# Patient Record
Sex: FEMALE | Race: WHITE | NOT HISPANIC OR LATINO | Employment: OTHER | URBAN - METROPOLITAN AREA
[De-identification: names, ages, dates, MRNs, and addresses within clinical notes are randomized per-mention and may not be internally consistent; named-entity substitution may affect disease eponyms.]

---

## 2018-10-08 ENCOUNTER — HOSPITAL ENCOUNTER (EMERGENCY)
Facility: CLINIC | Age: 63
Discharge: HOME | End: 2018-10-08
Attending: EMERGENCY MEDICINE | Admitting: EMERGENCY MEDICINE
Payer: MEDICARE

## 2018-10-08 VITALS
BODY MASS INDEX: 25.67 KG/M2 | DIASTOLIC BLOOD PRESSURE: 118 MMHG | SYSTOLIC BLOOD PRESSURE: 146 MMHG | RESPIRATION RATE: 16 BRPM | WEIGHT: 119 LBS | TEMPERATURE: 98.9 F | HEIGHT: 57 IN | OXYGEN SATURATION: 92 %

## 2018-10-08 DIAGNOSIS — G25.71 AKATHISIA: ICD-10-CM

## 2018-10-08 DIAGNOSIS — T88.7XXA MEDICATION SIDE EFFECTS: ICD-10-CM

## 2018-10-08 LAB
ALBUMIN SERPL-MCNC: 3.5 G/DL (ref 3.4–5)
ALBUMIN UR-MCNC: NEGATIVE MG/DL
ALP SERPL-CCNC: 85 U/L (ref 40–150)
ALT SERPL W P-5'-P-CCNC: 21 U/L (ref 0–50)
ANION GAP SERPL CALCULATED.3IONS-SCNC: 7 MMOL/L (ref 3–14)
APPEARANCE UR: CLEAR
AST SERPL W P-5'-P-CCNC: 20 U/L (ref 0–45)
BASOPHILS # BLD AUTO: 0 10E9/L (ref 0–0.2)
BASOPHILS NFR BLD AUTO: 0.3 %
BILIRUB SERPL-MCNC: 0.8 MG/DL (ref 0.2–1.3)
BILIRUB UR QL STRIP: NEGATIVE
BUN SERPL-MCNC: 14 MG/DL (ref 7–30)
CALCIUM SERPL-MCNC: 8.7 MG/DL (ref 8.5–10.1)
CHLORIDE SERPL-SCNC: 107 MMOL/L (ref 94–109)
CO2 SERPL-SCNC: 25 MMOL/L (ref 20–32)
COLOR UR AUTO: YELLOW
CREAT SERPL-MCNC: 0.58 MG/DL (ref 0.52–1.04)
DIFFERENTIAL METHOD BLD: NORMAL
EOSINOPHIL # BLD AUTO: 0 10E9/L (ref 0–0.7)
EOSINOPHIL NFR BLD AUTO: 0 %
ERYTHROCYTE [DISTWIDTH] IN BLOOD BY AUTOMATED COUNT: 12.8 % (ref 10–15)
GFR SERPL CREATININE-BSD FRML MDRD: >90 ML/MIN/1.7M2
GLUCOSE SERPL-MCNC: 107 MG/DL (ref 70–99)
GLUCOSE UR STRIP-MCNC: 150 MG/DL
HCT VFR BLD AUTO: 36.9 % (ref 35–47)
HGB BLD-MCNC: 12.8 G/DL (ref 11.7–15.7)
HGB UR QL STRIP: NEGATIVE
IMM GRANULOCYTES # BLD: 0 10E9/L (ref 0–0.4)
IMM GRANULOCYTES NFR BLD: 0.4 %
KETONES UR STRIP-MCNC: NEGATIVE MG/DL
LEUKOCYTE ESTERASE UR QL STRIP: NEGATIVE
LYMPHOCYTES # BLD AUTO: 0.9 10E9/L (ref 0.8–5.3)
LYMPHOCYTES NFR BLD AUTO: 8.4 %
MAGNESIUM SERPL-MCNC: 2.3 MG/DL (ref 1.6–2.3)
MCH RBC QN AUTO: 30.1 PG (ref 26.5–33)
MCHC RBC AUTO-ENTMCNC: 34.7 G/DL (ref 31.5–36.5)
MCV RBC AUTO: 87 FL (ref 78–100)
MONOCYTES # BLD AUTO: 1.2 10E9/L (ref 0–1.3)
MONOCYTES NFR BLD AUTO: 11.4 %
NEUTROPHILS # BLD AUTO: 8 10E9/L (ref 1.6–8.3)
NEUTROPHILS NFR BLD AUTO: 79.5 %
NITRATE UR QL: NEGATIVE
NRBC # BLD AUTO: 0 10*3/UL
NRBC BLD AUTO-RTO: 0 /100
PH UR STRIP: 6 PH (ref 5–7)
PLATELET # BLD AUTO: 247 10E9/L (ref 150–450)
POTASSIUM SERPL-SCNC: 3.5 MMOL/L (ref 3.4–5.3)
PROT SERPL-MCNC: 7.1 G/DL (ref 6.8–8.8)
RBC # BLD AUTO: 4.25 10E12/L (ref 3.8–5.2)
RBC #/AREA URNS AUTO: <1 /HPF (ref 0–2)
SODIUM SERPL-SCNC: 139 MMOL/L (ref 133–144)
SOURCE: ABNORMAL
SP GR UR STRIP: 1.02 (ref 1–1.03)
UROBILINOGEN UR STRIP-MCNC: NORMAL MG/DL (ref 0–2)
WBC # BLD AUTO: 10.1 10E9/L (ref 4–11)
WBC #/AREA URNS AUTO: 0 /HPF (ref 0–5)

## 2018-10-08 PROCEDURE — 81001 URINALYSIS AUTO W/SCOPE: CPT | Performed by: PHYSICIAN ASSISTANT

## 2018-10-08 PROCEDURE — 85025 COMPLETE CBC W/AUTO DIFF WBC: CPT | Performed by: PHYSICIAN ASSISTANT

## 2018-10-08 PROCEDURE — 25000128 H RX IP 250 OP 636: Performed by: EMERGENCY MEDICINE

## 2018-10-08 PROCEDURE — 83735 ASSAY OF MAGNESIUM: CPT | Performed by: PHYSICIAN ASSISTANT

## 2018-10-08 PROCEDURE — 25000132 ZZH RX MED GY IP 250 OP 250 PS 637: Mod: GY | Performed by: EMERGENCY MEDICINE

## 2018-10-08 PROCEDURE — 80053 COMPREHEN METABOLIC PANEL: CPT | Performed by: PHYSICIAN ASSISTANT

## 2018-10-08 PROCEDURE — 25000132 ZZH RX MED GY IP 250 OP 250 PS 637: Mod: GY | Performed by: PHYSICIAN ASSISTANT

## 2018-10-08 PROCEDURE — 25000128 H RX IP 250 OP 636: Performed by: PHYSICIAN ASSISTANT

## 2018-10-08 PROCEDURE — A9270 NON-COVERED ITEM OR SERVICE: HCPCS | Mod: GY | Performed by: PHYSICIAN ASSISTANT

## 2018-10-08 PROCEDURE — 99285 EMERGENCY DEPT VISIT HI MDM: CPT | Mod: 25

## 2018-10-08 PROCEDURE — 25000125 ZZHC RX 250: Performed by: EMERGENCY MEDICINE

## 2018-10-08 PROCEDURE — 96375 TX/PRO/DX INJ NEW DRUG ADDON: CPT

## 2018-10-08 PROCEDURE — 96374 THER/PROPH/DIAG INJ IV PUSH: CPT

## 2018-10-08 PROCEDURE — 96376 TX/PRO/DX INJ SAME DRUG ADON: CPT

## 2018-10-08 PROCEDURE — A9270 NON-COVERED ITEM OR SERVICE: HCPCS | Mod: GY | Performed by: EMERGENCY MEDICINE

## 2018-10-08 PROCEDURE — 96361 HYDRATE IV INFUSION ADD-ON: CPT

## 2018-10-08 RX ORDER — METOPROLOL TARTRATE 1 MG/ML
5 INJECTION, SOLUTION INTRAVENOUS ONCE
Status: COMPLETED | OUTPATIENT
Start: 2018-10-08 | End: 2018-10-08

## 2018-10-08 RX ORDER — LORAZEPAM 2 MG/ML
0.5 INJECTION INTRAMUSCULAR ONCE
Status: COMPLETED | OUTPATIENT
Start: 2018-10-08 | End: 2018-10-08

## 2018-10-08 RX ORDER — BENZTROPINE MESYLATE 1 MG/ML
0.5 INJECTION, SOLUTION INTRAMUSCULAR; INTRAVENOUS 2 TIMES DAILY PRN
Status: DISCONTINUED | OUTPATIENT
Start: 2018-10-08 | End: 2018-10-08

## 2018-10-08 RX ORDER — TRIHEXYPHENIDYL HYDROCHLORIDE 2 MG/1
2 TABLET ORAL ONCE
Status: COMPLETED | OUTPATIENT
Start: 2018-10-08 | End: 2018-10-08

## 2018-10-08 RX ORDER — PROPRANOLOL HYDROCHLORIDE 1 MG/ML
1 INJECTION, SOLUTION INTRAVENOUS ONCE
Status: DISCONTINUED | OUTPATIENT
Start: 2018-10-08 | End: 2018-10-08 | Stop reason: CLARIF

## 2018-10-08 RX ORDER — TRIHEXYPHENIDYL HYDROCHLORIDE 2 MG/1
2 TABLET ORAL 3 TIMES DAILY
Qty: 30 TABLET | Refills: 0 | Status: SHIPPED | OUTPATIENT
Start: 2018-10-08 | End: 2019-04-15

## 2018-10-08 RX ORDER — IBUPROFEN 600 MG/1
600 TABLET, FILM COATED ORAL ONCE
Status: COMPLETED | OUTPATIENT
Start: 2018-10-08 | End: 2018-10-08

## 2018-10-08 RX ORDER — BENZTROPINE MESYLATE 1 MG/ML
0.5 INJECTION, SOLUTION INTRAMUSCULAR; INTRAVENOUS ONCE
Status: COMPLETED | OUTPATIENT
Start: 2018-10-08 | End: 2018-10-08

## 2018-10-08 RX ORDER — LABETALOL HYDROCHLORIDE 5 MG/ML
20 INJECTION, SOLUTION INTRAVENOUS ONCE
Status: COMPLETED | OUTPATIENT
Start: 2018-10-08 | End: 2018-10-08

## 2018-10-08 RX ORDER — BENZTROPINE MESYLATE 1 MG/ML
1 INJECTION, SOLUTION INTRAMUSCULAR; INTRAVENOUS ONCE
Status: DISCONTINUED | OUTPATIENT
Start: 2018-10-08 | End: 2018-10-08

## 2018-10-08 RX ADMIN — LORAZEPAM 0.5 MG: 2 INJECTION INTRAMUSCULAR; INTRAVENOUS at 10:18

## 2018-10-08 RX ADMIN — METOPROLOL TARTRATE 5 MG: 5 INJECTION, SOLUTION INTRAVENOUS at 11:42

## 2018-10-08 RX ADMIN — TRIHEXYPHENIDYL HYDROCHLORIDE 2 MG: 2 TABLET ORAL at 13:05

## 2018-10-08 RX ADMIN — LORAZEPAM 0.5 MG: 2 INJECTION INTRAMUSCULAR; INTRAVENOUS at 12:48

## 2018-10-08 RX ADMIN — BENZTROPINE MESYLATE 0.5 MG: 1 INJECTION INTRAMUSCULAR; INTRAVENOUS at 13:19

## 2018-10-08 RX ADMIN — IBUPROFEN 600 MG: 600 TABLET, FILM COATED ORAL at 11:40

## 2018-10-08 RX ADMIN — TRIHEXYPHENIDYL HYDROCHLORIDE 2 MG: 2 TABLET ORAL at 11:03

## 2018-10-08 RX ADMIN — LABETALOL HYDROCHLORIDE 20 MG: 5 INJECTION, SOLUTION INTRAVENOUS at 12:50

## 2018-10-08 ASSESSMENT — ENCOUNTER SYMPTOMS
BACK PAIN: 0
WEAKNESS: 1
DIFFICULTY URINATING: 0
NERVOUS/ANXIOUS: 0
HEADACHES: 0
NECK PAIN: 0

## 2018-10-08 NOTE — ED AVS SNAPSHOT
Emergency Department    [address] [facility] [address]    Phone:  [phone]    Fax:  [phone]                                       Kortney Germain   MRN: [id number]    Department:   Emergency Department   Date of Visit:  10/8/2018           Patient Information     Date Of Birth          1955        Your diagnoses for this visit were:     Medication side effects     Akathisia        You were seen by Faheem Green MD.      Follow-up Information     Follow up with  Emergency Department.    Specialty:  EMERGENCY MEDICINE    Why:  As needed, If symptoms worsen    Contact information:    [address] [facility] [address] [phone]        Follow up with Niki Parham MD In 3 days.    Specialty:  Family Practice    Why:  For re-check    Contact information:    [facility]  [address] [place] [address] [phone]          Discharge Instructions       Follow-up with your primary care provider and your psychiatrist regarding the adjustment of your medications.  You will take the Artane 3 times daily, and can take a fourth dose as needed for symptoms of restlessness and movements of arms and legs.    24 Hour Appointment Hotline       To make an appointment at any [facility], call [phone] ([phone]). If you don't have a family doctor or clinic, we will help you find one. [facility] [place] clinics are conveniently located to serve the needs of you and your family.             Review of your medicines      START taking        Dose / Directions Last dose taken    trihexyphenidyl 2 MG tablet   Commonly known as:  ARTANE   Dose:  2 mg   Quantity:  30 tablet        Take 1 tablet (2 mg) by mouth 3 times daily You may take an additional 1 tablet (2 mg) as needed for symptoms of restlessness in arms and legs for a total of 4 doses per day.   Refills:  0                Prescriptions were sent or printed at these locations (1  Prescription)                   Other Prescriptions                Printed at Department/Unit printer (1 of 1)         trihexyphenidyl (ARTANE) 2 MG tablet                Procedures and tests performed during your visit     CBC with platelets differential    Comprehensive metabolic panel    Magnesium    Patient care order    UA with Microscopic      Orders Needing Specimen Collection     None      Pending Results     No orders found from 10/6/2018 to 10/9/2018.            Pending Culture Results     No orders found from 10/6/2018 to 10/9/2018.            Pending Results Instructions     If you had any lab results that were not finalized at the time of your Discharge, you can call the ED Lab Result RN at [phone]. You will be contacted by this team for any positive Lab results or changes in treatment. The nurses are available 7 days a week from 10A to 6:30P.  You can leave a message 24 hours per day and they will return your call.        Test Results From Your Hospital Stay        10/8/2018 10:09 AM      Component Results     Component Value Ref Range & Units Status    WBC 10.1 4.0 - 11.0 10e9/L Final    RBC Count 4.25 3.8 - 5.2 10e12/L Final    Hemoglobin 12.8 11.7 - 15.7 g/dL Final    Hematocrit 36.9 35.0 - 47.0 % Final    MCV 87 78 - 100 fl Final    MCH 30.1 26.5 - 33.0 pg Final    MCHC 34.7 31.5 - 36.5 g/dL Final    RDW 12.8 10.0 - 15.0 % Final    Platelet Count 247 150 - 450 10e9/L Final    Diff Method Automated Method  Final    % Neutrophils 79.5 % Final    % Lymphocytes 8.4 % Final    % Monocytes 11.4 % Final    % Eosinophils 0.0 % Final    % Basophils 0.3 % Final    % Immature Granulocytes 0.4 % Final    Nucleated RBCs 0 0 /100 Final    Absolute Neutrophil 8.0 1.6 - 8.3 10e9/L Final    Absolute Lymphocytes 0.9 0.8 - 5.3 10e9/L Final    Absolute Monocytes 1.2 0.0 - 1.3 10e9/L Final    Absolute Eosinophils 0.0 0.0 - 0.7 10e9/L Final    Absolute Basophils 0.0 0.0 - 0.2 10e9/L Final    Abs Immature  Granulocytes 0.0 0 - 0.4 10e9/L Final    Absolute Nucleated RBC 0.0  Final         10/8/2018 10:23 AM      Component Results     Component Value Ref Range & Units Status    Sodium 139 133 - 144 mmol/L Final    Potassium 3.5 3.4 - 5.3 mmol/L Final    Chloride 107 94 - 109 mmol/L Final    Carbon Dioxide 25 20 - 32 mmol/L Final    Anion Gap 7 3 - 14 mmol/L Final    Glucose 107 (H) 70 - 99 mg/dL Final    Urea Nitrogen 14 7 - 30 mg/dL Final    Creatinine 0.58 0.52 - 1.04 mg/dL Final    GFR Estimate >90 >60 mL/min/1.7m2 Final    Non  GFR Calc    GFR Estimate If Black >90 >60 mL/min/1.7m2 Final    African American GFR Calc    Calcium 8.7 8.5 - 10.1 mg/dL Final    Bilirubin Total 0.8 0.2 - 1.3 mg/dL Final    Albumin 3.5 3.4 - 5.0 g/dL Final    Protein Total 7.1 6.8 - 8.8 g/dL Final    Alkaline Phosphatase 85 40 - 150 U/L Final    ALT 21 0 - 50 U/L Final    AST 20 0 - 45 U/L Final         10/8/2018 10:21 AM      Component Results     Component Value Ref Range & Units Status    Magnesium 2.3 1.6 - 2.3 mg/dL Final         10/8/2018 10:21 AM      Component Results     Component Value Ref Range & Units Status    Color Urine Yellow  Final    Appearance Urine Clear  Final    Glucose Urine 150 (A) NEG^Negative mg/dL Final    Bilirubin Urine Negative NEG^Negative Final    Ketones Urine Negative NEG^Negative mg/dL Final    Specific Gravity Urine 1.017 1.003 - 1.035 Final    Blood Urine Negative NEG^Negative Final    pH Urine 6.0 5.0 - 7.0 pH Final    Protein Albumin Urine Negative NEG^Negative mg/dL Final    Urobilinogen mg/dL Normal 0.0 - 2.0 mg/dL Final    Nitrite Urine Negative NEG^Negative Final    Leukocyte Esterase Urine Negative NEG^Negative Final    Source Catheterized Urine  Final    WBC Urine 0 0 - 5 /HPF Final    RBC Urine <1 0 - 2 /HPF Final                Clinical Quality Measure: Blood Pressure Screening     Your blood pressure was checked while you were in the emergency department today. The last  "reading we obtained was  BP: (!) 146/118 . Please read the guidelines below about what these numbers mean and what you should do about them.  If your systolic blood pressure (the top number) is less than 120 and your diastolic blood pressure (the bottom number) is less than 80, then your blood pressure is normal. There is nothing more that you need to do about it.  If your systolic blood pressure (the top number) is 120-139 or your diastolic blood pressure (the bottom number) is 80-89, your blood pressure may be higher than it should be. You should have your blood pressure rechecked within a year by a primary care provider.  If your systolic blood pressure (the top number) is 140 or greater or your diastolic blood pressure (the bottom number) is 90 or greater, you may have high blood pressure. High blood pressure is treatable, but if left untreated over time it can put you at risk for heart attack, stroke, or kidney failure. You should have your blood pressure rechecked by a primary care provider within the next 4 weeks.  If your provider in the emergency department today gave you specific instructions to follow-up with your doctor or provider even sooner than that, you should follow that instruction and not wait for up to 4 weeks for your follow-up visit.        Thank you for choosing [facility]       Thank you for choosing [facility] for your care. Our goal is always to provide you with excellent care. Hearing back from our patients is one way we can continue to improve our services. Please take a few minutes to complete the written survey that you may receive in the mail after you visit with us. Thank you!        [employer] Information     [employer] lets you send messages to your doctor, view your test results, renew your prescriptions, schedule appointments and more. To sign up, go to [email or web address] . Click on \"Log in\" on the left side of the screen, which will take you to the Welcome page. Then click on \"Sign " "up Now\" on the right side of the page.     You will be asked to enter the access code listed below, as well as some personal information. Please follow the directions to create your username and password.     Your access code is: [email or web address]  Expires: 2019  9:15 AM     Your access code will  in 90 days. If you need help or a new code, please call your [facility] clinic or [phone].        Care EveryWhere ID     This is your Care EveryWhere ID. This could be used by other organizations to access your [facility] medical records  [id number]        Equal Access to Services     [facility] CampusSYLVIE : Kim Henning, stacey resendiz, pelon sawant, kana weaver . So [facility] [phone].    ATENCIÓN: Si habla español, tiene a amaral disposición servicios gratuitos de asistencia lingüística. Llame al [phone].    We comply with applicable federal civil rights laws and [place] laws. We do not discriminate on the basis of race, color, national origin, age, disability, sex, sexual orientation, or gender identity.            After Visit Summary       This is your record. Keep this with you and show to your community pharmacist(s) and doctor(s) at your next visit.                  "

## 2018-10-08 NOTE — ED NOTES
Bed: ED19  Expected date: 10/8/18  Expected time: 8:42 AM  Means of arrival: Ambulance  Comments:  [id number] weak ETA [id number]

## 2018-10-08 NOTE — DISCHARGE INSTRUCTIONS
Follow-up with your primary care provider and your psychiatrist regarding the adjustment of your medications.  You will take the Artane 3 times daily, and can take a fourth dose as needed for symptoms of restlessness and movements of arms and legs.

## 2018-10-08 NOTE — ED AVS SNAPSHOT
Emergency Department    [address] [place] [address]    Phone:  [phone]    Fax:  [phone]                                       Kortney Germain   MRN: [id number]    Department:   Emergency Department   Date of Visit:  10/8/2018           After Visit Summary Signature Page     I have received my discharge instructions, and my questions have been answered. I have discussed any challenges I see with this plan with the nurse or doctor.    ..........................................................................................................................................  Patient/Patient Representative Signature      ..........................................................................................................................................  Patient Representative Print Name and Relationship to Patient    ..................................................               ................................................  Date                                   Time    ..........................................................................................................................................  Reviewed by Signature/Title    ...................................................              ..............................................  Date                                               Time          22EPIC Rev 08/18

## 2018-10-09 ENCOUNTER — APPOINTMENT (OUTPATIENT)
Dept: GENERAL RADIOLOGY | Facility: CLINIC | Age: 63
End: 2018-10-09
Attending: EMERGENCY MEDICINE
Payer: MEDICARE

## 2018-10-09 ENCOUNTER — HOSPITAL ENCOUNTER (EMERGENCY)
Facility: CLINIC | Age: 63
Discharge: HOME | End: 2018-10-09
Attending: EMERGENCY MEDICINE | Admitting: EMERGENCY MEDICINE
Payer: MEDICARE

## 2018-10-09 VITALS
SYSTOLIC BLOOD PRESSURE: 185 MMHG | BODY MASS INDEX: 25.75 KG/M2 | HEIGHT: 57 IN | DIASTOLIC BLOOD PRESSURE: 99 MMHG | TEMPERATURE: 99.5 F | RESPIRATION RATE: 18 BRPM | OXYGEN SATURATION: 94 %

## 2018-10-09 DIAGNOSIS — S32.10XA CLOSED FRACTURE OF SACRUM, UNSPECIFIED PORTION OF SACRUM, INITIAL ENCOUNTER (H): ICD-10-CM

## 2018-10-09 PROCEDURE — 25000132 ZZH RX MED GY IP 250 OP 250 PS 637: Mod: GY | Performed by: EMERGENCY MEDICINE

## 2018-10-09 PROCEDURE — 72220 X-RAY EXAM SACRUM TAILBONE: CPT

## 2018-10-09 PROCEDURE — A9270 NON-COVERED ITEM OR SERVICE: HCPCS | Mod: GY | Performed by: EMERGENCY MEDICINE

## 2018-10-09 PROCEDURE — 99283 EMERGENCY DEPT VISIT LOW MDM: CPT

## 2018-10-09 RX ORDER — TRIHEXYPHENIDYL HYDROCHLORIDE 2 MG/1
2 TABLET ORAL ONCE
Status: COMPLETED | OUTPATIENT
Start: 2018-10-09 | End: 2018-10-09

## 2018-10-09 RX ORDER — LORAZEPAM 0.5 MG/1
1 TABLET ORAL ONCE
Status: COMPLETED | OUTPATIENT
Start: 2018-10-09 | End: 2018-10-09

## 2018-10-09 RX ORDER — ACETAMINOPHEN 500 MG
1000 TABLET ORAL ONCE
Status: COMPLETED | OUTPATIENT
Start: 2018-10-09 | End: 2018-10-09

## 2018-10-09 RX ORDER — OXYCODONE HYDROCHLORIDE 5 MG/1
5 TABLET ORAL EVERY 6 HOURS PRN
Qty: 12 TABLET | Refills: 0 | Status: SHIPPED | OUTPATIENT
Start: 2018-10-09 | End: 2020-06-03

## 2018-10-09 RX ADMIN — TRIHEXYPHENIDYL HYDROCHLORIDE 2 MG: 2 TABLET ORAL at 13:12

## 2018-10-09 RX ADMIN — ACETAMINOPHEN 1000 MG: 500 TABLET ORAL at 13:12

## 2018-10-09 RX ADMIN — LORAZEPAM 1 MG: 0.5 TABLET ORAL at 13:12

## 2018-10-09 ASSESSMENT — ENCOUNTER SYMPTOMS
HEADACHES: 0
ARTHRALGIAS: 1
BACK PAIN: 0

## 2018-10-09 NOTE — ED AVS SNAPSHOT
Emergency Department    [address] [facility] [address]    Phone:  [phone]    Fax:  [phone]                                       Kortney Germain   MRN: [id number]    Department:   Emergency Department   Date of Visit:  10/9/2018           Patient Information     Date Of Birth          1955        Your diagnoses for this visit were:     Closed fracture of sacrum, unspecified portion of sacrum, initial encounter (H)        You were seen by Ruel Black MD.      Follow-up Information     Follow up with Niki Parham MD.    Specialty:  Family Practice    Contact information:    [facility]  [address]   [facility] [id number] [phone]          Discharge Instructions         Pelvic Fracture  You have a break or fracture of the pelvic bone. Your fracture is stable because the bones are not out of place and there are no signs of serious internal bleeding. No surgery or other special treatment will be needed. As long as your pain is controlled by oral medicine, you can be treated at home. A broken pelvis will take about 6 to 8 weeks to heal. It can be painful to move for the first 3 to 4 weeks.   Home care    Bed rest and pain medicine is the only treatment required. Stay in bed for the first 2 to 3 days to reduce pain with movement. During this time, you will need help bathing, using the bathroom, and meals. A bedpan or bedside commode may be easier to use than getting up to use the bathroom. As soon as possible, begin sitting or walking to avoid problems with prolonged bed rest (muscle weakness, worsening back stiffness and pain, blood clots in the legs). A walker, crutches, or cane will make walking easier in the first 3 weeks.    Home healthcare may be available to provide in-home nursing services. Check with your healthcare provider, the hospital s social service department or a private nursing agency to see if your insurance will cover this  kind of care.    During the first 2 days after the injury there will probably be localized swelling and bruising on the skin over the pelvis. During this time apply an ice pack to the painful area for no more than 20 minutes every 1 to 2 hours to reduce swelling and pain. Wrap the ice pack in a thin towel. Never put ice or an ice pack directly on your skin.    You may use over-the-counter pain medicine to control pain, unless another medicine was prescribed. Take pain medicine as directed. Call your healthcare provider if your pain is not well-controlled. A dose change or stronger medicine may be needed. If you have chronic liver or kidney disease, talk with your healthcare provider before using pain medicine.  Follow-up care  Follow up with your healthcare provider, or as advised. This will help to make sure the bone is healing properly.  If X-rays were taken, you will be told of any new findings that may affect your care.  Call 911  Call 911 if you have:    Swelling, pain or redness below your knee    Chest pain or shortness of breath  When to seek medical advice  Call your healthcare provider right away if any of these occur:    Pain becomes worse or you are unable to walk with help for more than three days    Blood in your urine or bleeding from the urethra (the opening where urine comes out)    Difficulty passing urine or unable to pass stool due to pain    Fever of 100.4 F (38 C) or higher, or as directed by your healthcare provider  Date Last Reviewed: 12/3/2015    1510-5894 [employer]. [address] [place] [address]. All rights reserved. This information is not intended as a substitute for professional medical care. Always follow your healthcare professional's instructions.        Coccyx or Sacrum Contusion    You have a contusion (bruise) of the coccyx or sacrum. The sacrum is the triangular bone at the base of the spine that joins the pelvic bones. The coccyx (tailbone) is the  last bone of the sacrum that hangs down in a point like a small tail. Symptoms include swelling and some bleeding under the skin. This injury generaly takes a few weeks to heal. During that time, the bruise will typically change in color from reddish, to purple-blue, to greenish-yellow, then to yellow-brown. A crack (fracture) in the coccyx bone causes the same symptoms as a contusion in this area. Often, X-rays are not taken since the treatment is the same. If you have fracture of the tailbone as well as a contusion, healing generally takes about 4 weeks or longer.  Home care    Try to find a position of comfort. Try lying on your side with your knees bent up towards your chest and a pillow between your knees.    A bruised tailbone causes pain when sitting. You may try using a donut pillow. This is a foam pillow with a hole in the center to prevent pressure on the tailbone. You can buy this at a pharmacy or orthopedic supply store.    Ice the injured area to help reduce pain and swelling. Wrap a cold source (ice pack or ice cubes in a plastic bag) in a thin towel. Apply to the bruised area for 20 minutes every 1 to 2 hours the first day. Continue this 3 to 4 times a day until the pain and swelling goes away.    Unless another medicine was prescribed, you can take acetaminophen, ibuprofen, or naproxen to control pain. (If you have chronic liver or kidney disease or ever had a stomach ulcer or gastrointestinal bleeding, talk with your doctor before using these medicines.)  Follow-up care  Follow up with your healthcare provider, or as advised. Call if you are not improving within 2 weeks.  When to seek medical advice   Call your healthcare provider right away if you have any of the following:    Increased pain or swelling    Pain that becomes worse or spreads to one or both legs    Weakness or numbness in one or both legs    Loss of bowel or bladder control    Numbness in the groin area    Signs of infection,  including warmth, drainage, or increased redness    Frequent bruising for unknown reasons  Date Last Reviewed: 2/1/2017 2000-2017 [employer]. [address] [place] [address], [place] [address]. All rights reserved. This information is not intended as a substitute for professional medical care. Always follow your healthcare professional's instructions.    1.  Take Tylenol 500 mg tablets, take 2 tablets 1,000 mg three times a day for pain  2.  Take oxycodone for more severe pain not controlled with tylenol  3.  Weight bearing as tolerated  4.  Follow up with primary MD        24 Hour Appointment Hotline       To make an appointment at any [facility], call [phone] ([phone]). If you don't have a family doctor or clinic, we will help you find one. [facility] are conveniently located to serve the needs of you and your family.             Review of your medicines      START taking        Dose / Directions Last dose taken    oxyCODONE IR 5 MG tablet   Commonly known as:  ROXICODONE   Dose:  5 mg   Quantity:  12 tablet        Take 1 tablet (5 mg) by mouth every 6 hours as needed for pain   Refills:  0          Our records show that you are taking the medicines listed below. If these are incorrect, please call your family doctor or clinic.        Dose / Directions Last dose taken    ATIVAN PO        Refills:  0        BENADRYL PO        Refills:  0        PERPHENAZINE PO        Refills:  0        trihexyphenidyl 2 MG tablet   Commonly known as:  ARTANE   Dose:  2 mg   Quantity:  30 tablet        Take 1 tablet (2 mg) by mouth 3 times daily You may take an additional 1 tablet (2 mg) as needed for symptoms of restlessness in arms and legs for a total of 4 doses per day.   Refills:  0                Information about OPIOIDS     PRESCRIPTION OPIOIDS: WHAT YOU NEED TO KNOW   We gave you an opioid (narcotic) pain medicine. It is important to manage your pain, but opioids are not always the best  choice. You should first try all the other options your care team gave you. Take this medicine for as short a time (and as few doses) as possible.    Some activities can increase your pain, such as bandage changes or therapy sessions. It may help to take your pain medicine 30 to 60 minutes before these activities. Reduce your stress by getting enough sleep, working on hobbies you enjoy and practicing relaxation or meditation. Talk to your care team about ways to manage your pain beyond prescription opioids.    These medicines have risks:    DO NOT drive when on new or higher doses of pain medicine. These medicines can affect your alertness and reaction times, and you could be arrested for driving under the influence (DUI). If you need to use opioids long-term, talk to your care team about driving.    DO NOT operate heavy machinery    DO NOT do any other dangerous activities while taking these medicines.    DO NOT drink any alcohol while taking these medicines.     If the opioid prescribed includes acetaminophen, DO NOT take with any other medicines that contain acetaminophen. Read all labels carefully. Look for the word  acetaminophen  or  Tylenol.  Ask your pharmacist if you have questions or are unsure.    You can get addicted to pain medicines, especially if you have a history of addiction (chemical, alcohol or substance dependence). Talk to your care team about ways to reduce this risk.    All opioids tend to cause constipation. Drink plenty of water and eat foods that have a lot of fiber, such as fruits, vegetables, prune juice, apple juice and high-fiber cereal. Take a laxative (Miralax, milk of magnesia, Colace, Senna) if you don t move your bowels at least every other day. Other side effects include upset stomach, sleepiness, dizziness, throwing up, tolerance (needing more of the medicine to have the same effect), physical dependence and slowed breathing.    Store your pills in a secure place, locked if  possible. We will not replace any lost or stolen medicine. If you don t finish your medicine, please throw away (dispose) as directed by your pharmacist. The [place] [employer]ution [employer] has more information about safe disposal: https://www.pca.state.[place]/living-green/managing-unwanted-medications        Prescriptions were sent or printed at these locations (1 Prescription)                   Other Prescriptions                Printed at Department/Unit printer (1 of 1)         oxyCODONE IR (ROXICODONE) 5 MG tablet                Procedures and tests performed during your visit     XR Sacrum and Coccyx 2 Views      Orders Needing Specimen Collection     None      Pending Results     No orders found from 10/7/2018 to 10/10/2018.            Pending Culture Results     No orders found from 10/7/2018 to 10/10/2018.            Pending Results Instructions     If you had any lab results that were not finalized at the time of your Discharge, you can call the ED Lab Result RN at [phone]. You will be contacted by this team for any positive Lab results or changes in treatment. The nurses are available 7 days a week from 10A to 6:30P.  You can leave a message 24 hours per day and they will return your call.        Test Results From Your Hospital Stay        10/9/2018  2:13 PM      Narrative     XR SACRUM AND COCCYX 2 VW 10/9/2018 2:06 PM    HISTORY: Pain.    COMPARISON: None.        Impression     IMPRESSION: Lateral view demonstrates slight cortical irregularity of  the anterior sacrum concerning for a mildly displaced fracture.  Consider further evaluation with CT if warranted. Ill-defined  calcifications overlying the sacrum on frontal view likely reflect  calcified uterine fibroids.     TIMUR WOOTEN MD                Clinical Quality Measure: Blood Pressure Screening     Your blood pressure was checked while you were in the emergency department today. The last reading we obtained was  BP: (!) 185/99 . Please  "read the guidelines below about what these numbers mean and what you should do about them.  If your systolic blood pressure (the top number) is less than 120 and your diastolic blood pressure (the bottom number) is less than 80, then your blood pressure is normal. There is nothing more that you need to do about it.  If your systolic blood pressure (the top number) is 120-139 or your diastolic blood pressure (the bottom number) is 80-89, your blood pressure may be higher than it should be. You should have your blood pressure rechecked within a year by a primary care provider.  If your systolic blood pressure (the top number) is 140 or greater or your diastolic blood pressure (the bottom number) is 90 or greater, you may have high blood pressure. High blood pressure is treatable, but if left untreated over time it can put you at risk for heart attack, stroke, or kidney failure. You should have your blood pressure rechecked by a primary care provider within the next 4 weeks.  If your provider in the emergency department today gave you specific instructions to follow-up with your doctor or provider even sooner than that, you should follow that instruction and not wait for up to 4 weeks for your follow-up visit.        Thank you for choosing [facility]       Thank you for choosing [facility] for your care. Our goal is always to provide you with excellent care. Hearing back from our patients is one way we can continue to improve our services. Please take a few minutes to complete the written survey that you may receive in the mail after you visit with us. Thank you!        [employer] Information     [employer] lets you send messages to your doctor, view your test results, renew your prescriptions, schedule appointments and more. To sign up, go to [email or web address] . Click on \"Log in\" on the left side of the screen, which will take you to the Welcome page. Then click on \"Sign up Now\" on the right side of the page.     You " will be asked to enter the access code listed below, as well as some personal information. Please follow the directions to create your username and password.     Your access code is: [email or web address]  Expires: 2019  9:15 AM     Your access code will  in 90 days. If you need help or a new code, please call your [facility] clinic or [phone].        Care EveryWhere ID     This is your Care EveryWhere ID. This could be used by other organizations to access your [facility] medical records  [id number]        Equal Access to Services     [facility]: Kim Henning, stacey resendiz, pelon sawant, kana weaver . So [facility] [phone].    ATENCIÓN: Si habla español, tiene a amaral disposición servicios gratuitos de asistencia lingüística. Rosi al [phone].    We comply with applicable federal civil rights laws and [place] laws. We do not discriminate on the basis of race, color, national origin, age, disability, sex, sexual orientation, or gender identity.            After Visit Summary       This is your record. Keep this with you and show to your community pharmacist(s) and doctor(s) at your next visit.

## 2018-10-09 NOTE — ED NOTES
Bed: ED26  Expected date:   Expected time:   Means of arrival:   Comments:  william - 514 - 63 F fall buttocks pain eta 1238

## 2018-10-09 NOTE — ED PROVIDER NOTES
"  History     Chief Complaint:  Buttock pain    HPI   Kortney Germain is a 63 year old female who presents with concern for buttock pain. The patient reports that she lives in a group home, and that she fell standing up from her shower chair, due to a slippery shower mat. She landed on her back, but denies any head trauma or injury. She notes sacral pain as well as buttock pain secondary to the fall, which has persisted over the past week. The staff at this group home sent her to the emergency department with the claim that she is unable to walk independently with her walker. The patient denies this, although states that walking has indeed become more difficult. She has been managing her pain with Tylenol. She states She denies thoracic or lumbar back pain, or leg pain, or a history of osteoporosis, as well as all other concerns here in the ER today. Of note, she was seen here in the ED for the same pain yesterday.          Allergies:  Poultry meal  Potassium  Penicillins  Egg  Clozapine  Amlodipine    Medications:    Artane  Ativan  Benadryl    Past Medical History:    Dyskinesia  Schizo affective schizophrenia    Past Surgical History:    The patient does not have any pertinent past surgical history.    Family History:    No past pertinent family history.    Social History:  Marital Status:  Single [1]  Negative for tobacco use.  Negative for alcohol use.    Review of Systems   Musculoskeletal: Positive for arthralgias. Negative for back pain.   Neurological: Negative for headaches.   All other systems reviewed and are negative.      Physical Exam     Patient Vitals for the past 24 hrs:   BP Temp Temp src Heart Rate Resp SpO2 Height   10/09/18 1500 (!) 185/99 - - - - - -   10/09/18 1430 (!) 193/97 - - - - - -   10/09/18 1330 (!) 190/96 - - - - - -   10/09/18 1300 181/89 - - - - - -   10/09/18 1240 (!) 189/101 99.5  F (37.5  C) Oral 75 18 94 % 1.448 m (4' 9\")       Physical Exam    GENERAL: well developed, " pleasant  HEAD: atraumatic  EYES: pupils reactive, extraocular muscles intact, conjunctivae normal  ENT:  mucus membranes moist  NECK:  trachea midline, normal range of motion  RESPIRATORY: no tachypnea, breath sounds clear to auscultation   CVS: normal S1/S2, no murmurs, intact distal pulses  ABDOMEN: soft, nontender, nondistention  MUSCULOSKELETAL: no deformities  SKIN: warm and dry, no acute rashes or ulceration  NEURO: GCS 15, cranial nerves intact, alert and oriented x3  PSYCH:  Mood/affect normal      Emergency Department Course     Imaging:  Radiographic findings were communicated with the patient who voiced understanding of the findings.      XR sacrum and coccyx 2 views:  Lateral view demonstrates slight cortical irregularity of  the anterior sacrum concerning for a mildly displaced fracture.  Consider further evaluation with CT if warranted. Ill-defined  calcifications overlying the sacrum on frontal view likely reflect  calcified uterine fibroids.  as per radiology    Interventions:    1312 Artane 2 mg Oral   Ativan 1 mg Oral   Tylenol 1 g Oral      Emergency Department Course:    Nursing notes and vitals reviewed. (1667) I performed an exam of the patient as documented above.     Medicine administered as documented above. Blood drawn. This was sent to the lab for further testing, results above.    The patient was sent for a XR sacrum and coccyx while in the emergency department, findings above.     (4709) I rechecked the patient and discussed the results of her workup thus far.     Findings and plan explained to the Patient. Patient discharged home with instructions regarding supportive care, medications, and reasons to return. The importance of close follow-up was reviewed. The patient was prescribed Roxicodone.    I personally reviewed the imaging results with the Patient and answered all related questions prior to discharge.     Impression & Plan      Medical Decision Making:  Kortney Germain presents  with buttock pain after a fall. Xray of the coccyx and sacrum shows a subtle fracture. It is non displaced. Did not feel we needed dedicated images to prove this. Discussed supportive care with her. Waiting to see if she can ambulate with a walker and she is going back to a group home setting.  Patient tolerated walking and is safe for discharge.    Diagnosis:    ICD-10-CM    1. Closed fracture of sacrum, unspecified portion of sacrum, initial encounter (H) S32.10XA        Disposition:  discharged to home    Discharge Medications:  New Prescriptions    OXYCODONE IR (ROXICODONE) 5 MG TABLET    Take 1 tablet (5 mg) by mouth every 6 hours as needed for pain     Scribe Disclosure:  I, Dorian Pedroza, am serving as a scribe on 10/9/2018 at 12:43 PM to personally document services performed by Ruel Black MD based on my observations and the provider's statements to me.       Dorian Pedroza  10/9/2018    EMERGENCY DEPARTMENT       Ruel Black MD  10/12/18 0022

## 2018-10-09 NOTE — ED AVS SNAPSHOT
Emergency Department    [address] [place] [address]    Phone:  [phone]    Fax:  [phone]                                       Kortney Germain   MRN: [id number]    Department:   Emergency Department   Date of Visit:  10/9/2018           After Visit Summary Signature Page     I have received my discharge instructions, and my questions have been answered. I have discussed any challenges I see with this plan with the nurse or doctor.    ..........................................................................................................................................  Patient/Patient Representative Signature      ..........................................................................................................................................  Patient Representative Print Name and Relationship to Patient    ..................................................               ................................................  Date                                   Time    ..........................................................................................................................................  Reviewed by Signature/Title    ...................................................              ..............................................  Date                                               Time          22EPIC Rev 08/18

## 2018-10-09 NOTE — DISCHARGE INSTRUCTIONS
Pelvic Fracture  You have a break or fracture of the pelvic bone. Your fracture is stable because the bones are not out of place and there are no signs of serious internal bleeding. No surgery or other special treatment will be needed. As long as your pain is controlled by oral medicine, you can be treated at home. A broken pelvis will take about 6 to 8 weeks to heal. It can be painful to move for the first 3 to 4 weeks.   Home care    Bed rest and pain medicine is the only treatment required. Stay in bed for the first 2 to 3 days to reduce pain with movement. During this time, you will need help bathing, using the bathroom, and meals. A bedpan or bedside commode may be easier to use than getting up to use the bathroom. As soon as possible, begin sitting or walking to avoid problems with prolonged bed rest (muscle weakness, worsening back stiffness and pain, blood clots in the legs). A walker, crutches, or cane will make walking easier in the first 3 weeks.    Home healthcare may be available to provide in-home nursing services. Check with your healthcare provider, the hospital s social service department or a private nursing agency to see if your insurance will cover this kind of care.    During the first 2 days after the injury there will probably be localized swelling and bruising on the skin over the pelvis. During this time apply an ice pack to the painful area for no more than 20 minutes every 1 to 2 hours to reduce swelling and pain. Wrap the ice pack in a thin towel. Never put ice or an ice pack directly on your skin.    You may use over-the-counter pain medicine to control pain, unless another medicine was prescribed. Take pain medicine as directed. Call your healthcare provider if your pain is not well-controlled. A dose change or stronger medicine may be needed. If you have chronic liver or kidney disease, talk with your healthcare provider before using pain medicine.  Follow-up care  Follow up with  your healthcare provider, or as advised. This will help to make sure the bone is healing properly.  If X-rays were taken, you will be told of any new findings that may affect your care.  Call 911  Call 911 if you have:    Swelling, pain or redness below your knee    Chest pain or shortness of breath  When to seek medical advice  Call your healthcare provider right away if any of these occur:    Pain becomes worse or you are unable to walk with help for more than three days    Blood in your urine or bleeding from the urethra (the opening where urine comes out)    Difficulty passing urine or unable to pass stool due to pain    Fever of 100.4 F (38 C) or higher, or as directed by your healthcare provider  Date Last Reviewed: 12/3/2015    4718-5067 [employer]. [address] [place] [address]. All rights reserved. This information is not intended as a substitute for professional medical care. Always follow your healthcare professional's instructions.        Coccyx or Sacrum Contusion    You have a contusion (bruise) of the coccyx or sacrum. The sacrum is the triangular bone at the base of the spine that joins the pelvic bones. The coccyx (tailbone) is the last bone of the sacrum that hangs down in a point like a small tail. Symptoms include swelling and some bleeding under the skin. This injury generaly takes a few weeks to heal. During that time, the bruise will typically change in color from reddish, to purple-blue, to greenish-yellow, then to yellow-brown. A crack (fracture) in the coccyx bone causes the same symptoms as a contusion in this area. Often, X-rays are not taken since the treatment is the same. If you have fracture of the tailbone as well as a contusion, healing generally takes about 4 weeks or longer.  Home care    Try to find a position of comfort. Try lying on your side with your knees bent up towards your chest and a pillow between your knees.    A bruised tailbone causes pain  when sitting. You may try using a donut pillow. This is a foam pillow with a hole in the center to prevent pressure on the tailbone. You can buy this at a pharmacy or orthopedic supply store.    Ice the injured area to help reduce pain and swelling. Wrap a cold source (ice pack or ice cubes in a plastic bag) in a thin towel. Apply to the bruised area for 20 minutes every 1 to 2 hours the first day. Continue this 3 to 4 times a day until the pain and swelling goes away.    Unless another medicine was prescribed, you can take acetaminophen, ibuprofen, or naproxen to control pain. (If you have chronic liver or kidney disease or ever had a stomach ulcer or gastrointestinal bleeding, talk with your doctor before using these medicines.)  Follow-up care  Follow up with your healthcare provider, or as advised. Call if you are not improving within 2 weeks.  When to seek medical advice   Call your healthcare provider right away if you have any of the following:    Increased pain or swelling    Pain that becomes worse or spreads to one or both legs    Weakness or numbness in one or both legs    Loss of bowel or bladder control    Numbness in the groin area    Signs of infection, including warmth, drainage, or increased redness    Frequent bruising for unknown reasons  Date Last Reviewed: 2/1/2017 2000-2017 [employer]. [address] [place] [address], [place] [address]. All rights reserved. This information is not intended as a substitute for professional medical care. Always follow your healthcare professional's instructions.    1.  Take Tylenol 500 mg tablets, take 2 tablets 1,000 mg three times a day for pain  2.  Take oxycodone for more severe pain not controlled with tylenol  3.  Weight bearing as tolerated  4.  Follow up with primary MD

## 2018-10-30 ENCOUNTER — HOSPITAL ENCOUNTER (EMERGENCY)
Facility: CLINIC | Age: 63
Discharge: HOME | End: 2018-10-30
Attending: EMERGENCY MEDICINE | Admitting: EMERGENCY MEDICINE
Payer: MEDICARE

## 2018-10-30 ENCOUNTER — APPOINTMENT (OUTPATIENT)
Dept: CT IMAGING | Facility: CLINIC | Age: 63
End: 2018-10-30
Attending: EMERGENCY MEDICINE
Payer: MEDICARE

## 2018-10-30 VITALS
RESPIRATION RATE: 16 BRPM | HEIGHT: 57 IN | SYSTOLIC BLOOD PRESSURE: 136 MMHG | DIASTOLIC BLOOD PRESSURE: 83 MMHG | OXYGEN SATURATION: 95 % | HEART RATE: 75 BPM | WEIGHT: 97 LBS | TEMPERATURE: 97.6 F | BODY MASS INDEX: 20.93 KG/M2

## 2018-10-30 DIAGNOSIS — R42 DIZZINESS: ICD-10-CM
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
## 2018-10-30 PROCEDURE — 99284 EMERGENCY DEPT VISIT MOD MDM: CPT | Mod: 25

## 2018-10-30 PROCEDURE — 70450 CT HEAD/BRAIN W/O DYE: CPT

## 2018-10-30 ASSESSMENT — ENCOUNTER SYMPTOMS
MYALGIAS: 1
DIZZINESS: 1
NAUSEA: 0
VOMITING: 0
ABDOMINAL PAIN: 0
NECK PAIN: 0

## 2018-10-30 NOTE — ED AVS SNAPSHOT
Emergency Department    [address] [facility] [address]    Phone:  [phone]    Fax:  [phone]                                       Kortney Germain   MRN: [id number]    Department:   Emergency Department   Date of Visit:  10/30/2018           Patient Information     Date Of Birth          1955        Your diagnoses for this visit were:     Dizziness     Fall, initial encounter        You were seen by Umang Arceo MD.      Follow-up Information     Follow up with  Emergency Department.    Specialty:  EMERGENCY MEDICINE    Why:  As needed    Contact information:    [address] [facility] [address] [phone]        Follow up with Niki Parham MD.    Specialty:  Family Practice    Why:  As needed    Contact information:    [facility] [place]  [address] [facility] [address] [phone]          Discharge Instructions       Drink plenty of fluids and eat normally.    Please follow-up with your primary doctor by the end of the week if symptoms persist.    Please return to the emergency department as needed for new or worsening symptoms including speech changes, fainting, chest pain, shortness of breath, vomiting and unable to keep anything down, focal weakness to one side of her body, any other concerning symptoms.              Discharge Instructions  Concussion    You were seen today for signs of a concussion.  The symptoms will vary, depending on the nature of your injury and your health. You may have: headache, confusion, nausea (feel sick to your stomach), vomiting (throwing up) and problems with memory, concentrating, or sleep. You may feel dizzy, irritable, and tired. Children and teens may need help from their parents, teachers, and coaches to watch for symptoms as they recover.    Generally, every Emergency Department visit should have a follow-up clinic visit with either a primary or a specialty clinic/provider.  Please follow-up as instructed by your emergency provider today.     Return to the Emergency Department if:    Your headache gets worse or you start to have a really bad headache even with the recommended treatment plan.     You feel drowsier, have growing confusion, or slurred speech.     You keep repeating yourself.     You have strange behavior or are feeling more irritable.     You have a seizure.     You vomit (throw up) more than once.     You have trouble walking.     You have weakness or numbness.    Your neck pain gets worse.     You have a loss of consciousness.     You have blood for fluid coming from your ears or nose.     You have new symptoms or anything that worries you.     Home Care:    Get lots of rest and get enough sleep at night. Take daytime naps or rest if you feel tired.     Limit physical activity and  thinking  activities. These can make symptoms worse.   o Physical activities include gym, sports, weight training, running, exercise, and heavy lifting.   o Thinking activities include homework, class work, job-related work, and screen time (phone, computer, tablet, TV, and video games).     Stick to a healthy diet and drink lots of fluids. Avoid alcohol.    As symptoms improve, you may slowly return to your daily activities. If symptoms get worse or return, reduce your activity.     Know that it is normal to feel sad or frustrated when you do not feel right and are less active.     Going Back to Work:    Your care team will tell you when you are ready to return to work.      Limit the amount of work you do soon after your injury. This may speed healing. Take breaks if your symptoms get worse. You should also reduce your physical activity as well as activities that require a lot of thinking until you see your doctor. You may need shorter work days and a lighter workload.  Avoid heavy lifting, working with machinery, driving and working at heights until your symptoms are gone or you are cleared  by a provider.    Going Back to School:    If you are still having symptoms, you may need extra help at school.    Tell your teachers and school nurse about your injury and symptoms. Ask them to watch for problems with learning, memory, and concentrating. Symptoms may get worse when you do schoolwork, and you may become more irritable. You may need shorter school days, a reduced workload, and to postpone testing.  Do not drive or take gym class (physical activity) until cleared by a provider.    Returning to Sports:    Never return to play if you have any symptoms. A full recovery will reduce the chances of getting hurt again. Remember, it is better to miss one or two games than a whole season.    You should rest from all physical activity until you see your provider. Generally, if all symptoms have completely cleared, your provider can help guide you to slowly return to sports. If symptoms return or worsen, stop the activity and see your provider.    Important: If you are in an organized sport and under age 18, you will need written consent from a healthcare provider before you return to sports. Typically, this will be your primary care or sports medicine provider. Please make an appointment.    If you were given a prescription for medicine here today, be sure to read all of the information (including the package insert) that comes with your prescription.  This will include important information about the medicine, its side effects, and any warnings that you need to know about.  The pharmacist who fills the prescription can provide more information and answer questions you may have about the medicine.  If you have questions or concerns that the pharmacist cannot address, please call or return to the Emergency Department.     Remember that you can always come back to the Emergency Department if you are not able to see your regular provider in the amount of time listed above, if you get any new symptoms, or if there  is anything that worries you.    Discharge Instructions  Head Injury    You have been seen today for a head injury. Your evaluation included a history and physical examination. You may have had a CT (CAT) scan performed, though most head injuries do not require a scan. Based on this evaluation, your provider today does not feel that your head injury is serious.    Generally, every Emergency Department visit should have a follow-up clinic visit with either a primary or a specialty clinic/provider. Please follow-up as instructed by your emergency provider today.  Return to the Emergency Department if:    You are confused or you are not acting right.    Your headache gets worse or you start to have a really bad headache even with your recommended treatment plan.    You vomit (throw up) more than once.    You have a seizure.    You have trouble walking.    You have weakness or paralysis (cannot move) in an arm or a leg.    You have blood or fluid coming from your ears or nose.    You have new symptoms or anything that worries you.    Sleeping:  It is okay for you to sleep, but someone should wake you up if instructed by your provider, and someone should check on you at your usual time to wake up.     Activity:    Do not drive for at least 24 hours.    Do not drive if you have dizzy spells or trouble concentrating, or remembering things.    Do not return to any contact sports until cleared by your regular provider.     MORE INFORMATION:    Concussion:  A concussion is a minor head injury that may cause temporary problems with the way the brain works. Although concussions are important, they are generally not an emergency or a reason that a person needs to be hospitalized. Some concussion symptoms include confusion, amnesia (forgetful), nausea (sick to your stomach) and vomiting (throwing up), dizziness, fatigue, memory or concentration problems, irritability and sleep problems. For most people, concussions are mild and  temporary but some will have more severe and persistent symptoms that require on-going care and treatment.  CT Scans: Your evaluation today may have included a CT scan (CAT scan) to look for things like bleeding or a skull fracture (broken bone).  CT scans involve radiation and too many CT scans can cause serious health problems like cancer, especially in children.  Because of this, your provider may not have ordered a CT scan today if they think you are at low risk for a serious or life threatening problem.    If you were given a prescription for medicine here today, be sure to read all of the information (including the package insert) that comes with your prescription.  This will include important information about the medicine, its side effects, and any warnings that you need to know about.  The pharmacist who fills the prescription can provide more information and answer questions you may have about the medicine.  If you have questions or concerns that the pharmacist cannot address, please call or return to the Emergency Department.     Remember that you can always come back to the Emergency Department if you are not able to see your regular provider in the amount of time listed above, if you get any new symptoms, or if there is anything that worries you.      24 Hour Appointment Hotline       To make an appointment at any [facility], call [phone] ([phone]). If you don't have a family doctor or clinic, we will help you find one. [facility] are conveniently located to serve the needs of you and your family.             Review of your medicines      Our records show that you are taking the medicines listed below. If these are incorrect, please call your family doctor or clinic.        Dose / Directions Last dose taken    ATIVAN PO        Refills:  0        BENADRYL PO        Refills:  0        ESCITALOPRAM OXALATE PO        Refills:  0        oxyCODONE IR 5 MG tablet   Commonly known as:   ROXICODONE   Dose:  5 mg   Quantity:  12 tablet        Take 1 tablet (5 mg) by mouth every 6 hours as needed for pain   Refills:  0        PERPHENAZINE PO        Refills:  0        trihexyphenidyl 2 MG tablet   Commonly known as:  ARTANE   Dose:  2 mg   Quantity:  30 tablet        Take 1 tablet (2 mg) by mouth 3 times daily You may take an additional 1 tablet (2 mg) as needed for symptoms of restlessness in arms and legs for a total of 4 doses per day.   Refills:  0                Procedures and tests performed during your visit     CT Head w/o Contrast      Orders Needing Specimen Collection     None      Pending Results     Date and Time Order Name Status Description    10/30/2018 0936 CT Head w/o Contrast Preliminary             Pending Culture Results     No orders found from 10/28/2018 to 10/31/2018.            Pending Results Instructions     If you had any lab results that were not finalized at the time of your Discharge, you can call the ED Lab Result RN at [phone]. You will be contacted by this team for any positive Lab results or changes in treatment. The nurses are available 7 days a week from 10A to 6:30P.  You can leave a message 24 hours per day and they will return your call.        Test Results From Your Hospital Stay        10/30/2018 10:00 AM      Narrative     CT OF THE HEAD WITHOUT CONTRAST  10/30/2018 9:56 AM     COMPARISON: None.    HISTORY:  Intermittent dizziness status post fall. Evaluate  intracranial hemorrhage, skull fracture.     TECHNIQUE: 5 mm thick axial CT images of the head were acquired  without IV contrast material.    FINDINGS:  There is mild diffuse cerebral volume loss. There are  subtle patchy areas of decreased density in the cerebral white matter  bilaterally that are consistent with sequela of chronic small vessel  ischemic disease.     The ventricles and basal cisterns are within normal limits in  configuration given the degree of cerebral volume loss.  There is  no  midline shift. There are no extra-axial fluid collections.     No intracranial hemorrhage, mass or recent infarct.    The visualized paranasal sinuses are well aerated. There is no  mastoiditis. There are no fractures of the visualized bones.         Impression     IMPRESSION:  Diffuse cerebral volume loss and cerebral white matter  changes consistent with chronic small vessel ischemic disease. No  evidence for acute intracranial pathology.     Radiation dose for this scan was reduced using automated exposure  control, adjustment of the mA and/or kV according to patient size, or  iterative reconstruction technique.                Clinical Quality Measure: Blood Pressure Screening     Your blood pressure was checked while you were in the emergency department today. The last reading we obtained was  BP: 136/83 . Please read the guidelines below about what these numbers mean and what you should do about them.  If your systolic blood pressure (the top number) is less than 120 and your diastolic blood pressure (the bottom number) is less than 80, then your blood pressure is normal. There is nothing more that you need to do about it.  If your systolic blood pressure (the top number) is 120-139 or your diastolic blood pressure (the bottom number) is 80-89, your blood pressure may be higher than it should be. You should have your blood pressure rechecked within a year by a primary care provider.  If your systolic blood pressure (the top number) is 140 or greater or your diastolic blood pressure (the bottom number) is 90 or greater, you may have high blood pressure. High blood pressure is treatable, but if left untreated over time it can put you at risk for heart attack, stroke, or kidney failure. You should have your blood pressure rechecked by a primary care provider within the next 4 weeks.  If your provider in the emergency department today gave you specific instructions to follow-up with your doctor or provider even  "sooner than that, you should follow that instruction and not wait for up to 4 weeks for your follow-up visit.        Thank you for choosing [facility]       Thank you for choosing [facility] for your care. Our goal is always to provide you with excellent care. Hearing back from our patients is one way we can continue to improve our services. Please take a few minutes to complete the written survey that you may receive in the mail after you visit with us. Thank you!        [employer] Information     [employer] App lets you send messages to your doctor, view your test results, renew your prescriptions, schedule appointments and more. To sign up, go to [email or web address] . Click on \"Log in\" on the left side of the screen, which will take you to the Welcome page. Then click on \"Sign up Now\" on the right side of the page.     You will be asked to enter the access code listed below, as well as some personal information. Please follow the directions to create your username and password.     Your access code is: [email or web address]  Expires: 2019  9:15 AM     Your access code will  in 90 days. If you need help or a new code, please call your [facility] clinic or [phone].        Care EveryWhere ID     This is your Care EveryWhere ID. This could be used by other organizations to access your [facility] medical records  [id number]        Equal Access to Services     OLI AGGARWAL : Hadii shankar Henning, waaxda luqadaha, qaybta kaalmada savana, kana childress. So [facility] [phone].    ATENCIÓN: Si habla español, tiene a amaral disposición servicios gratuitos de asistencia lingüística. Rosi al [phone].    We comply with applicable federal civil rights laws and [place] laws. We do not discriminate on the basis of race, color, national origin, age, disability, sex, sexual orientation, or gender identity.            After Visit Summary       This is your record. Keep this with you and show to " your community pharmacist(s) and doctor(s) at your next visit.

## 2018-10-30 NOTE — DISCHARGE INSTRUCTIONS
Drink plenty of fluids and eat normally.    Please follow-up with your primary doctor by the end of the week if symptoms persist.    Please return to the emergency department as needed for new or worsening symptoms including speech changes, fainting, chest pain, shortness of breath, vomiting and unable to keep anything down, focal weakness to one side of her body, any other concerning symptoms.              Discharge Instructions  Concussion    You were seen today for signs of a concussion.  The symptoms will vary, depending on the nature of your injury and your health. You may have: headache, confusion, nausea (feel sick to your stomach), vomiting (throwing up) and problems with memory, concentrating, or sleep. You may feel dizzy, irritable, and tired. Children and teens may need help from their parents, teachers, and coaches to watch for symptoms as they recover.    Generally, every Emergency Department visit should have a follow-up clinic visit with either a primary or a specialty clinic/provider. Please follow-up as instructed by your emergency provider today.     Return to the Emergency Department if:    Your headache gets worse or you start to have a really bad headache even with the recommended treatment plan.     You feel drowsier, have growing confusion, or slurred speech.     You keep repeating yourself.     You have strange behavior or are feeling more irritable.     You have a seizure.     You vomit (throw up) more than once.     You have trouble walking.     You have weakness or numbness.    Your neck pain gets worse.     You have a loss of consciousness.     You have blood for fluid coming from your ears or nose.     You have new symptoms or anything that worries you.     Home Care:    Get lots of rest and get enough sleep at night. Take daytime naps or rest if you feel tired.     Limit physical activity and  thinking  activities. These can make symptoms worse.   o Physical activities include gym,  sports, weight training, running, exercise, and heavy lifting.   o Thinking activities include homework, class work, job-related work, and screen time (phone, computer, tablet, TV, and video games).     Stick to a healthy diet and drink lots of fluids. Avoid alcohol.    As symptoms improve, you may slowly return to your daily activities. If symptoms get worse or return, reduce your activity.     Know that it is normal to feel sad or frustrated when you do not feel right and are less active.     Going Back to Work:    Your care team will tell you when you are ready to return to work.      Limit the amount of work you do soon after your injury. This may speed healing. Take breaks if your symptoms get worse. You should also reduce your physical activity as well as activities that require a lot of thinking until you see your doctor. You may need shorter work days and a lighter workload.  Avoid heavy lifting, working with machinery, driving and working at heights until your symptoms are gone or you are cleared by a provider.    Going Back to School:    If you are still having symptoms, you may need extra help at school.    Tell your teachers and school nurse about your injury and symptoms. Ask them to watch for problems with learning, memory, and concentrating. Symptoms may get worse when you do schoolwork, and you may become more irritable. You may need shorter school days, a reduced workload, and to postpone testing.  Do not drive or take gym class (physical activity) until cleared by a provider.    Returning to Sports:    Never return to play if you have any symptoms. A full recovery will reduce the chances of getting hurt again. Remember, it is better to miss one or two games than a whole season.    You should rest from all physical activity until you see your provider. Generally, if all symptoms have completely cleared, your provider can help guide you to slowly return to sports. If symptoms return or worsen, stop  the activity and see your provider.    Important: If you are in an organized sport and under age 18, you will need written consent from a healthcare provider before you return to sports. Typically, this will be your primary care or sports medicine provider. Please make an appointment.    If you were given a prescription for medicine here today, be sure to read all of the information (including the package insert) that comes with your prescription.  This will include important information about the medicine, its side effects, and any warnings that you need to know about.  The pharmacist who fills the prescription can provide more information and answer questions you may have about the medicine.  If you have questions or concerns that the pharmacist cannot address, please call or return to the Emergency Department.     Remember that you can always come back to the Emergency Department if you are not able to see your regular provider in the amount of time listed above, if you get any new symptoms, or if there is anything that worries you.    Discharge Instructions  Head Injury    You have been seen today for a head injury. Your evaluation included a history and physical examination. You may have had a CT (CAT) scan performed, though most head injuries do not require a scan. Based on this evaluation, your provider today does not feel that your head injury is serious.    Generally, every Emergency Department visit should have a follow-up clinic visit with either a primary or a specialty clinic/provider. Please follow-up as instructed by your emergency provider today.  Return to the Emergency Department if:    You are confused or you are not acting right.    Your headache gets worse or you start to have a really bad headache even with your recommended treatment plan.    You vomit (throw up) more than once.    You have a seizure.    You have trouble walking.    You have weakness or paralysis (cannot move) in an arm or a  leg.    You have blood or fluid coming from your ears or nose.    You have new symptoms or anything that worries you.    Sleeping:  It is okay for you to sleep, but someone should wake you up if instructed by your provider, and someone should check on you at your usual time to wake up.     Activity:    Do not drive for at least 24 hours.    Do not drive if you have dizzy spells or trouble concentrating, or remembering things.    Do not return to any contact sports until cleared by your regular provider.     MORE INFORMATION:    Concussion:  A concussion is a minor head injury that may cause temporary problems with the way the brain works. Although concussions are important, they are generally not an emergency or a reason that a person needs to be hospitalized. Some concussion symptoms include confusion, amnesia (forgetful), nausea (sick to your stomach) and vomiting (throwing up), dizziness, fatigue, memory or concentration problems, irritability and sleep problems. For most people, concussions are mild and temporary but some will have more severe and persistent symptoms that require on-going care and treatment.  CT Scans: Your evaluation today may have included a CT scan (CAT scan) to look for things like bleeding or a skull fracture (broken bone).  CT scans involve radiation and too many CT scans can cause serious health problems like cancer, especially in children.  Because of this, your provider may not have ordered a CT scan today if they think you are at low risk for a serious or life threatening problem.    If you were given a prescription for medicine here today, be sure to read all of the information (including the package insert) that comes with your prescription.  This will include important information about the medicine, its side effects, and any warnings that you need to know about.  The pharmacist who fills the prescription can provide more information and answer questions you may have about the  medicine.  If you have questions or concerns that the pharmacist cannot address, please call or return to the Emergency Department.     Remember that you can always come back to the Emergency Department if you are not able to see your regular provider in the amount of time listed above, if you get any new symptoms, or if there is anything that worries you.

## 2018-10-30 NOTE — ED AVS SNAPSHOT
Emergency Department    [address] [place] [address]    Phone:  [phone]    Fax:  [phone]                                       Kortney Germain   MRN: [id number]    Department:   Emergency Department   Date of Visit:  10/30/2018           After Visit Summary Signature Page     I have received my discharge instructions, and my questions have been answered. I have discussed any challenges I see with this plan with the nurse or doctor.    ..........................................................................................................................................  Patient/Patient Representative Signature      ..........................................................................................................................................  Patient Representative Print Name and Relationship to Patient    ..................................................               ................................................  Date                                   Time    ..........................................................................................................................................  Reviewed by Signature/Title    ...................................................              ..............................................  Date                                               Time          22EPIC Rev 08/18

## 2018-10-30 NOTE — ED PROVIDER NOTES
"  History     Chief Complaint:  Dizziness    HPI   Kortney Germain is a 63 year old female with a history of a fractured sacrum and schizo affective schizophrenia who presents with two companions to the Emergency Department today for evaluation of dizziness. The patient reports she fell two days ago in the bathroom and was checked by paramedics after the fall. Since then she has had dizziness in the mornings. The dizziness feels like the room is spinning. She reports the dizziness goes away after she eats breakfast. She has never felt this dizziness before. No double vision. No nausea or vomiting. No chest pain. No abdominal pain. No neck pain. She reports leg pain but attributes it to her broken sacrum, for which she is supposed to see an orthopedist today.     Allergies:  Amlodipine  Clozapine  Penicillins  Potassium     Medications:    Benadryl  Ativan  Roxicodone  Perphenazine  Artane     Past Medical History:    Dyskinesia  Schizo affective schizophrenia  Fractured sacrum     Past Surgical History:    History reviewed. No pertinent surgical history.    Family History:    History reviewed. No pertinent family history.     Social History:  Smoking status: Never smoker  Alcohol use: No  Marital Status:  Single [1]     Review of Systems   Eyes: Negative for visual disturbance.   Cardiovascular: Negative for chest pain.   Gastrointestinal: Negative for abdominal pain, nausea and vomiting.   Musculoskeletal: Positive for myalgias. Negative for neck pain.   Neurological: Positive for dizziness.   All other systems reviewed and are negative.    Physical Exam     Patient Vitals for the past 24 hrs:   BP Temp Temp src Pulse Resp SpO2 Height Weight   10/30/18 0938 136/83 - - 75 16 95 % - -   10/30/18 0929 157/88 97.6  F (36.4  C) Oral 87 16 95 % 1.448 m (4' 9\") 44 kg (97 lb)   10/30/18 0928 157/88 - - - - - - -     Physical Exam    Constitutional: Well developed, nontox appearance  Head: Atraumatic.   Mouth/Throat: " Oropharynx is clear and moist.   Neck:  no stridor, no C-spine tenderness, full range of motion  Eyes: no scleral icterus, PERRL, EOMI  Cardiovascular: RRR, 2+ bilat radial pulses  Pulmonary/Chest: nml resp effort, Clear BS bilat  Abdominal: ND, +BS, soft, NT, no rebound or guarding   : no CVA tenderness bilat  Ext: Warm, well perfused, no edema, nontender w/ full ROM x4  Neurological: A&O,  CNII-XII intact, nml finger to nose, 5/5 strength throughout upper and lower ext, symmetric; sensation grossly intact  Skin: Skin is warm and dry.   Psychiatric: Behavior is normal. Thought content normal.   Nursing note and vitals reviewed.     Emergency Department Course     Imaging:  Radiographic findings were communicated with the patient and family who voiced understanding of the findings.  CT Head w/o Contrast  IMPRESSION:  Diffuse cerebral volume loss and cerebral white matter  changes consistent with chronic small vessel ischemic disease. No  evidence for acute intracranial pathology.  As read by radiology.    Emergency Department Course:  Past medical records, nursing notes, and vitals reviewed.  0930: I performed an exam of the patient and obtained history, as documented above.    The patient was sent for a head CT while in the emergency department, findings above.    1006: I rechecked the patient. Explained findings to the patient and her care staff.    Findings and plan explained to the Patient. Patient discharged home with instructions regarding supportive care, medications, and reasons to return. The importance of close follow-up was reviewed.     Impression & Plan      Medical Decision Makin year old female presenting w/ intermittent dizziness status post fall, currently asymptomatic     DDx includes concussion, BPPV, intracranial abnormality such as hemorrhage or skull fracture.  Doubt arterial injury such as carotid artery dissection or vertebral artery dissection given history and physical exam.   "Patient is somewhat of a poor historian but reports her \"dizziness\" improves after eating breakfast and only occurs in the morning.  Electrolyte abnormality seems unlikely given resolution of symptoms after eating.  There may be an element of transient hypoglycemia or dehydration given resolution with p.o. intake.  Labs deferred at this time given patient is asymptomatic.  Imaging ordered as noted above and significant for no acute intracranial abnml.  At this point I feel the pt is safe for discharge.  Concussion seems somewhat unlikely given reported symptoms in context of their alleviating and worsening factors.  Recommendations given regarding follow up with primary care doctor and return to the emergency department as needed for new or worsening symptoms.  Counseled on all results, disposition and diagnosis.  Patient in group home staff understanding and agreeable to plan. Patient discharged in stable condition.      Diagnosis:    ICD-10-CM   1. Dizziness R42   2. Fall, initial encounter W19.XXXA     Disposition:  discharged to home    Ana Grijalva  10/30/2018    EMERGENCY DEPARTMENT  Scribe Disclosure:  I, Ana Grijalva, am serving as a scribe at 9:30 AM on 10/30/2018 to document services personally performed by Umang Arceo MD based on my observations and the provider's statements to me.        Umang Arceo MD  10/31/18 0159    "

## 2019-04-15 ENCOUNTER — HOSPITAL ENCOUNTER (EMERGENCY)
Facility: CLINIC | Age: 64
Discharge: NURSING, REHAB OR LONG-TERM CARE | End: 2019-04-15
Attending: EMERGENCY MEDICINE | Admitting: EMERGENCY MEDICINE
Payer: MEDICARE

## 2019-04-15 VITALS
TEMPERATURE: 98.5 F | DIASTOLIC BLOOD PRESSURE: 93 MMHG | RESPIRATION RATE: 20 BRPM | OXYGEN SATURATION: 96 % | SYSTOLIC BLOOD PRESSURE: 166 MMHG

## 2019-04-15 DIAGNOSIS — G24.4 OROFACIAL DYSKINESIA: ICD-10-CM

## 2019-04-15 DIAGNOSIS — R25.1 TREMOR: ICD-10-CM

## 2019-04-15 LAB
ALBUMIN SERPL-MCNC: 3.8 G/DL (ref 3.4–5)
ALP SERPL-CCNC: 65 U/L (ref 40–150)
ALT SERPL W P-5'-P-CCNC: 33 U/L (ref 0–50)
ANION GAP SERPL CALCULATED.3IONS-SCNC: 6 MMOL/L (ref 3–14)
AST SERPL W P-5'-P-CCNC: 21 U/L (ref 0–45)
BASOPHILS # BLD AUTO: 0 10E9/L (ref 0–0.2)
BASOPHILS NFR BLD AUTO: 0.3 %
BILIRUB SERPL-MCNC: 0.5 MG/DL (ref 0.2–1.3)
BUN SERPL-MCNC: 27 MG/DL (ref 7–30)
CALCIUM SERPL-MCNC: 8.3 MG/DL (ref 8.5–10.1)
CHLORIDE SERPL-SCNC: 109 MMOL/L (ref 94–109)
CK SERPL-CCNC: 84 U/L (ref 30–225)
CO2 SERPL-SCNC: 27 MMOL/L (ref 20–32)
CREAT SERPL-MCNC: 0.72 MG/DL (ref 0.52–1.04)
DIFFERENTIAL METHOD BLD: NORMAL
EOSINOPHIL # BLD AUTO: 0.2 10E9/L (ref 0–0.7)
EOSINOPHIL NFR BLD AUTO: 3.2 %
ERYTHROCYTE [DISTWIDTH] IN BLOOD BY AUTOMATED COUNT: 13.7 % (ref 10–15)
GFR SERPL CREATININE-BSD FRML MDRD: 90 ML/MIN/{1.73_M2}
GLUCOSE SERPL-MCNC: 105 MG/DL (ref 70–99)
HCT VFR BLD AUTO: 40.2 % (ref 35–47)
HGB BLD-MCNC: 13.3 G/DL (ref 11.7–15.7)
IMM GRANULOCYTES # BLD: 0 10E9/L (ref 0–0.4)
IMM GRANULOCYTES NFR BLD: 0.2 %
LYMPHOCYTES # BLD AUTO: 1.8 10E9/L (ref 0.8–5.3)
LYMPHOCYTES NFR BLD AUTO: 30.1 %
MCH RBC QN AUTO: 31.7 PG (ref 26.5–33)
MCHC RBC AUTO-ENTMCNC: 33.1 G/DL (ref 31.5–36.5)
MCV RBC AUTO: 96 FL (ref 78–100)
MONOCYTES # BLD AUTO: 0.5 10E9/L (ref 0–1.3)
MONOCYTES NFR BLD AUTO: 8.5 %
NEUTROPHILS # BLD AUTO: 3.4 10E9/L (ref 1.6–8.3)
NEUTROPHILS NFR BLD AUTO: 57.7 %
NRBC # BLD AUTO: 0 10*3/UL
NRBC BLD AUTO-RTO: 0 /100
PLATELET # BLD AUTO: 235 10E9/L (ref 150–450)
POTASSIUM SERPL-SCNC: 3.7 MMOL/L (ref 3.4–5.3)
PROT SERPL-MCNC: 7.1 G/DL (ref 6.8–8.8)
RBC # BLD AUTO: 4.2 10E12/L (ref 3.8–5.2)
SODIUM SERPL-SCNC: 142 MMOL/L (ref 133–144)
WBC # BLD AUTO: 5.9 10E9/L (ref 4–11)

## 2019-04-15 PROCEDURE — 85025 COMPLETE CBC W/AUTO DIFF WBC: CPT | Performed by: EMERGENCY MEDICINE

## 2019-04-15 PROCEDURE — 82550 ASSAY OF CK (CPK): CPT | Performed by: EMERGENCY MEDICINE

## 2019-04-15 PROCEDURE — 80053 COMPREHEN METABOLIC PANEL: CPT | Performed by: EMERGENCY MEDICINE

## 2019-04-15 PROCEDURE — 96374 THER/PROPH/DIAG INJ IV PUSH: CPT

## 2019-04-15 PROCEDURE — 99284 EMERGENCY DEPT VISIT MOD MDM: CPT | Mod: 25

## 2019-04-15 PROCEDURE — 25000128 H RX IP 250 OP 636: Performed by: EMERGENCY MEDICINE

## 2019-04-15 PROCEDURE — 96361 HYDRATE IV INFUSION ADD-ON: CPT

## 2019-04-15 RX ORDER — DIPHENHYDRAMINE HYDROCHLORIDE 50 MG/ML
25 INJECTION INTRAMUSCULAR; INTRAVENOUS ONCE
Status: COMPLETED | OUTPATIENT
Start: 2019-04-15 | End: 2019-04-15

## 2019-04-15 RX ORDER — BENZTROPINE MESYLATE 1 MG/ML
1 INJECTION, SOLUTION INTRAMUSCULAR; INTRAVENOUS ONCE
Status: DISCONTINUED | OUTPATIENT
Start: 2019-04-15 | End: 2019-04-15 | Stop reason: HOSPADM

## 2019-04-15 RX ADMIN — SODIUM CHLORIDE 1000 ML: 9 INJECTION, SOLUTION INTRAVENOUS at 08:35

## 2019-04-15 RX ADMIN — DIPHENHYDRAMINE HYDROCHLORIDE 25 MG: 50 INJECTION INTRAMUSCULAR; INTRAVENOUS at 08:36

## 2019-04-15 ASSESSMENT — ENCOUNTER SYMPTOMS
VOMITING: 0
NAUSEA: 0
FEVER: 0
TREMORS: 1
DIARRHEA: 0

## 2019-04-15 NOTE — ED AVS SNAPSHOT
Emergency Department  [address] [place] [address]  Phone:  [phone]  Fax:  [phone]                                    Kortney Germain   MRN: [id number]    Department:   Emergency Department   Date of Visit:  4/15/2019           After Visit Summary Signature Page    I have received my discharge instructions, and my questions have been answered. I have discussed any challenges I see with this plan with the nurse or doctor.    ..........................................................................................................................................  Patient/Patient Representative Signature      ..........................................................................................................................................  Patient Representative Print Name and Relationship to Patient    ..................................................               ................................................  Date                                   Time    ..........................................................................................................................................  Reviewed by Signature/Title    ...................................................              ..............................................  Date                                               Time          22EPIC Rev 08/18

## 2019-04-15 NOTE — ED NOTES
Spoke with Carlos at pts group home regarding pts return to group home. Pt gave me permission to talk with carlos

## 2019-04-15 NOTE — ED PROVIDER NOTES
History     Chief Complaint:  Tremor    HPI   Kortney Germain is a 63 year old female with a history of hypertension and dyskinesia who presents to the ED via EMS from her group home for evaluation of increased tremors. EMS reports that the patient has a tremor at her baseline, related to her perphenazine 4 mg TID. There has been no recent dosing change of this, and she has had increased tremors over the past 4 days. The group home attempted to call her PA who prescribes this 3 days ago, though they were unable to get ahold of her. Given her ongoing symptoms, they called EMS to bring her to the ED for evaluation. Here in the ED, the patient denies any fevers, nausea, vomiting, diarrhea, or other symptoms or concerns. Of note, she does take Benadryl 50 mg TID, Artane 18 mg BID, and Lorazepam 0.25 mg BID to treat her tremor. She adds that she has no current homicidal or suicidal ideations.    Allergies:  Amlodipine  Clozapine  Penicillins  Potassium    Medications:    Benadryl  Escitalopram  Perphenazine  Artane    Past Medical History:    HTN  Anxiety  Dyskinesia  Schizo affective schizophrenia  Depression    Past Surgical History:    The patient does not have any pertinent past surgical history.    Family History:    No past pertinent family history.    Social History:  Marital Status:  Single [1]  Negative for tobacco use.  Negative for alcohol use.  Negative for drug use.     Review of Systems   Constitutional: Negative for fever.   Gastrointestinal: Negative for diarrhea, nausea and vomiting.   Neurological: Positive for tremors.   Psychiatric/Behavioral: Negative for suicidal ideas.   All other systems reviewed and are negative.      Physical Exam     Patient Vitals for the past 24 hrs:   BP Temp Temp src Heart Rate Resp SpO2   04/15/19 0829 (!) 166/93 98.5  F (36.9  C) Oral 73 20 96 %     Physical Exam   General: Alert, interactive in mild distress  Head:  Scalp is atraumatic. Tardive dyskinesia of the lower  lip.  Eyes:  The pupils are equal, round, and reactive to light    EOM's intact    No scleral icterus  ENT:      Nose:  The external nose is normal  Ears:  External ears are normal  Mouth/Throat: The oropharynx is normal    Mucus membranes are moist      Neck:  Normal range of motion.      There is no rigidity.    Trachea is in the midline         CV:  Regular rate and rhythm    No murmur   Resp:  Breath sounds are clear bilaterally    Non-labored, no retractions or accessory muscle use      GI:  Abdomen is soft, no distension, no tenderness.       MS:  Normal strength in all 4 extremities    No rigidity to the extremities. Mild carpalpedal spasm of the upper extremities.   Skin:  Warm and dry, No rash or lesions noted.  Neuro: Strength 5/5 x4.  Sensation intact  In all 4 extremities.        Psych:  Awake. Alert.      No suicidal or homicidal ideations.     Emergency Department Course     Laboratory:  CBC: WBC: 5.9, HGB: 13.3, PLT: 235  CMP: Glucose 105 (H), Calcium 8.3 (L), o/w WNL (Creatinine: 0.72)    CK total: 84    Interventions:  0835 NS 1L IV  0836 Benadryl 25 mg IV injection    Emergency Department Course:  Nursing notes and vitals reviewed. (4562) I performed an exam of the patient as documented above.     IV inserted. Medicine administered as documented above. Blood drawn. This was sent to the lab for further testing, results above.    (6532) I rechecked the patient and discussed the results of her workup thus far. The patient refuses a dose of cogentin here in the ED, as she has had side effects with this in the past.     (6613) I reevaluated the patient and provided an update in regards to her ED course.      (1239) I consulted with Kim Osuna PA-C, regarding the patient's history and presentation here in the emergency department.    Findings and plan explained to the Patient. Patient discharged home with instructions regarding supportive care, medications, and reasons to return. The importance of  close follow-up was reviewed. Patient was discharged with [place].    I personally reviewed the laboratory results with the Patient and answered all related questions prior to discharge. Patient will be transferred back to her group home    Impression & Plan      Medical Decision Making:  Kortney Germain was seen and evaluated. She presents with tardive dyskinesia. She has had no recent changes in any of her medications. There are no signs of neuroleptic malignant syndrome, serotonin syndrome, electrolyte abnormality, rhabdomyolysis, or more concerning illness. After IV benadryl and fluids here, the patient was feeling significantly improved.  Suggested cogentin, though the patient states that this has caused blurriness in her vision and refuses to take it. I have a call out for the patient's PA, Kim Cade, to discuss medication adjustments. At this point, I feel the patient can be safely discharged to home, closely follow up with her psychiatrist, and return if new symptoms develop.     Diagnosis:    ICD-10-CM    1. Orofacial dyskinesia G24.4 Comprehensive metabolic panel     CK total   2. Tremor R25.1      Disposition:  discharged to group home    Discharge Medications:     Medication List      Started    deutetrabenazine 12 MG tablet  Commonly known as:  AUSTEDO  24 mg, Oral, 2 TIMES DAILY        Discontinued    trihexyphenidyl 2 MG tablet  Commonly known as:  ARTANE          Scribe Disclosure:  I, Stephany Dewitt, am serving as a scribe on 4/15/2019 at 8:21 AM to personally document services performed by Lalit Martinez MD based on my observations and the provider's statements to me.     Stephany Dewitt  4/15/2019    EMERGENCY DEPARTMENT       Lalit Martinez MD  04/15/19 0803

## 2019-08-09 ENCOUNTER — HOSPITAL ENCOUNTER (EMERGENCY)
Facility: CLINIC | Age: 64
Discharge: HOME | End: 2019-08-09
Attending: EMERGENCY MEDICINE | Admitting: EMERGENCY MEDICINE
Payer: MEDICARE

## 2019-08-09 VITALS
TEMPERATURE: 97.5 F | DIASTOLIC BLOOD PRESSURE: 88 MMHG | HEART RATE: 59 BPM | RESPIRATION RATE: 18 BRPM | BODY MASS INDEX: 23.73 KG/M2 | SYSTOLIC BLOOD PRESSURE: 142 MMHG | HEIGHT: 57 IN | WEIGHT: 110 LBS | OXYGEN SATURATION: 98 %

## 2019-08-09 DIAGNOSIS — R25.1 TREMOR: ICD-10-CM

## 2019-08-09 DIAGNOSIS — G24.9 DYSKINESIA: ICD-10-CM

## 2019-08-09 LAB
ANION GAP SERPL CALCULATED.3IONS-SCNC: 5 MMOL/L (ref 3–14)
BASOPHILS # BLD AUTO: 0 10E9/L (ref 0–0.2)
BASOPHILS NFR BLD AUTO: 0.5 %
BUN SERPL-MCNC: 23 MG/DL (ref 7–30)
CALCIUM SERPL-MCNC: 8.8 MG/DL (ref 8.5–10.1)
CHLORIDE SERPL-SCNC: 113 MMOL/L (ref 94–109)
CO2 SERPL-SCNC: 28 MMOL/L (ref 20–32)
CREAT SERPL-MCNC: 0.61 MG/DL (ref 0.52–1.04)
DIFFERENTIAL METHOD BLD: NORMAL
EOSINOPHIL # BLD AUTO: 0.2 10E9/L (ref 0–0.7)
EOSINOPHIL NFR BLD AUTO: 3.2 %
ERYTHROCYTE [DISTWIDTH] IN BLOOD BY AUTOMATED COUNT: 13.1 % (ref 10–15)
GFR SERPL CREATININE-BSD FRML MDRD: >90 ML/MIN/{1.73_M2}
GLUCOSE SERPL-MCNC: 143 MG/DL (ref 70–99)
HCT VFR BLD AUTO: 40.4 % (ref 35–47)
HGB BLD-MCNC: 13.3 G/DL (ref 11.7–15.7)
IMM GRANULOCYTES # BLD: 0 10E9/L (ref 0–0.4)
IMM GRANULOCYTES NFR BLD: 0.3 %
LYMPHOCYTES # BLD AUTO: 1.7 10E9/L (ref 0.8–5.3)
LYMPHOCYTES NFR BLD AUTO: 27.7 %
MCH RBC QN AUTO: 32 PG (ref 26.5–33)
MCHC RBC AUTO-ENTMCNC: 32.9 G/DL (ref 31.5–36.5)
MCV RBC AUTO: 97 FL (ref 78–100)
MONOCYTES # BLD AUTO: 0.5 10E9/L (ref 0–1.3)
MONOCYTES NFR BLD AUTO: 7.5 %
NEUTROPHILS # BLD AUTO: 3.8 10E9/L (ref 1.6–8.3)
NEUTROPHILS NFR BLD AUTO: 60.8 %
NRBC # BLD AUTO: 0 10*3/UL
NRBC BLD AUTO-RTO: 0 /100
PLATELET # BLD AUTO: 223 10E9/L (ref 150–450)
POTASSIUM SERPL-SCNC: 3.9 MMOL/L (ref 3.4–5.3)
RBC # BLD AUTO: 4.16 10E12/L (ref 3.8–5.2)
SODIUM SERPL-SCNC: 146 MMOL/L (ref 133–144)
WBC # BLD AUTO: 6.3 10E9/L (ref 4–11)

## 2019-08-09 PROCEDURE — 96374 THER/PROPH/DIAG INJ IV PUSH: CPT

## 2019-08-09 PROCEDURE — 80048 BASIC METABOLIC PNL TOTAL CA: CPT | Performed by: EMERGENCY MEDICINE

## 2019-08-09 PROCEDURE — 99284 EMERGENCY DEPT VISIT MOD MDM: CPT | Mod: 25

## 2019-08-09 PROCEDURE — 25000128 H RX IP 250 OP 636: Performed by: EMERGENCY MEDICINE

## 2019-08-09 PROCEDURE — 85025 COMPLETE CBC W/AUTO DIFF WBC: CPT | Performed by: EMERGENCY MEDICINE

## 2019-08-09 RX ORDER — SODIUM CHLORIDE 9 MG/ML
1000 INJECTION, SOLUTION INTRAVENOUS CONTINUOUS
Status: DISCONTINUED | OUTPATIENT
Start: 2019-08-09 | End: 2019-08-09

## 2019-08-09 RX ORDER — DIPHENHYDRAMINE HYDROCHLORIDE 50 MG/ML
25 INJECTION INTRAMUSCULAR; INTRAVENOUS ONCE
Status: COMPLETED | OUTPATIENT
Start: 2019-08-09 | End: 2019-08-09

## 2019-08-09 RX ADMIN — DIPHENHYDRAMINE HYDROCHLORIDE 25 MG: 50 INJECTION, SOLUTION INTRAMUSCULAR; INTRAVENOUS at 15:36

## 2019-08-09 ASSESSMENT — ENCOUNTER SYMPTOMS
MYALGIAS: 0
HEADACHES: 0
FEVER: 0
NUMBNESS: 0
WEAKNESS: 0
NECK STIFFNESS: 0
TREMORS: 1
SHORTNESS OF BREATH: 0

## 2019-08-09 ASSESSMENT — MIFFLIN-ST. JEOR: SCORE: 927.84

## 2019-08-09 NOTE — ED TRIAGE NOTES
"Pt came to ER via EMS from group for evaluation of being \"shaky\" from taking her antipysch medication, perphenazine. Pt states she started taking this medication a couple months ago, with the shakiness starting a couple days ago.  "

## 2019-08-09 NOTE — ED NOTES
"Bed: ED04  Expected date: 8/9/19  Expected time: 2:08 PM  Means of arrival: Ambulance  Comments:  519 64f \"shakes\" ETA 1420  "

## 2019-08-09 NOTE — ED PROVIDER NOTES
History     Chief Complaint:  Shaking      HPI   Kortney Germain is a 64 year old female with a history of dyskinesia, schizophrenia, and depression who presents to the emergency department today for evaluation of shaking. Patient was taken off perphenazine on 7/27, and has had shaking ever since. She was then started on propranolol for the shaking, and it is not helping at all. She has been working with her PCP who referred her to a neurologist, but there is quite a long wait. She presents to the ED to be seen due to the inability to get in and see a neurologist. Her caregiver notes that her shaking has gotten slightly worse recently, and she was unable to go to her day program today. The last time she saw her doctor was a few weeks ago when she was prescribed the new medication at this appointment. She denies fever, new stiffness, or changes in her symptoms. She also denies chest pain, shortness of breath, weakness, numbness, headaches, tingling.     Allergies:  Amlodipine  Clozapine  Penicillins  Potassium    Medications:    Austedo  Benadryl  Escitalopram  Ativan  Oxycodone  Perphenazine    Past Medical History:    Depressive disorder  Dyskinesia  Hypertension  Schizo affective schizophrenia    Past Surgical History:    History reviewed. No pertinent surgical history.    Family History:    History reviewed. No pertinent family history.      Social History:  The patient was accompanied to the ED by her caregiver.  Smoking Status: no  Smokeless Tobacco: no  Alcohol Use: no   Marital Status:  Single [1]     Review of Systems   Constitutional: Negative for fever.   Respiratory: Negative for shortness of breath.    Cardiovascular: Negative for chest pain.   Musculoskeletal: Negative for myalgias and neck stiffness.   Neurological: Positive for tremors. Negative for weakness, numbness and headaches.   All other systems reviewed and are negative.      Physical Exam     Patient Vitals for the past 24 hrs:   BP Temp  "Temp src Pulse Heart Rate Resp SpO2 Height Weight   08/09/19 1615 (!) 142/88 -- -- 59 -- 14 96 % -- --   08/09/19 1600 (!) 153/89 -- -- -- -- 13 94 % -- --   08/09/19 1559 -- -- -- -- -- 16 94 % -- --   08/09/19 1558 -- -- -- -- 58 11 94 % -- --   08/09/19 1557 -- -- -- -- -- 13 -- -- --   08/09/19 1556 (!) 150/81 -- -- 58 -- -- -- -- --   08/09/19 1510 (!) 154/90 -- -- -- -- -- -- -- --   08/09/19 1440 -- -- -- -- -- -- 96 % -- --   08/09/19 1438 -- -- -- -- -- -- 95 % -- --   08/09/19 1429 (!) 123/93 99.2  F (37.3  C) Oral -- 64 16 95 % 1.448 m (4' 9\") 49.9 kg (110 lb)        Physical Exam  General: Resting on the bed.  Head: No obvious trauma to head.  Ears, Nose, Throat:  External ears normal.  Nose normal.  No pharyngeal erythema, swelling or exudate.  Midline uvula.  facial twitching noted.    Eyes:  Conjunctivae clear.  Pupils are equal, round, and reactive.   Neck: Normal range of motion.  Neck supple.   CV: Regular rate and rhythm.  No murmurs.      Respiratory: Effort normal and breath sounds normal.  No wheezing or crackles.   Gastrointestinal: Soft.  No distension. There is no tenderness.    Musculoskeletal: Normal range of motion.  Non tender extremities to palpations.    Neuro: Alert. Moving all extremities appropriately.  Normal speech.  Mild upper extremity rigidity but able to ROM.  2+ brachial and patellar reflexes.  No clonus.  Mild tremor, resolves with activity.    Skin: Skin is warm and dry.  No rash noted.   Psych: Normal mood and affect. Behavior is normal.      Emergency Department Course     Laboratory:  Laboratory findings were communicated with the patient who voiced understanding of the findings.   CBC: WBC 6.3, HGB 13.3,   BMP:  (H) Chloride 113 (H) Glucose 143 (H) (Creatinine 0.61)      Interventions:  1536 Benadryl 25 mg, IV     Emergency Department Course:  Past medical records, nursing notes, and vitals reviewed.    1516: I performed an exam of the patient and obtained " history, as documented above.     IV inserted and blood drawn.     1639 patient recheck    1653 Patient recheck    1653: I rechecked the patient. Findings and plan explained to the Patient and caregiver. Patient discharged home with instructions regarding supportive care, medications, and reasons to return. The importance of close follow-up was reviewed.          Impression & Plan      Medical Decision Makin-year-old female with history of schizophrenia, dyskinesias presents with shaking.  Vital signs have been largely unremarkable.  Broad differential was pursued including but not limited to medication reaction, dyskinesias, akathisia's, dystonic reaction, NMS, serotonin syndrome, infection, stroke, etc.  Overall patient has this bilateral tremor.  It seems to wax and wane.  When she is distracted the tremor seems to resolve or improve.  Her oral facial twitching does not seem to resolve is easily therefore suspect most likely this is a dyskinesia secondary to her psychiatric medications.  She is given IV Benadryl and had significant improvement in these.  She is encouraged to drink plenty of fluids as well.  Patient is afebrile, no hyperreflexia, no significant rigidity, no clonus.  Do not suspect NMS or serotonin syndrome at this time.  She has a nonfocal neurologic exam does not appear consistent with stroke.  She is has a low-grade fever but does not appear consistent with infection either.  She has no infectious signs or symptoms.  We attempted to get a hold of her [facility] physician assistant but were unable to.  She is feeling better.  She is able to ambulate with a walker which is baseline.  She otherwise was deemed appropriate for discharge to home.  She was discharged back to her group home.  She was encouraged to continue to follow-up with her primary doctor, her psychiatrist and the neurologist.  Of note this has been going on for several months.  She was seen in April in the ER as well for this.   I do not think this is an acute process.  Does not warrant hospitalization.  Given the patient feels better I feel comfortable patient discharging to home.  She is her own decision maker.     Diagnosis:    ICD-10-CM    1. Tremor R25.1    2. Dyskinesia G24.9        Disposition:  discharged to home    Kristin Bhupendra  8/9/2019    EMERGENCY DEPARTMENT  Scribe Disclosure:  I, Kristin Huizar, am serving as a scribe at 3:16 PM on 8/9/2019 to document services personally performed by Stephany Tripathi MD based on my observations and the provider's statements to me.       Stephany Tripathi MD  08/09/19 4350

## 2019-08-09 NOTE — ED NOTES
Went over discharge instructions with pt. Updated pt that [facility] will be transferring her back to her group home. Called group home and updated them on her return to home. HE eta 6:05.

## 2019-08-09 NOTE — ED AVS SNAPSHOT
Emergency Department  [address] [place] [address]  Phone:  [phone]  Fax:  [phone]                                    Kortney Germain   MRN: [id number]    Department:   Emergency Department   Date of Visit:  8/9/2019           After Visit Summary Signature Page    I have received my discharge instructions, and my questions have been answered. I have discussed any challenges I see with this plan with the nurse or doctor.    ..........................................................................................................................................  Patient/Patient Representative Signature      ..........................................................................................................................................  Patient Representative Print Name and Relationship to Patient    ..................................................               ................................................  Date                                   Time    ..........................................................................................................................................  Reviewed by Signature/Title    ...................................................              ..............................................  Date                                               Time          22EPIC Rev 08/18

## 2019-08-09 NOTE — DISCHARGE INSTRUCTIONS
Please continue to work with your primary doctor to manage this ongoing.  You need to continue to see her primary doctor, your psychiatrist and follow-up with neurology.  Any acute changes occur such as worsening numbness, tingling, unable to walk or do normal activities return to the emergency department sooner.

## 2020-06-03 ENCOUNTER — APPOINTMENT (OUTPATIENT)
Dept: GENERAL RADIOLOGY | Facility: CLINIC | Age: 65
End: 2020-06-03
Attending: EMERGENCY MEDICINE
Payer: MEDICARE

## 2020-06-03 ENCOUNTER — HOSPITAL ENCOUNTER (EMERGENCY)
Facility: CLINIC | Age: 65
Discharge: HOME | End: 2020-06-03
Attending: EMERGENCY MEDICINE | Admitting: EMERGENCY MEDICINE
Payer: MEDICARE

## 2020-06-03 VITALS
SYSTOLIC BLOOD PRESSURE: 127 MMHG | HEART RATE: 77 BPM | RESPIRATION RATE: 16 BRPM | DIASTOLIC BLOOD PRESSURE: 90 MMHG | TEMPERATURE: 97.7 F | BODY MASS INDEX: 22.94 KG/M2 | OXYGEN SATURATION: 99 % | WEIGHT: 106 LBS

## 2020-06-03 DIAGNOSIS — F32.0 CURRENT MILD EPISODE OF MAJOR DEPRESSIVE DISORDER, UNSPECIFIED WHETHER RECURRENT (H): ICD-10-CM

## 2020-06-03 DIAGNOSIS — S22.080A T12 COMPRESSION FRACTURE, INITIAL ENCOUNTER (H): ICD-10-CM

## 2020-06-03 PROCEDURE — 99283 EMERGENCY DEPT VISIT LOW MDM: CPT

## 2020-06-03 PROCEDURE — 25000132 ZZH RX MED GY IP 250 OP 250 PS 637: Mod: GY | Performed by: EMERGENCY MEDICINE

## 2020-06-03 PROCEDURE — 90791 PSYCH DIAGNOSTIC EVALUATION: CPT

## 2020-06-03 PROCEDURE — 72100 X-RAY EXAM L-S SPINE 2/3 VWS: CPT

## 2020-06-03 PROCEDURE — 99285 EMERGENCY DEPT VISIT HI MDM: CPT | Mod: 25

## 2020-06-03 RX ORDER — ALBUTEROL SULFATE 90 UG/1
1-2 AEROSOL, METERED RESPIRATORY (INHALATION) EVERY 4 HOURS PRN
COMMUNITY
End: 2023-01-01

## 2020-06-03 RX ORDER — METHOCARBAMOL 750 MG/1
750 TABLET, FILM COATED ORAL 4 TIMES DAILY PRN
Qty: 25 TABLET | Refills: 0 | Status: SHIPPED | OUTPATIENT
Start: 2020-06-03 | End: 2023-01-01

## 2020-06-03 RX ORDER — PERPHENAZINE 8 MG/1
8 TABLET ORAL AT BEDTIME
COMMUNITY
End: 2023-01-01

## 2020-06-03 RX ORDER — DIPHENHYDRAMINE HCL 50 MG
50 CAPSULE ORAL 3 TIMES DAILY
COMMUNITY

## 2020-06-03 RX ORDER — ACETAMINOPHEN 500 MG
1000 TABLET ORAL 3 TIMES DAILY
COMMUNITY

## 2020-06-03 RX ORDER — IBUPROFEN 600 MG/1
600 TABLET, FILM COATED ORAL ONCE
Status: COMPLETED | OUTPATIENT
Start: 2020-06-03 | End: 2020-06-03

## 2020-06-03 RX ORDER — NIFEDIPINE 30 MG
30 TABLET, EXTENDED RELEASE ORAL DAILY
COMMUNITY
End: 2023-01-01

## 2020-06-03 RX ORDER — OXYCODONE AND ACETAMINOPHEN 5; 325 MG/1; MG/1
1 TABLET ORAL ONCE
Status: COMPLETED | OUTPATIENT
Start: 2020-06-03 | End: 2020-06-03

## 2020-06-03 RX ORDER — MULTIPLE VITAMINS W/ MINERALS TAB 9MG-400MCG
1 TAB ORAL DAILY
COMMUNITY

## 2020-06-03 RX ADMIN — IBUPROFEN 600 MG: 600 TABLET ORAL at 09:58

## 2020-06-03 RX ADMIN — OXYCODONE HYDROCHLORIDE AND ACETAMINOPHEN 1 TABLET: 5; 325 TABLET ORAL at 09:58

## 2020-06-03 ASSESSMENT — ENCOUNTER SYMPTOMS
NUMBNESS: 0
FEVER: 0
HALLUCINATIONS: 0
NAUSEA: 0
VOMITING: 0
DYSPHORIC MOOD: 1
NECK PAIN: 0
BACK PAIN: 1
COUGH: 0

## 2020-06-03 NOTE — ED NOTES
Bed: ED29  Expected date:   Expected time:   Means of arrival:   Comments:  william - 518 - 63 F back pain no covid eta 0993

## 2020-06-03 NOTE — ED AVS SNAPSHOT
Emergency Department  [address] [place] [address]  Phone:  [phone]  Fax:  [phone]                                    Kortney Germain   MRN: [id number]    Department:   Emergency Department   Date of Visit:  6/3/2020           After Visit Summary Signature Page    I have received my discharge instructions, and my questions have been answered. I have discussed any challenges I see with this plan with the nurse or doctor.    ..........................................................................................................................................  Patient/Patient Representative Signature      ..........................................................................................................................................  Patient Representative Print Name and Relationship to Patient    ..................................................               ................................................  Date                                   Time    ..........................................................................................................................................  Reviewed by Signature/Title    ...................................................              ..............................................  Date                                               Time          22EPIC Rev 08/18

## 2020-06-03 NOTE — ED PROVIDER NOTES
History   Chief Complaint:  Depression    HPI   Kortney Germain is a 64 year old female, with a history of depression, schizo-affective schizophrenia, and dyskinesia, who presents to the ED for evaluation of depression. EMS reports the patient has been complaining of low back pain for the past three weeks per group home staff. They state she is having a tough time standing, but is ample to ambulate. EMS notes the staff have been trying to get an appointment with her primary, but has not been able to. Here in the emergency department, the patient states her back pain does not radiate into her legs and is localized into the middle of her back. She confirms the pain is worse when she ambulates. The patient does say, however, that her depression is worse than the back pain. She was recently taken off her Ativan about three months ago, which she states was when her depression had worsened. She does mention she has been crying excessively for the past three weeks, but has had no thoughts of self ham, suicidal ideation or homicidal ideation. The patient also denies any hallucinations as well. The patient does have a primary care doctor, but has not seen a psychiatrist recently. The patient denies any fever, nausea, vomiting, cough, recent falls, neck pain, or any other acute symptoms or injuries.     Allergies:  Amlodipine  Clozapine  Penicillins    Medications:    Austedo  Escitalopram  Perphenazine  Artane    Past Medical History:    Depressive disorder  Dyskinesia  Hypertension  Schizo affective schizophrenia  Anxiety  Adjustment disorder  ADD    Past Surgical History:    History reviewed. No pertinent surgical history.    Family History:    History reviewed. No pertinent family history.     Social History:  Smoking status: Never  Alcohol use: No  Drug use: No  Marital Status:  Single [1]    Review of Systems   Constitutional: Negative for fever.   Respiratory: Negative for cough.    Gastrointestinal: Negative for  nausea and vomiting.   Musculoskeletal: Positive for back pain. Negative for neck pain.   Neurological: Negative for numbness.   Psychiatric/Behavioral: Positive for dysphoric mood. Negative for hallucinations, self-injury and suicidal ideas.   All other systems reviewed and are negative.    Physical Exam     Patient Vitals for the past 24 hrs:   BP Temp Temp src Pulse Resp SpO2 Weight   06/03/20 0922 (!) 127/90 97.7  F (36.5  C) Oral 77 16 99 % 48.1 kg (106 lb)       Physical Exam  Nursing note and vitals reviewed.  Constitutional:  Appears well-developed and well-nourished.   HENT:   Head:    Atraumatic.   Mouth/Throat:   Oropharynx is clear and moist. No oropharyngeal exudate.   Eyes:    Pupils are equal, round, and reactive to light.   Neck:    Normal range of motion. Neck supple.      No tracheal deviation present. No thyromegaly present.   Cardiovascular:  Normal rate, regular rhythm, no murmur   Pulmonary/Chest: Breath sounds are clear and equal without wheezes or crackles.  Abdominal:   Soft. Bowel sounds are normal. Exhibits no distension and      no mass. There is no tenderness.   Back: Non-tender without swelling. No CVA tenderness.     There is no rebound and no guarding.   Musculoskeletal:  Exhibits no edema.   Lymphadenopathy:  No cervical adenopathy.   Neurological:   GCS 15.  CN 2-12 intact.  and proximal upper extremity strength strong and equal.  Bilateral lower extremity strength strong and equal, including strong dorsiflexion and plantarflexion strength.  Sensation intact and equal to the face, arms and legs.  No facial droop or weakness. Normal speech.  Follows commands and answers questions normally. Tremor(consistent with her history). Normal saddle area sensation. DTR's  2+ and equal bilaterally and achilles.    Skin:    Skin is warm and dry. No rash noted. No pallor.     Emergency Department Course   Imaging:  Radiology findings were communicated with the patient who voiced  understanding of the findings.    XR Lumbar spine, 2-3 views:  Mild dextroconvex curvature centered near the   thoracolumbar junction. Mild age-indeterminate superior endplate   compression fracture of T12. No other gross vertebral body height   loss. As before, cortical irregularity and abnormal morphology of the   sacrum on the lateral view, likely related to previous fracture, as   seen on prior pelvic radiograph of 10/9/2018, although a superimposed   acute component cannot be entirely excluded. Moderate degenerative   disc space narrowing at L5-S1 has progressed compared to previous   studies. No or minimal disc space narrowing elsewhere in the lumbar   spine. Multilevel facet arthropathy, appearing relatively   advanced/severe at L4-L5 and L5-S1. Presumed calcified uterine   fibroids projecting over the pelvis. Scattered vascular   calcifications.     Imaging independently reviewed and agree with radiologist interpretation.     Interventions:  0958: Ibuprofen 600 mg PO   0958: Percocet 5/325 1 tablet PO    Emergency Department Course:  Past medical records, nursing notes, and vitals reviewed.    [id number] I performed an exam of the patient as documented above.     The patient was sent for a lumbar x-ray while in the emergency department, results above.    1130 I rechecked the patient and discussed the results of her workup thus far. Patient is agreeable to seeing [facility].    1256 The patient spoke to Melecio of [facility].    1258 I rechecked the patient. Explained findings to patient.    Findings and plan explained to the Patient. Patient discharged home with instructions regarding supportive care, medications, and reasons to return. The importance of close follow-up was reviewed.    I personally reviewed the imaging results with the Patient and answered all related questions prior to discharge.     Impression & Plan   Medical Decision Making:  Kortney Germain is a 64 year old female who was found to have a T-12 vertebral  fracture of unknown age, which could be the cause of her pain. She does not have any neurologic deficits or complaints, so I felt she could be safely discharged to home. She was written for Robaxin, to take as needed, for muscle relaxation or pain and to continue with ibuprofen or Tylenol. She complains of feeling depressed, but she is not psychotic, suicidal, or homicidal and she was evaluated by the [facility] psychologist . She has a psychiatrist and was told to follow up with them. She does not want a therapist appointment. She was instructed to return if she developed any hallucinations or suicidal thoughts. She was also instructed to return if she developed any leg pain or weakness and was told to follow up with her primary care doctor this week. I did not feel there was any sign of epidural abscess, hematoma, or any concern for spinal chord compression and advanced imaging of her back was not indicated at this time.     Diagnosis:    ICD-10-CM    1. T12 compression fracture, initial encounter (H)  S22.080A    2. Current mild episode of major depressive disorder, unspecified whether recurrent (H)  F32.0        Disposition:  Discharged to home.    Discharge Medications:  New Prescriptions    METHOCARBAMOL (ROBAXIN) 750 MG TABLET    Take 1 tablet (750 mg) by mouth 4 times daily as needed for muscle spasms or other (back pain) No driving a car or drinking alcohol for 6 hours after taking this medication.       Scribe Disclosure:  I, Tye Lam, am serving as a scribe at 9:15 AM on 6/3/2020 to document services personally performed by Kiki Rivas MD based on my observations and the provider's statements to me.        Kiki Rivas MD  06/03/20 [id number]       Kiki Rivas MD  06/03/20 4359

## 2020-06-03 NOTE — PHARMACY-ADMISSION MEDICATION HISTORY
Pharmacy Medication History  Admission medication history interview status for the 6/3/2020  admission is complete. See EPIC admission navigator for prior to admission medications     Medication history sources: MAR ([facility] )  Medication history source reliability: Good  Adherence assessment: Good    Significant changes made to the medication list:        Additional medication history information:       Medication reconciliation completed by provider prior to medication history? No    Time spent in this activity: 15 min      Prior to Admission medications    Medication Sig Last Dose Taking? Auth Provider   acetaminophen (TYLENOL) 500 MG tablet Take 1,000 mg by mouth 3 times daily 0800 / 1400 / 2000 6/3/2020 at Unknown time Yes Unknown, Entered By History   albuterol (PROAIR HFA/PROVENTIL HFA/VENTOLIN HFA) 108 (90 Base) MCG/ACT inhaler Inhale 1-2 puffs into the lungs every 4 hours as needed for shortness of breath / dyspnea or wheezing Past Week at Unknown time Yes Unknown, Entered By History   cholecalciferol 50 MCG (2000 UT) tablet Take 1 tablet by mouth daily 6/3/2020 at Unknown time Yes Unknown, Entered By History   diphenhydrAMINE (BENADRYL) 50 MG capsule Take 50 mg by mouth 3 times daily 0800 / 1500 / 2000 6/3/2020 at Unknown time Yes Unknown, Entered By History   multivitamin w/minerals (THERA-VIT-M) tablet Take 1 tablet by mouth daily 6/3/2020 at Unknown time Yes Unknown, Entered By History   NIFEdipine ER (ADALAT CC) 30 MG 24 hr tablet Take 30 mg by mouth daily 6/3/2020 at Unknown time Yes Unknown, Entered By History   perphenazine 8 MG tablet Take 8 mg by mouth 2 times daily 6/3/2020 at Unknown time Yes Unknown, Entered By History        Stable

## 2020-06-21 ENCOUNTER — HOSPITAL ENCOUNTER (EMERGENCY)
Facility: CLINIC | Age: 65
Discharge: HOME | End: 2020-06-21
Attending: EMERGENCY MEDICINE | Admitting: EMERGENCY MEDICINE
Payer: MEDICARE

## 2020-06-21 VITALS
OXYGEN SATURATION: 97 % | RESPIRATION RATE: 25 BRPM | SYSTOLIC BLOOD PRESSURE: 182 MMHG | TEMPERATURE: 98.9 F | DIASTOLIC BLOOD PRESSURE: 123 MMHG

## 2020-06-21 DIAGNOSIS — G89.29 CHRONIC BACK PAIN, UNSPECIFIED BACK LOCATION, UNSPECIFIED BACK PAIN LATERALITY: ICD-10-CM

## 2020-06-21 DIAGNOSIS — M54.9 CHRONIC BACK PAIN, UNSPECIFIED BACK LOCATION, UNSPECIFIED BACK PAIN LATERALITY: ICD-10-CM

## 2020-06-21 DIAGNOSIS — F32.A DEPRESSION, UNSPECIFIED DEPRESSION TYPE: ICD-10-CM

## 2020-06-21 PROCEDURE — 25000132 ZZH RX MED GY IP 250 OP 250 PS 637: Mod: GY | Performed by: EMERGENCY MEDICINE

## 2020-06-21 PROCEDURE — 99285 EMERGENCY DEPT VISIT HI MDM: CPT | Mod: 25

## 2020-06-21 PROCEDURE — 90791 PSYCH DIAGNOSTIC EVALUATION: CPT

## 2020-06-21 RX ORDER — IBUPROFEN 800 MG/1
800 TABLET, FILM COATED ORAL EVERY 8 HOURS PRN
Qty: 60 TABLET | Refills: 0 | Status: SHIPPED | OUTPATIENT
Start: 2020-06-21 | End: 2023-01-01

## 2020-06-21 RX ADMIN — IBUPROFEN 800 MG: 600 TABLET ORAL at 18:37

## 2020-06-21 ASSESSMENT — ENCOUNTER SYMPTOMS
HALLUCINATIONS: 0
VOMITING: 0
TREMORS: 1
COUGH: 0
CHILLS: 0
SHORTNESS OF BREATH: 0
NAUSEA: 0
HEADACHES: 0
ABDOMINAL PAIN: 0
FEVER: 0
CONFUSION: 0
SLEEP DISTURBANCE: 0

## 2020-06-21 NOTE — ED TRIAGE NOTES
Pt has a medication change this week and feels like her depression is getting worse. She has been crying all day. Lives in a group home.

## 2020-06-21 NOTE — ED NOTES
Bed: ED17  Expected date: 6/21/20  Expected time: 3:55 PM  Means of arrival: Ambulance  Comments:  531 65f depression  ETA 1603

## 2020-06-21 NOTE — DISCHARGE INSTRUCTIONS
Continue your medications as directed.  Okay to take ibuprofen 800 mg up to 3 times a day as needed for your back pain with food.  Set an appointment up with your doctor in the clinic for recheck of your back.  Have staff call your psychiatrist tomorrow to talk about dosing of your antidepressant and set up a follow-up appointment.

## 2020-06-21 NOTE — ED PROVIDER NOTES
History     Chief Complaint:  Depression    HPI   Kortney Germain is a 65 year old female with a history of depression, anxiety, schizoaffective schizophrenia, and hypertension who arrived via EMS and presents with worsening depression over the last few days.  She said she is crying all the time.  She is taking her medications and she saw her psychiatrist in the last couple of weeks.  He does not have an appointment scheduled.  She denies suicidal ideation.  She does not do drugs.  She has a tremor disorder with dyskinesia but that is stable.  Because she is crying all the time, she talked with the group home staff and they recommended she come in to be seen.  She has been admitted for depression in the past but it has been a while.  She has been eating well.  She sleeps okay because of medicine she takes for chronic pain.  No other associated signs or symptoms.  There is been no eliciting factors that she can think of such as a friend's death or a bad relationship that could have triggered this.  She has had no COVID symptoms, no fever cough or shortness of breath.        Allergies:  Amlodipine  Clozapine  Egg  Penicillin  Potassium  Poultry     Medications:    Robaxin  Albuterol  Perphenazine  Adalat    Past Medical History:    Allergies  Depression  Dyskinesia  Hypertension  schizoaffective schizophrenia   Anxiety     Past Surgical History:    The patient does not have any pertinent past surgical history.     Family History:    History reviewed. No pertinent family history.     Social History:  Smoking status: never  Smokeless tobacco: never  Alcohol use: no  Drug use: no  Marital Status:  Single [1]     Review of Systems   Constitutional: Negative for chills and fever.   Respiratory: Negative for cough and shortness of breath.    Gastrointestinal: Negative for abdominal pain, nausea and vomiting.   Neurological: Positive for tremors. Negative for headaches.   Psychiatric/Behavioral: Negative for confusion,  hallucinations, self-injury, sleep disturbance and suicidal ideas.        Depressed   All other systems reviewed and are negative.      Physical Exam     Patient Vitals for the past 24 hrs:   BP Temp Temp src Heart Rate Resp SpO2   06/21/20 1618 (!) 167/94 98.9  F (37.2  C) Oral 78 16 96 %       Physical Exam  Nursing note and vitals reviewed.    Constitutional:  Patient has some irregular tremors or dyskinetic movements.  She seems depressed but makes good eye contact.  HENT:    Nose normal.  No discharge.      Oral mucosa is moist.  Eyes:    Conjunctivae are normal without injection.     Pupils are equal.  Cardiovascular:  Normal rate, regular rhythm with normal S1 and S2.      Normal heart sounds and peripheral pulses 2+ and equal.       No murmur or maria guadalupe.  Pulmonary:  Effort normal and breath sounds clear to auscultation bilaterally.     No respiratory distress.  No stridor.     No wheezes. No rales.     GI:    Soft. No distension and no mass. No tenderness.   Musculoskeletal:  Normal range of motion. No extremity deformity.     No edema and no tenderness.    Neurological:   Alert and oriented.  Patient has some random intermittent jerking movements.  She has a little bit of a contracture in her left hand and wrist.  No focal weakness noted.     GCS eye subscore is 4. GCS verbal subscore is 5.      GCS motor subscore is 6.   Skin:    Skin is warm and dry. No rash noted. No diaphoresis.      No erythema. No pallor.  No lesions.  Psychiatric:   Behavior is normal.  Flat affect.  Seems depressed but is not crying.  Good eye contact.  Thought processes are normal.      Emergency Department Course       Interventions:   Ibuprofen 800 mg PO given    Emergency Department Course:  Past medical records, nursing notes, and vitals reviewed.  1610: Patient arrived via EMS.    1641: I performed an exam of the patient and obtained history, as documented above.     Urinalysis obtained and sent for evaluation.    1729: I  spoke with DEC regarding this patient.    1737: I rechecked the patient. I reviewed the results with the Patient and answered all related questions prior to discharge.     Findings and plan explained to the Patient. Patient discharged home with instructions regarding supportive care, medications, and reasons to return. The importance of close follow-up was reviewed.   Impression & Plan   Medical Decision Making:  Patient comes in with 2 concerns.  One is her depression and the second 1 is her chronic back pain.  She has been crying a lot because of her back pain and there is been some depression issues as well.  An adjustment in her medication has been made recently for depression but she is not sure is helping much.  She is not suicidal I do not feel that she needs admission.  I had DEC see the patient and they felt that she also can go back to the group home.  They feel that her depression is probably increased because of her chronic pain that is not under good control.  She was given ibuprofen 800 mg orally here and I have written a prescription for 800 mg to be given every 8 hours as needed.  She really needs to get into see her physician regarding her back pain and get that managed better and also an appointment to see her psychiatrist to talk about her antidepressants and the dosing intervals.  I feel she is safe to go home at this point.    Diagnosis:    ICD-10-CM    1. Depression, unspecified depression type  F32.9    2. Chronic back pain, unspecified back location, unspecified back pain laterality  M54.9     G89.29      Disposition:  Discharged to home.    Continue your medications as directed.  Okay to take ibuprofen 800 mg up to 3 times a day as needed for your back pain with food.  Set an appointment up with your doctor in the clinic for recheck of your back.  Have staff call your psychiatrist tomorrow to talk about dosing of your antidepressant and set up a follow-up appointment.    Roque  Ariadne  6/21/2020    EMERGENCY DEPARTMENT    Scribe Disclosure:  I, Roque Ron, am serving as a scribe at 4:19 PM on 6/21/2020 to document services personally performed by Haylee Rod MD based on my observations and the provider's statements to me.          Haylee Rod MD  06/21/20 4136

## 2020-06-21 NOTE — ED AVS SNAPSHOT
Emergency Department  [address] [place] [address]  Phone:  [phone]  Fax:  [phone]                                    Kortney Germain   MRN: [id number]    Department:   Emergency Department   Date of Visit:  6/21/2020           After Visit Summary Signature Page    I have received my discharge instructions, and my questions have been answered. I have discussed any challenges I see with this plan with the nurse or doctor.    ..........................................................................................................................................  Patient/Patient Representative Signature      ..........................................................................................................................................  Patient Representative Print Name and Relationship to Patient    ..................................................               ................................................  Date                                   Time    ..........................................................................................................................................  Reviewed by Signature/Title    ...................................................              ..............................................  Date                                               Time          22EPIC Rev 08/18

## 2020-06-24 ENCOUNTER — HOSPITAL ENCOUNTER (EMERGENCY)
Facility: CLINIC | Age: 65
Discharge: HOME | End: 2020-06-24
Attending: EMERGENCY MEDICINE | Admitting: EMERGENCY MEDICINE
Payer: MEDICARE

## 2020-06-24 VITALS
OXYGEN SATURATION: 96 % | SYSTOLIC BLOOD PRESSURE: 151 MMHG | DIASTOLIC BLOOD PRESSURE: 112 MMHG | TEMPERATURE: 97.3 F | RESPIRATION RATE: 16 BRPM | WEIGHT: 106 LBS | HEART RATE: 74 BPM | HEIGHT: 60 IN | BODY MASS INDEX: 20.81 KG/M2

## 2020-06-24 DIAGNOSIS — M54.50 CHRONIC LOW BACK PAIN, UNSPECIFIED BACK PAIN LATERALITY, UNSPECIFIED WHETHER SCIATICA PRESENT: ICD-10-CM

## 2020-06-24 DIAGNOSIS — G89.29 CHRONIC LOW BACK PAIN, UNSPECIFIED BACK PAIN LATERALITY, UNSPECIFIED WHETHER SCIATICA PRESENT: ICD-10-CM

## 2020-06-24 PROCEDURE — 99283 EMERGENCY DEPT VISIT LOW MDM: CPT

## 2020-06-24 PROCEDURE — 25000132 ZZH RX MED GY IP 250 OP 250 PS 637: Mod: GY | Performed by: EMERGENCY MEDICINE

## 2020-06-24 RX ORDER — IBUPROFEN 400 MG/1
800 TABLET, FILM COATED ORAL ONCE
Status: COMPLETED | OUTPATIENT
Start: 2020-06-24 | End: 2020-06-24

## 2020-06-24 RX ADMIN — IBUPROFEN 800 MG: 400 TABLET, FILM COATED ORAL at 09:53

## 2020-06-24 ASSESSMENT — ENCOUNTER SYMPTOMS
FEVER: 0
BACK PAIN: 1
COUGH: 0
SHORTNESS OF BREATH: 0
CHILLS: 0

## 2020-06-24 ASSESSMENT — MIFFLIN-ST. JEOR: SCORE: 952.31

## 2020-06-24 NOTE — ED NOTES
Bed: ED05  Expected date: 6/24/20  Expected time: 9:10 AM  Means of arrival: Ambulance  Comments:  511 65f back pain ETA 0932

## 2020-06-24 NOTE — ED TRIAGE NOTES
Clinic appt for med refill, refused blood work so they wouldn't give her RX, came here for back pain , meds

## 2020-06-24 NOTE — ED NOTES
I was asked to assist this patient with a follow up appointment with her PCP.  I spoke with this patient and she said she goes to [facility] [place]. She said the  gets her to her appts and she would like the same provider with an appointment after lunch tomorrow.  I contacted [facility] and set up a phone visit at 1 pm with Dr. Blanchard who saw this patient yesterday morning.  I then called the  and spoke with the staff informing them that this patient has the appointment tomorrow on the phone and will need help. The  agreed to help her. I informed the  that this patient got Ibuprofen at 0950 and asked if the  picks this patient up form the ER.  The  said the ER sends the patient home.  I confirmed the address on the face sheet as being correct.  I updated the ER staff of the above.  I have completed the consult.

## 2020-06-24 NOTE — DISCHARGE INSTRUCTIONS
You have an ER follow up appointment with Dr. Blanchard on Thursday 6/25/2020 at 1 pm on the phone at the group home..  If you have questions re: this appointment please call the clinic at [phone].  Please be by the phone at 12:45 pm.    Use your medications as directed and follow your doctors orders.

## 2020-06-24 NOTE — ED AVS SNAPSHOT
Emergency Department  [address] [place] [id number]  Phone:  [phone]  Fax:  [phone]                                    Kortney Germain   MRN: [id number]    Department:   Emergency Department   Date of Visit:  6/24/2020           After Visit Summary Signature Page    I have received my discharge instructions, and my questions have been answered. I have discussed any challenges I see with this plan with the nurse or doctor.    ..........................................................................................................................................  Patient/Patient Representative Signature      ..........................................................................................................................................  Patient Representative Print Name and Relationship to Patient    ..................................................               ................................................  Date                                   Time    ..........................................................................................................................................  Reviewed by Signature/Title    ...................................................              ..............................................  Date                                               Time          22EPIC Rev 08/18

## 2020-06-24 NOTE — ED PROVIDER NOTES
History     Chief Complaint:  Back Pain      HPI   Kortney Germain is a 65 year old female who presents with back pain.  She has chronic back pain and was seen in the clinic yesterday for this.  She apparently was told she needed blood work before they would refill her pain medication and she did not want the blood work done so they did not refill her medication.  Today she called the ambulance from the group [facility] and they brought her in to be seen for this chronic back pain.  No new symptoms, no recent trauma.  No fevers or chills.  No COVID symptoms.  No urinary symptoms that are new.  No saddle anesthesia.    Allergies:  Amlodipine  Clozapine  Egg [Chicken-Derived Products (Egg)]  Penicillins  Potassium  Poultry Meal     Medications:    Acetaminophen   Albuterol   Benadryl  Methocarbamol  Perphenazine  Adalat  Fosamax    Past Medical History:    Depressive disorder  Dyskinesia  Hypertension   Schizo affective schizophrenia  Sacral fracture    Past Surgical History:    The patient does not have any pertinent past surgical history.     Family History:    No past pertinent family history.     Social History:  Smoking status: Never smoker  Alcohol use: No  Drug use: No  PCP: Physician No Ref-Primary  Presents to the ED alone   Up to date on immunization  Marital Status:  Single [1]    Review of Systems   Constitutional: Negative for chills and fever.   Respiratory: Negative for cough and shortness of breath.    Musculoskeletal: Positive for back pain.   All other systems reviewed and are negative.      Physical Exam     Patient Vitals for the past 24 hrs:   BP Temp Temp src Pulse Resp SpO2 Height Weight   06/24/20 0933 (!) 151/112 97.3  F (36.3  C) Temporal 74 16 96 % 1.524 m (5') 48.1 kg (106 lb)        Physical Exam  Nursing note and vitals reviewed.    Constitutional:  Appears comfortable.    Eyes:    Pupils are equal.  Musculoskeletal:  Patient has contractures in her hands.  No edema.  Diffuse low back  pain.  Neurological:   Alert and appropriate.       Coordination normal.   Skin:    Skin is warm and dry. No rash noted. No diaphoresis.   Psychiatric:   Behavior is normal. Appropriate mood and affect.     Judgment and thought content normal.      Emergency Department Course     Interventions:  0953 Ibuprofen, 800 mg, PO     Emergency Department Course:   Nursing notes and vitals reviewed.    0929 I performed an exam of the patient as documented above.     0954 I personally reviewed the results with the patient and answered all related questions prior to discharge.    Impression & Plan      Medical Decision Making:  Kortney Germain is a 65 year old female who presents to the emergency department today for evaluation of chronic back pain.  She was asking for something for pain.  She was supposed to have blood drawn yesterday before they would refill her pain medication and she refused.  Today she states she would be open to having her blood drawn.  I told her that that would not be appropriate for this visit which is for her back pain.  She does not have any new symptoms that would require advanced imaging, no fevers or chills and no trauma.  She was given 800 mg of ibuprofen and I consulted Ifrah our care coordinator who was able to establish an appointment with her doctor tomorrow.  I explained to her that if her doctor required blood work that she needs to have this done and they can manage her chronic pain through the clinic, not the emergency room.    You have an ER follow up appointment with Dr. Blanchard on Thursday 6/25/2020 at 1 pm on the phone at the group home..  If you have questions re: this appointment please call the clinic at [phone].  Please be by the phone at 12:45 pm.    Use your medications as directed and follow your doctors orders.    Diagnosis:    ICD-10-CM    1. Chronic low back pain, unspecified back pain laterality, unspecified whether sciatica present  M54.5     G89.29         Disposition:   The patient is discharged to home.     Discharge Medications:  New Prescriptions    No medications on file       Scribe Disclosure:  I, Vesta Currie, am serving as a scribe at 0929 on 6/24/2020 to document services personally performed by Haylee Rod MD based on my observations and the provider's statements to me.   EMERGENCY DEPARTMENT       Haylee Rod MD  06/24/20 1007

## 2020-09-28 NOTE — ED PROVIDER NOTES
History     Chief Complaint:  Extremity Weakness     HPI   Kortney Germain is a 63 year old female with history of schizophrenia and medication-induced dyskinesia on perphenazine who presents with leg weakness. The patient states that since last night, she has felt like she could not walk because her legs felt too weak. Of note, the patient has a baseline dyskinesia in her extremities from her psychiatric medications. She reports that her medications have been adjusted recently, though is uncertain exactly what was changed. The patient denies any headache, neck pain, back pain, difficulty urinating or defecating, weakness in her arms, or difficulty with speech/dizziness.she denies fever, chills, or pain.  Patient does report that she fell approximately 1 week ago, but has not fallen since.  She denies any history of similar episodes of leg weakness. She feels that she was at her baseline last night and reports she is felt otherwise well.         Allergies:  Amlodipine  Clozapine  Egg [Chicken-Derived Products (Egg)]  Penicillins  Potassium  Poultry Meal     Medications:    Procardia  Benadryl  Trilafon    Past Medical History:    Dyskinesia  Schizo affective shizophrenia    Past Surgical History:    The patient does not have any pertinent past surgical history.    Family History:    No past pertinent family history.    Social History:  Patient presents via EMS from her group home  Negative for tobacco use.  Negative for alcohol use.  Marital Status:  Single      Review of Systems   Genitourinary: Negative for difficulty urinating.   Musculoskeletal: Negative for back pain and neck pain.   Neurological: Positive for weakness. Negative for headaches.   Psychiatric/Behavioral: The patient is not nervous/anxious.    All other systems reviewed and are negative.    Physical Exam     Patient Vitals for the past 24 hrs:   BP Temp Temp src Heart Rate SpO2 Height Weight   10/08/18 1400 153/84 - - - - - -   10/08/18 1330  "166/81 - - - - - -   10/08/18 1300 158/90 - - - - - -   10/08/18 1255 (!) 166/94 - - - - - -   10/08/18 1130 173/89 - - - - - -   10/08/18 1100 162/80 - - - - - -   10/08/18 1030 169/90 - - - - - -   10/08/18 0901 (!) 199/104 98.9  F (37.2  C) Oral 75 92 % 1.448 m (4' 9\") 54 kg (119 lb)         Physical Exam  General: Frail-appearing female kicking and moving her legs restlessly.  Awake and alert.  Head:  Scalp is NC/AT  Eyes:  No scleral icterus, PERRL, EOMI   ENT:  The external nose and ears are normal.   Neck:  Normal range of motion without rigidity.  CV:  Regular rate and rhythm    No pathologic murmur, rubs, or gallops.  Resp:  Breath sounds are clear bilaterally.  No crackles, wheezes, rhonchi.    Non-labored, no retractions or accessory muscle use  GI:  Abdomen is soft, no distension, no tenderness. No peritoneal signs.  :  No suprapubic or flank tenderness  MS:  No lower extremity edema   Skin:  Warm and dry, No rash or lesions noted.  Neuro: Oriented x 3.    Akathisia, and dystonia is present bilaterally in the upper and lower extremities.    Normal movement of facial muscles without tardive dyskinesia.    Decreased strength bilaterally in upper and lower extremities.  Sensation grossly intact.    No hyperreflexia     Cranial nerves 2-12 intact.    GCS 15  Psych: Awake. Alert. Normal affect. Appropriate interactions.      Emergency Department Course     Laboratory:  UA with micro: Glucose: 150, o/w negative  CBC: WBC: 10.1, HGB: 12.8, PLT: 247  CMP: Glucose 107 (H), o/w WNL (Creatinine: 0.58)  Magnesium: 2.3      Interventions:  1018 - Ativan 0.5 mg IV  1103 - Artane 2 mg PO  1140 - Advil 600 mg PO  1142 - Lopressor 5 mg IV  1248 - Ativan 0.5 mg IV  1250 - Trandate 20 mg IV  1305 - Artane 2 mg IV  1319 - Cogentin 0.5 mg IV      Emergency Department Course:  Nursing notes and vitals reviewed.  0905: I performed an exam of the patient as documented above. IV inserted and blood drawn.  Labs ordered. "     1030: I rechecked the patient. Explained findings to patient.    1235: I rechecked the patient.  She continues to have akathisia, and reports pain in her legs diffusely on ambulation.  Patient unable     1346: Examined the patient and there is a marked reduction in the akathisia and dystonic reaction involving the feet and hand.  The patient feels symptomatically markedly improved although there is still some restlessness noted.  We will road test her again with and without a walker and 15-minutes.    1428: I rechecked the patient.  Findings and plan explained to the Patient. Patient discharged home with instructions regarding supportive care, medications, and reasons to return. The importance of close follow-up was reviewed.       Impression & Plan    Medical Decision Making:  Kortney Germain is a 63-year-old female who presents with akathisia and leg weakness.  Patient history and records reviewed.  Lab work was obtained as above and unremarkable including electrolytes and kidney function.  Urinalysis negative.      On examination, the patient has obvious akathisia accompanied by some dystonic movements.  She has a prior history of this secondary to her antipsychotics, and is currently prescribed Artane twice daily for symptoms.  She does not have evidence of tardive dyskinesia on examination.  There is no evidence of focal neurologic deficit to suggest CNS cause of symptoms.  She does not have any symptoms of bowel or bladder incontinence or groin anesthesia to suggest cauda equina syndrome.  No hyperreflexia, AMS, fever, or other evidence of Serotonin syndrome or NMS.  Patient is afebrile and no signs to suggest infection.  She was treated symptomatically here and required multiple medications to help control her symptoms including Cogentin.  Patient did report improvement in symptoms after several rechecks, and was able to ambulate well with the use of a walker.  She currently lives at a group home, and is  comfortable with discharge home at this time.  I did increase her Artane from twice daily to 2 mg 3 times daily with an option for an additional 2 mg dosage as needed.  I recommended she follow-up with her psychiatrist for recheck in the next several days to evaluate symptoms after adjustment of anticholinergic medication.  We will not decrease antipsychotic dosage at this time, but will defer this to outpatient psychiatry.      Additionally, the patient's blood pressure was elevated during her visit here, and it appears that she does have a prior diagnosis of hypertension though is not currently prescribed any medications.  She was given a dosage of labetalol and metoprolol for both blood pressure and for benefit related to extraparametal side effects with improvement in her pressures here.  I recommended that she follow-up with primary care provider for recheck of her blood pressure in the next 3-5 days.  Discussed indications to return to ED if new or worsening symptoms.  Diagnosis:    ICD-10-CM    1. Medication side effects T88.7XXA    2. Akathisia G25.71        Disposition:  discharged to home    Discharge Medications:  New Prescriptions    TRIHEXYPHENIDYL (ARTANE) 2 MG TABLET    Take 1 tablet (2 mg) by mouth 3 times daily You may take an additional 1 tablet (2 mg) as needed for symptoms of restlessness in arms and legs for a total of 4 doses per day.     Mukund Brewer PA-C  10/8/2018    EMERGENCY DEPARTMENT      Emergency Department Attending Supervision Note  10/8/2018  9:50 AM      I evaluated this patient in conjunction with an Advanced Practice Clinician:    APC: Daniella    Briefly, the patient presented with increasing akathisia and some mild dystonia in her legs but also this is noted in her right hand and wrist.  The patient takes Artane twice a day at 3 PM and just before bed.  She does not take this in the morning.  She has tried Cogentin in the past but this is caused blurred vision she has taken  Benadryl in the past and this led to urinary retention.  The patient believes that she needs a medication adjustment.    Examination:   General: Resting uncomfortably on the gurney  Head:  The scalp, face, and head appear normal  Eyes:  The pupils are normal    Conjunctivae and sclera appear normal    No dystonia to the tongue or face    No tar dive dyskinesia  ENT:    The nose is normal    Ears/pinnae are normal  Neck:  Normal range of motion  MS:  There is restlessness to the legs and to the right arm.  There are some periodic toe   curling/flexion and also some carpal spasms intermittently.  This is consistent with   akathisia and some dystonia    There is no focal weakness.  Skin:  No rash or lesions noted.  Neuro: Speech is normal and fluent    Akathisia and dystonia is are noted no tardive dyskinesia    No evidence of serotonin syndrome    No hyperreflexia    No clinical evidence of neuroleptic malignant syndrome  Psych:  Awake. Alert.        Medical decision making:  This patient presents with akathisia and some mild dystonic components to the toes and to the right wrist.  This is likely from her first generation antipsychotic agent.  The patient is intolerant of Cogentin and Benadryl but seems to do well with Artane.  We increased her Artane by 2 mg with some mild improvement.  We gave her 1 dose of beta-blocker which can be helpful.  A small dose of lorazepam was also provided, again which was helpful.  The most appropriate action at this time is to increase the Artane up to 2 mg 3 times a day and then she can take 1 extra 2 mg tablet if needed for akathisia and/or dystonic reaction.  Another possibility would be to have the patient talk with her psychiatrist and slightly decrease the dose of her antipsychotic, but that may not be a viable option.  There is no evidence of life-threatening etiology involving the spine or legs.  There is no evidence of neuroleptic malignant syndrome, serotonin syndrome, or  tardive dyskinesia at this time.  This patient required over 5 hours of revisits and reassessments to ensure that her extra pyramidal parkinsonian side effects or improved.  Ultimately, she is able to go home with the use of a walker for now.  The anticholinergic medication boost should be helpful over the coming days.    My impression/diagnosis is:    Akathisia and dystonia, consistent with extra pyramidal side effects from schizophrenia medication    MD Daniella Mitchell Clark Ellsworth, PA-C  10/08/18 1678       Faheem Green MD  10/08/18 3122     Orthopedic

## 2021-01-25 ENCOUNTER — APPOINTMENT (OUTPATIENT)
Dept: GENERAL RADIOLOGY | Facility: CLINIC | Age: 66
End: 2021-01-25
Attending: EMERGENCY MEDICINE
Payer: MEDICARE

## 2021-01-25 ENCOUNTER — HOSPITAL ENCOUNTER (EMERGENCY)
Facility: CLINIC | Age: 66
Discharge: HOME | End: 2021-01-25
Attending: EMERGENCY MEDICINE | Admitting: EMERGENCY MEDICINE
Payer: MEDICARE

## 2021-01-25 VITALS
HEART RATE: 76 BPM | TEMPERATURE: 98.1 F | HEIGHT: 60 IN | DIASTOLIC BLOOD PRESSURE: 82 MMHG | OXYGEN SATURATION: 99 % | BODY MASS INDEX: 20.7 KG/M2 | RESPIRATION RATE: 17 BRPM | SYSTOLIC BLOOD PRESSURE: 158 MMHG

## 2021-01-25 DIAGNOSIS — M25.552 HIP PAIN, LEFT: ICD-10-CM

## 2021-01-25 DIAGNOSIS — M16.12 OSTEOARTHRITIS OF LEFT HIP, UNSPECIFIED OSTEOARTHRITIS TYPE: ICD-10-CM

## 2021-01-25 PROCEDURE — 250N000013 HC RX MED GY IP 250 OP 250 PS 637: Mod: GY | Performed by: EMERGENCY MEDICINE

## 2021-01-25 PROCEDURE — 73502 X-RAY EXAM HIP UNI 2-3 VIEWS: CPT

## 2021-01-25 PROCEDURE — 99284 EMERGENCY DEPT VISIT MOD MDM: CPT

## 2021-01-25 RX ORDER — IBUPROFEN 400 MG/1
400 TABLET, FILM COATED ORAL ONCE
Status: COMPLETED | OUTPATIENT
Start: 2021-01-25 | End: 2021-01-25

## 2021-01-25 RX ADMIN — IBUPROFEN 400 MG: 400 TABLET, FILM COATED ORAL at 15:06

## 2021-01-25 ASSESSMENT — ENCOUNTER SYMPTOMS
SHORTNESS OF BREATH: 0
CHILLS: 0
FEVER: 0
WEAKNESS: 1
NUMBNESS: 0
ABDOMINAL PAIN: 0

## 2021-01-25 NOTE — ED PROVIDER NOTES
History   Chief Complaint:  Leg Pain    The history is provided by the patient, the EMS personnel and medical records.      Kortney Germain is a 65 year old female with history of hypertension, osteoporosis, closed sacral fracture and schizophrenia who presents via EMS with leg pain. EMS report that the patient began complaining of left leg pain at her group home last night around 9 pm. Pain is worse today she thinks is because of the weather combined with a prior injury to this leg. She took Benadryl and Tylenol approximately 1 hour ago which is not unusual for her. She has been able to bear weight but with pain. Vitals stable en route. Patient here states that she developed a sudden onset of left leg pain last night while getting up to go to the bathroom. Pain is from her left groin down her entire leg with no particular worse area of pain. Pain is worse with movement and laying back. She has a history of closed sacral fracture in 2018 and does have some chronic issues with low back pain, but nothing new or worse today, and this leg does not usually cause her any issues. No numbness or tingling, new leg swelling, fevers, chills, chest pain, shortness of breath, loss of taste or smell, abdominal pain or recent falls. No hx of blood clots. She does not have weakness, but is not using the leg as much because of pain.     Review of Systems   Constitutional: Negative for chills and fever.   HENT:        No loss of taste or smell   Respiratory: Negative for shortness of breath.    Cardiovascular: Negative for chest pain and leg swelling.   Gastrointestinal: Negative for abdominal pain.   Musculoskeletal:        Leg pain   Neurological: Positive for weakness. Negative for numbness.   All other systems reviewed and are negative.    Allergies:  Amlodipine  Clozapine  Egg   Penicillins  Potassium  Poultry meal    Medications:  Fosamax   Procardia   Trilafon   Ativan   Benadryl   Tylenol  Lexapro   Flexeril     Past Medical  History:    ADD  Anxiety   Depression  Hypertension   Osteoporosis   Sacral fracture, closed   Sebopsoriasis   Schizoaffective schizophrenia    Tardive dyskinesia    Social History:  Lives in a group home. Denies alcohol, tobacco and recreational drug use.     Physical Exam     Patient Vitals for the past 24 hrs:   BP Temp Temp src Pulse Resp Height   01/25/21 1507 -- -- -- 71 -- --   01/25/21 1502 110/79 98.1  F (36.7  C) Oral -- 17 1.524 m (5')     Physical Exam  Gen:  Alert, pleasant.    Eye:   Pupils are equal, round, and reactive.     Sclera non-injected.    ENT:   Moist mucus membranes.     Normal tongue.    Oropharynx without lesions.    Cardiac:     Normal rate and regular rhythm.    No murmurs, gallops, or rubs.   2+ femoral, popliteal and DP pulses bilaterally.     Pulmonary:     Clear to auscultation bilaterally.    No wheezes, rales, or rhonchi.    Abdomen:     Normal active bowel sounds.     Abdomen is soft and non-distended, without focal tenderness.    Musculoskeletal:     No midline thoracic or lumbar spine tenderness.   Spine is kyphotic. Pelvis is stable. Tenderness over the greater trochanter on the left, no crepitus, swelling or bruising.  Femur, knee and ankle are non-tender. Calves nontender.  Leg is warm and pink compared to the right.  5/5 DF, PF, HF, 4/5 KE on the left secondary to pain.  No pain with ROM in left hip.  Fine touch symmetric bilaterally.  No pain with log roll.  Able to bear weight with cane.    Extremities:    No edema.  Left leg shortened compared to the right.    Skin:   Warm and dry.  No rash or discoloration of left leg compared to right.    Neurologic:    Non-focal exam without asymmetric weakness or numbness.    Normal tone    Psychiatric:     Normal affect with appropriate interaction with examiner.    Emergency Department Course     Imaging:  XR Pelvis w Hip Left 1 View  No radiographic evidence of acute fracture or subluxation. Joint spaces are preserved with mild  marginal osteophyte formation. Calcified fibroid in the pelvis.  Reading per radiology     Emergency Department Course:  Reviewed:  I reviewed the patient's nursing notes, vitals, past medical records and Care Everywhere.     Assessments:  1434: I performed an exam of the patient as documented above.   1445: Patient declined labs, include d-dimer.  1704: Rechecked and updated.  She is able to ambulate without assistance, though requested a walker.  Feeling better following ibuprofen.    Interventions:  1506 Advil 400 mg Oral     Disposition:  The patient was discharged back to her group home.     Impression & Plan     Medical Decision Making:  Kortney Germain is a 65 year old female with history of schizophrenia, hypertension and osteoporosis who presents today with new onset of left leg pain. There is no history of trauma. The leg does not appear to be swollen or discolored. She has good pulses, and normal sensation. She does have a little bit of leg length discrepancy although this is likely related to old fracture she describes. She does have some point tenderness over the left greater trochanter, but has good range of motion without pain and is able to bear weight. Radiographs demonstrate bone-on-bone arthritis involving the left hip. I think this is likely the cause of the patient's pain. Otherwise, I do not suspect DVT, septic arthritis, occult fracture, acute arterial ischemia, or other dangerous condition. Here in the ED, the patient is able to ambulate with a cane, but requests use of a walker. I have recommended follow up with PCP as well as orthopedics. We did discuss initially performing labs to rule out DVT with d dimer. She declined this given that she has had recent blood work. She understands that I am unable to rule out other processes such as blood clot. Therefore I have recommended a return to the ED if she develops new or worsening symptoms. Pain with alleviated with ibuprofen, therefore I  recommended taking that as needed as well.     Diagnosis:    ICD-10-CM    1. Hip pain, left  M25.552    2. Osteoarthritis of left hip, unspecified osteoarthritis type  M16.12      Scribe Disclosure:  I, Daphne Fan, am serving as a scribe at 2:50 PM on 1/25/2021 to document services personally performed by Haylee Mcgowan MD based on my observations and the provider's statements to me.              Haylee Mcgowan MD  01/26/21 1415       Haylee Mcgowan MD  01/26/21 1414     no

## 2021-01-25 NOTE — ED NOTES
Bed: ED07  Expected date: 1/25/21  Expected time: 2:23 PM  Means of arrival: Ambulance  Comments:  514 65f leg pain ETA 1422

## 2021-01-25 NOTE — DISCHARGE INSTRUCTIONS
Your XRay today shows severe left-sided arthritis of the hip.  I think this is a likely source of your pain.  You did not wish us to check blood work which would include a screening test for a blood clot.  At this point, my suspicion for a blood clot is very low.  However, given her limited work-up, you should return to the ED if your pain gets worse, you get any leg swelling, or any new symptoms such as leg discoloration, fever or chills.  I recommend following up with an orthopedist and primary care physician to discuss further management of your arthritis.  You can try taking ibuprofen as directed, 200 to 400 mg every 6-8 hours for your pain.

## 2021-12-16 ENCOUNTER — APPOINTMENT (OUTPATIENT)
Dept: CT IMAGING | Facility: CLINIC | Age: 66
End: 2021-12-16
Attending: EMERGENCY MEDICINE
Payer: MEDICARE

## 2021-12-16 ENCOUNTER — HOSPITAL ENCOUNTER (EMERGENCY)
Facility: CLINIC | Age: 66
Discharge: HOME | End: 2021-12-16
Attending: EMERGENCY MEDICINE | Admitting: EMERGENCY MEDICINE
Payer: MEDICARE

## 2021-12-16 VITALS
HEART RATE: 69 BPM | SYSTOLIC BLOOD PRESSURE: 155 MMHG | OXYGEN SATURATION: 96 % | BODY MASS INDEX: 20.7 KG/M2 | TEMPERATURE: 98.3 F | WEIGHT: 106 LBS | RESPIRATION RATE: 16 BRPM | DIASTOLIC BLOOD PRESSURE: 86 MMHG

## 2021-12-16 DIAGNOSIS — H05.232 PERIORBITAL HEMATOMA OF LEFT EYE: ICD-10-CM

## 2021-12-16 DIAGNOSIS — S09.90XA CLOSED HEAD INJURY, INITIAL ENCOUNTER: ICD-10-CM

## 2021-12-16 LAB
ANION GAP SERPL CALCULATED.3IONS-SCNC: 4 MMOL/L (ref 3–14)
BASOPHILS # BLD AUTO: 0.1 10E3/UL (ref 0–0.2)
BASOPHILS NFR BLD AUTO: 2 %
BUN SERPL-MCNC: 24 MG/DL (ref 7–30)
CALCIUM SERPL-MCNC: 8.7 MG/DL (ref 8.5–10.1)
CHLORIDE BLD-SCNC: 110 MMOL/L (ref 94–109)
CO2 SERPL-SCNC: 26 MMOL/L (ref 20–32)
CREAT SERPL-MCNC: 0.57 MG/DL (ref 0.52–1.04)
EOSINOPHIL # BLD AUTO: 0.3 10E3/UL (ref 0–0.7)
EOSINOPHIL NFR BLD AUTO: 5 %
ERYTHROCYTE [DISTWIDTH] IN BLOOD BY AUTOMATED COUNT: 13 % (ref 10–15)
GFR SERPL CREATININE-BSD FRML MDRD: >90 ML/MIN/1.73M2
GLUCOSE BLD-MCNC: 88 MG/DL (ref 70–99)
HCT VFR BLD AUTO: 41.3 % (ref 35–47)
HGB BLD-MCNC: 12.8 G/DL (ref 11.7–15.7)
IMM GRANULOCYTES # BLD: 0 10E3/UL
IMM GRANULOCYTES NFR BLD: 0 %
LYMPHOCYTES # BLD AUTO: 2.1 10E3/UL (ref 0.8–5.3)
LYMPHOCYTES NFR BLD AUTO: 31 %
MCH RBC QN AUTO: 30.8 PG (ref 26.5–33)
MCHC RBC AUTO-ENTMCNC: 31 G/DL (ref 31.5–36.5)
MCV RBC AUTO: 100 FL (ref 78–100)
MONOCYTES # BLD AUTO: 0.7 10E3/UL (ref 0–1.3)
MONOCYTES NFR BLD AUTO: 11 %
NEUTROPHILS # BLD AUTO: 3.5 10E3/UL (ref 1.6–8.3)
NEUTROPHILS NFR BLD AUTO: 51 %
NRBC # BLD AUTO: 0 10E3/UL
NRBC BLD AUTO-RTO: 0 /100
PLATELET # BLD AUTO: 307 10E3/UL (ref 150–450)
POTASSIUM BLD-SCNC: 5.2 MMOL/L (ref 3.4–5.3)
RBC # BLD AUTO: 4.15 10E6/UL (ref 3.8–5.2)
SODIUM SERPL-SCNC: 140 MMOL/L (ref 133–144)
WBC # BLD AUTO: 6.8 10E3/UL (ref 4–11)

## 2021-12-16 PROCEDURE — 70480 CT ORBIT/EAR/FOSSA W/O DYE: CPT | Mod: MG

## 2021-12-16 PROCEDURE — 96375 TX/PRO/DX INJ NEW DRUG ADDON: CPT

## 2021-12-16 PROCEDURE — 96376 TX/PRO/DX INJ SAME DRUG ADON: CPT

## 2021-12-16 PROCEDURE — 85025 COMPLETE CBC W/AUTO DIFF WBC: CPT | Performed by: EMERGENCY MEDICINE

## 2021-12-16 PROCEDURE — 70450 CT HEAD/BRAIN W/O DYE: CPT | Mod: ME

## 2021-12-16 PROCEDURE — 80048 BASIC METABOLIC PNL TOTAL CA: CPT | Performed by: EMERGENCY MEDICINE

## 2021-12-16 PROCEDURE — 36415 COLL VENOUS BLD VENIPUNCTURE: CPT | Performed by: EMERGENCY MEDICINE

## 2021-12-16 PROCEDURE — 99285 EMERGENCY DEPT VISIT HI MDM: CPT | Mod: 25

## 2021-12-16 PROCEDURE — 96374 THER/PROPH/DIAG INJ IV PUSH: CPT

## 2021-12-16 PROCEDURE — 250N000011 HC RX IP 250 OP 636: Performed by: EMERGENCY MEDICINE

## 2021-12-16 RX ORDER — LORAZEPAM 2 MG/ML
0.5 INJECTION INTRAMUSCULAR ONCE
Status: COMPLETED | OUTPATIENT
Start: 2021-12-16 | End: 2021-12-16

## 2021-12-16 RX ORDER — DIPHENHYDRAMINE HYDROCHLORIDE 50 MG/ML
25 INJECTION INTRAMUSCULAR; INTRAVENOUS ONCE
Status: COMPLETED | OUTPATIENT
Start: 2021-12-16 | End: 2021-12-16

## 2021-12-16 RX ADMIN — LORAZEPAM 0.5 MG: 2 INJECTION INTRAMUSCULAR; INTRAVENOUS at 08:46

## 2021-12-16 RX ADMIN — LORAZEPAM 0.5 MG: 2 INJECTION INTRAMUSCULAR; INTRAVENOUS at 08:55

## 2021-12-16 RX ADMIN — DIPHENHYDRAMINE HYDROCHLORIDE 25 MG: 50 INJECTION INTRAMUSCULAR; INTRAVENOUS at 08:57

## 2021-12-16 ASSESSMENT — ENCOUNTER SYMPTOMS
NECK PAIN: 0
WOUND: 1

## 2021-12-16 NOTE — ED PROVIDER NOTES
History   Chief Complaint:  Head Injury     The history is provided by the patient and the EMS personnel.      Kortney Germain is a 66 year old female with history of hypertension, schizophrenia and dyskinesia who presents with a head injury following a fall. Per report, this morning the patient was at her group home when she slipped on wet floor in the bathroom and hit her head on the floor. She did not lose consciousness but did sustain a laceration above her left eye. Since then her left eye has become swollen. A staff was in the bathroom with her and witnessed the fall. Due to these symptoms EMS was called. With EMS her blood pressure was 160/100. Here, she denies any visual disturbances, neck pain or any other pain. Of note, she is not on any blood thinners.    Review of Systems   Eyes: Negative for visual disturbance.        Left eye swelling   Musculoskeletal: Negative for neck pain.        No other pain   Skin: Positive for wound.   All other systems reviewed and are negative.    Allergies:  Amlodipine  Clozapine  Egg   Penicillins  Potassium  Poultry Meal  Risperidone    Medications:  Albuterol  Cholecalciferol  Benadryl  Robaxin  Nifedipine  Perphenazine     Past Medical History:    Allergic state  Depressive disorder  Dyskinesia  Hypertension  Schizophrenia  Anxiety  Sacral fracture, closed  Vitamin D deficiency    Social History:  The patient was accompanied to the ED by EMS.    Physical Exam     Patient Vitals for the past 24 hrs:   BP Temp Temp src Pulse Resp SpO2 Weight   12/16/21 1000 -- -- -- 69 -- 96 % --   12/16/21 0845 (!) 155/86 -- -- -- -- -- --   12/16/21 0832 -- 98.3  F (36.8  C) Oral 94 16 96 % 48.1 kg (106 lb)       Physical Exam  General: Patient is alert and evidence of large periorbital hematoma on the left.  Patient has dyskinesia and spastic movement  HEENT: Head with evidence of trauma with left periorbital ecchymosis   Eyes: pupils equal and reactive. Conjunctiva clear.  No  diplopia   Nares: patent   Oropharynx: no lesions, uvula midline, no palatal draping, normal voice, no trismus  Neck: Supple without lymphadenopathy, no meningismus  Chest: Heart regular rate and rhythm.   Lungs: Equal clear to auscultation with no wheeze or rales  Abdomen: Soft, non tender, nondistended, normal bowel sounds  Back: No costovertebral angle tenderness, no midline C, T or L spine tenderness  Neuro: Alert and appropriate, moves all extremities equally, no weakness or numbness, dyskinesia movements and spastic movements noted  Extremities: No deformities, equal radial and DP pulses. No clubbing, cyanosis.  No edema  Skin: Warm and dry with no rash.       Emergency Department Course   Imaging:  CT Orbits wo Contrast  Left periorbital hematoma and soft tissue swelling. No  underlying orbital fracture. No fracture of the visualized facial  bones.  Reading per radiology    CT Head w/o Contrast  1. No evidence of acute intracranial hemorrhage, mass, or herniation.  2. Nonspecific white matter changes likely due to chronic  microvascular ischemic disease.   3. Left periorbital hematoma and soft tissue swelling. Please see  orbital CT for further details.  Reading per radiology    Laboratory:  CBC: WBC 6.8, HGB 12.8,   BMP: Chloride 110 (H) o/w WNL (Creatinine 0.57)    Emergency Department Course:  Reviewed:  I reviewed nursing notes, vitals, past medical history and care everywhere    Assessments:  0826 I obtained history and examined the patient as noted above.     1100 I rechecked and updated the patient regarding the imaging results, laboratory results and the plan for care.    Interventions:  0846 Atifan 0.5mg IV  0855 Ativan 0.5mg IV  0857 Benadryl 25mg IV    Disposition:  The patient was discharged to home.     Impression & Plan   Medical Decision Making:  Kortney Germain is a 66 year old female who presents for evaluation of a fall.  This was clearly a mechanical fall, not syncope.  There were  no prodromal symptoms so I doubt stroke, cardiac arrhythmia or other serious etiology.  Detailed exam shows periorbital hematoma on the left without diplopia or extraocular movement impairment.  Based on her medical history basic labs were ordered with an acceptable limits. CT head and CT orbits were negative for acute fracture or intracranial hemorrhage.  Results as above.  I had the tech ambulate the patient and she did well.  Medications were administered to facilitate imaging given her dyskinesia.  Patient continues to have a neurologic exam that is within normal limits, ambulate without difficulty.  Ice was applied to her left periorbital hematoma.  Based on this I would send home for outpatient management.  I would not do workup for syncope, stroke, ACS, PE at this point.  I doubt serious underlying fractures, intracerebral issues, spinal fractures.        Diagnosis:    ICD-10-CM    1. Periorbital hematoma of left eye  H05.232    2. Closed head injury, initial encounter  S09.90XA        Discharge Medications:  New Prescriptions    No medications on file       Scribe Disclosure:  I, Tang Rea, am serving as a scribe at 8:30 AM on 12/16/2021 to document services personally performed by Courtney Alonso MD based on my observations and the provider's statements to me.      Courtney Alonso MD  12/16/21 5149

## 2021-12-16 NOTE — DISCHARGE INSTRUCTIONS
Apply ice regularly to swollen left eye.      Discharge Instructions  Head Injury    You have been seen today for a head injury. Your evaluation included a history and physical examination. You may have had a CT (CAT) scan performed, though most head injuries do not require a scan. Based on this evaluation, your provider today does not feel that your head injury is serious.    Generally, every Emergency Department visit should have a follow-up clinic visit with either a primary or a specialty clinic/provider. Please follow-up as instructed by your emergency provider today.  Return to the Emergency Department if:  You are confused or you are not acting right.  Your headache gets worse or you start to have a really bad headache even with your recommended treatment plan.  You vomit (throw up) more than once.  You have a seizure.  You have trouble walking.  You have weakness or paralysis (cannot move) in an arm or a leg.  You have blood or fluid coming from your ears or nose.  You have new symptoms or anything that worries you.    Sleeping:  It is okay for you to sleep, but someone should wake you up if instructed by your provider, and someone should check on you at your usual time to wake up.     Activity:  Do not drive for at least 24 hours.  Do not drive if you have dizzy spells or trouble concentrating, or remembering things.  Do not return to any contact sports until cleared by your regular provider.     MORE INFORMATION:    Concussion:  A concussion is a minor head injury that may cause temporary problems with the way the brain works. Although concussions are important, they are generally not an emergency or a reason that a person needs to be hospitalized. Some concussion symptoms include confusion, amnesia (forgetful), nausea (sick to your stomach) and vomiting (throwing up), dizziness, fatigue, memory or concentration problems, irritability and sleep problems. For most people, concussions are mild and temporary  but some will have more severe and persistent symptoms that require on-going care and treatment.  CT Scans: Your evaluation today may have included a CT scan (CAT scan) to look for things like bleeding or a skull fracture (broken bone).  CT scans involve radiation and too many CT scans can cause serious health problems like cancer, especially in children.  Because of this, your provider may not have ordered a CT scan today if they think you are at low risk for a serious or life threatening problem.    If you were given a prescription for medicine here today, be sure to read all of the information (including the package insert) that comes with your prescription.  This will include important information about the medicine, its side effects, and any warnings that you need to know about.  The pharmacist who fills the prescription can provide more information and answer questions you may have about the medicine.  If you have questions or concerns that the pharmacist cannot address, please call or return to the Emergency Department.     Remember that you can always come back to the Emergency Department if you are not able to see your regular provider in the amount of time listed above, if you get any new symptoms, or if there is anything that worries you.

## 2021-12-16 NOTE — ED NOTES
Bed: ED04  Expected date:   Expected time:   Means of arrival:   Comments:  Joy - 513 - 66 F fall eye injury eta [id number]

## 2021-12-16 NOTE — ED TRIAGE NOTES
Pt arrives via EMS from a group home. Patient had a mechanical fall in the bathroom this morning and hit head and eye on counter. Pt has baseline tremors and hx of schizophrenia. A&Ox4.

## 2022-02-02 ENCOUNTER — HOSPITAL ENCOUNTER (EMERGENCY)
Facility: CLINIC | Age: 67
Discharge: HOME | End: 2022-02-02
Attending: EMERGENCY MEDICINE | Admitting: EMERGENCY MEDICINE
Payer: MEDICARE

## 2022-02-02 VITALS
BODY MASS INDEX: 22.58 KG/M2 | RESPIRATION RATE: 18 BRPM | HEIGHT: 60 IN | WEIGHT: 115 LBS | DIASTOLIC BLOOD PRESSURE: 102 MMHG | TEMPERATURE: 98.4 F | SYSTOLIC BLOOD PRESSURE: 143 MMHG | HEART RATE: 90 BPM | OXYGEN SATURATION: 96 %

## 2022-02-02 DIAGNOSIS — G89.29 OTHER CHRONIC PAIN: ICD-10-CM

## 2022-02-02 DIAGNOSIS — Z86.59 HISTORY OF PSYCHIATRIC DISORDER: ICD-10-CM

## 2022-02-02 DIAGNOSIS — M53.3 SACRAL PAIN: ICD-10-CM

## 2022-02-02 LAB
ANION GAP SERPL CALCULATED.3IONS-SCNC: 4 MMOL/L (ref 3–14)
ATRIAL RATE - MUSE: 84 BPM
BASOPHILS # BLD AUTO: 0.1 10E3/UL (ref 0–0.2)
BASOPHILS NFR BLD AUTO: 1 %
BUN SERPL-MCNC: 20 MG/DL (ref 7–30)
CALCIUM SERPL-MCNC: 9 MG/DL (ref 8.5–10.1)
CHLORIDE BLD-SCNC: 103 MMOL/L (ref 94–109)
CO2 SERPL-SCNC: 26 MMOL/L (ref 20–32)
CREAT SERPL-MCNC: 0.53 MG/DL (ref 0.52–1.04)
DIASTOLIC BLOOD PRESSURE - MUSE: NORMAL MMHG
EOSINOPHIL # BLD AUTO: 0.1 10E3/UL (ref 0–0.7)
EOSINOPHIL NFR BLD AUTO: 2 %
ERYTHROCYTE [DISTWIDTH] IN BLOOD BY AUTOMATED COUNT: 12.3 % (ref 10–15)
GFR SERPL CREATININE-BSD FRML MDRD: >90 ML/MIN/1.73M2
GLUCOSE BLD-MCNC: 102 MG/DL (ref 70–99)
HCT VFR BLD AUTO: 39.5 % (ref 35–47)
HGB BLD-MCNC: 12.7 G/DL (ref 11.7–15.7)
IMM GRANULOCYTES # BLD: 0 10E3/UL
IMM GRANULOCYTES NFR BLD: 0 %
INTERPRETATION ECG - MUSE: NORMAL
LYMPHOCYTES # BLD AUTO: 1.5 10E3/UL (ref 0.8–5.3)
LYMPHOCYTES NFR BLD AUTO: 28 %
MCH RBC QN AUTO: 30.7 PG (ref 26.5–33)
MCHC RBC AUTO-ENTMCNC: 32.2 G/DL (ref 31.5–36.5)
MCV RBC AUTO: 95 FL (ref 78–100)
MONOCYTES # BLD AUTO: 0.5 10E3/UL (ref 0–1.3)
MONOCYTES NFR BLD AUTO: 10 %
NEUTROPHILS # BLD AUTO: 3.1 10E3/UL (ref 1.6–8.3)
NEUTROPHILS NFR BLD AUTO: 59 %
NRBC # BLD AUTO: 0 10E3/UL
NRBC BLD AUTO-RTO: 0 /100
P AXIS - MUSE: 62 DEGREES
PLATELET # BLD AUTO: 317 10E3/UL (ref 150–450)
POTASSIUM BLD-SCNC: 4.7 MMOL/L (ref 3.4–5.3)
PR INTERVAL - MUSE: 186 MS
QRS DURATION - MUSE: 72 MS
QT - MUSE: 402 MS
QTC - MUSE: 475 MS
R AXIS - MUSE: 78 DEGREES
RBC # BLD AUTO: 4.14 10E6/UL (ref 3.8–5.2)
SODIUM SERPL-SCNC: 133 MMOL/L (ref 133–144)
SYSTOLIC BLOOD PRESSURE - MUSE: NORMAL MMHG
T AXIS - MUSE: 82 DEGREES
VENTRICULAR RATE- MUSE: 84 BPM
WBC # BLD AUTO: 5.2 10E3/UL (ref 4–11)

## 2022-02-02 PROCEDURE — 99284 EMERGENCY DEPT VISIT MOD MDM: CPT | Mod: 25

## 2022-02-02 PROCEDURE — 85025 COMPLETE CBC W/AUTO DIFF WBC: CPT | Performed by: EMERGENCY MEDICINE

## 2022-02-02 PROCEDURE — 93005 ELECTROCARDIOGRAM TRACING: CPT

## 2022-02-02 PROCEDURE — 250N000011 HC RX IP 250 OP 636: Performed by: EMERGENCY MEDICINE

## 2022-02-02 PROCEDURE — 36415 COLL VENOUS BLD VENIPUNCTURE: CPT | Performed by: EMERGENCY MEDICINE

## 2022-02-02 PROCEDURE — 80048 BASIC METABOLIC PNL TOTAL CA: CPT | Performed by: EMERGENCY MEDICINE

## 2022-02-02 PROCEDURE — 96374 THER/PROPH/DIAG INJ IV PUSH: CPT

## 2022-02-02 PROCEDURE — 250N000013 HC RX MED GY IP 250 OP 250 PS 637: Performed by: EMERGENCY MEDICINE

## 2022-02-02 RX ORDER — LIDOCAINE 4 G/G
1 PATCH TOPICAL EVERY 24 HOURS
Qty: 10 PATCH | Refills: 0 | Status: SHIPPED | OUTPATIENT
Start: 2022-02-02 | End: 2022-02-12

## 2022-02-02 RX ORDER — KETOROLAC TROMETHAMINE 15 MG/ML
15 INJECTION, SOLUTION INTRAMUSCULAR; INTRAVENOUS ONCE
Status: COMPLETED | OUTPATIENT
Start: 2022-02-02 | End: 2022-02-02

## 2022-02-02 RX ORDER — LIDOCAINE 4 G/G
1 PATCH TOPICAL ONCE
Status: DISCONTINUED | OUTPATIENT
Start: 2022-02-02 | End: 2022-02-02 | Stop reason: HOSPADM

## 2022-02-02 RX ORDER — CYCLOBENZAPRINE HCL 10 MG
10 TABLET ORAL ONCE
Status: COMPLETED | OUTPATIENT
Start: 2022-02-02 | End: 2022-02-02

## 2022-02-02 RX ADMIN — LIDOCAINE 1 PATCH: 560 PATCH PERCUTANEOUS; TOPICAL; TRANSDERMAL at 11:01

## 2022-02-02 RX ADMIN — KETOROLAC TROMETHAMINE 15 MG: 15 INJECTION, SOLUTION INTRAMUSCULAR; INTRAVENOUS at 09:29

## 2022-02-02 RX ADMIN — CYCLOBENZAPRINE 10 MG: 10 TABLET, FILM COATED ORAL at 11:35

## 2022-02-02 ASSESSMENT — ENCOUNTER SYMPTOMS
VOMITING: 0
BACK PAIN: 1
DIARRHEA: 0

## 2022-02-02 ASSESSMENT — MIFFLIN-ST. JEOR: SCORE: 988.14

## 2022-02-02 NOTE — ED NOTES
Bed: ED21  Expected date:   Expected time:   Means of arrival:   Comments:  Joy 513 66 F back pain eta [id number]

## 2022-02-02 NOTE — ED TRIAGE NOTES
Pt BIBA from group home. EMS report; Pt reports sacral pain since this morning, group home indicated no fall. Pt denies fall. When EMS arrived Pt was sitting on couch. Pt appeared agitated. . Pt had Tylenol at 0700.

## 2022-02-02 NOTE — ED NOTES
Report called to Britt Braswell ( manager) [phone] who will notify staff at site patient is returning shortly via [facility] wheelchair van.

## 2022-02-02 NOTE — ED NOTES
I was informed that this patients  wasn't answering the phone.  This patient lives at a [facility].  We called the [facility] number and got a contact Britt Braswell [phone].  I spoke with Britt and she said this patient is her own decision maker and she was complaining of low back pain.  This patient has chronic low back pain and takes ES tylenol q day and Iraj Camilo applied q.day.  She also got Ibuprophen this am.  Nothing was working and she was having bilateral leg spasms also.  She has flexeril ordered but hasn't taken that for some time.  This patient wanted to come to the ER to be checked out.    Britt stated that we will need to set up a ride for this patient to return to the  as there is no van at this facility.  If there is anything prescribed Britt asked that it be sent to [facility] as they will fill it and deliver the med to the .   [facility] fax [phone].  Britt asked that we call her when the patient is ready to return to the  and she will message the staff and let them know the patient is returning.  Of note Kathe is the  at this patients .  I wasn't given a contact number for her.  I updated the ER staff of the above and this patient was returned to her .  Bedside RN set up ride and called report to Britt.

## 2022-02-02 NOTE — ED PROVIDER NOTES
"  History   Chief Complaint:  \"my sacrum\"     The history is provided by the patient and the EMS personnel. History limited by: poor historian.     History supplemented by electronic chart review  History limited as patient is a poor historian    Kortney Germain is a 66 year old female with history of depressive disorder, dyskinesia, hypertension, and schizo affective schizophrenia who presents by EMS with sacral pain. The patient was transferred from a group home via EMS for complaints of sacral pain persisting \"for a while\" and feeling weak all over. EMS was able to measure her blood glucose level at 107. She denies any recent fall. She states she hasn't eaten much in three days. No vomiting, diarrhea, or urinary symptoms. The patient was given Tylenol at 7 am this morning (2/2/22) for her tailbone pain.    Of note the patient is considered a unreliable historian. Group home staff and EMS did not think the patient necessarily needed to come to emergency department but she felt strongly to the contrary.      In addition, chart review shows x-ray in 2018 showed suspected sacral fracture.     Review of Systems   Gastrointestinal: Negative for diarrhea and vomiting.   Genitourinary:        No urinary symptoms   Musculoskeletal: Positive for back pain (sacral region).   All other systems reviewed negative except as above in HPI      Allergies:  Amlodipine  Clozapine  Egg [Chicken-Derived Products (Egg)]  Penicillins  Potassium  Poultry Meal    Medications:  acetaminophen (TYLENOL) 500 MG tablet  albuterol (PROAIR HFA/PROVENTIL HFA/VENTOLIN HFA) 108 (90 Base) MCG/ACT inhaler  cholecalciferol 50 MCG (2000 UT) tablet  diphenhydrAMINE (BENADRYL) 50 MG capsule  ibuprofen (ADVIL/MOTRIN) 800 MG tablet  methocarbamol (ROBAXIN) 750 MG tablet  multivitamin w/minerals (THERA-VIT-M) tablet  NIFEdipine ER (ADALAT CC) 30 MG 24 hr tablet  perphenazine 8 MG tablet    Past Medical History:     Allergic state   Depressive " disorder   Dyskinesia   Hypertension   Schizo affective schizophrenia     Social History:  The patient currently lives in a group home.     Physical Exam     Patient Vitals for the past 24 hrs:   BP Temp Temp src Pulse Resp SpO2 Height Weight   02/02/22 1150 (!) 143/102 -- -- -- -- -- -- --   02/02/22 1145 -- -- -- 90 -- -- -- --   02/02/22 1010 -- -- -- -- -- 96 % -- --   02/02/22 0914 -- 98.4  F (36.9  C) Oral -- -- -- -- --   02/02/22 0910 (!) 158/131 -- -- 81 18 97 % 1.524 m (5') 52.2 kg (115 lb)     Physical Exam  General: Chronically ill but nontoxic-appearing woman recumbent in room 21  HENT: mucous membranes moist  CV: rate as above, regular rhythm  Resp: normal effort, speaks in full phrases, no stridor, no cough observed  GI: abdomen soft and nontender, no guarding  MSK:   Cervical spine: nontender with FROM  Thoracic spine: nontender, no CVAT  Lumbar spine: nontender  Pelvis stable.  Mild diffuse tenderness to sacral region without point tenderness or palpable acute deformity, no crepitus  FROM bilateral hips and knees without deformity.  Skin: appropriately warm and dry, no erythema/ecchymosis/vesicles to back, mild erythema consistent with pressure injury to sacral region, no fluctuance or induration  Neuro: alert, clear speech, oriented, hip flexion/extension normal, SILT bilateral legs and feet, patellar DTRs normal, ambulation not initially tested, frequently moving legs consistent with diagnosed dyskinesia  Psych: No evidence of joycelyn loose Nations    Emergency Department Course   ECG  ECG obtained at 0937, ECG read at 0941  Sinus rhythm with occasional premature ventricular complexes  Artifact in limb leads    Changes noted above, as compared to prior  Rate 84 bpm. RI interval 186 ms. QRS duration 72 ms. QT/QTc 402/475 ms. P-R-T axes 62 78 82.     Imaging:  Pelvis and Sacral xrays ordered but not completed as patient ultimately refused    Laboratory:  Labs Ordered and Resulted from Time of ED  Arrival to Time of ED Departure   BASIC METABOLIC PANEL - Abnormal       Result Value    Sodium 133      Potassium 4.7      Chloride 103      Carbon Dioxide (CO2) 26      Anion Gap 4      Urea Nitrogen 20      Creatinine 0.53      Calcium 9.0      Glucose 102 (*)     GFR Estimate >90     CBC WITH PLATELETS AND DIFFERENTIAL    WBC Count 5.2      RBC Count 4.14      Hemoglobin 12.7      Hematocrit 39.5      MCV 95      MCH 30.7      MCHC 32.2      RDW 12.3      Platelet Count 317      % Neutrophils 59      % Lymphocytes 28      % Monocytes 10      % Eosinophils 2      % Basophils 1      % Immature Granulocytes 0      NRBCs per 100 WBC 0      Absolute Neutrophils 3.1      Absolute Lymphocytes 1.5      Absolute Monocytes 0.5      Absolute Eosinophils 0.1      Absolute Basophils 0.1      Absolute Immature Granulocytes 0.0      Absolute NRBCs 0.0        Emergency Department Course:       Reviewed:  I reviewed nursing notes, vitals, past medical history, Care Everywhere and [facility]    Assessments:  0908 I obtained history and examined the patient as noted above.   1027 I rechecked the patient and explained findings.   1106 The patient was uncooperative for attempt at imagining with the portable x-ray.   1126 I rechecked the patient patient denies.     Consults:  I spoke with [employer] tech at 1024 re: patient uncooperative for initial attempt at xray.  1101 I discussed the situation with Ifrah Zhu ED care manager.    Interventions:  0929: ketorolac (TORADOL) injection 15 mg, IV  1101: Lidocaine (LIDOCARE) 4 % Patch 1 patch, transdermal    Disposition:  The patient was discharged to her group home.     Impression & Plan   Medical Decision Making:  She presents with concern for sacral and far lower back pain, the setting of chronic pain, but no recent trauma.  Her examination is benign.  She was sent over for x-rays, which she initially agreed to do, though upon getting to the radiology suite, she refused to cooperate with this,  and was sent back to her room.  She cited the fact that she did not think the x-ray tech knew where her sacrum was, as the reason for refusing these images.  We attempted then to perform portable x-rays after she agreed to retry, only to have her refuse these attempts as well.  I do not think that further attempts at imaging are indicated, nor that she requires advanced imaging, in the absence of focal neurologic changes, fever, or other more worrisome findings at this time.  The ED care manager was involved as well, and we spoke with staff at her group home.  She has additional analgesics available to her that she is not availing herself of.  I do not think that opioid medication is indicated.  I did electronically prescribe Lidoderm patches to a pharmacy that is able to deliver the medication to her group home to facilitate outpatient care.  No other changes to her medical regimen are recommended at this time.  She is welcome back for crisis and should follow-up soon to her primary clinician in the coming days.  I do believe her underlying psychiatric condition is contributing to her presentation though this is not so decompensated that she can be held against her will or requires formal psychiatric evaluation at this time.    Diagnosis:    ICD-10-CM    1. Sacral pain  M53.3    2. Other chronic pain  G89.29    3. History of psychiatric disorder  Z86.59        Discharge Medications:  Discharge Medication List as of 2/2/2022 12:06 PM      START taking these medications    Details   Lidocaine (LIDOCARE) 4 % Patch Place 1 patch onto the skin every 24 hours for 10 days To prevent lidocaine toxicity, patient should be patch free for 12 hrs daily.Disp-10 patch, [id number]             Scribe Disclosure:  Vesta NIETO, am serving as a scribe at 9:08 AM on 2/2/2022 to document services personally performed by Damian Lindsey MD based on my observations and the provider's statements to me.     Evelin NIETO  kimani Bryant serving as a scribe at 10:31 AM on 2/2/2022 to document services personally performed by Damian Lindsey MD based on my observations and the provider's statements to me.     This note was completed in part using Dragon voice recognition software. Although reviewed after completion, some word and grammatical errors may occur.       Damian Lindsey MD  02/02/22 0339

## 2023-01-01 ENCOUNTER — APPOINTMENT (OUTPATIENT)
Dept: GENERAL RADIOLOGY | Facility: CLINIC | Age: 68
DRG: 004 | End: 2023-01-01
Attending: PEDIATRICS
Payer: MEDICARE

## 2023-01-01 ENCOUNTER — APPOINTMENT (OUTPATIENT)
Dept: CT IMAGING | Facility: CLINIC | Age: 68
DRG: 004 | End: 2023-01-01
Attending: EMERGENCY MEDICINE
Payer: MEDICARE

## 2023-01-01 ENCOUNTER — DOCUMENTATION ONLY (OUTPATIENT)
Dept: CARE COORDINATION | Facility: CLINIC | Age: 68
End: 2023-01-01
Payer: MEDICARE

## 2023-01-01 ENCOUNTER — ANESTHESIA (OUTPATIENT)
Dept: SURGERY | Facility: CLINIC | Age: 68
DRG: 004 | End: 2023-01-01
Payer: MEDICARE

## 2023-01-01 ENCOUNTER — OFFICE VISIT (OUTPATIENT)
Dept: URGENT CARE | Facility: URGENT CARE | Age: 68
End: 2023-01-01
Payer: MEDICARE

## 2023-01-01 ENCOUNTER — HOSPITAL ENCOUNTER (INPATIENT)
Dept: NEUROLOGY | Facility: CLINIC | Age: 68
Discharge: HOME | DRG: 004 | End: 2023-10-22
Attending: NURSE PRACTITIONER
Payer: MEDICARE

## 2023-01-01 ENCOUNTER — HOSPITAL ENCOUNTER (INPATIENT)
Dept: NEUROLOGY | Facility: CLINIC | Age: 68
Discharge: HOME | DRG: 004 | End: 2023-10-08
Attending: NURSE PRACTITIONER
Payer: MEDICARE

## 2023-01-01 ENCOUNTER — APPOINTMENT (OUTPATIENT)
Dept: GENERAL RADIOLOGY | Facility: CLINIC | Age: 68
DRG: 004 | End: 2023-01-01
Attending: INTERNAL MEDICINE
Payer: MEDICARE

## 2023-01-01 ENCOUNTER — HOSPITAL ENCOUNTER (INPATIENT)
Dept: NEUROLOGY | Facility: CLINIC | Age: 68
Discharge: HOME | DRG: 004 | End: 2023-10-05
Attending: NURSE PRACTITIONER
Payer: MEDICARE

## 2023-01-01 ENCOUNTER — HOSPITAL ENCOUNTER (INPATIENT)
Dept: NEUROLOGY | Facility: CLINIC | Age: 68
Discharge: HOME | DRG: 004 | End: 2023-10-20
Attending: NURSE PRACTITIONER
Payer: MEDICARE

## 2023-01-01 ENCOUNTER — APPOINTMENT (OUTPATIENT)
Dept: CT IMAGING | Facility: CLINIC | Age: 68
DRG: 004 | End: 2023-01-01
Attending: INTERNAL MEDICINE
Payer: MEDICARE

## 2023-01-01 ENCOUNTER — HOSPITAL ENCOUNTER (INPATIENT)
Dept: NEUROLOGY | Facility: CLINIC | Age: 68
Discharge: HOME | DRG: 004 | End: 2023-11-05
Attending: PSYCHIATRY & NEUROLOGY
Payer: MEDICARE

## 2023-01-01 ENCOUNTER — HOSPITAL ENCOUNTER (INPATIENT)
Dept: NEUROLOGY | Facility: CLINIC | Age: 68
Discharge: HOME | DRG: 004 | End: 2023-10-23
Attending: NURSE PRACTITIONER
Payer: MEDICARE

## 2023-01-01 ENCOUNTER — APPOINTMENT (OUTPATIENT)
Dept: GENERAL RADIOLOGY | Facility: CLINIC | Age: 68
DRG: 640 | End: 2023-01-01
Attending: HOSPITALIST
Payer: MEDICARE

## 2023-01-01 ENCOUNTER — HOSPITAL ENCOUNTER (INPATIENT)
Facility: CLINIC | Age: 68
LOS: 10 days | End: 2023-11-27
Attending: HOSPITALIST | Admitting: INTERNAL MEDICINE
Payer: MEDICARE

## 2023-01-01 ENCOUNTER — APPOINTMENT (OUTPATIENT)
Dept: CT IMAGING | Facility: CLINIC | Age: 68
DRG: 640 | End: 2023-01-01
Attending: HOSPITALIST
Payer: MEDICARE

## 2023-01-01 ENCOUNTER — ANESTHESIA (OUTPATIENT)
Dept: MEDSURG UNIT | Facility: CLINIC | Age: 68
DRG: 004 | End: 2023-01-01
Payer: MEDICARE

## 2023-01-01 ENCOUNTER — NURSE TRIAGE (OUTPATIENT)
Dept: NURSING | Facility: CLINIC | Age: 68
End: 2023-01-01
Payer: MEDICARE

## 2023-01-01 ENCOUNTER — HOSPITAL ENCOUNTER (INPATIENT)
Dept: NEUROLOGY | Facility: CLINIC | Age: 68
Discharge: HOME | DRG: 004 | End: 2023-10-13
Attending: NURSE PRACTITIONER
Payer: MEDICARE

## 2023-01-01 ENCOUNTER — HOSPITAL ENCOUNTER (INPATIENT)
Dept: NEUROLOGY | Facility: CLINIC | Age: 68
Discharge: HOME | DRG: 004 | End: 2023-10-09
Attending: NURSE PRACTITIONER
Payer: MEDICARE

## 2023-01-01 ENCOUNTER — APPOINTMENT (OUTPATIENT)
Dept: SPEECH THERAPY | Facility: CLINIC | Age: 68
DRG: 640 | End: 2023-01-01
Payer: MEDICARE

## 2023-01-01 ENCOUNTER — APPOINTMENT (OUTPATIENT)
Dept: MRI IMAGING | Facility: CLINIC | Age: 68
DRG: 640 | End: 2023-01-01
Payer: MEDICARE

## 2023-01-01 ENCOUNTER — APPOINTMENT (OUTPATIENT)
Dept: GENERAL RADIOLOGY | Facility: CLINIC | Age: 68
DRG: 004 | End: 2023-01-01
Attending: STUDENT IN AN ORGANIZED HEALTH CARE EDUCATION/TRAINING PROGRAM
Payer: MEDICARE

## 2023-01-01 ENCOUNTER — APPOINTMENT (OUTPATIENT)
Dept: OCCUPATIONAL THERAPY | Facility: CLINIC | Age: 68
DRG: 640 | End: 2023-01-01
Payer: MEDICARE

## 2023-01-01 ENCOUNTER — HOSPITAL ENCOUNTER (INPATIENT)
Dept: NEUROLOGY | Facility: CLINIC | Age: 68
Discharge: HOME | DRG: 004 | End: 2023-10-03
Attending: NURSE PRACTITIONER
Payer: MEDICARE

## 2023-01-01 ENCOUNTER — HOSPITAL ENCOUNTER (INPATIENT)
Dept: NEUROLOGY | Facility: CLINIC | Age: 68
Discharge: HOME | DRG: 004 | End: 2023-10-17
Attending: NURSE PRACTITIONER
Payer: MEDICARE

## 2023-01-01 ENCOUNTER — HOSPITAL ENCOUNTER (INPATIENT)
Dept: NEUROLOGY | Facility: CLINIC | Age: 68
Discharge: HOME | DRG: 004 | End: 2023-10-07
Attending: NURSE PRACTITIONER
Payer: MEDICARE

## 2023-01-01 ENCOUNTER — HOSPITAL ENCOUNTER (INPATIENT)
Dept: NEUROLOGY | Facility: CLINIC | Age: 68
Discharge: HOME | DRG: 004 | End: 2023-10-26
Attending: PSYCHIATRY & NEUROLOGY
Payer: MEDICARE

## 2023-01-01 ENCOUNTER — APPOINTMENT (OUTPATIENT)
Dept: PHYSICAL THERAPY | Facility: CLINIC | Age: 68
DRG: 640 | End: 2023-01-01
Payer: MEDICARE

## 2023-01-01 ENCOUNTER — HOSPITAL ENCOUNTER (INPATIENT)
Dept: NEUROLOGY | Facility: CLINIC | Age: 68
Discharge: HOME | DRG: 004 | End: 2023-10-25
Attending: PSYCHIATRY & NEUROLOGY
Payer: MEDICARE

## 2023-01-01 ENCOUNTER — HOSPITAL ENCOUNTER (INPATIENT)
Dept: NEUROLOGY | Facility: CLINIC | Age: 68
Discharge: HOME | DRG: 004 | End: 2023-10-21
Attending: NURSE PRACTITIONER
Payer: MEDICARE

## 2023-01-01 ENCOUNTER — APPOINTMENT (OUTPATIENT)
Dept: CT IMAGING | Facility: CLINIC | Age: 68
DRG: 004 | End: 2023-01-01
Attending: PSYCHIATRY & NEUROLOGY
Payer: MEDICARE

## 2023-01-01 ENCOUNTER — APPOINTMENT (OUTPATIENT)
Dept: GENERAL RADIOLOGY | Facility: CLINIC | Age: 68
DRG: 004 | End: 2023-01-01
Attending: NURSE PRACTITIONER
Payer: MEDICARE

## 2023-01-01 ENCOUNTER — HOSPITAL ENCOUNTER (INPATIENT)
Facility: CLINIC | Age: 68
LOS: 49 days | Discharge: HOSPICE | DRG: 004 | End: 2023-11-16
Attending: EMERGENCY MEDICINE | Admitting: INTERNAL MEDICINE
Payer: MEDICARE

## 2023-01-01 ENCOUNTER — APPOINTMENT (OUTPATIENT)
Dept: GENERAL RADIOLOGY | Facility: CLINIC | Age: 68
DRG: 640 | End: 2023-01-01
Attending: INTERNAL MEDICINE
Payer: MEDICARE

## 2023-01-01 ENCOUNTER — HOSPITAL ENCOUNTER (INPATIENT)
Dept: NEUROLOGY | Facility: CLINIC | Age: 68
Discharge: HOME | DRG: 004 | End: 2023-10-16
Attending: NURSE PRACTITIONER
Payer: MEDICARE

## 2023-01-01 ENCOUNTER — HOSPITAL ENCOUNTER (EMERGENCY)
Facility: CLINIC | Age: 68
Discharge: HOME | DRG: 640 | End: 2023-03-05
Attending: EMERGENCY MEDICINE | Admitting: EMERGENCY MEDICINE
Payer: MEDICARE

## 2023-01-01 ENCOUNTER — ANESTHESIA EVENT (OUTPATIENT)
Dept: SURGERY | Facility: CLINIC | Age: 68
DRG: 004 | End: 2023-01-01
Payer: MEDICARE

## 2023-01-01 ENCOUNTER — HOSPITAL ENCOUNTER (INPATIENT)
Dept: NEUROLOGY | Facility: CLINIC | Age: 68
Discharge: HOME | DRG: 004 | End: 2023-11-04
Attending: PSYCHIATRY & NEUROLOGY
Payer: MEDICARE

## 2023-01-01 ENCOUNTER — APPOINTMENT (OUTPATIENT)
Dept: MRI IMAGING | Facility: CLINIC | Age: 68
DRG: 004 | End: 2023-01-01
Attending: NURSE PRACTITIONER
Payer: MEDICARE

## 2023-01-01 ENCOUNTER — APPOINTMENT (OUTPATIENT)
Dept: PHYSICAL THERAPY | Facility: CLINIC | Age: 68
DRG: 640 | End: 2023-01-01
Attending: INTERNAL MEDICINE
Payer: MEDICARE

## 2023-01-01 ENCOUNTER — MEDICAL CORRESPONDENCE (OUTPATIENT)
Dept: HEALTH INFORMATION MANAGEMENT | Facility: CLINIC | Age: 68
End: 2023-01-01

## 2023-01-01 ENCOUNTER — APPOINTMENT (OUTPATIENT)
Dept: OCCUPATIONAL THERAPY | Facility: CLINIC | Age: 68
DRG: 640 | End: 2023-01-01
Attending: INTERNAL MEDICINE
Payer: MEDICARE

## 2023-01-01 ENCOUNTER — HOSPITAL ENCOUNTER (INPATIENT)
Dept: NEUROLOGY | Facility: CLINIC | Age: 68
Discharge: HOME | DRG: 004 | End: 2023-09-30
Attending: NURSE PRACTITIONER
Payer: MEDICARE

## 2023-01-01 ENCOUNTER — APPOINTMENT (OUTPATIENT)
Dept: CARDIOLOGY | Facility: CLINIC | Age: 68
DRG: 640 | End: 2023-01-01
Attending: INTERNAL MEDICINE
Payer: MEDICARE

## 2023-01-01 ENCOUNTER — PREP FOR PROCEDURE (OUTPATIENT)
Dept: SURGERY | Facility: CLINIC | Age: 68
End: 2023-01-01
Payer: MEDICARE

## 2023-01-01 ENCOUNTER — HOSPITAL ENCOUNTER (INPATIENT)
Dept: NEUROLOGY | Facility: CLINIC | Age: 68
Discharge: HOME | DRG: 004 | End: 2023-10-14
Attending: NURSE PRACTITIONER
Payer: MEDICARE

## 2023-01-01 ENCOUNTER — HOSPITAL ENCOUNTER (INPATIENT)
Dept: NEUROLOGY | Facility: CLINIC | Age: 68
Discharge: HOME | DRG: 004 | End: 2023-11-07
Attending: INTERNAL MEDICINE
Payer: MEDICARE

## 2023-01-01 ENCOUNTER — HOSPITAL ENCOUNTER (INPATIENT)
Dept: NEUROLOGY | Facility: CLINIC | Age: 68
Discharge: HOME | DRG: 004 | End: 2023-10-24
Attending: NURSE PRACTITIONER
Payer: MEDICARE

## 2023-01-01 ENCOUNTER — HOSPITAL ENCOUNTER (INPATIENT)
Dept: NEUROLOGY | Facility: CLINIC | Age: 68
Discharge: HOME | DRG: 004 | End: 2023-11-08
Attending: INTERNAL MEDICINE
Payer: MEDICARE

## 2023-01-01 ENCOUNTER — HOSPITAL ENCOUNTER (INPATIENT)
Dept: NEUROLOGY | Facility: CLINIC | Age: 68
Discharge: HOME | DRG: 004 | End: 2023-10-18
Attending: NURSE PRACTITIONER
Payer: MEDICARE

## 2023-01-01 ENCOUNTER — HOSPITAL ENCOUNTER (INPATIENT)
Facility: CLINIC | Age: 68
LOS: 9 days | Discharge: HOME HEALTH | DRG: 640 | End: 2023-03-16
Attending: EMERGENCY MEDICINE | Admitting: HOSPITALIST
Payer: MEDICARE

## 2023-01-01 ENCOUNTER — HOSPITAL ENCOUNTER (INPATIENT)
Dept: NEUROLOGY | Facility: CLINIC | Age: 68
Discharge: HOME | DRG: 004 | End: 2023-10-04
Attending: NURSE PRACTITIONER
Payer: MEDICARE

## 2023-01-01 ENCOUNTER — HOSPITAL ENCOUNTER (INPATIENT)
Dept: NEUROLOGY | Facility: CLINIC | Age: 68
Discharge: HOME | DRG: 004 | End: 2023-10-01
Attending: NURSE PRACTITIONER
Payer: MEDICARE

## 2023-01-01 ENCOUNTER — HOSPITAL ENCOUNTER (INPATIENT)
Dept: NEUROLOGY | Facility: CLINIC | Age: 68
Discharge: HOME | DRG: 004 | End: 2023-10-27
Attending: PSYCHIATRY & NEUROLOGY
Payer: MEDICARE

## 2023-01-01 ENCOUNTER — ANESTHESIA EVENT (OUTPATIENT)
Dept: MEDSURG UNIT | Facility: CLINIC | Age: 68
DRG: 004 | End: 2023-01-01
Payer: MEDICARE

## 2023-01-01 ENCOUNTER — HOSPITAL ENCOUNTER (INPATIENT)
Dept: NEUROLOGY | Facility: CLINIC | Age: 68
Discharge: HOME | DRG: 004 | End: 2023-10-15
Attending: NURSE PRACTITIONER
Payer: MEDICARE

## 2023-01-01 ENCOUNTER — HOSPITAL ENCOUNTER (INPATIENT)
Dept: NEUROLOGY | Facility: CLINIC | Age: 68
Discharge: HOME | DRG: 004 | End: 2023-09-29
Attending: NURSE PRACTITIONER
Payer: MEDICARE

## 2023-01-01 ENCOUNTER — HOSPITAL ENCOUNTER (INPATIENT)
Dept: NEUROLOGY | Facility: CLINIC | Age: 68
Discharge: HOME | DRG: 004 | End: 2023-10-11
Attending: NURSE PRACTITIONER
Payer: MEDICARE

## 2023-01-01 ENCOUNTER — HOSPITAL ENCOUNTER (INPATIENT)
Dept: NEUROLOGY | Facility: CLINIC | Age: 68
Discharge: HOME | DRG: 004 | End: 2023-10-02
Attending: NURSE PRACTITIONER
Payer: MEDICARE

## 2023-01-01 VITALS
RESPIRATION RATE: 17 BRPM | TEMPERATURE: 98.7 F | DIASTOLIC BLOOD PRESSURE: 85 MMHG | SYSTOLIC BLOOD PRESSURE: 105 MMHG | HEART RATE: 71 BPM | OXYGEN SATURATION: 98 %

## 2023-01-01 VITALS
RESPIRATION RATE: 20 BRPM | TEMPERATURE: 97.8 F | HEART RATE: 60 BPM | SYSTOLIC BLOOD PRESSURE: 125 MMHG | WEIGHT: 102.29 LBS | OXYGEN SATURATION: 92 % | BODY MASS INDEX: 19.98 KG/M2 | DIASTOLIC BLOOD PRESSURE: 63 MMHG

## 2023-01-01 VITALS
SYSTOLIC BLOOD PRESSURE: 108 MMHG | DIASTOLIC BLOOD PRESSURE: 57 MMHG | HEART RATE: 60 BPM | OXYGEN SATURATION: 92 % | RESPIRATION RATE: 6 BRPM

## 2023-01-01 VITALS
OXYGEN SATURATION: 97 % | DIASTOLIC BLOOD PRESSURE: 74 MMHG | HEART RATE: 74 BPM | WEIGHT: 90 LBS | SYSTOLIC BLOOD PRESSURE: 176 MMHG | BODY MASS INDEX: 17.58 KG/M2 | TEMPERATURE: 97.6 F

## 2023-01-01 VITALS
RESPIRATION RATE: 16 BRPM | SYSTOLIC BLOOD PRESSURE: 101 MMHG | HEART RATE: 89 BPM | TEMPERATURE: 97.3 F | WEIGHT: 90.39 LBS | DIASTOLIC BLOOD PRESSURE: 62 MMHG | BODY MASS INDEX: 17.75 KG/M2 | OXYGEN SATURATION: 92 % | HEIGHT: 60 IN

## 2023-01-01 DIAGNOSIS — L89.152 PRESSURE INJURY OF SACRAL REGION, STAGE 2 (H): ICD-10-CM

## 2023-01-01 DIAGNOSIS — M62.82 NON-TRAUMATIC RHABDOMYOLYSIS: ICD-10-CM

## 2023-01-01 DIAGNOSIS — L30.4 INTERTRIGO: Primary | ICD-10-CM

## 2023-01-01 DIAGNOSIS — G40.901 STATUS EPILEPTICUS (H): Primary | ICD-10-CM

## 2023-01-01 DIAGNOSIS — R53.1 WEAKNESS: ICD-10-CM

## 2023-01-01 DIAGNOSIS — R07.0 THROAT PAIN: ICD-10-CM

## 2023-01-01 DIAGNOSIS — J06.9 VIRAL URI WITH COUGH: Primary | ICD-10-CM

## 2023-01-01 DIAGNOSIS — I10 ESSENTIAL HYPERTENSION: ICD-10-CM

## 2023-01-01 DIAGNOSIS — N30.00 ACUTE CYSTITIS WITHOUT HEMATURIA: ICD-10-CM

## 2023-01-01 DIAGNOSIS — D64.9 ANEMIA, UNSPECIFIED TYPE: ICD-10-CM

## 2023-01-01 DIAGNOSIS — L24.A2 IRRITANT CONTACT DERMATITIS DUE TO FECAL INCONTINENCE: ICD-10-CM

## 2023-01-01 DIAGNOSIS — G24.9 DYSKINESIA: ICD-10-CM

## 2023-01-01 DIAGNOSIS — R19.7 DIARRHEA, UNSPECIFIED TYPE: ICD-10-CM

## 2023-01-01 DIAGNOSIS — K92.2 LOWER GI BLEED: ICD-10-CM

## 2023-01-01 DIAGNOSIS — R33.9 URINARY RETENTION: ICD-10-CM

## 2023-01-01 DIAGNOSIS — K59.00 CONSTIPATION, UNSPECIFIED CONSTIPATION TYPE: ICD-10-CM

## 2023-01-01 DIAGNOSIS — R25.1 TREMOR: ICD-10-CM

## 2023-01-01 LAB
A-TOCOPHEROL VIT E SERPL-MCNC: 16.3 MG/L
ABO/RH(D): NORMAL
ABO/RH(D): NORMAL
ALBUMIN SERPL BCG-MCNC: 3.4 G/DL (ref 3.5–5.2)
ALBUMIN SERPL BCG-MCNC: 3.9 G/DL (ref 3.5–5.2)
ALBUMIN SERPL BCG-MCNC: 4.1 G/DL (ref 3.5–5.2)
ALBUMIN SERPL BCG-MCNC: 4.2 G/DL (ref 3.5–5.2)
ALBUMIN SERPL BCG-MCNC: 4.2 G/DL (ref 3.5–5.2)
ALBUMIN UR-MCNC: 30 MG/DL
ALBUMIN UR-MCNC: NEGATIVE MG/DL
ALLEN'S TEST: NO
ALLEN'S TEST: YES
ALLEN'S TEST: YES
ALP SERPL-CCNC: 63 U/L (ref 35–104)
ALP SERPL-CCNC: 66 U/L (ref 35–104)
ALP SERPL-CCNC: 77 U/L (ref 35–104)
ALP SERPL-CCNC: 80 U/L (ref 35–104)
ALP SERPL-CCNC: 83 U/L (ref 35–104)
ALT SERPL W P-5'-P-CCNC: 15 U/L (ref 0–50)
ALT SERPL W P-5'-P-CCNC: 15 U/L (ref 10–35)
ALT SERPL W P-5'-P-CCNC: 16 U/L (ref 0–50)
ALT SERPL W P-5'-P-CCNC: 20 U/L (ref 0–50)
ALT SERPL W P-5'-P-CCNC: 21 U/L (ref 10–35)
AMMONIA PLAS-SCNC: 14 UMOL/L (ref 11–51)
AMPAR2 IGG CSF QL CBA IFA: NEGATIVE
AMPHETAMINES UR QL SCN: ABNORMAL
AMPHIPHYSIN AB CSF QL IF: NEGATIVE
ANION GAP SERPL CALCULATED.3IONS-SCNC: 10 MMOL/L (ref 7–15)
ANION GAP SERPL CALCULATED.3IONS-SCNC: 11 MMOL/L (ref 7–15)
ANION GAP SERPL CALCULATED.3IONS-SCNC: 12 MMOL/L (ref 7–15)
ANION GAP SERPL CALCULATED.3IONS-SCNC: 18 MMOL/L (ref 7–15)
ANION GAP SERPL CALCULATED.3IONS-SCNC: 19 MMOL/L (ref 7–15)
ANION GAP SERPL CALCULATED.3IONS-SCNC: 6 MMOL/L (ref 7–15)
ANION GAP SERPL CALCULATED.3IONS-SCNC: 7 MMOL/L (ref 7–15)
ANION GAP SERPL CALCULATED.3IONS-SCNC: 8 MMOL/L (ref 7–15)
ANION GAP SERPL CALCULATED.3IONS-SCNC: 9 MMOL/L (ref 7–15)
ANNOTATION COMMENT IMP: NORMAL
ANTIBODY SCREEN: NEGATIVE
ANTIBODY SCREEN: NEGATIVE
APAP SERPL-MCNC: <5 UG/ML (ref 10–30)
APPEARANCE CSF: CLEAR
APPEARANCE CSF: CLEAR
APPEARANCE UR: ABNORMAL
APPEARANCE UR: ABNORMAL
APPEARANCE UR: CLEAR
APPEARANCE UR: CLEAR
APTT PPP: 25 SECONDS (ref 22–38)
AST SERPL W P-5'-P-CCNC: 14 U/L (ref 0–45)
AST SERPL W P-5'-P-CCNC: 19 U/L (ref 10–35)
AST SERPL W P-5'-P-CCNC: 22 U/L (ref 0–45)
AST SERPL W P-5'-P-CCNC: 27 U/L (ref 0–45)
AST SERPL W P-5'-P-CCNC: 39 U/L (ref 10–35)
ATRIAL RATE - MUSE: 64 BPM
ATRIAL RATE - MUSE: 68 BPM
ATRIAL RATE - MUSE: 68 BPM
ATRIAL RATE - MUSE: 75 BPM
ATRIAL RATE - MUSE: 79 BPM
BACTERIA #/AREA URNS HPF: ABNORMAL /HPF
BACTERIA BLD CULT: ABNORMAL
BACTERIA BLD CULT: ABNORMAL
BACTERIA BLD CULT: NO GROWTH
BACTERIA CSF CULT: ABNORMAL
BACTERIA CSF CULT: NO GROWTH
BACTERIA CSF CULT: NORMAL
BACTERIA CSF CULT: NORMAL
BACTERIA SPT CULT: ABNORMAL
BACTERIA SPT CULT: ABNORMAL
BACTERIA SPT CULT: NORMAL
BACTERIA SPT CULT: NORMAL
BACTERIA UR CULT: ABNORMAL
BACTERIA UR CULT: NO GROWTH
BACTERIA UR CULT: NO GROWTH
BACTERIA UR CULT: NORMAL
BACTERIA UR CULT: NORMAL
BARBITURATES UR QL SCN: ABNORMAL
BARTONELLA DNA SPEC QL NAA+PROBE: NOT DETECTED
BASE EXCESS BLDA CALC-SCNC: -2.9 MMOL/L (ref -9–1.8)
BASE EXCESS BLDA CALC-SCNC: -5.7 MMOL/L (ref -9–1.8)
BASE EXCESS BLDA CALC-SCNC: 1.3 MMOL/L (ref -9–1.8)
BASE EXCESS BLDV CALC-SCNC: -4.7 MMOL/L (ref -7.7–1.9)
BASE EXCESS BLDV CALC-SCNC: -5.2 MMOL/L (ref -7.7–1.9)
BASE EXCESS BLDV CALC-SCNC: 0.2 MMOL/L (ref -7.7–1.9)
BASE EXCESS BLDV CALC-SCNC: 1.4 MMOL/L (ref -7.7–1.9)
BASE EXCESS BLDV CALC-SCNC: 1.8 MMOL/L (ref -7.7–1.9)
BASE EXCESS BLDV CALC-SCNC: 4.5 MMOL/L (ref -7.7–1.9)
BASE EXCESS BLDV CALC-SCNC: 4.6 MMOL/L (ref -7.7–1.9)
BASE EXCESS BLDV CALC-SCNC: 9.5 MMOL/L (ref -7.7–1.9)
BASOPHILS # BLD AUTO: 0.1 10E3/UL (ref 0–0.2)
BASOPHILS NFR BLD AUTO: 1 %
BASOPHILS NFR BLD AUTO: 2 %
BENZODIAZ UR QL SCN: ABNORMAL
BETA+GAMMA TOCOPHEROL SERPL-MCNC: 1 MG/L
BILIRUB DIRECT SERPL-MCNC: <0.2 MG/DL (ref 0–0.3)
BILIRUB SERPL-MCNC: 0.2 MG/DL
BILIRUB SERPL-MCNC: 0.2 MG/DL
BILIRUB SERPL-MCNC: <0.2 MG/DL
BILIRUB UR QL STRIP: NEGATIVE
BLD PROD TYP BPU: NORMAL
BLD PROD TYP BPU: NORMAL
BLOOD COMPONENT TYPE: NORMAL
BLOOD COMPONENT TYPE: NORMAL
BUN SERPL-MCNC: 10.7 MG/DL (ref 8–23)
BUN SERPL-MCNC: 10.8 MG/DL (ref 8–23)
BUN SERPL-MCNC: 10.9 MG/DL (ref 8–23)
BUN SERPL-MCNC: 13.1 MG/DL (ref 8–23)
BUN SERPL-MCNC: 14 MG/DL (ref 8–23)
BUN SERPL-MCNC: 14.7 MG/DL (ref 8–23)
BUN SERPL-MCNC: 15.1 MG/DL (ref 8–23)
BUN SERPL-MCNC: 15.3 MG/DL (ref 8–23)
BUN SERPL-MCNC: 15.3 MG/DL (ref 8–23)
BUN SERPL-MCNC: 16 MG/DL (ref 8–23)
BUN SERPL-MCNC: 16.1 MG/DL (ref 8–23)
BUN SERPL-MCNC: 16.4 MG/DL (ref 8–23)
BUN SERPL-MCNC: 16.5 MG/DL (ref 8–23)
BUN SERPL-MCNC: 16.6 MG/DL (ref 8–23)
BUN SERPL-MCNC: 17.2 MG/DL (ref 8–23)
BUN SERPL-MCNC: 17.3 MG/DL (ref 8–23)
BUN SERPL-MCNC: 17.9 MG/DL (ref 8–23)
BUN SERPL-MCNC: 17.9 MG/DL (ref 8–23)
BUN SERPL-MCNC: 18.4 MG/DL (ref 8–23)
BUN SERPL-MCNC: 18.9 MG/DL (ref 8–23)
BUN SERPL-MCNC: 19 MG/DL (ref 8–23)
BUN SERPL-MCNC: 20.5 MG/DL (ref 8–23)
BUN SERPL-MCNC: 21.4 MG/DL (ref 8–23)
BUN SERPL-MCNC: 21.6 MG/DL (ref 8–23)
BUN SERPL-MCNC: 21.8 MG/DL (ref 8–23)
BUN SERPL-MCNC: 21.9 MG/DL (ref 8–23)
BUN SERPL-MCNC: 22.1 MG/DL (ref 8–23)
BUN SERPL-MCNC: 22.3 MG/DL (ref 8–23)
BUN SERPL-MCNC: 22.6 MG/DL (ref 8–23)
BUN SERPL-MCNC: 23.6 MG/DL (ref 8–23)
BUN SERPL-MCNC: 24 MG/DL (ref 8–23)
BUN SERPL-MCNC: 25.1 MG/DL (ref 8–23)
BUN SERPL-MCNC: 26 MG/DL (ref 8–23)
BUN SERPL-MCNC: 26.1 MG/DL (ref 8–23)
BUN SERPL-MCNC: 28.4 MG/DL (ref 8–23)
BUN SERPL-MCNC: 33 MG/DL (ref 8–23)
BUN SERPL-MCNC: 35.9 MG/DL (ref 8–23)
BUN SERPL-MCNC: 37.9 MG/DL (ref 8–23)
BUN SERPL-MCNC: 43.3 MG/DL (ref 8–23)
BUN SERPL-MCNC: 49.8 MG/DL (ref 8–23)
BUN SERPL-MCNC: 6.7 MG/DL (ref 8–23)
BUN SERPL-MCNC: 7.6 MG/DL (ref 8–23)
BZE UR QL SCN: ABNORMAL
C COLI+JEJUNI+LARI FUSA STL QL NAA+PROBE: NOT DETECTED
C DIFF TOX B STL QL: NEGATIVE
C DIFF TOX B STL QL: NEGATIVE
C GATTII+NEOFOR DNA CSF QL NAA+NON-PROBE: NEGATIVE
CA-I BLD-MCNC: 4.5 MG/DL (ref 4.4–5.2)
CA-I BLD-MCNC: 4.5 MG/DL (ref 4.4–5.2)
CA-I BLD-MCNC: 4.6 MG/DL (ref 4.4–5.2)
CA-I BLD-MCNC: 4.6 MG/DL (ref 4.4–5.2)
CA-I BLD-MCNC: 4.7 MG/DL (ref 4.4–5.2)
CALCIUM SERPL-MCNC: 6.6 MG/DL (ref 8.8–10.2)
CALCIUM SERPL-MCNC: 7 MG/DL (ref 8.8–10.2)
CALCIUM SERPL-MCNC: 7.3 MG/DL (ref 8.8–10.2)
CALCIUM SERPL-MCNC: 7.4 MG/DL (ref 8.8–10.2)
CALCIUM SERPL-MCNC: 7.5 MG/DL (ref 8.8–10.2)
CALCIUM SERPL-MCNC: 7.8 MG/DL (ref 8.8–10.2)
CALCIUM SERPL-MCNC: 7.9 MG/DL (ref 8.8–10.2)
CALCIUM SERPL-MCNC: 8 MG/DL (ref 8.8–10.2)
CALCIUM SERPL-MCNC: 8.1 MG/DL (ref 8.8–10.2)
CALCIUM SERPL-MCNC: 8.2 MG/DL (ref 8.8–10.2)
CALCIUM SERPL-MCNC: 8.3 MG/DL (ref 8.8–10.2)
CALCIUM SERPL-MCNC: 8.4 MG/DL (ref 8.8–10.2)
CALCIUM SERPL-MCNC: 8.5 MG/DL (ref 8.8–10.2)
CALCIUM SERPL-MCNC: 8.6 MG/DL (ref 8.8–10.2)
CALCIUM SERPL-MCNC: 8.7 MG/DL (ref 8.8–10.2)
CALCIUM SERPL-MCNC: 8.7 MG/DL (ref 8.8–10.2)
CALCIUM SERPL-MCNC: 8.8 MG/DL (ref 8.8–10.2)
CALCIUM SERPL-MCNC: 8.8 MG/DL (ref 8.8–10.2)
CALCIUM SERPL-MCNC: 8.9 MG/DL (ref 8.8–10.2)
CALCIUM SERPL-MCNC: 9 MG/DL (ref 8.8–10.2)
CALCIUM SERPL-MCNC: 9 MG/DL (ref 8.8–10.2)
CALCIUM SERPL-MCNC: 9.1 MG/DL (ref 8.8–10.2)
CALCIUM SERPL-MCNC: 9.3 MG/DL (ref 8.8–10.2)
CANNABINOIDS UR QL SCN: ABNORMAL
CASPR2 IGG CSF QL CBA IFA: NEGATIVE
CHLORIDE SERPL-SCNC: 100 MMOL/L (ref 98–107)
CHLORIDE SERPL-SCNC: 101 MMOL/L (ref 98–107)
CHLORIDE SERPL-SCNC: 102 MMOL/L (ref 98–107)
CHLORIDE SERPL-SCNC: 103 MMOL/L (ref 98–107)
CHLORIDE SERPL-SCNC: 104 MMOL/L (ref 98–107)
CHLORIDE SERPL-SCNC: 105 MMOL/L (ref 98–107)
CHLORIDE SERPL-SCNC: 106 MMOL/L (ref 98–107)
CHLORIDE SERPL-SCNC: 107 MMOL/L (ref 98–107)
CHLORIDE SERPL-SCNC: 108 MMOL/L (ref 98–107)
CHLORIDE SERPL-SCNC: 110 MMOL/L (ref 98–107)
CHLORIDE SERPL-SCNC: 110 MMOL/L (ref 98–107)
CHLORIDE SERPL-SCNC: 111 MMOL/L (ref 98–107)
CHLORIDE SERPL-SCNC: 112 MMOL/L (ref 98–107)
CHLORIDE SERPL-SCNC: 112 MMOL/L (ref 98–107)
CHLORIDE SERPL-SCNC: 114 MMOL/L (ref 98–107)
CHLORIDE SERPL-SCNC: 115 MMOL/L (ref 98–107)
CHLORIDE SERPL-SCNC: 116 MMOL/L (ref 98–107)
CHLORIDE SERPL-SCNC: 117 MMOL/L (ref 98–107)
CHLORIDE SERPL-SCNC: 118 MMOL/L (ref 98–107)
CHLORIDE SERPL-SCNC: 118 MMOL/L (ref 98–107)
CHLORIDE SERPL-SCNC: 119 MMOL/L (ref 98–107)
CHLORIDE SERPL-SCNC: 99 MMOL/L (ref 98–107)
CHOLEST SERPL-MCNC: 143 MG/DL
CK SERPL-CCNC: 105 U/L (ref 26–192)
CK SERPL-CCNC: 193 U/L (ref 26–192)
CK SERPL-CCNC: 682 U/L (ref 26–192)
CK SERPL-CCNC: 785 U/L (ref 26–192)
CLOBAZAM SERPL-MCNC: 133 NG/ML
CLOBAZAM SERPL-MCNC: 315 NG/ML
CMV DNA CSF QL NAA+NON-PROBE: NEGATIVE
CODING SYSTEM: NORMAL
CODING SYSTEM: NORMAL
COLOR CSF: COLORLESS
COLOR CSF: COLORLESS
COLOR UR AUTO: ABNORMAL
COLOR UR AUTO: YELLOW
COPPER SERPL-MCNC: 136.2 UG/DL
CREAT SERPL-MCNC: 0.33 MG/DL (ref 0.51–0.95)
CREAT SERPL-MCNC: 0.34 MG/DL (ref 0.51–0.95)
CREAT SERPL-MCNC: 0.36 MG/DL (ref 0.51–0.95)
CREAT SERPL-MCNC: 0.36 MG/DL (ref 0.51–0.95)
CREAT SERPL-MCNC: 0.38 MG/DL (ref 0.51–0.95)
CREAT SERPL-MCNC: 0.38 MG/DL (ref 0.51–0.95)
CREAT SERPL-MCNC: 0.39 MG/DL (ref 0.51–0.95)
CREAT SERPL-MCNC: 0.4 MG/DL (ref 0.51–0.95)
CREAT SERPL-MCNC: 0.41 MG/DL (ref 0.51–0.95)
CREAT SERPL-MCNC: 0.41 MG/DL (ref 0.51–0.95)
CREAT SERPL-MCNC: 0.42 MG/DL (ref 0.51–0.95)
CREAT SERPL-MCNC: 0.43 MG/DL (ref 0.51–0.95)
CREAT SERPL-MCNC: 0.44 MG/DL (ref 0.51–0.95)
CREAT SERPL-MCNC: 0.44 MG/DL (ref 0.51–0.95)
CREAT SERPL-MCNC: 0.46 MG/DL (ref 0.51–0.95)
CREAT SERPL-MCNC: 0.46 MG/DL (ref 0.51–0.95)
CREAT SERPL-MCNC: 0.47 MG/DL (ref 0.51–0.95)
CREAT SERPL-MCNC: 0.48 MG/DL (ref 0.51–0.95)
CREAT SERPL-MCNC: 0.5 MG/DL (ref 0.51–0.95)
CREAT SERPL-MCNC: 0.52 MG/DL (ref 0.51–0.95)
CREAT SERPL-MCNC: 0.52 MG/DL (ref 0.51–0.95)
CREAT SERPL-MCNC: 0.54 MG/DL (ref 0.51–0.95)
CREAT SERPL-MCNC: 0.56 MG/DL (ref 0.51–0.95)
CREAT SERPL-MCNC: 0.57 MG/DL (ref 0.51–0.95)
CREAT SERPL-MCNC: 0.58 MG/DL (ref 0.51–0.95)
CREAT SERPL-MCNC: 0.6 MG/DL (ref 0.51–0.95)
CREAT SERPL-MCNC: 0.63 MG/DL (ref 0.51–0.95)
CREAT SERPL-MCNC: 0.65 MG/DL (ref 0.51–0.95)
CREAT SERPL-MCNC: 0.68 MG/DL (ref 0.51–0.95)
CREAT SERPL-MCNC: 0.8 MG/DL (ref 0.51–0.95)
CREAT SERPL-MCNC: 0.81 MG/DL (ref 0.51–0.95)
CROSSMATCH: NORMAL
CROSSMATCH: NORMAL
CRP SERPL-MCNC: 9.23 MG/L
CV2 AB CSF QL IF: NEGATIVE
DEPRECATED HCO3 PLAS-SCNC: 14 MMOL/L (ref 22–29)
DEPRECATED HCO3 PLAS-SCNC: 15 MMOL/L (ref 22–29)
DEPRECATED HCO3 PLAS-SCNC: 19 MMOL/L (ref 22–29)
DEPRECATED HCO3 PLAS-SCNC: 19 MMOL/L (ref 22–29)
DEPRECATED HCO3 PLAS-SCNC: 21 MMOL/L (ref 22–29)
DEPRECATED HCO3 PLAS-SCNC: 22 MMOL/L (ref 22–29)
DEPRECATED HCO3 PLAS-SCNC: 23 MMOL/L (ref 22–29)
DEPRECATED HCO3 PLAS-SCNC: 24 MMOL/L (ref 22–29)
DEPRECATED HCO3 PLAS-SCNC: 25 MMOL/L (ref 22–29)
DEPRECATED HCO3 PLAS-SCNC: 26 MMOL/L (ref 22–29)
DEPRECATED HCO3 PLAS-SCNC: 27 MMOL/L (ref 22–29)
DEPRECATED HCO3 PLAS-SCNC: 28 MMOL/L (ref 22–29)
DEPRECATED HCO3 PLAS-SCNC: 29 MMOL/L (ref 22–29)
DEPRECATED HCO3 PLAS-SCNC: 30 MMOL/L (ref 22–29)
DEPRECATED HCO3 PLAS-SCNC: 31 MMOL/L (ref 22–29)
DEPRECATED HCO3 PLAS-SCNC: 32 MMOL/L (ref 22–29)
DEPRECATED S PYO AG THROAT QL EIA: NEGATIVE
DIASTOLIC BLOOD PRESSURE - MUSE: NORMAL MMHG
DPPX IGG CSF QL IF: NEGATIVE
E COLI K1 AG CSF QL: NEGATIVE
EBV DNA SPEC QL NAA+PROBE: NOT DETECTED
EC STX1 GENE STL QL NAA+PROBE: NOT DETECTED
EC STX2 GENE STL QL NAA+PROBE: NOT DETECTED
EGFRCR SERPLBLD CKD-EPI 2021: 79 ML/MIN/1.73M2
EGFRCR SERPLBLD CKD-EPI 2021: >90 ML/MIN/1.73M2
ENTEROCOCCUS FAECALIS: NOT DETECTED
ENTEROCOCCUS FAECIUM: NOT DETECTED
EOSINOPHIL # BLD AUTO: 0 10E3/UL (ref 0–0.7)
EOSINOPHIL # BLD AUTO: 0.1 10E3/UL (ref 0–0.7)
EOSINOPHIL # BLD AUTO: 0.2 10E3/UL (ref 0–0.7)
EOSINOPHIL # BLD AUTO: 0.2 10E3/UL (ref 0–0.7)
EOSINOPHIL # BLD AUTO: 0.3 10E3/UL (ref 0–0.7)
EOSINOPHIL # BLD AUTO: 0.4 10E3/UL (ref 0–0.7)
EOSINOPHIL # BLD AUTO: 0.4 10E3/UL (ref 0–0.7)
EOSINOPHIL # BLD AUTO: 0.5 10E3/UL (ref 0–0.7)
EOSINOPHIL # BLD AUTO: 0.8 10E3/UL (ref 0–0.7)
EOSINOPHIL NFR BLD AUTO: 0 %
EOSINOPHIL NFR BLD AUTO: 1 %
EOSINOPHIL NFR BLD AUTO: 2 %
EOSINOPHIL NFR BLD AUTO: 3 %
EOSINOPHIL NFR BLD AUTO: 3 %
EOSINOPHIL NFR BLD AUTO: 4 %
EOSINOPHIL NFR BLD AUTO: 5 %
EOSINOPHIL NFR BLD AUTO: 5 %
EOSINOPHIL NFR BLD AUTO: 6 %
ERYTHROCYTE [DISTWIDTH] IN BLOOD BY AUTOMATED COUNT: 15.1 % (ref 10–15)
ERYTHROCYTE [DISTWIDTH] IN BLOOD BY AUTOMATED COUNT: 15.2 % (ref 10–15)
ERYTHROCYTE [DISTWIDTH] IN BLOOD BY AUTOMATED COUNT: 15.2 % (ref 10–15)
ERYTHROCYTE [DISTWIDTH] IN BLOOD BY AUTOMATED COUNT: 15.4 % (ref 10–15)
ERYTHROCYTE [DISTWIDTH] IN BLOOD BY AUTOMATED COUNT: 15.5 % (ref 10–15)
ERYTHROCYTE [DISTWIDTH] IN BLOOD BY AUTOMATED COUNT: 15.5 % (ref 10–15)
ERYTHROCYTE [DISTWIDTH] IN BLOOD BY AUTOMATED COUNT: 15.6 % (ref 10–15)
ERYTHROCYTE [DISTWIDTH] IN BLOOD BY AUTOMATED COUNT: 15.8 % (ref 10–15)
ERYTHROCYTE [DISTWIDTH] IN BLOOD BY AUTOMATED COUNT: 16.1 % (ref 10–15)
ERYTHROCYTE [DISTWIDTH] IN BLOOD BY AUTOMATED COUNT: 16.2 % (ref 10–15)
ERYTHROCYTE [DISTWIDTH] IN BLOOD BY AUTOMATED COUNT: 16.5 % (ref 10–15)
ERYTHROCYTE [DISTWIDTH] IN BLOOD BY AUTOMATED COUNT: 17.9 % (ref 10–15)
ERYTHROCYTE [DISTWIDTH] IN BLOOD BY AUTOMATED COUNT: 18.2 % (ref 10–15)
ERYTHROCYTE [DISTWIDTH] IN BLOOD BY AUTOMATED COUNT: 18.3 % (ref 10–15)
ERYTHROCYTE [DISTWIDTH] IN BLOOD BY AUTOMATED COUNT: 18.8 % (ref 10–15)
ERYTHROCYTE [DISTWIDTH] IN BLOOD BY AUTOMATED COUNT: 18.9 % (ref 10–15)
ERYTHROCYTE [DISTWIDTH] IN BLOOD BY AUTOMATED COUNT: 19.7 % (ref 10–15)
ERYTHROCYTE [DISTWIDTH] IN BLOOD BY AUTOMATED COUNT: 19.9 % (ref 10–15)
ERYTHROCYTE [DISTWIDTH] IN BLOOD BY AUTOMATED COUNT: 20 % (ref 10–15)
ERYTHROCYTE [DISTWIDTH] IN BLOOD BY AUTOMATED COUNT: 20.1 % (ref 10–15)
ERYTHROCYTE [DISTWIDTH] IN BLOOD BY AUTOMATED COUNT: 20.1 % (ref 10–15)
ERYTHROCYTE [DISTWIDTH] IN BLOOD BY AUTOMATED COUNT: 20.2 % (ref 10–15)
ERYTHROCYTE [DISTWIDTH] IN BLOOD BY AUTOMATED COUNT: 20.3 % (ref 10–15)
ERYTHROCYTE [DISTWIDTH] IN BLOOD BY AUTOMATED COUNT: 20.4 % (ref 10–15)
ERYTHROCYTE [DISTWIDTH] IN BLOOD BY AUTOMATED COUNT: 20.5 % (ref 10–15)
ERYTHROCYTE [DISTWIDTH] IN BLOOD BY AUTOMATED COUNT: 20.6 % (ref 10–15)
ERYTHROCYTE [DISTWIDTH] IN BLOOD BY AUTOMATED COUNT: 20.7 % (ref 10–15)
ERYTHROCYTE [DISTWIDTH] IN BLOOD BY AUTOMATED COUNT: 20.8 % (ref 10–15)
ERYTHROCYTE [DISTWIDTH] IN BLOOD BY AUTOMATED COUNT: 20.9 % (ref 10–15)
ERYTHROCYTE [DISTWIDTH] IN BLOOD BY AUTOMATED COUNT: 20.9 % (ref 10–15)
ERYTHROCYTE [DISTWIDTH] IN BLOOD BY AUTOMATED COUNT: 21 % (ref 10–15)
ERYTHROCYTE [DISTWIDTH] IN BLOOD BY AUTOMATED COUNT: 21 % (ref 10–15)
ERYTHROCYTE [DISTWIDTH] IN BLOOD BY AUTOMATED COUNT: 21.9 % (ref 10–15)
ERYTHROCYTE [DISTWIDTH] IN BLOOD BY AUTOMATED COUNT: 22.3 % (ref 10–15)
ERYTHROCYTE [SEDIMENTATION RATE] IN BLOOD BY WESTERGREN METHOD: 11 MM/HR (ref 0–30)
ETHANOL SERPL-MCNC: <0.01 G/DL
EV RNA SPEC QL NAA+PROBE: NEGATIVE
FOLATE SERPL-MCNC: 20.6 NG/ML (ref 4.6–34.8)
GABABR IGG CSF QL CBA IFA: NEGATIVE
GAD65 IGG+IGM CSF IA-SCNC: 0 NMOL/L
GFAP ALPHA IGG CSF QL IF: NEGATIVE
GFR SERPL CREATININE-BSD FRML MDRD: 81 ML/MIN/1.73M2
GFR SERPL CREATININE-BSD FRML MDRD: >90 ML/MIN/1.73M2
GLIAL NUC TYPE 1 AB CSF QL IF: NEGATIVE
GLUCOSE BLDC GLUCOMTR-MCNC: 100 MG/DL (ref 70–99)
GLUCOSE BLDC GLUCOMTR-MCNC: 101 MG/DL (ref 70–99)
GLUCOSE BLDC GLUCOMTR-MCNC: 102 MG/DL (ref 70–99)
GLUCOSE BLDC GLUCOMTR-MCNC: 103 MG/DL (ref 70–99)
GLUCOSE BLDC GLUCOMTR-MCNC: 104 MG/DL (ref 70–99)
GLUCOSE BLDC GLUCOMTR-MCNC: 105 MG/DL (ref 70–99)
GLUCOSE BLDC GLUCOMTR-MCNC: 106 MG/DL (ref 70–99)
GLUCOSE BLDC GLUCOMTR-MCNC: 106 MG/DL (ref 70–99)
GLUCOSE BLDC GLUCOMTR-MCNC: 107 MG/DL (ref 70–99)
GLUCOSE BLDC GLUCOMTR-MCNC: 108 MG/DL (ref 70–99)
GLUCOSE BLDC GLUCOMTR-MCNC: 109 MG/DL (ref 70–99)
GLUCOSE BLDC GLUCOMTR-MCNC: 110 MG/DL (ref 70–99)
GLUCOSE BLDC GLUCOMTR-MCNC: 111 MG/DL (ref 70–99)
GLUCOSE BLDC GLUCOMTR-MCNC: 112 MG/DL (ref 70–99)
GLUCOSE BLDC GLUCOMTR-MCNC: 114 MG/DL (ref 70–99)
GLUCOSE BLDC GLUCOMTR-MCNC: 115 MG/DL (ref 70–99)
GLUCOSE BLDC GLUCOMTR-MCNC: 116 MG/DL (ref 70–99)
GLUCOSE BLDC GLUCOMTR-MCNC: 117 MG/DL (ref 70–99)
GLUCOSE BLDC GLUCOMTR-MCNC: 118 MG/DL (ref 70–99)
GLUCOSE BLDC GLUCOMTR-MCNC: 119 MG/DL (ref 70–99)
GLUCOSE BLDC GLUCOMTR-MCNC: 120 MG/DL (ref 70–99)
GLUCOSE BLDC GLUCOMTR-MCNC: 121 MG/DL (ref 70–99)
GLUCOSE BLDC GLUCOMTR-MCNC: 122 MG/DL (ref 70–99)
GLUCOSE BLDC GLUCOMTR-MCNC: 123 MG/DL (ref 70–99)
GLUCOSE BLDC GLUCOMTR-MCNC: 123 MG/DL (ref 70–99)
GLUCOSE BLDC GLUCOMTR-MCNC: 124 MG/DL (ref 70–99)
GLUCOSE BLDC GLUCOMTR-MCNC: 124 MG/DL (ref 70–99)
GLUCOSE BLDC GLUCOMTR-MCNC: 125 MG/DL (ref 70–99)
GLUCOSE BLDC GLUCOMTR-MCNC: 126 MG/DL (ref 70–99)
GLUCOSE BLDC GLUCOMTR-MCNC: 126 MG/DL (ref 70–99)
GLUCOSE BLDC GLUCOMTR-MCNC: 127 MG/DL (ref 70–99)
GLUCOSE BLDC GLUCOMTR-MCNC: 128 MG/DL (ref 70–99)
GLUCOSE BLDC GLUCOMTR-MCNC: 130 MG/DL (ref 70–99)
GLUCOSE BLDC GLUCOMTR-MCNC: 131 MG/DL (ref 70–99)
GLUCOSE BLDC GLUCOMTR-MCNC: 132 MG/DL (ref 70–99)
GLUCOSE BLDC GLUCOMTR-MCNC: 133 MG/DL (ref 70–99)
GLUCOSE BLDC GLUCOMTR-MCNC: 135 MG/DL (ref 70–99)
GLUCOSE BLDC GLUCOMTR-MCNC: 135 MG/DL (ref 70–99)
GLUCOSE BLDC GLUCOMTR-MCNC: 136 MG/DL (ref 70–99)
GLUCOSE BLDC GLUCOMTR-MCNC: 137 MG/DL (ref 70–99)
GLUCOSE BLDC GLUCOMTR-MCNC: 139 MG/DL (ref 70–99)
GLUCOSE BLDC GLUCOMTR-MCNC: 139 MG/DL (ref 70–99)
GLUCOSE BLDC GLUCOMTR-MCNC: 140 MG/DL (ref 70–99)
GLUCOSE BLDC GLUCOMTR-MCNC: 141 MG/DL (ref 70–99)
GLUCOSE BLDC GLUCOMTR-MCNC: 143 MG/DL (ref 70–99)
GLUCOSE BLDC GLUCOMTR-MCNC: 153 MG/DL (ref 70–99)
GLUCOSE BLDC GLUCOMTR-MCNC: 159 MG/DL (ref 70–99)
GLUCOSE BLDC GLUCOMTR-MCNC: 159 MG/DL (ref 70–99)
GLUCOSE BLDC GLUCOMTR-MCNC: 164 MG/DL (ref 70–99)
GLUCOSE BLDC GLUCOMTR-MCNC: 164 MG/DL (ref 70–99)
GLUCOSE BLDC GLUCOMTR-MCNC: 189 MG/DL (ref 70–99)
GLUCOSE BLDC GLUCOMTR-MCNC: 191 MG/DL (ref 70–99)
GLUCOSE BLDC GLUCOMTR-MCNC: 210 MG/DL (ref 70–99)
GLUCOSE BLDC GLUCOMTR-MCNC: 63 MG/DL (ref 70–99)
GLUCOSE BLDC GLUCOMTR-MCNC: 71 MG/DL (ref 70–99)
GLUCOSE BLDC GLUCOMTR-MCNC: 77 MG/DL (ref 70–99)
GLUCOSE BLDC GLUCOMTR-MCNC: 80 MG/DL (ref 70–99)
GLUCOSE BLDC GLUCOMTR-MCNC: 86 MG/DL (ref 70–99)
GLUCOSE BLDC GLUCOMTR-MCNC: 89 MG/DL (ref 70–99)
GLUCOSE BLDC GLUCOMTR-MCNC: 90 MG/DL (ref 70–99)
GLUCOSE BLDC GLUCOMTR-MCNC: 91 MG/DL (ref 70–99)
GLUCOSE BLDC GLUCOMTR-MCNC: 93 MG/DL (ref 70–99)
GLUCOSE BLDC GLUCOMTR-MCNC: 93 MG/DL (ref 70–99)
GLUCOSE BLDC GLUCOMTR-MCNC: 95 MG/DL (ref 70–99)
GLUCOSE BLDC GLUCOMTR-MCNC: 97 MG/DL (ref 70–99)
GLUCOSE BLDC GLUCOMTR-MCNC: 98 MG/DL (ref 70–99)
GLUCOSE BLDC GLUCOMTR-MCNC: 99 MG/DL (ref 70–99)
GLUCOSE CSF-MCNC: 88 MG/DL (ref 40–70)
GLUCOSE CSF-MCNC: 98 MG/DL (ref 40–70)
GLUCOSE SERPL-MCNC: 100 MG/DL (ref 70–99)
GLUCOSE SERPL-MCNC: 103 MG/DL (ref 70–99)
GLUCOSE SERPL-MCNC: 104 MG/DL (ref 70–99)
GLUCOSE SERPL-MCNC: 105 MG/DL (ref 70–99)
GLUCOSE SERPL-MCNC: 107 MG/DL (ref 70–99)
GLUCOSE SERPL-MCNC: 108 MG/DL (ref 70–99)
GLUCOSE SERPL-MCNC: 109 MG/DL (ref 70–99)
GLUCOSE SERPL-MCNC: 109 MG/DL (ref 70–99)
GLUCOSE SERPL-MCNC: 110 MG/DL (ref 70–99)
GLUCOSE SERPL-MCNC: 110 MG/DL (ref 70–99)
GLUCOSE SERPL-MCNC: 111 MG/DL (ref 70–99)
GLUCOSE SERPL-MCNC: 112 MG/DL (ref 70–99)
GLUCOSE SERPL-MCNC: 113 MG/DL (ref 70–99)
GLUCOSE SERPL-MCNC: 114 MG/DL (ref 70–99)
GLUCOSE SERPL-MCNC: 115 MG/DL (ref 70–99)
GLUCOSE SERPL-MCNC: 117 MG/DL (ref 70–99)
GLUCOSE SERPL-MCNC: 119 MG/DL (ref 70–99)
GLUCOSE SERPL-MCNC: 120 MG/DL (ref 70–99)
GLUCOSE SERPL-MCNC: 121 MG/DL (ref 70–99)
GLUCOSE SERPL-MCNC: 122 MG/DL (ref 70–99)
GLUCOSE SERPL-MCNC: 122 MG/DL (ref 70–99)
GLUCOSE SERPL-MCNC: 123 MG/DL (ref 70–99)
GLUCOSE SERPL-MCNC: 123 MG/DL (ref 70–99)
GLUCOSE SERPL-MCNC: 124 MG/DL (ref 70–99)
GLUCOSE SERPL-MCNC: 130 MG/DL (ref 70–99)
GLUCOSE SERPL-MCNC: 130 MG/DL (ref 70–99)
GLUCOSE SERPL-MCNC: 131 MG/DL (ref 70–99)
GLUCOSE SERPL-MCNC: 135 MG/DL (ref 70–99)
GLUCOSE SERPL-MCNC: 137 MG/DL (ref 70–99)
GLUCOSE SERPL-MCNC: 139 MG/DL (ref 70–99)
GLUCOSE SERPL-MCNC: 141 MG/DL (ref 70–99)
GLUCOSE SERPL-MCNC: 141 MG/DL (ref 70–99)
GLUCOSE SERPL-MCNC: 145 MG/DL (ref 70–99)
GLUCOSE SERPL-MCNC: 145 MG/DL (ref 70–99)
GLUCOSE SERPL-MCNC: 147 MG/DL (ref 70–99)
GLUCOSE SERPL-MCNC: 153 MG/DL (ref 70–99)
GLUCOSE SERPL-MCNC: 156 MG/DL (ref 70–99)
GLUCOSE SERPL-MCNC: 168 MG/DL (ref 70–99)
GLUCOSE SERPL-MCNC: 94 MG/DL (ref 70–99)
GLUCOSE SERPL-MCNC: 95 MG/DL (ref 70–99)
GLUCOSE SERPL-MCNC: 96 MG/DL (ref 70–99)
GLUCOSE SERPL-MCNC: 96 MG/DL (ref 70–99)
GLUCOSE SERPL-MCNC: 97 MG/DL (ref 70–99)
GLUCOSE UR STRIP-MCNC: NEGATIVE MG/DL
GP B STREP DNA CSF QL NAA+NON-PROBE: NEGATIVE
GRAM STAIN RESULT: ABNORMAL
GRAM STAIN RESULT: NORMAL
GROUP A STREP BY PCR: NOT DETECTED
HAEM INFLU DNA CSF QL NAA+NON-PROBE: NEGATIVE
HCO3 BLD-SCNC: 19 MMOL/L (ref 21–28)
HCO3 BLD-SCNC: 22 MMOL/L (ref 21–28)
HCO3 BLD-SCNC: 24 MMOL/L (ref 21–28)
HCO3 BLDV-SCNC: 15 MMOL/L (ref 21–28)
HCO3 BLDV-SCNC: 17 MMOL/L (ref 21–28)
HCO3 BLDV-SCNC: 19 MMOL/L (ref 21–28)
HCO3 BLDV-SCNC: 25 MMOL/L (ref 21–28)
HCO3 BLDV-SCNC: 26 MMOL/L (ref 21–28)
HCO3 BLDV-SCNC: 26 MMOL/L (ref 21–28)
HCO3 BLDV-SCNC: 29 MMOL/L (ref 21–28)
HCO3 BLDV-SCNC: 30 MMOL/L (ref 21–28)
HCO3 BLDV-SCNC: 35 MMOL/L (ref 21–28)
HCO3 SERPL-SCNC: 17 MMOL/L (ref 22–29)
HCT VFR BLD AUTO: 24.1 % (ref 35–47)
HCT VFR BLD AUTO: 24.3 % (ref 35–47)
HCT VFR BLD AUTO: 24.8 % (ref 35–47)
HCT VFR BLD AUTO: 24.8 % (ref 35–47)
HCT VFR BLD AUTO: 25.3 % (ref 35–47)
HCT VFR BLD AUTO: 25.5 % (ref 35–47)
HCT VFR BLD AUTO: 25.5 % (ref 35–47)
HCT VFR BLD AUTO: 25.6 % (ref 35–47)
HCT VFR BLD AUTO: 25.7 % (ref 35–47)
HCT VFR BLD AUTO: 25.7 % (ref 35–47)
HCT VFR BLD AUTO: 25.8 % (ref 35–47)
HCT VFR BLD AUTO: 26.4 % (ref 35–47)
HCT VFR BLD AUTO: 26.6 % (ref 35–47)
HCT VFR BLD AUTO: 26.6 % (ref 35–47)
HCT VFR BLD AUTO: 26.7 % (ref 35–47)
HCT VFR BLD AUTO: 27.1 % (ref 35–47)
HCT VFR BLD AUTO: 27.1 % (ref 35–47)
HCT VFR BLD AUTO: 27.2 % (ref 35–47)
HCT VFR BLD AUTO: 27.4 % (ref 35–47)
HCT VFR BLD AUTO: 27.5 % (ref 35–47)
HCT VFR BLD AUTO: 27.5 % (ref 35–47)
HCT VFR BLD AUTO: 27.6 % (ref 35–47)
HCT VFR BLD AUTO: 27.8 % (ref 35–47)
HCT VFR BLD AUTO: 28.4 % (ref 35–47)
HCT VFR BLD AUTO: 28.5 % (ref 35–47)
HCT VFR BLD AUTO: 28.9 % (ref 35–47)
HCT VFR BLD AUTO: 29.2 % (ref 35–47)
HCT VFR BLD AUTO: 29.5 % (ref 35–47)
HCT VFR BLD AUTO: 29.7 % (ref 35–47)
HCT VFR BLD AUTO: 30.1 % (ref 35–47)
HCT VFR BLD AUTO: 30.8 % (ref 35–47)
HCT VFR BLD AUTO: 31 % (ref 35–47)
HCT VFR BLD AUTO: 31.2 % (ref 35–47)
HCT VFR BLD AUTO: 31.2 % (ref 35–47)
HCT VFR BLD AUTO: 31.3 % (ref 35–47)
HCT VFR BLD AUTO: 31.4 % (ref 35–47)
HCT VFR BLD AUTO: 31.5 % (ref 35–47)
HCT VFR BLD AUTO: 31.6 % (ref 35–47)
HCT VFR BLD AUTO: 31.8 % (ref 35–47)
HCT VFR BLD AUTO: 31.9 % (ref 35–47)
HCT VFR BLD AUTO: 32.6 % (ref 35–47)
HCT VFR BLD AUTO: 32.7 % (ref 35–47)
HCT VFR BLD AUTO: 33.6 % (ref 35–47)
HDLC SERPL-MCNC: 50 MG/DL
HEMOCCULT STL QL: NEGATIVE
HGB BLD-MCNC: 10 G/DL (ref 11.7–15.7)
HGB BLD-MCNC: 6.3 G/DL (ref 11.7–15.7)
HGB BLD-MCNC: 6.9 G/DL (ref 11.7–15.7)
HGB BLD-MCNC: 7 G/DL (ref 11.7–15.7)
HGB BLD-MCNC: 7 G/DL (ref 11.7–15.7)
HGB BLD-MCNC: 7.1 G/DL (ref 11.7–15.7)
HGB BLD-MCNC: 7.2 G/DL (ref 11.7–15.7)
HGB BLD-MCNC: 7.2 G/DL (ref 11.7–15.7)
HGB BLD-MCNC: 7.4 G/DL (ref 11.7–15.7)
HGB BLD-MCNC: 7.4 G/DL (ref 11.7–15.7)
HGB BLD-MCNC: 7.5 G/DL (ref 11.7–15.7)
HGB BLD-MCNC: 7.6 G/DL (ref 11.7–15.7)
HGB BLD-MCNC: 7.7 G/DL (ref 11.7–15.7)
HGB BLD-MCNC: 7.8 G/DL (ref 11.7–15.7)
HGB BLD-MCNC: 7.9 G/DL (ref 11.7–15.7)
HGB BLD-MCNC: 8 G/DL (ref 11.7–15.7)
HGB BLD-MCNC: 8.1 G/DL (ref 11.7–15.7)
HGB BLD-MCNC: 8.2 G/DL (ref 11.7–15.7)
HGB BLD-MCNC: 8.3 G/DL (ref 11.7–15.7)
HGB BLD-MCNC: 8.3 G/DL (ref 11.7–15.7)
HGB BLD-MCNC: 8.4 G/DL (ref 11.7–15.7)
HGB BLD-MCNC: 8.5 G/DL (ref 11.7–15.7)
HGB BLD-MCNC: 8.7 G/DL (ref 11.7–15.7)
HGB BLD-MCNC: 8.8 G/DL (ref 11.7–15.7)
HGB BLD-MCNC: 8.9 G/DL (ref 11.7–15.7)
HGB BLD-MCNC: 9 G/DL (ref 11.7–15.7)
HGB BLD-MCNC: 9.1 G/DL (ref 11.7–15.7)
HGB BLD-MCNC: 9.2 G/DL (ref 11.7–15.7)
HGB BLD-MCNC: 9.3 G/DL (ref 11.7–15.7)
HGB BLD-MCNC: 9.4 G/DL (ref 11.7–15.7)
HGB BLD-MCNC: 9.4 G/DL (ref 11.7–15.7)
HGB BLD-MCNC: 9.6 G/DL (ref 11.7–15.7)
HGB BLD-MCNC: 9.8 G/DL (ref 11.7–15.7)
HGB BLD-MCNC: 9.9 G/DL (ref 11.7–15.7)
HGB BLD-MCNC: 9.9 G/DL (ref 11.7–15.7)
HGB UR QL STRIP: ABNORMAL
HGB UR QL STRIP: ABNORMAL
HGB UR QL STRIP: NEGATIVE
HGB UR QL STRIP: NEGATIVE
HHV6 DNA CSF QL NAA+NON-PROBE: NEGATIVE
HIV 1+2 AB+HIV1 P24 AG SERPL QL IA: NONREACTIVE
HOLD SPECIMEN: NORMAL
HSV1 DNA CSF QL NAA+NON-PROBE: NEGATIVE
HSV1 DNA CSF QL NAA+PROBE: NOT DETECTED
HSV1 DNA CSF QL NAA+PROBE: NOT DETECTED
HSV2 DNA CSF QL NAA+NON-PROBE: NEGATIVE
HSV2 DNA CSF QL NAA+PROBE: NOT DETECTED
HSV2 DNA CSF QL NAA+PROBE: NOT DETECTED
HU1 AB CSF QL IF: NEGATIVE
HU2 AB CSF QL IF: NEGATIVE
HU3 AB CSF QL IF: NEGATIVE
HYALINE CASTS: 3 /LPF
IGLON5 IGG CSF QL IF: NEGATIVE
IMM GRANULOCYTES # BLD: 0 10E3/UL
IMM GRANULOCYTES # BLD: 0.1 10E3/UL
IMM GRANULOCYTES NFR BLD: 0 %
IMM GRANULOCYTES NFR BLD: 0 %
IMM GRANULOCYTES NFR BLD: 1 %
IMMUNOLOGIST REVIEW: NORMAL
INR PPP: 1.04 (ref 0.85–1.15)
INR PPP: 1.15 (ref 0.85–1.15)
INTERPRETATION ECG - MUSE: NORMAL
ISSUE DATE AND TIME: NORMAL
ISSUE DATE AND TIME: NORMAL
KETONES UR STRIP-MCNC: NEGATIVE MG/DL
L MONOCYTOG DNA CSF QL NAA+NON-PROBE: NEGATIVE
LABCORP INTERFACED MISCELLANEOUS TEST RESULT: NORMAL
LACOSAMIDE SERPL-MCNC: 4.1 UG/ML
LACOSAMIDE SERPL-MCNC: 5.5 UG/ML
LACTATE BLD-SCNC: 1.1 MMOL/L
LACTATE SERPL-SCNC: 1.1 MMOL/L (ref 0.7–2)
LACTATE SERPL-SCNC: 1.3 MMOL/L (ref 0.7–2)
LACTATE SERPL-SCNC: 1.4 MMOL/L (ref 0.7–2)
LDLC SERPL CALC-MCNC: 75 MG/DL
LEUKOCYTE ESTERASE UR QL STRIP: ABNORMAL
LGI1 IGG CSF QL CBA IFA: NEGATIVE
LIPASE SERPL-CCNC: 22 U/L (ref 13–60)
LISTERIA SPECIES (DETECTED/NOT DETECTED): NOT DETECTED
LVEF ECHO: NORMAL
LYMPHOCYTES # BLD AUTO: 1.2 10E3/UL (ref 0.8–5.3)
LYMPHOCYTES # BLD AUTO: 1.7 10E3/UL (ref 0.8–5.3)
LYMPHOCYTES # BLD AUTO: 1.7 10E3/UL (ref 0.8–5.3)
LYMPHOCYTES # BLD AUTO: 1.8 10E3/UL (ref 0.8–5.3)
LYMPHOCYTES # BLD AUTO: 2 10E3/UL (ref 0.8–5.3)
LYMPHOCYTES # BLD AUTO: 2 10E3/UL (ref 0.8–5.3)
LYMPHOCYTES # BLD AUTO: 2.1 10E3/UL (ref 0.8–5.3)
LYMPHOCYTES # BLD AUTO: 2.3 10E3/UL (ref 0.8–5.3)
LYMPHOCYTES # BLD AUTO: 2.5 10E3/UL (ref 0.8–5.3)
LYMPHOCYTES # BLD AUTO: 2.8 10E3/UL (ref 0.8–5.3)
LYMPHOCYTES # BLD AUTO: 3.1 10E3/UL (ref 0.8–5.3)
LYMPHOCYTES NFR BLD AUTO: 11 %
LYMPHOCYTES NFR BLD AUTO: 12 %
LYMPHOCYTES NFR BLD AUTO: 16 %
LYMPHOCYTES NFR BLD AUTO: 17 %
LYMPHOCYTES NFR BLD AUTO: 17 %
LYMPHOCYTES NFR BLD AUTO: 23 %
LYMPHOCYTES NFR BLD AUTO: 23 %
LYMPHOCYTES NFR BLD AUTO: 26 %
LYMPHOCYTES NFR BLD AUTO: 28 %
LYMPHOCYTES NFR BLD AUTO: 28 %
LYMPHOCYTES NFR BLD AUTO: 35 %
Lab: NORMAL
M PNEUMO DNA SPEC QL NAA+PROBE: NOT DETECTED
MAGNESIUM SERPL-MCNC: 1.7 MG/DL (ref 1.7–2.3)
MAGNESIUM SERPL-MCNC: 1.9 MG/DL (ref 1.7–2.3)
MAGNESIUM SERPL-MCNC: 1.9 MG/DL (ref 1.7–2.3)
MAGNESIUM SERPL-MCNC: 2 MG/DL (ref 1.7–2.3)
MAGNESIUM SERPL-MCNC: 2.1 MG/DL (ref 1.7–2.3)
MAGNESIUM SERPL-MCNC: 2.2 MG/DL (ref 1.7–2.3)
MAGNESIUM SERPL-MCNC: 2.3 MG/DL (ref 1.7–2.3)
MAYO MISC RESULT: NORMAL
MCH RBC QN AUTO: 21.5 PG (ref 26.5–33)
MCH RBC QN AUTO: 21.7 PG (ref 26.5–33)
MCH RBC QN AUTO: 22 PG (ref 26.5–33)
MCH RBC QN AUTO: 23 PG (ref 26.5–33)
MCH RBC QN AUTO: 23.4 PG (ref 26.5–33)
MCH RBC QN AUTO: 23.7 PG (ref 26.5–33)
MCH RBC QN AUTO: 23.9 PG (ref 26.5–33)
MCH RBC QN AUTO: 24.1 PG (ref 26.5–33)
MCH RBC QN AUTO: 24.3 PG (ref 26.5–33)
MCH RBC QN AUTO: 24.3 PG (ref 26.5–33)
MCH RBC QN AUTO: 24.4 PG (ref 26.5–33)
MCH RBC QN AUTO: 24.5 PG (ref 26.5–33)
MCH RBC QN AUTO: 24.6 PG (ref 26.5–33)
MCH RBC QN AUTO: 24.7 PG (ref 26.5–33)
MCH RBC QN AUTO: 24.8 PG (ref 26.5–33)
MCH RBC QN AUTO: 24.8 PG (ref 26.5–33)
MCH RBC QN AUTO: 24.9 PG (ref 26.5–33)
MCH RBC QN AUTO: 24.9 PG (ref 26.5–33)
MCH RBC QN AUTO: 25 PG (ref 26.5–33)
MCH RBC QN AUTO: 25 PG (ref 26.5–33)
MCH RBC QN AUTO: 25.1 PG (ref 26.5–33)
MCH RBC QN AUTO: 25.2 PG (ref 26.5–33)
MCH RBC QN AUTO: 25.4 PG (ref 26.5–33)
MCH RBC QN AUTO: 26.5 PG (ref 26.5–33)
MCH RBC QN AUTO: 26.5 PG (ref 26.5–33)
MCH RBC QN AUTO: 26.7 PG (ref 26.5–33)
MCH RBC QN AUTO: 26.7 PG (ref 26.5–33)
MCH RBC QN AUTO: 26.8 PG (ref 26.5–33)
MCH RBC QN AUTO: 26.8 PG (ref 26.5–33)
MCH RBC QN AUTO: 27 PG (ref 26.5–33)
MCH RBC QN AUTO: 27 PG (ref 26.5–33)
MCH RBC QN AUTO: 27.2 PG (ref 26.5–33)
MCH RBC QN AUTO: 27.4 PG (ref 26.5–33)
MCHC RBC AUTO-ENTMCNC: 28.2 G/DL (ref 31.5–36.5)
MCHC RBC AUTO-ENTMCNC: 28.8 G/DL (ref 31.5–36.5)
MCHC RBC AUTO-ENTMCNC: 29 G/DL (ref 31.5–36.5)
MCHC RBC AUTO-ENTMCNC: 29.1 G/DL (ref 31.5–36.5)
MCHC RBC AUTO-ENTMCNC: 29.1 G/DL (ref 31.5–36.5)
MCHC RBC AUTO-ENTMCNC: 29.2 G/DL (ref 31.5–36.5)
MCHC RBC AUTO-ENTMCNC: 29.2 G/DL (ref 31.5–36.5)
MCHC RBC AUTO-ENTMCNC: 29.4 G/DL (ref 31.5–36.5)
MCHC RBC AUTO-ENTMCNC: 29.5 G/DL (ref 31.5–36.5)
MCHC RBC AUTO-ENTMCNC: 29.5 G/DL (ref 31.5–36.5)
MCHC RBC AUTO-ENTMCNC: 29.6 G/DL (ref 31.5–36.5)
MCHC RBC AUTO-ENTMCNC: 29.7 G/DL (ref 31.5–36.5)
MCHC RBC AUTO-ENTMCNC: 29.8 G/DL (ref 31.5–36.5)
MCHC RBC AUTO-ENTMCNC: 29.9 G/DL (ref 31.5–36.5)
MCHC RBC AUTO-ENTMCNC: 30 G/DL (ref 31.5–36.5)
MCHC RBC AUTO-ENTMCNC: 30 G/DL (ref 31.5–36.5)
MCHC RBC AUTO-ENTMCNC: 30.1 G/DL (ref 31.5–36.5)
MCHC RBC AUTO-ENTMCNC: 30.1 G/DL (ref 31.5–36.5)
MCHC RBC AUTO-ENTMCNC: 30.2 G/DL (ref 31.5–36.5)
MCHC RBC AUTO-ENTMCNC: 30.3 G/DL (ref 31.5–36.5)
MCHC RBC AUTO-ENTMCNC: 30.4 G/DL (ref 31.5–36.5)
MCHC RBC AUTO-ENTMCNC: 30.5 G/DL (ref 31.5–36.5)
MCHC RBC AUTO-ENTMCNC: 30.6 G/DL (ref 31.5–36.5)
MCHC RBC AUTO-ENTMCNC: 30.6 G/DL (ref 31.5–36.5)
MCHC RBC AUTO-ENTMCNC: 30.7 G/DL (ref 31.5–36.5)
MCHC RBC AUTO-ENTMCNC: 31.3 G/DL (ref 31.5–36.5)
MCHC RBC AUTO-ENTMCNC: 31.4 G/DL (ref 31.5–36.5)
MCV RBC AUTO: 75 FL (ref 78–100)
MCV RBC AUTO: 75 FL (ref 78–100)
MCV RBC AUTO: 76 FL (ref 78–100)
MCV RBC AUTO: 77 FL (ref 78–100)
MCV RBC AUTO: 77 FL (ref 78–100)
MCV RBC AUTO: 78 FL (ref 78–100)
MCV RBC AUTO: 79 FL (ref 78–100)
MCV RBC AUTO: 81 FL (ref 78–100)
MCV RBC AUTO: 82 FL (ref 78–100)
MCV RBC AUTO: 83 FL (ref 78–100)
MCV RBC AUTO: 84 FL (ref 78–100)
MCV RBC AUTO: 85 FL (ref 78–100)
MCV RBC AUTO: 86 FL (ref 78–100)
MCV RBC AUTO: 86 FL (ref 78–100)
MCV RBC AUTO: 87 FL (ref 78–100)
MCV RBC AUTO: 88 FL (ref 78–100)
MCV RBC AUTO: 88 FL (ref 78–100)
MCV RBC AUTO: 89 FL (ref 78–100)
MCV RBC AUTO: 90 FL (ref 78–100)
METHYLMALONATE SERPL-SCNC: 0.19 UMOL/L (ref 0–0.4)
MGLUR1 IGG CSF QL IF: NEGATIVE
MISCELLANEOUS TEST 1 (ARUP): NORMAL
MISCELLANEOUS TEST 1 (ARUP): NORMAL
MONOCYTES # BLD AUTO: 0.5 10E3/UL (ref 0–1.3)
MONOCYTES # BLD AUTO: 0.6 10E3/UL (ref 0–1.3)
MONOCYTES # BLD AUTO: 0.7 10E3/UL (ref 0–1.3)
MONOCYTES # BLD AUTO: 0.8 10E3/UL (ref 0–1.3)
MONOCYTES # BLD AUTO: 0.9 10E3/UL (ref 0–1.3)
MONOCYTES # BLD AUTO: 1 10E3/UL (ref 0–1.3)
MONOCYTES # BLD AUTO: 1.2 10E3/UL (ref 0–1.3)
MONOCYTES # BLD AUTO: 1.4 10E3/UL (ref 0–1.3)
MONOCYTES # BLD AUTO: 1.5 10E3/UL (ref 0–1.3)
MONOCYTES NFR BLD AUTO: 10 %
MONOCYTES NFR BLD AUTO: 10 %
MONOCYTES NFR BLD AUTO: 11 %
MONOCYTES NFR BLD AUTO: 11 %
MONOCYTES NFR BLD AUTO: 12 %
MONOCYTES NFR BLD AUTO: 5 %
MONOCYTES NFR BLD AUTO: 6 %
MONOCYTES NFR BLD AUTO: 7 %
MONOCYTES NFR BLD AUTO: 7 %
MONOCYTES NFR BLD AUTO: 9 %
MONOCYTES NFR BLD AUTO: 9 %
MRSA DNA SPEC QL NAA+PROBE: NEGATIVE
MUCOUS THREADS #/AREA URNS LPF: PRESENT /LPF
N MEN DNA CSF QL NAA+NON-PROBE: NEGATIVE
NEUROCHONDRIN AB CSF QL IF: NEGATIVE
NEUTROPHILS # BLD AUTO: 10.8 10E3/UL (ref 1.6–8.3)
NEUTROPHILS # BLD AUTO: 11.4 10E3/UL (ref 1.6–8.3)
NEUTROPHILS # BLD AUTO: 3.2 10E3/UL (ref 1.6–8.3)
NEUTROPHILS # BLD AUTO: 4.4 10E3/UL (ref 1.6–8.3)
NEUTROPHILS # BLD AUTO: 4.8 10E3/UL (ref 1.6–8.3)
NEUTROPHILS # BLD AUTO: 5 10E3/UL (ref 1.6–8.3)
NEUTROPHILS # BLD AUTO: 5.8 10E3/UL (ref 1.6–8.3)
NEUTROPHILS # BLD AUTO: 6.7 10E3/UL (ref 1.6–8.3)
NEUTROPHILS # BLD AUTO: 7.1 10E3/UL (ref 1.6–8.3)
NEUTROPHILS # BLD AUTO: 8.8 10E3/UL (ref 1.6–8.3)
NEUTROPHILS # BLD AUTO: 9.5 10E3/UL (ref 1.6–8.3)
NEUTROPHILS NFR BLD AUTO: 49 %
NEUTROPHILS NFR BLD AUTO: 54 %
NEUTROPHILS NFR BLD AUTO: 56 %
NEUTROPHILS NFR BLD AUTO: 57 %
NEUTROPHILS NFR BLD AUTO: 65 %
NEUTROPHILS NFR BLD AUTO: 65 %
NEUTROPHILS NFR BLD AUTO: 70 %
NEUTROPHILS NFR BLD AUTO: 71 %
NEUTROPHILS NFR BLD AUTO: 72 %
NEUTROPHILS NFR BLD AUTO: 76 %
NEUTROPHILS NFR BLD AUTO: 82 %
NIF IGG CSF QL IF: NEGATIVE
NITRATE UR QL: NEGATIVE
NITRATE UR QL: POSITIVE
NMDAR1 IGG CSF QL CBA IFA: NEGATIVE
NONHDLC SERPL-MCNC: 93 MG/DL
NORCLOBAZAM SERPL-MCNC: 1578 NG/ML
NORCLOBAZAM SERPL-MCNC: 883 NG/ML
NOROV GI+II ORF1-ORF2 JNC STL QL NAA+PR: NOT DETECTED
NRBC # BLD AUTO: 0 10E3/UL
NRBC BLD AUTO-RTO: 0 /100
O2/TOTAL GAS SETTING VFR VENT: 0 %
O2/TOTAL GAS SETTING VFR VENT: 0 %
O2/TOTAL GAS SETTING VFR VENT: 21 %
O2/TOTAL GAS SETTING VFR VENT: 30 %
O2/TOTAL GAS SETTING VFR VENT: 4 %
O2/TOTAL GAS SETTING VFR VENT: 40 %
O2/TOTAL GAS SETTING VFR VENT: 50 %
OPIATES UR QL SCN: ABNORMAL
OSMOLALITY SERPL: 313 MMOL/KG (ref 280–301)
OSMOLALITY UR: 678 MMOL/KG (ref 100–1200)
OXYHGB MFR BLDV: 74 % (ref 70–75)
OXYHGB MFR BLDV: 98 % (ref 70–75)
P AXIS - MUSE: 56 DEGREES
P AXIS - MUSE: 60 DEGREES
P AXIS - MUSE: 71 DEGREES
P AXIS - MUSE: 81 DEGREES
P AXIS - MUSE: 82 DEGREES
PARECHOVIRUS A RNA CSF QL NAA+NON-PROBE: NEGATIVE
PATH REPORT.COMMENTS IMP SPEC: NORMAL
PATH REPORT.FINAL DX SPEC: NORMAL
PATH REPORT.GROSS SPEC: NORMAL
PATH REPORT.MICROSCOPIC SPEC OTHER STN: NORMAL
PATH REPORT.RELEVANT HX SPEC: NORMAL
PCA-1 AB CSF QL IF: NEGATIVE
PCA-2 AB CSF QL IF: NEGATIVE
PCA-TR AB CSF QL IF: NEGATIVE
PCO2 BLD: 31 MM HG (ref 35–45)
PCO2 BLD: 31 MM HG (ref 35–45)
PCO2 BLD: 36 MM HG (ref 35–45)
PCO2 BLDV: 24 MM HG (ref 40–50)
PCO2 BLDV: 26 MM HG (ref 40–50)
PCO2 BLDV: 28 MM HG (ref 40–50)
PCO2 BLDV: 35 MM HG (ref 40–50)
PCO2 BLDV: 40 MM HG (ref 40–50)
PCO2 BLDV: 42 MM HG (ref 40–50)
PCO2 BLDV: 44 MM HG (ref 40–50)
PCO2 BLDV: 46 MM HG (ref 40–50)
PCO2 BLDV: 53 MM HG (ref 40–50)
PCP QUAL URINE (ROCHE): ABNORMAL
PERFORMING LABORATORY: NORMAL
PH BLD: 7.39 [PH] (ref 7.35–7.45)
PH BLD: 7.39 [PH] (ref 7.35–7.45)
PH BLD: 7.5 [PH] (ref 7.35–7.45)
PH BLDV: 7.38 [PH] (ref 7.32–7.43)
PH BLDV: 7.38 [PH] (ref 7.32–7.43)
PH BLDV: 7.41 [PH] (ref 7.32–7.43)
PH BLDV: 7.42 [PH] (ref 7.32–7.43)
PH BLDV: 7.43 [PH] (ref 7.32–7.43)
PH BLDV: 7.43 [PH] (ref 7.32–7.43)
PH BLDV: 7.44 [PH] (ref 7.32–7.43)
PH BLDV: 7.47 [PH] (ref 7.32–7.43)
PH BLDV: 7.47 [PH] (ref 7.32–7.43)
PH UR STRIP: 5 [PH] (ref 5–7)
PH UR STRIP: 5.5 [PH] (ref 5–7)
PH UR STRIP: 7 [PH] (ref 5–7)
PH UR STRIP: 7.5 [PH] (ref 5–7)
PHENYTOIN FREE SERPL-MCNC: 1.4 UG/ML
PHENYTOIN FREE SERPL-MCNC: 2 UG/ML
PHENYTOIN FREE SERPL-MCNC: 2.4 UG/ML
PHENYTOIN FREE SERPL-MCNC: 2.6 UG/ML
PHENYTOIN FREE SERPL-MCNC: 2.7 UG/ML
PHENYTOIN SERPL-MCNC: 11.5 UG/ML
PHENYTOIN SERPL-MCNC: 11.6 UG/ML
PHENYTOIN SERPL-MCNC: 13.8 UG/ML
PHENYTOIN SERPL-MCNC: 15.3 UG/ML
PHENYTOIN SERPL-MCNC: 17.2 UG/ML
PHENYTOIN SERPL-MCNC: 17.8 UG/ML
PHENYTOIN SERPL-MCNC: 8.9 UG/ML
PHOSPHATE SERPL-MCNC: 0.8 MG/DL (ref 2.5–4.5)
PHOSPHATE SERPL-MCNC: 1.5 MG/DL (ref 2.5–4.5)
PHOSPHATE SERPL-MCNC: 1.7 MG/DL (ref 2.5–4.5)
PHOSPHATE SERPL-MCNC: 1.8 MG/DL (ref 2.5–4.5)
PHOSPHATE SERPL-MCNC: 2.2 MG/DL (ref 2.5–4.5)
PHOSPHATE SERPL-MCNC: 2.6 MG/DL (ref 2.5–4.5)
PHOSPHATE SERPL-MCNC: 3 MG/DL (ref 2.5–4.5)
PHOSPHATE SERPL-MCNC: 3.1 MG/DL (ref 2.5–4.5)
PHOSPHATE SERPL-MCNC: 3.2 MG/DL (ref 2.5–4.5)
PHOSPHATE SERPL-MCNC: 3.2 MG/DL (ref 2.5–4.5)
PHOSPHATE SERPL-MCNC: 3.3 MG/DL (ref 2.5–4.5)
PHOSPHATE SERPL-MCNC: 3.3 MG/DL (ref 2.5–4.5)
PHOSPHATE SERPL-MCNC: 3.4 MG/DL (ref 2.5–4.5)
PHOSPHATE SERPL-MCNC: 3.5 MG/DL (ref 2.5–4.5)
PHOSPHATE SERPL-MCNC: 3.5 MG/DL (ref 2.5–4.5)
PHOSPHATE SERPL-MCNC: 3.6 MG/DL (ref 2.5–4.5)
PHOSPHATE SERPL-MCNC: 3.7 MG/DL (ref 2.5–4.5)
PHOSPHATE SERPL-MCNC: 3.8 MG/DL (ref 2.5–4.5)
PHOSPHATE SERPL-MCNC: 3.9 MG/DL (ref 2.5–4.5)
PHOSPHATE SERPL-MCNC: 4 MG/DL (ref 2.5–4.5)
PHOSPHATE SERPL-MCNC: 4 MG/DL (ref 2.5–4.5)
PHOSPHATE SERPL-MCNC: 4.1 MG/DL (ref 2.5–4.5)
PHOSPHATE SERPL-MCNC: 4.1 MG/DL (ref 2.5–4.5)
PHOSPHATE SERPL-MCNC: 4.2 MG/DL (ref 2.5–4.5)
PHOSPHATE SERPL-MCNC: 4.3 MG/DL (ref 2.5–4.5)
PHOSPHATE SERPL-MCNC: 4.5 MG/DL (ref 2.5–4.5)
PLATELET # BLD AUTO: 233 10E3/UL (ref 150–450)
PLATELET # BLD AUTO: 265 10E3/UL (ref 150–450)
PLATELET # BLD AUTO: 270 10E3/UL (ref 150–450)
PLATELET # BLD AUTO: 284 10E3/UL (ref 150–450)
PLATELET # BLD AUTO: 289 10E3/UL (ref 150–450)
PLATELET # BLD AUTO: 291 10E3/UL (ref 150–450)
PLATELET # BLD AUTO: 293 10E3/UL (ref 150–450)
PLATELET # BLD AUTO: 294 10E3/UL (ref 150–450)
PLATELET # BLD AUTO: 296 10E3/UL (ref 150–450)
PLATELET # BLD AUTO: 305 10E3/UL (ref 150–450)
PLATELET # BLD AUTO: 310 10E3/UL (ref 150–450)
PLATELET # BLD AUTO: 331 10E3/UL (ref 150–450)
PLATELET # BLD AUTO: 334 10E3/UL (ref 150–450)
PLATELET # BLD AUTO: 341 10E3/UL (ref 150–450)
PLATELET # BLD AUTO: 342 10E3/UL (ref 150–450)
PLATELET # BLD AUTO: 344 10E3/UL (ref 150–450)
PLATELET # BLD AUTO: 349 10E3/UL (ref 150–450)
PLATELET # BLD AUTO: 350 10E3/UL (ref 150–450)
PLATELET # BLD AUTO: 357 10E3/UL (ref 150–450)
PLATELET # BLD AUTO: 382 10E3/UL (ref 150–450)
PLATELET # BLD AUTO: 383 10E3/UL (ref 150–450)
PLATELET # BLD AUTO: 385 10E3/UL (ref 150–450)
PLATELET # BLD AUTO: 397 10E3/UL (ref 150–450)
PLATELET # BLD AUTO: 405 10E3/UL (ref 150–450)
PLATELET # BLD AUTO: 415 10E3/UL (ref 150–450)
PLATELET # BLD AUTO: 424 10E3/UL (ref 150–450)
PLATELET # BLD AUTO: 428 10E3/UL (ref 150–450)
PLATELET # BLD AUTO: 432 10E3/UL (ref 150–450)
PLATELET # BLD AUTO: 441 10E3/UL (ref 150–450)
PLATELET # BLD AUTO: 444 10E3/UL (ref 150–450)
PLATELET # BLD AUTO: 454 10E3/UL (ref 150–450)
PLATELET # BLD AUTO: 457 10E3/UL (ref 150–450)
PLATELET # BLD AUTO: 457 10E3/UL (ref 150–450)
PLATELET # BLD AUTO: 460 10E3/UL (ref 150–450)
PLATELET # BLD AUTO: 464 10E3/UL (ref 150–450)
PLATELET # BLD AUTO: 470 10E3/UL (ref 150–450)
PLATELET # BLD AUTO: 479 10E3/UL (ref 150–450)
PLATELET # BLD AUTO: 480 10E3/UL (ref 150–450)
PLATELET # BLD AUTO: 485 10E3/UL (ref 150–450)
PLATELET # BLD AUTO: 489 10E3/UL (ref 150–450)
PLATELET # BLD AUTO: 495 10E3/UL (ref 150–450)
PLATELET # BLD AUTO: 497 10E3/UL (ref 150–450)
PLATELET # BLD AUTO: 501 10E3/UL (ref 150–450)
PLATELET # BLD AUTO: 508 10E3/UL (ref 150–450)
PLATELET # BLD AUTO: 521 10E3/UL (ref 150–450)
PLATELET # BLD AUTO: 526 10E3/UL (ref 150–450)
PLATELET # BLD AUTO: 534 10E3/UL (ref 150–450)
PLATELET # BLD AUTO: 539 10E3/UL (ref 150–450)
PLATELET # BLD AUTO: 551 10E3/UL (ref 150–450)
PO2 BLD: 192 MM HG (ref 80–105)
PO2 BLD: 208 MM HG (ref 80–105)
PO2 BLD: 89 MM HG (ref 80–105)
PO2 BLDV: 112 MM HG (ref 25–47)
PO2 BLDV: 26 MM HG (ref 25–47)
PO2 BLDV: 32 MM HG (ref 25–47)
PO2 BLDV: 39 MM HG (ref 25–47)
PO2 BLDV: 40 MM HG (ref 25–47)
PO2 BLDV: 45 MM HG (ref 25–47)
PO2 BLDV: 45 MM HG (ref 25–47)
PO2 BLDV: 46 MM HG (ref 25–47)
PO2 BLDV: 56 MM HG (ref 25–47)
POTASSIUM SERPL-SCNC: 2.7 MMOL/L (ref 3.4–5.3)
POTASSIUM SERPL-SCNC: 2.9 MMOL/L (ref 3.4–5.3)
POTASSIUM SERPL-SCNC: 2.9 MMOL/L (ref 3.4–5.3)
POTASSIUM SERPL-SCNC: 3 MMOL/L (ref 3.4–5.3)
POTASSIUM SERPL-SCNC: 3 MMOL/L (ref 3.4–5.3)
POTASSIUM SERPL-SCNC: 3.1 MMOL/L (ref 3.4–5.3)
POTASSIUM SERPL-SCNC: 3.2 MMOL/L (ref 3.4–5.3)
POTASSIUM SERPL-SCNC: 3.3 MMOL/L (ref 3.4–5.3)
POTASSIUM SERPL-SCNC: 3.3 MMOL/L (ref 3.4–5.3)
POTASSIUM SERPL-SCNC: 3.4 MMOL/L (ref 3.4–5.3)
POTASSIUM SERPL-SCNC: 3.4 MMOL/L (ref 3.4–5.3)
POTASSIUM SERPL-SCNC: 3.5 MMOL/L (ref 3.4–5.3)
POTASSIUM SERPL-SCNC: 3.5 MMOL/L (ref 3.4–5.3)
POTASSIUM SERPL-SCNC: 3.6 MMOL/L (ref 3.4–5.3)
POTASSIUM SERPL-SCNC: 3.7 MMOL/L (ref 3.4–5.3)
POTASSIUM SERPL-SCNC: 3.8 MMOL/L (ref 3.4–5.3)
POTASSIUM SERPL-SCNC: 3.9 MMOL/L (ref 3.4–5.3)
POTASSIUM SERPL-SCNC: 4 MMOL/L (ref 3.4–5.3)
POTASSIUM SERPL-SCNC: 4.1 MMOL/L (ref 3.4–5.3)
POTASSIUM SERPL-SCNC: 4.2 MMOL/L (ref 3.4–5.3)
POTASSIUM SERPL-SCNC: 4.3 MMOL/L (ref 3.4–5.3)
POTASSIUM SERPL-SCNC: 4.7 MMOL/L (ref 3.4–5.3)
POTASSIUM SERPL-SCNC: 4.8 MMOL/L (ref 3.4–5.3)
PR INTERVAL - MUSE: 182 MS
PR INTERVAL - MUSE: 186 MS
PR INTERVAL - MUSE: 194 MS
PR INTERVAL - MUSE: 200 MS
PR INTERVAL - MUSE: 206 MS
PROCALCITONIN SERPL IA-MCNC: 0.06 NG/ML
PROT CSF-MCNC: 20.4 MG/DL (ref 15–45)
PROT CSF-MCNC: 22.8 MG/DL (ref 15–45)
PROT SERPL-MCNC: 5.6 G/DL (ref 6.4–8.3)
PROT SERPL-MCNC: 6.5 G/DL (ref 6.4–8.3)
PROT SERPL-MCNC: 6.7 G/DL (ref 6.4–8.3)
PROT SERPL-MCNC: 6.9 G/DL (ref 6.4–8.3)
PROT SERPL-MCNC: 6.9 G/DL (ref 6.4–8.3)
QRS DURATION - MUSE: 76 MS
QRS DURATION - MUSE: 78 MS
QRS DURATION - MUSE: 78 MS
QRS DURATION - MUSE: 80 MS
QRS DURATION - MUSE: 80 MS
QT - MUSE: 410 MS
QT - MUSE: 426 MS
QT - MUSE: 426 MS
QT - MUSE: 440 MS
QT - MUSE: 480 MS
QTC - MUSE: 452 MS
QTC - MUSE: 467 MS
QTC - MUSE: 470 MS
QTC - MUSE: 475 MS
QTC - MUSE: 495 MS
R AXIS - MUSE: -81 DEGREES
R AXIS - MUSE: -85 DEGREES
R AXIS - MUSE: 19 DEGREES
R AXIS - MUSE: 73 DEGREES
R AXIS - MUSE: 94 DEGREES
RBC # BLD AUTO: 2.99 10E6/UL (ref 3.8–5.2)
RBC # BLD AUTO: 3.05 10E6/UL (ref 3.8–5.2)
RBC # BLD AUTO: 3.08 10E6/UL (ref 3.8–5.2)
RBC # BLD AUTO: 3.11 10E6/UL (ref 3.8–5.2)
RBC # BLD AUTO: 3.11 10E6/UL (ref 3.8–5.2)
RBC # BLD AUTO: 3.12 10E6/UL (ref 3.8–5.2)
RBC # BLD AUTO: 3.13 10E6/UL (ref 3.8–5.2)
RBC # BLD AUTO: 3.15 10E6/UL (ref 3.8–5.2)
RBC # BLD AUTO: 3.18 10E6/UL (ref 3.8–5.2)
RBC # BLD AUTO: 3.21 10E6/UL (ref 3.8–5.2)
RBC # BLD AUTO: 3.23 10E6/UL (ref 3.8–5.2)
RBC # BLD AUTO: 3.23 10E6/UL (ref 3.8–5.2)
RBC # BLD AUTO: 3.25 10E6/UL (ref 3.8–5.2)
RBC # BLD AUTO: 3.28 10E6/UL (ref 3.8–5.2)
RBC # BLD AUTO: 3.31 10E6/UL (ref 3.8–5.2)
RBC # BLD AUTO: 3.31 10E6/UL (ref 3.8–5.2)
RBC # BLD AUTO: 3.32 10E6/UL (ref 3.8–5.2)
RBC # BLD AUTO: 3.33 10E6/UL (ref 3.8–5.2)
RBC # BLD AUTO: 3.34 10E6/UL (ref 3.8–5.2)
RBC # BLD AUTO: 3.35 10E6/UL (ref 3.8–5.2)
RBC # BLD AUTO: 3.36 10E6/UL (ref 3.8–5.2)
RBC # BLD AUTO: 3.37 10E6/UL (ref 3.8–5.2)
RBC # BLD AUTO: 3.38 10E6/UL (ref 3.8–5.2)
RBC # BLD AUTO: 3.43 10E6/UL (ref 3.8–5.2)
RBC # BLD AUTO: 3.45 10E6/UL (ref 3.8–5.2)
RBC # BLD AUTO: 3.45 10E6/UL (ref 3.8–5.2)
RBC # BLD AUTO: 3.46 10E6/UL (ref 3.8–5.2)
RBC # BLD AUTO: 3.47 10E6/UL (ref 3.8–5.2)
RBC # BLD AUTO: 3.54 10E6/UL (ref 3.8–5.2)
RBC # BLD AUTO: 3.57 10E6/UL (ref 3.8–5.2)
RBC # BLD AUTO: 3.59 10E6/UL (ref 3.8–5.2)
RBC # BLD AUTO: 3.6 10E6/UL (ref 3.8–5.2)
RBC # BLD AUTO: 3.61 10E6/UL (ref 3.8–5.2)
RBC # BLD AUTO: 3.65 10E6/UL (ref 3.8–5.2)
RBC # BLD AUTO: 3.66 10E6/UL (ref 3.8–5.2)
RBC # BLD AUTO: 3.68 10E6/UL (ref 3.8–5.2)
RBC # BLD AUTO: 3.68 10E6/UL (ref 3.8–5.2)
RBC # BLD AUTO: 3.71 10E6/UL (ref 3.8–5.2)
RBC # BLD AUTO: 3.73 10E6/UL (ref 3.8–5.2)
RBC # BLD AUTO: 3.76 10E6/UL (ref 3.8–5.2)
RBC # BLD AUTO: 3.76 10E6/UL (ref 3.8–5.2)
RBC # BLD AUTO: 3.77 10E6/UL (ref 3.8–5.2)
RBC # BLD AUTO: 3.77 10E6/UL (ref 3.8–5.2)
RBC # BLD AUTO: 3.87 10E6/UL (ref 3.8–5.2)
RBC # BLD AUTO: 3.92 10E6/UL (ref 3.8–5.2)
RBC # CSF MANUAL: 0 /UL (ref 0–2)
RBC URINE: 10 /HPF
RBC URINE: 2 /HPF
RBC URINE: 4 /HPF
RBC URINE: <1 /HPF
RETICS # AUTO: 0.06 10E6/UL (ref 0.03–0.1)
RETICS/RBC NFR AUTO: 1.8 % (ref 0.5–2)
RVA NSP5 STL QL NAA+PROBE: NOT DETECTED
S PNEUM DNA CSF QL NAA+NON-PROBE: NEGATIVE
SA TARGET DNA: NEGATIVE
SALICYLATES SERPL-MCNC: <0.3 MG/DL
SALMONELLA SP RPOD STL QL NAA+PROBE: NOT DETECTED
SAO2 % BLDV: 49 % (ref 94–100)
SARS-COV-2 RNA RESP QL NAA+PROBE: NEGATIVE
SCANNED LAB RESULT: NORMAL
SEPTIN-7 IGG CSF QL IF: NEGATIVE
SHIGELLA SP+EIEC IPAH STL QL NAA+PROBE: NOT DETECTED
SODIUM SERPL-SCNC: 136 MMOL/L (ref 135–145)
SODIUM SERPL-SCNC: 136 MMOL/L (ref 135–145)
SODIUM SERPL-SCNC: 137 MMOL/L (ref 135–145)
SODIUM SERPL-SCNC: 138 MMOL/L (ref 135–145)
SODIUM SERPL-SCNC: 139 MMOL/L (ref 135–145)
SODIUM SERPL-SCNC: 140 MMOL/L (ref 135–145)
SODIUM SERPL-SCNC: 141 MMOL/L (ref 135–145)
SODIUM SERPL-SCNC: 142 MMOL/L (ref 135–145)
SODIUM SERPL-SCNC: 142 MMOL/L (ref 136–145)
SODIUM SERPL-SCNC: 142 MMOL/L (ref 136–145)
SODIUM SERPL-SCNC: 143 MMOL/L (ref 135–145)
SODIUM SERPL-SCNC: 143 MMOL/L (ref 136–145)
SODIUM SERPL-SCNC: 144 MMOL/L (ref 135–145)
SODIUM SERPL-SCNC: 144 MMOL/L (ref 136–145)
SODIUM SERPL-SCNC: 144 MMOL/L (ref 136–145)
SODIUM SERPL-SCNC: 145 MMOL/L (ref 135–145)
SODIUM SERPL-SCNC: 145 MMOL/L (ref 135–145)
SODIUM SERPL-SCNC: 145 MMOL/L (ref 136–145)
SODIUM SERPL-SCNC: 146 MMOL/L (ref 136–145)
SODIUM SERPL-SCNC: 147 MMOL/L (ref 135–145)
SODIUM SERPL-SCNC: 147 MMOL/L (ref 136–145)
SODIUM SERPL-SCNC: 148 MMOL/L (ref 135–145)
SODIUM SERPL-SCNC: 149 MMOL/L (ref 136–145)
SODIUM SERPL-SCNC: 150 MMOL/L (ref 136–145)
SODIUM SERPL-SCNC: 151 MMOL/L (ref 136–145)
SODIUM SERPL-SCNC: 152 MMOL/L (ref 136–145)
SP GR UR STRIP: 1.01 (ref 1–1.03)
SP GR UR STRIP: 1.01 (ref 1–1.03)
SP GR UR STRIP: 1.02 (ref 1–1.03)
SP GR UR STRIP: 1.03 (ref 1–1.03)
SPECIMEN EXPIRATION DATE: NORMAL
SPECIMEN EXPIRATION DATE: NORMAL
SPECIMEN STATUS: NORMAL
STAPHYLOCOCCUS AUREUS: NOT DETECTED
STAPHYLOCOCCUS EPIDERMIDIS: NOT DETECTED
STAPHYLOCOCCUS LUGDUNENSIS: NOT DETECTED
STAPHYLOCOCCUS SPECIES: DETECTED
STREPTOCOCCUS AGALACTIAE: NOT DETECTED
STREPTOCOCCUS ANGINOSUS GROUP: NOT DETECTED
STREPTOCOCCUS PNEUMONIAE: NOT DETECTED
STREPTOCOCCUS PYOGENES: NOT DETECTED
STREPTOCOCCUS SPECIES: NOT DETECTED
SYSTOLIC BLOOD PRESSURE - MUSE: NORMAL MMHG
T AXIS - MUSE: 45 DEGREES
T AXIS - MUSE: 70 DEGREES
T AXIS - MUSE: 72 DEGREES
T AXIS - MUSE: 76 DEGREES
T AXIS - MUSE: 81 DEGREES
T WHIPPLEI DNA SPEC QL NAA+PROBE: NOT DETECTED
TEST NAME: NORMAL
TRIGL SERPL-MCNC: 136 MG/DL
TRIGL SERPL-MCNC: 1372 MG/DL
TRIGL SERPL-MCNC: 88 MG/DL
TROPONIN T SERPL HS-MCNC: 87 NG/L
TROPONIN T SERPL HS-MCNC: 90 NG/L
TSH SERPL DL<=0.005 MIU/L-ACNC: 1.13 UIU/ML (ref 0.3–4.2)
TUBE # CSF: 4
TUBE # CSF: 4
UNIT ABO/RH: NORMAL
UNIT ABO/RH: NORMAL
UNIT NUMBER: NORMAL
UNIT NUMBER: NORMAL
UNIT STATUS: NORMAL
UNIT STATUS: NORMAL
UNIT TYPE ISBT: 1700
UNIT TYPE ISBT: 5100
UROBILINOGEN UR STRIP-MCNC: NORMAL MG/DL
V CHOL+PARA RFBL+TRKH+TNAA STL QL NAA+PR: NOT DETECTED
VALPROATE FREE MFR SERPL: 41 %
VALPROATE FREE MFR SERPL: 49 %
VALPROATE FREE SERPL-MCNC: 10 UG/ML
VALPROATE FREE SERPL-MCNC: 12 UG/ML
VALPROATE SERPL-MCNC: 20 UG/ML
VALPROATE SERPL-MCNC: 30 UG/ML
VENTRICULAR RATE- MUSE: 64 BPM
VENTRICULAR RATE- MUSE: 68 BPM
VENTRICULAR RATE- MUSE: 68 BPM
VENTRICULAR RATE- MUSE: 75 BPM
VENTRICULAR RATE- MUSE: 79 BPM
VIT B1 PYROPHOSHATE BLD-SCNC: 223 NMOL/L
VIT B12 SERPL-MCNC: 1325 PG/ML (ref 232–1245)
VZV DNA CSF QL NAA+NON-PROBE: NEGATIVE
VZV DNA SPEC QL NAA+PROBE: NOT DETECTED
WBC # BLD AUTO: 10.1 10E3/UL (ref 4–11)
WBC # BLD AUTO: 10.2 10E3/UL (ref 4–11)
WBC # BLD AUTO: 10.6 10E3/UL (ref 4–11)
WBC # BLD AUTO: 10.8 10E3/UL (ref 4–11)
WBC # BLD AUTO: 11 10E3/UL (ref 4–11)
WBC # BLD AUTO: 11 10E3/UL (ref 4–11)
WBC # BLD AUTO: 11.1 10E3/UL (ref 4–11)
WBC # BLD AUTO: 11.3 10E3/UL (ref 4–11)
WBC # BLD AUTO: 11.4 10E3/UL (ref 4–11)
WBC # BLD AUTO: 11.4 10E3/UL (ref 4–11)
WBC # BLD AUTO: 11.5 10E3/UL (ref 4–11)
WBC # BLD AUTO: 11.8 10E3/UL (ref 4–11)
WBC # BLD AUTO: 12 10E3/UL (ref 4–11)
WBC # BLD AUTO: 12.1 10E3/UL (ref 4–11)
WBC # BLD AUTO: 12.2 10E3/UL (ref 4–11)
WBC # BLD AUTO: 12.6 10E3/UL (ref 4–11)
WBC # BLD AUTO: 14.2 10E3/UL (ref 4–11)
WBC # BLD AUTO: 14.8 10E3/UL (ref 4–11)
WBC # BLD AUTO: 16.1 10E3/UL (ref 4–11)
WBC # BLD AUTO: 5.6 10E3/UL (ref 4–11)
WBC # BLD AUTO: 6.4 10E3/UL (ref 4–11)
WBC # BLD AUTO: 6.5 10E3/UL (ref 4–11)
WBC # BLD AUTO: 6.5 10E3/UL (ref 4–11)
WBC # BLD AUTO: 6.8 10E3/UL (ref 4–11)
WBC # BLD AUTO: 6.9 10E3/UL (ref 4–11)
WBC # BLD AUTO: 7.4 10E3/UL (ref 4–11)
WBC # BLD AUTO: 7.5 10E3/UL (ref 4–11)
WBC # BLD AUTO: 7.6 10E3/UL (ref 4–11)
WBC # BLD AUTO: 7.6 10E3/UL (ref 4–11)
WBC # BLD AUTO: 7.8 10E3/UL (ref 4–11)
WBC # BLD AUTO: 8.1 10E3/UL (ref 4–11)
WBC # BLD AUTO: 8.3 10E3/UL (ref 4–11)
WBC # BLD AUTO: 8.4 10E3/UL (ref 4–11)
WBC # BLD AUTO: 8.5 10E3/UL (ref 4–11)
WBC # BLD AUTO: 8.5 10E3/UL (ref 4–11)
WBC # BLD AUTO: 8.6 10E3/UL (ref 4–11)
WBC # BLD AUTO: 8.7 10E3/UL (ref 4–11)
WBC # BLD AUTO: 8.8 10E3/UL (ref 4–11)
WBC # BLD AUTO: 9 10E3/UL (ref 4–11)
WBC # BLD AUTO: 9.1 10E3/UL (ref 4–11)
WBC # BLD AUTO: 9.2 10E3/UL (ref 4–11)
WBC # BLD AUTO: 9.2 10E3/UL (ref 4–11)
WBC # BLD AUTO: 9.4 10E3/UL (ref 4–11)
WBC # BLD AUTO: 9.6 10E3/UL (ref 4–11)
WBC # BLD AUTO: 9.6 10E3/UL (ref 4–11)
WBC # BLD AUTO: 9.7 10E3/UL (ref 4–11)
WBC # BLD AUTO: 9.8 10E3/UL (ref 4–11)
WBC # BLD AUTO: 9.9 10E3/UL (ref 4–11)
WBC # BLD AUTO: 9.9 10E3/UL (ref 4–11)
WBC # CSF MANUAL: 2 /UL (ref 0–5)
WBC # CSF MANUAL: 2 /UL (ref 0–5)
WBC CLUMPS #/AREA URNS HPF: PRESENT /HPF
WBC URINE: 18 /HPF
WBC URINE: >182 /HPF
WNV RNA SPEC QL NAA+PROBE: NOT DETECTED
Y ENTERO RECN STL QL NAA+PROBE: NOT DETECTED
ZINC SERPL-MCNC: 53 UG/DL

## 2023-01-01 PROCEDURE — 999N000065 XR ABDOMEN 1 VIEW

## 2023-01-01 PROCEDURE — 82803 BLOOD GASES ANY COMBINATION: CPT | Performed by: INTERNAL MEDICINE

## 2023-01-01 PROCEDURE — 110N000005 HC R&B HOSPICE, ACCENT

## 2023-01-01 PROCEDURE — 99291 CRITICAL CARE FIRST HOUR: CPT | Mod: 25 | Performed by: NURSE PRACTITIONER

## 2023-01-01 PROCEDURE — 250N000013 HC RX MED GY IP 250 OP 250 PS 637: Performed by: PHYSICIAN ASSISTANT

## 2023-01-01 PROCEDURE — 250N000013 HC RX MED GY IP 250 OP 250 PS 637: Performed by: INTERNAL MEDICINE

## 2023-01-01 PROCEDURE — 258N000003 HC RX IP 258 OP 636: Performed by: NURSE PRACTITIONER

## 2023-01-01 PROCEDURE — 250N000011 HC RX IP 250 OP 636: Performed by: PHYSICIAN ASSISTANT

## 2023-01-01 PROCEDURE — 95720 EEG PHY/QHP EA INCR W/VEEG: CPT | Performed by: PSYCHIATRY & NEUROLOGY

## 2023-01-01 PROCEDURE — C9254 INJECTION, LACOSAMIDE: HCPCS | Performed by: NURSE PRACTITIONER

## 2023-01-01 PROCEDURE — 999N000253 HC STATISTIC WEANING TRIALS

## 2023-01-01 PROCEDURE — 999N000009 HC STATISTIC AIRWAY CARE

## 2023-01-01 PROCEDURE — 99223 1ST HOSP IP/OBS HIGH 75: CPT | Mod: AI | Performed by: HOSPITALIST

## 2023-01-01 PROCEDURE — 250N000013 HC RX MED GY IP 250 OP 250 PS 637: Performed by: NURSE PRACTITIONER

## 2023-01-01 PROCEDURE — 94003 VENT MGMT INPAT SUBQ DAY: CPT

## 2023-01-01 PROCEDURE — 250N000011 HC RX IP 250 OP 636: Mod: JZ

## 2023-01-01 PROCEDURE — 99233 SBSQ HOSP IP/OBS HIGH 50: CPT | Performed by: NURSE PRACTITIONER

## 2023-01-01 PROCEDURE — 74018 RADEX ABDOMEN 1 VIEW: CPT

## 2023-01-01 PROCEDURE — 84425 ASSAY OF VITAMIN B-1: CPT | Performed by: PSYCHIATRY & NEUROLOGY

## 2023-01-01 PROCEDURE — 250N000011 HC RX IP 250 OP 636: Performed by: NURSE PRACTITIONER

## 2023-01-01 PROCEDURE — 80235 DRUG ASSAY LACOSAMIDE: CPT | Performed by: NURSE PRACTITIONER

## 2023-01-01 PROCEDURE — 82607 VITAMIN B-12: CPT | Performed by: PSYCHIATRY & NEUROLOGY

## 2023-01-01 PROCEDURE — 84100 ASSAY OF PHOSPHORUS: CPT | Performed by: INTERNAL MEDICINE

## 2023-01-01 PROCEDURE — 80048 BASIC METABOLIC PNL TOTAL CA: CPT | Performed by: INTERNAL MEDICINE

## 2023-01-01 PROCEDURE — 74177 CT ABD & PELVIS W/CONTRAST: CPT | Mod: MA

## 2023-01-01 PROCEDURE — 258N000003 HC RX IP 258 OP 636: Performed by: PHYSICIAN ASSISTANT

## 2023-01-01 PROCEDURE — 87493 C DIFF AMPLIFIED PROBE: CPT | Performed by: INTERNAL MEDICINE

## 2023-01-01 PROCEDURE — G0463 HOSPITAL OUTPT CLINIC VISIT: HCPCS

## 2023-01-01 PROCEDURE — 250N000009 HC RX 250: Performed by: NURSE ANESTHETIST, CERTIFIED REGISTERED

## 2023-01-01 PROCEDURE — 85025 COMPLETE CBC W/AUTO DIFF WBC: CPT

## 2023-01-01 PROCEDURE — 258N000003 HC RX IP 258 OP 636: Performed by: INTERNAL MEDICINE

## 2023-01-01 PROCEDURE — 0035U NEURO CSF PRION PRTN QUAL: CPT | Performed by: STUDENT IN AN ORGANIZED HEALTH CARE EDUCATION/TRAINING PROGRAM

## 2023-01-01 PROCEDURE — 85027 COMPLETE CBC AUTOMATED: CPT | Performed by: INTERNAL MEDICINE

## 2023-01-01 PROCEDURE — 85025 COMPLETE CBC W/AUTO DIFF WBC: CPT | Performed by: HOSPITALIST

## 2023-01-01 PROCEDURE — 86900 BLOOD TYPING SEROLOGIC ABO: CPT | Performed by: INTERNAL MEDICINE

## 2023-01-01 PROCEDURE — 99291 CRITICAL CARE FIRST HOUR: CPT | Performed by: NURSE PRACTITIONER

## 2023-01-01 PROCEDURE — 200N000001 HC R&B ICU

## 2023-01-01 PROCEDURE — 999N000157 HC STATISTIC RCP TIME EA 10 MIN

## 2023-01-01 PROCEDURE — 250N000011 HC RX IP 250 OP 636: Performed by: INTERNAL MEDICINE

## 2023-01-01 PROCEDURE — 82945 GLUCOSE OTHER FLUID: CPT | Performed by: INTERNAL MEDICINE

## 2023-01-01 PROCEDURE — 250N000013 HC RX MED GY IP 250 OP 250 PS 637: Performed by: STUDENT IN AN ORGANIZED HEALTH CARE EDUCATION/TRAINING PROGRAM

## 2023-01-01 PROCEDURE — 84478 ASSAY OF TRIGLYCERIDES: CPT | Performed by: STUDENT IN AN ORGANIZED HEALTH CARE EDUCATION/TRAINING PROGRAM

## 2023-01-01 PROCEDURE — 99221 1ST HOSP IP/OBS SF/LOW 40: CPT | Performed by: PSYCHIATRY & NEUROLOGY

## 2023-01-01 PROCEDURE — 87798 DETECT AGENT NOS DNA AMP: CPT | Performed by: STUDENT IN AN ORGANIZED HEALTH CARE EDUCATION/TRAINING PROGRAM

## 2023-01-01 PROCEDURE — 80185 ASSAY OF PHENYTOIN TOTAL: CPT | Performed by: PHYSICIAN ASSISTANT

## 2023-01-01 PROCEDURE — 258N000003 HC RX IP 258 OP 636: Performed by: STUDENT IN AN ORGANIZED HEALTH CARE EDUCATION/TRAINING PROGRAM

## 2023-01-01 PROCEDURE — 99291 CRITICAL CARE FIRST HOUR: CPT | Performed by: INTERNAL MEDICINE

## 2023-01-01 PROCEDURE — 250N000012 HC RX MED GY IP 250 OP 636 PS 637: Performed by: INTERNAL MEDICINE

## 2023-01-01 PROCEDURE — 120N000001 HC R&B MED SURG/OB

## 2023-01-01 PROCEDURE — 94640 AIRWAY INHALATION TREATMENT: CPT

## 2023-01-01 PROCEDURE — 80185 ASSAY OF PHENYTOIN TOTAL: CPT | Performed by: NURSE PRACTITIONER

## 2023-01-01 PROCEDURE — 83735 ASSAY OF MAGNESIUM: CPT | Performed by: INTERNAL MEDICINE

## 2023-01-01 PROCEDURE — 250N000013 HC RX MED GY IP 250 OP 250 PS 637: Performed by: PSYCHIATRY & NEUROLOGY

## 2023-01-01 PROCEDURE — 250N000013 HC RX MED GY IP 250 OP 250 PS 637: Performed by: HOSPITALIST

## 2023-01-01 PROCEDURE — 36415 COLL VENOUS BLD VENIPUNCTURE: CPT | Performed by: INTERNAL MEDICINE

## 2023-01-01 PROCEDURE — 250N000011 HC RX IP 250 OP 636: Mod: JZ | Performed by: STUDENT IN AN ORGANIZED HEALTH CARE EDUCATION/TRAINING PROGRAM

## 2023-01-01 PROCEDURE — 250N000011 HC RX IP 250 OP 636: Mod: JZ | Performed by: PHYSICIAN ASSISTANT

## 2023-01-01 PROCEDURE — 71045 X-RAY EXAM CHEST 1 VIEW: CPT

## 2023-01-01 PROCEDURE — 99232 SBSQ HOSP IP/OBS MODERATE 35: CPT | Performed by: INTERNAL MEDICINE

## 2023-01-01 PROCEDURE — 99233 SBSQ HOSP IP/OBS HIGH 50: CPT | Performed by: PSYCHIATRY & NEUROLOGY

## 2023-01-01 PROCEDURE — 99291 CRITICAL CARE FIRST HOUR: CPT | Performed by: PSYCHIATRY & NEUROLOGY

## 2023-01-01 PROCEDURE — 99232 SBSQ HOSP IP/OBS MODERATE 35: CPT | Performed by: HOSPITALIST

## 2023-01-01 PROCEDURE — G0378 HOSPITAL OBSERVATION PER HR: HCPCS

## 2023-01-01 PROCEDURE — 82945 GLUCOSE OTHER FLUID: CPT | Performed by: STUDENT IN AN ORGANIZED HEALTH CARE EDUCATION/TRAINING PROGRAM

## 2023-01-01 PROCEDURE — 80048 BASIC METABOLIC PNL TOTAL CA: CPT | Performed by: HOSPITALIST

## 2023-01-01 PROCEDURE — 99232 SBSQ HOSP IP/OBS MODERATE 35: CPT | Mod: GV | Performed by: INTERNAL MEDICINE

## 2023-01-01 PROCEDURE — 84999 UNLISTED CHEMISTRY PROCEDURE: CPT | Performed by: NURSE PRACTITIONER

## 2023-01-01 PROCEDURE — 250N000011 HC RX IP 250 OP 636: Mod: JZ | Performed by: INTERNAL MEDICINE

## 2023-01-01 PROCEDURE — 36415 COLL VENOUS BLD VENIPUNCTURE: CPT | Performed by: STUDENT IN AN ORGANIZED HEALTH CARE EDUCATION/TRAINING PROGRAM

## 2023-01-01 PROCEDURE — 80048 BASIC METABOLIC PNL TOTAL CA: CPT | Performed by: STUDENT IN AN ORGANIZED HEALTH CARE EDUCATION/TRAINING PROGRAM

## 2023-01-01 PROCEDURE — 80048 BASIC METABOLIC PNL TOTAL CA: CPT

## 2023-01-01 PROCEDURE — 85025 COMPLETE CBC W/AUTO DIFF WBC: CPT | Performed by: STUDENT IN AN ORGANIZED HEALTH CARE EDUCATION/TRAINING PROGRAM

## 2023-01-01 PROCEDURE — 258N000003 HC RX IP 258 OP 636: Performed by: PEDIATRICS

## 2023-01-01 PROCEDURE — 2894A VOIDCORRECT: CPT | Mod: 25 | Performed by: NURSE PRACTITIONER

## 2023-01-01 PROCEDURE — 36415 COLL VENOUS BLD VENIPUNCTURE: CPT | Performed by: EMERGENCY MEDICINE

## 2023-01-01 PROCEDURE — 36415 COLL VENOUS BLD VENIPUNCTURE: CPT | Performed by: HOSPITALIST

## 2023-01-01 PROCEDURE — 82550 ASSAY OF CK (CPK): CPT | Performed by: HOSPITALIST

## 2023-01-01 PROCEDURE — 250N000025 HC SEVOFLURANE, PER MIN: Performed by: THORACIC SURGERY (CARDIOTHORACIC VASCULAR SURGERY)

## 2023-01-01 PROCEDURE — 97535 SELF CARE MNGMENT TRAINING: CPT | Mod: GO

## 2023-01-01 PROCEDURE — 87040 BLOOD CULTURE FOR BACTERIA: CPT | Performed by: STUDENT IN AN ORGANIZED HEALTH CARE EDUCATION/TRAINING PROGRAM

## 2023-01-01 PROCEDURE — 999N000287 HC ICU ADULT ROUNDING, EACH 10 MINS

## 2023-01-01 PROCEDURE — 87075 CULTR BACTERIA EXCEPT BLOOD: CPT | Performed by: INTERNAL MEDICINE

## 2023-01-01 PROCEDURE — 82803 BLOOD GASES ANY COMBINATION: CPT | Performed by: NURSE PRACTITIONER

## 2023-01-01 PROCEDURE — 85027 COMPLETE CBC AUTOMATED: CPT | Performed by: PHYSICIAN ASSISTANT

## 2023-01-01 PROCEDURE — 999N000040 HC STATISTIC CONSULT NO CHARGE VASC ACCESS

## 2023-01-01 PROCEDURE — 250N000009 HC RX 250: Performed by: INTERNAL MEDICINE

## 2023-01-01 PROCEDURE — 999N000052 EEG VIDEO 12-26 HR UNMONITORED

## 2023-01-01 PROCEDURE — 250N000011 HC RX IP 250 OP 636: Performed by: STUDENT IN AN ORGANIZED HEALTH CARE EDUCATION/TRAINING PROGRAM

## 2023-01-01 PROCEDURE — 70553 MRI BRAIN STEM W/O & W/DYE: CPT | Mod: MG

## 2023-01-01 PROCEDURE — 84999 UNLISTED CHEMISTRY PROCEDURE: CPT | Performed by: STUDENT IN AN ORGANIZED HEALTH CARE EDUCATION/TRAINING PROGRAM

## 2023-01-01 PROCEDURE — 80165 DIPROPYLACETIC ACID FREE: CPT | Performed by: NURSE PRACTITIONER

## 2023-01-01 PROCEDURE — 83735 ASSAY OF MAGNESIUM: CPT

## 2023-01-01 PROCEDURE — 99231 SBSQ HOSP IP/OBS SF/LOW 25: CPT | Mod: GV | Performed by: INTERNAL MEDICINE

## 2023-01-01 PROCEDURE — 85049 AUTOMATED PLATELET COUNT: CPT | Performed by: PHYSICIAN ASSISTANT

## 2023-01-01 PROCEDURE — 36415 COLL VENOUS BLD VENIPUNCTURE: CPT

## 2023-01-01 PROCEDURE — 82525 ASSAY OF COPPER: CPT | Performed by: PSYCHIATRY & NEUROLOGY

## 2023-01-01 PROCEDURE — 93010 ELECTROCARDIOGRAM REPORT: CPT | Performed by: INTERNAL MEDICINE

## 2023-01-01 PROCEDURE — 258N000003 HC RX IP 258 OP 636: Performed by: HOSPITALIST

## 2023-01-01 PROCEDURE — 72131 CT LUMBAR SPINE W/O DYE: CPT | Mod: MA

## 2023-01-01 PROCEDURE — 80339 ANTIEPILEPTICS NOS 1-3: CPT | Performed by: PSYCHIATRY & NEUROLOGY

## 2023-01-01 PROCEDURE — 250N000013 HC RX MED GY IP 250 OP 250 PS 637: Performed by: ANESTHESIOLOGY

## 2023-01-01 PROCEDURE — 80339 ANTIEPILEPTICS NOS 1-3: CPT | Performed by: STUDENT IN AN ORGANIZED HEALTH CARE EDUCATION/TRAINING PROGRAM

## 2023-01-01 PROCEDURE — 95714 VEEG EA 12-26 HR UNMNTR: CPT

## 2023-01-01 PROCEDURE — 82746 ASSAY OF FOLIC ACID SERUM: CPT | Performed by: PSYCHIATRY & NEUROLOGY

## 2023-01-01 PROCEDURE — 258N000003 HC RX IP 258 OP 636

## 2023-01-01 PROCEDURE — 96374 THER/PROPH/DIAG INJ IV PUSH: CPT

## 2023-01-01 PROCEDURE — 83605 ASSAY OF LACTIC ACID: CPT | Performed by: INTERNAL MEDICINE

## 2023-01-01 PROCEDURE — 99291 CRITICAL CARE FIRST HOUR: CPT | Mod: FS | Performed by: NURSE PRACTITIONER

## 2023-01-01 PROCEDURE — 93306 TTE W/DOPPLER COMPLETE: CPT | Mod: 26 | Performed by: INTERNAL MEDICINE

## 2023-01-01 PROCEDURE — 83930 ASSAY OF BLOOD OSMOLALITY: CPT | Performed by: INTERNAL MEDICINE

## 2023-01-01 PROCEDURE — 82550 ASSAY OF CK (CPK): CPT | Performed by: INTERNAL MEDICINE

## 2023-01-01 PROCEDURE — 99233 SBSQ HOSP IP/OBS HIGH 50: CPT | Performed by: INTERNAL MEDICINE

## 2023-01-01 PROCEDURE — 250N000009 HC RX 250

## 2023-01-01 PROCEDURE — 250N000009 HC RX 250: Performed by: EMERGENCY MEDICINE

## 2023-01-01 PROCEDURE — 370N000017 HC ANESTHESIA TECHNICAL FEE, PER MIN: Performed by: SURGERY

## 2023-01-01 PROCEDURE — 93005 ELECTROCARDIOGRAM TRACING: CPT

## 2023-01-01 PROCEDURE — 99204 OFFICE O/P NEW MOD 45 MIN: CPT | Performed by: NURSE PRACTITIONER

## 2023-01-01 PROCEDURE — 86140 C-REACTIVE PROTEIN: CPT | Performed by: STUDENT IN AN ORGANIZED HEALTH CARE EDUCATION/TRAINING PROGRAM

## 2023-01-01 PROCEDURE — 272N000001 HC OR GENERAL SUPPLY STERILE: Performed by: SURGERY

## 2023-01-01 PROCEDURE — 94002 VENT MGMT INPAT INIT DAY: CPT

## 2023-01-01 PROCEDURE — 87070 CULTURE OTHR SPECIMN AEROBIC: CPT | Performed by: INTERNAL MEDICINE

## 2023-01-01 PROCEDURE — 97530 THERAPEUTIC ACTIVITIES: CPT | Mod: GP

## 2023-01-01 PROCEDURE — 96361 HYDRATE IV INFUSION ADD-ON: CPT

## 2023-01-01 PROCEDURE — 36415 COLL VENOUS BLD VENIPUNCTURE: CPT | Performed by: PHYSICIAN ASSISTANT

## 2023-01-01 PROCEDURE — 99231 SBSQ HOSP IP/OBS SF/LOW 25: CPT | Performed by: INTERNAL MEDICINE

## 2023-01-01 PROCEDURE — 99291 CRITICAL CARE FIRST HOUR: CPT | Performed by: STUDENT IN AN ORGANIZED HEALTH CARE EDUCATION/TRAINING PROGRAM

## 2023-01-01 PROCEDURE — 255N000002 HC RX 255 OP 636

## 2023-01-01 PROCEDURE — 250N000009 HC RX 250: Performed by: PHYSICIAN ASSISTANT

## 2023-01-01 PROCEDURE — 250N000011 HC RX IP 250 OP 636: Mod: JZ | Performed by: PEDIATRICS

## 2023-01-01 PROCEDURE — 87086 URINE CULTURE/COLONY COUNT: CPT | Performed by: INTERNAL MEDICINE

## 2023-01-01 PROCEDURE — 85041 AUTOMATED RBC COUNT: CPT | Performed by: INTERNAL MEDICINE

## 2023-01-01 PROCEDURE — 250N000009 HC RX 250: Performed by: PEDIATRICS

## 2023-01-01 PROCEDURE — 85045 AUTOMATED RETICULOCYTE COUNT: CPT | Performed by: INTERNAL MEDICINE

## 2023-01-01 PROCEDURE — 84132 ASSAY OF SERUM POTASSIUM: CPT | Performed by: INTERNAL MEDICINE

## 2023-01-01 PROCEDURE — 250N000011 HC RX IP 250 OP 636: Performed by: EMERGENCY MEDICINE

## 2023-01-01 PROCEDURE — 84484 ASSAY OF TROPONIN QUANT: CPT

## 2023-01-01 PROCEDURE — 999N000065 XR SKULL 1/3 VIEWS

## 2023-01-01 PROCEDURE — 250N000009 HC RX 250: Performed by: RADIOLOGY

## 2023-01-01 PROCEDURE — 94640 AIRWAY INHALATION TREATMENT: CPT | Mod: 76

## 2023-01-01 PROCEDURE — 82330 ASSAY OF CALCIUM: CPT | Performed by: STUDENT IN AN ORGANIZED HEALTH CARE EDUCATION/TRAINING PROGRAM

## 2023-01-01 PROCEDURE — 250N000011 HC RX IP 250 OP 636: Performed by: PSYCHIATRY & NEUROLOGY

## 2023-01-01 PROCEDURE — 99207 PR CDG-CUT & PASTE-POTENTIAL IMPACT ON LEVEL: CPT | Performed by: INTERNAL MEDICINE

## 2023-01-01 PROCEDURE — 250N000011 HC RX IP 250 OP 636: Performed by: HOSPITALIST

## 2023-01-01 PROCEDURE — 258N000001 HC RX 258: Performed by: INTERNAL MEDICINE

## 2023-01-01 PROCEDURE — 250N000011 HC RX IP 250 OP 636: Mod: JW | Performed by: STUDENT IN AN ORGANIZED HEALTH CARE EDUCATION/TRAINING PROGRAM

## 2023-01-01 PROCEDURE — 82565 ASSAY OF CREATININE: CPT | Performed by: INTERNAL MEDICINE

## 2023-01-01 PROCEDURE — 80186 ASSAY OF PHENYTOIN FREE: CPT | Performed by: NURSE PRACTITIONER

## 2023-01-01 PROCEDURE — 89050 BODY FLUID CELL COUNT: CPT | Performed by: INTERNAL MEDICINE

## 2023-01-01 PROCEDURE — 84478 ASSAY OF TRIGLYCERIDES: CPT | Performed by: INTERNAL MEDICINE

## 2023-01-01 PROCEDURE — 83520 IMMUNOASSAY QUANT NOS NONAB: CPT | Performed by: STUDENT IN AN ORGANIZED HEALTH CARE EDUCATION/TRAINING PROGRAM

## 2023-01-01 PROCEDURE — 84446 ASSAY OF VITAMIN E: CPT | Performed by: PSYCHIATRY & NEUROLOGY

## 2023-01-01 PROCEDURE — 85004 AUTOMATED DIFF WBC COUNT: CPT | Performed by: EMERGENCY MEDICINE

## 2023-01-01 PROCEDURE — 250N000011 HC RX IP 250 OP 636: Performed by: PEDIATRICS

## 2023-01-01 PROCEDURE — 85610 PROTHROMBIN TIME: CPT | Performed by: EMERGENCY MEDICINE

## 2023-01-01 PROCEDURE — 258N000001 HC RX 258: Performed by: NURSE ANESTHETIST, CERTIFIED REGISTERED

## 2023-01-01 PROCEDURE — 0DH63UZ INSERTION OF FEEDING DEVICE INTO STOMACH, PERCUTANEOUS APPROACH: ICD-10-PCS | Performed by: SURGERY

## 2023-01-01 PROCEDURE — 258N000003 HC RX IP 258 OP 636: Performed by: EMERGENCY MEDICINE

## 2023-01-01 PROCEDURE — 97116 GAIT TRAINING THERAPY: CPT | Mod: GP

## 2023-01-01 PROCEDURE — 85027 COMPLETE CBC AUTOMATED: CPT | Performed by: NURSE PRACTITIONER

## 2023-01-01 PROCEDURE — 82550 ASSAY OF CK (CPK): CPT | Performed by: EMERGENCY MEDICINE

## 2023-01-01 PROCEDURE — P9016 RBC LEUKOCYTES REDUCED: HCPCS | Performed by: STUDENT IN AN ORGANIZED HEALTH CARE EDUCATION/TRAINING PROGRAM

## 2023-01-01 PROCEDURE — 258N000003 HC RX IP 258 OP 636: Performed by: NURSE ANESTHETIST, CERTIFIED REGISTERED

## 2023-01-01 PROCEDURE — 80053 COMPREHEN METABOLIC PANEL: CPT | Performed by: STUDENT IN AN ORGANIZED HEALTH CARE EDUCATION/TRAINING PROGRAM

## 2023-01-01 PROCEDURE — 258N000002 HC RX IP 258 OP 250: Performed by: INTERNAL MEDICINE

## 2023-01-01 PROCEDURE — 70496 CT ANGIOGRAPHY HEAD: CPT | Mod: MG

## 2023-01-01 PROCEDURE — 250N000013 HC RX MED GY IP 250 OP 250 PS 637

## 2023-01-01 PROCEDURE — 99207 PR APP CREDIT; MD BILLING SHARED VISIT: CPT | Performed by: INTERNAL MEDICINE

## 2023-01-01 PROCEDURE — 87205 SMEAR GRAM STAIN: CPT | Performed by: INTERNAL MEDICINE

## 2023-01-01 PROCEDURE — 95711 VEEG 2-12 HR UNMONITORED: CPT

## 2023-01-01 PROCEDURE — 96375 TX/PRO/DX INJ NEW DRUG ADDON: CPT

## 2023-01-01 PROCEDURE — 82040 ASSAY OF SERUM ALBUMIN: CPT | Performed by: INTERNAL MEDICINE

## 2023-01-01 PROCEDURE — 99232 SBSQ HOSP IP/OBS MODERATE 35: CPT | Performed by: PSYCHIATRY & NEUROLOGY

## 2023-01-01 PROCEDURE — 31500 INSERT EMERGENCY AIRWAY: CPT | Performed by: NURSE ANESTHETIST, CERTIFIED REGISTERED

## 2023-01-01 PROCEDURE — 84630 ASSAY OF ZINC: CPT | Performed by: PSYCHIATRY & NEUROLOGY

## 2023-01-01 PROCEDURE — C9113 INJ PANTOPRAZOLE SODIUM, VIA: HCPCS | Mod: JZ | Performed by: INTERNAL MEDICINE

## 2023-01-01 PROCEDURE — 83735 ASSAY OF MAGNESIUM: CPT | Performed by: EMERGENCY MEDICINE

## 2023-01-01 PROCEDURE — 250N000011 HC RX IP 250 OP 636: Mod: JZ | Performed by: NURSE PRACTITIONER

## 2023-01-01 PROCEDURE — 87389 HIV-1 AG W/HIV-1&-2 AB AG IA: CPT | Performed by: STUDENT IN AN ORGANIZED HEALTH CARE EDUCATION/TRAINING PROGRAM

## 2023-01-01 PROCEDURE — 99223 1ST HOSP IP/OBS HIGH 75: CPT | Performed by: PSYCHIATRY & NEUROLOGY

## 2023-01-01 PROCEDURE — 82803 BLOOD GASES ANY COMBINATION: CPT

## 2023-01-01 PROCEDURE — 370N000003 HC ANESTHESIA WARD SERVICE: Performed by: NURSE ANESTHETIST, CERTIFIED REGISTERED

## 2023-01-01 PROCEDURE — 84157 ASSAY OF PROTEIN OTHER: CPT | Performed by: STUDENT IN AN ORGANIZED HEALTH CARE EDUCATION/TRAINING PROGRAM

## 2023-01-01 PROCEDURE — 87471 BARTONELLA DNA AMP PROBE: CPT | Performed by: HOSPITALIST

## 2023-01-01 PROCEDURE — 99207 PR APP CREDIT; MD BILLING SHARED VISIT: CPT | Mod: 25 | Performed by: PSYCHIATRY & NEUROLOGY

## 2023-01-01 PROCEDURE — 95718 EEG PHYS/QHP 2-12 HR W/VEEG: CPT | Performed by: PSYCHIATRY & NEUROLOGY

## 2023-01-01 PROCEDURE — 99291 CRITICAL CARE FIRST HOUR: CPT

## 2023-01-01 PROCEDURE — 84132 ASSAY OF SERUM POTASSIUM: CPT

## 2023-01-01 PROCEDURE — 120N000013 HC R&B IMCU

## 2023-01-01 PROCEDURE — 86255 FLUORESCENT ANTIBODY SCREEN: CPT | Performed by: STUDENT IN AN ORGANIZED HEALTH CARE EDUCATION/TRAINING PROGRAM

## 2023-01-01 PROCEDURE — 250N000013 HC RX MED GY IP 250 OP 250 PS 637: Performed by: EMERGENCY MEDICINE

## 2023-01-01 PROCEDURE — 99233 SBSQ HOSP IP/OBS HIGH 50: CPT | Performed by: HOSPITALIST

## 2023-01-01 PROCEDURE — 70450 CT HEAD/BRAIN W/O DYE: CPT | Mod: MG

## 2023-01-01 PROCEDURE — 99233 SBSQ HOSP IP/OBS HIGH 50: CPT | Mod: 25 | Performed by: PSYCHIATRY & NEUROLOGY

## 2023-01-01 PROCEDURE — A9585 GADOBUTROL INJECTION: HCPCS | Performed by: NURSE PRACTITIONER

## 2023-01-01 PROCEDURE — 84100 ASSAY OF PHOSPHORUS: CPT | Performed by: HOSPITALIST

## 2023-01-01 PROCEDURE — 99223 1ST HOSP IP/OBS HIGH 75: CPT | Mod: 25 | Performed by: INTERNAL MEDICINE

## 2023-01-01 PROCEDURE — 250N000009 HC RX 250: Performed by: STUDENT IN AN ORGANIZED HEALTH CARE EDUCATION/TRAINING PROGRAM

## 2023-01-01 PROCEDURE — 84295 ASSAY OF SERUM SODIUM: CPT | Performed by: EMERGENCY MEDICINE

## 2023-01-01 PROCEDURE — 999N000065 XR CHEST 1 VIEW

## 2023-01-01 PROCEDURE — 96374 THER/PROPH/DIAG INJ IV PUSH: CPT | Mod: 59

## 2023-01-01 PROCEDURE — 84100 ASSAY OF PHOSPHORUS: CPT

## 2023-01-01 PROCEDURE — 87040 BLOOD CULTURE FOR BACTERIA: CPT | Performed by: HOSPITALIST

## 2023-01-01 PROCEDURE — 255N000002 HC RX 255 OP 636: Performed by: NURSE PRACTITIONER

## 2023-01-01 PROCEDURE — 710N000009 HC RECOVERY PHASE 1, LEVEL 1, PER MIN: Performed by: SURGERY

## 2023-01-01 PROCEDURE — 999N000259 HC STATISTIC EXTUBATION

## 2023-01-01 PROCEDURE — 92610 EVALUATE SWALLOWING FUNCTION: CPT | Mod: GN | Performed by: SPEECH-LANGUAGE PATHOLOGIST

## 2023-01-01 PROCEDURE — 99291 CRITICAL CARE FIRST HOUR: CPT | Mod: 25 | Performed by: PSYCHIATRY & NEUROLOGY

## 2023-01-01 PROCEDURE — 99207 PR APP CREDIT; MD BILLING SHARED VISIT: CPT | Performed by: HOSPITALIST

## 2023-01-01 PROCEDURE — 85018 HEMOGLOBIN: CPT | Performed by: INTERNAL MEDICINE

## 2023-01-01 PROCEDURE — 272N000452 HC KIT SHRLOCK 5FR POWER PICC TRIPLE LUMEN

## 2023-01-01 PROCEDURE — 999N000065 XR ABDOMEN PORT 1 VIEW

## 2023-01-01 PROCEDURE — 85018 HEMOGLOBIN: CPT | Performed by: STUDENT IN AN ORGANIZED HEALTH CARE EDUCATION/TRAINING PROGRAM

## 2023-01-01 PROCEDURE — 80061 LIPID PANEL: CPT | Performed by: INTERNAL MEDICINE

## 2023-01-01 PROCEDURE — 85610 PROTHROMBIN TIME: CPT

## 2023-01-01 PROCEDURE — 999N000208 ECHOCARDIOGRAM COMPLETE

## 2023-01-01 PROCEDURE — 86923 COMPATIBILITY TEST ELECTRIC: CPT | Performed by: INTERNAL MEDICINE

## 2023-01-01 PROCEDURE — 99291 CRITICAL CARE FIRST HOUR: CPT | Mod: 25 | Performed by: PHYSICIAN ASSISTANT

## 2023-01-01 PROCEDURE — 99222 1ST HOSP IP/OBS MODERATE 55: CPT | Mod: 57 | Performed by: SURGERY

## 2023-01-01 PROCEDURE — 84100 ASSAY OF PHOSPHORUS: CPT | Performed by: PHYSICIAN ASSISTANT

## 2023-01-01 PROCEDURE — 250N000011 HC RX IP 250 OP 636: Mod: JW | Performed by: PEDIATRICS

## 2023-01-01 PROCEDURE — 80185 ASSAY OF PHENYTOIN TOTAL: CPT | Performed by: STUDENT IN AN ORGANIZED HEALTH CARE EDUCATION/TRAINING PROGRAM

## 2023-01-01 PROCEDURE — 250N000011 HC RX IP 250 OP 636: Performed by: NURSE ANESTHETIST, CERTIFIED REGISTERED

## 2023-01-01 PROCEDURE — 88188 FLOWCYTOMETRY/READ 9-15: CPT | Mod: GC | Performed by: STUDENT IN AN ORGANIZED HEALTH CARE EDUCATION/TRAINING PROGRAM

## 2023-01-01 PROCEDURE — 999N000190 HC STATISTIC VAT ROUNDS

## 2023-01-01 PROCEDURE — 88108 CYTOPATH CONCENTRATE TECH: CPT | Mod: TC | Performed by: STUDENT IN AN ORGANIZED HEALTH CARE EDUCATION/TRAINING PROGRAM

## 2023-01-01 PROCEDURE — 80165 DIPROPYLACETIC ACID FREE: CPT | Performed by: PHYSICIAN ASSISTANT

## 2023-01-01 PROCEDURE — 99231 SBSQ HOSP IP/OBS SF/LOW 25: CPT | Performed by: HOSPITALIST

## 2023-01-01 PROCEDURE — 88108 CYTOPATH CONCENTRATE TECH: CPT | Mod: 26 | Performed by: PATHOLOGY

## 2023-01-01 PROCEDURE — 999N000141 HC STATISTIC PRE-PROCEDURE NURSING ASSESSMENT: Performed by: SURGERY

## 2023-01-01 PROCEDURE — 258N000003 HC RX IP 258 OP 636: Performed by: PSYCHIATRY & NEUROLOGY

## 2023-01-01 PROCEDURE — 82272 OCCULT BLD FECES 1-3 TESTS: CPT | Performed by: EMERGENCY MEDICINE

## 2023-01-01 PROCEDURE — 99233 SBSQ HOSP IP/OBS HIGH 50: CPT | Mod: 25 | Performed by: INTERNAL MEDICINE

## 2023-01-01 PROCEDURE — 80186 ASSAY OF PHENYTOIN FREE: CPT | Performed by: PHYSICIAN ASSISTANT

## 2023-01-01 PROCEDURE — U0003 INFECTIOUS AGENT DETECTION BY NUCLEIC ACID (DNA OR RNA); SEVERE ACUTE RESPIRATORY SYNDROME CORONAVIRUS 2 (SARS-COV-2) (CORONAVIRUS DISEASE [COVID-19]), AMPLIFIED PROBE TECHNIQUE, MAKING USE OF HIGH THROUGHPUT TECHNOLOGIES AS DESCRIBED BY CMS-2020-01-R: HCPCS | Performed by: EMERGENCY MEDICINE

## 2023-01-01 PROCEDURE — 87086 URINE CULTURE/COLONY COUNT: CPT | Performed by: NURSE PRACTITIONER

## 2023-01-01 PROCEDURE — 85652 RBC SED RATE AUTOMATED: CPT | Performed by: STUDENT IN AN ORGANIZED HEALTH CARE EDUCATION/TRAINING PROGRAM

## 2023-01-01 PROCEDURE — 82565 ASSAY OF CREATININE: CPT | Performed by: PHARMACIST

## 2023-01-01 PROCEDURE — 85025 COMPLETE CBC W/AUTO DIFF WBC: CPT | Performed by: EMERGENCY MEDICINE

## 2023-01-01 PROCEDURE — 99418 PROLNG IP/OBS E/M EA 15 MIN: CPT | Performed by: NURSE PRACTITIONER

## 2023-01-01 PROCEDURE — 87506 IADNA-DNA/RNA PROBE TQ 6-11: CPT | Performed by: INTERNAL MEDICINE

## 2023-01-01 PROCEDURE — 87186 SC STD MICRODIL/AGAR DIL: CPT | Performed by: INTERNAL MEDICINE

## 2023-01-01 PROCEDURE — 82374 ASSAY BLOOD CARBON DIOXIDE: CPT | Performed by: EMERGENCY MEDICINE

## 2023-01-01 PROCEDURE — 999N000128 HC STATISTIC PERIPHERAL IV START W/O US GUIDANCE

## 2023-01-01 PROCEDURE — 99285 EMERGENCY DEPT VISIT HI MDM: CPT | Mod: 25

## 2023-01-01 PROCEDURE — 80053 COMPREHEN METABOLIC PANEL: CPT | Performed by: EMERGENCY MEDICINE

## 2023-01-01 PROCEDURE — 80186 ASSAY OF PHENYTOIN FREE: CPT | Performed by: STUDENT IN AN ORGANIZED HEALTH CARE EDUCATION/TRAINING PROGRAM

## 2023-01-01 PROCEDURE — 81001 URINALYSIS AUTO W/SCOPE: CPT | Performed by: EMERGENCY MEDICINE

## 2023-01-01 PROCEDURE — 250N000009 HC RX 250: Performed by: THORACIC SURGERY (CARDIOTHORACIC VASCULAR SURGERY)

## 2023-01-01 PROCEDURE — 250N000009 HC RX 250: Performed by: HOSPITALIST

## 2023-01-01 PROCEDURE — 82374 ASSAY BLOOD CARBON DIOXIDE: CPT | Performed by: INTERNAL MEDICINE

## 2023-01-01 PROCEDURE — 99207 PR NO BILLABLE SERVICE THIS VISIT: CPT | Performed by: INTERNAL MEDICINE

## 2023-01-01 PROCEDURE — 81001 URINALYSIS AUTO W/SCOPE: CPT | Performed by: NURSE PRACTITIONER

## 2023-01-01 PROCEDURE — G0463 HOSPITAL OUTPT CLINIC VISIT: HCPCS | Mod: 25

## 2023-01-01 PROCEDURE — 250N000011 HC RX IP 250 OP 636: Mod: JZ | Performed by: PSYCHIATRY & NEUROLOGY

## 2023-01-01 PROCEDURE — A9585 GADOBUTROL INJECTION: HCPCS

## 2023-01-01 PROCEDURE — 71260 CT THORAX DX C+: CPT | Mod: MG

## 2023-01-01 PROCEDURE — 86923 COMPATIBILITY TEST ELECTRIC: CPT | Performed by: STUDENT IN AN ORGANIZED HEALTH CARE EDUCATION/TRAINING PROGRAM

## 2023-01-01 PROCEDURE — 86317 IMMUNOASSAY INFECTIOUS AGENT: CPT | Performed by: STUDENT IN AN ORGANIZED HEALTH CARE EDUCATION/TRAINING PROGRAM

## 2023-01-01 PROCEDURE — 250N000009 HC RX 250: Performed by: SURGERY

## 2023-01-01 PROCEDURE — 70250 X-RAY EXAM OF SKULL: CPT

## 2023-01-01 PROCEDURE — 87529 HSV DNA AMP PROBE: CPT | Performed by: INTERNAL MEDICINE

## 2023-01-01 PROCEDURE — 84145 PROCALCITONIN (PCT): CPT | Performed by: NURSE PRACTITIONER

## 2023-01-01 PROCEDURE — 99223 1ST HOSP IP/OBS HIGH 75: CPT | Mod: AI | Performed by: STUDENT IN AN ORGANIZED HEALTH CARE EDUCATION/TRAINING PROGRAM

## 2023-01-01 PROCEDURE — 80235 DRUG ASSAY LACOSAMIDE: CPT | Performed by: PSYCHIATRY & NEUROLOGY

## 2023-01-01 PROCEDURE — 999N000185 HC STATISTIC TRANSPORT TIME EA 15 MIN

## 2023-01-01 PROCEDURE — 83935 ASSAY OF URINE OSMOLALITY: CPT | Performed by: INTERNAL MEDICINE

## 2023-01-01 PROCEDURE — 009U3ZX DRAINAGE OF SPINAL CANAL, PERCUTANEOUS APPROACH, DIAGNOSTIC: ICD-10-PCS | Performed by: RADIOLOGY

## 2023-01-01 PROCEDURE — 97166 OT EVAL MOD COMPLEX 45 MIN: CPT | Mod: GO

## 2023-01-01 PROCEDURE — 62328 DX LMBR SPI PNXR W/FLUOR/CT: CPT

## 2023-01-01 PROCEDURE — 84443 ASSAY THYROID STIM HORMONE: CPT | Performed by: INTERNAL MEDICINE

## 2023-01-01 PROCEDURE — 36600 WITHDRAWAL OF ARTERIAL BLOOD: CPT

## 2023-01-01 PROCEDURE — G1010 CDSM STANSON: HCPCS

## 2023-01-01 PROCEDURE — 87086 URINE CULTURE/COLONY COUNT: CPT | Performed by: HOSPITALIST

## 2023-01-01 PROCEDURE — 0042T CT HEAD PERFUSION W CONTRAST: CPT

## 2023-01-01 PROCEDURE — 009U3ZX DRAINAGE OF SPINAL CANAL, PERCUTANEOUS APPROACH, DIAGNOSTIC: ICD-10-PCS | Performed by: PHYSICIAN ASSISTANT

## 2023-01-01 PROCEDURE — 87798 DETECT AGENT NOS DNA AMP: CPT | Performed by: HOSPITALIST

## 2023-01-01 PROCEDURE — 99222 1ST HOSP IP/OBS MODERATE 55: CPT | Performed by: INTERNAL MEDICINE

## 2023-01-01 PROCEDURE — 80179 DRUG ASSAY SALICYLATE: CPT | Performed by: EMERGENCY MEDICINE

## 2023-01-01 PROCEDURE — 85060 BLOOD SMEAR INTERPRETATION: CPT | Performed by: PATHOLOGY

## 2023-01-01 PROCEDURE — 83605 ASSAY OF LACTIC ACID: CPT

## 2023-01-01 PROCEDURE — 84295 ASSAY OF SERUM SODIUM: CPT | Performed by: INTERNAL MEDICINE

## 2023-01-01 PROCEDURE — 82310 ASSAY OF CALCIUM: CPT | Performed by: HOSPITALIST

## 2023-01-01 PROCEDURE — 36415 COLL VENOUS BLD VENIPUNCTURE: CPT | Performed by: PSYCHIATRY & NEUROLOGY

## 2023-01-01 PROCEDURE — 87075 CULTR BACTERIA EXCEPT BLOOD: CPT | Performed by: STUDENT IN AN ORGANIZED HEALTH CARE EDUCATION/TRAINING PROGRAM

## 2023-01-01 PROCEDURE — 86255 FLUORESCENT ANTIBODY SCREEN: CPT | Performed by: NURSE PRACTITIONER

## 2023-01-01 PROCEDURE — 99291 CRITICAL CARE FIRST HOUR: CPT | Mod: GC | Performed by: PSYCHIATRY & NEUROLOGY

## 2023-01-01 PROCEDURE — 360N000075 HC SURGERY LEVEL 2, PER MIN: Performed by: THORACIC SURGERY (CARDIOTHORACIC VASCULAR SURGERY)

## 2023-01-01 PROCEDURE — 370N000017 HC ANESTHESIA TECHNICAL FEE, PER MIN: Performed by: THORACIC SURGERY (CARDIOTHORACIC VASCULAR SURGERY)

## 2023-01-01 PROCEDURE — 272N000001 HC OR GENERAL SUPPLY STERILE: Performed by: THORACIC SURGERY (CARDIOTHORACIC VASCULAR SURGERY)

## 2023-01-01 PROCEDURE — 87651 STREP A DNA AMP PROBE: CPT | Performed by: NURSE PRACTITIONER

## 2023-01-01 PROCEDURE — 97161 PT EVAL LOW COMPLEX 20 MIN: CPT | Mod: GP

## 2023-01-01 PROCEDURE — 0B110F4 BYPASS TRACHEA TO CUTANEOUS WITH TRACHEOSTOMY DEVICE, OPEN APPROACH: ICD-10-PCS | Performed by: THORACIC SURGERY (CARDIOTHORACIC VASCULAR SURGERY)

## 2023-01-01 PROCEDURE — 82140 ASSAY OF AMMONIA: CPT | Performed by: NURSE PRACTITIONER

## 2023-01-01 PROCEDURE — C9113 INJ PANTOPRAZOLE SODIUM, VIA: HCPCS | Performed by: INTERNAL MEDICINE

## 2023-01-01 PROCEDURE — 87186 SC STD MICRODIL/AGAR DIL: CPT | Performed by: EMERGENCY MEDICINE

## 2023-01-01 PROCEDURE — 99239 HOSP IP/OBS DSCHRG MGMT >30: CPT | Performed by: STUDENT IN AN ORGANIZED HEALTH CARE EDUCATION/TRAINING PROGRAM

## 2023-01-01 PROCEDURE — 99207 PR NO BILLABLE SERVICE THIS VISIT: CPT | Performed by: HOSPITALIST

## 2023-01-01 PROCEDURE — 70450 CT HEAD/BRAIN W/O DYE: CPT | Mod: MF

## 2023-01-01 PROCEDURE — 82077 ASSAY SPEC XCP UR&BREATH IA: CPT | Performed by: EMERGENCY MEDICINE

## 2023-01-01 PROCEDURE — 83921 ORGANIC ACID SINGLE QUANT: CPT | Performed by: PSYCHIATRY & NEUROLOGY

## 2023-01-01 PROCEDURE — 82805 BLOOD GASES W/O2 SATURATION: CPT | Performed by: PHYSICIAN ASSISTANT

## 2023-01-01 PROCEDURE — 81001 URINALYSIS AUTO W/SCOPE: CPT | Performed by: HOSPITALIST

## 2023-01-01 PROCEDURE — 99239 HOSP IP/OBS DSCHRG MGMT >30: CPT | Mod: GV | Performed by: INTERNAL MEDICINE

## 2023-01-01 PROCEDURE — 87581 M.PNEUMON DNA AMP PROBE: CPT | Performed by: STUDENT IN AN ORGANIZED HEALTH CARE EDUCATION/TRAINING PROGRAM

## 2023-01-01 PROCEDURE — 88185 FLOWCYTOMETRY/TC ADD-ON: CPT | Performed by: STUDENT IN AN ORGANIZED HEALTH CARE EDUCATION/TRAINING PROGRAM

## 2023-01-01 PROCEDURE — 83605 ASSAY OF LACTIC ACID: CPT | Performed by: NURSE PRACTITIONER

## 2023-01-01 PROCEDURE — 82947 ASSAY GLUCOSE BLOOD QUANT: CPT | Performed by: PHARMACIST

## 2023-01-01 PROCEDURE — 87086 URINE CULTURE/COLONY COUNT: CPT | Performed by: EMERGENCY MEDICINE

## 2023-01-01 PROCEDURE — 99233 SBSQ HOSP IP/OBS HIGH 50: CPT | Performed by: PHYSICIAN ASSISTANT

## 2023-01-01 PROCEDURE — 258N000003 HC RX IP 258 OP 636: Mod: JZ | Performed by: PHYSICIAN ASSISTANT

## 2023-01-01 PROCEDURE — 99222 1ST HOSP IP/OBS MODERATE 55: CPT | Performed by: PHYSICIAN ASSISTANT

## 2023-01-01 PROCEDURE — 87149 DNA/RNA DIRECT PROBE: CPT | Performed by: EMERGENCY MEDICINE

## 2023-01-01 PROCEDURE — 84132 ASSAY OF SERUM POTASSIUM: CPT | Performed by: HOSPITALIST

## 2023-01-01 PROCEDURE — 85049 AUTOMATED PLATELET COUNT: CPT | Performed by: HOSPITALIST

## 2023-01-01 PROCEDURE — 87641 MR-STAPH DNA AMP PROBE: CPT | Performed by: NURSE PRACTITIONER

## 2023-01-01 PROCEDURE — 360N000075 HC SURGERY LEVEL 2, PER MIN: Performed by: SURGERY

## 2023-01-01 PROCEDURE — C9803 HOPD COVID-19 SPEC COLLECT: HCPCS

## 2023-01-01 PROCEDURE — 86596 VOLTAGE-GTD CA CHNL ANTB EA: CPT | Performed by: STUDENT IN AN ORGANIZED HEALTH CARE EDUCATION/TRAINING PROGRAM

## 2023-01-01 PROCEDURE — 82805 BLOOD GASES W/O2 SATURATION: CPT | Performed by: STUDENT IN AN ORGANIZED HEALTH CARE EDUCATION/TRAINING PROGRAM

## 2023-01-01 PROCEDURE — 89050 BODY FLUID CELL COUNT: CPT | Performed by: STUDENT IN AN ORGANIZED HEALTH CARE EDUCATION/TRAINING PROGRAM

## 2023-01-01 PROCEDURE — 87205 SMEAR GRAM STAIN: CPT | Performed by: STUDENT IN AN ORGANIZED HEALTH CARE EDUCATION/TRAINING PROGRAM

## 2023-01-01 PROCEDURE — 999N000127 HC STATISTIC PERIPHERAL IV START W US GUIDANCE

## 2023-01-01 PROCEDURE — 70498 CT ANGIOGRAPHY NECK: CPT | Mod: MG

## 2023-01-01 PROCEDURE — 99207 EEG VIDEO 12-26 HR UNMONITORED: CPT | Performed by: PSYCHIATRY & NEUROLOGY

## 2023-01-01 PROCEDURE — 87205 SMEAR GRAM STAIN: CPT | Performed by: NURSE PRACTITIONER

## 2023-01-01 PROCEDURE — 999N000065 XR CHEST PORT 1 VIEW

## 2023-01-01 PROCEDURE — 80307 DRUG TEST PRSMV CHEM ANLYZR: CPT | Performed by: INTERNAL MEDICINE

## 2023-01-01 PROCEDURE — 250N000011 HC RX IP 250 OP 636

## 2023-01-01 PROCEDURE — 85730 THROMBOPLASTIN TIME PARTIAL: CPT | Performed by: EMERGENCY MEDICINE

## 2023-01-01 PROCEDURE — 87015 SPECIMEN INFECT AGNT CONCNTJ: CPT | Performed by: STUDENT IN AN ORGANIZED HEALTH CARE EDUCATION/TRAINING PROGRAM

## 2023-01-01 PROCEDURE — 99223 1ST HOSP IP/OBS HIGH 75: CPT | Mod: GC | Performed by: SURGERY

## 2023-01-01 PROCEDURE — 83690 ASSAY OF LIPASE: CPT | Performed by: EMERGENCY MEDICINE

## 2023-01-01 PROCEDURE — 82550 ASSAY OF CK (CPK): CPT | Performed by: PSYCHIATRY & NEUROLOGY

## 2023-01-01 PROCEDURE — 85027 COMPLETE CBC AUTOMATED: CPT | Performed by: STUDENT IN AN ORGANIZED HEALTH CARE EDUCATION/TRAINING PROGRAM

## 2023-01-01 PROCEDURE — 96376 TX/PRO/DX INJ SAME DRUG ADON: CPT

## 2023-01-01 PROCEDURE — 258N000003 HC RX IP 258 OP 636: Performed by: ORTHOPAEDIC SURGERY

## 2023-01-01 PROCEDURE — 80143 DRUG ASSAY ACETAMINOPHEN: CPT | Performed by: EMERGENCY MEDICINE

## 2023-01-01 PROCEDURE — 250N000011 HC RX IP 250 OP 636: Performed by: SURGERY

## 2023-01-01 PROCEDURE — 82310 ASSAY OF CALCIUM: CPT | Performed by: INTERNAL MEDICINE

## 2023-01-01 PROCEDURE — 43246 EGD PLACE GASTROSTOMY TUBE: CPT | Performed by: SURGERY

## 2023-01-01 PROCEDURE — 86900 BLOOD TYPING SEROLOGIC ABO: CPT | Performed by: EMERGENCY MEDICINE

## 2023-01-01 PROCEDURE — 84182 PROTEIN WESTERN BLOT TEST: CPT | Performed by: STUDENT IN AN ORGANIZED HEALTH CARE EDUCATION/TRAINING PROGRAM

## 2023-01-01 PROCEDURE — 86341 ISLET CELL ANTIBODY: CPT | Performed by: STUDENT IN AN ORGANIZED HEALTH CARE EDUCATION/TRAINING PROGRAM

## 2023-01-01 PROCEDURE — 99207 EEG VIDEO 2-12 HRS UNMONITORED: CPT | Performed by: PSYCHIATRY & NEUROLOGY

## 2023-01-01 PROCEDURE — 85014 HEMATOCRIT: CPT | Performed by: HOSPITALIST

## 2023-01-01 PROCEDURE — 87483 CNS DNA AMP PROBE TYPE 12-25: CPT | Performed by: STUDENT IN AN ORGANIZED HEALTH CARE EDUCATION/TRAINING PROGRAM

## 2023-01-01 PROCEDURE — 86341 ISLET CELL ANTIBODY: CPT | Performed by: NURSE PRACTITIONER

## 2023-01-01 PROCEDURE — 5A1955Z RESPIRATORY VENTILATION, GREATER THAN 96 CONSECUTIVE HOURS: ICD-10-PCS | Performed by: INTERNAL MEDICINE

## 2023-01-01 PROCEDURE — 87529 HSV DNA AMP PROBE: CPT | Performed by: STUDENT IN AN ORGANIZED HEALTH CARE EDUCATION/TRAINING PROGRAM

## 2023-01-01 PROCEDURE — P9016 RBC LEUKOCYTES REDUCED: HCPCS | Performed by: INTERNAL MEDICINE

## 2023-01-01 PROCEDURE — 84157 ASSAY OF PROTEIN OTHER: CPT | Performed by: INTERNAL MEDICINE

## 2023-01-01 PROCEDURE — 99238 HOSP IP/OBS DSCHRG MGMT 30/<: CPT | Mod: GV | Performed by: NURSE PRACTITIONER

## 2023-01-01 PROCEDURE — 36415 COLL VENOUS BLD VENIPUNCTURE: CPT | Performed by: NURSE PRACTITIONER

## 2023-01-01 RX ORDER — PERPHENAZINE 4 MG/1
4 TABLET ORAL EVERY MORNING
Status: DISCONTINUED | OUTPATIENT
Start: 2023-01-01 | End: 2023-01-01

## 2023-01-01 RX ORDER — SALIVA STIMULANT COMB. NO.3
2 SPRAY, NON-AEROSOL (ML) MUCOUS MEMBRANE 4 TIMES DAILY PRN
Status: DISCONTINUED | OUTPATIENT
Start: 2023-01-01 | End: 2023-01-01 | Stop reason: HOSPADM

## 2023-01-01 RX ORDER — DIPHENHYDRAMINE HYDROCHLORIDE 50 MG/ML
50 INJECTION INTRAMUSCULAR; INTRAVENOUS ONCE
Status: COMPLETED | OUTPATIENT
Start: 2023-01-01 | End: 2023-01-01

## 2023-01-01 RX ORDER — ACETAMINOPHEN 650 MG/1
650 SUPPOSITORY RECTAL EVERY 4 HOURS PRN
Status: DISCONTINUED | OUTPATIENT
Start: 2023-01-01 | End: 2023-01-01

## 2023-01-01 RX ORDER — NALOXONE HYDROCHLORIDE 0.4 MG/ML
0.2 INJECTION, SOLUTION INTRAMUSCULAR; INTRAVENOUS; SUBCUTANEOUS
Status: DISCONTINUED | OUTPATIENT
Start: 2023-01-01 | End: 2023-01-01 | Stop reason: HOSPADM

## 2023-01-01 RX ORDER — LIDOCAINE HYDROCHLORIDE 20 MG/ML
JELLY TOPICAL ONCE
Status: COMPLETED | OUTPATIENT
Start: 2023-01-01 | End: 2023-01-01

## 2023-01-01 RX ORDER — GADOBUTROL 604.72 MG/ML
5 INJECTION INTRAVENOUS ONCE
Status: COMPLETED | OUTPATIENT
Start: 2023-01-01 | End: 2023-01-01

## 2023-01-01 RX ORDER — NOREPINEPHRINE BITARTRATE 0.02 MG/ML
.01-.6 INJECTION, SOLUTION INTRAVENOUS CONTINUOUS
Status: DISCONTINUED | OUTPATIENT
Start: 2023-01-01 | End: 2023-01-01

## 2023-01-01 RX ORDER — MORPHINE SULFATE 20 MG/ML
5 SOLUTION ORAL EVERY 6 HOURS
Status: DISCONTINUED | OUTPATIENT
Start: 2023-01-01 | End: 2023-01-01

## 2023-01-01 RX ORDER — ATENOLOL 25 MG/1
25 TABLET ORAL DAILY
Status: DISCONTINUED | OUTPATIENT
Start: 2023-01-01 | End: 2023-01-01

## 2023-01-01 RX ORDER — MORPHINE SULFATE 20 MG/ML
5 SOLUTION ORAL EVERY 6 HOURS
Status: CANCELLED | OUTPATIENT
Start: 2023-01-01

## 2023-01-01 RX ORDER — ACETAMINOPHEN 650 MG/1
650 SUPPOSITORY RECTAL EVERY 6 HOURS PRN
Status: DISCONTINUED | OUTPATIENT
Start: 2023-01-01 | End: 2023-01-01 | Stop reason: HOSPADM

## 2023-01-01 RX ORDER — CEFEPIME HYDROCHLORIDE 1 G/1
1 INJECTION, POWDER, FOR SOLUTION INTRAMUSCULAR; INTRAVENOUS EVERY 8 HOURS
Status: DISCONTINUED | OUTPATIENT
Start: 2023-01-01 | End: 2023-01-01

## 2023-01-01 RX ORDER — NALOXONE HYDROCHLORIDE 0.4 MG/ML
0.4 INJECTION, SOLUTION INTRAMUSCULAR; INTRAVENOUS; SUBCUTANEOUS
Status: DISCONTINUED | OUTPATIENT
Start: 2023-01-01 | End: 2023-01-01 | Stop reason: HOSPADM

## 2023-01-01 RX ORDER — IOPAMIDOL 755 MG/ML
120 INJECTION, SOLUTION INTRAVASCULAR ONCE
Status: COMPLETED | OUTPATIENT
Start: 2023-01-01 | End: 2023-01-01

## 2023-01-01 RX ORDER — NALOXONE HYDROCHLORIDE 0.4 MG/ML
0.1 INJECTION, SOLUTION INTRAMUSCULAR; INTRAVENOUS; SUBCUTANEOUS
Status: CANCELLED | OUTPATIENT
Start: 2023-01-01

## 2023-01-01 RX ORDER — CEFTRIAXONE 2 G/1
2 INJECTION, POWDER, FOR SOLUTION INTRAMUSCULAR; INTRAVENOUS EVERY 24 HOURS
Status: DISCONTINUED | OUTPATIENT
Start: 2023-01-01 | End: 2023-01-01 | Stop reason: ALTCHOICE

## 2023-01-01 RX ORDER — LEVETIRACETAM 10 MG/ML
1000 INJECTION INTRAVASCULAR EVERY 12 HOURS
Status: DISCONTINUED | OUTPATIENT
Start: 2023-01-01 | End: 2023-01-01

## 2023-01-01 RX ORDER — NALOXONE HYDROCHLORIDE 0.4 MG/ML
0.4 INJECTION, SOLUTION INTRAMUSCULAR; INTRAVENOUS; SUBCUTANEOUS
Status: DISCONTINUED | OUTPATIENT
Start: 2023-01-01 | End: 2023-01-01

## 2023-01-01 RX ORDER — POTASSIUM CHLORIDE 20MEQ/15ML
40 LIQUID (ML) ORAL ONCE
Status: COMPLETED | OUTPATIENT
Start: 2023-01-01 | End: 2023-01-01

## 2023-01-01 RX ORDER — POTASSIUM CHLORIDE 1500 MG/1
40 TABLET, EXTENDED RELEASE ORAL ONCE
Status: COMPLETED | OUTPATIENT
Start: 2023-01-01 | End: 2023-01-01

## 2023-01-01 RX ORDER — ATROPINE SULFATE 10 MG/ML
2 SOLUTION/ DROPS OPHTHALMIC EVERY 4 HOURS PRN
Status: DISCONTINUED | OUTPATIENT
Start: 2023-01-01 | End: 2023-01-01 | Stop reason: HOSPADM

## 2023-01-01 RX ORDER — LORAZEPAM 2 MG/ML
0.5 INJECTION INTRAMUSCULAR EVERY 10 MIN PRN
Status: COMPLETED | OUTPATIENT
Start: 2023-01-01 | End: 2023-01-01

## 2023-01-01 RX ORDER — FUROSEMIDE 10 MG/ML
20 INJECTION INTRAMUSCULAR; INTRAVENOUS 2 TIMES DAILY
Status: DISCONTINUED | OUTPATIENT
Start: 2023-01-01 | End: 2023-01-01

## 2023-01-01 RX ORDER — HYDROMORPHONE HYDROCHLORIDE 1 MG/ML
.3-.5 INJECTION, SOLUTION INTRAMUSCULAR; INTRAVENOUS; SUBCUTANEOUS
Status: DISCONTINUED | OUTPATIENT
Start: 2023-01-01 | End: 2023-01-01 | Stop reason: HOSPADM

## 2023-01-01 RX ORDER — ZOLPIDEM TARTRATE 5 MG/1
10 TABLET ORAL ONCE
Qty: 2 TABLET | Refills: 0 | Status: COMPLETED | OUTPATIENT
Start: 2023-01-01 | End: 2023-01-01

## 2023-01-01 RX ORDER — LORAZEPAM 2 MG/ML
2 INJECTION INTRAMUSCULAR ONCE
Status: COMPLETED | OUTPATIENT
Start: 2023-01-01 | End: 2023-01-01

## 2023-01-01 RX ORDER — PERPHENAZINE 8 MG/1
8 TABLET ORAL AT BEDTIME
COMMUNITY

## 2023-01-01 RX ORDER — GUAIFENESIN 600 MG/1
15 TABLET, EXTENDED RELEASE ORAL DAILY
Status: DISCONTINUED | OUTPATIENT
Start: 2023-01-01 | End: 2023-01-01

## 2023-01-01 RX ORDER — ESCITALOPRAM OXALATE 20 MG/1
20 TABLET ORAL DAILY
Status: DISCONTINUED | OUTPATIENT
Start: 2023-01-01 | End: 2023-01-01

## 2023-01-01 RX ORDER — PROPOFOL 10 MG/ML
INJECTION, EMULSION INTRAVENOUS CONTINUOUS PRN
Status: DISCONTINUED | OUTPATIENT
Start: 2023-01-01 | End: 2023-01-01

## 2023-01-01 RX ORDER — ACETAMINOPHEN 325 MG/1
650 TABLET ORAL EVERY 4 HOURS PRN
Status: DISCONTINUED | OUTPATIENT
Start: 2023-01-01 | End: 2023-01-01

## 2023-01-01 RX ORDER — PERPHENAZINE 4 MG/1
4 TABLET ORAL EVERY MORNING
COMMUNITY
End: 2023-01-01

## 2023-01-01 RX ORDER — FENTANYL CITRATE 50 UG/ML
50 INJECTION, SOLUTION INTRAMUSCULAR; INTRAVENOUS ONCE
Status: COMPLETED | OUTPATIENT
Start: 2023-01-01 | End: 2023-01-01

## 2023-01-01 RX ORDER — PERPHENAZINE 4 MG/1
8 TABLET ORAL AT BEDTIME
Status: DISCONTINUED | OUTPATIENT
Start: 2023-01-01 | End: 2023-01-01

## 2023-01-01 RX ORDER — DEXTROSE MONOHYDRATE 100 MG/ML
INJECTION, SOLUTION INTRAVENOUS CONTINUOUS PRN
Status: DISCONTINUED | OUTPATIENT
Start: 2023-01-01 | End: 2023-01-01

## 2023-01-01 RX ORDER — HYDROMORPHONE HYDROCHLORIDE 1 MG/ML
0.5 INJECTION, SOLUTION INTRAMUSCULAR; INTRAVENOUS; SUBCUTANEOUS ONCE
Status: DISCONTINUED | OUTPATIENT
Start: 2023-01-01 | End: 2023-01-01

## 2023-01-01 RX ORDER — MIDAZOLAM HCL IN 0.9 % NACL/PF 1 MG/ML
1-12 PLASTIC BAG, INJECTION (ML) INTRAVENOUS CONTINUOUS
Status: DISCONTINUED | OUTPATIENT
Start: 2023-01-01 | End: 2023-01-01

## 2023-01-01 RX ORDER — DIPHENHYDRAMINE HCL 12.5MG/5ML
50 LIQUID (ML) ORAL 2 TIMES DAILY
Status: DISCONTINUED | OUTPATIENT
Start: 2023-01-01 | End: 2023-01-01

## 2023-01-01 RX ORDER — FUROSEMIDE 40 MG
40 TABLET ORAL
Status: DISCONTINUED | OUTPATIENT
Start: 2023-01-01 | End: 2023-01-01

## 2023-01-01 RX ORDER — FUROSEMIDE 10 MG/ML
20 INJECTION INTRAMUSCULAR; INTRAVENOUS ONCE
Status: COMPLETED | OUTPATIENT
Start: 2023-01-01 | End: 2023-01-01

## 2023-01-01 RX ORDER — CEFAZOLIN SODIUM/WATER 2 G/20 ML
2 SYRINGE (ML) INTRAVENOUS
Status: COMPLETED | OUTPATIENT
Start: 2023-01-01 | End: 2023-01-01

## 2023-01-01 RX ORDER — AMOXICILLIN 250 MG
2 CAPSULE ORAL 2 TIMES DAILY
Status: DISCONTINUED | OUTPATIENT
Start: 2023-01-01 | End: 2023-01-01 | Stop reason: HOSPADM

## 2023-01-01 RX ORDER — CLOBAZAM 2.5 MG/ML
5 SUSPENSION ORAL ONCE
Status: COMPLETED | OUTPATIENT
Start: 2023-01-01 | End: 2023-01-01

## 2023-01-01 RX ORDER — HYDROMORPHONE HYDROCHLORIDE 1 MG/ML
0.5 INJECTION, SOLUTION INTRAMUSCULAR; INTRAVENOUS; SUBCUTANEOUS ONCE
Status: COMPLETED | OUTPATIENT
Start: 2023-01-01 | End: 2023-01-01

## 2023-01-01 RX ORDER — ATROPINE SULFATE 10 MG/ML
2 SOLUTION/ DROPS OPHTHALMIC EVERY 4 HOURS PRN
Status: CANCELLED | OUTPATIENT
Start: 2023-01-01

## 2023-01-01 RX ORDER — CYCLOBENZAPRINE HCL 5 MG
5 TABLET ORAL 3 TIMES DAILY PRN
Status: DISCONTINUED | OUTPATIENT
Start: 2023-01-01 | End: 2023-01-01

## 2023-01-01 RX ORDER — ONDANSETRON 2 MG/ML
4 INJECTION INTRAMUSCULAR; INTRAVENOUS EVERY 30 MIN PRN
Status: DISCONTINUED | OUTPATIENT
Start: 2023-01-01 | End: 2023-01-01 | Stop reason: HOSPADM

## 2023-01-01 RX ORDER — HYDROMORPHONE HCL IN WATER/PF 6 MG/30 ML
0.4 PATIENT CONTROLLED ANALGESIA SYRINGE INTRAVENOUS EVERY 5 MIN PRN
Status: DISCONTINUED | OUTPATIENT
Start: 2023-01-01 | End: 2023-01-01 | Stop reason: HOSPADM

## 2023-01-01 RX ORDER — CARBOXYMETHYLCELLULOSE SODIUM 5 MG/ML
1-2 SOLUTION/ DROPS OPHTHALMIC
Status: DISCONTINUED | OUTPATIENT
Start: 2023-01-01 | End: 2023-01-01 | Stop reason: HOSPADM

## 2023-01-01 RX ORDER — DEXTROSE MONOHYDRATE 25 G/50ML
25-50 INJECTION, SOLUTION INTRAVENOUS
Status: DISCONTINUED | OUTPATIENT
Start: 2023-01-01 | End: 2023-01-01

## 2023-01-01 RX ORDER — CHLORHEXIDINE GLUCONATE ORAL RINSE 1.2 MG/ML
15 SOLUTION DENTAL EVERY 12 HOURS
Status: DISCONTINUED | OUTPATIENT
Start: 2023-01-01 | End: 2023-01-01

## 2023-01-01 RX ORDER — ONDANSETRON 4 MG/1
4 TABLET, ORALLY DISINTEGRATING ORAL EVERY 6 HOURS PRN
Status: DISCONTINUED | OUTPATIENT
Start: 2023-01-01 | End: 2023-01-01 | Stop reason: HOSPADM

## 2023-01-01 RX ORDER — POLYETHYLENE GLYCOL 3350 17 G/17G
17 POWDER, FOR SOLUTION ORAL 2 TIMES DAILY
Status: DISCONTINUED | OUTPATIENT
Start: 2023-01-01 | End: 2023-01-01

## 2023-01-01 RX ORDER — DIAZEPAM 10 MG/2ML
5 INJECTION, SOLUTION INTRAMUSCULAR; INTRAVENOUS EVERY 4 HOURS PRN
Status: DISCONTINUED | OUTPATIENT
Start: 2023-01-01 | End: 2023-01-01

## 2023-01-01 RX ORDER — CLOBAZAM 2.5 MG/ML
10 SUSPENSION ORAL 2 TIMES DAILY
Status: DISCONTINUED | OUTPATIENT
Start: 2023-01-01 | End: 2023-01-01

## 2023-01-01 RX ORDER — NIFEDIPINE 30 MG/1
30 TABLET, EXTENDED RELEASE ORAL DAILY
Status: DISCONTINUED | OUTPATIENT
Start: 2023-01-01 | End: 2023-01-01 | Stop reason: HOSPADM

## 2023-01-01 RX ORDER — ONDANSETRON 4 MG/1
4 TABLET, ORALLY DISINTEGRATING ORAL EVERY 30 MIN PRN
Status: DISCONTINUED | OUTPATIENT
Start: 2023-01-01 | End: 2023-01-01 | Stop reason: HOSPADM

## 2023-01-01 RX ORDER — LORAZEPAM 2 MG/ML
1 INJECTION INTRAMUSCULAR ONCE
Status: COMPLETED | OUTPATIENT
Start: 2023-01-01 | End: 2023-01-01

## 2023-01-01 RX ORDER — DEXTROSE MONOHYDRATE 50 MG/ML
INJECTION, SOLUTION INTRAVENOUS CONTINUOUS
Status: DISCONTINUED | OUTPATIENT
Start: 2023-01-01 | End: 2023-01-01

## 2023-01-01 RX ORDER — OLANZAPINE 5 MG/1
5 TABLET, ORALLY DISINTEGRATING ORAL ONCE
Status: COMPLETED | OUTPATIENT
Start: 2023-01-01 | End: 2023-01-01

## 2023-01-01 RX ORDER — SALIVA STIMULANT COMB. NO.3
1 SPRAY, NON-AEROSOL (ML) MUCOUS MEMBRANE
Status: DISCONTINUED | OUTPATIENT
Start: 2023-01-01 | End: 2023-01-01

## 2023-01-01 RX ORDER — NALOXONE HYDROCHLORIDE 0.4 MG/ML
0.2 INJECTION, SOLUTION INTRAMUSCULAR; INTRAVENOUS; SUBCUTANEOUS
Status: DISCONTINUED | OUTPATIENT
Start: 2023-01-01 | End: 2023-01-01

## 2023-01-01 RX ORDER — NITROGLYCERIN 0.4 MG/1
0.4 TABLET SUBLINGUAL EVERY 5 MIN PRN
Status: DISCONTINUED | OUTPATIENT
Start: 2023-01-01 | End: 2023-01-01

## 2023-01-01 RX ORDER — ESCITALOPRAM OXALATE 10 MG/1
20 TABLET ORAL DAILY
Status: DISCONTINUED | OUTPATIENT
Start: 2023-01-01 | End: 2023-01-01 | Stop reason: HOSPADM

## 2023-01-01 RX ORDER — LEVOFLOXACIN 500 MG/1
500 TABLET, FILM COATED ORAL DAILY
Status: DISCONTINUED | OUTPATIENT
Start: 2023-01-01 | End: 2023-01-01

## 2023-01-01 RX ORDER — BISACODYL 10 MG
10 SUPPOSITORY, RECTAL RECTAL DAILY PRN
Status: DISCONTINUED | OUTPATIENT
Start: 2023-01-01 | End: 2023-01-01 | Stop reason: HOSPADM

## 2023-01-01 RX ORDER — ALENDRONATE SODIUM 70 MG/1
70 TABLET ORAL
COMMUNITY

## 2023-01-01 RX ORDER — SODIUM CHLORIDE 9 MG/ML
INJECTION, SOLUTION INTRAVENOUS CONTINUOUS
Status: DISCONTINUED | OUTPATIENT
Start: 2023-01-01 | End: 2023-01-01

## 2023-01-01 RX ORDER — POTASSIUM CHLORIDE 7.45 MG/ML
10 INJECTION INTRAVENOUS ONCE
Qty: 100 ML | Refills: 0 | Status: COMPLETED | OUTPATIENT
Start: 2023-01-01 | End: 2023-01-01

## 2023-01-01 RX ORDER — POTASSIUM CHLORIDE 29.8 MG/ML
20 INJECTION INTRAVENOUS ONCE
Qty: 50 ML | Refills: 0 | Status: COMPLETED | OUTPATIENT
Start: 2023-01-01 | End: 2023-01-01

## 2023-01-01 RX ORDER — MULTIPLE VITAMINS W/ MINERALS TAB 9MG-400MCG
1 TAB ORAL DAILY
Status: DISCONTINUED | OUTPATIENT
Start: 2023-01-01 | End: 2023-01-01

## 2023-01-01 RX ORDER — ASPIRIN 325 MG
325 TABLET, DELAYED RELEASE (ENTERIC COATED) ORAL DAILY
Qty: 30 TABLET | Refills: 0 | Status: SHIPPED | OUTPATIENT
Start: 2023-01-01 | End: 2023-01-01

## 2023-01-01 RX ORDER — PROPOFOL 10 MG/ML
INJECTION, EMULSION INTRAVENOUS
Status: COMPLETED
Start: 2023-01-01 | End: 2023-01-01

## 2023-01-01 RX ORDER — LEVOFLOXACIN 500 MG/1
500 TABLET, FILM COATED ORAL DAILY
Status: COMPLETED | OUTPATIENT
Start: 2023-01-01 | End: 2023-01-01

## 2023-01-01 RX ORDER — NOREPINEPHRINE BITARTRATE 0.02 MG/ML
INJECTION, SOLUTION INTRAVENOUS
Status: DISCONTINUED
Start: 2023-01-01 | End: 2023-01-01 | Stop reason: HOSPADM

## 2023-01-01 RX ORDER — CLOBAZAM 10 MG/1
10 TABLET ORAL 2 TIMES DAILY
Status: DISCONTINUED | OUTPATIENT
Start: 2023-01-01 | End: 2023-01-01

## 2023-01-01 RX ORDER — HYDROMORPHONE HYDROCHLORIDE 1 MG/ML
0.3 INJECTION, SOLUTION INTRAMUSCULAR; INTRAVENOUS; SUBCUTANEOUS
Status: DISCONTINUED | OUTPATIENT
Start: 2023-01-01 | End: 2023-01-01 | Stop reason: HOSPADM

## 2023-01-01 RX ORDER — SODIUM CHLORIDE, SODIUM LACTATE, POTASSIUM CHLORIDE, CALCIUM CHLORIDE 600; 310; 30; 20 MG/100ML; MG/100ML; MG/100ML; MG/100ML
INJECTION, SOLUTION INTRAVENOUS CONTINUOUS
Status: DISCONTINUED | OUTPATIENT
Start: 2023-01-01 | End: 2023-01-01

## 2023-01-01 RX ORDER — SODIUM CHLORIDE 9 MG/ML
INJECTION, SOLUTION INTRAVENOUS CONTINUOUS
Status: DISCONTINUED | OUTPATIENT
Start: 2023-01-01 | End: 2023-01-01 | Stop reason: HOSPADM

## 2023-01-01 RX ORDER — ONDANSETRON 2 MG/ML
4 INJECTION INTRAMUSCULAR; INTRAVENOUS EVERY 6 HOURS PRN
Status: DISCONTINUED | OUTPATIENT
Start: 2023-01-01 | End: 2023-01-01

## 2023-01-01 RX ORDER — PERPHENAZINE 4 MG/1
4 TABLET ORAL EVERY MORNING
COMMUNITY

## 2023-01-01 RX ORDER — DEXTROSE MONOHYDRATE 25 G/50ML
INJECTION, SOLUTION INTRAVENOUS PRN
Status: DISCONTINUED | OUTPATIENT
Start: 2023-01-01 | End: 2023-01-01

## 2023-01-01 RX ORDER — ETOMIDATE 2 MG/ML
INJECTION INTRAVENOUS PRN
Status: DISCONTINUED | OUTPATIENT
Start: 2023-01-01 | End: 2023-01-01

## 2023-01-01 RX ORDER — CEFTRIAXONE 2 G/1
2 INJECTION, POWDER, FOR SOLUTION INTRAMUSCULAR; INTRAVENOUS EVERY 24 HOURS
Status: DISCONTINUED | OUTPATIENT
Start: 2023-01-01 | End: 2023-01-01

## 2023-01-01 RX ORDER — CLINDAMYCIN PHOSPHATE 900 MG/50ML
900 INJECTION, SOLUTION INTRAVENOUS SEE ADMIN INSTRUCTIONS
Status: CANCELLED | OUTPATIENT
Start: 2023-01-01

## 2023-01-01 RX ORDER — CARBOXYMETHYLCELLULOSE SODIUM 5 MG/ML
1-2 SOLUTION/ DROPS OPHTHALMIC
Status: CANCELLED | OUTPATIENT
Start: 2023-01-01

## 2023-01-01 RX ORDER — DIPHENHYDRAMINE HCL 25 MG
50 CAPSULE ORAL 2 TIMES DAILY
Status: DISCONTINUED | OUTPATIENT
Start: 2023-01-01 | End: 2023-01-01

## 2023-01-01 RX ORDER — PROPOFOL 10 MG/ML
5-75 INJECTION, EMULSION INTRAVENOUS CONTINUOUS
Status: DISCONTINUED | OUTPATIENT
Start: 2023-01-01 | End: 2023-01-01

## 2023-01-01 RX ORDER — ESCITALOPRAM OXALATE 20 MG/1
20 TABLET ORAL DAILY
COMMUNITY

## 2023-01-01 RX ORDER — MORPHINE SULFATE 20 MG/ML
5 SOLUTION ORAL EVERY 6 HOURS
Status: DISCONTINUED | OUTPATIENT
Start: 2023-01-01 | End: 2023-01-01 | Stop reason: HOSPADM

## 2023-01-01 RX ORDER — NICOTINE POLACRILEX 4 MG
15-30 LOZENGE BUCCAL
Status: DISCONTINUED | OUTPATIENT
Start: 2023-01-01 | End: 2023-01-01

## 2023-01-01 RX ORDER — LACOSAMIDE 10 MG/ML
250 SOLUTION ORAL 2 TIMES DAILY
Status: DISCONTINUED | OUTPATIENT
Start: 2023-01-01 | End: 2023-01-01 | Stop reason: HOSPADM

## 2023-01-01 RX ORDER — CLOBAZAM 2.5 MG/ML
20 SUSPENSION ORAL 2 TIMES DAILY
Status: DISCONTINUED | OUTPATIENT
Start: 2023-01-01 | End: 2023-01-01 | Stop reason: HOSPADM

## 2023-01-01 RX ORDER — ONDANSETRON 4 MG/1
4 TABLET, ORALLY DISINTEGRATING ORAL EVERY 6 HOURS PRN
Status: DISCONTINUED | OUTPATIENT
Start: 2023-01-01 | End: 2023-01-01

## 2023-01-01 RX ORDER — DIPHENHYDRAMINE HCL 25 MG
50 CAPSULE ORAL 3 TIMES DAILY
Status: DISCONTINUED | OUTPATIENT
Start: 2023-01-01 | End: 2023-01-01

## 2023-01-01 RX ORDER — LORAZEPAM 2 MG/ML
4 INJECTION INTRAMUSCULAR ONCE
Status: COMPLETED | OUTPATIENT
Start: 2023-01-01 | End: 2023-01-01

## 2023-01-01 RX ORDER — MORPHINE SULFATE 20 MG/ML
10 SOLUTION ORAL EVERY 6 HOURS
Status: DISCONTINUED | OUTPATIENT
Start: 2023-01-01 | End: 2023-01-01 | Stop reason: HOSPADM

## 2023-01-01 RX ORDER — WATER 10 ML/10ML
INJECTION INTRAMUSCULAR; INTRAVENOUS; SUBCUTANEOUS
Status: COMPLETED
Start: 2023-01-01 | End: 2023-01-01

## 2023-01-01 RX ORDER — ONDANSETRON 2 MG/ML
4 INJECTION INTRAMUSCULAR; INTRAVENOUS EVERY 6 HOURS PRN
Status: DISCONTINUED | OUTPATIENT
Start: 2023-01-01 | End: 2023-01-01 | Stop reason: HOSPADM

## 2023-01-01 RX ORDER — POTASSIUM CHLORIDE 1.5 G/1.58G
20 POWDER, FOR SOLUTION ORAL ONCE
Status: COMPLETED | OUTPATIENT
Start: 2023-01-01 | End: 2023-01-01

## 2023-01-01 RX ORDER — FUROSEMIDE 10 MG/ML
40 INJECTION INTRAMUSCULAR; INTRAVENOUS ONCE
Status: COMPLETED | OUTPATIENT
Start: 2023-01-01 | End: 2023-01-01

## 2023-01-01 RX ORDER — NALOXONE HYDROCHLORIDE 0.4 MG/ML
0.1 INJECTION, SOLUTION INTRAMUSCULAR; INTRAVENOUS; SUBCUTANEOUS
Status: DISCONTINUED | OUTPATIENT
Start: 2023-01-01 | End: 2023-01-01

## 2023-01-01 RX ORDER — CEFTRIAXONE 1 G/1
1 INJECTION, POWDER, FOR SOLUTION INTRAMUSCULAR; INTRAVENOUS EVERY 24 HOURS
Status: DISCONTINUED | OUTPATIENT
Start: 2023-01-01 | End: 2023-01-01

## 2023-01-01 RX ORDER — SODIUM CHLORIDE 9 MG/ML
INJECTION, SOLUTION INTRAVENOUS CONTINUOUS PRN
Status: DISCONTINUED | OUTPATIENT
Start: 2023-01-01 | End: 2023-01-01

## 2023-01-01 RX ORDER — CLOBAZAM 2.5 MG/ML
20 SUSPENSION ORAL 2 TIMES DAILY
Status: DISCONTINUED | OUTPATIENT
Start: 2023-01-01 | End: 2023-01-01

## 2023-01-01 RX ORDER — PERPHENAZINE 4 MG/1
4 TABLET ORAL EVERY MORNING
Status: DISCONTINUED | OUTPATIENT
Start: 2023-01-01 | End: 2023-01-01 | Stop reason: HOSPADM

## 2023-01-01 RX ORDER — IBUPROFEN 200 MG
400 TABLET ORAL EVERY 4 HOURS PRN
COMMUNITY
End: 2023-01-01

## 2023-01-01 RX ORDER — LORAZEPAM 0.5 MG/1
0.5 TABLET ORAL 2 TIMES DAILY
Status: DISCONTINUED | OUTPATIENT
Start: 2023-01-01 | End: 2023-01-01 | Stop reason: HOSPADM

## 2023-01-01 RX ORDER — LORAZEPAM 2 MG/ML
0.5 INJECTION INTRAMUSCULAR ONCE
Status: COMPLETED | OUTPATIENT
Start: 2023-01-01 | End: 2023-01-01

## 2023-01-01 RX ORDER — VANCOMYCIN HYDROCHLORIDE 1 G/200ML
1000 INJECTION, SOLUTION INTRAVENOUS EVERY 12 HOURS
Status: DISCONTINUED | OUTPATIENT
Start: 2023-01-01 | End: 2023-01-01

## 2023-01-01 RX ORDER — CEFAZOLIN SODIUM/WATER 2 G/20 ML
2 SYRINGE (ML) INTRAVENOUS SEE ADMIN INSTRUCTIONS
Status: DISCONTINUED | OUTPATIENT
Start: 2023-01-01 | End: 2023-01-01 | Stop reason: HOSPADM

## 2023-01-01 RX ORDER — SODIUM CHLORIDE 9 MG/ML
INJECTION, SOLUTION INTRAVENOUS CONTINUOUS
Status: DISCONTINUED | OUTPATIENT
Start: 2023-01-01 | End: 2023-01-01 | Stop reason: ALTCHOICE

## 2023-01-01 RX ORDER — LIDOCAINE 40 MG/G
CREAM TOPICAL
Status: ACTIVE | OUTPATIENT
Start: 2023-01-01 | End: 2023-01-01

## 2023-01-01 RX ORDER — FENTANYL CITRATE 0.05 MG/ML
25 INJECTION, SOLUTION INTRAMUSCULAR; INTRAVENOUS EVERY 5 MIN PRN
Status: DISCONTINUED | OUTPATIENT
Start: 2023-01-01 | End: 2023-01-01 | Stop reason: HOSPADM

## 2023-01-01 RX ORDER — HYDROMORPHONE HYDROCHLORIDE 1 MG/ML
.3-.5 INJECTION, SOLUTION INTRAMUSCULAR; INTRAVENOUS; SUBCUTANEOUS
Status: DISCONTINUED | OUTPATIENT
Start: 2023-01-01 | End: 2023-01-01

## 2023-01-01 RX ORDER — MIDAZOLAM HCL IN 0.9 % NACL/PF 1 MG/ML
1-15 PLASTIC BAG, INJECTION (ML) INTRAVENOUS CONTINUOUS
Status: DISCONTINUED | OUTPATIENT
Start: 2023-01-01 | End: 2023-01-01

## 2023-01-01 RX ORDER — FENTANYL CITRATE 0.05 MG/ML
50 INJECTION, SOLUTION INTRAMUSCULAR; INTRAVENOUS EVERY 5 MIN PRN
Status: DISCONTINUED | OUTPATIENT
Start: 2023-01-01 | End: 2023-01-01 | Stop reason: HOSPADM

## 2023-01-01 RX ORDER — LORAZEPAM 2 MG/ML
2 INJECTION INTRAMUSCULAR EVERY 4 HOURS
Status: CANCELLED | OUTPATIENT
Start: 2023-01-01

## 2023-01-01 RX ORDER — LIDOCAINE 40 MG/G
CREAM TOPICAL
Status: DISCONTINUED | OUTPATIENT
Start: 2023-01-01 | End: 2023-01-01

## 2023-01-01 RX ORDER — SODIUM CHLORIDE FOR INHALATION 3 %
3 VIAL, NEBULIZER (ML) INHALATION
Status: DISCONTINUED | OUTPATIENT
Start: 2023-01-01 | End: 2023-01-01 | Stop reason: HOSPADM

## 2023-01-01 RX ORDER — NALOXONE HYDROCHLORIDE 0.4 MG/ML
0.2 INJECTION, SOLUTION INTRAMUSCULAR; INTRAVENOUS; SUBCUTANEOUS
Status: CANCELLED | OUTPATIENT
Start: 2023-01-01

## 2023-01-01 RX ORDER — SALIVA STIMULANT COMB. NO.3
1 SPRAY, NON-AEROSOL (ML) MUCOUS MEMBRANE
Status: CANCELLED | OUTPATIENT
Start: 2023-01-01

## 2023-01-01 RX ORDER — LEVETIRACETAM 100 MG/ML
1500 SOLUTION ORAL 2 TIMES DAILY
Status: DISCONTINUED | OUTPATIENT
Start: 2023-01-01 | End: 2023-01-01 | Stop reason: HOSPADM

## 2023-01-01 RX ORDER — LORAZEPAM 2 MG/ML
1-2 INJECTION INTRAMUSCULAR EVERY 12 HOURS PRN
Status: DISCONTINUED | OUTPATIENT
Start: 2023-01-01 | End: 2023-01-01

## 2023-01-01 RX ORDER — LORAZEPAM 0.5 MG/1
0.25 TABLET ORAL AT BEDTIME
Status: DISCONTINUED | OUTPATIENT
Start: 2023-01-01 | End: 2023-01-01

## 2023-01-01 RX ORDER — IOPAMIDOL 755 MG/ML
45 INJECTION, SOLUTION INTRAVASCULAR ONCE
Status: COMPLETED | OUTPATIENT
Start: 2023-01-01 | End: 2023-01-01

## 2023-01-01 RX ORDER — CEFTRIAXONE 1 G/1
1 INJECTION, POWDER, FOR SOLUTION INTRAMUSCULAR; INTRAVENOUS EVERY 24 HOURS
Status: COMPLETED | OUTPATIENT
Start: 2023-01-01 | End: 2023-01-01

## 2023-01-01 RX ORDER — GLYCOPYRROLATE 0.2 MG/ML
0.2 INJECTION, SOLUTION INTRAMUSCULAR; INTRAVENOUS EVERY 4 HOURS PRN
Status: DISCONTINUED | OUTPATIENT
Start: 2023-01-01 | End: 2023-01-01 | Stop reason: HOSPADM

## 2023-01-01 RX ORDER — LACOSAMIDE 10 MG/ML
250 SOLUTION ORAL 2 TIMES DAILY
Status: CANCELLED | OUTPATIENT
Start: 2023-01-01

## 2023-01-01 RX ORDER — GADOBUTROL 604.72 MG/ML
4 INJECTION INTRAVENOUS ONCE
Status: COMPLETED | OUTPATIENT
Start: 2023-01-01 | End: 2023-01-01

## 2023-01-01 RX ORDER — MEROPENEM 1 G/1
1 INJECTION, POWDER, FOR SOLUTION INTRAVENOUS EVERY 8 HOURS
Status: DISCONTINUED | OUTPATIENT
Start: 2023-01-01 | End: 2023-01-01

## 2023-01-01 RX ORDER — CEFEPIME HYDROCHLORIDE 1 G/1
1 INJECTION, POWDER, FOR SOLUTION INTRAMUSCULAR; INTRAVENOUS EVERY 12 HOURS
Status: DISCONTINUED | OUTPATIENT
Start: 2023-01-01 | End: 2023-01-01

## 2023-01-01 RX ORDER — CEFTRIAXONE 2 G/1
2 INJECTION, POWDER, FOR SOLUTION INTRAMUSCULAR; INTRAVENOUS EVERY 12 HOURS
Status: DISCONTINUED | OUTPATIENT
Start: 2023-01-01 | End: 2023-01-01

## 2023-01-01 RX ORDER — CLINDAMYCIN PHOSPHATE 900 MG/50ML
900 INJECTION, SOLUTION INTRAVENOUS
Status: CANCELLED | OUTPATIENT
Start: 2023-01-01

## 2023-01-01 RX ORDER — LORAZEPAM 2 MG/ML
2 CONCENTRATE ORAL EVERY 4 HOURS
Status: DISCONTINUED | OUTPATIENT
Start: 2023-01-01 | End: 2023-01-01 | Stop reason: HOSPADM

## 2023-01-01 RX ORDER — HYDROMORPHONE HCL IN WATER/PF 6 MG/30 ML
0.2 PATIENT CONTROLLED ANALGESIA SYRINGE INTRAVENOUS EVERY 5 MIN PRN
Status: DISCONTINUED | OUTPATIENT
Start: 2023-01-01 | End: 2023-01-01 | Stop reason: HOSPADM

## 2023-01-01 RX ORDER — LACOSAMIDE 10 MG/ML
300 SOLUTION ORAL 2 TIMES DAILY
Status: DISCONTINUED | OUTPATIENT
Start: 2023-01-01 | End: 2023-01-01

## 2023-01-01 RX ORDER — LORAZEPAM 0.5 MG/1
0.5 TABLET ORAL 2 TIMES DAILY
Status: DISCONTINUED | OUTPATIENT
Start: 2023-01-01 | End: 2023-01-01

## 2023-01-01 RX ORDER — CYCLOBENZAPRINE HCL 5 MG
5 TABLET ORAL 3 TIMES DAILY PRN
COMMUNITY

## 2023-01-01 RX ORDER — GLYCOPYRROLATE 0.2 MG/ML
0.2 INJECTION, SOLUTION INTRAMUSCULAR; INTRAVENOUS EVERY 4 HOURS PRN
Status: CANCELLED | OUTPATIENT
Start: 2023-01-01

## 2023-01-01 RX ORDER — DIPHENHYDRAMINE HYDROCHLORIDE 50 MG/ML
25 INJECTION INTRAMUSCULAR; INTRAVENOUS EVERY 6 HOURS PRN
Status: DISCONTINUED | OUTPATIENT
Start: 2023-01-01 | End: 2023-01-01 | Stop reason: HOSPADM

## 2023-01-01 RX ORDER — HYDROMORPHONE HYDROCHLORIDE 1 MG/ML
.3-.5 INJECTION, SOLUTION INTRAMUSCULAR; INTRAVENOUS; SUBCUTANEOUS
Status: CANCELLED | OUTPATIENT
Start: 2023-01-01

## 2023-01-01 RX ORDER — IOPAMIDOL 755 MG/ML
48 INJECTION, SOLUTION INTRAVASCULAR ONCE
Status: COMPLETED | OUTPATIENT
Start: 2023-01-01 | End: 2023-01-01

## 2023-01-01 RX ORDER — LORAZEPAM 0.5 MG/1
0.25 TABLET ORAL AT BEDTIME
COMMUNITY

## 2023-01-01 RX ORDER — LORAZEPAM 2 MG/ML
2 INJECTION INTRAMUSCULAR EVERY 4 HOURS
Status: DISCONTINUED | OUTPATIENT
Start: 2023-01-01 | End: 2023-01-01 | Stop reason: HOSPADM

## 2023-01-01 RX ORDER — NALOXONE HYDROCHLORIDE 0.4 MG/ML
0.1 INJECTION, SOLUTION INTRAMUSCULAR; INTRAVENOUS; SUBCUTANEOUS
Status: DISCONTINUED | OUTPATIENT
Start: 2023-01-01 | End: 2023-01-01 | Stop reason: HOSPADM

## 2023-01-01 RX ORDER — PROPOFOL 10 MG/ML
INJECTION, EMULSION INTRAVENOUS PRN
Status: DISCONTINUED | OUTPATIENT
Start: 2023-01-01 | End: 2023-01-01

## 2023-01-01 RX ORDER — POTASSIUM CHLORIDE 7.45 MG/ML
10 INJECTION INTRAVENOUS
Status: COMPLETED | OUTPATIENT
Start: 2023-01-01 | End: 2023-01-01

## 2023-01-01 RX ORDER — AMOXICILLIN 250 MG
1 CAPSULE ORAL 2 TIMES DAILY
Status: DISCONTINUED | OUTPATIENT
Start: 2023-01-01 | End: 2023-01-01 | Stop reason: HOSPADM

## 2023-01-01 RX ORDER — LORAZEPAM 2 MG/ML
2 INJECTION INTRAMUSCULAR EVERY 30 MIN PRN
Status: DISCONTINUED | OUTPATIENT
Start: 2023-01-01 | End: 2023-01-01

## 2023-01-01 RX ORDER — ACETAMINOPHEN 325 MG/1
975 TABLET ORAL ONCE
Status: COMPLETED | OUTPATIENT
Start: 2023-01-01 | End: 2023-01-01

## 2023-01-01 RX ORDER — CEFEPIME HYDROCHLORIDE 2 G/1
2 INJECTION, POWDER, FOR SOLUTION INTRAVENOUS EVERY 8 HOURS
Status: DISCONTINUED | OUTPATIENT
Start: 2023-01-01 | End: 2023-01-01

## 2023-01-01 RX ORDER — POTASSIUM CHLORIDE 20MEQ/15ML
40 LIQUID (ML) ORAL ONCE
Status: DISCONTINUED | OUTPATIENT
Start: 2023-01-01 | End: 2023-01-01

## 2023-01-01 RX ORDER — PERPHENAZINE 4 MG/1
8 TABLET ORAL AT BEDTIME
Status: DISCONTINUED | OUTPATIENT
Start: 2023-01-01 | End: 2023-01-01 | Stop reason: HOSPADM

## 2023-01-01 RX ORDER — DEXTROSE MONOHYDRATE 50 MG/ML
INJECTION, SOLUTION INTRAVENOUS CONTINUOUS
Status: ACTIVE | OUTPATIENT
Start: 2023-01-01 | End: 2023-01-01

## 2023-01-01 RX ORDER — IPRATROPIUM BROMIDE AND ALBUTEROL SULFATE 2.5; .5 MG/3ML; MG/3ML
3 SOLUTION RESPIRATORY (INHALATION) 3 TIMES DAILY
Status: DISCONTINUED | OUTPATIENT
Start: 2023-01-01 | End: 2023-01-01

## 2023-01-01 RX ORDER — VECURONIUM BROMIDE 1 MG/ML
5 INJECTION, POWDER, LYOPHILIZED, FOR SOLUTION INTRAVENOUS ONCE
Status: COMPLETED | OUTPATIENT
Start: 2023-01-01 | End: 2023-01-01

## 2023-01-01 RX ORDER — SALIVA STIMULANT COMB. NO.3
2 SPRAY, NON-AEROSOL (ML) MUCOUS MEMBRANE 4 TIMES DAILY PRN
Status: DISCONTINUED | OUTPATIENT
Start: 2023-01-01 | End: 2023-01-01

## 2023-01-01 RX ORDER — SODIUM CHLORIDE, SODIUM LACTATE, POTASSIUM CHLORIDE, CALCIUM CHLORIDE 600; 310; 30; 20 MG/100ML; MG/100ML; MG/100ML; MG/100ML
INJECTION, SOLUTION INTRAVENOUS CONTINUOUS
Status: DISCONTINUED | OUTPATIENT
Start: 2023-01-01 | End: 2023-01-01 | Stop reason: HOSPADM

## 2023-01-01 RX ORDER — DIAZEPAM 10 MG/2ML
2.5 INJECTION, SOLUTION INTRAMUSCULAR; INTRAVENOUS EVERY 4 HOURS PRN
Status: DISCONTINUED | OUTPATIENT
Start: 2023-01-01 | End: 2023-01-01

## 2023-01-01 RX ORDER — CLOBAZAM 2.5 MG/ML
15 SUSPENSION ORAL 2 TIMES DAILY
Status: DISCONTINUED | OUTPATIENT
Start: 2023-01-01 | End: 2023-01-01

## 2023-01-01 RX ORDER — SODIUM CHLORIDE 450 MG/100ML
INJECTION, SOLUTION INTRAVENOUS CONTINUOUS
Status: DISCONTINUED | OUTPATIENT
Start: 2023-01-01 | End: 2023-01-01

## 2023-01-01 RX ORDER — ACETAMINOPHEN 325 MG/1
650 TABLET ORAL EVERY 6 HOURS PRN
Status: DISCONTINUED | OUTPATIENT
Start: 2023-01-01 | End: 2023-01-01 | Stop reason: HOSPADM

## 2023-01-01 RX ORDER — DIPHENHYDRAMINE HCL 25 MG
25 CAPSULE ORAL EVERY 6 HOURS PRN
Status: DISCONTINUED | OUTPATIENT
Start: 2023-01-01 | End: 2023-01-01 | Stop reason: HOSPADM

## 2023-01-01 RX ORDER — HYDROXYZINE HYDROCHLORIDE 25 MG/1
25 TABLET, FILM COATED ORAL 3 TIMES DAILY
Status: DISCONTINUED | OUTPATIENT
Start: 2023-01-01 | End: 2023-01-01 | Stop reason: HOSPADM

## 2023-01-01 RX ORDER — OXYCODONE HYDROCHLORIDE 5 MG/1
5 TABLET ORAL EVERY 4 HOURS PRN
Status: DISCONTINUED | OUTPATIENT
Start: 2023-01-01 | End: 2023-01-01 | Stop reason: HOSPADM

## 2023-01-01 RX ORDER — DIPHENHYDRAMINE HYDROCHLORIDE 50 MG/ML
25 INJECTION INTRAMUSCULAR; INTRAVENOUS ONCE
Status: COMPLETED | OUTPATIENT
Start: 2023-01-01 | End: 2023-01-01

## 2023-01-01 RX ORDER — PHENYTOIN SODIUM 50 MG/ML
100 INJECTION INTRAMUSCULAR; INTRAVENOUS EVERY 8 HOURS
Status: DISCONTINUED | OUTPATIENT
Start: 2023-01-01 | End: 2023-01-01

## 2023-01-01 RX ORDER — MIDAZOLAM HCL IN 0.9 % NACL/PF 1 MG/ML
4 PLASTIC BAG, INJECTION (ML) INTRAVENOUS CONTINUOUS
Status: DISCONTINUED | OUTPATIENT
Start: 2023-01-01 | End: 2023-01-01

## 2023-01-01 RX ORDER — LIDOCAINE HYDROCHLORIDE 10 MG/ML
5 INJECTION, SOLUTION EPIDURAL; INFILTRATION; INTRACAUDAL; PERINEURAL ONCE
Status: COMPLETED | OUTPATIENT
Start: 2023-01-01 | End: 2023-01-01

## 2023-01-01 RX ORDER — PROPOFOL 10 MG/ML
5-75 INJECTION, EMULSION INTRAVENOUS CONTINUOUS
Status: DISCONTINUED | OUTPATIENT
Start: 2023-01-01 | End: 2023-01-01 | Stop reason: ALTCHOICE

## 2023-01-01 RX ORDER — HYDRALAZINE HYDROCHLORIDE 20 MG/ML
5 INJECTION INTRAMUSCULAR; INTRAVENOUS EVERY 6 HOURS PRN
Status: DISCONTINUED | OUTPATIENT
Start: 2023-01-01 | End: 2023-01-01

## 2023-01-01 RX ORDER — FENTANYL CITRATE 50 UG/ML
INJECTION, SOLUTION INTRAMUSCULAR; INTRAVENOUS PRN
Status: DISCONTINUED | OUTPATIENT
Start: 2023-01-01 | End: 2023-01-01

## 2023-01-01 RX ORDER — MUSCLE RUB CREAM 100; 150 MG/G; MG/G
CREAM TOPICAL 4 TIMES DAILY
COMMUNITY

## 2023-01-01 RX ORDER — PERPHENAZINE 4 MG/1
4 TABLET ORAL 2 TIMES DAILY
Status: DISCONTINUED | OUTPATIENT
Start: 2023-01-01 | End: 2023-01-01

## 2023-01-01 RX ORDER — LORAZEPAM 0.5 MG/1
0.5 TABLET ORAL 2 TIMES DAILY
COMMUNITY

## 2023-01-01 RX ORDER — HYDROXYZINE HYDROCHLORIDE 25 MG/1
25-50 TABLET, FILM COATED ORAL 3 TIMES DAILY
Status: DISCONTINUED | OUTPATIENT
Start: 2023-01-01 | End: 2023-01-01

## 2023-01-01 RX ORDER — CLOBAZAM 2.5 MG/ML
10 SUSPENSION ORAL ONCE
Status: COMPLETED | OUTPATIENT
Start: 2023-01-01 | End: 2023-01-01

## 2023-01-01 RX ORDER — DIPHENHYDRAMINE HCL 50 MG
50 CAPSULE ORAL 3 TIMES DAILY
Status: DISCONTINUED | OUTPATIENT
Start: 2023-01-01 | End: 2023-01-01

## 2023-01-01 RX ORDER — DOCUSATE SODIUM 100 MG/1
100 CAPSULE, LIQUID FILLED ORAL 2 TIMES DAILY
Status: DISCONTINUED | OUTPATIENT
Start: 2023-01-01 | End: 2023-01-01

## 2023-01-01 RX ORDER — CLOBAZAM 2.5 MG/ML
20 SUSPENSION ORAL 2 TIMES DAILY
Status: CANCELLED | OUTPATIENT
Start: 2023-01-01

## 2023-01-01 RX ORDER — LORAZEPAM 2 MG/ML
2 INJECTION INTRAMUSCULAR EVERY 4 HOURS
Status: DISCONTINUED | OUTPATIENT
Start: 2023-01-01 | End: 2023-01-01

## 2023-01-01 RX ORDER — MAGNESIUM HYDROXIDE 1200 MG/15ML
LIQUID ORAL PRN
Status: DISCONTINUED | OUTPATIENT
Start: 2023-01-01 | End: 2023-01-01 | Stop reason: HOSPADM

## 2023-01-01 RX ORDER — LORAZEPAM 0.5 MG/1
0.25 TABLET ORAL AT BEDTIME
Status: DISCONTINUED | OUTPATIENT
Start: 2023-01-01 | End: 2023-01-01 | Stop reason: HOSPADM

## 2023-01-01 RX ORDER — DIAZEPAM 2 MG
2 TABLET ORAL EVERY 6 HOURS PRN
Status: DISCONTINUED | OUTPATIENT
Start: 2023-01-01 | End: 2023-01-01 | Stop reason: HOSPADM

## 2023-01-01 RX ORDER — ALBUTEROL SULFATE 0.83 MG/ML
2.5 SOLUTION RESPIRATORY (INHALATION) EVERY 4 HOURS PRN
Status: DISCONTINUED | OUTPATIENT
Start: 2023-01-01 | End: 2023-01-01

## 2023-01-01 RX ORDER — ALBUTEROL SULFATE 0.83 MG/ML
2.5 SOLUTION RESPIRATORY (INHALATION) EVERY 4 HOURS PRN
COMMUNITY

## 2023-01-01 RX ORDER — NOREPINEPHRINE BITARTRATE 0.02 MG/ML
INJECTION, SOLUTION INTRAVENOUS
Status: COMPLETED
Start: 2023-01-01 | End: 2023-01-01

## 2023-01-01 RX ORDER — HEPARIN SODIUM 5000 [USP'U]/.5ML
5000 INJECTION, SOLUTION INTRAVENOUS; SUBCUTANEOUS EVERY 8 HOURS
Status: DISCONTINUED | OUTPATIENT
Start: 2023-01-01 | End: 2023-01-01

## 2023-01-01 RX ORDER — ACETAMINOPHEN 500 MG
1000 TABLET ORAL 3 TIMES DAILY
Status: DISCONTINUED | OUTPATIENT
Start: 2023-01-01 | End: 2023-01-01

## 2023-01-01 RX ORDER — FUROSEMIDE 10 MG/ML
40 INJECTION INTRAMUSCULAR; INTRAVENOUS EVERY 12 HOURS
Status: DISCONTINUED | OUTPATIENT
Start: 2023-01-01 | End: 2023-01-01

## 2023-01-01 RX ORDER — OXYCODONE HYDROCHLORIDE 5 MG/1
10 TABLET ORAL EVERY 4 HOURS PRN
Status: DISCONTINUED | OUTPATIENT
Start: 2023-01-01 | End: 2023-01-01

## 2023-01-01 RX ORDER — POTASSIUM CHLORIDE 1500 MG/1
20 TABLET, EXTENDED RELEASE ORAL ONCE
Status: COMPLETED | OUTPATIENT
Start: 2023-01-01 | End: 2023-01-01

## 2023-01-01 RX ADMIN — FOSPHENYTOIN SODIUM 100 MG PE: 50 INJECTION INTRAMUSCULAR; INTRAVENOUS at 08:20

## 2023-01-01 RX ADMIN — LACOSAMIDE 200 MG: 10 INJECTION INTRAVENOUS at 22:27

## 2023-01-01 RX ADMIN — SODIUM CHLORIDE: 9 INJECTION, SOLUTION INTRAVENOUS at 07:33

## 2023-01-01 RX ADMIN — Medication 60 ML: at 15:06

## 2023-01-01 RX ADMIN — HYDROMORPHONE HYDROCHLORIDE 0.5 MG: 1 INJECTION, SOLUTION INTRAMUSCULAR; INTRAVENOUS; SUBCUTANEOUS at 06:24

## 2023-01-01 RX ADMIN — MICONAZOLE NITRATE: 2 POWDER TOPICAL at 09:56

## 2023-01-01 RX ADMIN — LACOSAMIDE 200 MG: 10 INJECTION INTRAVENOUS at 21:39

## 2023-01-01 RX ADMIN — ASPIRIN 325 MG: 325 TABLET, COATED ORAL at 09:04

## 2023-01-01 RX ADMIN — LEVETIRACETAM 1500 MG: 100 SOLUTION ORAL at 20:48

## 2023-01-01 RX ADMIN — MORPHINE SULFATE 5 MG: 100 SOLUTION ORAL at 01:00

## 2023-01-01 RX ADMIN — Medication 10 MG: at 02:12

## 2023-01-01 RX ADMIN — ACETAMINOPHEN 650 MG: 325 TABLET, FILM COATED ORAL at 06:14

## 2023-01-01 RX ADMIN — MICONAZOLE NITRATE: 2 POWDER TOPICAL at 20:03

## 2023-01-01 RX ADMIN — CEFEPIME HYDROCHLORIDE 1 G: 1 INJECTION, POWDER, FOR SOLUTION INTRAMUSCULAR; INTRAVENOUS at 04:15

## 2023-01-01 RX ADMIN — LACOSAMIDE ORAL SOLUTION 250 MG: 10 SOLUTION ORAL at 20:58

## 2023-01-01 RX ADMIN — LACOSAMIDE ORAL SOLUTION 250 MG: 10 SOLUTION ORAL at 09:51

## 2023-01-01 RX ADMIN — ACETAMINOPHEN 650 MG: 325 TABLET ORAL at 19:47

## 2023-01-01 RX ADMIN — VANCOMYCIN HYDROCHLORIDE 1000 MG: 1 INJECTION, SOLUTION INTRAVENOUS at 15:39

## 2023-01-01 RX ADMIN — LORAZEPAM 0.5 MG: 0.5 TABLET ORAL at 08:08

## 2023-01-01 RX ADMIN — Medication 60 ML: at 17:07

## 2023-01-01 RX ADMIN — LORAZEPAM 2 MG: 2 INJECTION INTRAMUSCULAR; INTRAVENOUS at 12:47

## 2023-01-01 RX ADMIN — FOSPHENYTOIN SODIUM 100 MG PE: 50 INJECTION INTRAMUSCULAR; INTRAVENOUS at 14:39

## 2023-01-01 RX ADMIN — HEPARIN SODIUM 5000 UNITS: 5000 INJECTION, SOLUTION INTRAVENOUS; SUBCUTANEOUS at 16:17

## 2023-01-01 RX ADMIN — PROPOFOL 50 MG: 10 INJECTION, EMULSION INTRAVENOUS at 13:49

## 2023-01-01 RX ADMIN — VALPROIC ACID 250 MG: 250 SOLUTION ORAL at 00:20

## 2023-01-01 RX ADMIN — LEVETIRACETAM 1500 MG: 100 INJECTION, SOLUTION INTRAVENOUS at 16:22

## 2023-01-01 RX ADMIN — MORPHINE SULFATE 10 MG: 100 SOLUTION ORAL at 05:55

## 2023-01-01 RX ADMIN — ACETAMINOPHEN 650 MG: 650 SUPPOSITORY RECTAL at 05:40

## 2023-01-01 RX ADMIN — CLOBAZAM 20 MG: 2.5 SUSPENSION ORAL at 09:16

## 2023-01-01 RX ADMIN — LEVETIRACETAM 1500 MG: 100 SOLUTION ORAL at 10:12

## 2023-01-01 RX ADMIN — Medication 10 MG: at 17:19

## 2023-01-01 RX ADMIN — HEPARIN SODIUM 5000 UNITS: 5000 INJECTION, SOLUTION INTRAVENOUS; SUBCUTANEOUS at 08:15

## 2023-01-01 RX ADMIN — Medication 10 MG: at 15:39

## 2023-01-01 RX ADMIN — POTASSIUM CHLORIDE 10 MEQ: 7.46 INJECTION, SOLUTION INTRAVENOUS at 18:41

## 2023-01-01 RX ADMIN — DIPHENHYDRAMINE HYDROCHLORIDE 50 MG: 25 SOLUTION ORAL at 21:11

## 2023-01-01 RX ADMIN — Medication 10 MG: at 02:26

## 2023-01-01 RX ADMIN — POLYETHYLENE GLYCOL 3350 17 G: 17 POWDER, FOR SOLUTION ORAL at 08:14

## 2023-01-01 RX ADMIN — HEPARIN SODIUM 5000 UNITS: 5000 INJECTION, SOLUTION INTRAVENOUS; SUBCUTANEOUS at 04:13

## 2023-01-01 RX ADMIN — VALPROIC ACID 250 MG: 250 SOLUTION ORAL at 09:56

## 2023-01-01 RX ADMIN — IPRATROPIUM BROMIDE AND ALBUTEROL SULFATE 3 ML: .5; 3 SOLUTION RESPIRATORY (INHALATION) at 12:09

## 2023-01-01 RX ADMIN — OXYCODONE HYDROCHLORIDE 5 MG: 5 TABLET ORAL at 17:33

## 2023-01-01 RX ADMIN — VALPROIC ACID 250 MG: 250 SOLUTION ORAL at 00:40

## 2023-01-01 RX ADMIN — VALPROIC ACID 250 MG: 250 SOLUTION ORAL at 10:10

## 2023-01-01 RX ADMIN — VALPROIC ACID 250 MG: 250 SOLUTION ORAL at 23:40

## 2023-01-01 RX ADMIN — MIDAZOLAM HYDROCHLORIDE 12 MG/HR: 1 INJECTION, SOLUTION INTRAVENOUS at 16:08

## 2023-01-01 RX ADMIN — SODIUM CHLORIDE 1000 MG: 9 INJECTION, SOLUTION INTRAVENOUS at 08:11

## 2023-01-01 RX ADMIN — FUROSEMIDE 40 MG: 10 INJECTION, SOLUTION INTRAMUSCULAR; INTRAVENOUS at 12:28

## 2023-01-01 RX ADMIN — VALPROIC ACID 250 MG: 250 SOLUTION ORAL at 00:06

## 2023-01-01 RX ADMIN — FENTANYL CITRATE 100 MCG: 50 INJECTION INTRAMUSCULAR; INTRAVENOUS at 13:49

## 2023-01-01 RX ADMIN — IOPAMIDOL 120 ML: 755 INJECTION, SOLUTION INTRAVENOUS at 16:32

## 2023-01-01 RX ADMIN — CLOBAZAM 20 MG: 2.5 SUSPENSION ORAL at 20:55

## 2023-01-01 RX ADMIN — LORAZEPAM 2 MG: 2 INJECTION INTRAMUSCULAR; INTRAVENOUS at 12:21

## 2023-01-01 RX ADMIN — Medication 60 ML: at 12:08

## 2023-01-01 RX ADMIN — HEPARIN SODIUM 5000 UNITS: 5000 INJECTION, SOLUTION INTRAVENOUS; SUBCUTANEOUS at 08:33

## 2023-01-01 RX ADMIN — POTASSIUM & SODIUM PHOSPHATES POWDER PACK 280-160-250 MG 2 PACKET: 280-160-250 PACK at 16:47

## 2023-01-01 RX ADMIN — FOSPHENYTOIN SODIUM 100 MG PE: 50 INJECTION INTRAMUSCULAR; INTRAVENOUS at 00:24

## 2023-01-01 RX ADMIN — HYDRALAZINE HYDROCHLORIDE 5 MG: 20 INJECTION INTRAMUSCULAR; INTRAVENOUS at 17:08

## 2023-01-01 RX ADMIN — LORAZEPAM 2 MG: 2 INJECTION INTRAMUSCULAR; INTRAVENOUS at 02:58

## 2023-01-01 RX ADMIN — CEFEPIME HYDROCHLORIDE 2 G: 2 INJECTION, POWDER, FOR SOLUTION INTRAVENOUS at 06:24

## 2023-01-01 RX ADMIN — HEPARIN SODIUM 5000 UNITS: 5000 INJECTION, SOLUTION INTRAVENOUS; SUBCUTANEOUS at 05:52

## 2023-01-01 RX ADMIN — MIDAZOLAM HYDROCHLORIDE 5 MG/HR: 1 INJECTION, SOLUTION INTRAVENOUS at 10:05

## 2023-01-01 RX ADMIN — FOSPHENYTOIN SODIUM 100 MG PE: 50 INJECTION INTRAMUSCULAR; INTRAVENOUS at 00:19

## 2023-01-01 RX ADMIN — LACOSAMIDE 150 MG: 10 INJECTION INTRAVENOUS at 22:23

## 2023-01-01 RX ADMIN — CHLORHEXIDINE GLUCONATE 15 ML: 1.2 SOLUTION ORAL at 07:48

## 2023-01-01 RX ADMIN — VALPROIC ACID 250 MG: 250 SOLUTION ORAL at 16:58

## 2023-01-01 RX ADMIN — CLOBAZAM 20 MG: 2.5 SUSPENSION ORAL at 09:29

## 2023-01-01 RX ADMIN — SODIUM CHLORIDE: 9 INJECTION, SOLUTION INTRAVENOUS at 08:51

## 2023-01-01 RX ADMIN — DEXTROSE MONOHYDRATE: 50 INJECTION, SOLUTION INTRAVENOUS at 14:36

## 2023-01-01 RX ADMIN — CLOBAZAM 10 MG: 2.5 SUSPENSION ORAL at 09:09

## 2023-01-01 RX ADMIN — VALPROIC ACID 250 MG: 250 SOLUTION ORAL at 16:06

## 2023-01-01 RX ADMIN — HEPARIN SODIUM 5000 UNITS: 5000 INJECTION, SOLUTION INTRAVENOUS; SUBCUTANEOUS at 15:48

## 2023-01-01 RX ADMIN — LACOSAMIDE 200 MG: 10 INJECTION INTRAVENOUS at 12:16

## 2023-01-01 RX ADMIN — LORAZEPAM 2 MG: 2 INJECTION INTRAMUSCULAR; INTRAVENOUS at 05:31

## 2023-01-01 RX ADMIN — CHLORHEXIDINE GLUCONATE 15 ML: 1.2 SOLUTION ORAL at 19:51

## 2023-01-01 RX ADMIN — Medication 15 ML: at 08:00

## 2023-01-01 RX ADMIN — SODIUM CHLORIDE 900 MG PE: 9 INJECTION, SOLUTION INTRAVENOUS at 18:35

## 2023-01-01 RX ADMIN — DIPHENHYDRAMINE HYDROCHLORIDE 50 MG: 25 SOLUTION ORAL at 20:45

## 2023-01-01 RX ADMIN — Medication 10 MG: at 08:09

## 2023-01-01 RX ADMIN — ESCITALOPRAM OXALATE 20 MG: 10 TABLET, FILM COATED ORAL at 08:08

## 2023-01-01 RX ADMIN — Medication 15 ML: at 08:16

## 2023-01-01 RX ADMIN — DEXTROSE MONOHYDRATE: 50 INJECTION, SOLUTION INTRAVENOUS at 22:14

## 2023-01-01 RX ADMIN — CHLORHEXIDINE GLUCONATE 15 ML: 1.2 SOLUTION ORAL at 08:29

## 2023-01-01 RX ADMIN — Medication 40 MG: at 08:11

## 2023-01-01 RX ADMIN — LORAZEPAM 4 MG: 2 INJECTION INTRAMUSCULAR; INTRAVENOUS at 08:29

## 2023-01-01 RX ADMIN — DIPHENHYDRAMINE HYDROCHLORIDE 50 MG: 25 SOLUTION ORAL at 08:29

## 2023-01-01 RX ADMIN — SENNOSIDES AND DOCUSATE SODIUM 1 TABLET: 50; 8.6 TABLET ORAL at 20:42

## 2023-01-01 RX ADMIN — VALPROIC ACID 250 MG: 250 SOLUTION ORAL at 23:56

## 2023-01-01 RX ADMIN — Medication 40 MG: at 06:34

## 2023-01-01 RX ADMIN — HYDROXYZINE HYDROCHLORIDE 25 MG: 25 TABLET, FILM COATED ORAL at 15:48

## 2023-01-01 RX ADMIN — DEXTROSE MONOHYDRATE: 50 INJECTION, SOLUTION INTRAVENOUS at 10:10

## 2023-01-01 RX ADMIN — ATENOLOL 25 MG: 25 TABLET ORAL at 09:06

## 2023-01-01 RX ADMIN — LORAZEPAM 2 MG: 2 CONCENTRATE ORAL at 23:40

## 2023-01-01 RX ADMIN — HEPARIN SODIUM 5000 UNITS: 5000 INJECTION, SOLUTION INTRAVENOUS; SUBCUTANEOUS at 14:02

## 2023-01-01 RX ADMIN — MORPHINE SULFATE 10 MG: 100 SOLUTION ORAL at 06:15

## 2023-01-01 RX ADMIN — MORPHINE SULFATE 10 MG: 100 SOLUTION ORAL at 23:42

## 2023-01-01 RX ADMIN — IOPAMIDOL 45 ML: 755 INJECTION, SOLUTION INTRAVENOUS at 16:38

## 2023-01-01 RX ADMIN — LACOSAMIDE 200 MG: 10 INJECTION INTRAVENOUS at 22:58

## 2023-01-01 RX ADMIN — CHLORHEXIDINE GLUCONATE 15 ML: 1.2 SOLUTION ORAL at 07:51

## 2023-01-01 RX ADMIN — HYDROXYZINE HYDROCHLORIDE 25 MG: 25 TABLET, FILM COATED ORAL at 07:49

## 2023-01-01 RX ADMIN — CHLORHEXIDINE GLUCONATE 15 ML: 1.2 SOLUTION ORAL at 08:14

## 2023-01-01 RX ADMIN — VALPROIC ACID 250 MG: 250 SOLUTION ORAL at 00:47

## 2023-01-01 RX ADMIN — LORAZEPAM 2 MG: 2 INJECTION INTRAMUSCULAR; INTRAVENOUS at 10:43

## 2023-01-01 RX ADMIN — MICONAZOLE NITRATE: 2 POWDER TOPICAL at 21:15

## 2023-01-01 RX ADMIN — SODIUM CHLORIDE, POTASSIUM CHLORIDE, SODIUM LACTATE AND CALCIUM CHLORIDE 1000 ML: 600; 310; 30; 20 INJECTION, SOLUTION INTRAVENOUS at 02:36

## 2023-01-01 RX ADMIN — DIPHENHYDRAMINE HYDROCHLORIDE 50 MG: 25 CAPSULE ORAL at 13:32

## 2023-01-01 RX ADMIN — MORPHINE SULFATE 5 MG: 100 SOLUTION ORAL at 00:50

## 2023-01-01 RX ADMIN — Medication 60 ML: at 15:37

## 2023-01-01 RX ADMIN — Medication 40 MG: at 08:00

## 2023-01-01 RX ADMIN — VANCOMYCIN HYDROCHLORIDE 1000 MG: 1 INJECTION, SOLUTION INTRAVENOUS at 01:49

## 2023-01-01 RX ADMIN — HEPARIN SODIUM 5000 UNITS: 5000 INJECTION, SOLUTION INTRAVENOUS; SUBCUTANEOUS at 08:24

## 2023-01-01 RX ADMIN — CEFEPIME HYDROCHLORIDE 1 G: 1 INJECTION, POWDER, FOR SOLUTION INTRAMUSCULAR; INTRAVENOUS at 20:19

## 2023-01-01 RX ADMIN — ACETAMINOPHEN 650 MG: 325 TABLET, FILM COATED ORAL at 15:37

## 2023-01-01 RX ADMIN — LORAZEPAM 0.25 MG: 0.5 TABLET ORAL at 23:08

## 2023-01-01 RX ADMIN — LORAZEPAM 2 MG: 2 INJECTION INTRAMUSCULAR; INTRAVENOUS at 00:21

## 2023-01-01 RX ADMIN — VALPROIC ACID 250 MG: 250 SOLUTION ORAL at 16:46

## 2023-01-01 RX ADMIN — NOREPINEPHRINE BITARTRATE 0.03 MCG/KG/MIN: 0.02 INJECTION, SOLUTION INTRAVENOUS at 02:28

## 2023-01-01 RX ADMIN — LACOSAMIDE ORAL SOLUTION 250 MG: 10 SOLUTION ORAL at 22:13

## 2023-01-01 RX ADMIN — DEXTROSE AND SODIUM CHLORIDE 1000 ML: 5; 200 INJECTION, SOLUTION INTRAVENOUS at 10:38

## 2023-01-01 RX ADMIN — DIPHENHYDRAMINE HYDROCHLORIDE 50 MG: 25 SOLUTION ORAL at 08:01

## 2023-01-01 RX ADMIN — NIFEDIPINE 30 MG: 30 TABLET, FILM COATED, EXTENDED RELEASE ORAL at 08:49

## 2023-01-01 RX ADMIN — VALPROIC ACID 250 MG: 250 SOLUTION ORAL at 00:04

## 2023-01-01 RX ADMIN — Medication 10 MG: at 14:09

## 2023-01-01 RX ADMIN — HEPARIN SODIUM 5000 UNITS: 5000 INJECTION, SOLUTION INTRAVENOUS; SUBCUTANEOUS at 00:48

## 2023-01-01 RX ADMIN — SODIUM CHLORIDE: 9 INJECTION, SOLUTION INTRAVENOUS at 17:38

## 2023-01-01 RX ADMIN — LORAZEPAM 2 MG: 2 CONCENTRATE ORAL at 12:22

## 2023-01-01 RX ADMIN — CLOBAZAM 20 MG: 2.5 SUSPENSION ORAL at 08:52

## 2023-01-01 RX ADMIN — ACETAMINOPHEN 650 MG: 325 TABLET, FILM COATED ORAL at 10:41

## 2023-01-01 RX ADMIN — LACOSAMIDE ORAL SOLUTION 300 MG: 10 SOLUTION ORAL at 20:58

## 2023-01-01 RX ADMIN — SODIUM PHOSPHATE, MONOBASIC, MONOHYDRATE AND SODIUM PHOSPHATE, DIBASIC, ANHYDROUS 15 MMOL: 142; 276 INJECTION, SOLUTION INTRAVENOUS at 06:08

## 2023-01-01 RX ADMIN — HYDROMORPHONE HYDROCHLORIDE 0.5 MG: 1 INJECTION, SOLUTION INTRAMUSCULAR; INTRAVENOUS; SUBCUTANEOUS at 20:12

## 2023-01-01 RX ADMIN — OLANZAPINE 5 MG: 5 TABLET, ORALLY DISINTEGRATING ORAL at 01:21

## 2023-01-01 RX ADMIN — LEVETIRACETAM 1500 MG: 100 INJECTION, SOLUTION INTRAVENOUS at 15:47

## 2023-01-01 RX ADMIN — HEPARIN SODIUM 5000 UNITS: 5000 INJECTION, SOLUTION INTRAVENOUS; SUBCUTANEOUS at 14:18

## 2023-01-01 RX ADMIN — LEVETIRACETAM 1500 MG: 100 INJECTION, SOLUTION INTRAVENOUS at 05:05

## 2023-01-01 RX ADMIN — CLOBAZAM 20 MG: 2.5 SUSPENSION ORAL at 09:10

## 2023-01-01 RX ADMIN — DEXTROSE MONOHYDRATE: 50 INJECTION, SOLUTION INTRAVENOUS at 11:21

## 2023-01-01 RX ADMIN — CLOBAZAM 20 MG: 2.5 SUSPENSION ORAL at 08:34

## 2023-01-01 RX ADMIN — ACETAMINOPHEN 650 MG: 325 TABLET ORAL at 11:15

## 2023-01-01 RX ADMIN — HEPARIN SODIUM 5000 UNITS: 5000 INJECTION, SOLUTION INTRAVENOUS; SUBCUTANEOUS at 13:21

## 2023-01-01 RX ADMIN — LORAZEPAM 2 MG: 2 INJECTION INTRAMUSCULAR; INTRAVENOUS at 10:15

## 2023-01-01 RX ADMIN — PROPOFOL 60 MCG/KG/MIN: 10 INJECTION, EMULSION INTRAVENOUS at 14:31

## 2023-01-01 RX ADMIN — PROPOFOL 40 MG: 10 INJECTION, EMULSION INTRAVENOUS at 07:33

## 2023-01-01 RX ADMIN — HEPARIN SODIUM 5000 UNITS: 5000 INJECTION, SOLUTION INTRAVENOUS; SUBCUTANEOUS at 02:29

## 2023-01-01 RX ADMIN — VALPROIC ACID 250 MG: 250 SOLUTION ORAL at 15:22

## 2023-01-01 RX ADMIN — HYDROXYZINE HYDROCHLORIDE 25 MG: 25 TABLET, FILM COATED ORAL at 16:16

## 2023-01-01 RX ADMIN — POLYETHYLENE GLYCOL 3350 17 G: 17 POWDER, FOR SOLUTION ORAL at 20:01

## 2023-01-01 RX ADMIN — Medication 40 MG: at 06:28

## 2023-01-01 RX ADMIN — IOPAMIDOL 48 ML: 755 INJECTION, SOLUTION INTRAVENOUS at 18:46

## 2023-01-01 RX ADMIN — SODIUM CHLORIDE, POTASSIUM CHLORIDE, SODIUM LACTATE AND CALCIUM CHLORIDE: 600; 310; 30; 20 INJECTION, SOLUTION INTRAVENOUS at 19:54

## 2023-01-01 RX ADMIN — Medication 60 ML: at 16:58

## 2023-01-01 RX ADMIN — LEVETIRACETAM 1500 MG: 100 INJECTION, SOLUTION INTRAVENOUS at 05:22

## 2023-01-01 RX ADMIN — FUROSEMIDE 40 MG: 40 TABLET ORAL at 16:59

## 2023-01-01 RX ADMIN — HYDROMORPHONE HYDROCHLORIDE 0.5 MG: 1 INJECTION, SOLUTION INTRAMUSCULAR; INTRAVENOUS; SUBCUTANEOUS at 03:16

## 2023-01-01 RX ADMIN — LORAZEPAM 2 MG: 2 INJECTION INTRAMUSCULAR; INTRAVENOUS at 09:10

## 2023-01-01 RX ADMIN — VALPROIC ACID 250 MG: 250 SOLUTION ORAL at 00:35

## 2023-01-01 RX ADMIN — MORPHINE SULFATE 10 MG: 100 SOLUTION ORAL at 23:57

## 2023-01-01 RX ADMIN — CLOBAZAM 20 MG: 2.5 SUSPENSION ORAL at 09:26

## 2023-01-01 RX ADMIN — HEPARIN SODIUM 5000 UNITS: 5000 INJECTION, SOLUTION INTRAVENOUS; SUBCUTANEOUS at 17:10

## 2023-01-01 RX ADMIN — FOSPHENYTOIN SODIUM 100 MG PE: 50 INJECTION INTRAMUSCULAR; INTRAVENOUS at 06:29

## 2023-01-01 RX ADMIN — CEFTRIAXONE SODIUM 2 G: 2 INJECTION, POWDER, FOR SOLUTION INTRAMUSCULAR; INTRAVENOUS at 22:14

## 2023-01-01 RX ADMIN — HEPARIN SODIUM 5000 UNITS: 5000 INJECTION, SOLUTION INTRAVENOUS; SUBCUTANEOUS at 21:18

## 2023-01-01 RX ADMIN — FUROSEMIDE 40 MG: 10 INJECTION, SOLUTION INTRAMUSCULAR; INTRAVENOUS at 00:00

## 2023-01-01 RX ADMIN — LORAZEPAM 2 MG: 2 CONCENTRATE ORAL at 18:47

## 2023-01-01 RX ADMIN — FOSPHENYTOIN SODIUM 100 MG PE: 50 INJECTION INTRAMUSCULAR; INTRAVENOUS at 14:07

## 2023-01-01 RX ADMIN — LACOSAMIDE ORAL SOLUTION 300 MG: 10 SOLUTION ORAL at 09:50

## 2023-01-01 RX ADMIN — Medication 15 ML: at 09:15

## 2023-01-01 RX ADMIN — LACOSAMIDE 150 MG: 10 INJECTION INTRAVENOUS at 12:27

## 2023-01-01 RX ADMIN — ACETAMINOPHEN 650 MG: 325 TABLET ORAL at 08:08

## 2023-01-01 RX ADMIN — LACOSAMIDE 200 MG: 10 INJECTION INTRAVENOUS at 22:16

## 2023-01-01 RX ADMIN — MORPHINE SULFATE 10 MG: 100 SOLUTION ORAL at 17:33

## 2023-01-01 RX ADMIN — LEVETIRACETAM 1500 MG: 100 SOLUTION ORAL at 08:41

## 2023-01-01 RX ADMIN — CLOBAZAM 20 MG: 2.5 SUSPENSION ORAL at 21:17

## 2023-01-01 RX ADMIN — CEFTRIAXONE SODIUM 1 G: 1 INJECTION, POWDER, FOR SOLUTION INTRAMUSCULAR; INTRAVENOUS at 05:06

## 2023-01-01 RX ADMIN — CLOBAZAM 20 MG: 2.5 SUSPENSION ORAL at 22:12

## 2023-01-01 RX ADMIN — LACOSAMIDE 100 MG: 10 INJECTION INTRAVENOUS at 22:31

## 2023-01-01 RX ADMIN — POTASSIUM & SODIUM PHOSPHATES POWDER PACK 280-160-250 MG 2 PACKET: 280-160-250 PACK at 13:24

## 2023-01-01 RX ADMIN — Medication 15 ML: at 09:08

## 2023-01-01 RX ADMIN — LEVETIRACETAM 1500 MG: 100 INJECTION, SOLUTION INTRAVENOUS at 15:58

## 2023-01-01 RX ADMIN — Medication 15 ML: at 08:39

## 2023-01-01 RX ADMIN — VALPROIC ACID 250 MG: 250 SOLUTION ORAL at 15:55

## 2023-01-01 RX ADMIN — FOSPHENYTOIN SODIUM 100 MG PE: 50 INJECTION INTRAMUSCULAR; INTRAVENOUS at 08:17

## 2023-01-01 RX ADMIN — Medication 10 MG: at 03:25

## 2023-01-01 RX ADMIN — SENNOSIDES AND DOCUSATE SODIUM 1 TABLET: 50; 8.6 TABLET ORAL at 09:04

## 2023-01-01 RX ADMIN — Medication 15 ML: at 08:31

## 2023-01-01 RX ADMIN — Medication 10 MG: at 20:17

## 2023-01-01 RX ADMIN — LORAZEPAM 2 MG: 2 CONCENTRATE ORAL at 05:44

## 2023-01-01 RX ADMIN — Medication 15 ML: at 08:58

## 2023-01-01 RX ADMIN — CLOBAZAM 20 MG: 2.5 SUSPENSION ORAL at 09:44

## 2023-01-01 RX ADMIN — CLOBAZAM 20 MG: 2.5 SUSPENSION ORAL at 20:45

## 2023-01-01 RX ADMIN — DIPHENHYDRAMINE HYDROCHLORIDE 50 MG: 25 SOLUTION ORAL at 20:22

## 2023-01-01 RX ADMIN — LACOSAMIDE 200 MG: 10 INJECTION INTRAVENOUS at 22:10

## 2023-01-01 RX ADMIN — MICONAZOLE NITRATE: 2 POWDER TOPICAL at 10:47

## 2023-01-01 RX ADMIN — ACETAMINOPHEN 650 MG: 325 TABLET, FILM COATED ORAL at 20:09

## 2023-01-01 RX ADMIN — GADOBUTROL 5 ML: 604.72 INJECTION INTRAVENOUS at 02:00

## 2023-01-01 RX ADMIN — LORAZEPAM 0.5 MG: 0.5 TABLET ORAL at 08:24

## 2023-01-01 RX ADMIN — HEPARIN SODIUM 5000 UNITS: 5000 INJECTION, SOLUTION INTRAVENOUS; SUBCUTANEOUS at 00:15

## 2023-01-01 RX ADMIN — Medication 60 ML: at 16:31

## 2023-01-01 RX ADMIN — HEPARIN SODIUM 5000 UNITS: 5000 INJECTION, SOLUTION INTRAVENOUS; SUBCUTANEOUS at 14:00

## 2023-01-01 RX ADMIN — VALPROIC ACID 250 MG: 250 SOLUTION ORAL at 16:35

## 2023-01-01 RX ADMIN — HYDROXYZINE HYDROCHLORIDE 25 MG: 25 TABLET, FILM COATED ORAL at 16:02

## 2023-01-01 RX ADMIN — POLYETHYLENE GLYCOL 3350 17 G: 17 POWDER, FOR SOLUTION ORAL at 21:52

## 2023-01-01 RX ADMIN — HEPARIN SODIUM 5000 UNITS: 5000 INJECTION, SOLUTION INTRAVENOUS; SUBCUTANEOUS at 08:55

## 2023-01-01 RX ADMIN — FOSPHENYTOIN SODIUM 100 MG PE: 50 INJECTION INTRAMUSCULAR; INTRAVENOUS at 23:14

## 2023-01-01 RX ADMIN — CLOBAZAM 20 MG: 2.5 SUSPENSION ORAL at 22:16

## 2023-01-01 RX ADMIN — LORAZEPAM 1 MG: 2 INJECTION INTRAMUSCULAR; INTRAVENOUS at 13:56

## 2023-01-01 RX ADMIN — DIPHENHYDRAMINE HYDROCHLORIDE 50 MG: 25 SOLUTION ORAL at 09:00

## 2023-01-01 RX ADMIN — HEPARIN SODIUM 5000 UNITS: 5000 INJECTION, SOLUTION INTRAVENOUS; SUBCUTANEOUS at 15:42

## 2023-01-01 RX ADMIN — HEPARIN SODIUM 5000 UNITS: 5000 INJECTION, SOLUTION INTRAVENOUS; SUBCUTANEOUS at 21:14

## 2023-01-01 RX ADMIN — MIDAZOLAM 2 MG: 1 INJECTION INTRAMUSCULAR; INTRAVENOUS at 11:54

## 2023-01-01 RX ADMIN — LEVETIRACETAM 1500 MG: 100 SOLUTION ORAL at 08:12

## 2023-01-01 RX ADMIN — CHLORHEXIDINE GLUCONATE 15 ML: 1.2 SOLUTION ORAL at 19:45

## 2023-01-01 RX ADMIN — HEPARIN SODIUM 5000 UNITS: 5000 INJECTION, SOLUTION INTRAVENOUS; SUBCUTANEOUS at 00:13

## 2023-01-01 RX ADMIN — FENTANYL CITRATE 50 MCG: 50 INJECTION INTRAMUSCULAR; INTRAVENOUS at 08:19

## 2023-01-01 RX ADMIN — LACOSAMIDE ORAL SOLUTION 250 MG: 10 SOLUTION ORAL at 09:44

## 2023-01-01 RX ADMIN — HYDROMORPHONE HYDROCHLORIDE 0.5 MG: 1 INJECTION, SOLUTION INTRAMUSCULAR; INTRAVENOUS; SUBCUTANEOUS at 22:05

## 2023-01-01 RX ADMIN — DEXTROSE MONOHYDRATE: 50 INJECTION, SOLUTION INTRAVENOUS at 23:33

## 2023-01-01 RX ADMIN — Medication 40 MG: at 08:07

## 2023-01-01 RX ADMIN — POTASSIUM CHLORIDE 40 MEQ: 20 SOLUTION ORAL at 15:42

## 2023-01-01 RX ADMIN — MICONAZOLE NITRATE: 2 POWDER TOPICAL at 08:25

## 2023-01-01 RX ADMIN — DIPHENHYDRAMINE HYDROCHLORIDE 50 MG: 25 SOLUTION ORAL at 08:22

## 2023-01-01 RX ADMIN — LORAZEPAM 2 MG: 2 INJECTION INTRAMUSCULAR; INTRAVENOUS at 06:15

## 2023-01-01 RX ADMIN — LACOSAMIDE 200 MG: 10 INJECTION INTRAVENOUS at 13:01

## 2023-01-01 RX ADMIN — LEVETIRACETAM 1500 MG: 100 INJECTION, SOLUTION INTRAVENOUS at 17:02

## 2023-01-01 RX ADMIN — LORAZEPAM 2 MG: 2 CONCENTRATE ORAL at 21:22

## 2023-01-01 RX ADMIN — SODIUM CHLORIDE: 9 INJECTION, SOLUTION INTRAVENOUS at 00:07

## 2023-01-01 RX ADMIN — LORAZEPAM 2 MG: 2 INJECTION INTRAMUSCULAR; INTRAVENOUS at 22:44

## 2023-01-01 RX ADMIN — CLOBAZAM 20 MG: 2.5 SUSPENSION ORAL at 22:13

## 2023-01-01 RX ADMIN — LACOSAMIDE ORAL SOLUTION 300 MG: 10 SOLUTION ORAL at 09:35

## 2023-01-01 RX ADMIN — FUROSEMIDE 40 MG: 40 TABLET ORAL at 08:14

## 2023-01-01 RX ADMIN — HEPARIN SODIUM 5000 UNITS: 5000 INJECTION, SOLUTION INTRAVENOUS; SUBCUTANEOUS at 08:58

## 2023-01-01 RX ADMIN — LACOSAMIDE 200 MG: 10 INJECTION INTRAVENOUS at 21:50

## 2023-01-01 RX ADMIN — LORAZEPAM 0.25 MG: 0.5 TABLET ORAL at 20:44

## 2023-01-01 RX ADMIN — Medication 60 ML: at 15:48

## 2023-01-01 RX ADMIN — LIDOCAINE HYDROCHLORIDE 3 ML: 10 INJECTION, SOLUTION EPIDURAL; INFILTRATION; INTRACAUDAL; PERINEURAL at 14:13

## 2023-01-01 RX ADMIN — PERPHENAZINE 8 MG: 4 TABLET, FILM COATED ORAL at 20:43

## 2023-01-01 RX ADMIN — FOSPHENYTOIN SODIUM 200 MG PE: 50 INJECTION INTRAMUSCULAR; INTRAVENOUS at 23:32

## 2023-01-01 RX ADMIN — CLOBAZAM 20 MG: 2.5 SUSPENSION ORAL at 09:52

## 2023-01-01 RX ADMIN — Medication 15 ML: at 08:08

## 2023-01-01 RX ADMIN — ACETAMINOPHEN 650 MG: 650 SUPPOSITORY RECTAL at 23:40

## 2023-01-01 RX ADMIN — HEPARIN SODIUM 5000 UNITS: 5000 INJECTION, SOLUTION INTRAVENOUS; SUBCUTANEOUS at 13:58

## 2023-01-01 RX ADMIN — ESCITALOPRAM OXALATE 20 MG: 10 TABLET, FILM COATED ORAL at 09:16

## 2023-01-01 RX ADMIN — LORAZEPAM 2 MG: 2 CONCENTRATE ORAL at 20:48

## 2023-01-01 RX ADMIN — MORPHINE SULFATE 10 MG: 100 SOLUTION ORAL at 00:47

## 2023-01-01 RX ADMIN — LORAZEPAM 1 MG: 2 INJECTION INTRAMUSCULAR; INTRAVENOUS at 18:48

## 2023-01-01 RX ADMIN — MICONAZOLE NITRATE: 2 POWDER TOPICAL at 10:11

## 2023-01-01 RX ADMIN — CLOBAZAM 20 MG: 2.5 SUSPENSION ORAL at 09:15

## 2023-01-01 RX ADMIN — CHLORHEXIDINE GLUCONATE 15 ML: 1.2 SOLUTION ORAL at 07:49

## 2023-01-01 RX ADMIN — VALPROIC ACID 250 MG: 250 SOLUTION ORAL at 16:31

## 2023-01-01 RX ADMIN — ATROPINE SULFATE 2 DROP: 10 SOLUTION/ DROPS OPHTHALMIC at 13:51

## 2023-01-01 RX ADMIN — POTASSIUM CHLORIDE 10 MEQ: 7.46 INJECTION, SOLUTION INTRAVENOUS at 14:49

## 2023-01-01 RX ADMIN — LEVETIRACETAM 1500 MG: 100 INJECTION, SOLUTION INTRAVENOUS at 16:44

## 2023-01-01 RX ADMIN — CLOBAZAM 10 MG: 2.5 SUSPENSION ORAL at 09:34

## 2023-01-01 RX ADMIN — FOSPHENYTOIN SODIUM 100 MG PE: 50 INJECTION INTRAMUSCULAR; INTRAVENOUS at 08:14

## 2023-01-01 RX ADMIN — MIDAZOLAM HYDROCHLORIDE 12 MG/HR: 1 INJECTION, SOLUTION INTRAVENOUS at 08:47

## 2023-01-01 RX ADMIN — VALPROIC ACID 250 MG: 250 SOLUTION ORAL at 16:13

## 2023-01-01 RX ADMIN — LEVETIRACETAM 1500 MG: 100 INJECTION, SOLUTION INTRAVENOUS at 04:17

## 2023-01-01 RX ADMIN — CHLORHEXIDINE GLUCONATE 15 ML: 1.2 SOLUTION ORAL at 20:17

## 2023-01-01 RX ADMIN — FUROSEMIDE 40 MG: 10 INJECTION, SOLUTION INTRAMUSCULAR; INTRAVENOUS at 13:02

## 2023-01-01 RX ADMIN — CHLORHEXIDINE GLUCONATE 15 ML: 1.2 SOLUTION ORAL at 07:54

## 2023-01-01 RX ADMIN — POLYETHYLENE GLYCOL 3350 17 G: 17 POWDER, FOR SOLUTION ORAL at 08:08

## 2023-01-01 RX ADMIN — FUROSEMIDE 40 MG: 10 INJECTION, SOLUTION INTRAMUSCULAR; INTRAVENOUS at 23:41

## 2023-01-01 RX ADMIN — MICONAZOLE NITRATE: 2 POWDER TOPICAL at 20:45

## 2023-01-01 RX ADMIN — LORAZEPAM 0.5 MG: 2 INJECTION INTRAMUSCULAR; INTRAVENOUS at 15:54

## 2023-01-01 RX ADMIN — HEPARIN SODIUM 5000 UNITS: 5000 INJECTION, SOLUTION INTRAVENOUS; SUBCUTANEOUS at 07:14

## 2023-01-01 RX ADMIN — LACOSAMIDE ORAL SOLUTION 250 MG: 10 SOLUTION ORAL at 21:49

## 2023-01-01 RX ADMIN — ATROPINE SULFATE 2 DROP: 10 SOLUTION/ DROPS OPHTHALMIC at 09:08

## 2023-01-01 RX ADMIN — PROPOFOL 30 MCG/KG/MIN: 10 INJECTION, EMULSION INTRAVENOUS at 21:23

## 2023-01-01 RX ADMIN — DEXTROSE MONOHYDRATE: 50 INJECTION, SOLUTION INTRAVENOUS at 14:03

## 2023-01-01 RX ADMIN — CHLORHEXIDINE GLUCONATE 15 ML: 1.2 SOLUTION ORAL at 07:41

## 2023-01-01 RX ADMIN — HYDROMORPHONE HYDROCHLORIDE 0.5 MG: 1 INJECTION, SOLUTION INTRAMUSCULAR; INTRAVENOUS; SUBCUTANEOUS at 05:09

## 2023-01-01 RX ADMIN — MICONAZOLE NITRATE: 2 POWDER TOPICAL at 19:39

## 2023-01-01 RX ADMIN — NIFEDIPINE 30 MG: 30 TABLET, FILM COATED, EXTENDED RELEASE ORAL at 08:08

## 2023-01-01 RX ADMIN — VALPROIC ACID 250 MG: 250 SOLUTION ORAL at 08:33

## 2023-01-01 RX ADMIN — Medication 15 ML: at 09:07

## 2023-01-01 RX ADMIN — GLYCOPYRROLATE 0.2 MG: 0.2 INJECTION, SOLUTION INTRAMUSCULAR; INTRAVENOUS at 22:04

## 2023-01-01 RX ADMIN — LORAZEPAM 2 MG: 2 INJECTION INTRAMUSCULAR; INTRAVENOUS at 04:58

## 2023-01-01 RX ADMIN — DIPHENHYDRAMINE HYDROCHLORIDE 25 MG: 50 INJECTION, SOLUTION INTRAMUSCULAR; INTRAVENOUS at 14:17

## 2023-01-01 RX ADMIN — DIAZEPAM 2 MG: 2 TABLET ORAL at 13:12

## 2023-01-01 RX ADMIN — POTASSIUM & SODIUM PHOSPHATES POWDER PACK 280-160-250 MG 2 PACKET: 280-160-250 PACK at 20:44

## 2023-01-01 RX ADMIN — ACYCLOVIR SODIUM 425 MG: 50 INJECTION, SOLUTION INTRAVENOUS at 14:39

## 2023-01-01 RX ADMIN — MIDAZOLAM HYDROCHLORIDE 4 MG/HR: 1 INJECTION, SOLUTION INTRAVENOUS at 16:49

## 2023-01-01 RX ADMIN — LACOSAMIDE ORAL SOLUTION 250 MG: 10 SOLUTION ORAL at 08:38

## 2023-01-01 RX ADMIN — LORAZEPAM 2 MG: 2 INJECTION INTRAMUSCULAR; INTRAVENOUS at 15:42

## 2023-01-01 RX ADMIN — Medication 60 ML: at 17:31

## 2023-01-01 RX ADMIN — MIDAZOLAM HYDROCHLORIDE 6 MG/HR: 1 INJECTION, SOLUTION INTRAVENOUS at 23:41

## 2023-01-01 RX ADMIN — DIAZEPAM 2 MG: 2 TABLET ORAL at 04:09

## 2023-01-01 RX ADMIN — HEPARIN SODIUM 5000 UNITS: 5000 INJECTION, SOLUTION INTRAVENOUS; SUBCUTANEOUS at 14:13

## 2023-01-01 RX ADMIN — LORAZEPAM 2 MG: 2 CONCENTRATE ORAL at 18:57

## 2023-01-01 RX ADMIN — HYDROXYZINE HYDROCHLORIDE 25 MG: 25 TABLET, FILM COATED ORAL at 20:44

## 2023-01-01 RX ADMIN — LORAZEPAM 0.25 MG: 0.5 TABLET ORAL at 21:51

## 2023-01-01 RX ADMIN — VALPROIC ACID 250 MG: 250 SOLUTION ORAL at 23:55

## 2023-01-01 RX ADMIN — LACOSAMIDE 200 MG: 10 INJECTION INTRAVENOUS at 12:23

## 2023-01-01 RX ADMIN — GLYCOPYRROLATE 0.2 MG: 0.2 INJECTION, SOLUTION INTRAMUSCULAR; INTRAVENOUS at 00:35

## 2023-01-01 RX ADMIN — CHLORHEXIDINE GLUCONATE 15 ML: 1.2 SOLUTION ORAL at 20:03

## 2023-01-01 RX ADMIN — VALPROIC ACID 250 MG: 250 SOLUTION ORAL at 00:16

## 2023-01-01 RX ADMIN — MIDAZOLAM HYDROCHLORIDE 12 MG/HR: 1 INJECTION, SOLUTION INTRAVENOUS at 22:15

## 2023-01-01 RX ADMIN — MIDAZOLAM HYDROCHLORIDE 4 MG/HR: 1 INJECTION, SOLUTION INTRAVENOUS at 18:19

## 2023-01-01 RX ADMIN — SENNOSIDES AND DOCUSATE SODIUM 1 TABLET: 50; 8.6 TABLET ORAL at 22:08

## 2023-01-01 RX ADMIN — LACOSAMIDE ORAL SOLUTION 300 MG: 10 SOLUTION ORAL at 09:15

## 2023-01-01 RX ADMIN — VALPROIC ACID 250 MG: 250 SOLUTION ORAL at 09:42

## 2023-01-01 RX ADMIN — CHLORHEXIDINE GLUCONATE 15 ML: 1.2 SOLUTION ORAL at 08:01

## 2023-01-01 RX ADMIN — CEFTRIAXONE SODIUM 1 G: 1 INJECTION, POWDER, FOR SOLUTION INTRAMUSCULAR; INTRAVENOUS at 15:22

## 2023-01-01 RX ADMIN — LACOSAMIDE ORAL SOLUTION 250 MG: 10 SOLUTION ORAL at 21:15

## 2023-01-01 RX ADMIN — ALTEPLASE 2 MG: 2.2 INJECTION, POWDER, LYOPHILIZED, FOR SOLUTION INTRAVENOUS at 21:14

## 2023-01-01 RX ADMIN — Medication 10 MG: at 14:00

## 2023-01-01 RX ADMIN — HYDROXYZINE HYDROCHLORIDE 25 MG: 25 TABLET, FILM COATED ORAL at 09:16

## 2023-01-01 RX ADMIN — LACOSAMIDE 200 MG: 10 INJECTION INTRAVENOUS at 10:57

## 2023-01-01 RX ADMIN — ATENOLOL 25 MG: 25 TABLET ORAL at 08:16

## 2023-01-01 RX ADMIN — LACOSAMIDE ORAL SOLUTION 250 MG: 10 SOLUTION ORAL at 20:48

## 2023-01-01 RX ADMIN — CLOBAZAM 20 MG: 2.5 SUSPENSION ORAL at 09:01

## 2023-01-01 RX ADMIN — CLOBAZAM 20 MG: 2.5 SUSPENSION ORAL at 20:54

## 2023-01-01 RX ADMIN — LEVETIRACETAM 1500 MG: 100 INJECTION, SOLUTION INTRAVENOUS at 03:54

## 2023-01-01 RX ADMIN — LACOSAMIDE ORAL SOLUTION 250 MG: 10 SOLUTION ORAL at 08:36

## 2023-01-01 RX ADMIN — CHLORHEXIDINE GLUCONATE 15 ML: 1.2 SOLUTION ORAL at 20:10

## 2023-01-01 RX ADMIN — SENNOSIDES AND DOCUSATE SODIUM 1 TABLET: 50; 8.6 TABLET ORAL at 20:07

## 2023-01-01 RX ADMIN — CLOBAZAM 10 MG: 2.5 SUSPENSION ORAL at 21:15

## 2023-01-01 RX ADMIN — LEVETIRACETAM 1500 MG: 100 SOLUTION ORAL at 08:29

## 2023-01-01 RX ADMIN — LORAZEPAM 2 MG: 2 INJECTION INTRAMUSCULAR; INTRAVENOUS at 20:36

## 2023-01-01 RX ADMIN — IPRATROPIUM BROMIDE AND ALBUTEROL SULFATE 3 ML: .5; 3 SOLUTION RESPIRATORY (INHALATION) at 07:08

## 2023-01-01 RX ADMIN — CHLORHEXIDINE GLUCONATE 15 ML: 1.2 SOLUTION ORAL at 08:15

## 2023-01-01 RX ADMIN — LORAZEPAM 2 MG: 2 INJECTION INTRAMUSCULAR; INTRAVENOUS at 09:15

## 2023-01-01 RX ADMIN — FOSPHENYTOIN SODIUM 100 MG PE: 50 INJECTION INTRAMUSCULAR; INTRAVENOUS at 07:06

## 2023-01-01 RX ADMIN — LORAZEPAM 2 MG: 2 INJECTION INTRAMUSCULAR; INTRAVENOUS at 13:14

## 2023-01-01 RX ADMIN — MICONAZOLE NITRATE: 2 POWDER TOPICAL at 07:50

## 2023-01-01 RX ADMIN — LEVOFLOXACIN 500 MG: 500 TABLET, FILM COATED ORAL at 15:51

## 2023-01-01 RX ADMIN — Medication 60 ML: at 15:57

## 2023-01-01 RX ADMIN — HEPARIN SODIUM 5000 UNITS: 5000 INJECTION, SOLUTION INTRAVENOUS; SUBCUTANEOUS at 22:16

## 2023-01-01 RX ADMIN — LACOSAMIDE 100 MG: 10 INJECTION INTRAVENOUS at 11:18

## 2023-01-01 RX ADMIN — Medication 60 ML: at 14:26

## 2023-01-01 RX ADMIN — FUROSEMIDE 40 MG: 10 INJECTION, SOLUTION INTRAMUSCULAR; INTRAVENOUS at 13:03

## 2023-01-01 RX ADMIN — HEPARIN SODIUM 5000 UNITS: 5000 INJECTION, SOLUTION INTRAVENOUS; SUBCUTANEOUS at 15:06

## 2023-01-01 RX ADMIN — PERPHENAZINE 8 MG: 4 TABLET, FILM COATED ORAL at 22:30

## 2023-01-01 RX ADMIN — IPRATROPIUM BROMIDE AND ALBUTEROL SULFATE 3 ML: .5; 3 SOLUTION RESPIRATORY (INHALATION) at 18:47

## 2023-01-01 RX ADMIN — MEROPENEM 1 G: 1 INJECTION, POWDER, FOR SOLUTION INTRAVENOUS at 14:05

## 2023-01-01 RX ADMIN — ATROPINE SULFATE 2 DROP: 10 SOLUTION/ DROPS OPHTHALMIC at 08:58

## 2023-01-01 RX ADMIN — VALPROIC ACID 250 MG: 250 SOLUTION ORAL at 15:42

## 2023-01-01 RX ADMIN — DIPHENHYDRAMINE HYDROCHLORIDE 50 MG: 25 SOLUTION ORAL at 08:17

## 2023-01-01 RX ADMIN — MORPHINE SULFATE 10 MG: 100 SOLUTION ORAL at 18:47

## 2023-01-01 RX ADMIN — LORAZEPAM 2 MG: 2 INJECTION INTRAMUSCULAR; INTRAVENOUS at 12:03

## 2023-01-01 RX ADMIN — FENTANYL CITRATE 50 MCG: 50 INJECTION, SOLUTION INTRAMUSCULAR; INTRAVENOUS at 13:13

## 2023-01-01 RX ADMIN — CHLORHEXIDINE GLUCONATE 15 ML: 1.2 SOLUTION ORAL at 08:30

## 2023-01-01 RX ADMIN — LORAZEPAM 2 MG: 2 CONCENTRATE ORAL at 23:57

## 2023-01-01 RX ADMIN — LEVETIRACETAM 1500 MG: 100 SOLUTION ORAL at 21:58

## 2023-01-01 RX ADMIN — Medication 40 MG: at 08:17

## 2023-01-01 RX ADMIN — MIDAZOLAM 2 MG: 1 INJECTION INTRAMUSCULAR; INTRAVENOUS at 12:00

## 2023-01-01 RX ADMIN — MORPHINE SULFATE 5 MG: 100 SOLUTION ORAL at 11:45

## 2023-01-01 RX ADMIN — Medication 40 MG: at 08:24

## 2023-01-01 RX ADMIN — LEVETIRACETAM 1500 MG: 100 INJECTION, SOLUTION INTRAVENOUS at 05:47

## 2023-01-01 RX ADMIN — VALPROIC ACID 250 MG: 250 SOLUTION ORAL at 08:29

## 2023-01-01 RX ADMIN — LORAZEPAM 2 MG: 2 INJECTION INTRAMUSCULAR; INTRAVENOUS at 20:05

## 2023-01-01 RX ADMIN — HYDROXYZINE HYDROCHLORIDE 25 MG: 25 TABLET, FILM COATED ORAL at 09:13

## 2023-01-01 RX ADMIN — VALPROIC ACID 250 MG: 250 SOLUTION ORAL at 00:07

## 2023-01-01 RX ADMIN — CLOBAZAM 20 MG: 2.5 SUSPENSION ORAL at 10:07

## 2023-01-01 RX ADMIN — DIPHENHYDRAMINE HYDROCHLORIDE 50 MG: 25 CAPSULE ORAL at 19:55

## 2023-01-01 RX ADMIN — HYDROXYZINE HYDROCHLORIDE 25 MG: 25 TABLET, FILM COATED ORAL at 21:51

## 2023-01-01 RX ADMIN — FOSPHENYTOIN SODIUM 100 MG PE: 50 INJECTION INTRAMUSCULAR; INTRAVENOUS at 13:44

## 2023-01-01 RX ADMIN — VALPROIC ACID 250 MG: 250 SOLUTION ORAL at 23:27

## 2023-01-01 RX ADMIN — MORPHINE SULFATE 5 MG: 100 SOLUTION ORAL at 17:47

## 2023-01-01 RX ADMIN — LORAZEPAM 2 MG: 2 INJECTION INTRAMUSCULAR; INTRAVENOUS at 16:53

## 2023-01-01 RX ADMIN — LORAZEPAM 2 MG: 2 INJECTION INTRAMUSCULAR; INTRAVENOUS at 04:52

## 2023-01-01 RX ADMIN — HEPARIN SODIUM 5000 UNITS: 5000 INJECTION, SOLUTION INTRAVENOUS; SUBCUTANEOUS at 02:13

## 2023-01-01 RX ADMIN — SENNOSIDES AND DOCUSATE SODIUM 2 TABLET: 50; 8.6 TABLET ORAL at 19:37

## 2023-01-01 RX ADMIN — HEPARIN SODIUM 5000 UNITS: 5000 INJECTION, SOLUTION INTRAVENOUS; SUBCUTANEOUS at 16:15

## 2023-01-01 RX ADMIN — CEFTRIAXONE SODIUM 1 G: 1 INJECTION, POWDER, FOR SOLUTION INTRAMUSCULAR; INTRAVENOUS at 05:11

## 2023-01-01 RX ADMIN — DIPHENHYDRAMINE HYDROCHLORIDE 25 MG: 25 CAPSULE ORAL at 16:16

## 2023-01-01 RX ADMIN — Medication 15 ML: at 08:17

## 2023-01-01 RX ADMIN — HEPARIN SODIUM 5000 UNITS: 5000 INJECTION, SOLUTION INTRAVENOUS; SUBCUTANEOUS at 06:06

## 2023-01-01 RX ADMIN — ALTEPLASE 2 MG: 2.2 INJECTION, POWDER, LYOPHILIZED, FOR SOLUTION INTRAVENOUS at 00:21

## 2023-01-01 RX ADMIN — LACOSAMIDE 150 MG: 10 INJECTION INTRAVENOUS at 12:38

## 2023-01-01 RX ADMIN — VALPROIC ACID 250 MG: 250 SOLUTION ORAL at 23:41

## 2023-01-01 RX ADMIN — Medication 15 ML: at 08:13

## 2023-01-01 RX ADMIN — CLOBAZAM 20 MG: 2.5 SUSPENSION ORAL at 08:39

## 2023-01-01 RX ADMIN — FOSPHENYTOIN SODIUM 100 MG PE: 50 INJECTION INTRAMUSCULAR; INTRAVENOUS at 16:11

## 2023-01-01 RX ADMIN — LORAZEPAM 0.25 MG: 0.5 TABLET ORAL at 22:07

## 2023-01-01 RX ADMIN — Medication 10 MG: at 02:15

## 2023-01-01 RX ADMIN — LORAZEPAM 2 MG: 2 INJECTION INTRAMUSCULAR; INTRAVENOUS at 00:29

## 2023-01-01 RX ADMIN — LACOSAMIDE ORAL SOLUTION 300 MG: 10 SOLUTION ORAL at 22:11

## 2023-01-01 RX ADMIN — NIFEDIPINE 30 MG: 30 TABLET, FILM COATED, EXTENDED RELEASE ORAL at 09:05

## 2023-01-01 RX ADMIN — LORAZEPAM 0.5 MG: 0.5 TABLET ORAL at 14:26

## 2023-01-01 RX ADMIN — CLOBAZAM 20 MG: 2.5 SUSPENSION ORAL at 09:50

## 2023-01-01 RX ADMIN — CEFTRIAXONE SODIUM 1 G: 1 INJECTION, POWDER, FOR SOLUTION INTRAMUSCULAR; INTRAVENOUS at 11:13

## 2023-01-01 RX ADMIN — HEPARIN SODIUM 5000 UNITS: 5000 INJECTION, SOLUTION INTRAVENOUS; SUBCUTANEOUS at 16:57

## 2023-01-01 RX ADMIN — LACOSAMIDE ORAL SOLUTION 250 MG: 10 SOLUTION ORAL at 09:30

## 2023-01-01 RX ADMIN — VALPROIC ACID 250 MG: 250 SOLUTION ORAL at 16:59

## 2023-01-01 RX ADMIN — PROPOFOL 50 MG: 10 INJECTION, EMULSION INTRAVENOUS at 13:16

## 2023-01-01 RX ADMIN — HYDROMORPHONE HYDROCHLORIDE 0.5 MG: 1 INJECTION, SOLUTION INTRAMUSCULAR; INTRAVENOUS; SUBCUTANEOUS at 10:24

## 2023-01-01 RX ADMIN — ESCITALOPRAM OXALATE 20 MG: 10 TABLET, FILM COATED ORAL at 09:14

## 2023-01-01 RX ADMIN — POLYETHYLENE GLYCOL 3350 17 G: 17 POWDER, FOR SOLUTION ORAL at 20:35

## 2023-01-01 RX ADMIN — LORAZEPAM 2 MG: 2 CONCENTRATE ORAL at 08:12

## 2023-01-01 RX ADMIN — LACOSAMIDE 200 MG: 10 INJECTION INTRAVENOUS at 21:28

## 2023-01-01 RX ADMIN — ESCITALOPRAM OXALATE 20 MG: 10 TABLET, FILM COATED ORAL at 08:24

## 2023-01-01 RX ADMIN — FOSPHENYTOIN SODIUM 100 MG PE: 50 INJECTION INTRAMUSCULAR; INTRAVENOUS at 07:14

## 2023-01-01 RX ADMIN — FOSPHENYTOIN SODIUM 100 MG PE: 50 INJECTION INTRAMUSCULAR; INTRAVENOUS at 00:06

## 2023-01-01 RX ADMIN — LACOSAMIDE ORAL SOLUTION 250 MG: 10 SOLUTION ORAL at 21:14

## 2023-01-01 RX ADMIN — CHLORHEXIDINE GLUCONATE 15 ML: 1.2 SOLUTION ORAL at 08:03

## 2023-01-01 RX ADMIN — FOSPHENYTOIN SODIUM 100 MG PE: 50 INJECTION INTRAMUSCULAR; INTRAVENOUS at 23:25

## 2023-01-01 RX ADMIN — CLOBAZAM 20 MG: 2.5 SUSPENSION ORAL at 20:33

## 2023-01-01 RX ADMIN — SODIUM CHLORIDE: 4.5 INJECTION, SOLUTION INTRAVENOUS at 04:23

## 2023-01-01 RX ADMIN — POTASSIUM & SODIUM PHOSPHATES POWDER PACK 280-160-250 MG 2 PACKET: 280-160-250 PACK at 19:55

## 2023-01-01 RX ADMIN — FUROSEMIDE 40 MG: 10 INJECTION, SOLUTION INTRAMUSCULAR; INTRAVENOUS at 00:05

## 2023-01-01 RX ADMIN — HEPARIN SODIUM 5000 UNITS: 5000 INJECTION, SOLUTION INTRAVENOUS; SUBCUTANEOUS at 13:46

## 2023-01-01 RX ADMIN — MIDAZOLAM HYDROCHLORIDE 18 MG/HR: 1 INJECTION, SOLUTION INTRAVENOUS at 02:07

## 2023-01-01 RX ADMIN — HEPARIN SODIUM 5000 UNITS: 5000 INJECTION, SOLUTION INTRAVENOUS; SUBCUTANEOUS at 04:29

## 2023-01-01 RX ADMIN — LEVETIRACETAM 1500 MG: 100 INJECTION, SOLUTION INTRAVENOUS at 18:31

## 2023-01-01 RX ADMIN — VALPROIC ACID 250 MG: 250 SOLUTION ORAL at 16:40

## 2023-01-01 RX ADMIN — MORPHINE SULFATE 10 MG: 100 SOLUTION ORAL at 06:19

## 2023-01-01 RX ADMIN — NOREPINEPHRINE BITARTRATE 0.03 MCG/KG/MIN: 0.02 INJECTION, SOLUTION INTRAVENOUS at 20:09

## 2023-01-01 RX ADMIN — HEPARIN SODIUM 5000 UNITS: 5000 INJECTION, SOLUTION INTRAVENOUS; SUBCUTANEOUS at 07:51

## 2023-01-01 RX ADMIN — LORAZEPAM 2 MG: 2 INJECTION INTRAMUSCULAR; INTRAVENOUS at 13:12

## 2023-01-01 RX ADMIN — MIDAZOLAM HYDROCHLORIDE 15 MG/HR: 1 INJECTION, SOLUTION INTRAVENOUS at 03:15

## 2023-01-01 RX ADMIN — VALPROIC ACID 250 MG: 250 SOLUTION ORAL at 07:48

## 2023-01-01 RX ADMIN — DIPHENHYDRAMINE HYDROCHLORIDE 25 MG: 25 CAPSULE ORAL at 06:19

## 2023-01-01 RX ADMIN — ROCURONIUM BROMIDE 30 MG: 50 INJECTION, SOLUTION INTRAVENOUS at 07:45

## 2023-01-01 RX ADMIN — MIDAZOLAM HYDROCHLORIDE 15 MG/HR: 1 INJECTION, SOLUTION INTRAVENOUS at 07:38

## 2023-01-01 RX ADMIN — MIDAZOLAM HYDROCHLORIDE 2 MG/HR: 1 INJECTION, SOLUTION INTRAVENOUS at 15:42

## 2023-01-01 RX ADMIN — LACOSAMIDE 200 MG: 10 INJECTION INTRAVENOUS at 12:21

## 2023-01-01 RX ADMIN — CHLORHEXIDINE GLUCONATE 15 ML: 1.2 SOLUTION ORAL at 08:07

## 2023-01-01 RX ADMIN — HEPARIN SODIUM 5000 UNITS: 5000 INJECTION, SOLUTION INTRAVENOUS; SUBCUTANEOUS at 14:23

## 2023-01-01 RX ADMIN — Medication 60 ML: at 17:54

## 2023-01-01 RX ADMIN — MIDAZOLAM HYDROCHLORIDE 15 MG/HR: 1 INJECTION, SOLUTION INTRAVENOUS at 00:25

## 2023-01-01 RX ADMIN — LORAZEPAM 0.25 MG: 0.5 TABLET ORAL at 21:16

## 2023-01-01 RX ADMIN — LORAZEPAM 2 MG: 2 INJECTION INTRAMUSCULAR; INTRAVENOUS at 16:28

## 2023-01-01 RX ADMIN — HYDROXYZINE HYDROCHLORIDE 25 MG: 25 TABLET, FILM COATED ORAL at 22:31

## 2023-01-01 RX ADMIN — POLYETHYLENE GLYCOL 3350 17 G: 17 POWDER, FOR SOLUTION ORAL at 08:24

## 2023-01-01 RX ADMIN — FOSPHENYTOIN SODIUM 100 MG PE: 50 INJECTION INTRAMUSCULAR; INTRAVENOUS at 06:03

## 2023-01-01 RX ADMIN — FUROSEMIDE 40 MG: 40 TABLET ORAL at 08:10

## 2023-01-01 RX ADMIN — LACOSAMIDE ORAL SOLUTION 250 MG: 10 SOLUTION ORAL at 09:08

## 2023-01-01 RX ADMIN — GLYCOPYRROLATE 0.2 MG: 0.2 INJECTION, SOLUTION INTRAMUSCULAR; INTRAVENOUS at 17:30

## 2023-01-01 RX ADMIN — LACOSAMIDE 200 MG: 10 INJECTION INTRAVENOUS at 22:13

## 2023-01-01 RX ADMIN — MIDAZOLAM HYDROCHLORIDE 12 MG/HR: 1 INJECTION, SOLUTION INTRAVENOUS at 23:57

## 2023-01-01 RX ADMIN — LACOSAMIDE ORAL SOLUTION 250 MG: 10 SOLUTION ORAL at 22:12

## 2023-01-01 RX ADMIN — LACOSAMIDE ORAL SOLUTION 250 MG: 10 SOLUTION ORAL at 09:34

## 2023-01-01 RX ADMIN — LORAZEPAM 2 MG: 2 INJECTION INTRAMUSCULAR; INTRAVENOUS at 00:27

## 2023-01-01 RX ADMIN — FOSPHENYTOIN SODIUM 100 MG PE: 50 INJECTION INTRAMUSCULAR; INTRAVENOUS at 00:04

## 2023-01-01 RX ADMIN — SODIUM CHLORIDE 1000 ML: 9 INJECTION, SOLUTION INTRAVENOUS at 13:26

## 2023-01-01 RX ADMIN — MIDAZOLAM HYDROCHLORIDE 18 MG/HR: 1 INJECTION, SOLUTION INTRAVENOUS at 13:31

## 2023-01-01 RX ADMIN — POLYETHYLENE GLYCOL 3350 17 G: 17 POWDER, FOR SOLUTION ORAL at 20:45

## 2023-01-01 RX ADMIN — LORAZEPAM 2 MG: 2 CONCENTRATE ORAL at 21:57

## 2023-01-01 RX ADMIN — LORAZEPAM 0.5 MG: 2 INJECTION INTRAMUSCULAR; INTRAVENOUS at 15:21

## 2023-01-01 RX ADMIN — Medication 15 ML: at 09:20

## 2023-01-01 RX ADMIN — HEPARIN SODIUM 5000 UNITS: 5000 INJECTION, SOLUTION INTRAVENOUS; SUBCUTANEOUS at 07:57

## 2023-01-01 RX ADMIN — LORAZEPAM 0.5 MG: 0.5 TABLET ORAL at 09:14

## 2023-01-01 RX ADMIN — CEFEPIME HYDROCHLORIDE 1 G: 1 INJECTION, POWDER, FOR SOLUTION INTRAMUSCULAR; INTRAVENOUS at 11:04

## 2023-01-01 RX ADMIN — FUROSEMIDE 40 MG: 10 INJECTION, SOLUTION INTRAMUSCULAR; INTRAVENOUS at 11:40

## 2023-01-01 RX ADMIN — CLOBAZAM 20 MG: 2.5 SUSPENSION ORAL at 20:27

## 2023-01-01 RX ADMIN — LORAZEPAM 2 MG: 2 INJECTION INTRAMUSCULAR; INTRAVENOUS at 08:47

## 2023-01-01 RX ADMIN — LACOSAMIDE ORAL SOLUTION 300 MG: 10 SOLUTION ORAL at 08:55

## 2023-01-01 RX ADMIN — IPRATROPIUM BROMIDE AND ALBUTEROL SULFATE 3 ML: .5; 3 SOLUTION RESPIRATORY (INHALATION) at 19:57

## 2023-01-01 RX ADMIN — SODIUM CHLORIDE 1000 MG: 9 INJECTION, SOLUTION INTRAVENOUS at 08:24

## 2023-01-01 RX ADMIN — ACETAMINOPHEN 650 MG: 325 TABLET ORAL at 13:04

## 2023-01-01 RX ADMIN — MIDAZOLAM HYDROCHLORIDE 18 MG/HR: 1 INJECTION, SOLUTION INTRAVENOUS at 22:12

## 2023-01-01 RX ADMIN — LORAZEPAM 0.25 MG: 0.5 TABLET ORAL at 22:05

## 2023-01-01 RX ADMIN — ESCITALOPRAM OXALATE 20 MG: 10 TABLET, FILM COATED ORAL at 08:49

## 2023-01-01 RX ADMIN — VALPROIC ACID 250 MG: 250 SOLUTION ORAL at 16:28

## 2023-01-01 RX ADMIN — LEVETIRACETAM 1500 MG: 100 INJECTION, SOLUTION INTRAVENOUS at 04:06

## 2023-01-01 RX ADMIN — PROPOFOL 25 MCG/KG/MIN: 10 INJECTION, EMULSION INTRAVENOUS at 01:58

## 2023-01-01 RX ADMIN — LORAZEPAM 2 MG: 2 INJECTION INTRAMUSCULAR; INTRAVENOUS at 00:04

## 2023-01-01 RX ADMIN — VALPROIC ACID 250 MG: 250 SOLUTION ORAL at 15:54

## 2023-01-01 RX ADMIN — CLOBAZAM 10 MG: 2.5 SUSPENSION ORAL at 12:07

## 2023-01-01 RX ADMIN — LACOSAMIDE 150 MG: 10 INJECTION INTRAVENOUS at 22:08

## 2023-01-01 RX ADMIN — LEVETIRACETAM 1500 MG: 100 INJECTION, SOLUTION INTRAVENOUS at 04:10

## 2023-01-01 RX ADMIN — ATENOLOL 25 MG: 25 TABLET ORAL at 08:24

## 2023-01-01 RX ADMIN — HYDROXYZINE HYDROCHLORIDE 25 MG: 25 TABLET, FILM COATED ORAL at 08:24

## 2023-01-01 RX ADMIN — NIFEDIPINE 30 MG: 30 TABLET, FILM COATED, EXTENDED RELEASE ORAL at 07:49

## 2023-01-01 RX ADMIN — ACETAMINOPHEN 650 MG: 325 TABLET ORAL at 20:18

## 2023-01-01 RX ADMIN — CLOBAZAM 20 MG: 2.5 SUSPENSION ORAL at 08:58

## 2023-01-01 RX ADMIN — HEPARIN SODIUM 5000 UNITS: 5000 INJECTION, SOLUTION INTRAVENOUS; SUBCUTANEOUS at 14:31

## 2023-01-01 RX ADMIN — MICONAZOLE NITRATE: 2 POWDER TOPICAL at 09:16

## 2023-01-01 RX ADMIN — CLOBAZAM 20 MG: 2.5 SUSPENSION ORAL at 09:22

## 2023-01-01 RX ADMIN — FUROSEMIDE 40 MG: 40 TABLET ORAL at 09:15

## 2023-01-01 RX ADMIN — LORAZEPAM 2 MG: 2 CONCENTRATE ORAL at 17:05

## 2023-01-01 RX ADMIN — HYDROMORPHONE HYDROCHLORIDE 0.5 MG: 1 INJECTION, SOLUTION INTRAMUSCULAR; INTRAVENOUS; SUBCUTANEOUS at 10:35

## 2023-01-01 RX ADMIN — MORPHINE SULFATE 10 MG: 100 SOLUTION ORAL at 05:47

## 2023-01-01 RX ADMIN — LEVETIRACETAM 1500 MG: 100 INJECTION, SOLUTION INTRAVENOUS at 04:14

## 2023-01-01 RX ADMIN — ESCITALOPRAM OXALATE 20 MG: 10 TABLET, FILM COATED ORAL at 09:05

## 2023-01-01 RX ADMIN — HEPARIN SODIUM 5000 UNITS: 5000 INJECTION, SOLUTION INTRAVENOUS; SUBCUTANEOUS at 00:07

## 2023-01-01 RX ADMIN — HYDROXYZINE HYDROCHLORIDE 25 MG: 25 TABLET, FILM COATED ORAL at 08:50

## 2023-01-01 RX ADMIN — HYDRALAZINE HYDROCHLORIDE 5 MG: 20 INJECTION INTRAMUSCULAR; INTRAVENOUS at 18:41

## 2023-01-01 RX ADMIN — HEPARIN SODIUM 5000 UNITS: 5000 INJECTION, SOLUTION INTRAVENOUS; SUBCUTANEOUS at 00:12

## 2023-01-01 RX ADMIN — CLOBAZAM 20 MG: 2.5 SUSPENSION ORAL at 09:20

## 2023-01-01 RX ADMIN — DIPHENHYDRAMINE HYDROCHLORIDE 50 MG: 25 SOLUTION ORAL at 08:14

## 2023-01-01 RX ADMIN — CHLORHEXIDINE GLUCONATE 15 ML: 1.2 SOLUTION ORAL at 09:00

## 2023-01-01 RX ADMIN — ATENOLOL 25 MG: 25 TABLET ORAL at 12:04

## 2023-01-01 RX ADMIN — Medication 60 ML: at 15:22

## 2023-01-01 RX ADMIN — CLOBAZAM 20 MG: 2.5 SUSPENSION ORAL at 20:58

## 2023-01-01 RX ADMIN — LEVETIRACETAM 1000 MG: 10 INJECTION INTRAVENOUS at 04:02

## 2023-01-01 RX ADMIN — IPRATROPIUM BROMIDE AND ALBUTEROL SULFATE 3 ML: .5; 3 SOLUTION RESPIRATORY (INHALATION) at 07:38

## 2023-01-01 RX ADMIN — GLYCOPYRROLATE 0.2 MG: 0.2 INJECTION, SOLUTION INTRAMUSCULAR; INTRAVENOUS at 05:47

## 2023-01-01 RX ADMIN — LACOSAMIDE ORAL SOLUTION 300 MG: 10 SOLUTION ORAL at 09:07

## 2023-01-01 RX ADMIN — LACOSAMIDE ORAL SOLUTION 300 MG: 10 SOLUTION ORAL at 09:23

## 2023-01-01 RX ADMIN — HEPARIN SODIUM 5000 UNITS: 5000 INJECTION, SOLUTION INTRAVENOUS; SUBCUTANEOUS at 16:46

## 2023-01-01 RX ADMIN — LACOSAMIDE 100 MG: 10 INJECTION INTRAVENOUS at 21:51

## 2023-01-01 RX ADMIN — LACOSAMIDE 200 MG: 10 INJECTION INTRAVENOUS at 12:12

## 2023-01-01 RX ADMIN — ASPIRIN 325 MG: 325 TABLET, COATED ORAL at 08:49

## 2023-01-01 RX ADMIN — MEROPENEM 1 G: 1 INJECTION, POWDER, FOR SOLUTION INTRAVENOUS at 06:34

## 2023-01-01 RX ADMIN — DIPHENHYDRAMINE HYDROCHLORIDE 50 MG: 25 CAPSULE ORAL at 14:54

## 2023-01-01 RX ADMIN — LACOSAMIDE ORAL SOLUTION 250 MG: 10 SOLUTION ORAL at 08:14

## 2023-01-01 RX ADMIN — VALPROIC ACID 250 MG: 250 SOLUTION ORAL at 09:15

## 2023-01-01 RX ADMIN — POTASSIUM CHLORIDE 40 MEQ: 20 SOLUTION ORAL at 01:49

## 2023-01-01 RX ADMIN — LEVOFLOXACIN 500 MG: 500 TABLET, FILM COATED ORAL at 17:33

## 2023-01-01 RX ADMIN — MIDAZOLAM HYDROCHLORIDE 10 MG/HR: 1 INJECTION, SOLUTION INTRAVENOUS at 00:30

## 2023-01-01 RX ADMIN — LORAZEPAM 0.5 MG: 0.5 TABLET ORAL at 13:57

## 2023-01-01 RX ADMIN — Medication 25 MCG/HR: at 00:13

## 2023-01-01 RX ADMIN — POLYETHYLENE GLYCOL 3350 17 G: 17 POWDER, FOR SOLUTION ORAL at 08:00

## 2023-01-01 RX ADMIN — HYDROMORPHONE HYDROCHLORIDE 0.5 MG: 1 INJECTION, SOLUTION INTRAMUSCULAR; INTRAVENOUS; SUBCUTANEOUS at 22:00

## 2023-01-01 RX ADMIN — LACOSAMIDE 200 MG: 10 INJECTION INTRAVENOUS at 11:16

## 2023-01-01 RX ADMIN — MORPHINE SULFATE 10 MG: 100 SOLUTION ORAL at 05:44

## 2023-01-01 RX ADMIN — DIPHENHYDRAMINE HYDROCHLORIDE 25 MG: 50 INJECTION, SOLUTION INTRAMUSCULAR; INTRAVENOUS at 18:39

## 2023-01-01 RX ADMIN — POTASSIUM & SODIUM PHOSPHATES POWDER PACK 280-160-250 MG 1 PACKET: 280-160-250 PACK at 15:52

## 2023-01-01 RX ADMIN — FUROSEMIDE 40 MG: 10 INJECTION, SOLUTION INTRAMUSCULAR; INTRAVENOUS at 12:12

## 2023-01-01 RX ADMIN — CEFEPIME HYDROCHLORIDE 1 G: 1 INJECTION, POWDER, FOR SOLUTION INTRAMUSCULAR; INTRAVENOUS at 06:46

## 2023-01-01 RX ADMIN — SODIUM CHLORIDE 54 ML: 9 INJECTION, SOLUTION INTRAVENOUS at 18:46

## 2023-01-01 RX ADMIN — LORAZEPAM 2 MG: 2 INJECTION INTRAMUSCULAR; INTRAVENOUS at 11:46

## 2023-01-01 RX ADMIN — FOSPHENYTOIN SODIUM 100 MG PE: 50 INJECTION INTRAMUSCULAR; INTRAVENOUS at 22:55

## 2023-01-01 RX ADMIN — MICONAZOLE NITRATE: 2 POWDER TOPICAL at 20:35

## 2023-01-01 RX ADMIN — SODIUM CHLORIDE, POTASSIUM CHLORIDE, SODIUM LACTATE AND CALCIUM CHLORIDE: 600; 310; 30; 20 INJECTION, SOLUTION INTRAVENOUS at 08:47

## 2023-01-01 RX ADMIN — FOSPHENYTOIN SODIUM 100 MG PE: 50 INJECTION INTRAMUSCULAR; INTRAVENOUS at 13:33

## 2023-01-01 RX ADMIN — MIDAZOLAM HYDROCHLORIDE 18 MG/HR: 1 INJECTION, SOLUTION INTRAVENOUS at 03:53

## 2023-01-01 RX ADMIN — VALPROIC ACID 250 MG: 250 SOLUTION ORAL at 08:21

## 2023-01-01 RX ADMIN — VALPROIC ACID 250 MG: 250 SOLUTION ORAL at 08:57

## 2023-01-01 RX ADMIN — Medication 60 ML: at 15:42

## 2023-01-01 RX ADMIN — MIDAZOLAM HYDROCHLORIDE 8 MG/HR: 1 INJECTION, SOLUTION INTRAVENOUS at 23:07

## 2023-01-01 RX ADMIN — LORAZEPAM 2 MG: 2 INJECTION INTRAMUSCULAR; INTRAVENOUS at 05:55

## 2023-01-01 RX ADMIN — LEVETIRACETAM 1500 MG: 100 INJECTION, SOLUTION INTRAVENOUS at 04:37

## 2023-01-01 RX ADMIN — LORAZEPAM 0.5 MG: 0.5 TABLET ORAL at 06:27

## 2023-01-01 RX ADMIN — DIPHENHYDRAMINE HYDROCHLORIDE 50 MG: 25 SOLUTION ORAL at 08:12

## 2023-01-01 RX ADMIN — Medication 60 ML: at 16:26

## 2023-01-01 RX ADMIN — Medication 10 MG: at 13:43

## 2023-01-01 RX ADMIN — DIPHENHYDRAMINE HYDROCHLORIDE 50 MG: 25 SOLUTION ORAL at 08:39

## 2023-01-01 RX ADMIN — FOSPHENYTOIN SODIUM 100 MG PE: 50 INJECTION INTRAMUSCULAR; INTRAVENOUS at 23:44

## 2023-01-01 RX ADMIN — LEVETIRACETAM 1500 MG: 100 SOLUTION ORAL at 20:52

## 2023-01-01 RX ADMIN — MORPHINE SULFATE 10 MG: 100 SOLUTION ORAL at 17:53

## 2023-01-01 RX ADMIN — HEPARIN SODIUM 5000 UNITS: 5000 INJECTION, SOLUTION INTRAVENOUS; SUBCUTANEOUS at 06:15

## 2023-01-01 RX ADMIN — Medication 10 MG: at 08:39

## 2023-01-01 RX ADMIN — MORPHINE SULFATE 5 MG: 100 SOLUTION ORAL at 19:47

## 2023-01-01 RX ADMIN — ACETAMINOPHEN 650 MG: 325 TABLET, FILM COATED ORAL at 14:39

## 2023-01-01 RX ADMIN — HYDRALAZINE HYDROCHLORIDE 5 MG: 20 INJECTION INTRAMUSCULAR; INTRAVENOUS at 13:30

## 2023-01-01 RX ADMIN — MORPHINE SULFATE 10 MG: 100 SOLUTION ORAL at 18:12

## 2023-01-01 RX ADMIN — CLOBAZAM 20 MG: 2.5 SUSPENSION ORAL at 21:52

## 2023-01-01 RX ADMIN — LORAZEPAM 2 MG: 2 CONCENTRATE ORAL at 02:24

## 2023-01-01 RX ADMIN — POLYETHYLENE GLYCOL 3350 17 G: 17 POWDER, FOR SOLUTION ORAL at 21:41

## 2023-01-01 RX ADMIN — Medication 10 MG: at 19:46

## 2023-01-01 RX ADMIN — CHLORHEXIDINE GLUCONATE 15 ML: 1.2 SOLUTION ORAL at 19:29

## 2023-01-01 RX ADMIN — Medication 40 MG: at 06:01

## 2023-01-01 RX ADMIN — LORAZEPAM 2 MG: 2 INJECTION INTRAMUSCULAR; INTRAVENOUS at 17:12

## 2023-01-01 RX ADMIN — VALPROIC ACID 250 MG: 250 SOLUTION ORAL at 08:15

## 2023-01-01 RX ADMIN — Medication 1 MG: at 20:43

## 2023-01-01 RX ADMIN — HEPARIN SODIUM 5000 UNITS: 5000 INJECTION, SOLUTION INTRAVENOUS; SUBCUTANEOUS at 00:01

## 2023-01-01 RX ADMIN — VALPROIC ACID 250 MG: 250 SOLUTION ORAL at 08:41

## 2023-01-01 RX ADMIN — NIFEDIPINE 30 MG: 30 TABLET, FILM COATED, EXTENDED RELEASE ORAL at 08:24

## 2023-01-01 RX ADMIN — CEFTRIAXONE SODIUM 1 G: 1 INJECTION, POWDER, FOR SOLUTION INTRAMUSCULAR; INTRAVENOUS at 10:57

## 2023-01-01 RX ADMIN — SODIUM CHLORIDE: 9 INJECTION, SOLUTION INTRAVENOUS at 18:20

## 2023-01-01 RX ADMIN — LORAZEPAM 2 MG: 2 INJECTION INTRAMUSCULAR; INTRAVENOUS at 16:01

## 2023-01-01 RX ADMIN — CEFTRIAXONE SODIUM 1 G: 1 INJECTION, POWDER, FOR SOLUTION INTRAMUSCULAR; INTRAVENOUS at 09:57

## 2023-01-01 RX ADMIN — LORAZEPAM 2 MG: 2 INJECTION INTRAMUSCULAR; INTRAVENOUS at 15:46

## 2023-01-01 RX ADMIN — Medication 40 MG: at 06:45

## 2023-01-01 RX ADMIN — CLOBAZAM 15 MG: 2.5 SUSPENSION ORAL at 09:28

## 2023-01-01 RX ADMIN — Medication 60 ML: at 16:59

## 2023-01-01 RX ADMIN — Medication 10 MG: at 13:46

## 2023-01-01 RX ADMIN — FOSPHENYTOIN SODIUM 100 MG PE: 50 INJECTION INTRAMUSCULAR; INTRAVENOUS at 00:02

## 2023-01-01 RX ADMIN — LACOSAMIDE ORAL SOLUTION 250 MG: 10 SOLUTION ORAL at 22:18

## 2023-01-01 RX ADMIN — MORPHINE SULFATE 5 MG: 100 SOLUTION ORAL at 06:37

## 2023-01-01 RX ADMIN — LORAZEPAM 0.5 MG: 0.5 TABLET ORAL at 14:58

## 2023-01-01 RX ADMIN — LEVETIRACETAM 1500 MG: 100 INJECTION, SOLUTION INTRAVENOUS at 05:31

## 2023-01-01 RX ADMIN — PROPOFOL 35 MCG/KG/MIN: 10 INJECTION, EMULSION INTRAVENOUS at 08:47

## 2023-01-01 RX ADMIN — SENNOSIDES AND DOCUSATE SODIUM 1 TABLET: 50; 8.6 TABLET ORAL at 08:50

## 2023-01-01 RX ADMIN — ACETAMINOPHEN 650 MG: 325 TABLET, FILM COATED ORAL at 12:48

## 2023-01-01 RX ADMIN — NIFEDIPINE 30 MG: 30 TABLET, FILM COATED, EXTENDED RELEASE ORAL at 09:15

## 2023-01-01 RX ADMIN — DIPHENHYDRAMINE HYDROCHLORIDE 50 MG: 25 CAPSULE ORAL at 06:28

## 2023-01-01 RX ADMIN — LACOSAMIDE 200 MG: 10 INJECTION INTRAVENOUS at 22:40

## 2023-01-01 RX ADMIN — LACOSAMIDE 200 MG: 10 INJECTION INTRAVENOUS at 22:17

## 2023-01-01 RX ADMIN — LORAZEPAM 2 MG: 2 CONCENTRATE ORAL at 16:01

## 2023-01-01 RX ADMIN — POLYETHYLENE GLYCOL 3350 17 G: 17 POWDER, FOR SOLUTION ORAL at 20:08

## 2023-01-01 RX ADMIN — VALPROIC ACID 250 MG: 250 SOLUTION ORAL at 16:26

## 2023-01-01 RX ADMIN — LACOSAMIDE 100 MG: 10 INJECTION INTRAVENOUS at 10:38

## 2023-01-01 RX ADMIN — LACOSAMIDE ORAL SOLUTION 250 MG: 10 SOLUTION ORAL at 09:01

## 2023-01-01 RX ADMIN — HEPARIN SODIUM 5000 UNITS: 5000 INJECTION, SOLUTION INTRAVENOUS; SUBCUTANEOUS at 17:58

## 2023-01-01 RX ADMIN — Medication 10 MG: at 02:13

## 2023-01-01 RX ADMIN — DIPHENHYDRAMINE HYDROCHLORIDE 25 MG: 50 INJECTION, SOLUTION INTRAMUSCULAR; INTRAVENOUS at 05:00

## 2023-01-01 RX ADMIN — VALPROIC ACID 250 MG: 250 SOLUTION ORAL at 07:57

## 2023-01-01 RX ADMIN — OXYCODONE HYDROCHLORIDE 5 MG: 5 TABLET ORAL at 11:20

## 2023-01-01 RX ADMIN — MORPHINE SULFATE 10 MG: 100 SOLUTION ORAL at 12:19

## 2023-01-01 RX ADMIN — Medication 15 ML: at 08:45

## 2023-01-01 RX ADMIN — POTASSIUM CHLORIDE 10 MEQ: 7.46 INJECTION, SOLUTION INTRAVENOUS at 09:02

## 2023-01-01 RX ADMIN — DIAZEPAM 2 MG: 2 TABLET ORAL at 13:41

## 2023-01-01 RX ADMIN — VALPROIC ACID 250 MG: 250 SOLUTION ORAL at 16:22

## 2023-01-01 RX ADMIN — HEPARIN SODIUM 5000 UNITS: 5000 INJECTION, SOLUTION INTRAVENOUS; SUBCUTANEOUS at 08:09

## 2023-01-01 RX ADMIN — LORAZEPAM 2 MG: 2 INJECTION INTRAMUSCULAR; INTRAVENOUS at 20:16

## 2023-01-01 RX ADMIN — LEVETIRACETAM 1500 MG: 100 INJECTION, SOLUTION INTRAVENOUS at 04:13

## 2023-01-01 RX ADMIN — HEPARIN SODIUM 5000 UNITS: 5000 INJECTION, SOLUTION INTRAVENOUS; SUBCUTANEOUS at 23:51

## 2023-01-01 RX ADMIN — SODIUM CHLORIDE 500 ML: 9 INJECTION, SOLUTION INTRAVENOUS at 20:08

## 2023-01-01 RX ADMIN — LORAZEPAM 4 MG: 2 INJECTION INTRAMUSCULAR; INTRAVENOUS at 18:44

## 2023-01-01 RX ADMIN — LACOSAMIDE 200 MG: 10 INJECTION INTRAVENOUS at 11:29

## 2023-01-01 RX ADMIN — Medication 15 ML: at 08:09

## 2023-01-01 RX ADMIN — VALPROIC ACID 250 MG: 250 SOLUTION ORAL at 08:35

## 2023-01-01 RX ADMIN — ASPIRIN 325 MG: 325 TABLET, COATED ORAL at 08:22

## 2023-01-01 RX ADMIN — MORPHINE SULFATE 10 MG: 100 SOLUTION ORAL at 18:29

## 2023-01-01 RX ADMIN — ROCURONIUM BROMIDE 20 MG: 50 INJECTION, SOLUTION INTRAVENOUS at 07:56

## 2023-01-01 RX ADMIN — LACOSAMIDE 200 MG: 10 INJECTION INTRAVENOUS at 18:19

## 2023-01-01 RX ADMIN — CEFEPIME HYDROCHLORIDE 2 G: 2 INJECTION, POWDER, FOR SOLUTION INTRAVENOUS at 21:51

## 2023-01-01 RX ADMIN — LORAZEPAM 2 MG: 2 INJECTION INTRAMUSCULAR; INTRAVENOUS at 08:57

## 2023-01-01 RX ADMIN — LORAZEPAM 2 MG: 2 CONCENTRATE ORAL at 20:49

## 2023-01-01 RX ADMIN — LORAZEPAM 1 MG: 2 INJECTION INTRAMUSCULAR; INTRAVENOUS at 13:31

## 2023-01-01 RX ADMIN — Medication 60 ML: at 17:14

## 2023-01-01 RX ADMIN — PROPOFOL 60 MCG/KG/MIN: 10 INJECTION, EMULSION INTRAVENOUS at 02:06

## 2023-01-01 RX ADMIN — Medication 60 ML: at 16:13

## 2023-01-01 RX ADMIN — NIFEDIPINE 30 MG: 30 TABLET, FILM COATED, EXTENDED RELEASE ORAL at 08:39

## 2023-01-01 RX ADMIN — LORAZEPAM 0.5 MG: 0.5 TABLET ORAL at 08:49

## 2023-01-01 RX ADMIN — CLOBAZAM 5 MG: 2.5 SUSPENSION ORAL at 16:41

## 2023-01-01 RX ADMIN — VALPROIC ACID 250 MG: 250 SOLUTION ORAL at 00:15

## 2023-01-01 RX ADMIN — VALPROIC ACID 250 MG: 250 SOLUTION ORAL at 00:51

## 2023-01-01 RX ADMIN — FOSPHENYTOIN SODIUM 200 MG PE: 50 INJECTION INTRAMUSCULAR; INTRAVENOUS at 21:26

## 2023-01-01 RX ADMIN — HEPARIN SODIUM 5000 UNITS: 5000 INJECTION, SOLUTION INTRAVENOUS; SUBCUTANEOUS at 05:58

## 2023-01-01 RX ADMIN — HEPARIN SODIUM 5000 UNITS: 5000 INJECTION, SOLUTION INTRAVENOUS; SUBCUTANEOUS at 00:06

## 2023-01-01 RX ADMIN — Medication 40 MG: at 07:53

## 2023-01-01 RX ADMIN — Medication 10 MG: at 13:51

## 2023-01-01 RX ADMIN — LEVETIRACETAM 1500 MG: 100 INJECTION, SOLUTION INTRAVENOUS at 04:52

## 2023-01-01 RX ADMIN — LORAZEPAM 0.5 MG: 2 INJECTION INTRAMUSCULAR; INTRAVENOUS at 16:09

## 2023-01-01 RX ADMIN — FOSPHENYTOIN SODIUM 100 MG PE: 50 INJECTION INTRAMUSCULAR; INTRAVENOUS at 22:49

## 2023-01-01 RX ADMIN — LORAZEPAM 0.5 MG: 2 INJECTION INTRAMUSCULAR; INTRAVENOUS at 13:30

## 2023-01-01 RX ADMIN — VALPROIC ACID 250 MG: 250 SOLUTION ORAL at 08:54

## 2023-01-01 RX ADMIN — VALPROIC ACID 250 MG: 250 SOLUTION ORAL at 07:59

## 2023-01-01 RX ADMIN — MORPHINE SULFATE 10 MG: 100 SOLUTION ORAL at 23:58

## 2023-01-01 RX ADMIN — VALPROIC ACID 250 MG: 250 SOLUTION ORAL at 07:51

## 2023-01-01 RX ADMIN — CLOBAZAM 20 MG: 2.5 SUSPENSION ORAL at 21:16

## 2023-01-01 RX ADMIN — VALPROIC ACID 250 MG: 250 SOLUTION ORAL at 16:53

## 2023-01-01 RX ADMIN — PANTOPRAZOLE SODIUM 40 MG: 40 INJECTION, POWDER, FOR SOLUTION INTRAVENOUS at 06:42

## 2023-01-01 RX ADMIN — LORAZEPAM 2 MG: 2 CONCENTRATE ORAL at 00:55

## 2023-01-01 RX ADMIN — Medication 10 MG: at 20:10

## 2023-01-01 RX ADMIN — Medication 10 MG: at 21:26

## 2023-01-01 RX ADMIN — LORAZEPAM 0.5 MG: 0.5 TABLET ORAL at 13:24

## 2023-01-01 RX ADMIN — CLOBAZAM 20 MG: 2.5 SUSPENSION ORAL at 09:33

## 2023-01-01 RX ADMIN — CHLORHEXIDINE GLUCONATE 15 ML: 1.2 SOLUTION ORAL at 19:44

## 2023-01-01 RX ADMIN — VALPROIC ACID 250 MG: 250 SOLUTION ORAL at 15:06

## 2023-01-01 RX ADMIN — MORPHINE SULFATE 10 MG: 100 SOLUTION ORAL at 12:22

## 2023-01-01 RX ADMIN — LORAZEPAM 2 MG: 2 CONCENTRATE ORAL at 21:30

## 2023-01-01 RX ADMIN — PERPHENAZINE 4 MG: 4 TABLET, FILM COATED ORAL at 08:39

## 2023-01-01 RX ADMIN — CLOBAZAM 20 MG: 2.5 SUSPENSION ORAL at 11:13

## 2023-01-01 RX ADMIN — VALPROIC ACID 250 MG: 250 SOLUTION ORAL at 16:57

## 2023-01-01 RX ADMIN — CHLORHEXIDINE GLUCONATE 15 ML: 1.2 SOLUTION ORAL at 08:39

## 2023-01-01 RX ADMIN — ALTEPLASE 2 MG: 2.2 INJECTION, POWDER, LYOPHILIZED, FOR SOLUTION INTRAVENOUS at 21:03

## 2023-01-01 RX ADMIN — ACYCLOVIR SODIUM 425 MG: 50 INJECTION, SOLUTION INTRAVENOUS at 23:34

## 2023-01-01 RX ADMIN — LORAZEPAM 1 MG: 2 INJECTION INTRAMUSCULAR; INTRAVENOUS at 11:58

## 2023-01-01 RX ADMIN — VALPROIC ACID 250 MG: 250 SOLUTION ORAL at 08:31

## 2023-01-01 RX ADMIN — FOSPHENYTOIN SODIUM 200 MG PE: 50 INJECTION INTRAMUSCULAR; INTRAVENOUS at 21:19

## 2023-01-01 RX ADMIN — POTASSIUM CHLORIDE 40 MEQ: 1500 TABLET, EXTENDED RELEASE ORAL at 18:07

## 2023-01-01 RX ADMIN — CHLORHEXIDINE GLUCONATE 15 ML: 1.2 SOLUTION ORAL at 20:26

## 2023-01-01 RX ADMIN — HEPARIN SODIUM 5000 UNITS: 5000 INJECTION, SOLUTION INTRAVENOUS; SUBCUTANEOUS at 03:36

## 2023-01-01 RX ADMIN — POTASSIUM CHLORIDE 10 MEQ: 7.46 INJECTION, SOLUTION INTRAVENOUS at 15:52

## 2023-01-01 RX ADMIN — HYDROXYZINE HYDROCHLORIDE 25 MG: 25 TABLET, FILM COATED ORAL at 23:09

## 2023-01-01 RX ADMIN — FOSPHENYTOIN SODIUM 100 MG PE: 50 INJECTION INTRAMUSCULAR; INTRAVENOUS at 16:57

## 2023-01-01 RX ADMIN — MICONAZOLE NITRATE: 2 POWDER TOPICAL at 08:39

## 2023-01-01 RX ADMIN — POTASSIUM CHLORIDE 10 MEQ: 7.46 INJECTION, SOLUTION INTRAVENOUS at 10:16

## 2023-01-01 RX ADMIN — Medication 10 MG: at 08:23

## 2023-01-01 RX ADMIN — MORPHINE SULFATE 10 MG: 100 SOLUTION ORAL at 13:27

## 2023-01-01 RX ADMIN — CHLORHEXIDINE GLUCONATE 15 ML: 1.2 SOLUTION ORAL at 08:17

## 2023-01-01 RX ADMIN — POLYETHYLENE GLYCOL 3350 17 G: 17 POWDER, FOR SOLUTION ORAL at 20:04

## 2023-01-01 RX ADMIN — WATER 10 ML: 1 INJECTION INTRAMUSCULAR; INTRAVENOUS; SUBCUTANEOUS at 00:21

## 2023-01-01 RX ADMIN — IPRATROPIUM BROMIDE AND ALBUTEROL SULFATE 3 ML: .5; 3 SOLUTION RESPIRATORY (INHALATION) at 13:37

## 2023-01-01 RX ADMIN — VALPROIC ACID 250 MG: 250 SOLUTION ORAL at 08:36

## 2023-01-01 RX ADMIN — SODIUM CHLORIDE 1000 MG: 9 INJECTION, SOLUTION INTRAVENOUS at 10:06

## 2023-01-01 RX ADMIN — CLOBAZAM 20 MG: 2.5 SUSPENSION ORAL at 21:26

## 2023-01-01 RX ADMIN — VALPROIC ACID 250 MG: 250 SOLUTION ORAL at 15:50

## 2023-01-01 RX ADMIN — CHLORHEXIDINE GLUCONATE 15 ML: 1.2 SOLUTION ORAL at 20:25

## 2023-01-01 RX ADMIN — CEFEPIME HYDROCHLORIDE 1 G: 1 INJECTION, POWDER, FOR SOLUTION INTRAMUSCULAR; INTRAVENOUS at 05:54

## 2023-01-01 RX ADMIN — Medication 60 ML: at 16:40

## 2023-01-01 RX ADMIN — VALPROIC ACID 250 MG: 250 SOLUTION ORAL at 18:11

## 2023-01-01 RX ADMIN — ACETAMINOPHEN 650 MG: 325 TABLET ORAL at 20:44

## 2023-01-01 RX ADMIN — MICONAZOLE NITRATE: 2 POWDER TOPICAL at 20:02

## 2023-01-01 RX ADMIN — FOSPHENYTOIN SODIUM 100 MG PE: 50 INJECTION INTRAMUSCULAR; INTRAVENOUS at 06:06

## 2023-01-01 RX ADMIN — HEPARIN SODIUM 5000 UNITS: 5000 INJECTION, SOLUTION INTRAVENOUS; SUBCUTANEOUS at 07:46

## 2023-01-01 RX ADMIN — CLOBAZAM 20 MG: 2.5 SUSPENSION ORAL at 21:07

## 2023-01-01 RX ADMIN — HYDROMORPHONE HYDROCHLORIDE 0.5 MG: 1 INJECTION, SOLUTION INTRAMUSCULAR; INTRAVENOUS; SUBCUTANEOUS at 23:06

## 2023-01-01 RX ADMIN — VALPROIC ACID 250 MG: 250 SOLUTION ORAL at 09:57

## 2023-01-01 RX ADMIN — HEPARIN SODIUM 5000 UNITS: 5000 INJECTION, SOLUTION INTRAVENOUS; SUBCUTANEOUS at 18:22

## 2023-01-01 RX ADMIN — CEFEPIME HYDROCHLORIDE 1 G: 1 INJECTION, POWDER, FOR SOLUTION INTRAMUSCULAR; INTRAVENOUS at 18:14

## 2023-01-01 RX ADMIN — ACETAMINOPHEN 650 MG: 325 TABLET, FILM COATED ORAL at 17:54

## 2023-01-01 RX ADMIN — HEPARIN SODIUM 5000 UNITS: 5000 INJECTION, SOLUTION INTRAVENOUS; SUBCUTANEOUS at 14:39

## 2023-01-01 RX ADMIN — FOSPHENYTOIN SODIUM 100 MG PE: 50 INJECTION INTRAMUSCULAR; INTRAVENOUS at 08:33

## 2023-01-01 RX ADMIN — CLOBAZAM 20 MG: 2.5 SUSPENSION ORAL at 20:48

## 2023-01-01 RX ADMIN — LACOSAMIDE ORAL SOLUTION 250 MG: 10 SOLUTION ORAL at 08:58

## 2023-01-01 RX ADMIN — OXYCODONE HYDROCHLORIDE 5 MG: 5 TABLET ORAL at 08:49

## 2023-01-01 RX ADMIN — LACOSAMIDE 200 MG: 10 INJECTION INTRAVENOUS at 10:49

## 2023-01-01 RX ADMIN — PROPOFOL 60 MCG/KG/MIN: 10 INJECTION, EMULSION INTRAVENOUS at 19:35

## 2023-01-01 RX ADMIN — HEPARIN SODIUM 5000 UNITS: 5000 INJECTION, SOLUTION INTRAVENOUS; SUBCUTANEOUS at 05:45

## 2023-01-01 RX ADMIN — LORAZEPAM 2 MG: 2 INJECTION INTRAMUSCULAR; INTRAVENOUS at 21:19

## 2023-01-01 RX ADMIN — ACETAMINOPHEN 650 MG: 325 TABLET, FILM COATED ORAL at 20:38

## 2023-01-01 RX ADMIN — Medication 60 ML: at 15:43

## 2023-01-01 RX ADMIN — CHLORHEXIDINE GLUCONATE 15 ML: 1.2 SOLUTION ORAL at 07:46

## 2023-01-01 RX ADMIN — SODIUM CHLORIDE: 9 INJECTION, SOLUTION INTRAVENOUS at 11:15

## 2023-01-01 RX ADMIN — LACOSAMIDE 300 MG: 10 INJECTION INTRAVENOUS at 12:11

## 2023-01-01 RX ADMIN — FOSPHENYTOIN SODIUM 100 MG PE: 50 INJECTION INTRAMUSCULAR; INTRAVENOUS at 14:18

## 2023-01-01 RX ADMIN — FOSPHENYTOIN SODIUM 100 MG PE: 50 INJECTION INTRAMUSCULAR; INTRAVENOUS at 17:17

## 2023-01-01 RX ADMIN — SUCCINYLCHOLINE CHLORIDE 60 MG: 20 INJECTION, SOLUTION INTRAMUSCULAR; INTRAVENOUS; PARENTERAL at 11:44

## 2023-01-01 RX ADMIN — LORAZEPAM 2 MG: 2 CONCENTRATE ORAL at 14:40

## 2023-01-01 RX ADMIN — MIDAZOLAM HYDROCHLORIDE 12 MG/HR: 1 INJECTION, SOLUTION INTRAVENOUS at 15:34

## 2023-01-01 RX ADMIN — VALPROIC ACID 250 MG: 250 SOLUTION ORAL at 01:36

## 2023-01-01 RX ADMIN — VALPROIC ACID 250 MG: 250 SOLUTION ORAL at 08:11

## 2023-01-01 RX ADMIN — SODIUM CHLORIDE: 9 INJECTION, SOLUTION INTRAVENOUS at 16:07

## 2023-01-01 RX ADMIN — CHLORHEXIDINE GLUCONATE 15 ML: 1.2 SOLUTION ORAL at 19:55

## 2023-01-01 RX ADMIN — SENNOSIDES AND DOCUSATE SODIUM 1 TABLET: 50; 8.6 TABLET ORAL at 08:24

## 2023-01-01 RX ADMIN — LEVETIRACETAM 1500 MG: 100 SOLUTION ORAL at 08:35

## 2023-01-01 RX ADMIN — CHLORHEXIDINE GLUCONATE 15 ML: 1.2 SOLUTION ORAL at 08:08

## 2023-01-01 RX ADMIN — HEPARIN SODIUM 5000 UNITS: 5000 INJECTION, SOLUTION INTRAVENOUS; SUBCUTANEOUS at 05:57

## 2023-01-01 RX ADMIN — LORAZEPAM 2 MG: 2 INJECTION INTRAMUSCULAR; INTRAVENOUS at 16:43

## 2023-01-01 RX ADMIN — CLOBAZAM 10 MG: 2.5 SUSPENSION ORAL at 22:08

## 2023-01-01 RX ADMIN — SODIUM CHLORIDE: 9 INJECTION, SOLUTION INTRAVENOUS at 02:19

## 2023-01-01 RX ADMIN — LACOSAMIDE 150 MG: 10 INJECTION INTRAVENOUS at 22:03

## 2023-01-01 RX ADMIN — LACOSAMIDE ORAL SOLUTION 250 MG: 10 SOLUTION ORAL at 09:10

## 2023-01-01 RX ADMIN — MORPHINE SULFATE 5 MG: 100 SOLUTION ORAL at 05:45

## 2023-01-01 RX ADMIN — DIAZEPAM 2.5 MG: 5 INJECTION INTRAMUSCULAR; INTRAVENOUS at 05:23

## 2023-01-01 RX ADMIN — MORPHINE SULFATE 10 MG: 100 SOLUTION ORAL at 23:40

## 2023-01-01 RX ADMIN — CLOBAZAM 20 MG: 2.5 SUSPENSION ORAL at 21:14

## 2023-01-01 RX ADMIN — HEPARIN SODIUM 5000 UNITS: 5000 INJECTION, SOLUTION INTRAVENOUS; SUBCUTANEOUS at 13:51

## 2023-01-01 RX ADMIN — POTASSIUM CHLORIDE 10 MEQ: 7.46 INJECTION, SOLUTION INTRAVENOUS at 16:12

## 2023-01-01 RX ADMIN — LACOSAMIDE 200 MG: 10 INJECTION INTRAVENOUS at 14:38

## 2023-01-01 RX ADMIN — LORAZEPAM 2 MG: 2 INJECTION INTRAMUSCULAR; INTRAVENOUS at 13:49

## 2023-01-01 RX ADMIN — HYDROXYZINE HYDROCHLORIDE 25 MG: 25 TABLET, FILM COATED ORAL at 21:56

## 2023-01-01 RX ADMIN — CLOBAZAM 20 MG: 2.5 SUSPENSION ORAL at 21:28

## 2023-01-01 RX ADMIN — SODIUM CHLORIDE 60 ML: 9 INJECTION, SOLUTION INTRAVENOUS at 16:49

## 2023-01-01 RX ADMIN — CLOBAZAM 20 MG: 2.5 SUSPENSION ORAL at 09:07

## 2023-01-01 RX ADMIN — LORAZEPAM 0.5 MG: 0.5 TABLET ORAL at 13:08

## 2023-01-01 RX ADMIN — LACOSAMIDE ORAL SOLUTION 250 MG: 10 SOLUTION ORAL at 20:54

## 2023-01-01 RX ADMIN — PROPOFOL 60 MCG/KG/MIN: 10 INJECTION, EMULSION INTRAVENOUS at 14:07

## 2023-01-01 RX ADMIN — LACOSAMIDE 100 MG: 10 INJECTION INTRAVENOUS at 09:44

## 2023-01-01 RX ADMIN — CLOBAZAM 20 MG: 2.5 SUSPENSION ORAL at 20:59

## 2023-01-01 RX ADMIN — CLOBAZAM 20 MG: 2.5 SUSPENSION ORAL at 21:30

## 2023-01-01 RX ADMIN — Medication 10 MG: at 02:33

## 2023-01-01 RX ADMIN — DIPHENHYDRAMINE HYDROCHLORIDE 50 MG: 25 SOLUTION ORAL at 21:16

## 2023-01-01 RX ADMIN — LORAZEPAM 2 MG: 2 INJECTION INTRAMUSCULAR; INTRAVENOUS at 16:16

## 2023-01-01 RX ADMIN — ASPIRIN 325 MG: 325 TABLET, COATED ORAL at 07:49

## 2023-01-01 RX ADMIN — Medication 10 MG: at 13:58

## 2023-01-01 RX ADMIN — CLOBAZAM 20 MG: 2.5 SUSPENSION ORAL at 12:01

## 2023-01-01 RX ADMIN — CLOBAZAM 20 MG: 2.5 SUSPENSION ORAL at 09:11

## 2023-01-01 RX ADMIN — Medication 40 MG: at 08:29

## 2023-01-01 RX ADMIN — HYDROMORPHONE HYDROCHLORIDE 0.5 MG: 1 INJECTION, SOLUTION INTRAMUSCULAR; INTRAVENOUS; SUBCUTANEOUS at 13:30

## 2023-01-01 RX ADMIN — CEFEPIME HYDROCHLORIDE 1 G: 1 INJECTION, POWDER, FOR SOLUTION INTRAMUSCULAR; INTRAVENOUS at 05:41

## 2023-01-01 RX ADMIN — FUROSEMIDE 40 MG: 10 INJECTION, SOLUTION INTRAMUSCULAR; INTRAVENOUS at 12:14

## 2023-01-01 RX ADMIN — Medication 2 G: at 12:56

## 2023-01-01 RX ADMIN — MICONAZOLE NITRATE: 2 POWDER TOPICAL at 20:07

## 2023-01-01 RX ADMIN — HEPARIN SODIUM 5000 UNITS: 5000 INJECTION, SOLUTION INTRAVENOUS; SUBCUTANEOUS at 22:26

## 2023-01-01 RX ADMIN — HEPARIN SODIUM 5000 UNITS: 5000 INJECTION, SOLUTION INTRAVENOUS; SUBCUTANEOUS at 06:17

## 2023-01-01 RX ADMIN — MIDAZOLAM HYDROCHLORIDE 18 MG/HR: 1 INJECTION, SOLUTION INTRAVENOUS at 08:09

## 2023-01-01 RX ADMIN — Medication 10 MG: at 14:35

## 2023-01-01 RX ADMIN — VALPROIC ACID 250 MG: 250 SOLUTION ORAL at 00:01

## 2023-01-01 RX ADMIN — LORAZEPAM 1 MG: 2 INJECTION INTRAMUSCULAR; INTRAVENOUS at 17:48

## 2023-01-01 RX ADMIN — Medication 15 ML: at 08:33

## 2023-01-01 RX ADMIN — CLOBAZAM 20 MG: 2.5 SUSPENSION ORAL at 08:47

## 2023-01-01 RX ADMIN — Medication 40 MG: at 07:50

## 2023-01-01 RX ADMIN — MORPHINE SULFATE 10 MG: 100 SOLUTION ORAL at 12:49

## 2023-01-01 RX ADMIN — DIAZEPAM 2 MG: 2 TABLET ORAL at 05:22

## 2023-01-01 RX ADMIN — LEVETIRACETAM 1500 MG: 100 INJECTION, SOLUTION INTRAVENOUS at 05:19

## 2023-01-01 RX ADMIN — SUGAMMADEX 100 MG: 100 INJECTION, SOLUTION INTRAVENOUS at 08:19

## 2023-01-01 RX ADMIN — HEPARIN SODIUM 5000 UNITS: 5000 INJECTION, SOLUTION INTRAVENOUS; SUBCUTANEOUS at 09:35

## 2023-01-01 RX ADMIN — LACOSAMIDE 150 MG: 10 INJECTION INTRAVENOUS at 11:56

## 2023-01-01 RX ADMIN — VALPROIC ACID 250 MG: 250 SOLUTION ORAL at 09:07

## 2023-01-01 RX ADMIN — MIDAZOLAM HYDROCHLORIDE 4 MG/HR: 1 INJECTION, SOLUTION INTRAVENOUS at 23:27

## 2023-01-01 RX ADMIN — HEPARIN SODIUM 5000 UNITS: 5000 INJECTION, SOLUTION INTRAVENOUS; SUBCUTANEOUS at 00:17

## 2023-01-01 RX ADMIN — PROPOFOL 20 MCG/KG/MIN: 10 INJECTION, EMULSION INTRAVENOUS at 13:37

## 2023-01-01 RX ADMIN — FOSPHENYTOIN SODIUM 100 MG PE: 50 INJECTION INTRAMUSCULAR; INTRAVENOUS at 00:09

## 2023-01-01 RX ADMIN — VALPROIC ACID 250 MG: 250 SOLUTION ORAL at 16:01

## 2023-01-01 RX ADMIN — HEPARIN SODIUM 5000 UNITS: 5000 INJECTION, SOLUTION INTRAVENOUS; SUBCUTANEOUS at 23:41

## 2023-01-01 RX ADMIN — CLOBAZAM 20 MG: 2.5 SUSPENSION ORAL at 21:48

## 2023-01-01 RX ADMIN — DIPHENHYDRAMINE HYDROCHLORIDE 50 MG: 25 SOLUTION ORAL at 20:25

## 2023-01-01 RX ADMIN — HYDROXYZINE HYDROCHLORIDE 25 MG: 25 TABLET, FILM COATED ORAL at 21:14

## 2023-01-01 RX ADMIN — LORAZEPAM 0.25 MG: 0.5 TABLET ORAL at 22:31

## 2023-01-01 RX ADMIN — LORAZEPAM 2 MG: 2 CONCENTRATE ORAL at 14:37

## 2023-01-01 RX ADMIN — GADOBUTROL 4 ML: 604.72 INJECTION INTRAVENOUS at 18:18

## 2023-01-01 RX ADMIN — LIDOCAINE HYDROCHLORIDE ANHYDROUS 1 ML: 10 INJECTION, SOLUTION INFILTRATION at 08:28

## 2023-01-01 RX ADMIN — VALPROIC ACID 250 MG: 250 SOLUTION ORAL at 17:14

## 2023-01-01 RX ADMIN — ATENOLOL 25 MG: 25 TABLET ORAL at 09:19

## 2023-01-01 RX ADMIN — CHLORHEXIDINE GLUCONATE 15 ML: 1.2 SOLUTION ORAL at 07:50

## 2023-01-01 RX ADMIN — LORAZEPAM 2 MG: 2 INJECTION INTRAMUSCULAR; INTRAVENOUS at 12:14

## 2023-01-01 RX ADMIN — LACOSAMIDE ORAL SOLUTION 250 MG: 10 SOLUTION ORAL at 09:11

## 2023-01-01 RX ADMIN — FOSPHENYTOIN SODIUM 200 MG PE: 50 INJECTION INTRAMUSCULAR; INTRAVENOUS at 23:23

## 2023-01-01 RX ADMIN — LORAZEPAM 2 MG: 2 INJECTION INTRAMUSCULAR; INTRAVENOUS at 18:50

## 2023-01-01 RX ADMIN — ACYCLOVIR SODIUM 425 MG: 50 INJECTION, SOLUTION INTRAVENOUS at 06:24

## 2023-01-01 RX ADMIN — GADOBUTROL 5 ML: 604.72 INJECTION INTRAVENOUS at 14:50

## 2023-01-01 RX ADMIN — LACOSAMIDE ORAL SOLUTION 250 MG: 10 SOLUTION ORAL at 21:16

## 2023-01-01 RX ADMIN — MORPHINE SULFATE 10 MG: 100 SOLUTION ORAL at 06:33

## 2023-01-01 RX ADMIN — Medication 10 MG: at 08:12

## 2023-01-01 RX ADMIN — LORAZEPAM 2 MG: 2 INJECTION INTRAMUSCULAR; INTRAVENOUS at 04:14

## 2023-01-01 RX ADMIN — ASPIRIN 325 MG: 325 TABLET, COATED ORAL at 09:15

## 2023-01-01 RX ADMIN — LACOSAMIDE 300 MG: 10 INJECTION INTRAVENOUS at 22:05

## 2023-01-01 RX ADMIN — MIDAZOLAM HYDROCHLORIDE 15 MG/HR: 1 INJECTION, SOLUTION INTRAVENOUS at 20:29

## 2023-01-01 RX ADMIN — LORAZEPAM 2 MG: 2 CONCENTRATE ORAL at 05:10

## 2023-01-01 RX ADMIN — HYDROMORPHONE HYDROCHLORIDE 0.5 MG: 1 INJECTION, SOLUTION INTRAMUSCULAR; INTRAVENOUS; SUBCUTANEOUS at 02:30

## 2023-01-01 RX ADMIN — VALPROIC ACID 250 MG: 250 SOLUTION ORAL at 09:10

## 2023-01-01 RX ADMIN — CLOBAZAM 20 MG: 2.5 SUSPENSION ORAL at 21:21

## 2023-01-01 RX ADMIN — ROCURONIUM BROMIDE 20 MG: 50 INJECTION, SOLUTION INTRAVENOUS at 07:40

## 2023-01-01 RX ADMIN — FOSPHENYTOIN SODIUM 100 MG PE: 50 INJECTION INTRAMUSCULAR; INTRAVENOUS at 15:55

## 2023-01-01 RX ADMIN — HEPARIN SODIUM 5000 UNITS: 5000 INJECTION, SOLUTION INTRAVENOUS; SUBCUTANEOUS at 06:12

## 2023-01-01 RX ADMIN — ACETAMINOPHEN 650 MG: 325 TABLET ORAL at 05:23

## 2023-01-01 RX ADMIN — VALPROIC ACID 250 MG: 250 SOLUTION ORAL at 01:01

## 2023-01-01 RX ADMIN — PHENYLEPHRINE HYDROCHLORIDE 100 MCG: 10 INJECTION INTRAVENOUS at 14:00

## 2023-01-01 RX ADMIN — CEFTRIAXONE SODIUM 1 G: 1 INJECTION, POWDER, FOR SOLUTION INTRAMUSCULAR; INTRAVENOUS at 10:24

## 2023-01-01 RX ADMIN — LORAZEPAM 2 MG: 2 CONCENTRATE ORAL at 10:23

## 2023-01-01 RX ADMIN — SENNOSIDES AND DOCUSATE SODIUM 1 TABLET: 50; 8.6 TABLET ORAL at 20:16

## 2023-01-01 RX ADMIN — SODIUM CHLORIDE 1000 MG: 9 INJECTION, SOLUTION INTRAVENOUS at 14:03

## 2023-01-01 RX ADMIN — Medication 15 ML: at 08:38

## 2023-01-01 RX ADMIN — ASPIRIN 325 MG: 325 TABLET, COATED ORAL at 09:13

## 2023-01-01 RX ADMIN — OXYCODONE HYDROCHLORIDE 5 MG: 5 TABLET ORAL at 14:58

## 2023-01-01 RX ADMIN — POLYETHYLENE GLYCOL 3350 17 G: 17 POWDER, FOR SOLUTION ORAL at 08:29

## 2023-01-01 RX ADMIN — POLYETHYLENE GLYCOL 3350 17 G: 17 POWDER, FOR SOLUTION ORAL at 20:05

## 2023-01-01 RX ADMIN — POTASSIUM & SODIUM PHOSPHATES POWDER PACK 280-160-250 MG 1 PACKET: 280-160-250 PACK at 08:10

## 2023-01-01 RX ADMIN — LORAZEPAM 2 MG: 2 INJECTION INTRAMUSCULAR; INTRAVENOUS at 14:30

## 2023-01-01 RX ADMIN — LORAZEPAM 2 MG: 2 INJECTION INTRAMUSCULAR; INTRAVENOUS at 00:36

## 2023-01-01 RX ADMIN — HEPARIN SODIUM 5000 UNITS: 5000 INJECTION, SOLUTION INTRAVENOUS; SUBCUTANEOUS at 02:15

## 2023-01-01 RX ADMIN — LORAZEPAM 2 MG: 2 INJECTION INTRAMUSCULAR; INTRAVENOUS at 18:30

## 2023-01-01 RX ADMIN — LACOSAMIDE ORAL SOLUTION 250 MG: 10 SOLUTION ORAL at 12:01

## 2023-01-01 RX ADMIN — MIDAZOLAM HYDROCHLORIDE 18 MG/HR: 1 INJECTION, SOLUTION INTRAVENOUS at 20:44

## 2023-01-01 RX ADMIN — DEXTROSE MONOHYDRATE: 50 INJECTION, SOLUTION INTRAVENOUS at 23:30

## 2023-01-01 RX ADMIN — CLOBAZAM 20 MG: 2.5 SUSPENSION ORAL at 10:11

## 2023-01-01 RX ADMIN — HYDROMORPHONE HYDROCHLORIDE 0.3 MG: 1 INJECTION, SOLUTION INTRAMUSCULAR; INTRAVENOUS; SUBCUTANEOUS at 14:37

## 2023-01-01 RX ADMIN — MORPHINE SULFATE 10 MG: 100 SOLUTION ORAL at 00:31

## 2023-01-01 RX ADMIN — PANTOPRAZOLE SODIUM 40 MG: 40 INJECTION, POWDER, FOR SOLUTION INTRAVENOUS at 06:17

## 2023-01-01 RX ADMIN — HYDROXYZINE HYDROCHLORIDE 25 MG: 25 TABLET, FILM COATED ORAL at 09:05

## 2023-01-01 RX ADMIN — HEPARIN SODIUM 5000 UNITS: 5000 INJECTION, SOLUTION INTRAVENOUS; SUBCUTANEOUS at 22:00

## 2023-01-01 RX ADMIN — HYDROXYZINE HYDROCHLORIDE 25 MG: 25 TABLET, FILM COATED ORAL at 22:08

## 2023-01-01 RX ADMIN — PROPOFOL 150 MCG/KG/MIN: 10 INJECTION, EMULSION INTRAVENOUS at 13:02

## 2023-01-01 RX ADMIN — CHLORHEXIDINE GLUCONATE 15 ML: 1.2 SOLUTION ORAL at 20:12

## 2023-01-01 RX ADMIN — HEPARIN SODIUM 5000 UNITS: 5000 INJECTION, SOLUTION INTRAVENOUS; SUBCUTANEOUS at 09:15

## 2023-01-01 RX ADMIN — VALPROIC ACID 250 MG: 250 SOLUTION ORAL at 17:05

## 2023-01-01 RX ADMIN — Medication 10 MG: at 19:51

## 2023-01-01 RX ADMIN — PERPHENAZINE 8 MG: 4 TABLET, FILM COATED ORAL at 22:05

## 2023-01-01 RX ADMIN — ACETAMINOPHEN 650 MG: 325 TABLET ORAL at 13:32

## 2023-01-01 RX ADMIN — ESCITALOPRAM OXALATE 20 MG: 10 TABLET, FILM COATED ORAL at 10:43

## 2023-01-01 RX ADMIN — CLOBAZAM 20 MG: 2.5 SUSPENSION ORAL at 21:23

## 2023-01-01 RX ADMIN — LORAZEPAM 0.5 MG: 0.5 TABLET ORAL at 14:02

## 2023-01-01 RX ADMIN — PHENYLEPHRINE HYDROCHLORIDE 150 MCG: 10 INJECTION INTRAVENOUS at 13:12

## 2023-01-01 RX ADMIN — LACOSAMIDE 300 MG: 10 INJECTION INTRAVENOUS at 12:33

## 2023-01-01 RX ADMIN — LORAZEPAM 2 MG: 2 CONCENTRATE ORAL at 17:24

## 2023-01-01 RX ADMIN — HEPARIN SODIUM 5000 UNITS: 5000 INJECTION, SOLUTION INTRAVENOUS; SUBCUTANEOUS at 21:19

## 2023-01-01 RX ADMIN — Medication 10 MG: at 19:58

## 2023-01-01 RX ADMIN — LEVETIRACETAM 1500 MG: 100 INJECTION, SOLUTION INTRAVENOUS at 04:30

## 2023-01-01 RX ADMIN — CHLORHEXIDINE GLUCONATE 15 ML: 1.2 SOLUTION ORAL at 20:21

## 2023-01-01 RX ADMIN — LORAZEPAM 0.5 MG: 0.5 TABLET ORAL at 09:05

## 2023-01-01 RX ADMIN — HYDROMORPHONE HYDROCHLORIDE 0.5 MG: 1 INJECTION, SOLUTION INTRAMUSCULAR; INTRAVENOUS; SUBCUTANEOUS at 12:00

## 2023-01-01 RX ADMIN — MICONAZOLE NITRATE: 2 POWDER TOPICAL at 08:18

## 2023-01-01 RX ADMIN — CHLORHEXIDINE GLUCONATE 15 ML: 1.2 SOLUTION ORAL at 08:00

## 2023-01-01 RX ADMIN — VALPROIC ACID 250 MG: 250 SOLUTION ORAL at 00:17

## 2023-01-01 RX ADMIN — HEPARIN SODIUM 5000 UNITS: 5000 INJECTION, SOLUTION INTRAVENOUS; SUBCUTANEOUS at 10:57

## 2023-01-01 RX ADMIN — ACETAMINOPHEN 650 MG: 650 SUPPOSITORY RECTAL at 01:27

## 2023-01-01 RX ADMIN — DIPHENHYDRAMINE HYDROCHLORIDE 50 MG: 25 SOLUTION ORAL at 21:25

## 2023-01-01 RX ADMIN — LACOSAMIDE 200 MG: 10 INJECTION INTRAVENOUS at 22:22

## 2023-01-01 RX ADMIN — LACOSAMIDE ORAL SOLUTION 250 MG: 10 SOLUTION ORAL at 20:56

## 2023-01-01 RX ADMIN — DIPHENHYDRAMINE HYDROCHLORIDE 50 MG: 50 INJECTION, SOLUTION INTRAMUSCULAR; INTRAVENOUS at 11:53

## 2023-01-01 RX ADMIN — MICONAZOLE NITRATE: 2 POWDER TOPICAL at 22:22

## 2023-01-01 RX ADMIN — LORAZEPAM 2 MG: 2 CONCENTRATE ORAL at 12:19

## 2023-01-01 RX ADMIN — LORAZEPAM 2 MG: 2 INJECTION INTRAMUSCULAR; INTRAVENOUS at 06:38

## 2023-01-01 RX ADMIN — POLYETHYLENE GLYCOL 3350 17 G: 17 POWDER, FOR SOLUTION ORAL at 20:16

## 2023-01-01 RX ADMIN — LORAZEPAM 2 MG: 2 CONCENTRATE ORAL at 17:53

## 2023-01-01 RX ADMIN — SODIUM CHLORIDE, POTASSIUM CHLORIDE, SODIUM LACTATE AND CALCIUM CHLORIDE 500 ML: 600; 310; 30; 20 INJECTION, SOLUTION INTRAVENOUS at 21:15

## 2023-01-01 RX ADMIN — LORAZEPAM 0.5 MG: 0.5 TABLET ORAL at 13:32

## 2023-01-01 RX ADMIN — DEXTROSE MONOHYDRATE 25 ML: 25 INJECTION, SOLUTION INTRAVENOUS at 07:25

## 2023-01-01 RX ADMIN — Medication 15 ML: at 08:14

## 2023-01-01 RX ADMIN — CLOBAZAM 20 MG: 2.5 SUSPENSION ORAL at 09:34

## 2023-01-01 RX ADMIN — VALPROIC ACID 250 MG: 250 SOLUTION ORAL at 08:12

## 2023-01-01 RX ADMIN — FOSPHENYTOIN SODIUM 100 MG PE: 50 INJECTION INTRAMUSCULAR; INTRAVENOUS at 16:47

## 2023-01-01 RX ADMIN — LORAZEPAM 2 MG: 2 CONCENTRATE ORAL at 13:27

## 2023-01-01 RX ADMIN — Medication 10 MG: at 03:36

## 2023-01-01 RX ADMIN — CLOBAZAM 10 MG: 2.5 SUSPENSION ORAL at 16:24

## 2023-01-01 RX ADMIN — MORPHINE SULFATE 10 MG: 100 SOLUTION ORAL at 18:57

## 2023-01-01 RX ADMIN — LACOSAMIDE ORAL SOLUTION 300 MG: 10 SOLUTION ORAL at 21:52

## 2023-01-01 RX ADMIN — DIPHENHYDRAMINE HYDROCHLORIDE 25 MG: 25 CAPSULE ORAL at 09:05

## 2023-01-01 RX ADMIN — VALPROIC ACID 250 MG: 250 SOLUTION ORAL at 07:50

## 2023-01-01 RX ADMIN — LORAZEPAM 1 MG: 2 INJECTION INTRAMUSCULAR; INTRAVENOUS at 17:20

## 2023-01-01 RX ADMIN — LACOSAMIDE ORAL SOLUTION 250 MG: 10 SOLUTION ORAL at 22:16

## 2023-01-01 RX ADMIN — LEVOFLOXACIN 500 MG: 500 TABLET, FILM COATED ORAL at 16:58

## 2023-01-01 RX ADMIN — PERPHENAZINE 4 MG: 4 TABLET, FILM COATED ORAL at 08:22

## 2023-01-01 RX ADMIN — CLOBAZAM 20 MG: 2.5 SUSPENSION ORAL at 20:37

## 2023-01-01 RX ADMIN — LORAZEPAM 2 MG: 2 INJECTION INTRAMUSCULAR; INTRAVENOUS at 05:19

## 2023-01-01 RX ADMIN — HEPARIN SODIUM 5000 UNITS: 5000 INJECTION, SOLUTION INTRAVENOUS; SUBCUTANEOUS at 08:14

## 2023-01-01 RX ADMIN — VALPROIC ACID 250 MG: 250 SOLUTION ORAL at 16:03

## 2023-01-01 RX ADMIN — PROPOFOL 10 MCG/KG/MIN: 10 INJECTION, EMULSION INTRAVENOUS at 11:00

## 2023-01-01 RX ADMIN — LEVETIRACETAM 1500 MG: 100 INJECTION, SOLUTION INTRAVENOUS at 17:03

## 2023-01-01 RX ADMIN — LEVETIRACETAM 1500 MG: 100 INJECTION, SOLUTION INTRAVENOUS at 17:44

## 2023-01-01 RX ADMIN — CLOBAZAM 20 MG: 2.5 SUSPENSION ORAL at 09:09

## 2023-01-01 RX ADMIN — LORAZEPAM 2 MG: 2 INJECTION INTRAMUSCULAR; INTRAVENOUS at 09:47

## 2023-01-01 RX ADMIN — DIAZEPAM 2 MG: 2 TABLET ORAL at 06:12

## 2023-01-01 RX ADMIN — CHLORHEXIDINE GLUCONATE 15 ML: 1.2 SOLUTION ORAL at 21:25

## 2023-01-01 RX ADMIN — LEVETIRACETAM 1500 MG: 100 INJECTION, SOLUTION INTRAVENOUS at 20:30

## 2023-01-01 RX ADMIN — MORPHINE SULFATE 10 MG: 100 SOLUTION ORAL at 18:19

## 2023-01-01 RX ADMIN — OXYCODONE HYDROCHLORIDE 5 MG: 5 TABLET ORAL at 18:39

## 2023-01-01 RX ADMIN — HEPARIN SODIUM 5000 UNITS: 5000 INJECTION, SOLUTION INTRAVENOUS; SUBCUTANEOUS at 17:37

## 2023-01-01 RX ADMIN — VALPROIC ACID 250 MG: 250 SOLUTION ORAL at 08:24

## 2023-01-01 RX ADMIN — DIAZEPAM 5 MG: 5 INJECTION INTRAMUSCULAR; INTRAVENOUS at 15:43

## 2023-01-01 RX ADMIN — CEFEPIME HYDROCHLORIDE 1 G: 1 INJECTION, POWDER, FOR SOLUTION INTRAMUSCULAR; INTRAVENOUS at 17:23

## 2023-01-01 RX ADMIN — LACOSAMIDE ORAL SOLUTION 250 MG: 10 SOLUTION ORAL at 21:32

## 2023-01-01 RX ADMIN — CLOBAZAM 20 MG: 2.5 SUSPENSION ORAL at 08:38

## 2023-01-01 RX ADMIN — CLOBAZAM 20 MG: 2.5 SUSPENSION ORAL at 21:15

## 2023-01-01 RX ADMIN — PROPOFOL 20 MCG/KG/MIN: 10 INJECTION, EMULSION INTRAVENOUS at 07:00

## 2023-01-01 RX ADMIN — POLYETHYLENE GLYCOL 3350 17 G: 17 POWDER, FOR SOLUTION ORAL at 08:16

## 2023-01-01 RX ADMIN — VALPROIC ACID 250 MG: 250 SOLUTION ORAL at 08:14

## 2023-01-01 RX ADMIN — Medication 10 MG: at 08:02

## 2023-01-01 RX ADMIN — VALPROIC ACID 250 MG: 250 SOLUTION ORAL at 02:23

## 2023-01-01 RX ADMIN — CLOBAZAM 20 MG: 2.5 SUSPENSION ORAL at 21:40

## 2023-01-01 RX ADMIN — LORAZEPAM 2 MG: 2 INJECTION INTRAMUSCULAR; INTRAVENOUS at 00:48

## 2023-01-01 RX ADMIN — HEPARIN SODIUM 5000 UNITS: 5000 INJECTION, SOLUTION INTRAVENOUS; SUBCUTANEOUS at 21:25

## 2023-01-01 RX ADMIN — SODIUM CHLORIDE 1000 ML: 9 INJECTION, SOLUTION INTRAVENOUS at 15:31

## 2023-01-01 RX ADMIN — Medication 15 ML: at 09:13

## 2023-01-01 RX ADMIN — VALPROIC ACID 250 MG: 250 SOLUTION ORAL at 16:17

## 2023-01-01 RX ADMIN — INSULIN ASPART 1 UNITS: 100 INJECTION, SOLUTION INTRAVENOUS; SUBCUTANEOUS at 11:41

## 2023-01-01 RX ADMIN — ASPIRIN 325 MG: 325 TABLET, COATED ORAL at 18:07

## 2023-01-01 RX ADMIN — VALPROIC ACID 250 MG: 250 SOLUTION ORAL at 00:38

## 2023-01-01 RX ADMIN — LORAZEPAM 2 MG: 2 INJECTION INTRAMUSCULAR; INTRAVENOUS at 04:05

## 2023-01-01 RX ADMIN — SENNOSIDES AND DOCUSATE SODIUM 1 TABLET: 50; 8.6 TABLET ORAL at 07:50

## 2023-01-01 RX ADMIN — LORAZEPAM 2 MG: 2 INJECTION INTRAMUSCULAR; INTRAVENOUS at 09:37

## 2023-01-01 RX ADMIN — VANCOMYCIN HYDROCHLORIDE 1000 MG: 1 INJECTION, SOLUTION INTRAVENOUS at 01:58

## 2023-01-01 RX ADMIN — POTASSIUM CHLORIDE 20 MEQ: 29.8 INJECTION, SOLUTION INTRAVENOUS at 11:30

## 2023-01-01 RX ADMIN — LACOSAMIDE 100 MG: 10 INJECTION INTRAVENOUS at 20:53

## 2023-01-01 RX ADMIN — POLYETHYLENE GLYCOL 3350 17 G: 17 POWDER, FOR SOLUTION ORAL at 09:23

## 2023-01-01 RX ADMIN — Medication 40 MG: at 08:14

## 2023-01-01 RX ADMIN — ACETAMINOPHEN 650 MG: 325 TABLET ORAL at 15:52

## 2023-01-01 RX ADMIN — LORAZEPAM 0.25 MG: 0.5 TABLET ORAL at 20:41

## 2023-01-01 RX ADMIN — HYDROXYZINE HYDROCHLORIDE 25 MG: 25 TABLET, FILM COATED ORAL at 08:21

## 2023-01-01 RX ADMIN — ESCITALOPRAM OXALATE 20 MG: 10 TABLET, FILM COATED ORAL at 08:22

## 2023-01-01 RX ADMIN — FOSPHENYTOIN SODIUM 100 MG PE: 50 INJECTION INTRAMUSCULAR; INTRAVENOUS at 00:27

## 2023-01-01 RX ADMIN — PHENYLEPHRINE HYDROCHLORIDE 100 MCG: 10 INJECTION INTRAVENOUS at 13:45

## 2023-01-01 RX ADMIN — LEVETIRACETAM 1500 MG: 100 SOLUTION ORAL at 08:58

## 2023-01-01 RX ADMIN — LACOSAMIDE 150 MG: 10 INJECTION INTRAVENOUS at 21:26

## 2023-01-01 RX ADMIN — CLOBAZAM 20 MG: 2.5 SUSPENSION ORAL at 09:35

## 2023-01-01 RX ADMIN — DEXTROSE MONOHYDRATE: 50 INJECTION, SOLUTION INTRAVENOUS at 00:49

## 2023-01-01 RX ADMIN — CHLORHEXIDINE GLUCONATE 15 ML: 1.2 SOLUTION ORAL at 20:13

## 2023-01-01 RX ADMIN — Medication 15 ML: at 08:05

## 2023-01-01 RX ADMIN — LORAZEPAM 2 MG: 2 CONCENTRATE ORAL at 08:58

## 2023-01-01 RX ADMIN — LORAZEPAM 2 MG: 2 CONCENTRATE ORAL at 04:04

## 2023-01-01 RX ADMIN — LORAZEPAM 2 MG: 2 INJECTION INTRAMUSCULAR; INTRAVENOUS at 21:49

## 2023-01-01 RX ADMIN — LEVETIRACETAM 1500 MG: 100 INJECTION, SOLUTION INTRAVENOUS at 16:17

## 2023-01-01 RX ADMIN — Medication 10 MG: at 08:03

## 2023-01-01 RX ADMIN — PANTOPRAZOLE SODIUM 40 MG: 40 INJECTION, POWDER, FOR SOLUTION INTRAVENOUS at 08:30

## 2023-01-01 RX ADMIN — LORAZEPAM 2 MG: 2 INJECTION INTRAMUSCULAR; INTRAVENOUS at 14:39

## 2023-01-01 RX ADMIN — CLOBAZAM 20 MG: 2.5 SUSPENSION ORAL at 21:56

## 2023-01-01 RX ADMIN — HEPARIN SODIUM 5000 UNITS: 5000 INJECTION, SOLUTION INTRAVENOUS; SUBCUTANEOUS at 21:07

## 2023-01-01 RX ADMIN — PROPOFOL 60 MCG/KG/MIN: 10 INJECTION, EMULSION INTRAVENOUS at 08:45

## 2023-01-01 RX ADMIN — VALPROIC ACID 250 MG: 250 SOLUTION ORAL at 23:57

## 2023-01-01 RX ADMIN — FUROSEMIDE 40 MG: 40 TABLET ORAL at 16:30

## 2023-01-01 RX ADMIN — DIPHENHYDRAMINE HYDROCHLORIDE 50 MG: 25 SOLUTION ORAL at 20:24

## 2023-01-01 RX ADMIN — MORPHINE SULFATE 5 MG: 100 SOLUTION ORAL at 12:14

## 2023-01-01 RX ADMIN — LEVETIRACETAM 1500 MG: 100 INJECTION, SOLUTION INTRAVENOUS at 15:42

## 2023-01-01 RX ADMIN — WATER 5 ML: 1 INJECTION INTRAMUSCULAR; INTRAVENOUS; SUBCUTANEOUS at 14:45

## 2023-01-01 RX ADMIN — SODIUM CHLORIDE: 9 INJECTION, SOLUTION INTRAVENOUS at 18:35

## 2023-01-01 RX ADMIN — Medication 10 MG: at 08:17

## 2023-01-01 RX ADMIN — DIPHENHYDRAMINE HYDROCHLORIDE 25 MG: 25 CAPSULE ORAL at 19:21

## 2023-01-01 RX ADMIN — ACETAMINOPHEN 650 MG: 325 TABLET, FILM COATED ORAL at 20:14

## 2023-01-01 RX ADMIN — FENTANYL CITRATE 50 MCG: 50 INJECTION INTRAMUSCULAR; INTRAVENOUS at 07:55

## 2023-01-01 RX ADMIN — Medication 15 ML: at 08:24

## 2023-01-01 RX ADMIN — VANCOMYCIN HYDROCHLORIDE 1000 MG: 1 INJECTION, SOLUTION INTRAVENOUS at 14:23

## 2023-01-01 RX ADMIN — HYDRALAZINE HYDROCHLORIDE 5 MG: 20 INJECTION INTRAMUSCULAR; INTRAVENOUS at 04:25

## 2023-01-01 RX ADMIN — Medication 40 MG: at 06:12

## 2023-01-01 RX ADMIN — CHLORHEXIDINE GLUCONATE 15 ML: 1.2 SOLUTION ORAL at 08:23

## 2023-01-01 RX ADMIN — ACETAMINOPHEN 650 MG: 325 TABLET, FILM COATED ORAL at 20:03

## 2023-01-01 RX ADMIN — Medication 15 ML: at 10:27

## 2023-01-01 RX ADMIN — HEPARIN SODIUM 5000 UNITS: 5000 INJECTION, SOLUTION INTRAVENOUS; SUBCUTANEOUS at 21:55

## 2023-01-01 RX ADMIN — LORAZEPAM 2 MG: 2 CONCENTRATE ORAL at 10:57

## 2023-01-01 RX ADMIN — LACOSAMIDE 200 MG: 10 INJECTION INTRAVENOUS at 22:35

## 2023-01-01 RX ADMIN — IPRATROPIUM BROMIDE AND ALBUTEROL SULFATE 3 ML: .5; 3 SOLUTION RESPIRATORY (INHALATION) at 12:30

## 2023-01-01 RX ADMIN — ETOMIDATE 8 MG: 2 INJECTION, SOLUTION INTRAVENOUS at 11:43

## 2023-01-01 RX ADMIN — HEPARIN SODIUM 5000 UNITS: 5000 INJECTION, SOLUTION INTRAVENOUS; SUBCUTANEOUS at 16:59

## 2023-01-01 RX ADMIN — LACOSAMIDE 200 MG: 10 INJECTION INTRAVENOUS at 21:52

## 2023-01-01 RX ADMIN — LACOSAMIDE ORAL SOLUTION 250 MG: 10 SOLUTION ORAL at 08:35

## 2023-01-01 RX ADMIN — CEFEPIME HYDROCHLORIDE 1 G: 1 INJECTION, POWDER, FOR SOLUTION INTRAMUSCULAR; INTRAVENOUS at 19:58

## 2023-01-01 RX ADMIN — Medication 10 MG: at 09:00

## 2023-01-01 RX ADMIN — IPRATROPIUM BROMIDE AND ALBUTEROL SULFATE 3 ML: .5; 3 SOLUTION RESPIRATORY (INHALATION) at 19:51

## 2023-01-01 RX ADMIN — LACOSAMIDE 200 MG: 10 INJECTION INTRAVENOUS at 22:26

## 2023-01-01 RX ADMIN — MORPHINE SULFATE 10 MG: 100 SOLUTION ORAL at 09:57

## 2023-01-01 RX ADMIN — MORPHINE SULFATE 10 MG: 100 SOLUTION ORAL at 00:35

## 2023-01-01 RX ADMIN — POTASSIUM CHLORIDE 10 MEQ: 7.46 INJECTION, SOLUTION INTRAVENOUS at 11:15

## 2023-01-01 RX ADMIN — ACETAMINOPHEN 650 MG: 325 TABLET ORAL at 16:15

## 2023-01-01 RX ADMIN — MIDAZOLAM HYDROCHLORIDE 6 MG/HR: 1 INJECTION, SOLUTION INTRAVENOUS at 20:20

## 2023-01-01 RX ADMIN — ZOLPIDEM TARTRATE 10 MG: 5 TABLET ORAL at 10:46

## 2023-01-01 RX ADMIN — HEPARIN SODIUM 5000 UNITS: 5000 INJECTION, SOLUTION INTRAVENOUS; SUBCUTANEOUS at 16:12

## 2023-01-01 RX ADMIN — VALPROIC ACID 250 MG: 250 SOLUTION ORAL at 16:16

## 2023-01-01 RX ADMIN — HEPARIN SODIUM 5000 UNITS: 5000 INJECTION, SOLUTION INTRAVENOUS; SUBCUTANEOUS at 06:22

## 2023-01-01 RX ADMIN — DEXTROSE MONOHYDRATE 25 G: 25 INJECTION, SOLUTION INTRAVENOUS at 08:00

## 2023-01-01 RX ADMIN — SODIUM CHLORIDE, POTASSIUM CHLORIDE, SODIUM LACTATE AND CALCIUM CHLORIDE 1000 ML: 600; 310; 30; 20 INJECTION, SOLUTION INTRAVENOUS at 18:48

## 2023-01-01 RX ADMIN — IPRATROPIUM BROMIDE AND ALBUTEROL SULFATE 3 ML: .5; 3 SOLUTION RESPIRATORY (INHALATION) at 12:05

## 2023-01-01 RX ADMIN — LACOSAMIDE ORAL SOLUTION 300 MG: 10 SOLUTION ORAL at 08:52

## 2023-01-01 RX ADMIN — LORAZEPAM 4 MG: 2 INJECTION INTRAMUSCULAR; INTRAVENOUS at 22:00

## 2023-01-01 RX ADMIN — MORPHINE SULFATE 10 MG: 100 SOLUTION ORAL at 13:20

## 2023-01-01 RX ADMIN — CLOBAZAM 20 MG: 2.5 SUSPENSION ORAL at 21:31

## 2023-01-01 RX ADMIN — LACOSAMIDE 200 MG: 10 INJECTION INTRAVENOUS at 12:08

## 2023-01-01 RX ADMIN — HEPARIN SODIUM 5000 UNITS: 5000 INJECTION, SOLUTION INTRAVENOUS; SUBCUTANEOUS at 16:40

## 2023-01-01 RX ADMIN — ALBUTEROL SULFATE 2.5 MG: 2.5 SOLUTION RESPIRATORY (INHALATION) at 22:59

## 2023-01-01 RX ADMIN — ACETAMINOPHEN 650 MG: 325 TABLET, FILM COATED ORAL at 21:07

## 2023-01-01 RX ADMIN — CLOBAZAM 20 MG: 2.5 SUSPENSION ORAL at 21:54

## 2023-01-01 RX ADMIN — CHLORHEXIDINE GLUCONATE 15 ML: 1.2 SOLUTION ORAL at 19:53

## 2023-01-01 RX ADMIN — ATROPINE SULFATE 2 DROP: 10 SOLUTION/ DROPS OPHTHALMIC at 09:10

## 2023-01-01 RX ADMIN — CHLORHEXIDINE GLUCONATE 15 ML: 1.2 SOLUTION ORAL at 07:53

## 2023-01-01 RX ADMIN — POTASSIUM & SODIUM PHOSPHATES POWDER PACK 280-160-250 MG 2 PACKET: 280-160-250 PACK at 23:44

## 2023-01-01 RX ADMIN — GLYCOPYRROLATE 0.2 MG: 0.2 INJECTION, SOLUTION INTRAMUSCULAR; INTRAVENOUS at 17:25

## 2023-01-01 RX ADMIN — LORAZEPAM 2 MG: 2 CONCENTRATE ORAL at 10:41

## 2023-01-01 RX ADMIN — Medication 60 ML: at 15:50

## 2023-01-01 RX ADMIN — LORAZEPAM 2 MG: 2 INJECTION INTRAMUSCULAR; INTRAVENOUS at 08:54

## 2023-01-01 RX ADMIN — LEVETIRACETAM 1500 MG: 100 SOLUTION ORAL at 21:25

## 2023-01-01 RX ADMIN — HEPARIN SODIUM 5000 UNITS: 5000 INJECTION, SOLUTION INTRAVENOUS; SUBCUTANEOUS at 00:20

## 2023-01-01 RX ADMIN — WATER 10 ML: 1 INJECTION INTRAMUSCULAR; INTRAVENOUS; SUBCUTANEOUS at 22:10

## 2023-01-01 RX ADMIN — HYDROMORPHONE HYDROCHLORIDE 0.5 MG: 1 INJECTION, SOLUTION INTRAMUSCULAR; INTRAVENOUS; SUBCUTANEOUS at 17:30

## 2023-01-01 RX ADMIN — LEVOFLOXACIN 500 MG: 500 TABLET, FILM COATED ORAL at 16:59

## 2023-01-01 RX ADMIN — CHLORHEXIDINE GLUCONATE 15 ML: 1.2 SOLUTION ORAL at 20:07

## 2023-01-01 RX ADMIN — GLYCOPYRROLATE 0.2 MG: 0.2 INJECTION, SOLUTION INTRAMUSCULAR; INTRAVENOUS at 05:17

## 2023-01-01 RX ADMIN — CLOBAZAM 20 MG: 2.5 SUSPENSION ORAL at 21:49

## 2023-01-01 RX ADMIN — VALPROIC ACID 250 MG: 250 SOLUTION ORAL at 17:53

## 2023-01-01 RX ADMIN — ACETAMINOPHEN 650 MG: 325 TABLET, FILM COATED ORAL at 10:07

## 2023-01-01 RX ADMIN — ATROPINE SULFATE 2 DROP: 10 SOLUTION/ DROPS OPHTHALMIC at 13:49

## 2023-01-01 RX ADMIN — Medication 15 ML: at 14:55

## 2023-01-01 RX ADMIN — ROCURONIUM BROMIDE 30 MG: 50 INJECTION, SOLUTION INTRAVENOUS at 07:33

## 2023-01-01 RX ADMIN — HEPARIN SODIUM 5000 UNITS: 5000 INJECTION, SOLUTION INTRAVENOUS; SUBCUTANEOUS at 09:32

## 2023-01-01 RX ADMIN — DIPHENHYDRAMINE HYDROCHLORIDE 50 MG: 25 SOLUTION ORAL at 20:10

## 2023-01-01 RX ADMIN — LACOSAMIDE ORAL SOLUTION 250 MG: 10 SOLUTION ORAL at 21:40

## 2023-01-01 RX ADMIN — FOSPHENYTOIN SODIUM 100 MG PE: 50 INJECTION INTRAMUSCULAR; INTRAVENOUS at 23:43

## 2023-01-01 RX ADMIN — MIDAZOLAM HYDROCHLORIDE 1 MG/HR: 1 INJECTION, SOLUTION INTRAVENOUS at 11:07

## 2023-01-01 RX ADMIN — HEPARIN SODIUM 5000 UNITS: 5000 INJECTION, SOLUTION INTRAVENOUS; SUBCUTANEOUS at 21:43

## 2023-01-01 RX ADMIN — DIPHENHYDRAMINE HYDROCHLORIDE 50 MG: 25 CAPSULE ORAL at 22:00

## 2023-01-01 RX ADMIN — CHLORHEXIDINE GLUCONATE 15 ML: 1.2 SOLUTION ORAL at 19:49

## 2023-01-01 RX ADMIN — CLOBAZAM 5 MG: 2.5 SUSPENSION ORAL at 17:27

## 2023-01-01 RX ADMIN — LACOSAMIDE ORAL SOLUTION 250 MG: 10 SOLUTION ORAL at 09:09

## 2023-01-01 RX ADMIN — LACOSAMIDE ORAL SOLUTION 250 MG: 10 SOLUTION ORAL at 21:31

## 2023-01-01 RX ADMIN — ACETAMINOPHEN 650 MG: 325 TABLET, FILM COATED ORAL at 08:10

## 2023-01-01 RX ADMIN — Medication 10 MG: at 02:35

## 2023-01-01 RX ADMIN — FOSPHENYTOIN SODIUM 100 MG PE: 50 INJECTION INTRAMUSCULAR; INTRAVENOUS at 09:00

## 2023-01-01 RX ADMIN — CHLORHEXIDINE GLUCONATE 15 ML: 1.2 SOLUTION ORAL at 19:46

## 2023-01-01 RX ADMIN — LACOSAMIDE 200 MG: 10 INJECTION INTRAVENOUS at 11:37

## 2023-01-01 RX ADMIN — Medication 15 ML: at 08:29

## 2023-01-01 RX ADMIN — Medication 15 ML: at 08:52

## 2023-01-01 RX ADMIN — GLYCOPYRROLATE 0.2 MG: 0.2 INJECTION, SOLUTION INTRAMUSCULAR; INTRAVENOUS at 20:48

## 2023-01-01 RX ADMIN — SENNOSIDES AND DOCUSATE SODIUM 1 TABLET: 50; 8.6 TABLET ORAL at 08:08

## 2023-01-01 RX ADMIN — ACETAMINOPHEN 650 MG: 325 TABLET, FILM COATED ORAL at 15:48

## 2023-01-01 RX ADMIN — DIPHENHYDRAMINE HYDROCHLORIDE 50 MG: 25 SOLUTION ORAL at 20:17

## 2023-01-01 RX ADMIN — Medication 10 MG: at 20:25

## 2023-01-01 RX ADMIN — LEVOFLOXACIN 500 MG: 500 TABLET, FILM COATED ORAL at 16:30

## 2023-01-01 RX ADMIN — VALPROIC ACID 250 MG: 250 SOLUTION ORAL at 08:05

## 2023-01-01 RX ADMIN — LACOSAMIDE ORAL SOLUTION 300 MG: 10 SOLUTION ORAL at 21:31

## 2023-01-01 RX ADMIN — LACOSAMIDE ORAL SOLUTION 300 MG: 10 SOLUTION ORAL at 21:05

## 2023-01-01 RX ADMIN — Medication 40 MG: at 07:46

## 2023-01-01 RX ADMIN — ACETAMINOPHEN 650 MG: 325 TABLET, FILM COATED ORAL at 05:55

## 2023-01-01 RX ADMIN — LEVETIRACETAM 1500 MG: 100 INJECTION, SOLUTION INTRAVENOUS at 15:20

## 2023-01-01 RX ADMIN — VALPROIC ACID 250 MG: 250 SOLUTION ORAL at 08:09

## 2023-01-01 RX ADMIN — GADOBUTROL 4 ML: 604.72 INJECTION INTRAVENOUS at 14:31

## 2023-01-01 RX ADMIN — LACOSAMIDE 200 MG: 10 INJECTION INTRAVENOUS at 09:41

## 2023-01-01 RX ADMIN — DOCUSATE SODIUM 226 ML: 50 LIQUID ORAL at 18:00

## 2023-01-01 RX ADMIN — MORPHINE SULFATE 10 MG: 100 SOLUTION ORAL at 19:02

## 2023-01-01 RX ADMIN — CLOBAZAM 10 MG: 2.5 SUSPENSION ORAL at 21:11

## 2023-01-01 RX ADMIN — MIDAZOLAM HYDROCHLORIDE 18 MG/HR: 1 INJECTION, SOLUTION INTRAVENOUS at 09:25

## 2023-01-01 RX ADMIN — LACOSAMIDE ORAL SOLUTION 250 MG: 10 SOLUTION ORAL at 20:59

## 2023-01-01 RX ADMIN — VALPROIC ACID 250 MG: 250 SOLUTION ORAL at 00:29

## 2023-01-01 RX ADMIN — HYDRALAZINE HYDROCHLORIDE 5 MG: 20 INJECTION INTRAMUSCULAR; INTRAVENOUS at 21:36

## 2023-01-01 RX ADMIN — FOSPHENYTOIN SODIUM 200 MG PE: 50 INJECTION INTRAMUSCULAR; INTRAVENOUS at 21:14

## 2023-01-01 RX ADMIN — POTASSIUM & SODIUM PHOSPHATES POWDER PACK 280-160-250 MG 1 PACKET: 280-160-250 PACK at 12:04

## 2023-01-01 RX ADMIN — MORPHINE SULFATE 10 MG: 100 SOLUTION ORAL at 00:06

## 2023-01-01 RX ADMIN — FUROSEMIDE 40 MG: 40 TABLET ORAL at 16:58

## 2023-01-01 RX ADMIN — LACOSAMIDE ORAL SOLUTION 300 MG: 10 SOLUTION ORAL at 09:22

## 2023-01-01 RX ADMIN — CLOBAZAM 20 MG: 2.5 SUSPENSION ORAL at 08:54

## 2023-01-01 RX ADMIN — CHLORHEXIDINE GLUCONATE 15 ML: 1.2 SOLUTION ORAL at 07:47

## 2023-01-01 RX ADMIN — LACOSAMIDE ORAL SOLUTION 300 MG: 10 SOLUTION ORAL at 09:24

## 2023-01-01 RX ADMIN — CHLORHEXIDINE GLUCONATE 15 ML: 1.2 SOLUTION ORAL at 19:59

## 2023-01-01 RX ADMIN — HEPARIN SODIUM 5000 UNITS: 5000 INJECTION, SOLUTION INTRAVENOUS; SUBCUTANEOUS at 05:26

## 2023-01-01 RX ADMIN — ESCITALOPRAM OXALATE 20 MG: 10 TABLET, FILM COATED ORAL at 07:49

## 2023-01-01 RX ADMIN — LACOSAMIDE 200 MG: 10 INJECTION INTRAVENOUS at 09:33

## 2023-01-01 RX ADMIN — CLOBAZAM 20 MG: 2.5 SUSPENSION ORAL at 08:36

## 2023-01-01 RX ADMIN — VALPROIC ACID 250 MG: 250 SOLUTION ORAL at 00:10

## 2023-01-01 RX ADMIN — DIPHENHYDRAMINE HYDROCHLORIDE 50 MG: 25 SOLUTION ORAL at 20:05

## 2023-01-01 RX ADMIN — LEVETIRACETAM 1000 MG: 10 INJECTION INTRAVENOUS at 15:49

## 2023-01-01 RX ADMIN — FUROSEMIDE 40 MG: 10 INJECTION, SOLUTION INTRAMUSCULAR; INTRAVENOUS at 00:07

## 2023-01-01 RX ADMIN — CLOBAZAM 20 MG: 2.5 SUSPENSION ORAL at 08:56

## 2023-01-01 RX ADMIN — LORAZEPAM 2 MG: 2 INJECTION INTRAMUSCULAR; INTRAVENOUS at 08:36

## 2023-01-01 RX ADMIN — CLOBAZAM 20 MG: 2.5 SUSPENSION ORAL at 08:14

## 2023-01-01 RX ADMIN — LORAZEPAM 0.25 MG: 0.5 TABLET ORAL at 21:14

## 2023-01-01 RX ADMIN — DIPHENHYDRAMINE HYDROCHLORIDE 50 MG: 25 SOLUTION ORAL at 08:00

## 2023-01-01 RX ADMIN — VALPROIC ACID 250 MG: 250 SOLUTION ORAL at 16:11

## 2023-01-01 RX ADMIN — HYDROMORPHONE HYDROCHLORIDE 0.5 MG: 1 INJECTION, SOLUTION INTRAMUSCULAR; INTRAVENOUS; SUBCUTANEOUS at 13:12

## 2023-01-01 RX ADMIN — CLOBAZAM 20 MG: 2.5 SUSPENSION ORAL at 08:45

## 2023-01-01 RX ADMIN — GLYCOPYRROLATE 0.2 MG: 0.2 INJECTION, SOLUTION INTRAMUSCULAR; INTRAVENOUS at 11:14

## 2023-01-01 RX ADMIN — MORPHINE SULFATE 10 MG: 100 SOLUTION ORAL at 12:48

## 2023-01-01 RX ADMIN — Medication 10 MG: at 20:03

## 2023-01-01 RX ADMIN — Medication 10 MG: at 20:31

## 2023-01-01 RX ADMIN — CHLORHEXIDINE GLUCONATE 15 ML: 1.2 SOLUTION ORAL at 20:16

## 2023-01-01 RX ADMIN — PROPOFOL 50 MG: 10 INJECTION, EMULSION INTRAVENOUS at 13:02

## 2023-01-01 RX ADMIN — LORAZEPAM 2 MG: 2 INJECTION INTRAMUSCULAR; INTRAVENOUS at 01:57

## 2023-01-01 RX ADMIN — CLOBAZAM 20 MG: 2.5 SUSPENSION ORAL at 09:30

## 2023-01-01 RX ADMIN — CLOBAZAM 20 MG: 2.5 SUSPENSION ORAL at 09:24

## 2023-01-01 RX ADMIN — CLOBAZAM 20 MG: 2.5 SUSPENSION ORAL at 09:08

## 2023-01-01 RX ADMIN — CHLORHEXIDINE GLUCONATE 15 ML: 1.2 SOLUTION ORAL at 08:12

## 2023-01-01 RX ADMIN — FOSPHENYTOIN SODIUM 100 MG PE: 50 INJECTION INTRAMUSCULAR; INTRAVENOUS at 06:45

## 2023-01-01 RX ADMIN — VALPROIC ACID 250 MG: 250 SOLUTION ORAL at 01:00

## 2023-01-01 RX ADMIN — FOSPHENYTOIN SODIUM 100 MG PE: 50 INJECTION INTRAMUSCULAR; INTRAVENOUS at 23:47

## 2023-01-01 RX ADMIN — Medication 1 MG: at 02:14

## 2023-01-01 RX ADMIN — LORAZEPAM 2 MG: 2 INJECTION INTRAMUSCULAR; INTRAVENOUS at 19:01

## 2023-01-01 RX ADMIN — NIFEDIPINE 30 MG: 30 TABLET, FILM COATED, EXTENDED RELEASE ORAL at 08:22

## 2023-01-01 RX ADMIN — LIDOCAINE HYDROCHLORIDE: 20 JELLY TOPICAL at 23:05

## 2023-01-01 RX ADMIN — LEVETIRACETAM 1500 MG: 100 INJECTION, SOLUTION INTRAVENOUS at 17:23

## 2023-01-01 RX ADMIN — MIDAZOLAM HYDROCHLORIDE 13 MG/HR: 1 INJECTION, SOLUTION INTRAVENOUS at 14:38

## 2023-01-01 RX ADMIN — Medication 15 ML: at 09:34

## 2023-01-01 RX ADMIN — CLOBAZAM 20 MG: 2.5 SUSPENSION ORAL at 09:19

## 2023-01-01 RX ADMIN — ACETAMINOPHEN 650 MG: 325 TABLET, FILM COATED ORAL at 19:52

## 2023-01-01 RX ADMIN — PHENYLEPHRINE HYDROCHLORIDE 100 MCG: 10 INJECTION INTRAVENOUS at 13:25

## 2023-01-01 RX ADMIN — HEPARIN SODIUM 5000 UNITS: 5000 INJECTION, SOLUTION INTRAVENOUS; SUBCUTANEOUS at 17:00

## 2023-01-01 RX ADMIN — LEVETIRACETAM 1500 MG: 100 SOLUTION ORAL at 21:18

## 2023-01-01 RX ADMIN — LACOSAMIDE ORAL SOLUTION 300 MG: 10 SOLUTION ORAL at 21:21

## 2023-01-01 RX ADMIN — CHLORHEXIDINE GLUCONATE 15 ML: 1.2 SOLUTION ORAL at 08:58

## 2023-01-01 RX ADMIN — CLOBAZAM 20 MG: 2.5 SUSPENSION ORAL at 20:44

## 2023-01-01 RX ADMIN — ESCITALOPRAM OXALATE 20 MG: 10 TABLET, FILM COATED ORAL at 08:39

## 2023-01-01 RX ADMIN — LACOSAMIDE 200 MG: 10 INJECTION INTRAVENOUS at 12:42

## 2023-01-01 RX ADMIN — LACOSAMIDE ORAL SOLUTION 250 MG: 10 SOLUTION ORAL at 20:33

## 2023-01-01 RX ADMIN — DIAZEPAM 2 MG: 2 TABLET ORAL at 10:47

## 2023-01-01 RX ADMIN — CLOBAZAM 20 MG: 2.5 SUSPENSION ORAL at 22:18

## 2023-01-01 RX ADMIN — IPRATROPIUM BROMIDE AND ALBUTEROL SULFATE 3 ML: .5; 3 SOLUTION RESPIRATORY (INHALATION) at 07:07

## 2023-01-01 RX ADMIN — POLYETHYLENE GLYCOL 3350 17 G: 17 POWDER, FOR SOLUTION ORAL at 20:24

## 2023-01-01 RX ADMIN — ACETAMINOPHEN 650 MG: 325 TABLET ORAL at 06:19

## 2023-01-01 RX ADMIN — PROPOFOL 60 MCG/KG/MIN: 10 INJECTION, EMULSION INTRAVENOUS at 01:49

## 2023-01-01 RX ADMIN — PHENYLEPHRINE HYDROCHLORIDE 100 MCG: 10 INJECTION INTRAVENOUS at 07:39

## 2023-01-01 RX ADMIN — SODIUM CHLORIDE 100 ML: 900 INJECTION INTRAVENOUS at 16:32

## 2023-01-01 RX ADMIN — HEPARIN SODIUM 5000 UNITS: 5000 INJECTION, SOLUTION INTRAVENOUS; SUBCUTANEOUS at 00:10

## 2023-01-01 RX ADMIN — MIDAZOLAM HYDROCHLORIDE 4 MG/HR: 1 INJECTION, SOLUTION INTRAVENOUS at 19:44

## 2023-01-01 RX ADMIN — ATROPINE SULFATE 2 DROP: 10 SOLUTION/ DROPS OPHTHALMIC at 21:02

## 2023-01-01 RX ADMIN — LACOSAMIDE ORAL SOLUTION 300 MG: 10 SOLUTION ORAL at 21:41

## 2023-01-01 RX ADMIN — CLOBAZAM 20 MG: 2.5 SUSPENSION ORAL at 21:05

## 2023-01-01 RX ADMIN — LORAZEPAM 0.5 MG: 0.5 TABLET ORAL at 13:13

## 2023-01-01 RX ADMIN — LORAZEPAM 2 MG: 2 CONCENTRATE ORAL at 13:19

## 2023-01-01 RX ADMIN — HEPARIN SODIUM 5000 UNITS: 5000 INJECTION, SOLUTION INTRAVENOUS; SUBCUTANEOUS at 21:22

## 2023-01-01 RX ADMIN — DIPHENHYDRAMINE HYDROCHLORIDE 25 MG: 25 CAPSULE ORAL at 08:08

## 2023-01-01 RX ADMIN — CHLORHEXIDINE GLUCONATE 15 ML: 1.2 SOLUTION ORAL at 19:47

## 2023-01-01 RX ADMIN — OXYCODONE HYDROCHLORIDE 5 MG: 5 TABLET ORAL at 04:09

## 2023-01-01 RX ADMIN — Medication 15 ML: at 08:23

## 2023-01-01 RX ADMIN — LORAZEPAM 2 MG: 2 INJECTION INTRAMUSCULAR; INTRAVENOUS at 07:52

## 2023-01-01 RX ADMIN — LACOSAMIDE 200 MG: 10 INJECTION INTRAVENOUS at 12:36

## 2023-01-01 RX ADMIN — CHLORHEXIDINE GLUCONATE 15 ML: 1.2 SOLUTION ORAL at 20:38

## 2023-01-01 RX ADMIN — LORAZEPAM 2 MG: 2 INJECTION INTRAMUSCULAR; INTRAVENOUS at 21:09

## 2023-01-01 RX ADMIN — LACOSAMIDE ORAL SOLUTION 300 MG: 10 SOLUTION ORAL at 21:30

## 2023-01-01 RX ADMIN — LORAZEPAM 2 MG: 2 INJECTION INTRAMUSCULAR; INTRAVENOUS at 04:06

## 2023-01-01 RX ADMIN — HEPARIN SODIUM 5000 UNITS: 5000 INJECTION, SOLUTION INTRAVENOUS; SUBCUTANEOUS at 06:52

## 2023-01-01 RX ADMIN — FUROSEMIDE 40 MG: 10 INJECTION, SOLUTION INTRAMUSCULAR; INTRAVENOUS at 00:48

## 2023-01-01 RX ADMIN — HEPARIN SODIUM 5000 UNITS: 5000 INJECTION, SOLUTION INTRAVENOUS; SUBCUTANEOUS at 06:01

## 2023-01-01 RX ADMIN — LORAZEPAM 0.25 MG: 0.5 TABLET ORAL at 21:55

## 2023-01-01 RX ADMIN — Medication 15 ML: at 07:46

## 2023-01-01 RX ADMIN — PROPOFOL 30 MCG/KG/MIN: 10 INJECTION, EMULSION INTRAVENOUS at 00:35

## 2023-01-01 RX ADMIN — SODIUM CHLORIDE 100 MG: 9 INJECTION, SOLUTION INTRAVENOUS at 12:43

## 2023-01-01 RX ADMIN — FUROSEMIDE 40 MG: 10 INJECTION, SOLUTION INTRAMUSCULAR; INTRAVENOUS at 23:51

## 2023-01-01 RX ADMIN — MIDAZOLAM HYDROCHLORIDE 7 MG/HR: 1 INJECTION, SOLUTION INTRAVENOUS at 12:33

## 2023-01-01 RX ADMIN — HYDROMORPHONE HYDROCHLORIDE 0.5 MG: 1 INJECTION, SOLUTION INTRAMUSCULAR; INTRAVENOUS; SUBCUTANEOUS at 18:38

## 2023-01-01 RX ADMIN — VALPROIC ACID 250 MG: 250 SOLUTION ORAL at 00:21

## 2023-01-01 RX ADMIN — CHLORHEXIDINE GLUCONATE 15 ML: 1.2 SOLUTION ORAL at 07:59

## 2023-01-01 RX ADMIN — LORAZEPAM 2 MG: 2 CONCENTRATE ORAL at 09:57

## 2023-01-01 RX ADMIN — PANTOPRAZOLE SODIUM 40 MG: 40 INJECTION, POWDER, FOR SOLUTION INTRAVENOUS at 06:03

## 2023-01-01 RX ADMIN — HYDRALAZINE HYDROCHLORIDE 5 MG: 20 INJECTION INTRAMUSCULAR; INTRAVENOUS at 19:59

## 2023-01-01 RX ADMIN — POTASSIUM CHLORIDE 20 MEQ: 1.5 POWDER, FOR SOLUTION ORAL at 05:17

## 2023-01-01 RX ADMIN — CHLORHEXIDINE GLUCONATE 15 ML: 1.2 SOLUTION ORAL at 08:24

## 2023-01-01 RX ADMIN — LACOSAMIDE 100 MG: 10 INJECTION INTRAVENOUS at 11:28

## 2023-01-01 RX ADMIN — FOSPHENYTOIN SODIUM 100 MG PE: 50 INJECTION INTRAMUSCULAR; INTRAVENOUS at 08:38

## 2023-01-01 RX ADMIN — PROPOFOL 30 MCG/KG/MIN: 10 INJECTION, EMULSION INTRAVENOUS at 19:00

## 2023-01-01 RX ADMIN — LORAZEPAM 0.5 MG: 0.5 TABLET ORAL at 14:30

## 2023-01-01 RX ADMIN — CLOBAZAM 10 MG: 2.5 SUSPENSION ORAL at 21:03

## 2023-01-01 RX ADMIN — DEXTROSE MONOHYDRATE: 50 INJECTION, SOLUTION INTRAVENOUS at 17:04

## 2023-01-01 RX ADMIN — Medication 15 ML: at 09:23

## 2023-01-01 RX ADMIN — LACOSAMIDE 200 MG: 10 INJECTION INTRAVENOUS at 22:03

## 2023-01-01 RX ADMIN — HEPARIN SODIUM 5000 UNITS: 5000 INJECTION, SOLUTION INTRAVENOUS; SUBCUTANEOUS at 21:30

## 2023-01-01 RX ADMIN — MICONAZOLE NITRATE: 2 POWDER TOPICAL at 20:42

## 2023-01-01 RX ADMIN — DEXTROSE MONOHYDRATE: 50 INJECTION, SOLUTION INTRAVENOUS at 14:17

## 2023-01-01 RX ADMIN — PANTOPRAZOLE SODIUM 40 MG: 40 INJECTION, POWDER, FOR SOLUTION INTRAVENOUS at 07:08

## 2023-01-01 RX ADMIN — LORAZEPAM 2 MG: 2 INJECTION INTRAMUSCULAR; INTRAVENOUS at 12:15

## 2023-01-01 RX ADMIN — LORAZEPAM 2 MG: 2 INJECTION INTRAMUSCULAR; INTRAVENOUS at 03:22

## 2023-01-01 RX ADMIN — VALPROIC ACID 250 MG: 250 SOLUTION ORAL at 00:48

## 2023-01-01 RX ADMIN — LORAZEPAM 0.5 MG: 0.5 TABLET ORAL at 08:21

## 2023-01-01 RX ADMIN — LORAZEPAM 2 MG: 2 INJECTION INTRAMUSCULAR; INTRAVENOUS at 22:32

## 2023-01-01 RX ADMIN — LACOSAMIDE ORAL SOLUTION 250 MG: 10 SOLUTION ORAL at 09:15

## 2023-01-01 RX ADMIN — LORAZEPAM 2 MG: 2 INJECTION INTRAMUSCULAR; INTRAVENOUS at 22:04

## 2023-01-01 RX ADMIN — FENTANYL CITRATE 50 MCG: 50 INJECTION, SOLUTION INTRAMUSCULAR; INTRAVENOUS at 12:33

## 2023-01-01 RX ADMIN — LIDOCAINE HYDROCHLORIDE 2 ML: 10 INJECTION, SOLUTION EPIDURAL; INFILTRATION; INTRACAUDAL; PERINEURAL at 13:01

## 2023-01-01 RX ADMIN — ACETAMINOPHEN 650 MG: 325 TABLET, FILM COATED ORAL at 12:05

## 2023-01-01 RX ADMIN — ATENOLOL 25 MG: 25 TABLET ORAL at 08:31

## 2023-01-01 RX ADMIN — LACOSAMIDE 150 MG: 10 INJECTION INTRAVENOUS at 12:12

## 2023-01-01 RX ADMIN — ALTEPLASE 2 MG: 2.2 INJECTION, POWDER, LYOPHILIZED, FOR SOLUTION INTRAVENOUS at 22:23

## 2023-01-01 RX ADMIN — MORPHINE SULFATE 10 MG: 100 SOLUTION ORAL at 05:25

## 2023-01-01 RX ADMIN — MIDAZOLAM HYDROCHLORIDE 6 MG/HR: 1 INJECTION, SOLUTION INTRAVENOUS at 12:35

## 2023-01-01 RX ADMIN — DIPHENHYDRAMINE HYDROCHLORIDE 50 MG: 25 SOLUTION ORAL at 21:12

## 2023-01-01 RX ADMIN — LEVETIRACETAM 1500 MG: 100 INJECTION, SOLUTION INTRAVENOUS at 16:30

## 2023-01-01 RX ADMIN — MIDAZOLAM HYDROCHLORIDE 15 MG/HR: 1 INJECTION, SOLUTION INTRAVENOUS at 10:13

## 2023-01-01 RX ADMIN — VALPROIC ACID 250 MG: 250 SOLUTION ORAL at 17:02

## 2023-01-01 RX ADMIN — CLOBAZAM 20 MG: 2.5 SUSPENSION ORAL at 09:02

## 2023-01-01 RX ADMIN — CLOBAZAM 20 MG: 2.5 SUSPENSION ORAL at 20:57

## 2023-01-01 RX ADMIN — HYDROXYZINE HYDROCHLORIDE 25 MG: 25 TABLET, FILM COATED ORAL at 17:21

## 2023-01-01 RX ADMIN — Medication 40 MG: at 06:52

## 2023-01-01 RX ADMIN — LEVETIRACETAM 1500 MG: 100 INJECTION, SOLUTION INTRAVENOUS at 16:16

## 2023-01-01 RX ADMIN — GLYCOPYRROLATE 0.2 MG: 0.2 INJECTION, SOLUTION INTRAMUSCULAR; INTRAVENOUS at 22:05

## 2023-01-01 RX ADMIN — ETOMIDATE 12 MG: 2 INJECTION, SOLUTION INTRAVENOUS at 11:42

## 2023-01-01 RX ADMIN — INSULIN ASPART 1 UNITS: 100 INJECTION, SOLUTION INTRAVENOUS; SUBCUTANEOUS at 08:38

## 2023-01-01 RX ADMIN — LORAZEPAM 2 MG: 2 INJECTION INTRAMUSCULAR; INTRAVENOUS at 22:12

## 2023-01-01 RX ADMIN — HEPARIN SODIUM 5000 UNITS: 5000 INJECTION, SOLUTION INTRAVENOUS; SUBCUTANEOUS at 21:48

## 2023-01-01 RX ADMIN — LACOSAMIDE ORAL SOLUTION 250 MG: 10 SOLUTION ORAL at 21:25

## 2023-01-01 RX ADMIN — LORAZEPAM 0.5 MG: 0.5 TABLET ORAL at 07:49

## 2023-01-01 RX ADMIN — LORAZEPAM 2 MG: 2 CONCENTRATE ORAL at 00:35

## 2023-01-01 RX ADMIN — VALPROIC ACID 250 MG: 250 SOLUTION ORAL at 17:12

## 2023-01-01 RX ADMIN — VALPROIC ACID 250 MG: 250 SOLUTION ORAL at 00:37

## 2023-01-01 RX ADMIN — CHLORHEXIDINE GLUCONATE 15 ML: 1.2 SOLUTION ORAL at 08:10

## 2023-01-01 RX ADMIN — HEPARIN SODIUM 5000 UNITS: 5000 INJECTION, SOLUTION INTRAVENOUS; SUBCUTANEOUS at 09:27

## 2023-01-01 RX ADMIN — LORAZEPAM 0.5 MG: 2 INJECTION INTRAMUSCULAR; INTRAVENOUS at 15:29

## 2023-01-01 RX ADMIN — VALPROIC ACID 250 MG: 250 SOLUTION ORAL at 23:58

## 2023-01-01 RX ADMIN — VALPROIC ACID 250 MG: 250 SOLUTION ORAL at 08:58

## 2023-01-01 RX ADMIN — LORAZEPAM 2 MG: 2 CONCENTRATE ORAL at 08:29

## 2023-01-01 RX ADMIN — Medication 15 ML: at 09:00

## 2023-01-01 RX ADMIN — VECURONIUM BROMIDE 5 MG: 1 INJECTION, POWDER, LYOPHILIZED, FOR SOLUTION INTRAVENOUS at 14:45

## 2023-01-01 RX ADMIN — NIFEDIPINE 30 MG: 30 TABLET, FILM COATED, EXTENDED RELEASE ORAL at 10:43

## 2023-01-01 RX ADMIN — LORAZEPAM 2 MG: 2 CONCENTRATE ORAL at 03:07

## 2023-01-01 RX ADMIN — VALPROIC ACID 250 MG: 250 SOLUTION ORAL at 08:16

## 2023-01-01 RX ADMIN — SODIUM CHLORIDE 1000 ML: 9 INJECTION, SOLUTION INTRAVENOUS at 16:09

## 2023-01-01 RX ADMIN — FOSPHENYTOIN SODIUM 200 MG PE: 50 INJECTION INTRAMUSCULAR; INTRAVENOUS at 21:18

## 2023-01-01 RX ADMIN — Medication 40 MG: at 07:47

## 2023-01-01 RX ADMIN — POLYETHYLENE GLYCOL 3350 17 G: 17 POWDER, FOR SOLUTION ORAL at 08:55

## 2023-01-01 RX ADMIN — VALPROIC ACID 250 MG: 250 SOLUTION ORAL at 15:37

## 2023-01-01 RX ADMIN — LACOSAMIDE ORAL SOLUTION 300 MG: 10 SOLUTION ORAL at 09:19

## 2023-01-01 RX ADMIN — VALPROIC ACID 250 MG: 250 SOLUTION ORAL at 17:31

## 2023-01-01 RX ADMIN — VALPROIC ACID 250 MG: 250 SOLUTION ORAL at 09:22

## 2023-01-01 RX ADMIN — LORAZEPAM 0.5 MG: 0.5 TABLET ORAL at 08:39

## 2023-01-01 RX ADMIN — CLOBAZAM 15 MG: 2.5 SUSPENSION ORAL at 21:26

## 2023-01-01 RX ADMIN — OXYCODONE HYDROCHLORIDE 5 MG: 5 TABLET ORAL at 07:24

## 2023-01-01 RX ADMIN — Medication 15 ML: at 08:11

## 2023-01-01 RX ADMIN — MIDAZOLAM HYDROCHLORIDE 12 MG/HR: 1 INJECTION, SOLUTION INTRAVENOUS at 07:00

## 2023-01-01 RX ADMIN — HYDROXYZINE HYDROCHLORIDE 25 MG: 25 TABLET, FILM COATED ORAL at 16:32

## 2023-01-01 RX ADMIN — SENNOSIDES AND DOCUSATE SODIUM 1 TABLET: 50; 8.6 TABLET ORAL at 21:14

## 2023-01-01 RX ADMIN — MICONAZOLE NITRATE: 2 POWDER TOPICAL at 13:31

## 2023-01-01 RX ADMIN — CLOBAZAM 20 MG: 2.5 SUSPENSION ORAL at 09:23

## 2023-01-01 RX ADMIN — ACETAMINOPHEN 650 MG: 325 TABLET ORAL at 08:36

## 2023-01-01 RX ADMIN — FUROSEMIDE 40 MG: 40 TABLET ORAL at 15:48

## 2023-01-01 RX ADMIN — CLOBAZAM 20 MG: 2.5 SUSPENSION ORAL at 08:51

## 2023-01-01 RX ADMIN — HEPARIN SODIUM 5000 UNITS: 5000 INJECTION, SOLUTION INTRAVENOUS; SUBCUTANEOUS at 19:44

## 2023-01-01 RX ADMIN — CLOBAZAM 20 MG: 2.5 SUSPENSION ORAL at 22:11

## 2023-01-01 RX ADMIN — HYDROXYZINE HYDROCHLORIDE 25 MG: 25 TABLET, FILM COATED ORAL at 15:51

## 2023-01-01 RX ADMIN — SODIUM CHLORIDE, POTASSIUM CHLORIDE, SODIUM LACTATE AND CALCIUM CHLORIDE 1000 ML: 600; 310; 30; 20 INJECTION, SOLUTION INTRAVENOUS at 10:20

## 2023-01-01 RX ADMIN — VALPROIC ACID 250 MG: 250 SOLUTION ORAL at 16:44

## 2023-01-01 RX ADMIN — LACOSAMIDE ORAL SOLUTION 250 MG: 10 SOLUTION ORAL at 10:11

## 2023-01-01 RX ADMIN — LACOSAMIDE 100 MG: 10 INJECTION INTRAVENOUS at 10:12

## 2023-01-01 RX ADMIN — HYDROXYZINE HYDROCHLORIDE 25 MG: 25 TABLET, FILM COATED ORAL at 08:08

## 2023-01-01 RX ADMIN — MIDAZOLAM HYDROCHLORIDE 2 MG/HR: 1 INJECTION, SOLUTION INTRAVENOUS at 11:49

## 2023-01-01 RX ADMIN — MIDAZOLAM HYDROCHLORIDE 18 MG/HR: 1 INJECTION, SOLUTION INTRAVENOUS at 14:56

## 2023-01-01 RX ADMIN — VALPROIC ACID 250 MG: 250 SOLUTION ORAL at 23:51

## 2023-01-01 RX ADMIN — FUROSEMIDE 40 MG: 10 INJECTION, SOLUTION INTRAMUSCULAR; INTRAVENOUS at 12:41

## 2023-01-01 RX ADMIN — Medication 40 MG: at 07:48

## 2023-01-01 RX ADMIN — VALPROIC ACID 250 MG: 250 SOLUTION ORAL at 08:52

## 2023-01-01 RX ADMIN — LORAZEPAM 2 MG: 2 INJECTION INTRAMUSCULAR; INTRAVENOUS at 01:36

## 2023-01-01 RX ADMIN — LACOSAMIDE ORAL SOLUTION 300 MG: 10 SOLUTION ORAL at 21:07

## 2023-01-01 RX ADMIN — CLOBAZAM 20 MG: 2.5 SUSPENSION ORAL at 20:47

## 2023-01-01 RX ADMIN — CLOBAZAM 20 MG: 2.5 SUSPENSION ORAL at 20:53

## 2023-01-01 RX ADMIN — LEVETIRACETAM 1784 MG: 100 INJECTION, SOLUTION INTRAVENOUS at 15:49

## 2023-01-01 RX ADMIN — LORAZEPAM 2 MG: 2 INJECTION INTRAMUSCULAR; INTRAVENOUS at 16:17

## 2023-01-01 RX ADMIN — ALTEPLASE 2 MG: 2.2 INJECTION, POWDER, LYOPHILIZED, FOR SOLUTION INTRAVENOUS at 11:27

## 2023-01-01 RX ADMIN — HEPARIN SODIUM 5000 UNITS: 5000 INJECTION, SOLUTION INTRAVENOUS; SUBCUTANEOUS at 12:12

## 2023-01-01 RX ADMIN — DIPHENHYDRAMINE HYDROCHLORIDE 25 MG: 50 INJECTION, SOLUTION INTRAMUSCULAR; INTRAVENOUS at 18:44

## 2023-01-01 RX ADMIN — POLYETHYLENE GLYCOL 3350 17 G: 17 POWDER, FOR SOLUTION ORAL at 23:06

## 2023-01-01 RX ADMIN — VALPROIC ACID 250 MG: 250 SOLUTION ORAL at 23:42

## 2023-01-01 RX ADMIN — Medication 10 MG: at 02:18

## 2023-01-01 RX ADMIN — Medication 15 ML: at 08:30

## 2023-01-01 RX ADMIN — OXYCODONE HYDROCHLORIDE 5 MG: 5 TABLET ORAL at 05:16

## 2023-01-01 RX ADMIN — ACETAMINOPHEN 975 MG: 325 TABLET ORAL at 17:26

## 2023-01-01 RX ADMIN — POTASSIUM CHLORIDE 10 MEQ: 7.46 INJECTION, SOLUTION INTRAVENOUS at 19:41

## 2023-01-01 RX ADMIN — SODIUM CHLORIDE, POTASSIUM CHLORIDE, SODIUM LACTATE AND CALCIUM CHLORIDE: 600; 310; 30; 20 INJECTION, SOLUTION INTRAVENOUS at 05:24

## 2023-01-01 RX ADMIN — PANTOPRAZOLE SODIUM 40 MG: 40 INJECTION, POWDER, FOR SOLUTION INTRAVENOUS at 09:02

## 2023-01-01 RX ADMIN — ASPIRIN 325 MG: 325 TABLET, COATED ORAL at 08:24

## 2023-01-01 RX ADMIN — ASPIRIN 325 MG: 325 TABLET, COATED ORAL at 08:39

## 2023-01-01 RX ADMIN — VALPROIC ACID 250 MG: 250 SOLUTION ORAL at 00:31

## 2023-01-01 RX ADMIN — HEPARIN SODIUM 5000 UNITS: 5000 INJECTION, SOLUTION INTRAVENOUS; SUBCUTANEOUS at 02:12

## 2023-01-01 RX ADMIN — FUROSEMIDE 40 MG: 40 TABLET ORAL at 08:55

## 2023-01-01 RX ADMIN — PHENYLEPHRINE HYDROCHLORIDE 50 MCG: 10 INJECTION INTRAVENOUS at 13:32

## 2023-01-01 RX ADMIN — MIDAZOLAM HYDROCHLORIDE 15 MG/HR: 1 INJECTION, SOLUTION INTRAVENOUS at 16:55

## 2023-01-01 RX ADMIN — LACOSAMIDE 300 MG: 10 INJECTION INTRAVENOUS at 22:33

## 2023-01-01 RX ADMIN — Medication 10 MG: at 08:01

## 2023-01-01 RX ADMIN — CLOBAZAM 20 MG: 2.5 SUSPENSION ORAL at 21:12

## 2023-01-01 RX ADMIN — OXYCODONE HYDROCHLORIDE 5 MG: 5 TABLET ORAL at 00:38

## 2023-01-01 RX ADMIN — CEFTRIAXONE SODIUM 2 G: 2 INJECTION, POWDER, FOR SOLUTION INTRAMUSCULAR; INTRAVENOUS at 15:33

## 2023-01-01 RX ADMIN — LORAZEPAM 2 MG: 2 INJECTION INTRAMUSCULAR; INTRAVENOUS at 20:28

## 2023-01-01 RX ADMIN — CLOBAZAM 20 MG: 2.5 SUSPENSION ORAL at 21:46

## 2023-01-01 RX ADMIN — LACOSAMIDE 200 MG: 10 INJECTION INTRAVENOUS at 21:41

## 2023-01-01 RX ADMIN — PROPOFOL 60 MCG/KG/MIN: 10 INJECTION, EMULSION INTRAVENOUS at 20:27

## 2023-01-01 RX ADMIN — ACETAMINOPHEN 650 MG: 325 TABLET ORAL at 09:14

## 2023-01-01 RX ADMIN — VALPROIC ACID 250 MG: 250 SOLUTION ORAL at 08:47

## 2023-01-01 RX ADMIN — LORAZEPAM 0.5 MG: 0.5 TABLET ORAL at 09:16

## 2023-01-01 RX ADMIN — FUROSEMIDE 20 MG: 10 INJECTION, SOLUTION INTRAMUSCULAR; INTRAVENOUS at 08:12

## 2023-01-01 RX ADMIN — IPRATROPIUM BROMIDE AND ALBUTEROL SULFATE 3 ML: .5; 3 SOLUTION RESPIRATORY (INHALATION) at 07:28

## 2023-01-01 RX ADMIN — CEFTRIAXONE SODIUM 1 G: 1 INJECTION, POWDER, FOR SOLUTION INTRAMUSCULAR; INTRAVENOUS at 10:29

## 2023-01-01 RX ADMIN — LEVETIRACETAM 1500 MG: 100 SOLUTION ORAL at 20:58

## 2023-01-01 RX ADMIN — DIPHENHYDRAMINE HYDROCHLORIDE 50 MG: 25 SOLUTION ORAL at 08:08

## 2023-01-01 RX ADMIN — POTASSIUM CHLORIDE 20 MEQ: 1500 TABLET, EXTENDED RELEASE ORAL at 10:10

## 2023-01-01 RX ADMIN — LEVETIRACETAM 1500 MG: 100 INJECTION, SOLUTION INTRAVENOUS at 04:58

## 2023-01-01 RX ADMIN — CLOBAZAM 20 MG: 2.5 SUSPENSION ORAL at 21:03

## 2023-01-01 RX ADMIN — WATER 10 ML: 1 INJECTION INTRAMUSCULAR; INTRAVENOUS; SUBCUTANEOUS at 21:44

## 2023-01-01 RX ADMIN — CHLORHEXIDINE GLUCONATE 15 ML: 1.2 SOLUTION ORAL at 20:04

## 2023-01-01 RX ADMIN — FOSPHENYTOIN SODIUM 100 MG PE: 50 INJECTION INTRAMUSCULAR; INTRAVENOUS at 08:35

## 2023-01-01 RX ADMIN — NIFEDIPINE 30 MG: 30 TABLET, FILM COATED, EXTENDED RELEASE ORAL at 09:14

## 2023-01-01 RX ADMIN — HEPARIN SODIUM 5000 UNITS: 5000 INJECTION, SOLUTION INTRAVENOUS; SUBCUTANEOUS at 13:53

## 2023-01-01 RX ADMIN — PROPOFOL 60 MCG/KG/MIN: 10 INJECTION, EMULSION INTRAVENOUS at 08:28

## 2023-01-01 RX ADMIN — HEPARIN SODIUM 5000 UNITS: 5000 INJECTION, SOLUTION INTRAVENOUS; SUBCUTANEOUS at 21:53

## 2023-01-01 RX ADMIN — FOSPHENYTOIN SODIUM 200 MG PE: 50 INJECTION INTRAMUSCULAR; INTRAVENOUS at 23:17

## 2023-01-01 RX ADMIN — LACOSAMIDE ORAL SOLUTION 300 MG: 10 SOLUTION ORAL at 21:02

## 2023-01-01 RX ADMIN — LORAZEPAM 2 MG: 2 CONCENTRATE ORAL at 03:22

## 2023-01-01 RX ADMIN — LORAZEPAM 2 MG: 2 INJECTION INTRAMUSCULAR; INTRAVENOUS at 04:37

## 2023-01-01 RX ADMIN — ACETAMINOPHEN 650 MG: 325 TABLET, FILM COATED ORAL at 14:44

## 2023-01-01 RX ADMIN — LORAZEPAM 2 MG: 2 INJECTION INTRAMUSCULAR; INTRAVENOUS at 16:22

## 2023-01-01 RX ADMIN — ASPIRIN 325 MG: 325 TABLET, COATED ORAL at 08:08

## 2023-01-01 RX ADMIN — LORAZEPAM 2 MG: 2 CONCENTRATE ORAL at 04:08

## 2023-01-01 RX ADMIN — LORAZEPAM 0.5 MG: 2 INJECTION INTRAMUSCULAR; INTRAVENOUS at 18:49

## 2023-01-01 RX ADMIN — LORAZEPAM 2 MG: 2 CONCENTRATE ORAL at 23:58

## 2023-01-01 RX ADMIN — FOSPHENYTOIN SODIUM 100 MG PE: 50 INJECTION INTRAMUSCULAR; INTRAVENOUS at 16:26

## 2023-01-01 RX ADMIN — Medication 60 ML: at 16:52

## 2023-01-01 RX ADMIN — LORAZEPAM 2 MG: 2 INJECTION INTRAMUSCULAR; INTRAVENOUS at 21:14

## 2023-01-01 RX ADMIN — GLYCOPYRROLATE 0.2 MG: 0.2 INJECTION, SOLUTION INTRAMUSCULAR; INTRAVENOUS at 17:55

## 2023-01-01 RX ADMIN — HYDROXYZINE HYDROCHLORIDE 25 MG: 25 TABLET, FILM COATED ORAL at 16:28

## 2023-01-01 RX ADMIN — POTASSIUM CHLORIDE 10 MEQ: 7.46 INJECTION, SOLUTION INTRAVENOUS at 07:57

## 2023-01-01 RX ADMIN — PROPOFOL 20 MCG/KG/MIN: 10 INJECTION, EMULSION INTRAVENOUS at 10:56

## 2023-01-01 RX ADMIN — Medication 10 MG: at 14:23

## 2023-01-01 RX ADMIN — CLOBAZAM 10 MG: 2.5 SUSPENSION ORAL at 08:14

## 2023-01-01 RX ADMIN — MORPHINE SULFATE 10 MG: 100 SOLUTION ORAL at 13:14

## 2023-01-01 RX ADMIN — MIDAZOLAM HYDROCHLORIDE 10 MG/HR: 1 INJECTION, SOLUTION INTRAVENOUS at 09:40

## 2023-01-01 RX ADMIN — ATENOLOL 25 MG: 25 TABLET ORAL at 09:15

## 2023-01-01 RX ADMIN — HEPARIN SODIUM 5000 UNITS: 5000 INJECTION, SOLUTION INTRAVENOUS; SUBCUTANEOUS at 08:10

## 2023-01-01 RX ADMIN — CLOBAZAM 10 MG: 2.5 SUSPENSION ORAL at 08:39

## 2023-01-01 RX ADMIN — HEPARIN SODIUM 5000 UNITS: 5000 INJECTION, SOLUTION INTRAVENOUS; SUBCUTANEOUS at 09:40

## 2023-01-01 RX ADMIN — FOSPHENYTOIN SODIUM 100 MG PE: 50 INJECTION INTRAMUSCULAR; INTRAVENOUS at 08:08

## 2023-01-01 RX ADMIN — HYDROMORPHONE HYDROCHLORIDE 0.5 MG: 1 INJECTION, SOLUTION INTRAMUSCULAR; INTRAVENOUS; SUBCUTANEOUS at 22:03

## 2023-01-01 RX ADMIN — MIDAZOLAM 2 MG: 1 INJECTION INTRAMUSCULAR; INTRAVENOUS at 07:33

## 2023-01-01 RX ADMIN — MORPHINE SULFATE 10 MG: 100 SOLUTION ORAL at 12:59

## 2023-01-01 RX ADMIN — FOSPHENYTOIN SODIUM 100 MG PE: 50 INJECTION INTRAMUSCULAR; INTRAVENOUS at 15:39

## 2023-01-01 RX ADMIN — HEPARIN SODIUM 5000 UNITS: 5000 INJECTION, SOLUTION INTRAVENOUS; SUBCUTANEOUS at 14:45

## 2023-01-01 RX ADMIN — LACOSAMIDE ORAL SOLUTION 250 MG: 10 SOLUTION ORAL at 11:13

## 2023-01-01 RX ADMIN — VALPROIC ACID 250 MG: 250 SOLUTION ORAL at 17:25

## 2023-01-01 RX ADMIN — Medication 40 MG: at 08:57

## 2023-01-01 RX ADMIN — HYDROXYZINE HYDROCHLORIDE 25 MG: 25 TABLET, FILM COATED ORAL at 20:42

## 2023-01-01 RX ADMIN — ACETAMINOPHEN 650 MG: 325 TABLET, FILM COATED ORAL at 16:04

## 2023-01-01 ASSESSMENT — ACTIVITIES OF DAILY LIVING (ADL)
ADLS_ACUITY_SCORE: 53
ADLS_ACUITY_SCORE: 53
ADLS_ACUITY_SCORE: 49
ADLS_ACUITY_SCORE: 49
ADLS_ACUITY_SCORE: 53
ADLS_ACUITY_SCORE: 53
ADLS_ACUITY_SCORE: 51
ADLS_ACUITY_SCORE: 49
ADLS_ACUITY_SCORE: 57
ADLS_ACUITY_SCORE: 51
CHANGE_IN_FUNCTIONAL_STATUS_SINCE_ONSET_OF_CURRENT_ILLNESS/INJURY: YES
ADLS_ACUITY_SCORE: 53
ADLS_ACUITY_SCORE: 45
ADLS_ACUITY_SCORE: 53
ADLS_ACUITY_SCORE: 51
ADLS_ACUITY_SCORE: 57
ADLS_ACUITY_SCORE: 51
ADLS_ACUITY_SCORE: 57
ADLS_ACUITY_SCORE: 51
ADLS_ACUITY_SCORE: 47
ADLS_ACUITY_SCORE: 55
ADLS_ACUITY_SCORE: 51
ADLS_ACUITY_SCORE: 53
TRANSFERRING: 0-->ASSISTANCE NEEDED (DEVELOPMENTALLY APPROPRIATE)
ADLS_ACUITY_SCORE: 55
ADLS_ACUITY_SCORE: 51
ADLS_ACUITY_SCORE: 53
ADLS_ACUITY_SCORE: 51
ADLS_ACUITY_SCORE: 49
TOILETING_ISSUES: YES
ADLS_ACUITY_SCORE: 57
ADLS_ACUITY_SCORE: 53
TOILETING: 1-->ASSISTANCE (EQUIPMENT/PERSON) NEEDED
ADLS_ACUITY_SCORE: 53
ADLS_ACUITY_SCORE: 49
ADLS_ACUITY_SCORE: 57
ADLS_ACUITY_SCORE: 47
ADLS_ACUITY_SCORE: 47
ADLS_ACUITY_SCORE: 53
ADLS_ACUITY_SCORE: 51
ADLS_ACUITY_SCORE: 49
ADLS_ACUITY_SCORE: 53
ADLS_ACUITY_SCORE: 59
ADLS_ACUITY_SCORE: 45
ADLS_ACUITY_SCORE: 45
ADLS_ACUITY_SCORE: 55
ADLS_ACUITY_SCORE: 49
DRESS: 0-->ASSISTANCE NEEDED (DEVELOPMENTALLY APPROPRIATE)
ADLS_ACUITY_SCORE: 47
ADLS_ACUITY_SCORE: 57
ADLS_ACUITY_SCORE: 53
ADLS_ACUITY_SCORE: 49
ADLS_ACUITY_SCORE: 51
ADLS_ACUITY_SCORE: 57
ADLS_ACUITY_SCORE: 51
ADLS_ACUITY_SCORE: 55
ADLS_ACUITY_SCORE: 57
ADLS_ACUITY_SCORE: 53
ADLS_ACUITY_SCORE: 49
DIFFICULTY_EATING/SWALLOWING: NO
ADLS_ACUITY_SCORE: 51
ADLS_ACUITY_SCORE: 55
ADLS_ACUITY_SCORE: 53
ADLS_ACUITY_SCORE: 51
ADLS_ACUITY_SCORE: 53
ADLS_ACUITY_SCORE: 53
ADLS_ACUITY_SCORE: 57
ADLS_ACUITY_SCORE: 55
ADLS_ACUITY_SCORE: 53
ADLS_ACUITY_SCORE: 49
ADLS_ACUITY_SCORE: 47
ADLS_ACUITY_SCORE: 57
ADLS_ACUITY_SCORE: 45
ADLS_ACUITY_SCORE: 53
ADLS_ACUITY_SCORE: 57
TOILETING_ASSISTANCE: TOILETING DIFFICULTY, REQUIRES EQUIPMENT
ADLS_ACUITY_SCORE: 53
ADLS_ACUITY_SCORE: 59
ADLS_ACUITY_SCORE: 51
ADLS_ACUITY_SCORE: 57
ADLS_ACUITY_SCORE: 55
ADLS_ACUITY_SCORE: 57
ADLS_ACUITY_SCORE: 53
ADLS_ACUITY_SCORE: 57
ADLS_ACUITY_SCORE: 53
ADLS_ACUITY_SCORE: 51
ADLS_ACUITY_SCORE: 49
ADLS_ACUITY_SCORE: 45
ADLS_ACUITY_SCORE: 47
ADLS_ACUITY_SCORE: 49
ADLS_ACUITY_SCORE: 53
ADLS_ACUITY_SCORE: 49
ADLS_ACUITY_SCORE: 49
ADLS_ACUITY_SCORE: 51
ADLS_ACUITY_SCORE: 49
ADLS_ACUITY_SCORE: 53
ADLS_ACUITY_SCORE: 35
ADLS_ACUITY_SCORE: 49
ADLS_ACUITY_SCORE: 51
ADLS_ACUITY_SCORE: 53
ADLS_ACUITY_SCORE: 53
ADLS_ACUITY_SCORE: 55
ADLS_ACUITY_SCORE: 53
DOING_ERRANDS_INDEPENDENTLY_DIFFICULTY: OTHER (SEE COMMENTS)
ADLS_ACUITY_SCORE: 39
ADLS_ACUITY_SCORE: 51
ADLS_ACUITY_SCORE: 51
ADLS_ACUITY_SCORE: 49
ADLS_ACUITY_SCORE: 51
ADLS_ACUITY_SCORE: 51
ADLS_ACUITY_SCORE: 59
ADLS_ACUITY_SCORE: 57
ADLS_ACUITY_SCORE: 53
ADLS_ACUITY_SCORE: 49
ADLS_ACUITY_SCORE: 53
ADLS_ACUITY_SCORE: 53
ADLS_ACUITY_SCORE: 55
ADLS_ACUITY_SCORE: 57
ADLS_ACUITY_SCORE: 35
ADLS_ACUITY_SCORE: 53
ADLS_ACUITY_SCORE: 47
ADLS_ACUITY_SCORE: 57
ADLS_ACUITY_SCORE: 55
ADLS_ACUITY_SCORE: 57
ADLS_ACUITY_SCORE: 45
ADLS_ACUITY_SCORE: 55
ADLS_ACUITY_SCORE: 53
ADLS_ACUITY_SCORE: 55
ADLS_ACUITY_SCORE: 57
ADLS_ACUITY_SCORE: 53
ADLS_ACUITY_SCORE: 60
ADLS_ACUITY_SCORE: 53
ADLS_ACUITY_SCORE: 49
ADLS_ACUITY_SCORE: 57
ADLS_ACUITY_SCORE: 53
ADLS_ACUITY_SCORE: 51
ADLS_ACUITY_SCORE: 55
ADLS_ACUITY_SCORE: 53
ADLS_ACUITY_SCORE: 55
ADLS_ACUITY_SCORE: 47
ADLS_ACUITY_SCORE: 49
ADLS_ACUITY_SCORE: 47
ADLS_ACUITY_SCORE: 45
ADLS_ACUITY_SCORE: 45
ADLS_ACUITY_SCORE: 57
ADLS_ACUITY_SCORE: 57
ADLS_ACUITY_SCORE: 51
ADLS_ACUITY_SCORE: 51
ADLS_ACUITY_SCORE: 37
ADLS_ACUITY_SCORE: 53
ADLS_ACUITY_SCORE: 57
ADLS_ACUITY_SCORE: 53
ADLS_ACUITY_SCORE: 47
ADLS_ACUITY_SCORE: 51
ADLS_ACUITY_SCORE: 45
ADLS_ACUITY_SCORE: 49
ADLS_ACUITY_SCORE: 53
ADLS_ACUITY_SCORE: 49
ADLS_ACUITY_SCORE: 53
ADLS_ACUITY_SCORE: 35
ADLS_ACUITY_SCORE: 53
ADLS_ACUITY_SCORE: 53
ADLS_ACUITY_SCORE: 49
ADLS_ACUITY_SCORE: 53
ADLS_ACUITY_SCORE: 51
ADLS_ACUITY_SCORE: 49
ADLS_ACUITY_SCORE: 60
ADLS_ACUITY_SCORE: 51
ADLS_ACUITY_SCORE: 51
ADLS_ACUITY_SCORE: 53
ADLS_ACUITY_SCORE: 51
ADLS_ACUITY_SCORE: 53
ADLS_ACUITY_SCORE: 55
ADLS_ACUITY_SCORE: 47
ADLS_ACUITY_SCORE: 57
ADLS_ACUITY_SCORE: 55
ADLS_ACUITY_SCORE: 53
ADLS_ACUITY_SCORE: 49
ADLS_ACUITY_SCORE: 53
ADLS_ACUITY_SCORE: 59
ADLS_ACUITY_SCORE: 51
ADLS_ACUITY_SCORE: 49
ADLS_ACUITY_SCORE: 51
ADLS_ACUITY_SCORE: 53
ADLS_ACUITY_SCORE: 53
ADLS_ACUITY_SCORE: 41
ADLS_ACUITY_SCORE: 51
ADLS_ACUITY_SCORE: 45
ADLS_ACUITY_SCORE: 49
ADLS_ACUITY_SCORE: 53
ADLS_ACUITY_SCORE: 55
ADLS_ACUITY_SCORE: 47
ADLS_ACUITY_SCORE: 55
ADLS_ACUITY_SCORE: 35
ADLS_ACUITY_SCORE: 55
ADLS_ACUITY_SCORE: 49
ADLS_ACUITY_SCORE: 55
ADLS_ACUITY_SCORE: 47
ADLS_ACUITY_SCORE: 53
ADLS_ACUITY_SCORE: 57
ADLS_ACUITY_SCORE: 51
ADLS_ACUITY_SCORE: 53
ADLS_ACUITY_SCORE: 57
ADLS_ACUITY_SCORE: 53
ADLS_ACUITY_SCORE: 59
ADLS_ACUITY_SCORE: 49
ADLS_ACUITY_SCORE: 55
ADLS_ACUITY_SCORE: 57
ADLS_ACUITY_SCORE: 55
ADLS_ACUITY_SCORE: 51
ADLS_ACUITY_SCORE: 57
ADLS_ACUITY_SCORE: 51
TRANSFERRING: 1-->ASSISTANCE (EQUIPMENT/PERSON) NEEDED
ADLS_ACUITY_SCORE: 49
ADLS_ACUITY_SCORE: 49
ADLS_ACUITY_SCORE: 55
ADLS_ACUITY_SCORE: 53
ADLS_ACUITY_SCORE: 49
TOILETING: 1-->ASSISTANCE (EQUIPMENT/PERSON) NEEDED (NOT DEVELOPMENTALLY APPROPRIATE)
ADLS_ACUITY_SCORE: 51
ADLS_ACUITY_SCORE: 53
ADLS_ACUITY_SCORE: 57
ADLS_ACUITY_SCORE: 55
ADLS_ACUITY_SCORE: 51
ADLS_ACUITY_SCORE: 53
ADLS_ACUITY_SCORE: 59
ADLS_ACUITY_SCORE: 53
ADLS_ACUITY_SCORE: 45
ADLS_ACUITY_SCORE: 53
ADLS_ACUITY_SCORE: 55
ADLS_ACUITY_SCORE: 53
ADLS_ACUITY_SCORE: 55
ADLS_ACUITY_SCORE: 49
ADLS_ACUITY_SCORE: 45
ADLS_ACUITY_SCORE: 51
ADLS_ACUITY_SCORE: 51
WEAR_GLASSES_OR_BLIND: OTHER (SEE COMMENTS)
ADLS_ACUITY_SCORE: 53
ADLS_ACUITY_SCORE: 53
ADLS_ACUITY_SCORE: 57
ADLS_ACUITY_SCORE: 55
ADLS_ACUITY_SCORE: 57
ADLS_ACUITY_SCORE: 55
ADLS_ACUITY_SCORE: 49
ADLS_ACUITY_SCORE: 45
ADLS_ACUITY_SCORE: 53
DIFFICULTY_EATING/SWALLOWING: OTHER (SEE COMMENTS)
ADLS_ACUITY_SCORE: 47
ADLS_ACUITY_SCORE: 51
ADLS_ACUITY_SCORE: 57
ADLS_ACUITY_SCORE: 51
ADLS_ACUITY_SCORE: 49
ADLS_ACUITY_SCORE: 57
ADLS_ACUITY_SCORE: 53
ADLS_ACUITY_SCORE: 55
ADLS_ACUITY_SCORE: 55
ADLS_ACUITY_SCORE: 53
ADLS_ACUITY_SCORE: 51
ADLS_ACUITY_SCORE: 53
ADLS_ACUITY_SCORE: 49
ADLS_ACUITY_SCORE: 57
ADLS_ACUITY_SCORE: 57
ADLS_ACUITY_SCORE: 49
ADLS_ACUITY_SCORE: 55
ADLS_ACUITY_SCORE: 53
ADLS_ACUITY_SCORE: 55
ADLS_ACUITY_SCORE: 57
ADLS_ACUITY_SCORE: 51
ADLS_ACUITY_SCORE: 57
ADLS_ACUITY_SCORE: 53
ADLS_ACUITY_SCORE: 57
ADLS_ACUITY_SCORE: 49
ADLS_ACUITY_SCORE: 55
ADLS_ACUITY_SCORE: 51
ADLS_ACUITY_SCORE: 57
ADLS_ACUITY_SCORE: 53
ADLS_ACUITY_SCORE: 57
ADLS_ACUITY_SCORE: 53
ADLS_ACUITY_SCORE: 35
ADLS_ACUITY_SCORE: 53
ADLS_ACUITY_SCORE: 53
ADLS_ACUITY_SCORE: 51
ADLS_ACUITY_SCORE: 53
ADLS_ACUITY_SCORE: 55
ADLS_ACUITY_SCORE: 57
ADLS_ACUITY_SCORE: 53
ADLS_ACUITY_SCORE: 53
ADLS_ACUITY_SCORE: 57
ADLS_ACUITY_SCORE: 53
ADLS_ACUITY_SCORE: 57
ADLS_ACUITY_SCORE: 51
ADLS_ACUITY_SCORE: 45
ADLS_ACUITY_SCORE: 53
ADLS_ACUITY_SCORE: 53
ADLS_ACUITY_SCORE: 47
ADLS_ACUITY_SCORE: 51
ADLS_ACUITY_SCORE: 55
ADLS_ACUITY_SCORE: 53
ADLS_ACUITY_SCORE: 53
ADLS_ACUITY_SCORE: 57
ADLS_ACUITY_SCORE: 45
ADLS_ACUITY_SCORE: 49
ADLS_ACUITY_SCORE: 55
ADLS_ACUITY_SCORE: 39
ADLS_ACUITY_SCORE: 51
ADLS_ACUITY_SCORE: 53
ADLS_ACUITY_SCORE: 51
ADLS_ACUITY_SCORE: 53
ADLS_ACUITY_SCORE: 53
DEPENDENT_IADLS:: COOKING;MEAL PREPARATION;MEDICATION MANAGEMENT;TRANSPORTATION
ADLS_ACUITY_SCORE: 53
ADLS_ACUITY_SCORE: 53
ADLS_ACUITY_SCORE: 57
TOILETING_ISSUES: OTHER (SEE COMMENTS)
ADLS_ACUITY_SCORE: 49
ADLS_ACUITY_SCORE: 45
ADLS_ACUITY_SCORE: 49
ADLS_ACUITY_SCORE: 53
ADLS_ACUITY_SCORE: 51
ADLS_ACUITY_SCORE: 47
ADLS_ACUITY_SCORE: 49
ADLS_ACUITY_SCORE: 57
ADLS_ACUITY_SCORE: 53
ADLS_ACUITY_SCORE: 51
ADLS_ACUITY_SCORE: 55
ADLS_ACUITY_SCORE: 53
ADLS_ACUITY_SCORE: 47
ADLS_ACUITY_SCORE: 57
ADLS_ACUITY_SCORE: 53
ADLS_ACUITY_SCORE: 49
ADLS_ACUITY_SCORE: 53
ADLS_ACUITY_SCORE: 49
ADLS_ACUITY_SCORE: 55
ADLS_ACUITY_SCORE: 53
ADLS_ACUITY_SCORE: 45
ADLS_ACUITY_SCORE: 51
ADLS_ACUITY_SCORE: 57
ADLS_ACUITY_SCORE: 53
ADLS_ACUITY_SCORE: 53
CONCENTRATING,_REMEMBERING_OR_MAKING_DECISIONS_DIFFICULTY: NO
ADLS_ACUITY_SCORE: 55
ADLS_ACUITY_SCORE: 55
ADLS_ACUITY_SCORE: 51
ADLS_ACUITY_SCORE: 57
ADLS_ACUITY_SCORE: 53
ADLS_ACUITY_SCORE: 57
ADLS_ACUITY_SCORE: 57
ADLS_ACUITY_SCORE: 55
ADLS_ACUITY_SCORE: 51
ADLS_ACUITY_SCORE: 53
ADLS_ACUITY_SCORE: 53
ADLS_ACUITY_SCORE: 57
ADLS_ACUITY_SCORE: 51
ADLS_ACUITY_SCORE: 47
ADLS_ACUITY_SCORE: 57
ADLS_ACUITY_SCORE: 51
ADLS_ACUITY_SCORE: 60
ADLS_ACUITY_SCORE: 59
ADLS_ACUITY_SCORE: 53
ADLS_ACUITY_SCORE: 53
ADLS_ACUITY_SCORE: 51
ADLS_ACUITY_SCORE: 35
ADLS_ACUITY_SCORE: 55
ADLS_ACUITY_SCORE: 35
ADLS_ACUITY_SCORE: 49
ADLS_ACUITY_SCORE: 53
ADLS_ACUITY_SCORE: 45
ADLS_ACUITY_SCORE: 53
ADLS_ACUITY_SCORE: 47
ADLS_ACUITY_SCORE: 51
ADLS_ACUITY_SCORE: 49
ADLS_ACUITY_SCORE: 57
ADLS_ACUITY_SCORE: 55
ADLS_ACUITY_SCORE: 47
ADLS_ACUITY_SCORE: 51
ADLS_ACUITY_SCORE: 53
ADLS_ACUITY_SCORE: 53
ADLS_ACUITY_SCORE: 51
WEAR_GLASSES_OR_BLIND: YES
ADLS_ACUITY_SCORE: 53
DRESSING/BATHING: BATHING DIFFICULTY, REQUIRES EQUIPMENT
ADLS_ACUITY_SCORE: 53
ADLS_ACUITY_SCORE: 53
ADLS_ACUITY_SCORE: 51
ADLS_ACUITY_SCORE: 57
ADLS_ACUITY_SCORE: 51
ADLS_ACUITY_SCORE: 51
EQUIPMENT_CURRENTLY_USED_AT_HOME: OTHER (SEE COMMENTS)
ADLS_ACUITY_SCORE: 51
ADLS_ACUITY_SCORE: 49
ADLS_ACUITY_SCORE: 51
ADLS_ACUITY_SCORE: 51
ADLS_ACUITY_SCORE: 57
ADLS_ACUITY_SCORE: 55
ADLS_ACUITY_SCORE: 51
ADLS_ACUITY_SCORE: 53
ADLS_ACUITY_SCORE: 35
ADLS_ACUITY_SCORE: 49
ADLS_ACUITY_SCORE: 53
ADLS_ACUITY_SCORE: 45
ADLS_ACUITY_SCORE: 51
ADLS_ACUITY_SCORE: 53
ADLS_ACUITY_SCORE: 51
ADLS_ACUITY_SCORE: 53
ADLS_ACUITY_SCORE: 49
ADLS_ACUITY_SCORE: 51
ADLS_ACUITY_SCORE: 57
ADLS_ACUITY_SCORE: 59
ADLS_ACUITY_SCORE: 51
ADLS_ACUITY_SCORE: 51
ADLS_ACUITY_SCORE: 57
ADLS_ACUITY_SCORE: 55
ADLS_ACUITY_SCORE: 49
ADLS_ACUITY_SCORE: 47
ADLS_ACUITY_SCORE: 51
ADLS_ACUITY_SCORE: 53
ADLS_ACUITY_SCORE: 57
ADLS_ACUITY_SCORE: 49
ADLS_ACUITY_SCORE: 51
ADLS_ACUITY_SCORE: 53
ADLS_ACUITY_SCORE: 49
ADLS_ACUITY_SCORE: 51
ADLS_ACUITY_SCORE: 51
ADLS_ACUITY_SCORE: 49
ADLS_ACUITY_SCORE: 53
ADLS_ACUITY_SCORE: 59
ADLS_ACUITY_SCORE: 55
ADLS_ACUITY_SCORE: 35
ADLS_ACUITY_SCORE: 53
ADLS_ACUITY_SCORE: 47
ADLS_ACUITY_SCORE: 47
ADLS_ACUITY_SCORE: 49
ADLS_ACUITY_SCORE: 49
ADLS_ACUITY_SCORE: 59
ADLS_ACUITY_SCORE: 53
ADLS_ACUITY_SCORE: 45
ADLS_ACUITY_SCORE: 57
ADLS_ACUITY_SCORE: 53
ADLS_ACUITY_SCORE: 57
ADLS_ACUITY_SCORE: 53
ADLS_ACUITY_SCORE: 53
ADLS_ACUITY_SCORE: 55
ADLS_ACUITY_SCORE: 51
ADLS_ACUITY_SCORE: 57
ADLS_ACUITY_SCORE: 49
DRESSING/BATHING_DIFFICULTY: OTHER (SEE COMMENTS)
ADLS_ACUITY_SCORE: 53
ADLS_ACUITY_SCORE: 49
ADLS_ACUITY_SCORE: 57
ADLS_ACUITY_SCORE: 53
ADLS_ACUITY_SCORE: 57
ADLS_ACUITY_SCORE: 49
ADLS_ACUITY_SCORE: 55
ADLS_ACUITY_SCORE: 53
ADLS_ACUITY_SCORE: 47
ADLS_ACUITY_SCORE: 51
ADLS_ACUITY_SCORE: 45
ADLS_ACUITY_SCORE: 59
ADLS_ACUITY_SCORE: 51
ADLS_ACUITY_SCORE: 49
ADLS_ACUITY_SCORE: 53
ADLS_ACUITY_SCORE: 57
ADLS_ACUITY_SCORE: 57
EQUIPMENT_CURRENTLY_USED_AT_HOME: CANE, QUAD
ADLS_ACUITY_SCORE: 53
ADLS_ACUITY_SCORE: 45
ADLS_ACUITY_SCORE: 53
ADLS_ACUITY_SCORE: 57
ADLS_ACUITY_SCORE: 51
ADLS_ACUITY_SCORE: 49
ADLS_ACUITY_SCORE: 55
ADLS_ACUITY_SCORE: 49
ADLS_ACUITY_SCORE: 51
ADLS_ACUITY_SCORE: 59
ADLS_ACUITY_SCORE: 55
ADLS_ACUITY_SCORE: 49
ADLS_ACUITY_SCORE: 51
ADLS_ACUITY_SCORE: 49
ADLS_ACUITY_SCORE: 49
ADLS_ACUITY_SCORE: 57
ADLS_ACUITY_SCORE: 53
ADLS_ACUITY_SCORE: 49
ADLS_ACUITY_SCORE: 49
ADLS_ACUITY_SCORE: 53
ADLS_ACUITY_SCORE: 57
ADLS_ACUITY_SCORE: 51
ADLS_ACUITY_SCORE: 57
ADLS_ACUITY_SCORE: 49
ADLS_ACUITY_SCORE: 47
ADLS_ACUITY_SCORE: 53
ADLS_ACUITY_SCORE: 51
ADLS_ACUITY_SCORE: 47
ADLS_ACUITY_SCORE: 57
ADLS_ACUITY_SCORE: 53
ADLS_ACUITY_SCORE: 51
ADLS_ACUITY_SCORE: 51
ADLS_ACUITY_SCORE: 57
ADLS_ACUITY_SCORE: 51
ADLS_ACUITY_SCORE: 57
ADLS_ACUITY_SCORE: 57
ADLS_ACUITY_SCORE: 53
ADLS_ACUITY_SCORE: 51
ADLS_ACUITY_SCORE: 43
ADLS_ACUITY_SCORE: 53
ADLS_ACUITY_SCORE: 51
ADLS_ACUITY_SCORE: 51
ADLS_ACUITY_SCORE: 57
ADLS_ACUITY_SCORE: 57
ADLS_ACUITY_SCORE: 55
ADLS_ACUITY_SCORE: 53
ADLS_ACUITY_SCORE: 51
ADLS_ACUITY_SCORE: 49
ADLS_ACUITY_SCORE: 51
ADLS_ACUITY_SCORE: 49
ADLS_ACUITY_SCORE: 45
ADLS_ACUITY_SCORE: 51
ADLS_ACUITY_SCORE: 55
ADLS_ACUITY_SCORE: 53
ADLS_ACUITY_SCORE: 57
VISION_MANAGEMENT: GLASSES
ADLS_ACUITY_SCORE: 53
ADLS_ACUITY_SCORE: 51
ADLS_ACUITY_SCORE: 51
ADLS_ACUITY_SCORE: 57
ADLS_ACUITY_SCORE: 53
ADLS_ACUITY_SCORE: 51
ADLS_ACUITY_SCORE: 53
DOING_ERRANDS_INDEPENDENTLY_DIFFICULTY: YES
ADLS_ACUITY_SCORE: 45
ADLS_ACUITY_SCORE: 53
ADLS_ACUITY_SCORE: 53
ADLS_ACUITY_SCORE: 51
ADLS_ACUITY_SCORE: 53
ADLS_ACUITY_SCORE: 49
ADLS_ACUITY_SCORE: 57
ADLS_ACUITY_SCORE: 53
ADLS_ACUITY_SCORE: 55
ADLS_ACUITY_SCORE: 53
ADLS_ACUITY_SCORE: 51
ADLS_ACUITY_SCORE: 59
ADLS_ACUITY_SCORE: 49
ADLS_ACUITY_SCORE: 53
ADLS_ACUITY_SCORE: 53
ADLS_ACUITY_SCORE: 47
ADLS_ACUITY_SCORE: 57
ADLS_ACUITY_SCORE: 55
ADLS_ACUITY_SCORE: 49
ADLS_ACUITY_SCORE: 35
ADLS_ACUITY_SCORE: 53
ADLS_ACUITY_SCORE: 51
ADLS_ACUITY_SCORE: 37
ADLS_ACUITY_SCORE: 49
ADLS_ACUITY_SCORE: 53
ADLS_ACUITY_SCORE: 51
ADLS_ACUITY_SCORE: 37
ADLS_ACUITY_SCORE: 51
ADLS_ACUITY_SCORE: 55
ADLS_ACUITY_SCORE: 57
FALL_HISTORY_WITHIN_LAST_SIX_MONTHS: NO
ADLS_ACUITY_SCORE: 55
ADLS_ACUITY_SCORE: 45
ADLS_ACUITY_SCORE: 53
ADLS_ACUITY_SCORE: 35
ADLS_ACUITY_SCORE: 45
ADLS_ACUITY_SCORE: 47
ADLS_ACUITY_SCORE: 53
ADLS_ACUITY_SCORE: 53
ADLS_ACUITY_SCORE: 57
ADLS_ACUITY_SCORE: 53
DRESS: 1-->ASSISTANCE (EQUIPMENT/PERSON) NEEDED
ADLS_ACUITY_SCORE: 49
ADLS_ACUITY_SCORE: 47
ADLS_ACUITY_SCORE: 53
ADLS_ACUITY_SCORE: 57
ADLS_ACUITY_SCORE: 53
ADLS_ACUITY_SCORE: 45
ADLS_ACUITY_SCORE: 57
ADLS_ACUITY_SCORE: 53
ADLS_ACUITY_SCORE: 47
DRESSING/BATHING_DIFFICULTY: YES
ADLS_ACUITY_SCORE: 53
ADLS_ACUITY_SCORE: 47
ADLS_ACUITY_SCORE: 53
ADLS_ACUITY_SCORE: 51
ADLS_ACUITY_SCORE: 47
ADLS_ACUITY_SCORE: 51
ADLS_ACUITY_SCORE: 53
ADLS_ACUITY_SCORE: 49
ADLS_ACUITY_SCORE: 53
ADLS_ACUITY_SCORE: 55
ADLS_ACUITY_SCORE: 45
ADLS_ACUITY_SCORE: 45
ADLS_ACUITY_SCORE: 51
ADLS_ACUITY_SCORE: 53
ADLS_ACUITY_SCORE: 49
ADLS_ACUITY_SCORE: 51
ADLS_ACUITY_SCORE: 53
ADLS_ACUITY_SCORE: 49
ADLS_ACUITY_SCORE: 53
ADLS_ACUITY_SCORE: 51
ADLS_ACUITY_SCORE: 53
ADLS_ACUITY_SCORE: 53
ADLS_ACUITY_SCORE: 59
ADLS_ACUITY_SCORE: 51
ADLS_ACUITY_SCORE: 53
ADLS_ACUITY_SCORE: 53
ADLS_ACUITY_SCORE: 55
ADLS_ACUITY_SCORE: 49
ADLS_ACUITY_SCORE: 53
ADLS_ACUITY_SCORE: 55
ADLS_ACUITY_SCORE: 51
ADLS_ACUITY_SCORE: 57
ADLS_ACUITY_SCORE: 57
ADLS_ACUITY_SCORE: 55
ADLS_ACUITY_SCORE: 55
ADLS_ACUITY_SCORE: 47
ADLS_ACUITY_SCORE: 51
ADLS_ACUITY_SCORE: 51
ADLS_ACUITY_SCORE: 53
ADLS_ACUITY_SCORE: 51
ADLS_ACUITY_SCORE: 55
ADLS_ACUITY_SCORE: 49
ADLS_ACUITY_SCORE: 51
ADLS_ACUITY_SCORE: 41
TOILETING_MANAGEMENT: ASSISTANCE
ADLS_ACUITY_SCORE: 53
ADLS_ACUITY_SCORE: 49
ADLS_ACUITY_SCORE: 49
ADLS_ACUITY_SCORE: 51
ADLS_ACUITY_SCORE: 51
ADLS_ACUITY_SCORE: 49
ADLS_ACUITY_SCORE: 57
ADLS_ACUITY_SCORE: 49
ADLS_ACUITY_SCORE: 53
ADLS_ACUITY_SCORE: 51
ADLS_ACUITY_SCORE: 51
ADLS_ACUITY_SCORE: 41
ADLS_ACUITY_SCORE: 53
ADLS_ACUITY_SCORE: 53
ADLS_ACUITY_SCORE: 57
ADLS_ACUITY_SCORE: 51
ADLS_ACUITY_SCORE: 57
ADLS_ACUITY_SCORE: 51
ADLS_ACUITY_SCORE: 57
ADLS_ACUITY_SCORE: 57
ADLS_ACUITY_SCORE: 53
ADLS_ACUITY_SCORE: 57
ADLS_ACUITY_SCORE: 51
ADLS_ACUITY_SCORE: 57
ADLS_ACUITY_SCORE: 55
ADLS_ACUITY_SCORE: 45
ADLS_ACUITY_SCORE: 53
ADLS_ACUITY_SCORE: 53
ADLS_ACUITY_SCORE: 49
ADLS_ACUITY_SCORE: 59
ADLS_ACUITY_SCORE: 53
ADLS_ACUITY_SCORE: 51
ADLS_ACUITY_SCORE: 53
ADLS_ACUITY_SCORE: 51
ADLS_ACUITY_SCORE: 55
ADLS_ACUITY_SCORE: 53
ADLS_ACUITY_SCORE: 51
WALKING_OR_CLIMBING_STAIRS_DIFFICULTY: OTHER (SEE COMMENTS)
ADLS_ACUITY_SCORE: 39
ADLS_ACUITY_SCORE: 51
ADLS_ACUITY_SCORE: 49
ADLS_ACUITY_SCORE: 47
ADLS_ACUITY_SCORE: 57
ADLS_ACUITY_SCORE: 51
ADLS_ACUITY_SCORE: 53
ADLS_ACUITY_SCORE: 49
ADLS_ACUITY_SCORE: 59
ADLS_ACUITY_SCORE: 51
ADLS_ACUITY_SCORE: 57
ADLS_ACUITY_SCORE: 53
ADLS_ACUITY_SCORE: 51
ADLS_ACUITY_SCORE: 57
ADLS_ACUITY_SCORE: 55
ADLS_ACUITY_SCORE: 51
ADLS_ACUITY_SCORE: 51
ADLS_ACUITY_SCORE: 53
ADLS_ACUITY_SCORE: 51
ADLS_ACUITY_SCORE: 53
ADLS_ACUITY_SCORE: 39
ADLS_ACUITY_SCORE: 53
ADLS_ACUITY_SCORE: 49
ADLS_ACUITY_SCORE: 45
ADLS_ACUITY_SCORE: 49
ADLS_ACUITY_SCORE: 47
ADLS_ACUITY_SCORE: 51
ADLS_ACUITY_SCORE: 57
ADLS_ACUITY_SCORE: 57
ADLS_ACUITY_SCORE: 51
ADLS_ACUITY_SCORE: 35
ADLS_ACUITY_SCORE: 53
ADLS_ACUITY_SCORE: 55
ADLS_ACUITY_SCORE: 49
ADLS_ACUITY_SCORE: 53
ADLS_ACUITY_SCORE: 49
WALKING_OR_CLIMBING_STAIRS_DIFFICULTY: YES
ADLS_ACUITY_SCORE: 53
ADLS_ACUITY_SCORE: 47
ADLS_ACUITY_SCORE: 49
ADLS_ACUITY_SCORE: 49
ADLS_ACUITY_SCORE: 53
ADLS_ACUITY_SCORE: 39
ADLS_ACUITY_SCORE: 53
ADLS_ACUITY_SCORE: 51
ADLS_ACUITY_SCORE: 47
ADLS_ACUITY_SCORE: 51
ADLS_ACUITY_SCORE: 55
FALL_HISTORY_WITHIN_LAST_SIX_MONTHS: NO
ADLS_ACUITY_SCORE: 55
ADLS_ACUITY_SCORE: 53
ADLS_ACUITY_SCORE: 51
ADLS_ACUITY_SCORE: 49
ADLS_ACUITY_SCORE: 51
ADLS_ACUITY_SCORE: 57
ADLS_ACUITY_SCORE: 45
ADLS_ACUITY_SCORE: 51
ADLS_ACUITY_SCORE: 53
ADLS_ACUITY_SCORE: 51
ADLS_ACUITY_SCORE: 45
ADLS_ACUITY_SCORE: 57
ADLS_ACUITY_SCORE: 55
ADLS_ACUITY_SCORE: 49
ADLS_ACUITY_SCORE: 53
ADLS_ACUITY_SCORE: 51
ADLS_ACUITY_SCORE: 49
ADLS_ACUITY_SCORE: 55
ADLS_ACUITY_SCORE: 47
ADLS_ACUITY_SCORE: 57
ADLS_ACUITY_SCORE: 53
ADLS_ACUITY_SCORE: 53
ADLS_ACUITY_SCORE: 51
ADLS_ACUITY_SCORE: 35
ADLS_ACUITY_SCORE: 55
ADLS_ACUITY_SCORE: 56
ADLS_ACUITY_SCORE: 55
ADLS_ACUITY_SCORE: 51
ADLS_ACUITY_SCORE: 51
ADLS_ACUITY_SCORE: 53
ADLS_ACUITY_SCORE: 57
ADLS_ACUITY_SCORE: 53
ADLS_ACUITY_SCORE: 47
ADLS_ACUITY_SCORE: 49
ADLS_ACUITY_SCORE: 51
ADLS_ACUITY_SCORE: 49
ADLS_ACUITY_SCORE: 49
ADLS_ACUITY_SCORE: 45
ADLS_ACUITY_SCORE: 57
ADLS_ACUITY_SCORE: 53
ADLS_ACUITY_SCORE: 35
ADLS_ACUITY_SCORE: 51
BATHING: 1-->ASSISTANCE NEEDED
ADLS_ACUITY_SCORE: 53
ADLS_ACUITY_SCORE: 51
ADLS_ACUITY_SCORE: 53
ADLS_ACUITY_SCORE: 51
ADLS_ACUITY_SCORE: 39
ADLS_ACUITY_SCORE: 53
ADLS_ACUITY_SCORE: 45
ADLS_ACUITY_SCORE: 53
ADLS_ACUITY_SCORE: 55
ADLS_ACUITY_SCORE: 57
ADLS_ACUITY_SCORE: 51
ADLS_ACUITY_SCORE: 53
ADLS_ACUITY_SCORE: 53
ADLS_ACUITY_SCORE: 55
ADLS_ACUITY_SCORE: 51
ADLS_ACUITY_SCORE: 57
ADLS_ACUITY_SCORE: 57
ADLS_ACUITY_SCORE: 55
ADLS_ACUITY_SCORE: 35
ADLS_ACUITY_SCORE: 47
ADLS_ACUITY_SCORE: 53
ADLS_ACUITY_SCORE: 49
ADLS_ACUITY_SCORE: 49
ADLS_ACUITY_SCORE: 45
ADLS_ACUITY_SCORE: 51
ADLS_ACUITY_SCORE: 49
ADLS_ACUITY_SCORE: 49
ADLS_ACUITY_SCORE: 53
ADLS_ACUITY_SCORE: 57
ADLS_ACUITY_SCORE: 53
ADLS_ACUITY_SCORE: 49
ADLS_ACUITY_SCORE: 55
ADLS_ACUITY_SCORE: 57
ADLS_ACUITY_SCORE: 57
ADLS_ACUITY_SCORE: 51
ADLS_ACUITY_SCORE: 55
ADLS_ACUITY_SCORE: 55
ADLS_ACUITY_SCORE: 49
ADLS_ACUITY_SCORE: 55
ADLS_ACUITY_SCORE: 49
ADLS_ACUITY_SCORE: 51
ADLS_ACUITY_SCORE: 55
ADLS_ACUITY_SCORE: 57
ADLS_ACUITY_SCORE: 51
CONCENTRATING,_REMEMBERING_OR_MAKING_DECISIONS_DIFFICULTY: OTHER (SEE COMMENTS)
ADLS_ACUITY_SCORE: 47
ADLS_ACUITY_SCORE: 49
ADLS_ACUITY_SCORE: 53
ADLS_ACUITY_SCORE: 57
ADLS_ACUITY_SCORE: 53
ADLS_ACUITY_SCORE: 47
ADLS_ACUITY_SCORE: 51
ADLS_ACUITY_SCORE: 53
ADLS_ACUITY_SCORE: 47
ADLS_ACUITY_SCORE: 51
ADLS_ACUITY_SCORE: 53
ADLS_ACUITY_SCORE: 57
ADLS_ACUITY_SCORE: 53
ADLS_ACUITY_SCORE: 45
ADLS_ACUITY_SCORE: 57
ADLS_ACUITY_SCORE: 51
ADLS_ACUITY_SCORE: 55
ADLS_ACUITY_SCORE: 53
ADLS_ACUITY_SCORE: 51
ADLS_ACUITY_SCORE: 47
CHANGE_IN_FUNCTIONAL_STATUS_SINCE_ONSET_OF_CURRENT_ILLNESS/INJURY: YES
ADLS_ACUITY_SCORE: 55
ADLS_ACUITY_SCORE: 45
ADLS_ACUITY_SCORE: 53
ADLS_ACUITY_SCORE: 55
ADLS_ACUITY_SCORE: 49
ADLS_ACUITY_SCORE: 51
ADLS_ACUITY_SCORE: 35
ADLS_ACUITY_SCORE: 57
ADLS_ACUITY_SCORE: 49
ADLS_ACUITY_SCORE: 49
ADLS_ACUITY_SCORE: 51
WALKING_OR_CLIMBING_STAIRS: AMBULATION DIFFICULTY, REQUIRES EQUIPMENT

## 2023-01-01 ASSESSMENT — ENCOUNTER SYMPTOMS
TREMORS: 1
APPETITE CHANGE: 0
VOMITING: 0
SEIZURES: 1
ABDOMINAL PAIN: 0
SEIZURES: 1
NAUSEA: 0

## 2023-03-05 NOTE — ED TRIAGE NOTES
From Group Home . History of tardive dyskinesia. At 0800 400mg Motrin to help shaking. At 1000 400mg Motrin. Upon arrival, EMS noticed shaking and stiffening .    Stool sample last week for diarrhea but don't know results.     EMS:    50 IM Benadryl which help shaking decrease.

## 2023-03-05 NOTE — ED PROVIDER NOTES
History     Chief Complaint:  Shaking    The history is provided by the patient and the EMS personnel. The history is limited by the condition of the patient.      Kortney Germain is a 67 year old female with a history of tardive dyskinesia and schizophrenia who presents with shaking. The patient arrives via EMS, coming from group home where she had been shaking. Staff normally administer 400 mg of Motrin to stop her shaking, which did not work as per usual, so they gave an additional 400 mg and called for EMS. En route, EMS gave 50 mg IM Benadryl, which stopped the shaking. Staff also note diarrhea for the past week, which the patient endorses. Patient denies any abdominal pain, nausea, vomiting, changes in eating/drinking, or sick contacts. Denies any pain anywhere. No recent stressors or staffing changes at home. No recent medication changes.     Independent Historian:   EMS - They report History of dystonia and given 50 of Benadryl with improvement of symptoms.    Review of External Notes: Paperwork and med list from [facility]    ROS:  Review of Systems   Unable to perform ROS: Other   Constitutional: Negative for appetite change.   Gastrointestinal: Negative for abdominal pain, nausea and vomiting.   Neurological: Positive for tremors.   All other systems reviewed and are negative.      Allergies:  Amlodipine  Clozapine  Egg   Penicillins  Potassium  Poultry Meal     Medications:    Albuterol  Benadryl  Robaxin  Adalat  Perphenazine     Past Medical History:    tardive dyskinesia  Depression  Hypertension   Vitamin D deficiency   ADD  UTI    Social History:   reports that she has never smoked. She has never used smokeless tobacco. She reports that she does not drink alcohol and does not use drugs.  PCP: [facility]   Presents to the ED alone via EMS, coming from group home where she lives.     Physical Exam     Patient Vitals for the past 24 hrs:   BP Temp Temp src Pulse Resp SpO2    03/05/23 1517 105/85 -- -- 71 17 98 %   03/05/23 1500 -- -- -- 72 17 --   03/05/23 1445 -- -- -- 73 18 --   03/05/23 1332 -- 98.7  F (37.1  C) Temporal -- -- --        Physical Exam  GENERAL: well developed, pleasant  HEAD: atraumatic  EYES: pupils reactive, extraocular muscles intact, conjunctivae normal  ENT:  mucus membranes moist  NECK:  trachea midline, normal range of motion  RESPIRATORY: no tachypnea, breath sounds clear to auscultation   CVS: normal S1/S2, no murmurs, intact distal pulses  ABDOMEN: soft, nontender, nondistention  MUSCULOSKELETAL: no deformities  SKIN: warm and dry, no acute rashes or ulceration  NEURO: GCS 15, cranial nerves intact, alert and oriented x3. Mild dyskinesias to the arms and legs.   PSYCH:  Mood/affect normal    Emergency Department Course     Laboratory:  Labs Ordered and Resulted from Time of ED Arrival to Time of ED Departure   COMPREHENSIVE METABOLIC PANEL - Abnormal       Result Value    Sodium 145      Potassium 4.0      Chloride 112 (*)     Carbon Dioxide (CO2) 21 (*)     Anion Gap 12      Urea Nitrogen 49.8 (*)     Creatinine 0.80      Calcium 9.3      Glucose 97      Alkaline Phosphatase 80      AST 19      ALT 15      Protein Total 6.5      Albumin 3.9      Bilirubin Total <0.2      GFR Estimate 81     CBC WITH PLATELETS AND DIFFERENTIAL - Abnormal    WBC Count 12.1 (*)     RBC Count 3.60 (*)     Hemoglobin 9.6 (*)     Hematocrit 31.3 (*)     MCV 87      MCH 26.7      MCHC 30.7 (*)     RDW 15.2 (*)     Platelet Count 383      % Neutrophils 72      % Lymphocytes 16      % Monocytes 10      % Eosinophils 0      % Basophils 1      % Immature Granulocytes 1      NRBCs per 100 WBC 0      Absolute Neutrophils 8.8 (*)     Absolute Lymphocytes 2.0      Absolute Monocytes 1.2      Absolute Eosinophils 0.0      Absolute Basophils 0.1      Absolute Immature Granulocytes 0.1      Absolute NRBCs 0.0     MAGNESIUM - Normal    Magnesium 2.0       Emergency Department Course &  Assessments:       Interventions:  Medications   0.9% sodium chloride BOLUS (0 mLs Intravenous Stopped 3/5/23 1402)     Followed by   sodium chloride 0.9% infusion (has no administration in time range)   LORazepam (ATIVAN) injection 0.5 mg (0.5 mg Intravenous $Given 3/5/23 1330)        Assessments:  1300 I obtained history and examined the patient as noted above.   1510 I rechecked the patient and explained findings. Prepared for discharge.        Social Determinants of Health affecting care:   None    Disposition:  The patient was discharged to home.     Impression & Plan      Medical Decision Making:    Patient presents with history of schizophrenia and dystonia likely secondary to medications which is chronic comes in with increased symptoms and recent diarrhea.  Certainly electrolyte disturbance is considered given the diarrhea.  Malabsorption of her medications is considered as well.  She was given IV fluids.  EMS report that the treatment plan right now is Motrin for the dystonia which I have some reservations as to this being true.  Potentially Benadryl is being used for the dystonia and there was miscommunication between the staff and EMS.  Reviewed her medication list did not see any sudden changes.  Certainly the diarrhea may have things worse.  She was given Ativan and feeling improved.  Ongoing dystonia can be managed by her psychiatry team.    Diagnosis:    ICD-10-CM    1. Diarrhea, unspecified type  R19.7       2. Dyskinesia  G24.9            Discharge Medications:  None.      Scribe Disclosure:  I, Lalit Rahman, am serving as a scribe at 1:06 PM on 3/5/2023 to document services personally performed by Ruel Black MD based on my observations and the provider's statements to me.   3/5/2023   Ruel Black MD Adams, Shaun L, MD  03/05/23 8079

## 2023-03-06 NOTE — ED PROVIDER NOTES
History     Chief Complaint:  Diarrhea and Shaking     HPI   Kortney Germain is a 67 year old female with a history of schizophrenia and tardive dyskinesia who presents via EMS from her group home for evaluation of diarrhea and shaking. Yesterday the patient was seen in the ED for increased shaking and diarrhea. Her workup in the ED was unremarkable and she was discharged back to her group home. Today EMS was again called to bring her into the ED for evaluation. Here in the ED the patient cannot provide additional history. Per EMS her blood sugar was 154 prior to arrival.     Independent Historian:   None - Patient Only    Review of External Notes:   [employer]  Outside Information Consultation 10/3/2019 [facility]     Kortney Germain - 67 y.o. Female; born [birth year]Encounter Summary, generated on Mar. 06, 2023March 06, 2023   Reason for Visit    Reason for Visit -   Reason Comments   CONSULT       Encounter Details    Encounter Details  Date Type Department Care Team Description   10/03/2019 Initial Consult [facility]    [address] [place] [address]    [phone]   Jesus Sage DO    [address] [facility] [address]    [place] [address]    [phone] (Work)    [phone] (Fax)   Schizophrenia, unspecified type (HRC) (Primary Dx);   Tardive syndrome     Social History  - documented as of this encounter  Social History  Tobacco Use Types Packs/Day Years Used Date   Smoking Tobacco: Never           Smokeless Tobacco: Never             Social History  Alcohol Use Standard Drinks/Week Comments   No 0 (1 standard drink = 0.6 oz pure alcohol)       Social History  Sex Assigned at Birth Date Recorded   Not on file       Last Filed Vital Signs  - documented in this encounter  Last Filed Vital Signs  Vital Sign Reading Time Taken Comments   Blood Pressure 137/76 10/03/2019 9:05 AM CDT     Pulse 66 10/03/2019 9:05 AM CDT      Temperature - -     Respiratory Rate 16 10/03/2019 9:05 AM CDT     Oxygen Saturation - -     Inhaled Oxygen Concentration - -     Weight 49.9 kg (110 lb) 10/03/2019 9:05 AM CDT     Height - -     Body Mass Index 23 2018 10:37 AM CST       Progress Notes  - documented in this encounter  AlonaJesusDO - 10/03/2019 12:00 PM CDT  Formatting of this note might be different from the original.      NAME: MAXIMUS PERAZA MR#: [id number]  CSN: [id number]  AUTHENTICATING CLINICIAN: Jesus TORRE DO Alona  CONFIRM #: [id number]  LOC: 223    CLINIC CONSULTATION    DATE OF CONSULTATION: 10/03/2019  : 1955    REQUESTING PHYSICIAN:  Niki Parham MD    I am seeing Mrs. Peraza for abnormal movements. Originally when this appointment was scheduled, she was having a great deal of difficulty with tremor and dyskinesia. She has a longstanding history of schizophrenia. Has been on neuroleptic agents for years, she believes decades. Had been on perphenazine for a number of years. Made several trips to the emergency room. Several recent medicine changes took place. She was switched from the perphenazine to risperidone. Also, had Austedo added to the mix in order to help her with her tardive movement disorder. This apparently was not tolerated. As she was transitioned to the Risperdal, her symptoms have attenuated substantially. Still has a little tremor predominantly involving her legs as well as some dyskinesias, but previously had difficulty walking, getting in and out of chairs, in and out of bed, etc. At present, she is back to independent activities.     Still uses Benadryl on occasion, she tells me.     She otherwise has a history of sacral fracture, anxiety, and hypertension.    CURRENT MEDICATIONS:  Fosamax, Lexapro, Inderal, and the Risperdal.    ALLERGIES:  Numerous allergies reviewed and updated in the computer.    PHYSICAL EXAMINATION:  VITAL SIGNS: Blood pressure 137/76, pulse 66.   NEURO: Pupils are  equally round and reactive to light. The extraocular movements are full. A little hypomimic in her facies. No chin tremor. No tremor of her hands. Face moves symmetrically. Tongue is midline. No dysarthria.   Deltoid, biceps, finger flexor strength probably normal. Finger taps are slowed, but she really did not have much in the way of rigidity. She has a little bit of dyskinesia involving her legs at present. Able to get out of the chair and initiate her gait. Steps are short. Is able to walk independently, but does better with a cane. Reflexes are all symmetric. Toes are downgoing. She has no clonus. Finger-nose-finger was accurate.    ASSESSMENT:  Tardive movement disorder. Seems to clearly be better, per history of the patient and the patient's group home care attendant. She seems to be tolerating the switch to risperidone and it does seem to be helping her mental health symptoms. At present, I do not think I would make any changes in her medicines. Mental health care trumps everything else. This particular medicine seems to be tolerated from side effect point of view better than the perphenazine was.     It is possible that the tardive movement disorder may continue to improve a bit, but I would not be surprised if she has baseline symptoms throughout time. It becomes a matter of having some side effects, which are I think acceptable. At present, that appears to be the case.     We will obtain the outside psychiatry records to review and see if there are any other suggestions that I have.    SAMARA:CHERELLE C:   D:10/03/19 09:51 T: 10/03/19 10:24 CONFIRM #: [id number]  Electronically signed by Jesus Sage DO at 10/04/2019 7:41 AM CDT   Plan of Treatment  - documented as of this encounter  Not on file    Visit Diagnoses  - documented in this encounter  Visit Diagnoses  Diagnosis   Schizophrenia, unspecified type (HRC) - Primary     Tardive syndrome    Orofacial dyskinesia       Discontinued Medications  - documented as  of this encounter  Discontinued Medications  Medication Sig Discontinue Reason Start Date End Date   multiple vitamins-minerals (CERTAGEN) oral liquid    Indications: Is taking 70 mg tab instead of liquid Take 70 mL by mouth daily. Indications: Is taking 70 mg tab instead of liquid *Resolved Condition   10/03/2019     Care Teams  - documented as of this encounter  Care Teams  Team Member Relationship Specialty Start Date End Date   Niki Parham MD    [address]    [place], [place] [address] [phone] (Work)    [phone] (Fax)   PCP - General   8/25/15       Images  Patient Demographics    Patient Demographics  Patient Address Communication Language Race / Ethnicity Marital Status   [address] (Home)  [place] [address]    Former (Apr. 25, 2006 - Aug. 17, 2015):  [address]  (Home)  [place] [address] [phone] (Home)  [phone] (Mobile)  [phone] (Home) English (Preferred) White / Non- Single     Patient Contacts    Patient Contacts  Contact Name Contact Address Communication Relationship to Patient   Home Group Unknown [phone] (Home) Other, Emergency Contact   Pt 10-23-18 Declined Unknown Unknown Emergency Contact     Document Information    Primary Care Provider Other Service Providers Document Coverage Dates   Niki Parham MD (Aug. 25, 2015August 25, 2015 - Present)  [phone] (Work)  [phone] (Fax)  [address]  [place] [address]  [facility]  [address] [place] [address]  Oct. 03, 2019October 03, 2019 - Oct. 04, 2019October 04, 2019      Organization   [facility]  [address] [place] [address]     Encounter Providers Encounter Date   Jesus Sage DO (Attending)  [phone] (Work)  [phone] (Fax)  [address] [address]  [place] [address]  Neurology Oct. 03           ROS:  Review of Systems   Unable to perform ROS: Psychiatric disorder        Allergies:  Amlodipine  Clozapine  Penicillins  Potassium    Medications:    Tylenol  Albuterol inhaler  Benadryl  Robaxin  Adalat   Perphenazine     Past Medical History:    Schizoaffective schizophrenia   Hypertension  Tardive dyskinesia   Depression      Social History:  The patient presents to the ED via EMS.  The patient lives in a group home.   PCP: [facility]     Physical Exam     Patient Vitals for the past 24 hrs:   BP Temp Temp src Pulse Resp SpO2 Height Weight   03/06/23 1836 (!) 175/93 -- -- 94 28 99 % -- --   03/06/23 1551 (!) 142/90 -- -- 86 (!) 32 98 % -- --   03/06/23 1448 (!) 159/118 98.7  F (37.1  C) Temporal 87 22 96 % -- --   03/06/23 1422 (!) 159/87 -- -- -- -- -- 1.524 m (5') 52.2 kg (115 lb)   03/06/23 1415 -- 98.9  F (37.2  C) Oral 97 29 95 % -- --        Physical Exam  Physical Exam   Nursing note and vitals reviewed.  General: Oriented to person, place, and time. Appears well-developed and well-nourished.   Head: No signs of trauma.   Mouth/Throat: Oropharynx is clear and moist.   Eyes: Conjunctivae are normal. Pupils are equal, round, and reactive to light.   Neck: Normal range of motion. No nuchal rigidity.   Cardiovascular: Normal rate and regular rhythm.    Respiratory: Effort normal and breath sounds normal. No respiratory distress.   Abdominal: Soft. There is no tenderness. There is no guarding.   Musculoskeletal: Normal range of motion. no edema.   Neurological: Tremulous. Both wrists and toes flexed. Intentional tremor seems to stop when she is talking. The patient is alert and oriented to person, place, and time.  PERRLA, EOMI, visual fields intact, strength in upper/lower extremities normal and symmetrical.   Sensation normal. Speech normal  GCS eye subscore is 4. GCS verbal subscore is 5. GCS motor subscore is 6.   Skin: Skin is warm and dry. No rash noted.   Psychiatric: normal mood and affect. behavior is normal.      Emergency Department Course      Laboratory:  Labs Ordered and Resulted from Time of ED Arrival to Time of ED Departure   COMPREHENSIVE METABOLIC PANEL - Abnormal       Result Value    Sodium 151 (*)     Potassium 3.9      Chloride 118 (*)     Carbon Dioxide (CO2) 21 (*)     Anion Gap 12      Urea Nitrogen 37.9 (*)     Creatinine 0.58      Calcium 9.3      Glucose 130 (*)     Alkaline Phosphatase 83      AST 39 (*)     ALT 21      Protein Total 6.9      Albumin 4.1      Bilirubin Total <0.2      GFR Estimate >90     CBC WITH PLATELETS AND DIFFERENTIAL - Abnormal    WBC Count 14.2 (*)     RBC Count 3.61 (*)     Hemoglobin 9.9 (*)     Hematocrit 31.5 (*)     MCV 87      MCH 27.4      MCHC 31.4 (*)     RDW 15.2 (*)     Platelet Count 424      % Neutrophils 76      % Lymphocytes 12      % Monocytes 10      % Eosinophils 0      % Basophils 1      % Immature Granulocytes 1      NRBCs per 100 WBC 0      Absolute Neutrophils 10.8 (*)     Absolute Lymphocytes 1.8      Absolute Monocytes 1.5 (*)     Absolute Eosinophils 0.0      Absolute Basophils 0.1      Absolute Immature Granulocytes 0.1      Absolute NRBCs 0.0     CK TOTAL - Abnormal     (*)    LIPASE - Normal    Lipase 22     ROUTINE UA WITH MICROSCOPIC REFLEX TO CULTURE      Emergency Department Course & Assessments:     Interventions:  1417 Benadryl 25 mg IV  1521 Ativan 0.5 mg IV    1529 Ativan 0.5 mg IV   1531 NS 1,000 mL IV   1554 Ativan 0.5 mg IV     Assessments:  1523: The patient was seen and evaluated.     Independent Interpretation (X-rays, CTs, rhythm strip):  None    Consultations/Discussion of Management or Tests:  1501: I spoke with Dr. Cantu of the hospitalist service regarding patient's presentation, findings, and plan of care.         Social Determinants of Health affecting care:   Education/Literacy, Homelessness/Housing Insecurity, Stress/Adjustment Disorders and Social Connections/Isolation    Disposition:  The patient was admitted to the hospital under the care of   Pepe.     Impression & Plan    Medical Decision Making:  Kortney Germain is a 67 year old female who presents for evaluation of weakness.  Associated symptoms today have included tremulousness.  She has a history of tremors.  She also has a history of tardive dyskinesia but that is only involving her facial muscles.    The workup here in the emergency room was to evaluate for infections, metabolic, electrolyte, neurologic or cardiovascular causes.  Of note, there are no signs of pneumonia or UTI causing the symptoms.    In addition, I do not believe symptoms are due to CVA, TIA, myasthesia gravis, myopathy or acute coronary syndromes and there are no indications at this point for a further workup with a benign history, exam and laboratory tests.    The patient has an uncontrolled tremor which has been present in the past.  She has a slight increase in her CK level.  Given her muscle tetany with these tremors the CK could be on the rise or leveling off.  She is requiring IV Ativan and Benadryl to help control these tremors.  She will be admitted to the hospital for further evaluation and treatment     Diagnosis:    ICD-10-CM    1. Tremor  R25.1       2. Non-traumatic rhabdomyolysis  M62.82       3. Weakness  R53.1             Scribe Disclosure:  I, Dorian Hannah, am serving as a scribe at 3:21 PM on 3/6/2023 to document services personally performed by Bao Li MD based on my observations and the provider's statements to me.   3/6/2023   Bao Li MD Joing, Todd Roger, MD  03/06/23 9976

## 2023-03-06 NOTE — ED NOTES
"While conducting Hourly Rounds on Pt this author observed pt out of bed and standing at the foot of the bed with back of knees touching the bed. Pt stated \" I want to urinate\". Pt  was helped back to bed with x 2 assist without resistance. She was incontintence of urine large amount. Tamar-care provided, bed linen change and pure wick placed to facilitate urination. No shakiness of extremities noted. VSS, denied pain when asked. Fransisca Sitter placed as pt did not demonstrate return demo on call light use after instructions provided on the use of the call light. Dr. Li updated.    "

## 2023-03-06 NOTE — H&P
[facility]    History and Physical - Hospitalist Service       Date of Admission:  3/6/2023    Assessment & Plan      Kortney Germain is a 67 year old female with history of schizophrenia, tardive dyskinesia, hypertension, depression, ADD, and UTI who resides in a group home who presented to the ED with diarrhea.  Patient is actually seen in the ED yesterday for increased shaking from her apparent baseline of tremors as well as ongoing diarrhea.  Work-up yesterday was fairly unremarkable and she discharged back to her group home.  EMS was called again today due to ongoing diarrhea with abdominal discomfort.  She is found to be fairly dehydrated and will be brought in under observation tonight.         Diarrhea  Dehydration  Patient was seen in ED yesterday 3/5 with similar symptoms and fairly unremarkable work up and discharge back to group home. Abdominal discomfort and diarrhea apparently continued and staff summoned EMS again.  Patient denies abdominal pain, bloody or black stool - she is not the best historian currently. No report of recent abx or hospitalization. No BM activity in ED reported thus far. Benign abdominal exam, afebrile, and nontoxic - will hold off from enteric panel or c diff testing.   - management as below  - prn anti-emetics etc  - may benefit from further details from group home when they can be reached - no answer tonight  - UA pending, follow    Hyperchloremic hypernatremia   today and Cl 118.  BUN  38 and creat 0.58.  Likely secondary to GI losses.  - IVF initiated in ED with NS 1L bolus, will continue maintenance IVF with LR at 100 ml/hr  - repeat BMP in AM  - encourage adequate dietary intake as tolerated    Nontraumatic rhabdomyolysis, mild  Worsening tremors/shaking  Reported history of tardive dyskinesia  Patient with frequent muscle flexing posturing at times.  No falls or substantial injuries noted.  History of TD was reportedly primarily in the  face years ago noted.  Patient is awake and alert during these times.  CK elevated at 682 which is greater than 3 times upper limit of normal.  Renal function appears intact although she is quite dehydrated likely from diarrhea as above.  Shaking/spasms could be in part related to dehydration and electrolyte abnormalities as above.  - follow CK and BMP  - Continue IV fluids  - benadryl and lorazepam attempted in ED due to shaking/spasm like movements, some improvement  - consider neurology consultation if persisting    History of schizophrenia  History of depression  Group home resident  Patient apparently is her own decision maker.   - PTA medications to continue when confirmed  - CT/SW consult for assistance with discharge planning    Hypertension  BP acceptable in ED initially.   - PTA regimen to resume when verified        Diet:   reg  DVT Prophylaxis: Pneumatic Compression Devices  Liu Catheter: Not present  Lines: None     Cardiac Monitoring: None  Code Status:  Full Code    Clinically Significant Risk Factors Present on Admission         # Hypernatremia: Highest Na = 151 mmol/L in last 2 days, will monitor as appropriate                       Disposition Plan      Expected Discharge Date: 03/07/2023                  Toni Cantu MD  Hospitalist Service  [facility]  Securely message with [employer] (more info)  Text page via [facility] Paging/Directory     ______________________________________________________________________    Chief Complaint   Diarrhea and shaking    History is obtained from the patient    History of Present Illness   Kortney Germain is a 67 year old female with history of schizophrenia, tardive dyskinesia, hypertension, depression, ADD, and UTI who resides in a group home who presented to the ED with diarrhea.  Patient is actually seen in the ED yesterday for increased shaking from her apparent baseline of tremors as well as ongoing diarrhea.  Work-up yesterday was  fairly unremarkable and she discharged back to her group home.  EMS was called again today due to ongoing diarrhea with abdominal discomfort.  She is not the most reliable historian but currently reports no abdominal pain or distention.  Abdomen is benign on exam.  She denies recent antibiotics, hospitalizations, or history of C. difficile.  Of note, she has not had any bowel movement in the ED today.  She does not report fevers, chills, coughing, shortness of breath, chest pain, bloody stool, or black stool.      She is found to be fairly dehydrated with hypernatremia, elevated BUN, and elevated CK technically qualifying for rhabdomyolysis (nontraumatic).  She has received IV fluids, Benadryl, and lorazepam with some improvement of her worsening tremors/baseline shakiness.  She will be brought in under observation with IV fluids continued.      Past Medical History    Past Medical History:   Diagnosis Date     Allergic state      Depressive disorder      Dyskinesia      Hypertension      Schizo affective schizophrenia (H)        Past Surgical History   No past surgical history on file.    Prior to Admission Medications   Prior to Admission Medications   Prescriptions Last Dose Informant Patient Reported? Taking?   NIFEdipine ER (ADALAT CC) 30 MG 24 hr tablet  Nursing Home Yes No   Sig: Take 30 mg by mouth daily   acetaminophen (TYLENOL) 500 MG tablet  Nursing Home Yes No   Sig: Take 1,000 mg by mouth 3 times daily 0800 / 1400 / 2000   albuterol (PROAIR HFA/PROVENTIL HFA/VENTOLIN HFA) 108 (90 Base) MCG/ACT inhaler  Nursing Home Yes No   Sig: Inhale 1-2 puffs into the lungs every 4 hours as needed for shortness of breath / dyspnea or wheezing   cholecalciferol 50 MCG (2000 UT) tablet  Nursing Home Yes No   Sig: Take 1 tablet by mouth daily   diphenhydrAMINE (BENADRYL) 50 MG capsule  Nursing Home Yes No   Sig: Take 50 mg by mouth 3 times daily 0800 / 1500 / 2000   ibuprofen (ADVIL/MOTRIN) 800 MG tablet   No No   Sig:  Take 1 tablet (800 mg) by mouth every 8 hours as needed for moderate pain   methocarbamol (ROBAXIN) 750 MG tablet   No No   Sig: Take 1 tablet (750 mg) by mouth 4 times daily as needed for muscle spasms or other (back pain) No driving a car or drinking alcohol for 6 hours after taking this medication.   multivitamin w/minerals (THERA-VIT-M) tablet  Nursing Home Yes No   Sig: Take 1 tablet by mouth daily   perphenazine 8 MG tablet  Nursing Home Yes No   Sig: Take 8 mg by mouth 2 times daily      Facility-Administered Medications: None        Review of Systems    The 10 point Review of Systems is negative other than noted in the HPI or here.     Social History   I have reviewed this patient's social history and updated it with pertinent information if needed.  Social History     Tobacco Use     Smoking status: Never     Smokeless tobacco: Never   Substance Use Topics     Alcohol use: No     Drug use: No        Physical Exam   Vital Signs: Temp: 98.7  F (37.1  C) Temp src: Temporal BP: (!) 142/90 Pulse: 86   Resp: (!) 32 SpO2: 98 % O2 Device: None (Room air)    Weight: 115 lbs 0 oz    Gen: NAD, pleasant, awake and alert, some tremor/shaking ongoing in extremities  HEENT: EOMI, MMM  Resp: no focal crackles,  no wheezes, no increased work of resp  CV: S1S2 heard, reg rhythm, reg rate  Abdo: soft, nontender, nondistended, bowel sounds present  Ext: calves nontender, well perfused  Neuro: aa, conversing, oriented, moving all ext, shaking as above, CN grossly intact, no facial asymmetry      Medical Decision Making       77 MINUTES SPENT BY ME on the date of service doing chart review, history, exam, documentation & further activities per the note.      Data     I have personally reviewed the following data over the past 24 hrs:    14.2 (H)  \   9.9 (L)   / 424     151 (H) 118 (H) 37.9 (H) /  130 (H)   3.9 21 (L) 0.58 \       ALT: 21 AST: 39 (H) AP: 83 TBILI: <0.2   ALB: 4.1 TOT PROTEIN: 6.9 LIPASE: 22       Imaging results  reviewed over the past 24 hrs:   No results found for this or any previous visit (from the past 24 hour(s)).

## 2023-03-06 NOTE — ED NOTES
Rapid Assessment Note    History:   Kortney Germain is a 67 year old female with a history of schizophrenia and tardive dyskinesia who presents via EMS from her group home for evaluation of diarrhea. Yesterday the patient was seen in the ED for increased shaking and diarrhea. Her workup in the ED was unremarkable and she was discharged back to her group home. Today EMS was again called to bring her into the ED for evaluation. Here in the ED the patient cannot provide additional history. Per EMS her blood sugar was 154 prior to arrival.     Exam:   General:  Alert, interactive  Cardiovascular:  Well perfused  Lungs:  No respiratory distress, no accessory muscle use  Neuro:  Moving all 4 extremities  Skin:  Warm, dry  Psych:  Normal affect    Plan of Care:   I evaluated the patient and developed an initial plan of care. I discussed this plan and explained that I, or one of my partners, would be returning to complete the evaluation.         I, Dorian Hannah, am serving as a scribe to document services personally performed by Dr. Li, based on my observations and the provider's statements to me.    3/6/2023  [employer]    Portions of this medical record were completed by a scribe. UPON MY REVIEW AND AUTHENTICATION BY ELECTRONIC SIGNATURE, this confirms (a) I performed the applicable clinical services, and (b) the record is accurate.       Bao Li MD  03/06/23 1240

## 2023-03-06 NOTE — ED TRIAGE NOTES
Pt seen in ED yesterday and diagnosed with dyknesia, comes from group home, pt is her own guardian. Abdominal pain and diarrhea,

## 2023-03-06 NOTE — ED NOTES
[facility]  ED Nurse Handoff Report    ED Chief complaint: Diarrhea      ED Diagnosis:   Final diagnoses:   Tremor   Non-traumatic rhabdomyolysis   Weakness       Code Status: Per admitting Provider     Allergies:   Allergies   Allergen Reactions    Amlodipine     Clozapine     Egg [Chicken-Derived Products (Egg)]     Penicillins     Potassium     Poultry Meal        Patient Story: Yesterday the patient was seen in the ED for increased shaking and diarrhea. She workup in the ED was unremarkable and she was discharged back to her group home. Today EMS was again called to bring her into the ED for evaluation for increased shaking.       Focused Assessment:   Pt A/ O to self and place. Increased shaking and rigidity of extremities noted that responded to x 3 doses of Ativan. Pt denied pain when asked. Posey sitter  put in placed  as a precautionary measure as pt got out of bed once and did not demonstrate  use of call light. Pure  wick in placed to facilitate urination.     Treatments and/or interventions provided:  Benadryl, Ativan 0.5 mg IVP x 3 respectively. Pt is asleep at   this time.     Results for orders placed or performed during the hospital encounter of 03/06/23   Comprehensive metabolic panel     Status: Abnormal   Result Value Ref Range    Sodium 151 (H) 136 - 145 mmol/L    Potassium 3.9 3.4 - 5.3 mmol/L    Chloride 118 (H) 98 - 107 mmol/L    Carbon Dioxide (CO2) 21 (L) 22 - 29 mmol/L    Anion Gap 12 7 - 15 mmol/L    Urea Nitrogen 37.9 (H) 8.0 - 23.0 mg/dL    Creatinine 0.58 0.51 - 0.95 mg/dL    Calcium 9.3 8.8 - 10.2 mg/dL    Glucose 130 (H) 70 - 99 mg/dL    Alkaline Phosphatase 83 35 - 104 U/L    AST 39 (H) 10 - 35 U/L    ALT 21 10 - 35 U/L    Protein Total 6.9 6.4 - 8.3 g/dL    Albumin 4.1 3.5 - 5.2 g/dL    Bilirubin Total <0.2 <=1.2 mg/dL    GFR Estimate >90 >60 mL/min/1.73m2   Lipase     Status: Normal   Result Value Ref Range    Lipase 22 13 - 60 U/L   Extra Tube     Status: None     Narrative    The following orders were created for panel order Extra Tube.  Procedure                               Abnormality         Status                     ---------                               -----------         ------                     Extra Blue Top Tube[[id number]]                              Final result               Extra Red Top Tube[[id number]]                               Final result               Extra Blood Bank Purple ...[[id number]]                      Final result                 Please view results for these tests on the individual orders.   CBC with platelets and differential     Status: Abnormal   Result Value Ref Range    WBC Count 14.2 (H) 4.0 - 11.0 10e3/uL    RBC Count 3.61 (L) 3.80 - 5.20 10e6/uL    Hemoglobin 9.9 (L) 11.7 - 15.7 g/dL    Hematocrit 31.5 (L) 35.0 - 47.0 %    MCV 87 78 - 100 fL    MCH 27.4 26.5 - 33.0 pg    MCHC 31.4 (L) 31.5 - 36.5 g/dL    RDW 15.2 (H) 10.0 - 15.0 %    Platelet Count 424 150 - 450 10e3/uL    % Neutrophils 76 %    % Lymphocytes 12 %    % Monocytes 10 %    % Eosinophils 0 %    % Basophils 1 %    % Immature Granulocytes 1 %    NRBCs per 100 WBC 0 <1 /100    Absolute Neutrophils 10.8 (H) 1.6 - 8.3 10e3/uL    Absolute Lymphocytes 1.8 0.8 - 5.3 10e3/uL    Absolute Monocytes 1.5 (H) 0.0 - 1.3 10e3/uL    Absolute Eosinophils 0.0 0.0 - 0.7 10e3/uL    Absolute Basophils 0.1 0.0 - 0.2 10e3/uL    Absolute Immature Granulocytes 0.1 <=0.4 10e3/uL    Absolute NRBCs 0.0 10e3/uL   Extra Blue Top Tube     Status: None   Result Value Ref Range    Hold Specimen JIC    Extra Red Top Tube     Status: None   Result Value Ref Range    Hold Specimen JIC    Extra Blood Bank Purple Top Tube     Status: None   Result Value Ref Range    Hold Specimen JIC    CK total     Status: Abnormal   Result Value Ref Range     (H) 26 - 192 U/L   CBC with platelets + differential     Status: Abnormal    Narrative    The following orders were created for panel order CBC with platelets  + differential.  Procedure                               Abnormality         Status                     ---------                               -----------         ------                     CBC with platelets and d...[[id number]]  Abnormal            Final result                 Please view results for these tests on the individual orders.      Patient's response to treatments and/or interventions:  Asleep and stable     To be done/followed up on inpatient unit:  Monitor     Does this patient have any cognitive concerns?:  No     Activity level - Baseline/Home:  Stand with assist x2  Activity Level - Current:   Stand with assist x2    Patient's Preferred language: English   Needed?: No    Isolation: None  Infection: Not Applicable  Patient tested for COVID 19 prior to admission: YES  Bariatric?: No    Vital Signs:   Vitals:    03/06/23 1415 03/06/23 1422 03/06/23 1448 03/06/23 1551   BP:  (!) 159/87 (!) 159/118 (!) 142/90   Pulse: 97  87 86   Resp: 29  22 (!) 32   Temp: 98.9  F (37.2  C)  98.7  F (37.1  C)    TempSrc: Oral  Temporal    SpO2: 95%  96% 98%   Weight:  52.2 kg (115 lb)     Height:  1.524 m (5')         Cardiac Rhythm:     Was the PSS-3 completed:   Yes  What interventions are required if any?               Family Comments: No family present in ED.  OBS brochure/video discussed/provided to patient/family: Yes              Name of person given brochure if not patient: N/A              Relationship to patient: N/A    For the majority of the shift this patient's behavior was Yellow.   Behavioral interventions performed were  Fransisca Figueroa .    ED NURSE PHONE NUMBER: [phone]

## 2023-03-07 PROBLEM — M62.82 NON-TRAUMATIC RHABDOMYOLYSIS: Status: ACTIVE | Noted: 2023-01-01

## 2023-03-07 PROBLEM — R25.1 TREMOR: Status: ACTIVE | Noted: 2023-01-01

## 2023-03-07 PROBLEM — R53.1 WEAKNESS: Status: ACTIVE | Noted: 2023-01-01

## 2023-03-07 NOTE — CONSULTS
Care Management Initial Consult    General Information  Assessment completed with: Care Team Member,   Alicia  Type of CM/SW Visit: Initial Assessment    Primary Care Provider verified and updated as needed: Yes   Readmission within the last 30 days: no previous admission in last 30 days      Reason for Consult: discharge planning  Advance Care Planning:            Communication Assessment  Patient's communication style: spoken language (English or Bilingual)    Hearing Difficulty or Deaf: no   Wear Glasses or Blind: yes    Cognitive  Cognitive/Neuro/Behavioral: .WDL except, orientation  Level of Consciousness: other (see comments) (forgetful at times)  Arousal Level: opens eyes spontaneously  Orientation: oriented x 4  Mood/Behavior: calm  Best Language: 0 - No aphasia  Speech: slow, clear, spontaneous    Living Environment:   People in home: other (see comments) ( resident)     Current living Arrangements: group home      Able to return to prior arrangements: yes       Family/Social Support:  Care provided by: self, other (see comments) ( staff)  Provides care for: no one  Marital Status: Single  Facility resident(s)/Staff          Description of Support System: Supportive, Involved         Current Resources:   Patient receiving home care services: No     Community Resources: County Worker, Home Chef Programs  Equipment currently used at home: walker, standard  Supplies currently used at home: None    Employment/Financial:  Employment Status: disabled        Financial Concerns: No concerns identified   Referral to Financial Worker: No       Lifestyle & Psychosocial Needs:  Social Determinants of Health     Tobacco Use: Not on file   Alcohol Use: Not on file   Financial Resource Strain: Not on file   Food Insecurity: Not on file   Transportation Needs: Not on file   Physical Activity: Not on file   Stress: Not on file   Social Connections: Not on file   Intimate Partner Violence: Not on file    Depression: Not on file   Housing Stability: Not on file       Functional Status:  Prior to admission patient needed assistance:              Mental Health Status:          Chemical Dependency Status:                Values/Beliefs:  Spiritual, Cultural Beliefs, Confucianist Practices, Values that affect care:                 Additional Information:  Per consult for discharge planning and chart review indicating patient resides at the [facility], Called  [phone] and spoke to Alicia  .  Per Alicia, pt is her own guardian.  Per Alicia, prior to admit pt was independent with all ADLs,uses a walker  and she attends a Day Program and her [facility]  is Gisele Aranda [phone].   Per [facility],  staff administer pt' medication and drives pt to her medical appointments.  Rimmama requested any new medications at discharge be filled at [facility].  Rimmama stated that  is unable to transport pt home at discharge and requested transportation be set up for pt at discharge.  Rimmama stated that  Manager is Emely [phone] and that she will need to be called regarding when pt is medically ready to discharge home from the hospital.  Inpatient Care Coordinator will continue to follow for discharge planning.    Marialuisa Cowan RN  Marialuisa Cowan RN, BSN, OCN   Inpatient Care Coordination [address]  Office: [phone]

## 2023-03-07 NOTE — ED NOTES
Discussed pt's increases restlessness with Charge and need for a 1:1 sitter. Sitter obtained and is currently at the bedside.

## 2023-03-07 NOTE — PROGRESS NOTES
RECEIVING UNIT ED HANDOFF REVIEW    ED Nurse Handoff Report was reviewed by: Katiuska Mendoza RN on March 7, 2023 at 2:16 AM

## 2023-03-07 NOTE — CONSULTS
"[facility] Nurse Inpatient Assessment     Consulted for: Pressure Injury buttocks       Areas Assessed:      Areas visualized during today's visit: Focused: and Sacrum/coccyx    Wound location: buttock     Last photo: 3/7  Wound due to: Moisture Associated Skin Damage (MASD) with pressure and friction components   Wound history/plan of care: found with diaper in use with incontinent urine   Wound base: 100 % dermis     Palpation of the wound bed: normal      Drainage: scant     Description of drainage: serosanguinous     Measurements (length x width x depth, in cm): diffuse with largest 1.5  x 0.8  x  0.1 cm      Tunneling: N/A     Undermining: N/A  Periwound skin: Irritant dermatitis      Color: normal and consistent with surrounding tissue      Temperature: normal   Odor: none  Pain: no grimacing or signs of discomfort, none  Pain interventions prior to dressing change: patient tolerated well  Treatment goal: Heal  and Decrease moisture  STATUS: initial assessment  Supplies ordered: supplies stored on unit       Treatment Plan:      Wound care  EVERY SHIFT        Comments: Location: buttock and perineal   Care: provided qshift by primary RN   1. Cleanse with each incontinence episode with wei cleanser and soft dry wipes   2. Apply Triad paste ( #[id number]) over wounds and perianal   3. Do not apply diapers   4. Apply covidien underpads for incontinence, ok to toilet patient or use purewick         Skin care precautions  EFFECTIVE NOW        Comments: Pressure Injury Prevention (PIP) Plan:   If patient is declining pressure injury prevention interventions: Explore reason why and address patient's concerns, Educate on pressure injury risk and prevention intervention(s), If patient is still declining, document \"informed refusal\" , and Ensure Care team is aware ( provider, charge nurse, etc)   Mattress: Follow bed algorithm, reassess daily and order specialty mattress, if indicated. "   HOB: Maintain at or below 30 degrees, unless contraindicated   Repositioning in bed: Every 1-2 hours , Left/right positioning; avoid supine, Raise foot of bed prior to raising head of bed, to reduce patient sliding down (shear), and Frequent microturns using TAPS wedges, as patient tolerates   Heels: Keep elevated off mattress and Pillows under calves   Protective Dressing: please see orders for Triad paste, separate care order   Chair positioning: Chair cushion (#[id number]) , Assist patient to reposition hourly, and Do NOT use a donut for sitting (this increases pressure to smaller area and creates a higher potential for injury)     If patient has a buttock pressure injury, or high risk for PI use chair cushion or SPS.   Moisture Management: Perineal cleansing /protection: Follow Incontinence Protocol, Avoid brief in bed, Clean and dry skin folds with bathing , and Moisturize dry skin   Under Devices: Inspect skin under all medical devices during skin inspection , Ensure tubes are stabilized without tension, and Ensure patient is not lying on medical devices or equipment when repositioned   Ask provider to discontinue device when no longer needed.          Orders: Written    RECOMMEND PRIMARY TEAM ORDER: None, at this time  Education provided: importance of repositioning, plan of care, Moisture management, Hygiene and Off-loading pressure  Discussed plan of care with: Patient and Nurse  WOC nurse follow-up plan: weekly  Notify WOC if wound(s) deteriorate.  Nursing to notify the Provider(s) and re-consult the WOC Nurse if new skin concern.    DATA:     Current support surface: Standard  Standard gel/foam mattress (IsoFlex, Atmos air, etc)  Containment of urine/stool: Incontinence Protocol  BMI: Body mass index is 17.3 kg/m .   Active diet order: Orders Placed This Encounter      Regular Diet Adult     Output: I/O last 3 completed shifts:  In: 1046 [P.O.:120; I.V.:926]  Out: -      Labs: Recent Labs   Lab  "03/06/23  1413   ALBUMIN 4.1   HGB 9.9*   WBC 14.2*     Pressure injury risk assessment:   Sensory Perception: 3-->slightly limited  Moisture: 3-->occasionally moist  Activity: 2-->chairfast  Mobility: 2-->very limited  Nutrition: 2-->probably inadequate  Friction and Shear: 1-->problem  Luis Score: 13    Anisa AVELAR   1st: Vocera Connect, call \"Anisa Fabian\" or call Group \"[facility] Nurse\"   (2nd option: leave a voicemail at Dept. Office Number: [phone]. Messages checked occasionally M-F)      "

## 2023-03-07 NOTE — PHARMACY-ADMISSION MEDICATION HISTORY
Pharmacy Medication History  Admission medication history interview status for the 3/6/2023  admission is complete. See EPIC admission navigator for prior to admission medications     Location of Interview: Phone  Medication history sources: Barber and staff member at [facility] ([phone]; spoke with Raphael)    Significant changes made to the medication list:  - Added alendronate, escitalopram, lorazepam, and Bengay  - Removed albuterol and methocarbamol  - Changed:    Ibuprofen 800 mg -> 400 mg (takes 2x 200 mg prn)    Perphenazine from 8 mg BID -> 4 mg am and 8 mg pm    In the past week, patient estimated taking medication this percent of the time: greater than 90%    Medication Affordability:  Not including over the counter (OTC) medications, was there a time in the past 12 months when you did not take your medications as prescribed because of cost?: Unable to Assess    Additional medication history information:   - Vitamin D filled for 400 units but [facility] staff indicates patient takes 2000 units daily.    Medication reconciliation completed by provider prior to medication history? No    Time spent in this activity: 35 minutes    Prior to Admission medications    Medication Sig Last Dose Taking? Auth Provider Long Term End Date   acetaminophen (TYLENOL) 500 MG tablet Take 1,000 mg by mouth 3 times daily 0800 / 1400 / 2000 3/6/2023 at 0800 Yes Unknown, Entered By History     alendronate (FOSAMAX) 70 MG tablet Take 70 mg by mouth every 7 days Thursdays at 0700 Past Week Yes Unknown, Entered By History Yes    cholecalciferol 50 MCG (2000 UT) tablet Take 1 tablet by mouth daily 0800 3/6/2023 at 0800 Yes Unknown, Entered By History     diphenhydrAMINE (BENADRYL) 50 MG capsule Take 50 mg by mouth 3 times daily 0800 / 1500 / 2000 3/6/2023 at 0800 Yes Unknown, Entered By History     escitalopram (LEXAPRO) 20 MG tablet Take 20 mg by mouth daily 0800 3/6/2023 at 0800 Yes Unknown, Entered By History No     ibuprofen (ADVIL/MOTRIN) 200 MG tablet Take 400 mg by mouth every 4 hours as needed for pain prn Yes Unknown, Entered By History     LORazepam (ATIVAN) 0.5 MG tablet Take 0.5 mg by mouth 2 times daily 0800/1400 3/6/2023 at 0800 Yes Unknown, Entered By History     LORazepam (ATIVAN) 0.5 MG tablet Take 0.25 mg by mouth At Bedtime 2000 3/5/2023 at hs Yes Unknown, Entered By History     multivitamin w/minerals (THERA-VIT-M) tablet Take 1 tablet by mouth daily 0800 3/6/2023 at 0800 Yes Unknown, Entered By History     muscle rub (ARTHRITIS HOT) 10-15 % CREA Apply topically 4 times daily Back pain - 0700/1100/1500/2000 3/6/2023 at 0700 Yes Unknown, Entered By History     NIFEdipine ER (ADALAT CC) 30 MG 24 hr tablet Take 30 mg by mouth daily 0800 3/6/2023 at 0800 Yes Unknown, Entered By History Yes    perphenazine 4 MG tablet Take 4 mg by mouth every morning 0800 3/6/2023 at 0800 Yes Unknown, Entered By History     perphenazine 8 MG tablet Take 8 mg by mouth At Bedtime 2000 3/5/2023 at 2000 Yes Unknown, Entered By History         The information provided in this note is only as accurate as the sources available at the time of update(s)

## 2023-03-07 NOTE — PROVIDER NOTIFICATION
MD Notification    Notified Person: MD    Notified Person Name: denise    Notification Date/Time: 3/7/23 0724    Notification Interaction: web based paging    Purpose of Notification: Can you please come up and evaluate the pt. we need some meds to help her relax. she is trying to crawl out of bed, very tense tremors that appear to have her in a lot of pain. we already gave her scheduled ativan and benadryl per MD. xyprexa worked     Orders Received:    Comments:

## 2023-03-07 NOTE — PLAN OF CARE
Arrived on unit at 0220. Alert to self only. VSS on RA. Denies pain, N/V. Regular diet. Up A2 w/GB. T/R q 2 hrs. Incontinent of B/B at times, uses bedside commode. L PIV infusing LR @ 100 mL/hr. Scattered bruising. Open sores on buttocks - mepilex applied. WOC consulted. Pulsate ordered. Tremors to BUE. Sitter at bedside. Discharge pending clinical improvement.

## 2023-03-07 NOTE — PROGRESS NOTES
[facility]    Hospitalist Progress Note    Date of Service (when I saw the patient): 03/07/2023    Assessment & Plan   Kortney Germain is a 67 year old female who was admitted on 3/6/2023.    Kortney Germain is a 67 year old female with history of schizophrenia, tardive dyskinesia, hypertension, depression, ADD, and UTI who resides in a group home who presented to the ED with diarrhea.  Patient is actually seen in the ED yesterday for increased shaking from her apparent baseline of tremors as well as ongoing diarrhea.  Work-up yesterday was fairly unremarkable and she discharged back to her group home.  EMS was called again today due to ongoing diarrhea with abdominal discomfort.  She is found to be fairly dehydrated and will be brought in under observation tonight.            Diarrhea  Dehydration  Patient was seen in ED yesterday 3/5 with similar symptoms and fairly unremarkable work up and discharge back to group home. Abdominal discomfort and diarrhea apparently continued and staff summoned EMS again.  Patient denies abdominal pain, bloody or black stool - she is not the best historian currently. No report of recent abx or hospitalization. No BM activity in ED reported thus far. Benign abdominal exam, afebrile, and nontoxic - will hold off from enteric panel or c diff testing.   - management as below  - prn anti-emetics etc  - may benefit from further details from [facility] when they can be reached - no answer tonight  - UA pending, follow    WBC count increased to 14 K from 12 K on admission  C. difficile testing and enteric panel sent results are currently pending     Hyperchloremic hypernatremia  Hyponatremia   on admission and Cl 118.  BUN  38 and creat 0.58.  Patient was started on LR at 100 mL/h on admission  We will switch LR to D5, 0.2% normal saline at 100 mL/h to help with hyper natremia  Sodium at 149 on 3/7/2023 with potassium of 2.9  Started on potassium  replacement protocol  Follow BMP daily     Nontraumatic rhabdomyolysis, mild  Worsening tremors/shaking  Reported history of tardive dyskinesia  Patient with frequent muscle flexing posturing at times.  No falls or substantial injuries noted.  History of TD was reportedly primarily in the face years ago noted.  Patient is awake and alert during these times.  CK elevated at 682 which is greater than 3 times upper limit of normal.  Renal function appears intact although she is quite dehydrated likely from diarrhea as above.  Shaking/spasms could be in part related to dehydration and electrolyte abnormalities as above.  -785  - Continue IV fluids with changing IV fluids as noted above  - benadryl and lorazepam attempted in ED due to shaking/spasm like movements, some improvement  Will order as needed Valium IV to help with tardive dyskinesia movements  Patient is on perphenazine antipsychotic to help with her seizure for anemia which might contribute to tardive dyskinesia and elevated CPK  Perphenazine held  Psych consult placed for medical management     History of schizophrenia  History of depression  Group home resident  Acute encephalopathy secondary to metabolic with hypernatremia and delirium with hospitalization  Patient apparently is her own decision maker.   PTA Perpenazine  discontinued due to increasing tardive dyskinesia tremors and elevated CPK  As needed Valium ordered  Patient was agitated overnight needing Ativan and Benadryl and Atarax given  Sleeping this morning.  Bedside sitter in place  Psych consultation requested.  Appreciate assistance  - CT/SW consult for assistance with discharge planning     Hypertension  BP acceptable in ED initially.   Blood pressure currently systolics in the 130s    Moderate to severe malnutrition with a BMI of 17.3  Nutrition services consult for supplementation ordering        Diet:   reg  DVT Prophylaxis: Pneumatic Compression Devices  Liu Catheter: Not  present  Lines: None     Cardiac Monitoring: None  Code Status:  Full Code           Clinically Significant Risk Factors Present on Admission         Clinically Significant Risk Factors Present on Admission        # Hypokalemia: Lowest K = 2.9 mmol/L in last 2 days, will replace as needed  # Hypernatremia: Highest Na = 151 mmol/L in last 2 days, will monitor as appropriate  # Hypocalcemia: Lowest Ca = 8.1 mg/dL in last 2 days, will monitor and replace as appropriate              # Cachexia: Estimated body mass index is 17.3 kg/m  as calculated from the following:    Height as of this encounter: 1.524 m (5').    Weight as of this encounter: 40.2 kg (88 lb 9.6 oz).                         Disposition: Expected discharge in 2-3days    Discussed with bedside RN and patient today    Chitra Lagos MD, MD  [phone] (P)      Interval History   Patient was agitated overnight trying to get out of bed.  Given as needed Atarax and Benadryl.  Resting comfortably in bed this morning during my visit.  Bedside sitter in place.  Denies any pain.    -Data reviewed today: I reviewed all new labs and imaging results over the last 24 hours. I personally reviewed no images or EKG's today.    Physical Exam   Temp: 100.3  F (37.9  C) Temp src: Axillary BP: 131/71 Pulse: 80   Resp: 16 SpO2: 95 % O2 Device: None (Room air)    Vitals:    03/06/23 1422 03/07/23 0445   Weight: 52.2 kg (115 lb) 40.2 kg (88 lb 9.6 oz)     Vital Signs with Ranges  Temp:  [98.7  F (37.1  C)-100.3  F (37.9  C)] 100.3  F (37.9  C)  Pulse:  [67-97] 80  Resp:  [11-32] 16  BP: (106-175)/() 131/71  SpO2:  [94 %-99 %] 95 %  I/O last 3 completed shifts:  In: 1046 [P.O.:120; I.V.:926]  Out: -     Constitutional: Awake, alert, cooperative, no apparent distress, resting comfortably in bed  Respiratory: Clear to auscultation bilaterally, no crackles or wheezing  Cardiovascular: Regular rate and rhythm, normal S1 and S2, and no murmur noted  GI: Normal bowel sounds,  soft, non-distended, non-tender  Skin/Integumen: No rashes, no cyanosis, mild tremors noted in upper extremities  Other:     Medications     dextrose 5% and 0.2% NaCl 1,000 mL (03/07/23 1038)       diphenhydrAMINE  50 mg Oral TID     escitalopram  20 mg Oral Daily     HYDROmorphone  0.5 mg Intravenous Once     LORazepam  0.25 mg Oral At Bedtime     LORazepam  0.5 mg Oral BID     miconazole   Topical BID     NIFEdipine ER  30 mg Oral Daily     senna-docusate  1 tablet Oral BID    Or     senna-docusate  2 tablet Oral BID       Data   Recent Labs   Lab 03/07/23  0748 03/06/23  1413 03/05/23  1323   WBC  --  14.2* 12.1*   HGB  --  9.9* 9.6*   MCV  --  87 87   PLT  --  424 383   * 151* 145   POTASSIUM 2.9* 3.9 4.0   CHLORIDE 115* 118* 112*   CO2 22 21* 21*   BUN 17.2 37.9* 49.8*   CR 0.47* 0.58 0.80   ANIONGAP 12 12 12   LULU 8.1* 9.3 9.3   * 130* 97   ALBUMIN  --  4.1 3.9   PROTTOTAL  --  6.9 6.5   BILITOTAL  --  <0.2 <0.2   ALKPHOS  --  83 80   ALT  --  21 15   AST  --  39* 19   LIPASE  --  22  --        No results found for this or any previous visit (from the past 24 hour(s)).

## 2023-03-07 NOTE — CODE/RAPID RESPONSE
[facility] [place]    Code Stroke  House Officer Note    ////////////////////////////////////////////////////////////////////////////////////////////////////////////////////////////////  Acute stroke evaluation:  Time of arrival: 16:05   Time called: 16:09   Glucose level 149   Time patient seen by neurology:  16:15   Time onset of stroke symptoms / witnessed onset:  15:53   Time last known well: 15:50    IV tPA admistered: No   IA / Thrombolectomy NA   Stroke Presentation Type Inhouse Stroke   Stroke Thrombolytic Ineligible Reason Improving symptoms   Stroke Thrombolytic Treatments Na   Neurologists Sravani Lee NP   Stroke Type of Vascular Event No evidence of acute ischemic stroke on initial imaging; will have Brain MRI   Pre TPa Wts entered? No   Post TPa VS Neuro Checks No      IV tPA:  Patient was not treated with rt-TP due to the following reason(s):  Rapidly improving symptoms     IA thrombolectomy:  N/A      [employer] Stroke Scale  Exam Interval: Baseline   Score    Level of consciousness: (0)   Alert, keenly responsive    LOC questions: (0)   Answers both questions correctly    LOC commands: (0)   Performs both tasks correctly    Best gaze: (0)   Normal    Visual: (0)   No visual loss    Facial palsy: (1)   Minor paralysis (flat nasolabial fold, smile asymmetry)    Motor arm (left): (1)   Drift    Motor arm (right): (0)   No drift    Motor leg (left): (1)   Drift    Motor leg (right): (0)   No drift    Limb ataxia: (1)   Present in one limb    Sensory: (1)   Mild to moderate sensory loss    Best language: (0)   Normal- no aphasia    Dysarthria: (0)   Normal    Extinction and inattention: (0)   No abnormality        Total Score:  5       Imaging:  No results found for this or any previous visit (from the past 24 hour(s)).    Physical Exam   Vital Signs with Ranges:  Temp:  [98.4  F (36.9  C)-100.3  F (37.9  C)] 98.4  F (36.9  C)  Pulse:  [67-94] 68  Resp:  [11-28]  16  BP: (106-175)/(58-93) 120/73  SpO2:  [93 %-99 %] 96 %    Assessment & Plan     Right facial droop, left upper and lower extremity weakness, left lower extremity ataxia  Patient was sitting on commode feeling weak leaning over towards the right side.  RN describes as decreased level of consciousness, however patient did not lose consciousness.  Upon arrival patient is nontoxic-appearing, not in acute distress.  She is alert, laying in bed with HOB 60 to 90 degrees. Initial vital signs include temp 98.4, HR 68, /73, RR 16 on RA. She is alert, oriented x3.  She is logical speech.  Neuro exam notable for right facial droop. Left upper extremity and left lower extremity weakness, and left lower extremity ataxia.    Stroke risk factors  HTN    Differentials:  - Vasovagal syncope vs. Orthostasis  - ACS- elevated troponin; will recheck at 1830  - Hypercapnic respiratory failure  - Seizures; less likely as lactic normal, patient did not appear to be in a post-ictal state  - Tardive Dyskinesia exacerbation  - Medication side effect; recent administration of valium; although patient developed these symptoms approximately 15-20 minutes after valium administered, likely not enough time for onset of medication    Working Diagnosis:     INTERVENTIONS:  -   - CT head w/o contrast  - CT Head perfusion  - CTA Head and neck  - 12-lead EKG- NSR, no acute ischemic changes  - BMP  - mg  - phos  - CBC  - trop 90, recheck at 1830  - lactic acid 1.1  - VB.42/40/32/26  - orthostatic BP  - telemetry  - bedside swallow  - 40 meq K  - Brain MRI w/ and w/o contrast    At end of evaluation patient is non-toxic appearing, hemodynamically stable.   Discussed with and defer further cares to Dr. Lagos.     Interval History     Kortney Germain is a 67 year old female who was admitted on 3/6/2023 for diarrhea.    Medical history significant for: Schizophrenia, tardive dyskinesia, hypertension, depression, ADD, and UTI    Code  Status: Full Code    Physical Exam   Constitutional: Alert, non-toxic appearing.   Neuro: Right facial droop. Clear, logical speech.  Pulmonary: Lungs clear, no increased work of breathing.  Cardiovascular: RRR. No murmur, rub, or gallop.  Skin/Integumen: No lesions on exposed skin.  Psych:  Calm, cooperative.  Extremities: generalized tremors in bilateral upper extremities. Left upper extremity and left lower extremity drift.    Data   Recent Labs   Lab 03/06/23  1413 03/05/23  1323   WBC 14.2* 12.1*   HGB 9.9* 9.6*   MCV 87 87    383         Time Spent on this Encounter   I spent 70 minutes (1605 - 1715) of critical care time on the unit/floor managing the care of Kortney Germain. Upon evaluation, this patient had a high probability of imminent or life-threatening deterioration due to neuro changes concerning for acute ischemic stroke, which required my direct attention, intervention, and personal management. 100% of my time was spent at the bedside counseling the patient and/or coordinating care regarding services listed in this note.      Jeremy Sims NP      .

## 2023-03-07 NOTE — PROGRESS NOTES
Shift Summary      Neuro: Alert to self. Restless/agitated much of the shift. Attempting to get out of bed. Hollering out. MD paged and PRN Zyprexa given per order. A sitter has present at the bedside to insure her safety.     Cardiac: HR 80's. 's-130's.     Respiratory: LS clear.    GI: Abdomen soft. Small loose stools 2. Incontinent of urine.     Musculoskeletal: Moving all extremities per self. Continued shaking/tremoring of extremities.      Pain: Grimacing and hollering out. PRN Tylenol given, see the MAR.     Skin: Open areas x3 to buttocks. Nonblanchable redness and excoriation to the perianal area. MD updated when paged regarding behaviors. Micatin powder ordered and a wound consult was placed.

## 2023-03-07 NOTE — PROVIDER NOTIFICATION
MD Notification    Notified Person: MD    Notified Person Name: Becca    Notification Date/Time: 03/07/23 5:45 AM    Notification Interaction: AmCom    Purpose of Notification: Pt having tremors again. Zyprexa seemed to help with tremors. Can we get another dose of zyprexa? Thanks.    Orders Received:    Comments:

## 2023-03-07 NOTE — ED NOTES
Text message below sent too the Hospitalist. Awaiting reply    Pt in ED room 11 , MRN:[id number] with increase restlessness and ,shaking in extremities not responding to redirections

## 2023-03-07 NOTE — CONSULTS
CLINICAL NUTRITION SERVICES  -  ASSESSMENT NOTE      Recommendations Ordered by Registered Dietitian (RD):   Vanilla Ensure BID w/ breakfast and dinner daily   Encouraged meals TID - offered to send standard meals TID but pt declines   MVI/M daily -- ADDENDUM: unable to order d/t potassium allergy warning    Malnutrition:   % Weight Loss:  > 20% in 1 year (severe malnutrition)  % Intake: Unable to determine  Subcutaneous Fat Loss:  Orbital region mild depletion and Upper arm region mild depletion (potentially greater, further examination needed)  Muscle Loss:  Temporal region mild depletion and Clavicle bone region mild depletion (potentially greater, further examination needed)  Fluid Retention:  None noted    Malnutrition Diagnosis: Moderate malnutrition  In Context of:  Chronic illness or disease          REASON FOR ASSESSMENT  Kortney Germain is a 67 year old female seen by Registered Dietitian for Provider Order - malnutrition       NUTRITION HISTORY  Chart reviewed and discussed with patient at bedside (sitter present) - ?accuracy of historian at this time   Resides in a group home - she states that she makes her own food and likes meat and vegetables   Pt denies recent changes to intake/appetite at home and feels she has been eating well   PMH includes schizophrenia, tardive dyskinesia, hypertension, depression, ADD, and UTI   Here with worsening diarrhea and dehydration   Egg and chicken allergy recorded in EMR - pt does not mention this during diet hx review     CURRENT NUTRITION ORDERS  Diet Order:     Regular    Current Intake/Tolerance:  100% consumption of lunch documented earlier today   R/o CDiff - in enteric precautions   She reports familiarly of protein supplements and is agreeable to Ensure BID     NUTRITION FOCUSED PHYSICAL ASSESSMENT FOR DIAGNOSING MALNUTRITION)  Limited d/t Pt having tremors and shaking during assessment          Observed:    Muscle wasting (refer to documentation in  "Malnutrition section) and Subcutaneous fat loss (refer to documentation in Malnutrition section) -- visually appears to have at least mild fat/muscle wasting but very limited regions observed     Obtained from Chart/Interdisciplinary Team:  WOCN --> buttock wound from Moisture Associated Skin Damage (MASD) with pressure and friction components     ANTHROPOMETRICS  Height: 5' 0\"  Weight: 88 lbs 9.6 oz  Body mass index is 17.3 kg/m .  Weight Status:  Underweight BMI <18.5  IBW: 45.4 kg   % IBW: 89%  Weight History: Potential for 27 lbs lost in <1 year (23% body wt) - pt denies wt loss   Wt Readings from Last 10 Encounters:   03/07/23 40.2 kg (88 lb 9.6 oz)   02/02/22 52.2 kg (115 lb)   12/16/21 48.1 kg (106 lb)   06/24/20 48.1 kg (106 lb)   06/03/20 48.1 kg (106 lb)   08/09/19 49.9 kg (110 lb)   10/30/18 44 kg (97 lb)   10/08/18 54 kg (119 lb)       LABS  Labs reviewed  Na 149  K 2.9 (L)  Recent Labs   Lab 03/07/23  0748 03/06/23  1413 03/05/23  1323   * 130* 97     Lab Results   Component Value Date    URINEKETONE Negative 10/08/2018       MEDICATIONS  Medications reviewed      ASSESSED NUTRITION NEEDS PER APPROVED PRACTICE GUIDELINES:    Dosing Weight 40.2 kg   Estimated Energy Needs: 0442-2502 kcals (35-40 Kcal/Kg)  Justification: repletion and underweight  Estimated Protein Needs: 48-60 grams protein (1.2-1.5 g pro/Kg)  Justification: Repletion and preservation of lean body mass  Estimated Fluid Needs: per MD      MALNUTRITION:  % Weight Loss:  > 20% in 1 year (severe malnutrition)  % Intake: Unable to determine  Subcutaneous Fat Loss:  Orbital region mild depletion and Upper arm region mild depletion (potentially greater, further examination needed)  Muscle Loss:  Temporal region mild depletion and Clavicle bone region mild depletion (potentially greater, further examination needed)  Fluid Retention:  None noted    Malnutrition Diagnosis: Moderate malnutrition  In Context of:  Chronic illness or " disease    NUTRITION DIAGNOSIS:  Unintended weight loss related to ?etiology, likely prolonged inadequate oral intake however patient denies as evidenced by wt loss up to 23% in 1 year or less      NUTRITION INTERVENTIONS  Recommendations / Nutrition Prescription  Vanilla Ensure BID w/ breakfast and dinner daily   Encouraged meals TID - offered to send standard meals TID but pt declines   MVI/M daily       Implementation  Nutrition education: Per Provider order if indicated. Encouraged improved protein/calorie intake to prevent wt loss   Medical Food Supplement and Multivitamin/Mineral: as above       Nutrition Goals  Patient will consume 75% nutritionally adequate meals, plus supplements, BID      MONITORING AND EVALUATION:  Progress towards goals will be monitored and evaluated per protocol and Practice Guidelines        Colleen Bobo RD, LD  Clinical Dietitian

## 2023-03-07 NOTE — PROVIDER NOTIFICATION
MD Notification    Notified Person: MD    Notified Person Name: Becca    Notification Date/Time: 03/07/23 6:12 AM    Notification Interaction: AmCom    Purpose of Notification: Pt becoming agitated and trying to jump out of bed. Can we get a PRN zyprexa? Thanks.    Orders Received: Ok to give morning ativan and benadryl early.    Comments:

## 2023-03-07 NOTE — DISCHARGE INSTRUCTIONS
No renal follow-up necessary       Wound care  EVERY SHIFT        Comments: Location: buttock and perineal   Care: provided qshift by primary RN   1. Cleanse with each incontinence episode with wei cleanser and soft dry wipes   2. Apply Triad paste ( #[id number]) over wounds and perianal   3. Do not apply diapers   4. Apply covidien underpads for incontinence, ok to toilet patient or use purewick

## 2023-03-07 NOTE — PROVIDER NOTIFICATION
MD Notification    Notified Person: MD    Notified Person Name: Yolis    Notification Date/Time: 3/7/23 8361    Notification Interaction: text page    Purpose of Notification:Pt has K allergy unspecified, but ok K replacement protocol, ok to try K replacement, pt is getting scheduled benadryl     Orders Received:    Comments:

## 2023-03-07 NOTE — CONSULTS
"      [facility]    Stroke Consult Note    Reason for Consult: Stroke Code     Chief Complaint: Diarrhea      HPI  Kortney Germain is a 67 year old female admitted to the hospital with diarrhea and is being treated for dehydration. She has a past medical history of schizophrenia, tardive dyskinesia, hypertension, depression, ADD, and UTI and resides in a group home. Today at 1545 she was on the toilet and nurse was present and she had a presyncopal episode and leaned over to the right. Since then she has some new right facial droop. On exam the patient reports she feels \"better now\" and otherwise has no specific complaints.    Imaging Findings  CT head shows no acute finding. CTA shows focal high grade stenosis R V3.      Intravenous Thrombolysis  Not given due to:   - minor/isolated/quickly resolving symptoms    Endovascular Treatment  Not initiated due to absence of proximal vessel occlusion    Impression   Mild R facial droop, not clear if baseline or related to new infarct. Although her NIHSS is 6 her neurologic exam is overall not especially focal  R vertebral stenosis, probably chronic    Recommendations  - Continue neuro checks q4 hours  - Check brain MRI this afternoon/evening ideally by 7 am  - Would treat with antiplatet agents only if stroke seen on MRI    Patient Follow-up     - final recommendation pending work-up    Thank you for this consult. We will continue to follow.      Sravani Lee PA-C  Vascular Neurology    To page me or covering stroke neurology team member, click here: AMCOM  Choose \"On Call\" tab at top, then select \"NEUROLOGY/ALL SITES\" from middle drop-down box, press Enter, then look for \"stroke\" or \"telestroke\" for your site.    ______________________________________________________    Clinically Significant Risk Factors Present on Admission        # Hypokalemia: Lowest K = 2.9 mmol/L in last 2 days, will replace as needed  # Hypernatremia: Highest Na = 151 " mmol/L in last 2 days, will monitor as appropriate  # Hypocalcemia: Lowest Ca = 8.1 mg/dL in last 2 days, will monitor and replace as appropriate              # Cachexia: Estimated body mass index is 17.3 kg/m  as calculated from the following:    Height as of this encounter: 1.524 m (5').    Weight as of this encounter: 40.2 kg (88 lb 9.6 oz).    # Moderate Malnutrition: based on nutrition assessment        Past Medical History   Past Medical History:   Diagnosis Date     Allergic state      Depressive disorder      Dyskinesia      Hypertension      Schizo affective schizophrenia (H)      Past Surgical History   No past surgical history on file.  Medications   Home Meds  Prior to Admission medications    Medication Sig Start Date End Date Taking? Authorizing Provider   acetaminophen (TYLENOL) 500 MG tablet Take 1,000 mg by mouth 3 times daily 0800 / 1400 / 2000   Yes Unknown, Entered By History   alendronate (FOSAMAX) 70 MG tablet Take 70 mg by mouth every 7 days Thursdays at 0700   Yes Unknown, Entered By History   cholecalciferol 50 MCG (2000 UT) tablet Take 1 tablet by mouth daily 0800   Yes Unknown, Entered By History   diphenhydrAMINE (BENADRYL) 50 MG capsule Take 50 mg by mouth 3 times daily 0800 / 1500 / 2000   Yes Unknown, Entered By History   escitalopram (LEXAPRO) 20 MG tablet Take 20 mg by mouth daily 0800   Yes Unknown, Entered By History   ibuprofen (ADVIL/MOTRIN) 200 MG tablet Take 400 mg by mouth every 4 hours as needed for pain   Yes Unknown, Entered By History   LORazepam (ATIVAN) 0.5 MG tablet Take 0.5 mg by mouth 2 times daily 0800/1400   Yes Unknown, Entered By History   LORazepam (ATIVAN) 0.5 MG tablet Take 0.25 mg by mouth At Bedtime 2000   Yes Unknown, Entered By History   multivitamin w/minerals (THERA-VIT-M) tablet Take 1 tablet by mouth daily 0800   Yes Unknown, Entered By History   muscle rub (ARTHRITIS HOT) 10-15 % CREA Apply topically 4 times daily Back pain - 0700/1100/1500/2000   Yes  Unknown, Entered By History   NIFEdipine ER (ADALAT CC) 30 MG 24 hr tablet Take 30 mg by mouth daily 0800   Yes Unknown, Entered By History   perphenazine 4 MG tablet Take 4 mg by mouth every morning 0800   Yes Unknown, Entered By History   perphenazine 8 MG tablet Take 8 mg by mouth At Bedtime 2000   Yes Unknown, Entered By History       Scheduled Meds    diphenhydrAMINE  50 mg Oral TID     escitalopram  20 mg Oral Daily     HYDROmorphone  0.5 mg Intravenous Once     LORazepam  0.25 mg Oral At Bedtime     LORazepam  0.5 mg Oral BID     miconazole   Topical BID     NIFEdipine ER  30 mg Oral Daily     potassium chloride  40 mEq Oral Once     senna-docusate  1 tablet Oral BID    Or     senna-docusate  2 tablet Oral BID       Infusion Meds    dextrose 5% and 0.2% NaCl 1,000 mL (03/07/23 [facility])       PRN Meds  acetaminophen **OR** acetaminophen, bisacodyl, diazepam, HYDROmorphone, melatonin, naloxone **OR** naloxone **OR** naloxone **OR** naloxone, ondansetron **OR** ondansetron, oxyCODONE    Allergies   Allergies   Allergen Reactions     Amlodipine      Clozapine      Egg [Chicken-Derived Products (Egg)]      Penicillins      Potassium      Poultry Meal      Family History   No family history on file.  Social History   Social History     Tobacco Use     Smoking status: Never     Smokeless tobacco: Never   Substance Use Topics     Alcohol use: No     Drug use: No       Review of Systems   Review of systems not obtained due to patient factors - critical condition       PHYSICAL EXAMINATION  Temp:  [98.4  F (36.9  C)-100.3  F (37.9  C)] 98.4  F (36.9  C)  Pulse:  [66-94] 66  Resp:  [11-28] 22  BP: (106-175)/(58-93) 137/93  SpO2:  [93 %-99 %] 97 %     Neurologic  Mental Status:  oriented to self, age, place, situation, follows most 1-step commands and is able to repeat  Cranial Nerves:  PERRL, EOMI with normal smooth pursuit, facial sensation intact and symmetric, hearing not formally tested but intact to conversation,  palate elevation symmetric and uvula midline, shoulder shrug strong bilaterally, tongue protrusion midline, mild R NLF flattening  Motor:  arms with frequent shaking which is positional, and contractured appearance of hands  Reflexes:  deferred  Sensory:  light touch sensation intact and symmetric throughout upper and lower extremities, no extinction on double simultaneous stimulation   Coordination:  reports pain and that she cannot do these movements--- no gross ataxia observed but limited by frequent shaking  Station/Gait:  deferred    Dysphagia Screen  Per Nursing    Stroke Scales    NIHSS  1a. Level of Consciousness 0-->Alert, keenly responsive   1b. LOC Questions 0-->Answers both questions correctly   1c. LOC Commands 0-->Performs both tasks correctly   2.   Best Gaze 0-->Normal   3.   Visual 0-->No visual loss   4.   Facial Palsy 1-->Minor paralysis (flattened nasolabial fold, asymmetry on smiling)   5a. Motor Arm, Left 1-->Drift, limb holds 90 (or 45) degrees, but drifts down before full 10 seconds, does not hit bed or other support   5b. Motor Arm, Right 1-->Drift, limb holds 90 (or 45) degrees, but drifts down before full 10 secs, does not hit bed or other support   6a. Motor Leg, Left 1-->Drift, leg falls by the end of the 5-sec period but does not hit bed   6b. Motor Leg, right 1-->Drift, leg falls by the end of the 5-sec period but does not hit bed   7.   Limb Ataxia 0-->Absent   8.   Sensory 0-->Normal, no sensory loss   9.   Best Language 0-->No aphasia, normal   10. Dysarthria 1-->Mild-to-moderate dysarthria, patient slurs at least some words and, at worst, can be understood with some difficulty   11. Extinction and Inattention  0-->No abnormality   Total 6 (03/07/23 1626)       Imaging  I personally reviewed all imaging; relevant findings per HPI.     Lab Results Data   CBC  Recent Labs   Lab 03/07/23  1629 03/06/23  1413 03/05/23  1323   WBC 11.8* 14.2* 12.1*   RBC 3.45* 3.61* 3.60*   HGB 9.3* 9.9*  9.6*   HCT 31.0* 31.5* 31.3*    424 383     Basic Metabolic Panel    Recent Labs   Lab 03/07/23  1629 03/07/23  1605 03/07/23  0748 03/06/23  1413   *  --  149* 151*   POTASSIUM 2.9*  --  2.9* 3.9   CHLORIDE 115*  --  115* 118*   CO2 24  --  22 21*   BUN 17.9  --  17.2 37.9*   CR 0.65  --  0.47* 0.58   * 141* 107* 130*   LULU 8.4*  --  8.1* 9.3     Liver Panel  Recent Labs   Lab 03/06/23  1413 03/05/23  1323   PROTTOTAL 6.9 6.5   ALBUMIN 4.1 3.9   BILITOTAL <0.2 <0.2   ALKPHOS 83 80   AST 39* 19   ALT 21 15     INR    Recent Labs   Lab Test 03/07/23  1629   INR 1.15      Lipid Profile  No lab results found.  A1C  No lab results found.  Troponin    Recent Labs   Lab 03/07/23  1629   CTROPT 90*          Stroke Code Data Data   Stroke Code Data  (for stroke code without tele)  Stroke code activated 03/07/23   1609   First stroke provider response 03/07/23   1612   Last known normal 03/07/23   1544   Time of discovery   (or onset of symptoms) 03/07/23   1545   Head CT read by Stroke Neuro Dr/Provider 03/07/23   1635   Was stroke code de-escalated? Yes 03/07/23 1715            I personally examined and evaluated the patient today. At the time of my evaluation and management the patient was in critical condition today due to stroke. I personally managed stroke code. Key decisions made today included management of stroke code. I spent a total of 60 minutes providing critical care services, evaluating the patient, directing care and reviewing laboratory values and radiologic reports.

## 2023-03-07 NOTE — UTILIZATION REVIEW
Admission Status; Secondary Review Determination       Under the authority of the Utilization Management Committee, the utilization review process indicated a secondary review on the above patient. The review outcome is based on review of the medical records, discussions with staff, and applying clinical experience noted on the date of the review.     (x) Inpatient Status Appropriate - This patient's medical care is consistent with medical management for inpatient care and reasonable inpatient medical practice.     RATIONALE FOR DETERMINATION    67-year-old female with a history of schizophrenia, tardive dyskinesia, hypertension, depression, ADD, and UTI was admitted to the ED for diarrhea and dehydration. The patient had visited the ED the previous day for increased shaking and ongoing diarrhea but was discharged after a unremarkable workup. However, the symptoms persisted, and the patient was readmitted due to ongoing diarrhea and abdominal discomfort. The patient was found to be dehydrated, and tests showed hyperchloremic hypernatremia and nontraumatic rhabdomyolysis. The patient also exhibited shaking/spasms, possibly due to dehydration and electrolyte abnormalities, and was given IV fluids, anti-emetics, and lorazepam.    Patient requires inpatient admission versus short stay observation or outpatient treatment for the following reasons: Continues to have significant electrolyte abnormality with a potassium of 2.9, continues to be hypernatremic requiring ongoing IV fluid hospital care.  This is a conditional review for a Medicare patient, at the time of this review patient is anticipated to require at least 1 more night of hospital care; however if plan changes and discharges before 2 midnights please send for post discharge review.        This document was produced using voice recognition software       The information on this document is developed by the utilization review team in order for the business  office to ensure compliance. This only denotes the appropriateness of proper admission status and does not reflect the quality of care rendered.   The definitions of Inpatient Status and Observation Status used in making the determination above are those provided in the CMS Coverage Manual, Chapter 1 and Chapter 6, section 70.4.   Sincerely,   DARRIAN NIELSON MD   System Medical Director   Utilization Management   [facility].

## 2023-03-07 NOTE — PLAN OF CARE
Goal Outcome Evaluation:      Plan of Care Reviewed With: patient    Overall Patient Progress: improvingOverall Patient Progress: improving    Nursing Note    Patient Information  Name: Kortney Germain  Age: 67 year old    Assessment  Orientation/Neuro: Alert and Oriented x4  Cardiac/Tele: No tele - apical pulse regular  Resp: CARRANZA  GI/: Incont of bladder, unable to obtain urine sample, stool sample sent, pt having softer stools  Mobility: walks with assist/A2 gait belt  Pain: denies   Diet: Orders Placed This Encounter      Regular Diet Adult    Vital Signs  B/P: 143/73, T: 99.4, P: 82, R: 16, O2: 95 on RA    Plan  Electrolyte replacements, see provider notification about K allergy, continue IV ABX, discharge back to group home 1-3 days    Jazlyn Seth RN              Marty Tadeo is a 70 year old male here for  Chief Complaint   Patient presents with   • Video Visit     MGUS     Denies latex allergy or sensitivity.    Medication verified and med list updated.  PCP and Pharmacy verified.    Social History     Tobacco Use   Smoking Status Former Smoker   • Packs/day: 1.00   • Years: 15.00   • Pack years: 15.00   • Types: Cigarettes   • Start date: 1978   • Quit date: 1993   • Years since quittin.5   Smokeless Tobacco Never Used     Advance Directives Filed: Yes    ECOG:   ECOG [22 1035]   ECOG Performance Status 2       Vitals:    There were no vitals taken for this visit.    These vital signs are:  Within defined parameters (Per Reference \"Defined Limits Hospital Outpatient Department (HOD)\")    Height: No.  Ht Readings from Last 1 Encounters:   22 5' 6\" (1.676 m)     Weight:No.  Wt Readings from Last 3 Encounters:   22 102.2 kg (225 lb 6.4 oz)   22 101.6 kg (223 lb 15.8 oz)   22 101.6 kg (224 lb)       BMI: There is no height or weight on file to calculate BMI.    REVIEW OF SYSTEMS  GENERAL:  Patient denies headache, fevers, chills, night sweats, excessive fatigue, change in appetite, weight loss, dizziness  ALLERGIC/IMMUNOLOGIC: Verified allergies: Yes  EYES:  Patient denies significant visual difficulties, double vision, blurred vision  ENT/MOUTH: Patient denies problems with hearing, sore throat, mouth sores, but complains of: sinus drainage  ENDOCRINE:  Patient denies diabetes, thyroid disease, hormone replacement, hot flashes  HEMATOLOGIC/LYMPHATIC: Patient denies bleeding, tender lymph nodes, swollen lymph nodes, but complains of: easy bruising  BREASTS: Patient denies abnormal masses of breast, nipple discharge, pain  RESPIRATORY:  Patient denies lung pain with breathing, coughing up blood, but complains of: cough and shortness of breath  CARDIOVASCULAR:  Patient denies anginal chest pain, palpitations, shortness of breath  when lying flat, but complains of: peripheral edema feet ankles and legs  GASTROINTESTINAL: Patient denies abdominal pain , nausea, vomiting, diarrhea, GI bleeding, constipation, change in bowel habits, heartburn, sensation of feeling full, difficulty swallowing  : Patient denies blood in the urine, burning with urination, frequency, urgency, hesitancy, incontinence  MUSCULOSKELETAL:  Patient denies joint pain, bone pain, joint swelling, redness, decreased range of motion  SKIN:  Patient denies chronic rashes, inflammation, ulcerations, skin changes, but complains of: itching all over dry skin  NEUROLOGIC:  Patient denies areas of focal weakness, abnormal gait, sensory problems, but complains of: loss of balance, numbness fingers and tingling fingers  PSYCHIATRIC: Patient denies insomnia, depression, anxiety    This patient reported abnormal symptoms that needed immediate verbal communication: No

## 2023-03-07 NOTE — PROVIDER NOTIFICATION
Brief update:    Patient remains agitated, calling out, answers yes and no questions, though not always appropriately.  Minimal improvement after Ativan per nursing    ODT Zyprexa 5 mg x 1, assess for worsening tardive dyskinesia or potential improvement in symptoms.    Added miconazole powder  Wound consult for pressure injuries    Anderson Kee MD  1:13 AM

## 2023-03-07 NOTE — ED NOTES
Dr. Cantu ( [facility] Hospitalist) returned call. Status given. Ativan 1 mg IVP administered as ordered by Dr. Cantu. Sitter 1:1  remains at the beside.

## 2023-03-08 NOTE — CONSULTS
"[facility] Nurse Inpatient Assessment      Consulted for: wounds on buttocks          Areas Assessed:       Areas visualized during today's visit: Focused: and Sacrum/coccyx     Wound location: buttock     Last photo: 3/8  Wound due to: Moisture Associated Skin Damage (MASD) with pressure and friction components   Wound history/plan of care: found without diaper, found with triad in use per plan of care    Wound base: 100 % dermis     Palpation of the wound bed: normal      Drainage: scant     Description of drainage: serosanguinous     Measurements (length x width x depth, in cm): diffuse with largest 1.5  x 0.8  x  0.1 cm      Tunneling: N/A     Undermining: N/A  Periwound skin: Irritant dermatitis      Color: normal and consistent with surrounding tissue      Temperature: normal   Odor: none  Pain: no grimacing or signs of discomfort, none  Pain interventions prior to dressing change: patient tolerated well  Treatment goal: Heal  and Decrease moisture  STATUS: improved compared to assessment 3/7   Supplies ordered: supplies stored on unit        Treatment Plan:        Wound care  EVERY SHIFT        Comments: Location: buttock and perineal   Care: provided qshift by primary RN   1. Cleanse with each incontinence episode with wei cleanser and soft dry wipes   2. Apply Triad paste ( #[id number]) over wounds and perianal   3. Do not apply diapers   4. Apply covidien underpads for incontinence, ok to toilet patient or use purewick           Skin care precautions  EFFECTIVE NOW        Comments: Pressure Injury Prevention (PIP) Plan:   If patient is declining pressure injury prevention interventions: Explore reason why and address patient's concerns, Educate on pressure injury risk and prevention intervention(s), If patient is still declining, document \"informed refusal\" , and Ensure Care team is aware ( provider, charge nurse, etc)   Mattress: Follow bed algorithm, reassess daily and " order specialty mattress, if indicated.   HOB: Maintain at or below 30 degrees, unless contraindicated   Repositioning in bed: Every 1-2 hours , Left/right positioning; avoid supine, Raise foot of bed prior to raising head of bed, to reduce patient sliding down (shear), and Frequent microturns using TAPS wedges, as patient tolerates   Heels: Keep elevated off mattress and Pillows under calves   Protective Dressing: please see orders for Triad paste, separate care order   Chair positioning: Chair cushion (#[id number]) , Assist patient to reposition hourly, and Do NOT use a donut for sitting (this increases pressure to smaller area and creates a higher potential for injury)     If patient has a buttock pressure injury, or high risk for PI use chair cushion or SPS.   Moisture Management: Perineal cleansing /protection: Follow Incontinence Protocol, Avoid brief in bed, Clean and dry skin folds with bathing , and Moisturize dry skin   Under Devices: Inspect skin under all medical devices during skin inspection , Ensure tubes are stabilized without tension, and Ensure patient is not lying on medical devices or equipment when repositioned   Ask provider to discontinue device when no longer needed.            Orders: reviewed      RECOMMEND PRIMARY TEAM ORDER: None, at this time  Education provided: importance of repositioning, plan of care, Moisture management, Hygiene and Off-loading pressure  Discussed plan of care with: Patient and Nurse  WOC nurse follow-up plan: weekly  Notify WOC if wound(s) deteriorate.  Nursing to notify the Provider(s) and re-consult the WOC Nurse if new skin concern.     DATA:      Current support surface: Standard  Standard gel/foam mattress (IsoFlex, Atmos air, etc)  Containment of urine/stool: Incontinence Protocol  BMI: Body mass index is 17.3 kg/m .   Active diet order: Orders Placed This Encounter      Regular Diet Adult     Output: I/O last 3 completed shifts:  In: 1046 [P.O.:120;  "I.V.:926]  Out: -           Labs: Recent Labs   Lab 03/06/23  1413   ALBUMIN 4.1   HGB 9.9*   WBC 14.2*      Pressure injury risk assessment:   Sensory Perception: 3-->slightly limited  Moisture: 3-->occasionally moist  Activity: 2-->chairfast  Mobility: 2-->very limited  Nutrition: 2-->probably inadequate  Friction and Shear: 1-->problem  Luis Score: 13     Anisa AVELAR   1st: Vocera Connect, call \"Anisa Fabian\" or call Group \"[facility] Nurse\"   (2nd option: leave a voicemail at Dept. Office Number: [phone]. Messages checked occasionally M-F)      "

## 2023-03-08 NOTE — PLAN OF CARE
Shift Note:  [id number]  RRT called at 1547- see previous progress note. Code stroke called. Pt went down for head CT- no acute findings. EKG completed. Plan for MRI. Pt A&Ox3-disoriented to situation. Lethargic and slow to respond. Neuros (q4)- R sided facial droop, tongue deviates to the R. L sided pronator drift. Tremors to BUE. PRN IV valium given for tremors x1. VSS on RA. Orthostatic BPs negative. Pt placed on tele. Denies pain. Regular diet- good appetite, assistance with feeding. Takes pills whole one at a time. Up A2 w/GB. Cont B/B- uses bedside commode or bedpan. X2 PIV SL-fluids stopped after RRT and was advised to saline lock pt and not continue fluids. K 2.9- replaced x2, recheck 2220. Still need UA and urine drug screen. Scattered bruising. Abrasions on sacrum/buttocks- pasted applied. Sitter at bedside. WOC following. Lyly consulted-not seen yet. Discharge pending. Contact precautions maintained.

## 2023-03-08 NOTE — PROGRESS NOTES
MRI checklist faxed to MRI.   Received call from MRI asking if pt is A&Ox4. Writer informed MRI that pt is A&Ox2. MRI informed writer that a more reliable source is needed to fill out check list. Writer called [facility]'s phone number listed in pt chart with no answer.

## 2023-03-08 NOTE — CONSULTS
"      Initial Psychiatric Consult   Consult date: March 8, 2023         Reason for Consult, requesting source:    pt with worsening TD on perphenazine    Requesting source:     Labs and imaging reviewed. Patient seen and evaluated by Milagros Parker MD          HPI:     This is a 67-year-old female admitted on 3/6/2023 after she presented with diarrhea and increased tremoring.  The patient had been seen in the ED the day prior for the same complaints, and was sent home.  She was found to be dehydrated when she came into the ED the second time, so she was admitted for observation.  The patient does reside in a group home and has a reported history of schizophrenia.  Patient was noted to have electrolyte derangement, as well as nontraumatic rhabdomyolysis with her CK at [phone].  IV Valium was ordered due to concerns for tardive dyskinesia, and the patient's perphenazine was held.  We are asked to see the patient to evaluate her medications.    Patient was seen today in her room.  Discussed with CNA who is there as a one-to-one sitter.  Patient has notable tardive dyskinesia.  She is aware of the tardive dyskinesia, and states that the thing that helps it is \"perphenazine\".  She was unaware of any other medications that have ever been more helpful, but is a somewhat limited historian.  She would like to have her perphenazine back.  I was unclear whether or not she is currently having psychiatric symptoms, though she seemed to indicate that she is not depressed, or hallucinating.  She may be having some anxiety.  Communication was somewhat limited.        Past Psychiatric History:   Patient indicates that she first developed mental health issues after a traumatic head injury at age 29.  She states that she was living in [facility], and fell on the ice, and since then has had problems.  She was not able to tell me what her diagnosis is.  There are multiple psychiatric admissions in her chart, but I cannot access " them because we do not have permission to review records from [facility] or [facility].        Substance Use and History:   I do not see any mention of substance abuse issues in the patient's chart, and she lives in a controlled setting.        Past Medical History:   PAST MEDICAL HISTORY:   Past Medical History:   Diagnosis Date     Allergic state      Depressive disorder      Dyskinesia      Hypertension      Schizo affective schizophrenia (H)        PAST SURGICAL HISTORY: No past surgical history on file.          Family History:   FAMILY HISTORY: No family history on file.    Family Psychiatric History:         Social History:   SOCIAL HISTORY:   Social History     Tobacco Use     Smoking status: Never     Smokeless tobacco: Never   Substance Use Topics     Alcohol use: No                Physical ROS:   Patient cannot comply with comprehensive review of systems due to communication limitations and cognitive impairment.           Medications:       aspirin  325 mg Oral Daily     escitalopram  20 mg Oral Daily     LORazepam  0.25 mg Oral At Bedtime     LORazepam  0.5 mg Oral BID     miconazole   Topical BID     NIFEdipine ER  30 mg Oral Daily     perphenazine  4 mg Oral BID     senna-docusate  1 tablet Oral BID    Or     senna-docusate  2 tablet Oral BID              Allergies:     Allergies   Allergen Reactions     Amlodipine      Clozapine      Egg [Chicken-Derived Products (Egg)]      Penicillins      Potassium      Poultry Meal           Labs:     Recent Results (from the past 48 hour(s))   Comprehensive metabolic panel    Collection Time: 03/06/23  2:13 PM   Result Value Ref Range    Sodium 151 (H) 136 - 145 mmol/L    Potassium 3.9 3.4 - 5.3 mmol/L    Chloride 118 (H) 98 - 107 mmol/L    Carbon Dioxide (CO2) 21 (L) 22 - 29 mmol/L    Anion Gap 12 7 - 15 mmol/L    Urea Nitrogen 37.9 (H) 8.0 - 23.0 mg/dL    Creatinine 0.58 0.51 - 0.95 mg/dL    Calcium 9.3 8.8 - 10.2 mg/dL    Glucose 130  (H) 70 - 99 mg/dL    Alkaline Phosphatase 83 35 - 104 U/L    AST 39 (H) 10 - 35 U/L    ALT 21 10 - 35 U/L    Protein Total 6.9 6.4 - 8.3 g/dL    Albumin 4.1 3.5 - 5.2 g/dL    Bilirubin Total <0.2 <=1.2 mg/dL    GFR Estimate >90 >60 mL/min/1.73m2   Lipase    Collection Time: 03/06/23  2:13 PM   Result Value Ref Range    Lipase 22 13 - 60 U/L   CBC with platelets and differential    Collection Time: 03/06/23  2:13 PM   Result Value Ref Range    WBC Count 14.2 (H) 4.0 - 11.0 10e3/uL    RBC Count 3.61 (L) 3.80 - 5.20 10e6/uL    Hemoglobin 9.9 (L) 11.7 - 15.7 g/dL    Hematocrit 31.5 (L) 35.0 - 47.0 %    MCV 87 78 - 100 fL    MCH 27.4 26.5 - 33.0 pg    MCHC 31.4 (L) 31.5 - 36.5 g/dL    RDW 15.2 (H) 10.0 - 15.0 %    Platelet Count 424 150 - 450 10e3/uL    % Neutrophils 76 %    % Lymphocytes 12 %    % Monocytes 10 %    % Eosinophils 0 %    % Basophils 1 %    % Immature Granulocytes 1 %    NRBCs per 100 WBC 0 <1 /100    Absolute Neutrophils 10.8 (H) 1.6 - 8.3 10e3/uL    Absolute Lymphocytes 1.8 0.8 - 5.3 10e3/uL    Absolute Monocytes 1.5 (H) 0.0 - 1.3 10e3/uL    Absolute Eosinophils 0.0 0.0 - 0.7 10e3/uL    Absolute Basophils 0.1 0.0 - 0.2 10e3/uL    Absolute Immature Granulocytes 0.1 <=0.4 10e3/uL    Absolute NRBCs 0.0 10e3/uL   Extra Red Top Tube    Collection Time: 03/06/23  2:13 PM   Result Value Ref Range    Hold Specimen JI    Extra Blood Bank Purple Top Tube    Collection Time: 03/06/23  2:13 PM   Result Value Ref Range    Hold Specimen JI    CK total    Collection Time: 03/06/23  2:13 PM   Result Value Ref Range     (H) 26 - 192 U/L   Extra Blue Top Tube    Collection Time: 03/06/23  2:15 PM   Result Value Ref Range    Hold Specimen JIC    Asymptomatic COVID-19 Virus (Coronavirus) by PCR Nasopharyngeal    Collection Time: 03/07/23 12:44 AM    Specimen: Nasopharyngeal; Swab   Result Value Ref Range    SARS CoV2 PCR Negative Negative   Basic metabolic panel    Collection Time: 03/07/23  7:48 AM   Result Value  Ref Range    Sodium 149 (H) 136 - 145 mmol/L    Potassium 2.9 (L) 3.4 - 5.3 mmol/L    Chloride 115 (H) 98 - 107 mmol/L    Carbon Dioxide (CO2) 22 22 - 29 mmol/L    Anion Gap 12 7 - 15 mmol/L    Urea Nitrogen 17.2 8.0 - 23.0 mg/dL    Creatinine 0.47 (L) 0.51 - 0.95 mg/dL    Calcium 8.1 (L) 8.8 - 10.2 mg/dL    Glucose 107 (H) 70 - 99 mg/dL    GFR Estimate >90 >60 mL/min/1.73m2   CK total    Collection Time: 03/07/23  7:48 AM   Result Value Ref Range     (H) 26 - 192 U/L   C. difficile Toxin B PCR with reflex to C. difficile Antigen and Toxins A/B EIA    Collection Time: 03/07/23 11:59 AM    Specimen: Per Rectum; Stool   Result Value Ref Range    C Difficile Toxin B by PCR Negative Negative   Glucose by meter    Collection Time: 03/07/23  4:05 PM   Result Value Ref Range    GLUCOSE BY METER POCT 141 (H) 70 - 99 mg/dL   Basic metabolic panel    Collection Time: 03/07/23  4:29 PM   Result Value Ref Range    Sodium 150 (H) 136 - 145 mmol/L    Potassium 2.9 (L) 3.4 - 5.3 mmol/L    Chloride 115 (H) 98 - 107 mmol/L    Carbon Dioxide (CO2) 24 22 - 29 mmol/L    Anion Gap 11 7 - 15 mmol/L    Urea Nitrogen 17.9 8.0 - 23.0 mg/dL    Creatinine 0.65 0.51 - 0.95 mg/dL    Calcium 8.4 (L) 8.8 - 10.2 mg/dL    Glucose 135 (H) 70 - 99 mg/dL    GFR Estimate >90 >60 mL/min/1.73m2   Troponin T, High Sensitivity    Collection Time: 03/07/23  4:29 PM   Result Value Ref Range    Troponin T, High Sensitivity 90 (H) <=14 ng/L   INR    Collection Time: 03/07/23  4:29 PM   Result Value Ref Range    INR 1.15 0.85 - 1.15   Blood gas venous    Collection Time: 03/07/23  4:29 PM   Result Value Ref Range    pH Venous 7.42 7.32 - 7.43    pCO2 Venous 40 40 - 50 mm Hg    pO2 Venous 32 25 - 47 mm Hg    Bicarbonate Venous 26 21 - 28 mmol/L    Base Excess/Deficit (+/-) 1.4 -7.7 - 1.9 mmol/L    FIO2 0    Lactic acid whole blood    Collection Time: 03/07/23  4:29 PM   Result Value Ref Range    Lactic Acid 1.1 0.7 - 2.0 mmol/L   CBC with platelets and  differential    Collection Time: 03/07/23  4:29 PM   Result Value Ref Range    WBC Count 11.8 (H) 4.0 - 11.0 10e3/uL    RBC Count 3.45 (L) 3.80 - 5.20 10e6/uL    Hemoglobin 9.3 (L) 11.7 - 15.7 g/dL    Hematocrit 31.0 (L) 35.0 - 47.0 %    MCV 90 78 - 100 fL    MCH 27.0 26.5 - 33.0 pg    MCHC 30.0 (L) 31.5 - 36.5 g/dL    RDW 15.4 (H) 10.0 - 15.0 %    Platelet Count 334 150 - 450 10e3/uL    % Neutrophils 57 %    % Lymphocytes 26 %    % Monocytes 12 %    % Eosinophils 4 %    % Basophils 1 %    % Immature Granulocytes 0 %    NRBCs per 100 WBC 0 <1 /100    Absolute Neutrophils 6.7 1.6 - 8.3 10e3/uL    Absolute Lymphocytes 3.1 0.8 - 5.3 10e3/uL    Absolute Monocytes 1.4 (H) 0.0 - 1.3 10e3/uL    Absolute Eosinophils 0.4 0.0 - 0.7 10e3/uL    Absolute Basophils 0.1 0.0 - 0.2 10e3/uL    Absolute Immature Granulocytes 0.0 <=0.4 10e3/uL    Absolute NRBCs 0.0 10e3/uL   EKG 12-lead, tracing only    Collection Time: 03/07/23  4:52 PM   Result Value Ref Range    Systolic Blood Pressure  mmHg    Diastolic Blood Pressure  mmHg    Ventricular Rate 64 BPM    Atrial Rate 64 BPM    IL Interval 200 ms    QRS Duration 78 ms     ms    QTc 495 ms    P Axis 71 degrees    R AXIS 94 degrees    T Axis 70 degrees    Interpretation ECG       Sinus rhythm  Rightward axis  Nonspecific ST abnormality  Prolonged QT  Abnormal ECG  When compared with ECG of 02-FEB-2022 09:37,  Premature ventricular complexes are no longer Present     Troponin T, High Sensitivity    Collection Time: 03/07/23  6:05 PM   Result Value Ref Range    Troponin T, High Sensitivity 87 (H) <=14 ng/L   Magnesium    Collection Time: 03/07/23  6:05 PM   Result Value Ref Range    Magnesium 2.0 1.7 - 2.3 mg/dL   Phosphorus    Collection Time: 03/07/23  6:05 PM   Result Value Ref Range    Phosphorus 1.8 (L) 2.5 - 4.5 mg/dL   Potassium    Collection Time: 03/07/23 11:10 PM   Result Value Ref Range    Potassium 3.7 3.4 - 5.3 mmol/L   CBC with platelets    Collection Time: 03/08/23   8:30 AM   Result Value Ref Range    WBC Count 8.5 4.0 - 11.0 10e3/uL    RBC Count 3.15 (L) 3.80 - 5.20 10e6/uL    Hemoglobin 8.5 (L) 11.7 - 15.7 g/dL    Hematocrit 27.2 (L) 35.0 - 47.0 %    MCV 86 78 - 100 fL    MCH 27.0 26.5 - 33.0 pg    MCHC 31.3 (L) 31.5 - 36.5 g/dL    RDW 15.6 (H) 10.0 - 15.0 %    Platelet Count 293 150 - 450 10e3/uL          Physical and Psychiatric Examination:     /68   Pulse 63   Temp 97.6  F (36.4  C)   Resp 16   Ht 1.524 m (5')   Wt 40.2 kg (88 lb 9.6 oz)   SpO2 96%   BMI 17.30 kg/m    Weight is 88 lbs 9.6 oz  Body mass index is 17.3 kg/m .    Physical Exam:  I have reviewed the physical exam as documented by by the medical team and agree with findings and assessment and have no additional findings to add at this time.    Mental Status Exam:    Appearance: awake, alert, appeared older than stated age, poorly groomed, awake, cooperative and mild distress  Attitude:  cooperative  Eye Contact:  good  Mood:  anxious  Affect:  intensity is blunted  Speech:  dysarthria and increased speech latency  Language: Fluent in english   Psychomotor Behavior:  evidence of tardive dyskinesia  Thought Process:  goal oriented  Associations:  Unable to assess due to communication limitations  Thought Content:  no evidence of suicidal ideation or homicidal ideation and Patient appeared to indicate that she is not hallucinating, but I am not confident that I understood what she was saying due to communication limitations.  Insight:  fair  Judgement:  fair  Oriented to:  Unable to assess  Attention Span and Concentration:  fair  Recent and Remote Memory:  fair  Fund of Knowledge: Appropriate   Gait and Station:                DSM-5 Diagnosis:   Schizoaffective disorder    History of closed head injury    Tardive dyskinesia          Assessment:   This is a 67-year-old female who reportedly has a history of schizoaffective disorder.  It looks that she has many past psychiatric hospitalizations, though  I am not able to access those records.  She told me that her problems began at age 29 when she fell and hit her head on the ice.  Unclear if that is entirely accurate, though the patient was pretty clear about it.  She has notable tardive dyskinesia which is uncomfortable to watch, and uncomfortable for her as well.  She states that the perphenazine helps her when she is on it, so I have restarted the medication.  Looks like she also was previously on Benadryl 50 mg p.o. 3 times daily.  It does look like she has been on other anticholinergics, and medications for extraparametal side effects.  It is difficult to tell how long she has been on the Benadryl.          Summary of Recommendations:   1.  I have restarted the patient's perphenazine with the same regimen she takes as an outpatient.    2.  Patient's other psychiatric medications are already restarted.    3.  Patient is okay to discharge from a psychiatric standpoint when medically clear.  We will sign off.    Milagros Parker MD  Consult/Liaison Psychiatry and Addiction Medicine  [facility]

## 2023-03-08 NOTE — PROGRESS NOTES
House [facility] brief RRT follow up:    Was asked by colleague, TAMI Louise, CNP, to follow up on trop; please refer to Levi's initial RRT note for further details.     Latest Reference Range & Units 03/07/23 16:29 03/07/23 18:05   Troponin T, High Sensitivity <=14 ng/L 90 (H) 87 (H)   (H): Data is abnormally high    Troponin remains flat.  Pt's VS remain stable.  Pt continues on telemetry monitoring.  Nursing reports no current chest pain.  Reviewed EKG from earlier this evening, no obvious signs of ischemic changes noted.  Will continue with previously outlined plan of care.    TAMI Serrano, CNP  Hospitalist-[facility]  Hospitalist Service  Securely message with [employer] (more info)  Text page via [facility] Paging/Directory     No charge.

## 2023-03-08 NOTE — CONSULTS
RENAL CONSULTATION NOTE      REFERRING MD:  Yolis    REASON FOR CONSULTATION:  Hypernatremia        A/P:     1.  Baseline normal renal function   -creatinine 0.6 mg/dl range  2.  [place] UA 2018  3.  Hypernatremia    Was receiving isotonic fluid on admission in the form of LR secondary to diarrhea   and apparent dehydration.  Sodium has increased from 145 mmol/L to 152 mmol/L today.  She did receive furosemide today.    Based on weight 52.2 kg calculated water deficit of 2.7 L  Replacement of half the deficit over 24 hours yields the D5 rate of approximately 75 mL/min.    Patient is able to take p.o. and drinking water per NA.      Water deficit secondary to inadequate water provision.  Would continue with D5 at 100 cc/h.  Avoid diuretic if able.  Twice daily sodium check adequate.        HPI:     Comfortable in bed.  Recently received lorazepam  Lethargic but arouseable  Will follow commands    No headache or chest pain per patient  No edema per patient    She does require a sitter.  She has water by the bedside.  She is on her fourth cup today by report        ROS:  A complete 10 point review of systems was performed and is negative except as noted above.    PMH:    Past Medical History:   Diagnosis Date     Allergic state      Depressive disorder      Dyskinesia      Hypertension      Schizo affective schizophrenia (H)        PSH:  No past surgical history on file.    MEDICATIONS:    No current outpatient medications on file.       ALLERGIES:    Allergies as of 03/06/2023 - Reviewed 03/06/2023   Allergen Reaction Noted     Amlodipine  10/08/2018     Clozapine  10/08/2018     Egg [chicken-derived products (egg)]  10/08/2018     Penicillins  10/08/2018     Potassium  10/08/2018     Poultry meal  10/08/2018       FH:  No family history on file.    SH:    Social History     Socioeconomic History     Marital status: Single     Spouse name: Not on file     Number of children: Not on file     Years of education: Not on  file     Highest education level: Not on file   Occupational History     Not on file   Tobacco Use     Smoking status: Never     Smokeless tobacco: Never   Substance and Sexual Activity     Alcohol use: No     Drug use: No     Sexual activity: Never   Other Topics Concern     Not on file   Social History Narrative     Not on file     Social Determinants of Health     Financial Resource Strain: Not on file   Food Insecurity: Not on file   Transportation Needs: Not on file   Physical Activity: Not on file   Stress: Not on file   Social Connections: Not on file   Intimate Partner Violence: Not on file   Housing Stability: Not on file       PHYSICAL EXAM:      Vitals were reviewed  Patient Vitals for the past 8 hrs:   BP Temp Temp src Pulse Resp SpO2   03/08/23 1220 -- 99.3  F (37.4  C) Axillary -- -- --   03/08/23 1112 -- 100.2  F (37.9  C) Axillary -- -- --   03/08/23 0837 131/68 97.6  F (36.4  C) -- 63 16 96 %     I/O last 3 completed shifts:  In: 940 [P.O.:940]  Out: 200 [Urine:200]      Vitals:    03/06/23 1422 03/07/23 0445   Weight: 52.2 kg (115 lb) 40.2 kg (88 lb 9.6 oz)         GENERAL: awake, alert, follows  HEENT: NC/AT, PERRLA, EOMI, non icteric, pharynx moist without lesion  RESP:  clear anteriorly  CV: RRR, normal S1 S2  ABDOMEN: soft  MS: no clubbing, cyanosis   SKIN: clear without significant rashes or lesions  EXT: warm, no edema      LABS:        Recent Labs   Lab 03/08/23  0830   *   POTASSIUM 3.3*   CHLORIDE 119*   CO2 26   ANIONGAP 7   *   BUN 18.9   CR 0.54   GFRESTIMATED >90   LULU 8.3*     Recent Labs   Lab 03/08/23  0830 03/07/23  1629 03/07/23  0748 03/06/23  1413 03/05/23  1323   CR 0.54 0.65 0.47* 0.58 0.80     Recent Labs   Lab Test 03/08/23  0830 03/07/23  1629 03/07/23  0748 03/06/23  1413 03/05/23  1323 02/02/22  0925 12/16/21  0844 08/09/19  1436 04/15/19  0825 10/08/18  0930   * 150* 149* 151* 145 133 140 146* 142 139     Recent Labs   Lab 03/06/23  1413 03/05/23  1323    ALBUMIN 4.1 3.9       DIAGNOSTICS:  Reviewed      KENDRA Mccauley    [facility] consultants  [phone]

## 2023-03-08 NOTE — PROGRESS NOTES
RRT called at 1547. RN went into room while pt was on the bedside commode. While pt was on the beside commode, RN and NA noticed the pt started to lean to the right side, R-sided facial droop, and drooling from the right side. Pt became very lethargic and slurred speech. RRT was called.

## 2023-03-08 NOTE — PROGRESS NOTES
[facility]    Hospitalist Progress Note    Date of Service (when I saw the patient): 03/08/2023    Assessment & Plan   Kortney Germain is a 67 year old female who was admitted on 3/6/2023.    Kortney Geramin is a 67 year old female with history of schizophrenia, tardive dyskinesia, hypertension, depression, ADD, and UTI who resides in a group home who presented to the ED with diarrhea.  Patient is actually seen in the ED yesterday for increased shaking from her apparent baseline of tremors as well as ongoing diarrhea.  Work-up yesterday was fairly unremarkable and she discharged back to her group home.  EMS was called again today due to ongoing diarrhea with abdominal discomfort.  She is found to be fairly dehydrated and will be brought in under observation tonight.            Diarrhea  Dehydration  Patient was seen in ED yesterday 3/5 with similar symptoms and fairly unremarkable work up and discharge back to group home. Abdominal discomfort and diarrhea apparently continued and staff summoned EMS again.  Patient denies abdominal pain, bloody or black stool - she is not the best historian currently. No report of recent abx or hospitalization. No BM activity in ED reported thus far. Benign abdominal exam, afebrile, and nontoxic - will hold off from enteric panel or c diff testing.   - management as below  - prn anti-emetics etc  - may benefit from further details from group home when they can be reached - no answer tonight  - UA pending, follow    WBC count 12.1-14.2-11.8-8.5   C. difficile testing done returned negative     Right facial droop, left upper and lower extremity weakness, left lower extremity ataxia  Patient was sitting on commode feeling weak leaning over towards the right side.  RN describes as decreased level of consciousness, however patient did not lose consciousness.  RRT called on 3/8 for code stroke   CT and CTA head and neck done returned negative eccept Segmental  severe stenoses of the right the V3 segment.   2.  Focal high-grade stenosis versus short segment occlusion of the distal right V3 segment with reconstitution of normal caliber right vertebral artery immediately prior to piercing the dura.   3.  Multiple mild and moderate stenoses of the right vertebral artery throughout the V2 segment  NEurology consulted and are following   NEurochecks Q 4hours   MRI brain ordered   Started on regular aspirin        Hyperchloremic hypernatremia  Hyponatremia  HYpophosphatemia    on admission and Cl 118.  BUN  38 and creat 0.58.  Patient was started on LR at 100 mL/h on admission  We will switch  To D5w at 100ml per hour Sodium at 152  on 3/7/2023 with potassium of 2.9  Sodium rechecks q 4hours ordered   Started on potassium replacement protocol  On K Phos replacement protocol   Follow BMP daily     Nontraumatic rhabdomyolysis, mild  Worsening tremors/shaking  Reported history of tardive dyskinesia  Patient with frequent muscle flexing posturing at times.  No falls or substantial injuries noted.  History of TD was reportedly primarily in the face years ago noted.  Patient is awake and alert during these times.  CK elevated at 682 which is greater than 3 times upper limit of normal.  Renal function appears intact although she is quite dehydrated likely from diarrhea as above.  Shaking/spasms could be in part related to dehydration and electrolyte abnormalities as above.  -785  - Continue IV fluids with changing IV fluids as noted above  - benadryl and lorazepam attempted in ED due to shaking/spasm like movements, some improvement  Will order as needed Valium 2mg PO [id number] PRN  to help with tardive dyskinesia movements  Patient is on perphenazine antipsychotic to help with her seizure for anemia which might contribute to tardive dyskinesia and elevated CPK  Perphenazine held and restarted at 4mg PO BID   Psych consult placed for medical management    General NEurology  consult placed for evaluation and management of tremors      History of schizophrenia  History of depression  Group home resident  Acute encephalopathy secondary to metabolic with hypernatremia and delirium with hospitalization  Patient apparently is her own decision maker.   PTA Perpenazine  discontinued due to increasing tardive dyskinesia tremors and elevated CPK  As needed Valium ordered  Patient was agitated overnight needing Ativan and Benadryl and Atarax given  Sleeping this morning.  Bedside sitter in place  Psych consultation requested.  Appreciate assistance  - CT/SW consult for assistance with discharge planning     Hypertension  BP acceptable in ED initially.   Blood pressure currently systolics in the 130s    Moderate to severe malnutrition with a BMI of 17.3  Nutrition services consult for supplementation ordering        Diet:   reg  DVT Prophylaxis: Pneumatic Compression Devices  Liu Catheter: Not present  Lines: None     Cardiac Monitoring: None  Code Status:  Full Code           Clinically Significant Risk Factors Present on Admission         Clinically Significant Risk Factors        # Hypokalemia: Lowest K = 2.9 mmol/L in last 2 days, will replace as needed  # Hypernatremia: Highest Na = 152 mmol/L in last 2 days, will monitor as appropriate  # Hypocalcemia: Lowest Ca = 8.1 mg/dL in last 2 days, will monitor and replace as appropriate                # Cachexia: Estimated body mass index is 17.3 kg/m  as calculated from the following:    Height as of this encounter: 1.524 m (5').    Weight as of this encounter: 40.2 kg (88 lb 9.6 oz)., PRESENT ON ADMISSION  # Moderate Malnutrition: based on nutrition assessment, PRESENT ON ADMISSION                     Disposition: Expected discharge in 2-3days    Discussed with bedside RN and patient today    Chitra Lagos MD, MD  [phone] (P)      Interval History   Patient needed RRT last evening for stroke like symptoms .  C/o tremors being uncomfortable  in both upper extremities and lower jaw noted this morning. MRI brain to be done.     -Data reviewed today: I reviewed all new labs and imaging results over the last 24 hours. I personally reviewed no images or EKG's today.    Physical Exam   Temp: 97.6  F (36.4  C) Temp src: Axillary BP: 131/68 Pulse: 63   Resp: 16 SpO2: 96 % O2 Device: None (Room air)    Vitals:    03/06/23 1422 03/07/23 0445   Weight: 52.2 kg (115 lb) 40.2 kg (88 lb 9.6 oz)     Vital Signs with Ranges  Temp:  [97.6  F (36.4  C)-99.4  F (37.4  C)] 97.6  F (36.4  C)  Pulse:  [63-82] 63  Resp:  [16-22] 16  BP: (117-160)/(62-93) 131/68  SpO2:  [93 %-97 %] 96 %  I/O last 3 completed shifts:  In: 940 [P.O.:940]  Out: 200 [Urine:200]    Constitutional: Awake, alert, cooperative, no apparent distress, resting comfortably in bed  Respiratory: Clear to auscultation bilaterally, no crackles or wheezing  Cardiovascular: Regular rate and rhythm, normal S1 and S2, and no murmur noted  GI: Normal bowel sounds, soft, non-distended, non-tender  Skin/Integumen: No rashes, no cyanosis, mild tremors noted in upper extremities  Other:     Medications     D5W 100 mL/hr at 03/08/23 1010       aspirin  325 mg Oral Daily     escitalopram  20 mg Oral Daily     LORazepam  0.25 mg Oral At Bedtime     LORazepam  0.5 mg Oral BID     miconazole   Topical BID     NIFEdipine ER  30 mg Oral Daily     perphenazine  4 mg Oral BID     senna-docusate  1 tablet Oral BID    Or     senna-docusate  2 tablet Oral BID       Data   Recent Labs   Lab 03/08/23  0830 03/07/23  2310 03/07/23  1629 03/07/23  1605 03/07/23  0748 03/06/23  1413 03/05/23  1323   WBC 8.5  --  11.8*  --   --  14.2* 12.1*   HGB 8.5*  --  9.3*  --   --  9.9* 9.6*   MCV 86  --  90  --   --  87 87     --  334  --   --  424 383   INR  --   --  1.15  --   --   --   --    *  --  150*  --  149* 151* 145   POTASSIUM 3.3* 3.7 2.9*  --  2.9* 3.9 4.0   CHLORIDE 119*  --  115*  --  115* 118* 112*   CO2 26  --  24  --   22 21* 21*   BUN 18.9  --  17.9  --  17.2 37.9* 49.8*   CR 0.54  --  0.65  --  0.47* 0.58 0.80   ANIONGAP 7  --  11  --  12 12 12   LULU 8.3*  --  8.4*  --  8.1* 9.3 9.3   *  --  135* 141* 107* 130* 97   ALBUMIN  --   --   --   --   --  4.1 3.9   PROTTOTAL  --   --   --   --   --  6.9 6.5   BILITOTAL  --   --   --   --   --  <0.2 <0.2   ALKPHOS  --   --   --   --   --  83 80   ALT  --   --   --   --   --  21 15   AST  --   --   --   --   --  39* 19   LIPASE  --   --   --   --   --  22  --        Recent Results (from the past 24 hour(s))   CT Head w/o Contrast    Narrative    EXAM: CT HEAD W/O CONTRAST, CTA HEAD NECK W CONTRAST, CT HEAD PERFUSION W CONTRAST  LOCATION: [facility]  DATE/TIME: 3/7/2023 4:35 PM    INDICATION: stroke  COMPARISON: 12/16/2021  TECHNIQUE: Head and neck CT angiogram with IV contrast. Noncontrast head CT followed by axial helical CT images of the head and neck vessels obtained during the arterial phase of intravenous contrast administration. Axial 2D reconstructed images and   multiplanar 3D MIP reconstructed images of the head and neck vessels were performed by the technologist. Additional CT cerebral perfusion was performed utilizing a second contrast bolus. Perfusion data were post processed with generation of standard   perfusion maps and estimation of ischemic/infarcted volumes utilizing standard threshold values. Dose reduction techniques were used. All stenosis measurements made according to NASCET criteria unless otherwise specified.  CONTRAST: 75 mL Isovue 370 (accession [id number]), 75 mL Isovue 370 (accession [id number]), 50mL Isovue 370     (accession [id number])    FINDINGS:   NONCONTRAST HEAD CT:   INTRACRANIAL CONTENTS: No intracranial hemorrhage, extraaxial collection, or mass effect.  No CT evidence of acute infarct. Moderate presumed chronic small vessel ischemic changes. Normal ventricles and sulci.     VISUALIZED ORBITS/SINUSES/MASTOIDS: No  intraorbital abnormality. No paranasal sinus mucosal disease. No middle ear or mastoid effusion.    BONES/SOFT TISSUES: No acute abnormality.    HEAD CTA:  ANTERIOR CIRCULATION: No stenosis/occlusion, aneurysm, or high flow vascular malformation. Fetal origin of the right posterior cerebral artery from the anterior circulation. Partially fetal origin of the left posterior cerebral artery from the anterior   circulation.    POSTERIOR CIRCULATION: Moderate stenosis of the right vertebral artery as it pierces the dura. Mild irregularity of the proximal basilar artery and mild stenoses noted. Moderate stenosis of the proximal basilar artery. No stenosis/occlusion, aneurysm, or   high flow vascular malformation. Dominant right vertebral artery supplies the basilar artery with a small left vertebral artery supplying the left posterior inferior cerebellar artery (PICA).     DURAL VENOUS SINUSES: Expected enhancement of the major dural venous sinuses.    NECK CTA:  RIGHT CAROTID: No measurable stenosis or dissection.    LEFT CAROTID: No measurable stenosis or dissection.    VERTEBRAL ARTERIES: There are multiple mild and moderate stenoses of the right vertebral artery throughout the V2 segment. Severe, segmental stenosis of the right V3 segment . Focal high-grade stenosis versus short segment occlusion of the distal right   V3 segment. Just prior to piercing the dura of the distal right vertebral artery in the neck reconstitutes and becomes normal in caliber again. Of note the right posterior inferior cerebellar artery arises prior to piercing the dura. Dominant right and   smaller left vertebral arteries.    AORTIC ARCH: Classic aortic arch anatomy with no significant stenosis at the origin of the great vessels.    NONVASCULAR STRUCTURES: Unremarkable.    CT PERFUSION:  PERFUSION MAPS: Symmetrical cerebral perfusion. No focal deficits in cerebral blood flow or volume to suggest ischemia/oligemia. There is a small region of  parenchyma on the region of the right occipital lobe with a Tmax greater than 4 seconds though no   corresponding reduced cerebral blood flow. Finding is nonspecific it may be incidental. Mild hypoperfusion in this region is possible.    RAPID ANALYSIS:  CBF<30%: 0 mL  Tmax>6sec: 0 mL  Mismatch volume: 0 mL  Mismatch ratio: None      Impression    IMPRESSION:   HEAD CT:  1.  No CT evidence for acute intracranial process.  2.  Brain atrophy and presumed chronic microvascular ischemic changes as above.    HEAD CTA:   1.  No large vessel occlusion.  2.  Moderate stenosis of the right vertebral artery as it pierces the dura.  3.  Moderate stenosis of the mid right basilar artery.    NECK CTA:  1.  Segmental severe stenoses of the right the V3 segment.  2.  Focal high-grade stenosis versus short segment occlusion of the distal right V3 segment with reconstitution of normal caliber right vertebral artery immediately prior to piercing the dura.  3.  Multiple mild and moderate stenoses of the right vertebral artery throughout the V2 segment.    CT PERFUSION:  1.  Normal cerebral perfusion. No focal deficits to suggest ischemia.  2.  Small region of normally prolonged Tmax in the right occipital lobe without corresponding reduced cerebral blood flow is nonspecific. Mild hypoperfusion is possible. An MRI could further evaluate if clinically indicated.    Findings were discussed with nurse practitioner Jeremy Sims at 4:48 PM on 03/07/2023.   CTA Head Neck w Contrast    Narrative    EXAM: CT HEAD W/O CONTRAST, CTA HEAD NECK W CONTRAST, CT HEAD PERFUSION W CONTRAST  LOCATION: [facility]  DATE/TIME: 3/7/2023 4:35 PM    INDICATION: stroke  COMPARISON: 12/16/2021  TECHNIQUE: Head and neck CT angiogram with IV contrast. Noncontrast head CT followed by axial helical CT images of the head and neck vessels obtained during the arterial phase of intravenous contrast administration. Axial 2D reconstructed images  and   multiplanar 3D MIP reconstructed images of the head and neck vessels were performed by the technologist. Additional CT cerebral perfusion was performed utilizing a second contrast bolus. Perfusion data were post processed with generation of standard   perfusion maps and estimation of ischemic/infarcted volumes utilizing standard threshold values. Dose reduction techniques were used. All stenosis measurements made according to NASCET criteria unless otherwise specified.  CONTRAST: 75 mL Isovue 370 (accession [id number]), 75 mL Isovue 370 (accession [id number]), 50mL Isovue 370     (accession [id number])    FINDINGS:   NONCONTRAST HEAD CT:   INTRACRANIAL CONTENTS: No intracranial hemorrhage, extraaxial collection, or mass effect.  No CT evidence of acute infarct. Moderate presumed chronic small vessel ischemic changes. Normal ventricles and sulci.     VISUALIZED ORBITS/SINUSES/MASTOIDS: No intraorbital abnormality. No paranasal sinus mucosal disease. No middle ear or mastoid effusion.    BONES/SOFT TISSUES: No acute abnormality.    HEAD CTA:  ANTERIOR CIRCULATION: No stenosis/occlusion, aneurysm, or high flow vascular malformation. Fetal origin of the right posterior cerebral artery from the anterior circulation. Partially fetal origin of the left posterior cerebral artery from the anterior   circulation.    POSTERIOR CIRCULATION: Moderate stenosis of the right vertebral artery as it pierces the dura. Mild irregularity of the proximal basilar artery and mild stenoses noted. Moderate stenosis of the proximal basilar artery. No stenosis/occlusion, aneurysm, or   high flow vascular malformation. Dominant right vertebral artery supplies the basilar artery with a small left vertebral artery supplying the left posterior inferior cerebellar artery (PICA).     DURAL VENOUS SINUSES: Expected enhancement of the major dural venous sinuses.    NECK CTA:  RIGHT CAROTID: No measurable stenosis or dissection.    LEFT CAROTID: No  measurable stenosis or dissection.    VERTEBRAL ARTERIES: There are multiple mild and moderate stenoses of the right vertebral artery throughout the V2 segment. Severe, segmental stenosis of the right V3 segment . Focal high-grade stenosis versus short segment occlusion of the distal right   V3 segment. Just prior to piercing the dura of the distal right vertebral artery in the neck reconstitutes and becomes normal in caliber again. Of note the right posterior inferior cerebellar artery arises prior to piercing the dura. Dominant right and   smaller left vertebral arteries.    AORTIC ARCH: Classic aortic arch anatomy with no significant stenosis at the origin of the great vessels.    NONVASCULAR STRUCTURES: Unremarkable.    CT PERFUSION:  PERFUSION MAPS: Symmetrical cerebral perfusion. No focal deficits in cerebral blood flow or volume to suggest ischemia/oligemia. There is a small region of parenchyma on the region of the right occipital lobe with a Tmax greater than 4 seconds though no   corresponding reduced cerebral blood flow. Finding is nonspecific it may be incidental. Mild hypoperfusion in this region is possible.    RAPID ANALYSIS:  CBF<30%: 0 mL  Tmax>6sec: 0 mL  Mismatch volume: 0 mL  Mismatch ratio: None      Impression    IMPRESSION:   HEAD CT:  1.  No CT evidence for acute intracranial process.  2.  Brain atrophy and presumed chronic microvascular ischemic changes as above.    HEAD CTA:   1.  No large vessel occlusion.  2.  Moderate stenosis of the right vertebral artery as it pierces the dura.  3.  Moderate stenosis of the mid right basilar artery.    NECK CTA:  1.  Segmental severe stenoses of the right the V3 segment.  2.  Focal high-grade stenosis versus short segment occlusion of the distal right V3 segment with reconstitution of normal caliber right vertebral artery immediately prior to piercing the dura.  3.  Multiple mild and moderate stenoses of the right vertebral artery throughout the V2  segment.    CT PERFUSION:  1.  Normal cerebral perfusion. No focal deficits to suggest ischemia.  2.  Small region of normally prolonged Tmax in the right occipital lobe without corresponding reduced cerebral blood flow is nonspecific. Mild hypoperfusion is possible. An MRI could further evaluate if clinically indicated.    Findings were discussed with nurse practitioner Jeremy Sims at 4:48 PM on 03/07/2023.   CT Head Perfusion w Contrast    Narrative    EXAM: CT HEAD W/O CONTRAST, CTA HEAD NECK W CONTRAST, CT HEAD PERFUSION W CONTRAST  LOCATION: [facility]  DATE/TIME: 3/7/2023 4:35 PM    INDICATION: stroke  COMPARISON: 12/16/2021  TECHNIQUE: Head and neck CT angiogram with IV contrast. Noncontrast head CT followed by axial helical CT images of the head and neck vessels obtained during the arterial phase of intravenous contrast administration. Axial 2D reconstructed images and   multiplanar 3D MIP reconstructed images of the head and neck vessels were performed by the technologist. Additional CT cerebral perfusion was performed utilizing a second contrast bolus. Perfusion data were post processed with generation of standard   perfusion maps and estimation of ischemic/infarcted volumes utilizing standard threshold values. Dose reduction techniques were used. All stenosis measurements made according to NASCET criteria unless otherwise specified.  CONTRAST: 75 mL Isovue 370 (accession [id number]), 75 mL Isovue 370 (accession [id number]), 50mL Isovue 370     (accession [id number])    FINDINGS:   NONCONTRAST HEAD CT:   INTRACRANIAL CONTENTS: No intracranial hemorrhage, extraaxial collection, or mass effect.  No CT evidence of acute infarct. Moderate presumed chronic small vessel ischemic changes. Normal ventricles and sulci.     VISUALIZED ORBITS/SINUSES/MASTOIDS: No intraorbital abnormality. No paranasal sinus mucosal disease. No middle ear or mastoid effusion.    BONES/SOFT TISSUES: No acute  abnormality.    HEAD CTA:  ANTERIOR CIRCULATION: No stenosis/occlusion, aneurysm, or high flow vascular malformation. Fetal origin of the right posterior cerebral artery from the anterior circulation. Partially fetal origin of the left posterior cerebral artery from the anterior   circulation.    POSTERIOR CIRCULATION: Moderate stenosis of the right vertebral artery as it pierces the dura. Mild irregularity of the proximal basilar artery and mild stenoses noted. Moderate stenosis of the proximal basilar artery. No stenosis/occlusion, aneurysm, or   high flow vascular malformation. Dominant right vertebral artery supplies the basilar artery with a small left vertebral artery supplying the left posterior inferior cerebellar artery (PICA).     DURAL VENOUS SINUSES: Expected enhancement of the major dural venous sinuses.    NECK CTA:  RIGHT CAROTID: No measurable stenosis or dissection.    LEFT CAROTID: No measurable stenosis or dissection.    VERTEBRAL ARTERIES: There are multiple mild and moderate stenoses of the right vertebral artery throughout the V2 segment. Severe, segmental stenosis of the right V3 segment . Focal high-grade stenosis versus short segment occlusion of the distal right   V3 segment. Just prior to piercing the dura of the distal right vertebral artery in the neck reconstitutes and becomes normal in caliber again. Of note the right posterior inferior cerebellar artery arises prior to piercing the dura. Dominant right and   smaller left vertebral arteries.    AORTIC ARCH: Classic aortic arch anatomy with no significant stenosis at the origin of the great vessels.    NONVASCULAR STRUCTURES: Unremarkable.    CT PERFUSION:  PERFUSION MAPS: Symmetrical cerebral perfusion. No focal deficits in cerebral blood flow or volume to suggest ischemia/oligemia. There is a small region of parenchyma on the region of the right occipital lobe with a Tmax greater than 4 seconds though no   corresponding reduced  cerebral blood flow. Finding is nonspecific it may be incidental. Mild hypoperfusion in this region is possible.    RAPID ANALYSIS:  CBF<30%: 0 mL  Tmax>6sec: 0 mL  Mismatch volume: 0 mL  Mismatch ratio: None      Impression    IMPRESSION:   HEAD CT:  1.  No CT evidence for acute intracranial process.  2.  Brain atrophy and presumed chronic microvascular ischemic changes as above.    HEAD CTA:   1.  No large vessel occlusion.  2.  Moderate stenosis of the right vertebral artery as it pierces the dura.  3.  Moderate stenosis of the mid right basilar artery.    NECK CTA:  1.  Segmental severe stenoses of the right the V3 segment.  2.  Focal high-grade stenosis versus short segment occlusion of the distal right V3 segment with reconstitution of normal caliber right vertebral artery immediately prior to piercing the dura.  3.  Multiple mild and moderate stenoses of the right vertebral artery throughout the V2 segment.    CT PERFUSION:  1.  Normal cerebral perfusion. No focal deficits to suggest ischemia.  2.  Small region of normally prolonged Tmax in the right occipital lobe without corresponding reduced cerebral blood flow is nonspecific. Mild hypoperfusion is possible. An MRI could further evaluate if clinically indicated.    Findings were discussed with nurse practitioner Jeremy Sims at 4:48 PM on 03/07/2023.

## 2023-03-08 NOTE — PLAN OF CARE
Goal Outcome Evaluation:    Pt A&Ox2 this shift. Disoriented to place and situation. Pt pulled out one of her IVs,. PRN Valium given x1 for tremors. Pt took off Tele; writer replaced Tele patches more than 3 times, pt education provided. Up with assist of 2 with gait belt and walker to bedside commode. No new changes or concerns overnight. Continue to monitor.

## 2023-03-08 NOTE — CONSULTS
[facility]    Neurology Consultation     Date of Admission:  3/6/2023    Assessment & Plan   Kortney Germain is a 67 year old female who was admitted on 3/6/2023. I was asked to see the patient for pt with worsened involuntary movements.  Extrapyramidal symptoms with tremors and increasing muscle tone very likely due to psychiatric medication.  The patient seems weak in both upper and lower extremities, uncertain baseline.  History of schizophrenia and depression.  Recently admitted with hypernatremia, dehydration and diarrhea.  Sodium today is normal, 143.  Anemia ? etiology    -Follow-up with psychiatry for treatment of extrapyramidal side effects from psychiatric medication.  -Might consider doing an MRI of the cervical spine, the patient is significant weak in both upper extremities and some weakness in lower extremities.  -We will do a metabolic work-up to exclude other treatable causes.  -Work-up of anemia.  -Physical therapy occupational therapy    Tiffanie Reynoso MD    Code Status    Full Code    Reason for Consult   Reason for consult: I was asked by Dr Lagos to evaluate this patient for worsened involuntary movements    Primary Care Physician   [facility]    Chief Complaint   worsened involuntary movements    History is obtained from the patient and electronic health record    History of Present Illness   Kortney Germain is a 67 year old female who presents with diarrhea and worsening tremors.  The patient was found to be significantly dehydrated with a high sodium of 151.  The patient states that she shakes in both upper extremities on and off sometimes things get worse.  She states that sometimes Ativan helps.    She does not lose consciousness or awareness during the movements.  The movements are bilateral upper extremities.  The patient does not follow much with history.  She states that she does not walk for some time.  Specific history was unable  to be obtained.    The patient has history of schizophrenia and depression and has been on medication and was diagnosed with tardive dyskinesia.    The patient was evaluated by psychiatry and perphenazine was restarted, during the interview psychiatry the patient was saying that this medication has helped.    The patient states that she feels weak in both upper and lower extremities.  She states she has she has numbness.  Occasionally she will have pain in both feet.    The patient has had an MRI of the head which showed no acute changes there is moderate nonspecific white matter changes questionable small vessel ischemic disease.  The films were reviewed by me.      Past Medical History   I have reviewed this patient's medical history and updated it with pertinent information if needed.   Past Medical History:   Diagnosis Date     Allergic state      Depressive disorder      Dyskinesia      Hypertension      Schizo affective schizophrenia (H)        Past Surgical History   I have reviewed this patient's surgical history and updated it with pertinent information if needed.  No past surgical history on file.    Prior to Admission Medications   Prior to Admission Medications   Prescriptions Last Dose Informant Patient Reported? Taking?   LORazepam (ATIVAN) 0.5 MG tablet 3/6/2023 at 0800 Nursing Home Yes Yes   Sig: Take 0.5 mg by mouth 2 times daily 0800/1400   LORazepam (ATIVAN) 0.5 MG tablet 3/5/2023 at 2000 Nursing Home Yes Yes   Sig: Take 0.25 mg by mouth At Bedtime 2000   NIFEdipine ER (ADALAT CC) 30 MG 24 hr tablet 3/6/2023 at 0800 Nursing Home Yes Yes   Sig: Take 30 mg by mouth daily 0800   acetaminophen (TYLENOL) 500 MG tablet 3/6/2023 at 0800 Nursing Home Yes Yes   Sig: Take 1,000 mg by mouth 3 times daily 0800 / 1400 / 2000   alendronate (FOSAMAX) 70 MG tablet Past Week Nursing Home Yes Yes   Sig: Take 70 mg by mouth every 7 days Thursdays at 0700   cholecalciferol 50 MCG (2000 UT) tablet 3/6/2023 at 0800  Nursing Home Yes Yes   Sig: Take 1 tablet by mouth daily 0800   diphenhydrAMINE (BENADRYL) 50 MG capsule 3/6/2023 at 0800 Nursing Home Yes Yes   Sig: Take 50 mg by mouth 3 times daily 0800 / 1500 / 2000   escitalopram (LEXAPRO) 20 MG tablet 3/6/2023 at 0800 Nursing Home Yes Yes   Sig: Take 20 mg by mouth daily 0800   ibuprofen (ADVIL/MOTRIN) 200 MG tablet prn Nursing Home Yes Yes   Sig: Take 400 mg by mouth every 4 hours as needed for pain   multivitamin w/minerals (THERA-VIT-M) tablet 3/6/2023 at 0800 Nursing Home Yes Yes   Sig: Take 1 tablet by mouth daily 0800   muscle rub (ARTHRITIS HOT) 10-15 % CREA 3/6/2023 at 0700 Nursing Home Yes Yes   Sig: Apply topically 4 times daily Back pain - 0700/1100/1500/2000   perphenazine 4 MG tablet 3/6/2023 at 0800 Nursing Home Yes Yes   Sig: Take 4 mg by mouth every morning 0800   perphenazine 8 MG tablet 3/5/2023 at 2000 Nursing [place] Yes Yes   Sig: Take 8 mg by mouth At Bedtime 2000      Facility-Administered Medications: None     Allergies   Allergies   Allergen Reactions     Amlodipine      Clozapine      Egg [Chicken-Derived Products (Egg)]      Penicillins      Potassium      Poultry Meal        Social History   I have reviewed this patient's social history and updated it with pertinent information if needed. Kortney Germain  reports that she has never smoked. She has never used smokeless tobacco. She reports that she does not drink alcohol and does not use drugs.    Family History   I have reviewed this patient's family history and updated it with pertinent information if needed.   No family history on file.    Review of Systems   The 10 point Review of Systems is negative other than noted in the HPI or here.     Physical Exam   Temp: 97.6  F (36.4  C) Temp src: Axillary BP: 131/68 Pulse: 63   Resp: 16 SpO2: 96 % O2 Device: None (Room air)    Vital Signs with Ranges  Temp:  [97.6  F (36.4  C)-99.4  F (37.4  C)] 97.6  F (36.4  C)  Pulse:  [63-82] 63  Resp:  [16-22]  16  BP: (117-160)/(62-93) 131/68  SpO2:  [93 %-97 %] 96 %  88 lbs 9.6 oz    Constitutional: Normal  Eyes: No conjunctival erythema  ENT: Neck is supple  Respiratory: CTA  Cardiovascular: RRR  Skin: Bruise over the right upper extremity  Musculoskeletal: No pedal edema  The patient is alert oriented x3 no acute distress.  Spontaneous speech is decreased her history lacks details she knows the name of the president.  She does not follow with full Mini-Mental status exam.  Pupils are equal round reactive to light extraocular movements are intact, there is no nystagmus.  Facial muscles are equal bilaterally.  Uvula and tongue are midline.  The patient lays in bed with arms and triple flexion.  Muscle tone is increased especially in left upper extremity and both lower extremities.  Occasionally the patient has a tremor in both upper extremities.  There is weakness in both hands distally rather than proximally.  The patient does not fully follows commands and participates with my exam.  Reflexes are present brisk in upper and lower extremities with upgoing toes bilaterally.  Sensory exam is normal to light touch and vibratory sense-I am not certain this test was reliable..    Finger-nose-finger  without dysmetria.  Fine movements are clumsy bilateral.  The patient is not able to ambulate.  Neuropsychiatric: Difficult to assess    Data   Results for orders placed or performed during the hospital encounter of 03/06/23 (from the past 24 hour(s))   C. difficile Toxin B PCR with reflex to C. difficile Antigen and Toxins A/B EIA    Specimen: Per Rectum; Stool   Result Value Ref Range    C Difficile Toxin B by PCR Negative Negative    Narrative    The [employer] Xpert C. difficile Assay, performed on the [employer]ument Systems, is a qualitative in vitro diagnostic test for rapid detection of toxin B gene sequences from unformed (liquid or soft) stool specimens collected from patients suspected of having  Clostridioides difficile infection (CDI). The test utilizes automated real-time polymerase chain reaction (PCR) to detect toxin gene sequences associated with toxin producing C. difficile. The Xpert C. difficile Assay is intended as an aid in the diagnosis of CDI.   Glucose by meter   Result Value Ref Range    GLUCOSE BY METER POCT 141 (H) 70 - 99 mg/dL   Basic metabolic panel   Result Value Ref Range    Sodium 150 (H) 136 - 145 mmol/L    Potassium 2.9 (L) 3.4 - 5.3 mmol/L    Chloride 115 (H) 98 - 107 mmol/L    Carbon Dioxide (CO2) 24 22 - 29 mmol/L    Anion Gap 11 7 - 15 mmol/L    Urea Nitrogen 17.9 8.0 - 23.0 mg/dL    Creatinine 0.65 0.51 - 0.95 mg/dL    Calcium 8.4 (L) 8.8 - 10.2 mg/dL    Glucose 135 (H) 70 - 99 mg/dL    GFR Estimate >90 >60 mL/min/1.73m2   Troponin T, High Sensitivity   Result Value Ref Range    Troponin T, High Sensitivity 90 (H) <=14 ng/L   INR   Result Value Ref Range    INR 1.15 0.85 - 1.15   CBC with platelets differential    Narrative    The following orders were created for panel order CBC with platelets differential.  Procedure                               Abnormality         Status                     ---------                               -----------         ------                     CBC with platelets and d...[[id number]]  Abnormal            Final result                 Please view results for these tests on the individual orders.   Blood gas venous   Result Value Ref Range    pH Venous 7.42 7.32 - 7.43    pCO2 Venous 40 40 - 50 mm Hg    pO2 Venous 32 25 - 47 mm Hg    Bicarbonate Venous 26 21 - 28 mmol/L    Base Excess/Deficit (+/-) 1.4 -7.7 - 1.9 mmol/L    FIO2 0    Lactic acid whole blood   Result Value Ref Range    Lactic Acid 1.1 0.7 - 2.0 mmol/L   CBC with platelets and differential   Result Value Ref Range    WBC Count 11.8 (H) 4.0 - 11.0 10e3/uL    RBC Count 3.45 (L) 3.80 - 5.20 10e6/uL    Hemoglobin 9.3 (L) 11.7 - 15.7 g/dL    Hematocrit 31.0 (L) 35.0 - 47.0 %    MCV 90 78  - 100 fL    MCH 27.0 26.5 - 33.0 pg    MCHC 30.0 (L) 31.5 - 36.5 g/dL    RDW 15.4 (H) 10.0 - 15.0 %    Platelet Count 334 150 - 450 10e3/uL    % Neutrophils 57 %    % Lymphocytes 26 %    % Monocytes 12 %    % Eosinophils 4 %    % Basophils 1 %    % Immature Granulocytes 0 %    NRBCs per 100 WBC 0 <1 /100    Absolute Neutrophils 6.7 1.6 - 8.3 10e3/uL    Absolute Lymphocytes 3.1 0.8 - 5.3 10e3/uL    Absolute Monocytes 1.4 (H) 0.0 - 1.3 10e3/uL    Absolute Eosinophils 0.4 0.0 - 0.7 10e3/uL    Absolute Basophils 0.1 0.0 - 0.2 10e3/uL    Absolute Immature Granulocytes 0.0 <=0.4 10e3/uL    Absolute NRBCs 0.0 10e3/uL   CT Head w/o Contrast    Narrative    EXAM: CT HEAD W/O CONTRAST, CTA HEAD NECK W CONTRAST, CT HEAD PERFUSION W CONTRAST  LOCATION: [facility]  DATE/TIME: 3/7/2023 4:35 PM    INDICATION: stroke  COMPARISON: 12/16/2021  TECHNIQUE: Head and neck CT angiogram with IV contrast. Noncontrast head CT followed by axial helical CT images of the head and neck vessels obtained during the arterial phase of intravenous contrast administration. Axial 2D reconstructed images and   multiplanar 3D MIP reconstructed images of the head and neck vessels were performed by the technologist. Additional CT cerebral perfusion was performed utilizing a second contrast bolus. Perfusion data were post processed with generation of standard   perfusion maps and estimation of ischemic/infarcted volumes utilizing standard threshold values. Dose reduction techniques were used. All stenosis measurements made according to NASCET criteria unless otherwise specified.  CONTRAST: 75 mL Isovue 370 (accession [id number]), 75 mL Isovue 370 (accession [id number]), 50mL Isovue 370     (accession [id number])    FINDINGS:   NONCONTRAST HEAD CT:   INTRACRANIAL CONTENTS: No intracranial hemorrhage, extraaxial collection, or mass effect.  No CT evidence of acute infarct. Moderate presumed chronic small vessel ischemic changes. Normal  ventricles and sulci.     VISUALIZED ORBITS/SINUSES/MASTOIDS: No intraorbital abnormality. No paranasal sinus mucosal disease. No middle ear or mastoid effusion.    BONES/SOFT TISSUES: No acute abnormality.    HEAD CTA:  ANTERIOR CIRCULATION: No stenosis/occlusion, aneurysm, or high flow vascular malformation. Fetal origin of the right posterior cerebral artery from the anterior circulation. Partially fetal origin of the left posterior cerebral artery from the anterior   circulation.    POSTERIOR CIRCULATION: Moderate stenosis of the right vertebral artery as it pierces the dura. Mild irregularity of the proximal basilar artery and mild stenoses noted. Moderate stenosis of the proximal basilar artery. No stenosis/occlusion, aneurysm, or   high flow vascular malformation. Dominant right vertebral artery supplies the basilar artery with a small left vertebral artery supplying the left posterior inferior cerebellar artery (PICA).     DURAL VENOUS SINUSES: Expected enhancement of the major dural venous sinuses.    NECK CTA:  RIGHT CAROTID: No measurable stenosis or dissection.    LEFT CAROTID: No measurable stenosis or dissection.    VERTEBRAL ARTERIES: There are multiple mild and moderate stenoses of the right vertebral artery throughout the V2 segment. Severe, segmental stenosis of the right V3 segment . Focal high-grade stenosis versus short segment occlusion of the distal right   V3 segment. Just prior to piercing the dura of the distal right vertebral artery in the neck reconstitutes and becomes normal in caliber again. Of note the right posterior inferior cerebellar artery arises prior to piercing the dura. Dominant right and   smaller left vertebral arteries.    AORTIC ARCH: Classic aortic arch anatomy with no significant stenosis at the origin of the great vessels.    NONVASCULAR STRUCTURES: Unremarkable.    CT PERFUSION:  PERFUSION MAPS: Symmetrical cerebral perfusion. No focal deficits in cerebral blood flow  or volume to suggest ischemia/oligemia. There is a small region of parenchyma on the region of the right occipital lobe with a Tmax greater than 4 seconds though no   corresponding reduced cerebral blood flow. Finding is nonspecific it may be incidental. Mild hypoperfusion in this region is possible.    RAPID ANALYSIS:  CBF<30%: 0 mL  Tmax>6sec: 0 mL  Mismatch volume: 0 mL  Mismatch ratio: None      Impression    IMPRESSION:   HEAD CT:  1.  No CT evidence for acute intracranial process.  2.  Brain atrophy and presumed chronic microvascular ischemic changes as above.    HEAD CTA:   1.  No large vessel occlusion.  2.  Moderate stenosis of the right vertebral artery as it pierces the dura.  3.  Moderate stenosis of the mid right basilar artery.    NECK CTA:  1.  Segmental severe stenoses of the right the V3 segment.  2.  Focal high-grade stenosis versus short segment occlusion of the distal right V3 segment with reconstitution of normal caliber right vertebral artery immediately prior to piercing the dura.  3.  Multiple mild and moderate stenoses of the right vertebral artery throughout the V2 segment.    CT PERFUSION:  1.  Normal cerebral perfusion. No focal deficits to suggest ischemia.  2.  Small region of normally prolonged Tmax in the right occipital lobe without corresponding reduced cerebral blood flow is nonspecific. Mild hypoperfusion is possible. An MRI could further evaluate if clinically indicated.    Findings were discussed with nurse practitioner Jeremy Sims at 4:48 PM on 03/07/2023.   CTA Head Neck w Contrast    Narrative    EXAM: CT HEAD W/O CONTRAST, CTA HEAD NECK W CONTRAST, CT HEAD PERFUSION W CONTRAST  LOCATION: [facility]  DATE/TIME: 3/7/2023 4:35 PM    INDICATION: stroke  COMPARISON: 12/16/2021  TECHNIQUE: Head and neck CT angiogram with IV contrast. Noncontrast head CT followed by axial helical CT images of the head and neck vessels obtained during the arterial phase of  intravenous contrast administration. Axial 2D reconstructed images and   multiplanar 3D MIP reconstructed images of the head and neck vessels were performed by the technologist. Additional CT cerebral perfusion was performed utilizing a second contrast bolus. Perfusion data were post processed with generation of standard   perfusion maps and estimation of ischemic/infarcted volumes utilizing standard threshold values. Dose reduction techniques were used. All stenosis measurements made according to NASCET criteria unless otherwise specified.  CONTRAST: 75 mL Isovue 370 (accession [id number]), 75 mL Isovue 370 (accession [id number]), 50mL Isovue 370     (accession [id number])    FINDINGS:   NONCONTRAST HEAD CT:   INTRACRANIAL CONTENTS: No intracranial hemorrhage, extraaxial collection, or mass effect.  No CT evidence of acute infarct. Moderate presumed chronic small vessel ischemic changes. Normal ventricles and sulci.     VISUALIZED ORBITS/SINUSES/MASTOIDS: No intraorbital abnormality. No paranasal sinus mucosal disease. No middle ear or mastoid effusion.    BONES/SOFT TISSUES: No acute abnormality.    HEAD CTA:  ANTERIOR CIRCULATION: No stenosis/occlusion, aneurysm, or high flow vascular malformation. Fetal origin of the right posterior cerebral artery from the anterior circulation. Partially fetal origin of the left posterior cerebral artery from the anterior   circulation.    POSTERIOR CIRCULATION: Moderate stenosis of the right vertebral artery as it pierces the dura. Mild irregularity of the proximal basilar artery and mild stenoses noted. Moderate stenosis of the proximal basilar artery. No stenosis/occlusion, aneurysm, or   high flow vascular malformation. Dominant right vertebral artery supplies the basilar artery with a small left vertebral artery supplying the left posterior inferior cerebellar artery (PICA).     DURAL VENOUS SINUSES: Expected enhancement of the major dural venous sinuses.    NECK CTA:  RIGHT  CAROTID: No measurable stenosis or dissection.    LEFT CAROTID: No measurable stenosis or dissection.    VERTEBRAL ARTERIES: There are multiple mild and moderate stenoses of the right vertebral artery throughout the V2 segment. Severe, segmental stenosis of the right V3 segment . Focal high-grade stenosis versus short segment occlusion of the distal right   V3 segment. Just prior to piercing the dura of the distal right vertebral artery in the neck reconstitutes and becomes normal in caliber again. Of note the right posterior inferior cerebellar artery arises prior to piercing the dura. Dominant right and   smaller left vertebral arteries.    AORTIC ARCH: Classic aortic arch anatomy with no significant stenosis at the origin of the great vessels.    NONVASCULAR STRUCTURES: Unremarkable.    CT PERFUSION:  PERFUSION MAPS: Symmetrical cerebral perfusion. No focal deficits in cerebral blood flow or volume to suggest ischemia/oligemia. There is a small region of parenchyma on the region of the right occipital lobe with a Tmax greater than 4 seconds though no   corresponding reduced cerebral blood flow. Finding is nonspecific it may be incidental. Mild hypoperfusion in this region is possible.    RAPID ANALYSIS:  CBF<30%: 0 mL  Tmax>6sec: 0 mL  Mismatch volume: 0 mL  Mismatch ratio: None      Impression    IMPRESSION:   HEAD CT:  1.  No CT evidence for acute intracranial process.  2.  Brain atrophy and presumed chronic microvascular ischemic changes as above.    HEAD CTA:   1.  No large vessel occlusion.  2.  Moderate stenosis of the right vertebral artery as it pierces the dura.  3.  Moderate stenosis of the mid right basilar artery.    NECK CTA:  1.  Segmental severe stenoses of the right the V3 segment.  2.  Focal high-grade stenosis versus short segment occlusion of the distal right V3 segment with reconstitution of normal caliber right vertebral artery immediately prior to piercing the dura.  3.  Multiple mild and  moderate stenoses of the right vertebral artery throughout the V2 segment.    CT PERFUSION:  1.  Normal cerebral perfusion. No focal deficits to suggest ischemia.  2.  Small region of normally prolonged Tmax in the right occipital lobe without corresponding reduced cerebral blood flow is nonspecific. Mild hypoperfusion is possible. An MRI could further evaluate if clinically indicated.    Findings were discussed with nurse practitioner Jeremy Sims at 4:48 PM on 03/07/2023.   EKG 12-lead, tracing only   Result Value Ref Range    Systolic Blood Pressure  mmHg    Diastolic Blood Pressure  mmHg    Ventricular Rate 64 BPM    Atrial Rate 64 BPM    WA Interval 200 ms    QRS Duration 78 ms     ms    QTc 495 ms    P Axis 71 degrees    R AXIS 94 degrees    T Axis 70 degrees    Interpretation ECG       Sinus rhythm  Rightward axis  Nonspecific ST abnormality  Prolonged QT  Abnormal ECG  When compared with ECG of 02-FEB-2022 09:37,  Premature ventricular complexes are no longer Present     CT Head Perfusion w Contrast    Narrative    EXAM: CT HEAD W/O CONTRAST, CTA HEAD NECK W CONTRAST, CT HEAD PERFUSION W CONTRAST  LOCATION: [facility]  DATE/TIME: 3/7/2023 4:35 PM    INDICATION: stroke  COMPARISON: 12/16/2021  TECHNIQUE: Head and neck CT angiogram with IV contrast. Noncontrast head CT followed by axial helical CT images of the head and neck vessels obtained during the arterial phase of intravenous contrast administration. Axial 2D reconstructed images and   multiplanar 3D MIP reconstructed images of the head and neck vessels were performed by the technologist. Additional CT cerebral perfusion was performed utilizing a second contrast bolus. Perfusion data were post processed with generation of standard   perfusion maps and estimation of ischemic/infarcted volumes utilizing standard threshold values. Dose reduction techniques were used. All stenosis measurements made according to NASCET criteria  unless otherwise specified.  CONTRAST: 75 mL Isovue 370 (accession [id number]), 75 mL Isovue 370 (accession [id number]), 50mL Isovue 370     (accession [id number])    FINDINGS:   NONCONTRAST HEAD CT:   INTRACRANIAL CONTENTS: No intracranial hemorrhage, extraaxial collection, or mass effect.  No CT evidence of acute infarct. Moderate presumed chronic small vessel ischemic changes. Normal ventricles and sulci.     VISUALIZED ORBITS/SINUSES/MASTOIDS: No intraorbital abnormality. No paranasal sinus mucosal disease. No middle ear or mastoid effusion.    BONES/SOFT TISSUES: No acute abnormality.    HEAD CTA:  ANTERIOR CIRCULATION: No stenosis/occlusion, aneurysm, or high flow vascular malformation. Fetal origin of the right posterior cerebral artery from the anterior circulation. Partially fetal origin of the left posterior cerebral artery from the anterior   circulation.    POSTERIOR CIRCULATION: Moderate stenosis of the right vertebral artery as it pierces the dura. Mild irregularity of the proximal basilar artery and mild stenoses noted. Moderate stenosis of the proximal basilar artery. No stenosis/occlusion, aneurysm, or   high flow vascular malformation. Dominant right vertebral artery supplies the basilar artery with a small left vertebral artery supplying the left posterior inferior cerebellar artery (PICA).     DURAL VENOUS SINUSES: Expected enhancement of the major dural venous sinuses.    NECK CTA:  RIGHT CAROTID: No measurable stenosis or dissection.    LEFT CAROTID: No measurable stenosis or dissection.    VERTEBRAL ARTERIES: There are multiple mild and moderate stenoses of the right vertebral artery throughout the V2 segment. Severe, segmental stenosis of the right V3 segment . Focal high-grade stenosis versus short segment occlusion of the distal right   V3 segment. Just prior to piercing the dura of the distal right vertebral artery in the neck reconstitutes and becomes normal in caliber again. Of note the  right posterior inferior cerebellar artery arises prior to piercing the dura. Dominant right and   smaller left vertebral arteries.    AORTIC ARCH: Classic aortic arch anatomy with no significant stenosis at the origin of the great vessels.    NONVASCULAR STRUCTURES: Unremarkable.    CT PERFUSION:  PERFUSION MAPS: Symmetrical cerebral perfusion. No focal deficits in cerebral blood flow or volume to suggest ischemia/oligemia. There is a small region of parenchyma on the region of the right occipital lobe with a Tmax greater than 4 seconds though no   corresponding reduced cerebral blood flow. Finding is nonspecific it may be incidental. Mild hypoperfusion in this region is possible.    RAPID ANALYSIS:  CBF<30%: 0 mL  Tmax>6sec: 0 mL  Mismatch volume: 0 mL  Mismatch ratio: None      Impression    IMPRESSION:   HEAD CT:  1.  No CT evidence for acute intracranial process.  2.  Brain atrophy and presumed chronic microvascular ischemic changes as above.    HEAD CTA:   1.  No large vessel occlusion.  2.  Moderate stenosis of the right vertebral artery as it pierces the dura.  3.  Moderate stenosis of the mid right basilar artery.    NECK CTA:  1.  Segmental severe stenoses of the right the V3 segment.  2.  Focal high-grade stenosis versus short segment occlusion of the distal right V3 segment with reconstitution of normal caliber right vertebral artery immediately prior to piercing the dura.  3.  Multiple mild and moderate stenoses of the right vertebral artery throughout the V2 segment.    CT PERFUSION:  1.  Normal cerebral perfusion. No focal deficits to suggest ischemia.  2.  Small region of normally prolonged Tmax in the right occipital lobe without corresponding reduced cerebral blood flow is nonspecific. Mild hypoperfusion is possible. An MRI could further evaluate if clinically indicated.    Findings were discussed with nurse practitioner Jeremy Sims at 4:48 PM on 03/07/2023.   Troponin T, High Sensitivity   Result  Value Ref Range    Troponin T, High Sensitivity 87 (H) <=14 ng/L   Magnesium   Result Value Ref Range    Magnesium 2.0 1.7 - 2.3 mg/dL   Phosphorus   Result Value Ref Range    Phosphorus 1.8 (L) 2.5 - 4.5 mg/dL   Potassium   Result Value Ref Range    Potassium 3.7 3.4 - 5.3 mmol/L   CBC with platelets   Result Value Ref Range    WBC Count 8.5 4.0 - 11.0 10e3/uL    RBC Count 3.15 (L) 3.80 - 5.20 10e6/uL    Hemoglobin 8.5 (L) 11.7 - 15.7 g/dL    Hematocrit 27.2 (L) 35.0 - 47.0 %    MCV 86 78 - 100 fL    MCH 27.0 26.5 - 33.0 pg    MCHC 31.3 (L) 31.5 - 36.5 g/dL    RDW 15.6 (H) 10.0 - 15.0 %    Platelet Count 293 150 - 450 10e3/uL   Basic metabolic panel   Result Value Ref Range    Sodium 152 (H) 136 - 145 mmol/L    Potassium 3.3 (L) 3.4 - 5.3 mmol/L    Chloride 119 (H) 98 - 107 mmol/L    Carbon Dioxide (CO2) 26 22 - 29 mmol/L    Anion Gap 7 7 - 15 mmol/L    Urea Nitrogen 18.9 8.0 - 23.0 mg/dL    Creatinine 0.54 0.51 - 0.95 mg/dL    Calcium 8.3 (L) 8.8 - 10.2 mg/dL    Glucose 105 (H) 70 - 99 mg/dL    GFR Estimate >90 >60 mL/min/1.73m2

## 2023-03-08 NOTE — PROGRESS NOTES
Pt A/Ox1-2. She has a sitter for safety needs as she otherwise pulls at Tele, IV lines, & getting up without help. Pt went down for  MRI- she received PRN oral valium prior to procedure to help with tremors.   Neuro checks have been unchanged.   Wound care done to coccyx. Potassium and phosphorus replaced. UA needed.   Sodium high this shift- D5w infusion started.  Pt had a temperature of 100.2 PRN tylenol given and temp re-check 99.3.

## 2023-03-08 NOTE — PROGRESS NOTES
X-ray of the skull, x-ray of the chest, x-ray of the abdomen ordered to rule out foreign bodies due to patient's mild confused state to do the MRI of her brain.  All x-rays returned negative for foreign bodies.  Chest x-ray showed cardiomegaly and pulmonary vascular congestion consistent with consistent with mild congestive heart failure.  Patient has been getting IV fluids since admission.  Continued on IV fluids due to significant hyponatremia at 152.  Currently on D5W at 100 mL/h.  Monitor fluid status closely,   Echocardiogram ordered.   TSH, URine osmolality,  serum osmolality ordered  Will get Nephrology consult for management of Hypernatremia    Chitra Lagos MD

## 2023-03-09 NOTE — PLAN OF CARE
3/8/23  2300-0730H  AOx2, disoriented to place and situation. Sitter at bedside for safety. A2 GBW to commode. R PIV infusing with D5W at 100mL/hr. Scattered bruising noted. Neuros checked, unchanged. Difficult to assess at times d/t pt not following commands. UA positive, rocephin ordered. Wound on coccyx, cleansed and open to air per POC. Discharge pending overall improvement.

## 2023-03-09 NOTE — PROGRESS NOTES
Renal Medicine Progress Note                                Kortney Germain MRN# [id number]   Age: 67 year old YOB: 1955   Date of Admission: 3/6/2023 Hospital LOS: 2                  Assessment/Plan:     1.  Baseline normal renal function              -creatinine 0.6 mg/dl range  2.  [place] UA 2018  3.  Hypernatremia    Na has corrected as expected on D5  Will reduce rate slightly (100 ml --> 75 ml)    Subsequent adjustments based on Na level/water intake    No further recommendations  Signing off         Interval History:     Lying in bed  Disheveled   Responds to name     Na 145 mmol/l      ROS:     CV: NEGATIVE for chest pain, palpitations  GI: NEGATIVE for abdominal pain, heartburn, nausea, vomiting or change in bowel pattern    Medications and Allergies:     Reviewed    Physical Exam:     Vitals were reviewed  Patient Vitals for the past 8 hrs:   BP Temp Temp src Pulse Resp SpO2 Weight   03/09/23 0838 -- 98.7  F (37.1  C) Axillary -- -- -- --   03/09/23 0735 (!) 185/85 98.5  F (36.9  C) Axillary 97 16 97 % --   03/09/23 0515 131/59 97.9  F (36.6  C) Axillary 78 18 97 % 44 kg (97 lb 1.6 oz)   03/09/23 0230 133/64 97.4  F (36.3  C) Axillary 62 16 96 % --     I/O last 3 completed shifts:  In: 480 [P.O.:480]  Out: 550 [Urine:550]    Vitals:    03/06/23 1422 03/07/23 0445 03/09/23 0515   Weight: 52.2 kg (115 lb) 40.2 kg (88 lb 9.6 oz) 44 kg (97 lb 1.6 oz)       GENERAL: lethargic   HEENT: NC/AT, PERRLA, EOMI, non icteric  RESP:  clear anteriorly  CV: RRR, normal S1 S2  ABDOMEN: soft  EXT: trace    Data:     Recent Labs   Lab 03/08/23  2111 03/08/23  1451 03/08/23  0830 03/07/23  2310 03/07/23  1629 03/07/23  1605 03/07/23  0748 03/06/23  1413    147* 152*  --  150*  --  149* 151*   POTASSIUM  --  4.3 3.3* 3.7 2.9*  --  2.9* 3.9   CHLORIDE  --   --  119*  --  115*  --  115* 118*   CO2  --   --  26  --  24  --  22 21*   ANIONGAP  --   --  7  --  11  --  12 12   GLC  --   --  105*  --  135* 141*  107* 130*   BUN  --   --  18.9  --  17.9  --  17.2 37.9*   CR  --   --  0.54  --  0.65  --  0.47* 0.58   GFRESTIMATED  --   --  >90  --  >90  --  >90 >90   LULU  --   --  8.3*  --  8.4*  --  8.1* 9.3         Recent Labs   Lab Test 03/08/23  2111 03/08/23  1451 03/08/23  0830 03/07/23  1629 03/07/23  0748 03/06/23  1413 03/05/23  1323 02/02/22  0925 12/16/21  0844 08/09/19  1436    147* 152* 150* 149* 151* 145 133 140 146*         G Melecio Mccauley MD    [facility] - Nephrology  [phone]

## 2023-03-09 NOTE — PROGRESS NOTES
MD Notification    Notified Person: MD    Notified Person Name: Sravani Agarwal    Notification Date/Time: 3/9/23 @ 10:39 AM    Notification Interaction: Amcom    Purpose of Notification:     Hi, OT saw pt and was recommending a speech consult. Pt wasn't aspirating on food but thought a second look might be helpful      Orders Received:    Comments:

## 2023-03-09 NOTE — PROGRESS NOTES
"   03/09/23 0800   Appointment Info   Signing Clinician's Name / Credentials (OT) Dasha Terrell, OTR/L   Living Environment   People in Home   (Pt states \"yes\" when asked if she lives with others)   Current Living Arrangements group home   Transportation Anticipated health plan transportation   Living Environment Comments Pt is questionable historian.   Self-Care   Usual Activity Tolerance fair   Equipment Currently Used at Home walker, standard   Fall history within last six months no  (Per pt report)   Activity/Exercise/Self-Care Comment Per Chart: \"Prior to admit pt was independent with all ADLs, uses a walker.  staff administer pt's medication and drives pt to her medical appointments.\"   General Information   Onset of Illness/Injury or Date of Surgery 03/06/23   Referring Physician Chitra Lagos MD   Patient/Family Therapy Goal Statement (OT) Not stated   Additional Occupational Profile Info/Pertinent History of Current Problem Per Chart: \"Kortney Germain is a 67 year old female with history of schizophrenia, tardive dyskinesia, hypertension, depression, ADD, and UTI who resides in a group home who presented to the ED with diarrhea.\"   Existing Precautions/Restrictions fall   Limitations/Impairments safety/cognitive   General Observations and Info Pt presents with involuntary writhing movements through BUEs/BLEs - hx of tardive dyskinesia   Cognitive Status Examination   Orientation Status person   Follows Commands follows one-step commands;25-49% accuracy   Cognitive Status Comments Pt inconsistently responds to simple questions/follows one step commands   Visual Perception   Visual Impairment/Limitations corrective lenses full-time   Sensory   Sensory Quick Adds not tested   Pain Assessment   Patient Currently in Pain No   Range of Motion Comprehensive   Comment, General Range of Motion BUE PROM WFL throughout; Decreased B Shoulder flexion/extension, eblow flexion/extension, wrist and digit " flexion/extension.   Strength Comprehensive (MMT)   Comment, General Manual Muscle Testing (MMT) Assessment BUEs WFL, generalized weakness   Coordination   Upper Extremity Coordination Left UE impaired;Right UE impaired   Gross Motor Coordination Manages glasses/puts them on   Coordination Comments Manages glasses/puts them on; difficulty with spoon to mouth pattern   Bed Mobility   Bed Mobility rolling left;rolling right;supine-sit;sit-supine   Rolling Left [place] (Bed Mobility) moderate assist (50% patient effort)  (A x 1-2)   Rolling Right [place] (Bed Mobility) moderate assist (50% patient effort)  (A x 1-2)   Supine-Sit [place] (Bed Mobility) moderate assist (50% patient effort)  (A x 1)   Sit-Supine [place] (Bed Mobility) moderate assist (50% patient effort);2 person assist  (A x 2)   Comment (Bed Mobility) Mod A x 1-2 bed moblity   Transfers   Transfers sit-stand transfer;toilet transfer   Sit-Stand Transfer   Sit/Stand Transfer Comments Min-Mod  A x 1-2, Katia Fields   Toilet Transfer   Toilet Transfer Comments Min-Mod A x 1-2, [facility]   Balance   Balance Comments CGA-Min A x 1-2 to maintain sitting balance   Activities of Daily Living   BADL Assessment/Intervention feeding;upper body dressing;toileting   Upper Body Dressing Assessment/Training   Comment, (Upper Body Dressing) Min A to manage glasses/put on   Eating/Self Feeding   Comment, (Feeding) Mod A for set up, AA/PROM of RUE for hand to mouth pattern with built up handle   Toileting   Comment, (Toileting) Max A brief change/kiran-cares in bed   Clinical Impression   Criteria for Skilled Therapeutic Interventions Met (OT) Yes, treatment indicated   OT Diagnosis Decreased ind with I/ADLs   OT Problem List-Impairments impacting ADL problems related to;activity tolerance impaired;balance;cognition;coordination;mobility;motor control;strength   Assessment of Occupational Performance 3-5 Performance Deficits   Identified  Performance Deficits UB dressing, LB dressing, g/h tasks, feeding, bathing   Planned Therapy Interventions (OT) ADL retraining;IADL retraining;transfer training   Clinical Decision Making Complexity (OT) moderate complexity   OT Total Evaluation Time   OT Eval, Moderate Complexity Minutes (49874) 15   OT Goals   Therapy Frequency (OT) 5 times/wk   OT Predicted Duration/Target Date for Goal Attainment 03/23/23   OT Goals Hygiene/Grooming;Upper Body Dressing;Toilet Transfer/Toileting;Cognition   OT: Hygiene/Grooming supervision/stand-by assist;while standing  (Katia Stedy)   OT: Upper Body Dressing Supervision/stand-by assist   OT: Toilet Transfer/Toileting Minimal assist  (FWW, BSC)   OT: Cognitive Patient/caregiver will verbalize understanding of cognitive assessment results/recommendations as needed for safe discharge planning   Self-Care/Home Management   Self-Care/Home Mgmt/ADL, Compensatory, Meal Prep Minutes (77075) 23   Symptoms Noted During/After Treatment (Meal Preparation/Planning Training) fatigue   Treatment Detail/Skilled Intervention Pt greeted in supine, sitter present for session, pt agreeable to OT. Pt O x1, intermittently responds to simple commands/questions. Pt with ongoing BM throughout session, pt apologetic - pt reassured. Pt completes rolling in bed with Mod A x 1-2, hand over hand and VCs to grasp rail. Pt completes supine>sitting EOB With Mod A x 1 - pt unsteady sitting EOB, requires CGA-Min A to maintain sitting balance/prevent sliding. Pt completes Sit<>stand to/from EOB with Katia Stedy with CGA, pt pops up fairly quickly, unsteady on feet, forward head position - VCs/tactile cues to maintain upright posture. Pt requires Min A x 1-2 for safe transfer in SS d/t pt short in stature/difficulty maintaining upright posture, VCs/tactile cues for safety throughout. Pt completes BSC transfer from  with Min A for controlled sit, assist to guide hips/controlled sit, VCs for sequence. Pt with  repetitive hip thrusting/retropuslion while seated on commode - Min A x 1-2 for safe positioning/support of BSC, SS positioned in front of pt for safety. Pt completes sit>stand from BSC in SS with Min A x 1-2, VCs/tacitle cues throughout. Pt Mod A x 2 for sitting EOB>supine for safety/prevent pt from sliding. Pt supine in bed with Max A x 2 for second brief change/kiran-cares. Pt set up A/Mod A for feeding, decreased grasp - built up handle provided for ease of grasp, Mod A for hand to mouth pattern/hand over hand assist. Pt able to don glasses with Min A prior to feeding self. Pt pass off to sitter, needs met, alarm set, RN updated.   OT Discharge Planning   OT Plan UB dressing, P/AAROM of BUEs, g/h tasks, sitting EOB with A x 2 for safety   OT Discharge Recommendation (DC Rec) Transitional Care Facility   OT Rationale for DC Rec Pt currently functioning well below baseline level, limited by decreased independence in ADLs, decreased strength, impairments in motor control, cognition, ROM, balance, mobility. Pt was completing ADLs ind prior to admit, pt now requires A x 1-2 for self cares and functional mobility. Pt would benefit from skilled TCU for increased ind with ADL completion prior to return to Group Home.   OT Brief overview of current status Mod A x 1-2 bed mobility; Min A to don glasses, Mod A for feeding; Min-Mod A x 1-2 for BSC transfer using SS - Min A x 1 for safety position in SS   Total Session Time   Timed Code Treatment Minutes 23   Total Session Time (sum of timed and untimed services) 38

## 2023-03-09 NOTE — PROGRESS NOTES
MD Notification    Notified Person: MD    Notified Person Name: Stefano    Notification Date/Time:  1705  3/9/23    Notification Interaction:  Amcom/ Phone    Purpose of Notification:  Patient temp 102.1 despite 650 mg prn tylenol given 70 minutes ago.      Orders Received: Give 975 mg Tylenol now.    Comments:

## 2023-03-09 NOTE — PLAN OF CARE
Goal Outcome Evaluation:    [id number]  VSS ex HTN. Difficulty assessing orientation at times ,at second neuro check pt was A/Ox4. Pt occasionally answering yes/no questions and stating when she needs to use bathroom or pain. PRN oxy given 1x for verbalized pain. Tremors at baseline. Up A2 +GB, pivot to BSC. One loose bm this shift. PIV infusing D5 @ 75ml/hr. Scattered bruising throughout. Q4H neuro checks, pt not able to follow many commands. Regular diet, seen by OT - recommending speech consult. Takes meds whole in pudding. Bed bath completed. Triad paste applied to coccyx and is open to air. Sitter at bedside. K+ and Phos WNL, monitor AM rechecl

## 2023-03-09 NOTE — PROGRESS NOTES
[facility]    Medicine Progress Note - Hospitalist Service    Date of Admission:  3/6/2023    Assessment & Plan   Kortney Germain is a 67 year old female with history of schizophrenia, tardive dyskinesia, HTN, depression, ADD, and UTI who resides in a group home who presented to the ED with diarrhea on 3/6/23.  Patient is actually seen in the ED day PTA for increased shaking from her apparent baseline of tremors as well as ongoing diarrhea.  Work-up was fairly unremarkable and she discharged back to her group home.  EMS was called again due to ongoing diarrhea with abdominal discomfort.  She was found to be fairly dehydrated.    Diarrhea  Dehydration  Patient was seen in ED 3/5 with similar symptoms and fairly unremarkable work up and discharge back to group home. Abdominal discomfort and diarrhea apparently continued and staff summoned EMS again.  Patient denied abdominal pain, bloody or black stool - she is not the best historian currently. No report of recent abx or hospitalization. No BM activity in ED reported thus far. Benign abdominal exam, afebrile, and nontoxic - will hold off from enteric panel or c diff testing.   - management as below  - prn anti-emetics etc  - UA sent for culture; in process  WBC count 12.1->14.2->11.8-8.5   C. difficile testing negative   Cont to monitor    **Addendum 1700: called by RN for fever worsening despite tylenol. Additional tylenol dose ordered (975mg), blood cultures ordered, will repeat CXR. Pt already has urine cx pending and is on CTX. Other vital signs stable, CK level decreasing and pt at baseline mentation so neuroleptic malignant syndrome much less likely. Cont to monitor closely.     Right facial droop, left upper and lower extremity weakness, left lower extremity ataxia  Patient was sitting on commode feeling weak leaning over towards the right side.  RN describes as decreased level of consciousness, however patient did not lose  consciousness.  RRT called on 3/7 for code stroke   CT and CTA head and neck done returned negative eccept segmental severe stenoses of the right the V3 segment. Focal high-grade stenosis versus short segment occlusion of the distal right V3 segment with reconstitution of normal caliber right vertebral artery immediately prior to piercing the dura.  Multiple mild and moderate stenoses of the right vertebral artery throughout the V2 segment  Neurology consulted and are following - appreciate their input; metabolic workup, consider MRI c-spine (pt with too much motion artifact at this time to successfully complete), anemia workup  PT/OT  Neurochecks Q 4hours   MRI brain with chronic microvascular ischemic disease, no acute infarct  Started on regular aspirin         Hyperchloremic hypernatremia  Hypokalemia  Hypophosphatemia  Na 151 on admission and Cl 118.  BUN  38 and Cr 0.58.  Patient was started on LR at 100 mL/h on admission  Switched to D5w at 100ml per hour Sodium at 152  on 3/7/2023 with potassium of 2.9  Sodium rechecks q 4hours ordered   Started on potassium replacement protocol  On K Phos replacement protocol   Nephrology consulted and Na correcting as expected so decreased rate to 75ml/hr  Follow BMP daily     Nontraumatic rhabdomyolysis, mild  Worsening tremors/shaking  Reported history of tardive dyskinesia  Patient with frequent muscle flexing posturing at times.  No falls or substantial injuries noted.  History of TD was reportedly primarily in the face years ago noted.  Patient is awake and alert during these times.  CK elevated at 682 which is greater than 3 times upper limit of normal.  Renal function appears intact although she is quite dehydrated likely from diarrhea as above.  Shaking/spasms could be in part related to dehydration and electrolyte abnormalities as above.  -785  - Continue IV fluids with changing IV fluids as noted above  - benadryl and lorazepam attempted in ED due to  shaking/spasm like movements, some improvement  Cont as needed Valium 2mg PO [id number] PRN to help with tardive dyskinesia movements  Patient is on perphenazine antipsychotic to help with her seizure for anemia which might contribute to tardive dyskinesia and elevated CPK  Perphenazine held initially and after Psych consult was restarted at 4mg PO BID   Psych consult placed for medical management  General Neurology consult placed for evaluation and management of tremors - see above     History of schizophrenia  History of depression  Group home resident  Acute encephalopathy secondary to metabolic with hypernatremia and delirium with hospitalization  Patient apparently is her own decision maker.   PTA Perphenazine discontinued due to increasing tardive dyskinesia tremors and elevated CPK  As needed Valium ordered  Patient was agitated overnight earlier in admission needing Ativan and Benadryl and Atarax given Bedside sitter in place  Psych consultation requested.  Appreciate assistance - they actually restarted pt's perphenazine  - CT/SW consult for assistance with discharge planning     Hypertension  BP acceptable in ED initially.   Blood pressure currently systolics in the 130s     Moderate to severe malnutrition with a BMI of 17.3  Nutrition services consult for supplementation ordering       Diet: Regular Diet Adult  Snacks/Supplements Adult: Ensure Enlive; With Meals    DVT Prophylaxis: Pneumatic Compression Devices  Liu Catheter: Not present  Lines: None     Cardiac Monitoring: ACTIVE order. Indication: Syncope- high cardiac risk (48 hours)  Code Status: Full Code      Clinically Significant Risk Factors        # Hypokalemia: Lowest K = 2.9 mmol/L in last 2 days, will replace as needed  # Hypernatremia: Highest Na = 152 mmol/L in last 2 days, will monitor as appropriate                 # Moderate Malnutrition: based on nutrition assessment, PRESENT ON ADMISSION       Disposition Plan      Expected Discharge  Date: 2023                  Sravani Agarwal MD  Hospitalist Service  [facility]  Securely message with [employer] (more info)  Text page via [employer] Paging/Directory   ______________________________________________________________________    Interval History   Pt with continued tremors, TD, but feels that it is doing better than on admission. No SOB, nausea. Having a hard time going to the bathroom    Physical Exam   Vital Signs: Temp: 98.3  F (36.8  C) Temp src: Axillary BP: (!) 157/80 Pulse: 79   Resp: 16 SpO2: 96 % O2 Device: None (Room air)    Weight: 97 lbs 1.6 oz    Constitutional: WDWN female lying in bed, chronically-ill appearing, in NAD  HEENT: NC/AT, no scleral icterus, nl conjunctiva, moist oral mucosa  Respiratory: fair inspiratory effort, lungs diminished but clear  Cardiovascular: RRR, no M/R/G, 2+ radial pulses  GI: soft, nondistended  Skin: no rashes, warm, dry  Neuro: CN 2-12 grossly intact, tardive dyskinesia movements of lips, tongue, intermittent prolonged BUE tremors  MSK:  able to move all 4 exts  Psych: nl mood/affect/judgment.            Data     I have personally reviewed the following data over the past 24 hrs:    9.7  \   9.8 (L)   / 344     143 107 22.3 /  145 (H)   3.8 26 0.50 (L) \       Imaging results reviewed over the past 24 hrs:   Recent Results (from the past 24 hour(s))   Echocardiogram Complete   Result Value    LVEF  60-65%    Narrative    [id number]  [id number]  [id number]  [id number]^KRISTEN^YVETTE     [facility]  Echocardiography Laboratory  [address] [place] [address]     Name: MAXIMUS PERAZA  MRN: [id number]  : 1955  Study Date: 2023 11:15 AM  Age: 67 yrs  Gender: Female  Patient Location: [facility]  Reason For Study: SOB  Ordering Physician: YVETTE PETERSON  Referring Physician: Tip Parker DO  Performed By: Britany Montgomery RDCS     BSA: 1.4 m2  Height: 60 in  Weight: 98 lb  HR: 66  BP: 131/68  mmHg  ______________________________________________________________________________  Procedure  Complete Portable Echo Adult.  ______________________________________________________________________________  Interpretation Summary     Left ventricular size, global systolic function, and wall motion are normal,  estimated LVEF 60-65%.  Right ventricular global function is normal.  No significant valvular abnormalities.  The inferior vena cava was normal in size with preserved respiratory  variability.     There are no prior studies available for comparison.  ______________________________________________________________________________  Left Ventricle  The left ventricle is normal in size. A sigmoid septum is present. Left  ventricular systolic function is normal. The visual ejection fraction is 60-  65%. Left ventricular diastolic function is indeterminate. No regional wall  motion abnormalities noted.     Right Ventricle  The right ventricle is normal in structure, function and size.     Atria  The left atrium is mildly dilated. Right atrial size is normal. There is no  color Doppler evidence of an atrial shunt.     Mitral Valve  There is trace mitral regurgitation.     Tricuspid Valve  The tricuspid valve is not well visualized, but is grossly normal. There is  trace tricuspid regurgitation.     Aortic Valve  The aortic valve is normal in structure and function.     Pulmonic Valve  The pulmonic valve is not well seen, but is grossly normal.     Vessels  The aortic root is normal size. Normal size ascending aorta. The inferior vena  cava was normal in size with preserved respiratory variability.     Pericardium  There is no pericardial effusion.     Rhythm  Sinus rhythm was noted.  ______________________________________________________________________________  MMode/2D Measurements & Calculations  IVSd: 1.0 cm     LVIDd: 4.3 cm  LVIDs: 2.7 cm  LVPWd: 0.97 cm  FS: 37.9 %  LV mass(C)d: 144.6 grams  LV mass(C)dI:  104.9 grams/m2  Ao root diam: 3.0 cm  LA dimension: 3.4 cm  asc Aorta Diam: 3.6 cm  LA/Ao: 1.1  LA Volume (BP): 51.9 ml  LA Volume Index (BP): 37.6 ml/m2  RWT: 0.45  TAPSE: 2.4 cm     Doppler Measurements & Calculations  MV E max suly: 67.6 cm/sec  MV A max suly: 110.0 cm/sec  MV E/A: 0.61  MV dec time: 0.23 sec  PA acc time: 0.13 sec  E/E' av.6  Lateral E/e': 9.0  Medial E/e': 10.2     ______________________________________________________________________________  Report approved by: Caitlyn Vogel 2023 02:08 PM

## 2023-03-10 NOTE — PROGRESS NOTES
"   03/10/23 1042   Appointment Info   Signing Clinician's Name / Credentials (PT) Titusmichaela Rowan DPT   Living Environment   People in Home   (facility resident)   Current Living Arrangements group home   Home Accessibility stairs to enter home   Number of Stairs, Main Entrance 6   Stair Railings, Main Entrance railings on both sides of stairs   Transportation Anticipated health plan transportation   Living Environment Comments Pt is questionable historian. Pt states she is IND within group home, only needing walker for ambulation.   Self-Care   Usual Activity Tolerance moderate   Current Activity Tolerance fair   Regular Exercise No   Equipment Currently Used at Home walker, rolling;cane, straight   Fall history within last six months no   Activity/Exercise/Self-Care Comment Pt states she uses FWW at baseline, also SEC when she is at a \"day program\".   General Information   Onset of Illness/Injury or Date of Surgery 03/06/23   Referring Physician Chitra Lagos MD   Patient/Family Therapy Goals Statement (PT) did not state.   Pertinent History of Current Problem (include personal factors and/or comorbidities that impact the POC) Pt is 66 yo male who, per chart, \" history of schizophrenia, tardive dyskinesia, HTN, depression, ADD, and UTI who resides in a group home who presented to the ED with diarrhea on 3/6/23.  Patient is actually seen in the ED day PTA for increased shaking from her apparent baseline of tremors as well as ongoing diarrhea.  Work-up was fairly unremarkable and she discharged back to her group home.  EMS was called again due to ongoing diarrhea with abdominal discomfort.  She was found to be fairly dehydrated.\"   Existing Precautions/Restrictions fall   Cognition   Affect/Mental Status (Cognition) flat/blunted affect;low arousal/lethargic   Cognitive Status Comments forgetful, oriented to self, and year, all other responses required hints.   Pain Assessment   Patient Currently in Pain No "   Integumentary/Edema   Integumentary/Edema Comments brusing in the L distal UEs noted.   Posture    Posture Forward head position;Protracted shoulders   Range of Motion (ROM)   Range of Motion ROM is WFL   Strength (Manual Muscle Testing)   Strength (Manual Muscle Testing) Deficits observed during functional mobility   Bed Mobility   Bed Mobility supine-sit   Comment, (Bed Mobility) supine-sit with min/modA   Transfers   Transfers sit-stand transfer   Comment, (Transfers) STS with FWW and CGA, pt short and bed very tall given mattress.   Gait/Stairs (Locomotion)   Comment, (Gait/Stairs) Pt ambulated 5' with FWW and CG-Librado, decreased gait speed, lateral gait deviations, pt unsteady on feet.   Balance   Balance Comments unsteady with standing and ambulation; also, noted L trunk lean during sitting in chair able to correct with verbal cues.   Sensory Examination   Sensory Perception patient reports no sensory changes   Sensory Perception Comments difficult to assess, but light touch appears intact per screen of distal UE/LEs   Clinical Impression   Criteria for Skilled Therapeutic Intervention Yes, treatment indicated   PT Diagnosis (PT) impaird functional mobility   Influenced by the following impairments decreased strength and activity tolerance, impaired balance, impaired cognition   Functional limitations due to impairments difficulty with bed mobility, transfers, and ambulation   Clinical Presentation (PT Evaluation Complexity) Stable/Uncomplicated   Clinical Presentation Rationale clinical judgement   Clinical Decision Making (Complexity) low complexity   Planned Therapy Interventions (PT) balance training;bed mobility training;gait training;stair training;strengthening;ROM (range of motion);stretching;transfer training;progressive activity/exercise   Risk & Benefits of therapy have been explained evaluation/treatment results reviewed;care plan/treatment goals reviewed;risks/benefits reviewed;current/potential  barriers reviewed;participants voiced agreement with care plan;participants included;patient   PT Total Evaluation Time   PT Eval, Low Complexity Minutes (05412) 10   Physical Therapy Goals   PT Frequency 3x/week   PT Predicted Duration/Target Date for Goal Attainment 03/17/23   PT Goals Bed Mobility;Transfers;Gait;Stairs   PT: Bed Mobility Supervision/stand-by assist;Supine to/from sit   PT: Transfers Supervision/stand-by assist;Sit to/from stand;Assistive device   PT: Gait Supervision/stand-by assist;50 feet;Rolling walker   PT: Stairs 6 stairs;Minimal assist;Rail on both sides   PT Discharge Planning   PT Plan progress bed mob, postural control in sitting (from chair in room, bed very tall for pt); progress gait as able.   PT Discharge Recommendation (DC Rec) home with assist;home with home care physical therapy;Transitional Care Facility   PT Rationale for DC Rec Pt does not appear to be an accurate historian, difficult to know baseline; per chart, pt uses FWW for ambulation. Pt is below baseline, currently requires min-modA with all mobility, able to ambulate short distance within room, but impulsive with movement and unsteady on feet. If group home able to provide Ax1 for mobility, recommend returned there, otherwise TCF.   PT Brief overview of current status bed mob min/modA, STS and ambulation with FWW and CGA/Librado   Total Session Time   Total Session Time (sum of timed and untimed services) 10

## 2023-03-10 NOTE — PROGRESS NOTES
[facility]    Medicine Progress Note - Hospitalist Service    Date of Admission:  3/6/2023    Assessment & Plan   Kortney Germain is a 67 year old female with history of schizophrenia, tardive dyskinesia, HTN, depression, ADD, and UTI who resides in a group home who presented to the ED with diarrhea on 3/6/23.  Patient is actually seen in the ED day PTA for increased shaking from her apparent baseline of tremors as well as ongoing diarrhea.  Work-up was fairly unremarkable and she discharged back to her group home.  EMS was called again due to ongoing diarrhea with abdominal discomfort.  She was found to be fairly dehydrated.    Diarrhea  Dehydration  Fever  Patient was seen in ED 3/5 with similar symptoms and fairly unremarkable work up and discharged back to group home. Abdominal discomfort and diarrhea apparently continued and staff summoned EMS again.  Patient denied abdominal pain, bloody or black stool - she was not the best historian. No report of recent abx or hospitalization. No BM activity in ED. Benign abdominal exam, afebrile, and nontoxic.   - C. Difficile, enteric pathogen panel negative   - prn anti-emetics etc  - UA appeared infected, culture grew >100,000 CFU multiple organisms c/w contamination  - started on ceftriaxone and continue this for now  - given fever on 3/9 and previous urine cx contaminated, repeat U/A C&S (straight cath)  - CXR 3/9 with increased interstitial opacities which could be edema vs pneumonitis vs other; not hypoxic, denies SOB  - blood cultures checked on 3/9 and pending  - decrease D5 to 50ml/hr and if Na normalized today, will discontinue  - CK level decreasing, no rigidity on exam, vital signs other than temp stable, so NMS less likely, but will hold perphenazine today and monitor  - WBC count 14.2 on 3/6, down to 9/7 on 3/9; await today's labs  - low threshold for broadening antibx  - Cont to monitor closely    Right facial droop, left upper and  lower extremity weakness, left lower extremity ataxia  RRT called on 3/7 for code stroke   Patient was sitting on commode feeling weak leaning over towards the right side.  RN describes as decreased level of consciousness, however patient did not lose consciousness.  CT and CTA head and neck done returned negative eccept segmental severe stenoses of the right the V3 segment. Focal high-grade stenosis versus short segment occlusion of the distal right V3 segment with reconstitution of normal caliber right vertebral artery immediately prior to piercing the dura.  Multiple mild and moderate stenoses of the right vertebral artery throughout the V2 segment  Neurology consulted and are following - appreciate their input; metabolic workup, consider MRI c-spine (pt with too much motion artifact at this time to successfully complete), anemia workup  PT/OT  Neurochecks Q 4hours   MRI brain with chronic microvascular ischemic disease, no acute infarct  Started on regular aspirin       Hyperchloremic hypernatremia  Hypokalemia  Hypophosphatemia  Na 151 on admission and Cl 118.  BUN  38 and Cr 0.58.  Patient was started on LR at 100 mL/h on admission  Switched to D5W at 100ml per hour for Na 152 on 3/7 with potassium of 2.9  Started on potassium replacement protocol  On K Phos replacement protocol   Nephrology consulted and Na correcting as expected so decreased rate to 75ml/hr on 3/9  Will decrease to 50ml/hr given CXR findings and if Na normalized today, will discontinue D5W  Follow BMP daily     Nontraumatic rhabdomyolysis, mild  Worsening involuntary movements  Reported history of tardive dyskinesia  Patient with frequent muscle flexing posturing at times.  No falls or substantial injuries noted.  History of TD years ago was reportedly primarily in the face.  Patient is awake and alert during these times.  CK elevated at 682 which is greater than 3 times upper limit of normal.  Renal function appears intact although she is  quite dehydrated likely from diarrhea as above.  Shaking/spasms could be in part related to dehydration and electrolyte abnormalities as above.  -785  - benadryl and lorazepam attempted in ED due to shaking/spasm like movements, some improvement  -Cont as needed Valium 2mg PO [id number] PRN to help with tardive dyskinesia movements  Patient is on perphenazine antipsychotic to help with her schizophrenia which might contribute to tardive dyskinesia and elevated CPK  Psych consult placed for medical management  Perphenazine held initially and after Psych consult was restarted at 4mg PO BID; given fever and worsening holding perphenazine starting 3/10 and will monitor  General Neurology consult placed for evaluation and management of tremors; appreciate their input - f/u with Psych for tx of EPS, consider MRI of c-spine given pt's pattern of weakness, although will not be able to do that with increased involuntary movements currently, metabolic workup in process     History of schizophrenia  History of depression  Group home resident  Acute encephalopathy secondary to metabolic with hypernatremia and delirium with hospitalization  Patient apparently is her own decision maker.   PTA Perphenazine discontinued due to increasing tardive dyskinesia tremors and elevated CPK  As needed Valium ordered  Patient was agitated overnight earlier in admission needing Ativan and Benadryl and Atarax given   Bedside sitter in place  Psych consultation requested.  Appreciate assistance - they actually restarted pt's perphenazine but given fever yesterday and ongoing issues, will hold starting 3/10  - CT/SW consult for assistance with discharge planning     Hypertension  BP acceptable in ED initially.   Blood pressure currently systolics in the 130s     Moderate to severe malnutrition with a BMI of 17.3  Nutrition services consult for supplementation ordering       Diet: Regular Diet Adult  Snacks/Supplements Adult: Ensure Enlive;  With Meals    DVT Prophylaxis: Pneumatic Compression Devices  Liu Catheter: Not present  Lines: None     Cardiac Monitoring: ACTIVE order. Indication: Tachyarrhythmias, acute (48 hours)  Code Status: Full Code      Clinically Significant Risk Factors                       Disposition Plan      Expected Discharge Date: 03/13/2023                  Sravani Agarwal MD  Hospitalist Service  [facility]  Securely message with [employer] (more info)  Text page via [employer] Paging/Directory   ______________________________________________________________________    Interval History   Pt had temp to 102 yesterday evening. Blood cultures drawn, CXR with potential edema vs pneumonitis vs other. U/A being repeated this AM based on previous urine cx looking like a contaminant. Still having significant tremors/involuntary movements. Denies pain, SOB.    Physical Exam   Vital Signs: Temp: 98.5  F (36.9  C) Temp src: Axillary BP: (!) 161/60 Pulse: 99   Resp: 16 SpO2: 93 % O2 Device: None (Room air)    Weight: 97 lbs 1.6 oz    Constitutional: WDWN female lying in bed, chronically-ill appearing, diaphoretic  HEENT: NC/AT, no scleral icterus, nl conjunctiva, moist oral mucosa  Respiratory: fair inspiratory effort, lungs diminished but clear  Cardiovascular: RRR, no M/R/G, 2+ radial pulses, trace LE edema  GI: soft, NT, nondistended  Skin: no rashes, warm, dry, diaphoretic  Neuro: CN 2-12 grossly intact, tardive dyskinesia movements of lips, tongue, intermittent prolonged BUE tremors  MSK:  able to move all 4 exts, contractures throughout  Psych: able to answer simple yes/no questions            Data     I have personally reviewed the following data over the past 24 hrs:    9.9  \   9.9 (L)   / 350     142 107 22.3 /  145 (H)   3.8 26 0.50 (L) \       Imaging results reviewed over the past 24 hrs:   Recent Results (from the past 24 hour(s))   Echocardiogram Complete   Result Value    LVEF  60-65%    Narrative     [id number]  [id number]  [id number]^KRISTEN^YVETTE     [facility]  Echocardiography Laboratory  [address] [place] [address]     Name: MAXIMUS PERAZA  MRN: [id number]  : 1955  Study Date: 2023 11:15 AM  Age: 67 yrs  Gender: Female  Patient Location: [facility]  Reason For Study: SOB  Ordering Physician: YVETTE PETERSON  Referring Physician: Tip Parker DO  Performed By: Britany Montgomery RDCS     BSA: 1.4 m2  Height: 60 in  Weight: 98 lb  HR: 66  BP: 131/68 mmHg  ______________________________________________________________________________  Procedure  Complete Portable Echo Adult.  ______________________________________________________________________________  Interpretation Summary     Left ventricular size, global systolic function, and wall motion are normal,  estimated LVEF 60-65%.  Right ventricular global function is normal.  No significant valvular abnormalities.  The inferior vena cava was normal in size with preserved respiratory  variability.     There are no prior studies available for comparison.  ______________________________________________________________________________  Left Ventricle  The left ventricle is normal in size. A sigmoid septum is present. Left  ventricular systolic function is normal. The visual ejection fraction is 60-  65%. Left ventricular diastolic function is indeterminate. No regional wall  motion abnormalities noted.     Right Ventricle  The right ventricle is normal in structure, function and size.     Atria  The left atrium is mildly dilated. Right atrial size is normal. There is no  color Doppler evidence of an atrial shunt.     Mitral Valve  There is trace mitral regurgitation.     Tricuspid Valve  The tricuspid valve is not well visualized, but is grossly normal. There is  trace tricuspid regurgitation.     Aortic Valve  The aortic valve is normal in structure and function.     Pulmonic Valve  The pulmonic valve is not well  seen, but is grossly normal.     Vessels  The aortic root is normal size. Normal size ascending aorta. The inferior vena  cava was normal in size with preserved respiratory variability.     Pericardium  There is no pericardial effusion.     Rhythm  Sinus rhythm was noted.  ______________________________________________________________________________  MMode/2D Measurements & Calculations  IVSd: 1.0 cm     LVIDd: 4.3 cm  LVIDs: 2.7 cm  LVPWd: 0.97 cm  FS: 37.9 %  LV mass(C)d: 144.6 grams  LV mass(C)dI: 104.9 grams/m2  Ao root diam: 3.0 cm  LA dimension: 3.4 cm  asc Aorta Diam: 3.6 cm  LA/Ao: 1.1  LA Volume (BP): 51.9 ml  LA Volume Index (BP): 37.6 ml/m2  RWT: 0.45  TAPSE: 2.4 cm     Doppler Measurements & Calculations  MV E max suly: 67.6 cm/sec  MV A max suly: 110.0 cm/sec  MV E/A: 0.61  MV dec time: 0.23 sec  PA acc time: 0.13 sec  E/E' av.6  Lateral E/e': 9.0  Medial E/e': 10.2     ______________________________________________________________________________  Report approved by: Caitlyn Vogel 2023 02:08 PM         XR Chest Port 1 View    Narrative    EXAM: XR CHEST PORT 1 VIEW  LOCATION: [facility]  DATE/TIME: 3/9/2023 5:44 PM    INDICATION: new onset fever  COMPARISON: None.      Impression    IMPRESSION: Heart size is normal. Thoracic aorta is tortuous with calcifications in the arch. There are increased interstitial opacities throughout both lungs with differential considerations include interstitial edema and multifocal interstitial   pneumonitis. No lobar consolidation. No pneumothorax. Multiple old rib fractures.

## 2023-03-10 NOTE — PLAN OF CARE
Alert to self, confused, unable to do neuro assessment, hypertensive, RA. IVF infusing. Assist of 2 with cares. Incontinent of urine,  regular diet.  Sitter at bedside, valium given for tremors, oxycodone for pain. Tele- SR, on antibiotics. Slept intermittent.

## 2023-03-10 NOTE — PLAN OF CARE
Goal Outcome Evaluation:    1500 - 1130    VSS ex HTN. Difficulty assessing orientation. Tremors at baseline. Pt occasionally answering yes/no questions. PRN oxy given 1x for verbalized pain.  Spiked 102.1 axillary temperature with tremors more pronounced. Contacted Dr. Agarwal who ordered an add'l dose of Tylenol 975 mg, blood cultures and chest X-ray.  Tylenol brought temperature down below 100.   In bed entire shift.  One loose bm this shift.  PIV infusing D5 @ 75ml/hr. Scattered bruising throughout.  Q4H neuro checks, pt not able to follow many commands. Triad paste applied to coccyx and is open to air. Sitter at bedside.   K+ and Phos WNL, monitor AM recheck.

## 2023-03-11 NOTE — CODE/RAPID RESPONSE
"[facility]    RRT Note  3/11/2023   Time Called: 0956am    RRT called for: Possible arrhythmia    Assessment & Plan   IMPRESSION & PLAN:    Kortney Germain is a 67 year old female w/ PMH of tardive dyskinesia, schizophrenia, hypertension, depression, ADD who was admitted on 3/6/2023 for diarrhea, hypovolemia and electrolyte derangements.  Her course has included rhabdomyolysis and focal neurologic deficit for which code stroke was called and for which she was noted to have multiple stenoses of intracranial vessels but MRI was negative for any acute infarct.    On the a.m. of 3/11/2023 charge RN responded to arrhythmia alarm that was worrisome for ventricular fibrillation.  RN went to go check on the patient.  This patient was new to the RN who found her to be shaking (this is baseline).  Patient was breathing and presumed to have had a pulse.  EKG was ordered and RRT activated.    On my arrival I find an elderly woman shaking in bed but she is breathing spontaneously.  She was able to make a few verbalizations indicating that she does does \"not feel very good\".  She will answer some questions, but not all but was able to wiggle toes bilaterally.  She appears in normal sinus rhythm on bedside telemetry although there is frequent artifact because of her shaking which seems to wax and wane and on the last recheck she was not shaking.  BP is 126/115, SPO2 95-97% and heart rate 82.    Impression  Likely tremor induced motion artifact on telemetry mimicking ventricular fibrillation.  Patient had a normal echocardiogram on 3/9/2023 &  electrolytes are balanced 03/11/23   Differential diagnosis:  -Although she has cerebral vascular disease & hypertension, there are no other risk factors for sudden cardiac death and it would be extremely unlikely for VF to resolve spontaneously without defibrillation    INTERVENTIONS:  -EKG reviewed, normal sinus rhythm, normal axis, no Q waves, no acute ischemic " changes, possible incomplete right bundle branch block, no significant change when compared with similar study from 3/7/2023  - Telemetry reviewed:  I did find two short bursts that could be interpreted as ventricular fibrillation but in the clinical context with patient shaking more likely than not these represent motion artifact  - Glucose 114 mg/dL  - Continue telemetry monitoring    Working diagnosis: Likely motion artifact mimicking ventricular fibrillation    At the end of the RRT she was resting calmly in bed no tremors    disposition continue current level of care    Discussed with and defer further cares to hospitalist attending physician Dr. Agarwal     Code Status: Full Code    Allergies   Allergies   Allergen Reactions     Amlodipine      Clozapine      Egg [Chicken-Derived Products (Egg)]      Penicillins      Potassium      Poultry Meal        Physical Exam   Vital Signs with Ranges:  Temp:  [97.5  F (36.4  C)-99.8  F (37.7  C)] 97.5  F (36.4  C)  Pulse:  [71-92] 92  Resp:  [16-18] 18  BP: (110-142)/(65-88) 138/88  SpO2:  [92 %-96 %] 96 %  I/O last 3 completed shifts:  In: 940 [P.O.:940]  Out: -     Constitutional: vs as above and/or per EMR  General:  adult pt lying in bed without acute distress   GCS:   Motor 6=Obeys commands   Verbal 4=Confused   Eye Opening 3=To speech   Total: 13       Neuro: +follows commands wiggle toes bilaterally, speech fluent but limited  Eyes defer   head, ENT & mouth: NC/AT,  mouth moist oral mucosa  Neck: supple  CV S1S2 sinus rhythm on telemetry, easily palpable radial pulse brisk capillary refill  resp: CTAB upper and lower lobes  gi:normoactive bowel sounds, soft, nontender, nondisteded  Ext: no edema or mottling  Skin: no rashes on exposed skin  Lymph: defer  Musculoskeletal no bony joint deformities    Data     EKG:  Interpreted by TAMI Alan CNP  Time reviewed: 1003  Symptoms at time of EKG: None   Rhythm: normal sinus   Rate: Normal  Axis: Normal  Ectopy:  none  Conduction: normal vs incomplete RBBB  ST Segments/ T Waves: No acute ischemic changes  Q Waves: none  Comparison to prior: Unchanged from 3/7/23    Clinical Impression: NSR w/o acute ischemic changes    CBC with Diff:  Recent Labs   Lab Test 03/11/23  0816 03/08/23  0830 03/07/23  1629   WBC 9.0   < > 11.8*   HGB 10.0*   < > 9.3*   MCV 89   < > 90      < > 334   INR  --   --  1.15    < > = values in this interval not displayed.        Comprehensive Metabolic Panel:  Recent Labs   Lab 03/11/23  1010 03/11/23  0816 03/07/23  2310 03/07/23  1805 03/07/23  0748 03/06/23  1413   NA  --  144   < >  --    < > 151*   POTASSIUM  --  4.7   < >  --    < > 3.9   CHLORIDE  --  106   < >  --    < > 118*   CO2  --  28   < >  --    < > 21*   ANIONGAP  --  10   < >  --    < > 12   * 113*   < >  --    < > 130*   BUN  --  21.6   < >  --    < > 37.9*   CR  --  0.50*   < >  --    < > 0.58   GFRESTIMATED  --  >90   < >  --    < > >90   LULU  --  9.3   < >  --    < > 9.3   MAG  --   --   --  2.0  --   --    PHOS  --  3.6   < > 1.8*  --   --    PROTTOTAL  --   --   --   --   --  6.9   ALBUMIN  --   --   --   --   --  4.1   BILITOTAL  --   --   --   --   --  <0.2   ALKPHOS  --   --   --   --   --  83   AST  --   --   --   --   --  39*   ALT  --   --   --   --   --  21    < > = values in this interval not displayed.       UA:  Recent Labs   Lab 03/10/23  0932   COLOR Light Yellow   APPEARANCE Clear   URINEGLC Negative   URINEBILI Negative   URINEKETONE Negative   SG 1.015   UBLD Negative   URINEPH 7.0   PROTEIN Negative   NITRITE Positive*   LEUKEST Large*   RBCU 2   WBCU >182*       Time Spent on this Encounter   I spent 30 minutes (10-1030am) on the unit/floor managing the care of Kortney Germain. Over 50% of my time was spent counseling the patient and/or coordinating care regarding services listed in this note.    Shakila Vargas, [facility]  Hospitalist House [facility]  [phone]

## 2023-03-11 NOTE — PLAN OF CARE
Goal Outcome Evaluation:    Shift Note: [id number]    A/O x 2-3, was disoriented to situation. VSS except for elevated BP's. On RA. A2 + GB/walker. Takes medications in pudding. C/o pain rating at 10/10, gave PRN Oxycodone and took pain down to 5/10. On tele-SR. Was very diaphoretic this morning but no fevers. Has D5 running and was decreased from 75 mL/hr to 50 mL/hr. Urine culture was ordered, straight cath was done to collect specimen. Is on potassium/phosphorus protocol, no replacements needed- recheck tomorrow AM. Has mepilex on sacrum, dressing changed- CDI. SW consulted for assistance with discharge planning.

## 2023-03-11 NOTE — PLAN OF CARE
Goal Outcome Evaluation:       A+O to self. VSS on RA. Pt c/o 10/10 pain to coccyx, but is appears comfortable and sleeping in between care. PRN pain meds given + repositioning. Incontinent of b+b at times, brief on. Coccyx bon CDI. NSR on tele. IVF @ 50. K/Magdalena protocol, recheck in AM.

## 2023-03-11 NOTE — PROGRESS NOTES
[facility]    Medicine Progress Note - Hospitalist Service    Date of Admission:  3/6/2023    Assessment & Plan   Kortney Germain is a 67 year old female with history of schizophrenia, tardive dyskinesia, HTN, depression, ADD, and UTI who resides in a group home who presented to the ED with diarrhea on 3/6/23.  Patient is actually seen in the ED day PTA for increased shaking from her apparent baseline of tremors as well as ongoing diarrhea.  Work-up was fairly unremarkable and she discharged back to her group home.  EMS was called again due to ongoing diarrhea with abdominal discomfort.  She was found to be fairly dehydrated.    Diarrhea  Dehydration  Fever  Patient was seen in ED 3/5 with similar symptoms and fairly unremarkable work up and discharged back to group home. Abdominal discomfort and diarrhea apparently continued and staff summoned EMS again.  Patient denied abdominal pain, bloody or black stool - she was not the best historian. No report of recent abx or hospitalization. No BM activity in ED. Benign abdominal exam, afebrile, and nontoxic.   - C. Difficile, enteric pathogen panel negative   - prn anti-emetics etc  - UA appeared infected, culture grew >100,000 CFU multiple organisms c/w contamination  - started on ceftriaxone; switched to cefepime on 3/10 for broader coverage given repeat U/A still looking infected  - given fever on 3/9 and previous urine cx contaminated, repeat U/A C&S (straight cath) - continues to appear infected  - CXR 3/9 with increased interstitial opacities which could be edema vs pneumonitis vs other; not hypoxic, denies SOB  - blood cultures checked on 3/9 and pending  - decreased D5 to 50ml/hr   - CK level decreasing, no rigidity on exam, vital signs other than temp stable, so NMS less likely, but held perphenazine starting 3/10  - WBC count 14.2 on 3/6, down to 9.9 on 3/10  - low threshold for broadening antibx  - Cont to monitor closely    Right  facial droop, left upper and lower extremity weakness, left lower extremity ataxia  RRT called on 3/7 for code stroke   Patient was sitting on commode feeling weak leaning over towards the right side.  RN describes as decreased level of consciousness, however patient did not lose consciousness.  CT and CTA head and neck done returned negative eccept segmental severe stenoses of the right the V3 segment. Focal high-grade stenosis versus short segment occlusion of the distal right V3 segment with reconstitution of normal caliber right vertebral artery immediately prior to piercing the dura.  Multiple mild and moderate stenoses of the right vertebral artery throughout the V2 segment  Neurology consulted and are following - appreciate their input; metabolic workup, consider MRI c-spine (pt with too much motion artifact at this time to successfully complete), anemia workup  PT/OT  Neurochecks Q 4hours   MRI brain with chronic microvascular ischemic disease, no acute infarct  Started on regular aspirin       Hyperchloremic hypernatremia  Hypokalemia  Hypophosphatemia  Na 151 on admission and Cl 118.  BUN  38 and Cr 0.58.  Patient was started on LR at 100 mL/h on admission  Switched to D5W at 100ml per hour for Na 152 on 3/7 with potassium of 2.9  Started on potassium replacement protocol  On K Phos replacement protocol   Nephrology consulted and Na correcting as expected so decreased rate to 75ml/hr on 3/9  Decreased to 50ml/hr given CXR findings  Will cont for today given slight increase in Na on 3/10 labs; today's labs pending  Follow BMP daily     Nontraumatic rhabdomyolysis, mild  Worsening involuntary movements  Reported history of tardive dyskinesia  Patient with frequent muscle flexing posturing at times.  No falls or substantial injuries noted.  History of TD years ago was reportedly primarily in the face.  Patient is awake and alert during these times.  CK elevated at 682 which is greater than 3 times upper limit  of normal.  Renal function appears intact although she is quite dehydrated likely from diarrhea as above.  Shaking/spasms could be in part related to dehydration and electrolyte abnormalities as above.  -785  - benadryl and lorazepam attempted in ED due to shaking/spasm like movements, some improvement  -Cont as needed Valium 2mg PO [id number] PRN to help with tardive dyskinesia movements  Patient is on perphenazine antipsychotic to help with her schizophrenia which might contribute to tardive dyskinesia and elevated CPK  Psych consult placed for medical management  Perphenazine held initially and after Psych consult was restarted at 4mg PO BID; given fever and worsening clinical status held perphenazine starting 3/10; appears more alert today  General Neurology consult placed for evaluation and management of tremors; appreciate their input - f/u with Psych for tx of EPS, consider MRI of c-spine given pt's pattern of weakness, although will not be able to do that with increased involuntary movements currently, metabolic workup in process  - add back prn benadryl as she feels it helped more with the involuntary movements     History of schizophrenia  History of depression  Group home resident  Acute metabolic encephalopathy secondary to hypernatremia and delirium with hospitalization  Patient apparently is her own decision maker.   PTA Perphenazine discontinued due to increasing tardive dyskinesia tremors and elevated CPK  As needed Valium ordered  Patient was agitated overnight earlier in admission needing Ativan and Benadryl and Atarax given   Bedside sitter in place  Psych consultation requested.  Appreciate assistance - they actually restarted pt's perphenazine but given fever yesterday and ongoing issues, held starting 3/10  - pt more awake and interactive today; cont to monitor closely  - add prn benadryl as she reports it helped more with the involuntary movements  - CT/SW consult for assistance with  discharge planning     Hypertension  BP acceptable in ED initially.   Blood pressure currently systolics in the 130s     Moderate to severe malnutrition with a BMI of 17.3  Nutrition services consult for supplementation ordering       Diet: Regular Diet Adult  Snacks/Supplements Adult: Ensure Enlive; With Meals    DVT Prophylaxis: Pneumatic Compression Devices  Liu Catheter: Not present  Lines: None     Cardiac Monitoring: ACTIVE order. Indication: Tachyarrhythmias, acute (48 hours)  Code Status: Full Code      Clinically Significant Risk Factors                 # Cachexia: Estimated body mass index is 17.26 kg/m  as calculated from the following:    Height as of this encounter: 1.524 m (5').    Weight as of this encounter: 40.1 kg (88 lb 6.4 oz)., PRESENT ON ADMISSION       Disposition Plan     Expected Discharge Date: 03/13/2023                  Sravani Agarwal MD  Hospitalist Service  [facility]  Text page via [facility] Paging/Directory   ______________________________________________________________________    Interval History   Pt with continued involuntary movements. More awake and interactive this morning. Tells me that perphenazine didn't seem to help, thinks benadryl has helped better in the past. Has pain all over.    Physical Exam   Vital Signs: Temp: 98  F (36.7  C) Temp src: Oral BP: 121/79 Pulse: 71   Resp: 16 SpO2: 95 % O2 Device: None (Room air)    Weight: 88 lbs 6.4 oz    Constitutional: WDWN female lying in bed, chronically-ill appearing, mildly diaphoretic, more awake today  HEENT: NC/AT, no scleral icterus, nl conjunctiva, moist oral mucosa  Respiratory: fair inspiratory effort, lungs diminished but clear  Cardiovascular: RRR, no M/R/G, 2+ radial pulses, trace LE edema  GI: soft, nondistended  Skin: no rashes, warm, dry, mildly diaphoretic, scattered ecchymoses  Neuro: CN 2-12 grossly intact, tardive dyskinesia movements of lips, tongue, intermittent prolonged BUE  tremors  MSK:  able to move all 4 exts, contractures throughout  Psych: more awake, interactive            Data     I have personally reviewed the following data over the past 24 hrs:    N/A  \   N/A   / N/A     N/A N/A N/A /  N/A   N/A N/A N/A \       Imaging results reviewed over the past 24 hrs:   No results found for this or any previous visit (from the past 24 hour(s)).

## 2023-03-11 NOTE — PLAN OF CARE
Goal Outcome Evaluation:       Summary:    Primary Diagnosis:   Orientation: x4 forgetful  Aggression Stop Light: Yellow  Mobility: A2 w/ GB  Pain Management: Tylenol/Oxycodone  Diet: Regular  Bowel/Bladder: Incontinent  Abnormal Lab/Assessments: Hgb 9.9, UA+  Drain/Device/Wound: IVF  Consults: WOC  D/C Day/Goals/Place:     Shift Note:  [id number]  VSS. A/O x4, neuro at times difficult to assess 2/2 TD. Didn't complete heel/shin. C/O neck pain so wouldn't do shoulder assmt.  PRN oxy given w effect. Tremors at baseline. Arms/hands bruised. A2 +GB, pivot to [facility] per report. Not OOB this shift. Incont beginning of shift. Bottom red, pale w scabs. Triad after Arian cleanse. PIV infusing D5 @ 50ml/hr. IV Abx. Sitter at bedside. K+ and Phos WNL,

## 2023-03-12 NOTE — PLAN OF CARE
Goal Outcome Evaluation:    Shift Note: [id number]     A/O x 2-3, was disoriented to situation. VSS except for HTN. On RA. A2 + GB/walker. Takes medications whole with pudding. C/o pain rating at 10/10, gave scheduled meds, no PRN's. Pt's mentioned to provider that Benadryl helps with tremors, given x1. On tele-normal SR. Had episode this afternoon, tele alarming pt was in V-fib. RRT was called- please see note. Was very diaphoretic throughout the day-no fevers. Has D5 running at 50 mL/hr. PIV in left arm infiltrated after PM ABX ran, IV pulled. Vascular access team consulted to place new IV. Is on potassium/phosphorus protocol, no replacements needed- recheck tomorrow AM. Has mepilex on sacrum, dressing changed- CDI. SW consulted for assistance with discharge planning.

## 2023-03-12 NOTE — PLAN OF CARE
Goal Outcome Evaluation:    Shift Note:  [id number]  VSS. A/O x4, tele NSR. neuro at times difficult to assess 2/2 TD. Didn't complete heel/shin- states it hurts her legs. C/O neck pain so wouldn't do shoulder assmt.  PRN Tylenol given w effect. Declined Oxycodone. Uncontrollable tremors at baseline. Benadryl effective for this. Arms/hands bruised. A2 +GB to [facility], incont. Bottom red, pale w scabs. Triad after Arian cleanse. PIV infusing D5 @ 50ml/hr. IV Abx. Sitter at bedside. K/Phos WNL, meds in pudding

## 2023-03-12 NOTE — PROGRESS NOTES
[facility]    Medicine Progress Note - Hospitalist Service    Date of Admission:  3/6/2023    Assessment & Plan   Kortney Germain is a 67 year old female with history of schizophrenia, tardive dyskinesia, HTN, depression, ADD, and UTI who resides in a group home who presented to the ED with diarrhea on 3/6/23.  Patient is actually seen in the ED day PTA for increased shaking from her apparent baseline of tremors as well as ongoing diarrhea.  Work-up was fairly unremarkable and she discharged back to her group home.  EMS was called again due to ongoing diarrhea with abdominal discomfort.  She was found to be fairly dehydrated.    Diarrhea  Dehydration  Fever  Suspected UTI  Patient was seen in ED 3/5 with similar symptoms and fairly unremarkable work up and discharged back to group home. Abdominal discomfort and diarrhea apparently continued and staff summoned EMS again.  Patient denied abdominal pain, bloody or black stool - she was not the best historian. No report of recent abx or hospitalization. No BM activity in ED. Benign abdominal exam, afebrile, and nontoxic.   - C. Difficile, enteric pathogen panel negative   - prn anti-emetics etc  - UA appeared infected, culture grew >100,000 CFU multiple organisms c/w contamination  - given fever on 3/9 and previous urine cx contaminated, repeat U/A C&S (straight cath) - continues to appear infected, however, 2nd urine culture did not grow any organism  - CK level was elevated but now decreasing, no rigidity on exam, vital signs other than temp stable, so NMS less likely, but held perphenazine starting 3/10  - CXR 3/9 with increased interstitial opacities which could be edema vs pneumonitis vs other; not hypoxic, denies SOB  - blood cultures checked on 3/9 and no growth to date  - WBC count 14.2 on 3/6, down to 9.9 on 3/10  - initially on ceftriaxone; given grossly infected appearing repeat U/A despite CTX and continued significant fevers,  switched to cefepime on 3/10 for broader coverage and has had improvements on this so will continue at least through 3/13 based on U/A, clinical status  - repeat CXR today to eval opacities  - low threshold for broadening antibx  - Cont to monitor closely    Right facial droop, left upper and lower extremity weakness, left lower extremity ataxia  RRT called on 3/7 for code stroke   Patient was sitting on commode feeling weak leaning over towards the right side.  RN describes as decreased level of consciousness, however patient did not lose consciousness.  CT and CTA head and neck done returned negative eccept segmental severe stenoses of the right the V3 segment. Focal high-grade stenosis versus short segment occlusion of the distal right V3 segment with reconstitution of normal caliber right vertebral artery immediately prior to piercing the dura.  Multiple mild and moderate stenoses of the right vertebral artery throughout the V2 segment  MRI brain with chronic microvascular ischemic disease, no acute infarct  Neurology consulted - appreciate their input; metabolic workup, consider MRI c-spine (pt with too much motion artifact at this time to successfully complete), anemia workup; not likely stroke  PT/OT  Neurochecks Q 4hours initially, now q8h  Started on regular aspirin       Hyperchloremic hypernatremia  Hypokalemia  Hypophosphatemia  Na 151 on admission and Cl 118.  BUN  38 and Cr 0.58.  Patient was started on LR at 100 mL/h on admission  Switched to D5W at 100ml per hour for Na 152 on 3/7 with potassium of 2.9  Started on potassium replacement protocol  On K Phos replacement protocol   Nephrology consulted and Na correcting as expected so decreased rate to 75ml/hr on 3/9  Discontinuing D5W today since Na has normalized and is stalbe  Follow BMP daily     Nontraumatic rhabdomyolysis, mild  Worsening involuntary movements  Reported history of tardive dyskinesia  Patient with frequent muscle flexing posturing  at times.  No falls or substantial injuries noted.  History of TD years ago was reportedly primarily in the face.  Patient is awake and alert during these times.  CK elevated at 682 which is greater than 3 times upper limit of normal.  Renal function appears intact although she is quite dehydrated likely from diarrhea as above.  Shaking/spasms could be in part related to dehydration and electrolyte abnormalities as above.  -785  - benadryl and lorazepam attempted in ED due to shaking/spasm like movements, some improvement  -Patient was on perphenazine antipsychotic to help with her schizophrenia which might contribute to tardive dyskinesia and elevated CPK  -Cont as needed Valium 2mg PO [id number] PRN, as well as prn benadryl to help with tardive dyskinesia and tremors  -Psych consult placed for medical management  Perphenazine held initially and after Psych consult was restarted at 4mg PO BID; given fever and worsening clinical status, held perphenazine starting 3/10 - pt has had some improvement in clinical status; fewer TD movements, still with some tremors  General Neurology consult placed for evaluation and management of tremors; appreciate their input - f/u with Psych for tx of EPS, consider MRI of c-spine given pt's pattern of weakness, although will not be able to do that with increased involuntary movements currently, metabolic workup in process     History of schizophrenia  History of depression  Group home resident  Acute encephalopathy secondary to metabolic with hypernatremia and delirium with hospitalization  Patient apparently is her own decision maker.   PTA Perphenazine discontinued due to increasing tardive dyskinesia tremors and elevated CPK  As needed Valium ordered  Patient was agitated earlier in admission needing Ativan and Benadryl and Atarax given   Bedside sitter in place  Psych consultation requested.  Appreciate assistance - they actually restarted pt's perphenazine but given fever  and ongoing issues, held starting 3/10  - pt's TD movements have decreased, although still with some tremors  - will ask Psych to see pt again on 3/13 for any further psych med recs  - CT/SW consult for assistance with discharge planning     Hypertension  BP acceptable in ED initially.   Blood pressure currently systolics in the 130s     Moderate to severe malnutrition with a BMI of 17.3  Nutrition services consult for supplementation ordering       Diet: Regular Diet Adult  Snacks/Supplements Adult: Ensure Enlive; With Meals    DVT Prophylaxis: Pneumatic Compression Devices  Liu Catheter: Not present  Lines: None     Cardiac Monitoring: ACTIVE order. Indication: Tachyarrhythmias, acute (48 hours)  Code Status: Full Code      Clinically Significant Risk Factors                 # Cachexia: Estimated body mass index is 17.26 kg/m  as calculated from the following:    Height as of this encounter: 1.524 m (5').    Weight as of this encounter: 40.1 kg (88 lb 6.4 oz).        Disposition Plan     Expected Discharge Date: 03/14/2023                  Sravani Agarwal MD  Hospitalist Service  [facility]  Text page via [employer] Paging/Directory   ______________________________________________________________________    Interval History   Pt has not had fever. Fewer tremors with benadryl and holding perphenazine. Feels that benadryl is helping tremors quite a lot. No TD movements of her lip or tongues seen on exam today and she feels that they are doing better.    Physical Exam   Vital Signs: Temp: 98.8  F (37.1  C) Temp src: Axillary BP: (!) 136/99 Pulse: 75   Resp: 18 SpO2: 94 % O2 Device: None (Room air)    Weight: 88 lbs 6.4 oz    Constitutional: WDWN female lying in bed, chronically-ill appearing, in NAD  HEENT: NC/AT, no scleral icterus, nl conjunctiva, moist oral mucosa  Respiratory: good inspiratory effort, lungs diminished but clear  Cardiovascular: RRR, no M/R/G, 2+ radial pulses, trace LE  edema  GI: soft, nondistended  Skin: no rashes, warm, dry  Neuro: CN 2-12 grossly intact, faint resting BUE tremors, no TD movements of lips/tongue today  MSK:  able to move all 4 exts, contractures throughout  Psych: more alert, interactive, calm, cooperative            Data     I have personally reviewed the following data over the past 24 hrs:    7.6  \   9.6 (L)   / 383     142 104 21.9 /  96   3.9 28 0.52 \       Imaging results reviewed over the past 24 hrs:   No results found for this or any previous visit (from the past 24 hour(s)).

## 2023-03-13 NOTE — PROGRESS NOTES
"    Psychiatric progress note    Consult date: March 8, 2023         Reason for Consult, requesting source:    pt with worsening TD on perphenazine    Requesting source:     Labs and imaging reviewed. Patient seen and evaluated by Milagros Parker MD          HPI:     This is a 67-year-old female admitted on 3/6/2023 after she presented with diarrhea and increased tremoring.  The patient had been seen in the ED the day prior for the same complaints, and was sent home.  She was found to be dehydrated when she came into the ED the second time, so she was admitted for observation.  The patient does reside in a group home and has a reported history of schizophrenia.  Patient was noted to have electrolyte derangement, as well as nontraumatic rhabdomyolysis with her CK at [phone].  IV Valium was ordered due to concerns for tardive dyskinesia, and the patient's perphenazine was held.  We are asked to see the patient to evaluate her medications.    Patient was seen today in her room.  Discussed with CNA who is there as a one-to-one sitter.  Patient has notable tardive dyskinesia.  She is aware of the tardive dyskinesia, and states that the thing that helps it is \"perphenazine\".  She was unaware of any other medications that have ever been more helpful, but is a somewhat limited historian.  She would like to have her perphenazine back.  I was unclear whether or not she is currently having psychiatric symptoms, though she seemed to indicate that she is not depressed, or hallucinating.  She may be having some anxiety.  Communication was somewhat limited.    3/13/2023: Patient seen today for follow-up.  CNA was in the room.  Patient continues with tardive dyskinesia, but appears more comfortable.  She smiled and stated that she feels better.  I asked if she was having any of her psychiatric symptoms including hallucinations, and she denied this.  I asked if there is anything that helps with her tremoring and tardive dyskinesia " "and she stated \"Ativan\".  I did let her know that she is not on her perphenazine currently, she stated that she feels fine.        Past Psychiatric History:   Patient indicates that she first developed mental health issues after a traumatic head injury at age 29.  She states that she was living in [facility], and fell on the ice, and since then has had problems.  She was not able to tell me what her diagnosis is.  There are multiple psychiatric admissions in her chart, but I cannot access them because we do not have permission to review records from [facility] or [facility].        Substance Use and History:   I do not see any mention of substance abuse issues in the patient's chart, and she lives in a controlled setting.        Past Medical History:   PAST MEDICAL HISTORY:   Past Medical History:   Diagnosis Date     Allergic state      Depressive disorder      Dyskinesia      Hypertension      Schizo affective schizophrenia (H)        PAST SURGICAL HISTORY: No past surgical history on file.          Family History:   FAMILY HISTORY: No family history on file.    Family Psychiatric History:         Social History:   SOCIAL HISTORY:   Social History     Tobacco Use     Smoking status: Never     Smokeless tobacco: Never   Substance Use Topics     Alcohol use: No                Physical ROS:   Patient cannot comply with comprehensive review of systems due to communication limitations and cognitive impairment.           Medications:       aspirin  325 mg Oral Daily     ceFEPIme  1 g Intravenous Q12H     escitalopram  20 mg Oral Daily     hydrOXYzine  25 mg Oral TID     LORazepam  0.25 mg Oral At Bedtime     LORazepam  0.5 mg Oral BID     miconazole   Topical BID     NIFEdipine ER  30 mg Oral Daily     [Held by provider] perphenazine  4 mg Oral QAM     [Held by provider] perphenazine  8 mg Oral At Bedtime     senna-docusate  1 tablet Oral BID    Or     senna-docusate  2 tablet Oral BID "              Allergies:     Allergies   Allergen Reactions     Amlodipine      Clozapine      Egg [Chicken-Derived Products (Egg)]      Penicillins      Potassium      Poultry Meal           Labs:     Recent Results (from the past 48 hour(s))   Basic metabolic panel    Collection Time: 03/12/23  7:57 AM   Result Value Ref Range    Sodium 142 136 - 145 mmol/L    Potassium 3.9 3.4 - 5.3 mmol/L    Chloride 104 98 - 107 mmol/L    Carbon Dioxide (CO2) 28 22 - 29 mmol/L    Anion Gap 10 7 - 15 mmol/L    Urea Nitrogen 21.9 8.0 - 23.0 mg/dL    Creatinine 0.52 0.51 - 0.95 mg/dL    Calcium 9.0 8.8 - 10.2 mg/dL    Glucose 96 70 - 99 mg/dL    GFR Estimate >90 >60 mL/min/1.73m2   Phosphorus    Collection Time: 03/12/23  7:57 AM   Result Value Ref Range    Phosphorus 3.3 2.5 - 4.5 mg/dL   CBC with platelets and differential    Collection Time: 03/12/23  7:57 AM   Result Value Ref Range    WBC Count 7.6 4.0 - 11.0 10e3/uL    RBC Count 3.59 (L) 3.80 - 5.20 10e6/uL    Hemoglobin 9.6 (L) 11.7 - 15.7 g/dL    Hematocrit 31.8 (L) 35.0 - 47.0 %    MCV 89 78 - 100 fL    MCH 26.7 26.5 - 33.0 pg    MCHC 30.2 (L) 31.5 - 36.5 g/dL    RDW 15.5 (H) 10.0 - 15.0 %    Platelet Count 383 150 - 450 10e3/uL    % Neutrophils 56 %    % Lymphocytes 28 %    % Monocytes 9 %    % Eosinophils 5 %    % Basophils 1 %    % Immature Granulocytes 1 %    NRBCs per 100 WBC 0 <1 /100    Absolute Neutrophils 4.4 1.6 - 8.3 10e3/uL    Absolute Lymphocytes 2.1 0.8 - 5.3 10e3/uL    Absolute Monocytes 0.7 0.0 - 1.3 10e3/uL    Absolute Eosinophils 0.4 0.0 - 0.7 10e3/uL    Absolute Basophils 0.1 0.0 - 0.2 10e3/uL    Absolute Immature Granulocytes 0.0 <=0.4 10e3/uL    Absolute NRBCs 0.0 10e3/uL   Basic metabolic panel    Collection Time: 03/13/23  9:59 AM   Result Value Ref Range    Sodium 145 136 - 145 mmol/L    Potassium 3.7 3.4 - 5.3 mmol/L    Chloride 107 98 - 107 mmol/L    Carbon Dioxide (CO2) 27 22 - 29 mmol/L    Anion Gap 11 7 - 15 mmol/L    Urea Nitrogen 28.4 (H) 8.0  - 23.0 mg/dL    Creatinine 0.60 0.51 - 0.95 mg/dL    Calcium 9.0 8.8 - 10.2 mg/dL    Glucose 122 (H) 70 - 99 mg/dL    GFR Estimate >90 >60 mL/min/1.73m2   Phosphorus    Collection Time: 03/13/23  9:59 AM   Result Value Ref Range    Phosphorus 3.6 2.5 - 4.5 mg/dL   CBC with platelets and differential    Collection Time: 03/13/23  9:59 AM   Result Value Ref Range    WBC Count 7.5 4.0 - 11.0 10e3/uL    RBC Count 3.45 (L) 3.80 - 5.20 10e6/uL    Hemoglobin 9.4 (L) 11.7 - 15.7 g/dL    Hematocrit 30.8 (L) 35.0 - 47.0 %    MCV 89 78 - 100 fL    MCH 27.2 26.5 - 33.0 pg    MCHC 30.5 (L) 31.5 - 36.5 g/dL    RDW 15.5 (H) 10.0 - 15.0 %    Platelet Count 382 150 - 450 10e3/uL    % Neutrophils 65 %    % Lymphocytes 23 %    % Monocytes 7 %    % Eosinophils 2 %    % Basophils 2 %    % Immature Granulocytes 1 %    NRBCs per 100 WBC 0 <1 /100    Absolute Neutrophils 5.0 1.6 - 8.3 10e3/uL    Absolute Lymphocytes 1.7 0.8 - 5.3 10e3/uL    Absolute Monocytes 0.5 0.0 - 1.3 10e3/uL    Absolute Eosinophils 0.2 0.0 - 0.7 10e3/uL    Absolute Basophils 0.1 0.0 - 0.2 10e3/uL    Absolute Immature Granulocytes 0.1 <=0.4 10e3/uL    Absolute NRBCs 0.0 10e3/uL          Physical and Psychiatric Examination:     BP (!) 175/119 (BP Location: Right arm)   Pulse 78   Temp 97.9  F (36.6  C) (Axillary)   Resp 18   Ht 1.524 m (5')   Wt 40.1 kg (88 lb 6.4 oz)   SpO2 95%   BMI 17.26 kg/m    Weight is 88 lbs 6.4 oz  Body mass index is 17.26 kg/m .    Physical Exam:  I have reviewed the physical exam as documented by by the medical team and agree with findings and assessment and have no additional findings to add at this time.    Mental Status Exam:    Appearance: awake, alert, appeared older than stated age, well groomed, awake and cooperative  Attitude:  cooperative  Eye Contact:  good  Mood:  better and good  Affect:  appropriate and in normal range  Speech:  dysarthria and increased speech latency  Language: Fluent in english   Psychomotor Behavior:   evidence of tardive dyskinesia  Thought Process:  goal oriented  Associations:  no loose associations  Thought Content:  no evidence of suicidal ideation or homicidal ideation, no evidence of psychotic thought, no auditory hallucinations present and no visual hallucinations present  Insight:  fair  Judgement:  fair  Oriented to:  Unable to assess  Attention Span and Concentration:  fair  Recent and Remote Memory:  fair  Fund of Knowledge: Appropriate   Gait and Station:                DSM-5 Diagnosis:   Schizoaffective disorder    History of closed head injury    Tardive dyskinesia          Assessment:   This is a 67-year-old female who reportedly has a history of schizoaffective disorder.  It looks that she has many past psychiatric hospitalizations, though I am not able to access those records.  She told me that her problems began at age 29 when she fell and hit her head on the ice.  Unclear if that is entirely accurate, though the patient was pretty clear about it.  She has notable tardive dyskinesia which is uncomfortable to watch, and uncomfortable for her as well.  She states that the perphenazine helps her when she is on it, so I have restarted the medication.  Looks like she also was previously on Benadryl 50 mg p.o. 3 times daily.  It does look like she has been on other anticholinergics, and medications for extraparametal side effects.  It is difficult to tell how long she has been on the Benadryl.    Patient seems to indicate that she is doing better, despite not being on perphenazine currently.  I am not able to access patient's outside records psychiatrically, so I did ask if we could get patient to sign consent so that I can look at records, particularly from [facility] where she appears to get her care.  It is unclear to me whether or not it would be appropriate to discharge the patient not on an antipsychotic any longer.  I do not have a good sense of her psychiatric history.  She appears euthymic  currently.  Thinking also appears organized.          Summary of Recommendations:   1.  I will hold off on restarting the patient's for perphenazine again, as it was discontinued by the hospitalist due to concerns for fever and rhabdomyolysis.    2.  Patient's other psychiatric medications are already restarted.    3.  Appreciate assistance of RN in getting consent signed for outside records, as I can now see patients [facility] psychiatric notes.    4.  We will check with [facility] to verify whether or not patient's group home will require her being back on an antipsychotic prior to allowing her to come back.    Milagros Parker MD  Consult/Liaison Psychiatry and Addiction Medicine  [facility]

## 2023-03-13 NOTE — PROGRESS NOTES
"CLINICAL NUTRITION SERVICES - REASSESSMENT NOTE      Recommendations Ordered by Registered Dietitian (RD):   Continue regular diet  Continue Ensure BID  Encourage PO intake   Future/Additional Recommendations:   Consider calorie counts if PO intake continues to be poor   Malnutrition: 3/7  % Weight Loss:  > 20% in 1 year (severe malnutrition)  % Intake: Unable to determine  Subcutaneous Fat Loss:  Orbital region mild depletion and Upper arm region mild depletion (potentially greater, further examination needed)  Muscle Loss:  Temporal region mild depletion and Clavicle bone region mild depletion (potentially greater, further examination needed)  Fluid Retention:  None noted     Malnutrition Diagnosis: Moderate malnutrition  In Context of:  Chronic illness or disease     EVALUATION OF PROGRESS TOWARD GOALS   Diet: Regular  Ensure BID with breakfast and dinner    Intake/Tolerance: Ordering 1-2 meals/d. Per flowsheets, consuming % of meals. Visited pt today, however experiencing many tremors and did not answer many questions. She did answer \"Yes\" to liking the protein drinks she has been getting.       ASSESSED NUTRITION NEEDS:  Dosing Weight 40.2 kg   Estimated Energy Needs: 7760-4018 kcals (35-40 Kcal/Kg)  Justification: repletion and underweight  Estimated Protein Needs: 48-60 grams protein (1.2-1.5 g pro/Kg)  Justification: Repletion and preservation of lean body mass  Estimated Fluid Needs: per MD        NEW FINDINGS:   General: Discontinued D5 today    Weight: Variability in bed scale weights  03/11/23 0520 40.1 kg (88 lb 6.4 oz) Bed scale   03/09/23 0515 44 kg (97 lb 1.6 oz) Bed scale   03/07/23 0445 40.2 kg (88 lb 9.6 oz) Bed scale   03/06/23 1422 52.2 kg (115 lb)      GI: Last BM today x1    Skin: Scattered bruises, sacrum wound    Medications:  Ativan  Senna    Labs: Reviewed     Previous Goals:   Patient will consume 75% nutritionally adequate meals, plus supplements, BID  Evaluation: Not " met    Previous Nutrition Diagnosis:   Unintended weight loss related to ?etiology, likely prolonged inadequate oral intake however patient denies as evidenced by wt loss up to 23% in 1 year or less  Evaluation: No change      CURRENT NUTRITION DIAGNOSIS  Unintended weight loss related to inadequate oral intake as evidenced by ordering 1-2 meals/d, PO intake variability of %, and 23% wt loss in 1 year or less    INTERVENTIONS  Recommendations / Nutrition Prescription  Continue regular diet  Continue Ensure BID  Encourage PO intake    Implementation  None at this time    Goals  Patient will consume 75% nutritionally adequate meals, plus supplements, BID      MONITORING AND EVALUATION:  Progress towards goals will be monitored and evaluated per protocol and Practice Guidelines    Juana Phillips  Dietetic Intern

## 2023-03-13 NOTE — PROGRESS NOTES
Pt A&Ox4; VSS on RA. Pain managed with current regimen. Paged hospitalist regarding discontinuing D5; order to discontinue received. SL between IV abx. Pt takes pills whole with pudding. T/R Q2. Scattered bruises continue. Sitter at bedside. K and Magdalena protocol, recheck this morning.     Writer noticed pt shaking too much this morning @ 0645 and diaphoretic; offered PRN valium and accepted initially but refused when writer was about to give it. Will notify oncoming shift nurse.  Discharge pending.

## 2023-03-13 NOTE — PLAN OF CARE
Goal Outcome Evaluation:         Shift Note: 3/13/2023 - 1472-1320     A/Ox4. VSS on RA ex. HTN at times -- Morning elevated BP resolved with scheduled meds. Ax1-2 + GB/walker to [facility]. Takes medications whole with pudding. C/o pain in her neck rating at 10/10 x2 this shift-- PRN Tylenol available Q6h for pain. PRN lorazepam given x1 for tremors this AM. PRN Benadryl given x1 this shift for tremors, as well. PIV in R arm SL. On K+ and Phos protocols -- no replacements needed; rechecks ordered for tomorrow AM. Sacrum wound cleaned w/ Arian and Triad cream applied - left open to air. Psych saw pt again today -- see Psych note for more details. SW consulted for assistance with discharge planning.

## 2023-03-13 NOTE — PROVIDER NOTIFICATION
MD Notification    Notified Person: MD    Notified Person Name: Dylan Mackey    Notification Date/Time: 3/12/23 @ 2036    Notification Interaction: paged    Purpose of Notification: Pt in 8829 DC is on D5 and infusing. Provider's note says to discontinue D5 today. Can you pls put an order to stop it? Thank you. Miles MORELAND RN   *[id number]    Orders Received: discontinue d5    Comments:

## 2023-03-13 NOTE — PLAN OF CARE
Goal Outcome Evaluation:    Shift Note: 0165-2535     A/O x 4. VSS, on RA. A2 + GB/walker. Takes medications whole with pudding. C/o pain in her neck rating at 10/10, req Benadryl and Tylenol for the pain. Tele was discontinued today. Tends to be very diaphoretic throughout the day but no fever. Has D5 running at 50 mL/hr. Per hospitalist note today, discussed discontinuing D5 however it was not discussed with me when we spoke. Has PIV in R arm, infusing intermittent ABX and continuous IVF. Is on potassium/phosphorus protocol, no replacements needed- recheck tomorrow AM. Sacrum wound cleaned w/ Arian and Triad cream applied- left open to air. Psych ordered to see pt again 3/13. SW consulted for assistance with discharge planning.

## 2023-03-13 NOTE — CONSULTS
Triage and Transition - Consult and Liaison     Kortney Germain  March 14, 2023    The following information was received from Emely, patient's  at [facility]. Information was obtained via phone. Their phone number is # [phone] and they last had contact with patient on prior to admission.     Collateral information:  reports patient's mental health symptoms mostly well managed. They are mostly concerned about her physical health. They states she does refuse a lot of cares or medication, but is usually redirectable., In the evening before dinner, she often is seen yelling out or appearing agitated. They report she is better in the morning but is shaking in pain usually before meds. Not concerned about her returning home on current medication regime, not concerned about scheduled appointment for follow up with psychiatry- info listed below.     What is the patient's legal status (Commitment, probation, guardian, etc.): patient is own decision maker    How does their current level of functioning compare to baseline: Same    Concern about alcohol/drug use? No    Has the patient made any comments about wanting to kill themselves/others: No     What is your treatment plan going forward: Okay with patient coming home- requested that if we are recommending patient to see psychiatrist for follow up soon after discharge, to call back in the morning and ask to speak with Kathe, she is the one that schedules these appoinments. She can be reached at [phone]- I plan to call in the morning (3/14)  to confirm appointment is scheduled.     Confirmed appointment is scheduled with provider, TAMI Menjivar, KATHIE 4/3/2023 11:00 AM      Ayaka Killian [facility]  Triage and Transition - Consult and Liaison   [phone]

## 2023-03-13 NOTE — PLAN OF CARE
Goal Outcome Evaluation:      Plan of Care Reviewed With: patient    Overall Patient Progress: no changeOverall Patient Progress: no change    Outcome Evaluation: Reg diet. Ordering 1-2 meals/d, consumes %. Will keep sending Ensure BID. Encourage PO intake    Juana Alert  Dietetic Intern

## 2023-03-13 NOTE — PROGRESS NOTES
[facility]    Medicine Progress Note - Hospitalist Service    Date of Admission:  3/6/2023    Assessment & Plan   Kortney Germain is a 67 year old female with history of schizophrenia, tardive dyskinesia, HTN, depression, ADD, and UTI who resides in a group home who presented to the ED with diarrhea on 3/6/23.  Patient is actually seen in the ED day PTA for increased shaking from her apparent baseline of tremors as well as ongoing diarrhea.  Work-up was fairly unremarkable and she discharged back to her group home.  EMS was called again due to ongoing diarrhea with abdominal discomfort.  She was found to be fairly dehydrated and this did improve with IVF. She had fevers and initially was felt to have UTI (completed 3 days of cefepime - see below) but had initial urine cx that documented contamination and second that had no growth. Her perphenazine was held due to concern that it may be worsening her tardive dyskinesia and tremors and her fevers have resolved after this was discontinued. Neurology and Psychiatry have also both consulted - see below for further details.    Diarrhea  Dehydration  Fever  Suspected UTI  Patient was seen in ED 3/5 with similar symptoms and fairly unremarkable work up and discharged back to group home. Abdominal discomfort and diarrhea apparently continued and staff summoned EMS again.  Patient denied abdominal pain, bloody or black stool - she was not the best historian. No report of recent abx or hospitalization. No BM activity in ED. Benign abdominal exam, afebrile, and nontoxic.   - C. Difficile, enteric pathogen panel negative   - first UA appeared infected, culture grew >100,000 CFU multiple organisms c/w contamination  - WBC count 14.2 on 3/6, down to 9.9 on 3/10  - given fever on 3/9 and previous urine cx contaminated, repeat U/A C&S (straight cath) - continued to appear infected, however, 2nd urine culture did not grow any organism  - initially on  ceftriaxone; given grossly infected appearing repeat U/A despite CTX and continued significant fevers, switched to cefepime on 3/10 for broader coverage and has had improvements on this so continued through 3/12 (3 doses) based on U/A, clinical status  - will discontinue 3/13 and monitor for any signs of recurrent fever, clinical decline  - CXR 3/9 with increased interstitial opacities which could be edema vs pneumonitis vs other; not hypoxic, denies SOB; repeat CXR 3/12 has resolved  - blood cultures checked on 3/9 and no growth to date  - fever may have been from perphenazine, tremors  - CK level was elevated but now decreasing, no rigidity on exam, vital signs other than temp stable, so NMS less likely, but held perphenazine starting 3/10  - Cont to monitor closely    Right facial droop, left upper and lower extremity weakness, left lower extremity ataxia  RRT called on 3/7 for code stroke   Patient was sitting on commode feeling weak leaning over towards the right side.  RN describes as decreased level of consciousness, however patient did not lose consciousness.  CT and CTA head and neck done returned negative eccept segmental severe stenoses of the right the V3 segment. Focal high-grade stenosis versus short segment occlusion of the distal right V3 segment with reconstitution of normal caliber right vertebral artery immediately prior to piercing the dura.  Multiple mild and moderate stenoses of the right vertebral artery throughout the V2 segment  MRI brain with chronic microvascular ischemic disease, no acute infarct  Neurology consulted - appreciate their input; metabolic workup, consider MRI c-spine (pt with too much motion artifact at this time to successfully complete), anemia workup; not likely stroke  PT/OT  Neurochecks Q 4hours initially, now q8h and remains stable  Also had RRT on 3/11 for Vfib on monitor which was due to tremors; pt had no change in clinical status or loss of pulse  Telemetry  discontinued due to ongoing tremors  Started on regular aspirin       Hyperchloremic hypernatremia  Hypokalemia  Hypophosphatemia  Na 151 on admission and Cl 118.  BUN  38 and Cr 0.58.  Patient was started on LR at 100 mL/h on admission  Switched to D5W at 100ml per hour for Na 152 on 3/7 with potassium of 2.9  Started on potassium replacement protocol  On K Phos replacement protocol   Nephrology consulted and Na correcting as expected so decreased rate to 75ml/hr on 3/9  Discontinued D5W on 3/12 since Na has normalized and is stable  Follow BMP daily     Nontraumatic rhabdomyolysis, mild  Worsening involuntary movements  Reported history of tardive dyskinesia  Patient with frequent muscle flexing posturing at times.  No falls or substantial injuries noted.  History of TD years ago was reportedly primarily in the face.  Patient is awake and alert during these times.  CK elevated at 682 which is greater than 3 times upper limit of normal.  Renal function appears intact although she is quite dehydrated on admission likely from diarrhea as above.  Shaking/spasms could be in part related to dehydration and electrolyte abnormalities as above.  -785  - benadryl and lorazepam attempted in ED due to shaking/spasm like movements, some improvement  -Patient was on perphenazine antipsychotic to help with her schizophrenia which may have been contributing to tardive dyskinesia and elevated CPK so it was held  -Psych consult placed for medical management  -Cont as needed Valium 2mg PO [id number] PRN, as well as prn benadryl to help with tardive dyskinesia and tremors  Perphenazine held initially and after initial Psych consult was restarted at 4mg PO BID; given fever and worsening clinical status over the last several days, held perphenazine starting 3/10 - pt has had some improvement in clinical status; fewer TD movements, still with some tremors  General Neurology consult placed for evaluation and management of tremors;  appreciate their input - f/u with Psych for tx of EPS, consider MRI of c-spine given pt's pattern of weakness, although will not be able to do that with increased involuntary movements currently, metabolic workup in process     History of schizophrenia  History of depression  Group home resident  Acute encephalopathy secondary to metabolic with hypernatremia and delirium with hospitalization  -Patient apparently is her own decision maker, does live in a group home   -PTA Perphenazine discontinued due to increasing tardive dyskinesia tremors and elevated CPK  -As needed Valium ordered  -Patient was agitated earlier in admission needing Ativan and Benadryl and Atarax   -Bedside sitter in place  -Psych consultation requested initially.  Appreciate assistance - they actually restarted pt's perphenazine but given fever and ongoing issues, held starting 3/10  - pt's TD movements have decreased, although still with some tremors  - reconsulted Psych to see pt again on 3/13 for any further psych med recs  - CT/SW consult for assistance with discharge planning     Hypertension  BP acceptable in ED initially.   Blood pressure currently systolics in the 130s     Moderate to severe malnutrition with a BMI of 17.3  Nutrition services consult for supplementation ordering       Diet: Snacks/Supplements Adult: Ensure Enlive; With Meals  Regular Diet Adult  Room Service    DVT Prophylaxis: Pneumatic Compression Devices  Liu Catheter: Not present  Lines: None     Cardiac Monitoring: None  Code Status: Full Code      Clinically Significant Risk Factors                 # Cachexia: Estimated body mass index is 17.26 kg/m  as calculated from the following:    Height as of this encounter: 1.524 m (5').    Weight as of this encounter: 40.1 kg (88 lb 6.4 oz).        Disposition Plan      Expected Discharge Date: 03/15/2023                  Sravani Agarwal MD  Hospitalist Service  [facility]  Text page via [facility]  Paging/Directory   ______________________________________________________________________    Interval History   Pt remains afebrile. More alert and interactive. Feels that mouth movements have resolved, tremors still present intermittently. Denies pain on my exam. Overall feels better than she has - benadryl and ativan most helpful for her tremors by her report.    Physical Exam   Vital Signs: Temp: 98.9  F (37.2  C) Temp src: Axillary BP: (!) 142/79 Pulse: 77   Resp: 18 SpO2: 93 % O2 Device: None (Room air)    Weight: 88 lbs 6.4 oz    Constitutional: thin female lying in bed, chronically-ill appearing, in NAD  HEENT: NC/AT, no scleral icterus, nl conjunctiva, moist oral mucosa, no involuntary tongue/lip movements  Respiratory: good inspiratory effort, lungs CTAB  Cardiovascular: RRR, no M/R/G, 2+ radial pulses, trace LE edema  GI: soft, nondistended  Skin: no rashes, warm, dry  Neuro: CN 2-12 grossly intact, faint resting BUE tremors  MSK:  able to move all 4 exts, contractures throughout  Psych: more alert, interactive, calm, cooperative            Data     I have personally reviewed the following data over the past 24 hrs:    7.5  \   9.4 (L)   / 382     145 107 28.4 (H) /  122 (H)   3.7 27 0.60 \       Imaging results reviewed over the past 24 hrs:   Recent Results (from the past 24 hour(s))   XR Chest Port 1 View    Narrative    EXAM: XR CHEST PORT 1 VIEW  LOCATION: [facility]  DATE/TIME: 3/12/2023 4:23 PM    INDICATION: Prior lung opacities. Evaluate for clearance.  COMPARISON: Chest radiograph 03/09/2023.      Impression    IMPRESSION: Prior interstitial opacities have resolved. No new airspace opacities. No pleural effusions or pneumothorax. Stable, nonenlarged cardiac silhouette. Aortic arch calcifications. Multiple remote healed rib fractures.

## 2023-03-14 NOTE — PROGRESS NOTES
[facility]    Medicine Progress Note - Hospitalist Service    Date of Admission:  3/6/2023    Assessment & Plan   Kortney Germain is a 67 year old female with history of schizophrenia, tardive dyskinesia, HTN, depression, ADD, and UTI who resides in a group home who presented to the ED with diarrhea on 3/6/23.  Patient is actually seen in the ED day PTA for increased shaking from her apparent baseline of tremors as well as ongoing diarrhea.  Work-up was fairly unremarkable and she discharged back to her group home.  EMS was called again due to ongoing diarrhea with abdominal discomfort.  She was found to be fairly dehydrated and this did improve with IVF. She had fevers and initially was felt to have UTI (completed 3 days of cefepime - see below) but had initial urine cx that documented contamination and second that had no growth. Her perphenazine was held due to concern that it may be worsening her tardive dyskinesia and tremors and her fevers have resolved after this was discontinued. Neurology and Psychiatry have also both consulted - see below for further details.     Diarrhea  Dehydration  Fever  Suspected UTI  Patient was seen in ED 3/5 with similar symptoms and fairly unremarkable work up and discharged back to group home. Abdominal discomfort and diarrhea apparently continued and staff summoned EMS again.  Patient denied abdominal pain, bloody or black stool - she was not the best historian. No report of recent abx or hospitalization. No BM activity in ED. Benign abdominal exam, afebrile, and nontoxic.   - C. Difficile, enteric pathogen panel negative   - first UA appeared infected, culture grew >100,000 CFU multiple organisms c/w contamination  - WBC count 14.2 on 3/6, down to 9.9 on 3/10  - given fever on 3/9 and previous urine cx contaminated, repeat U/A C&S (straight cath) - continued to appear infected, however, 2nd urine culture did not grow any organism  - initially on  ceftriaxone; given grossly infected appearing repeat U/A despite CTX and continued significant fevers, switched to cefepime on 3/10 for broader coverage and has had improvements on this so continued through 3/12 (3 doses) based on U/A, clinical status  - will discontinue 3/13 and monitor for any signs of recurrent fever, clinical decline  - CXR 3/9 with increased interstitial opacities which could be edema vs pneumonitis vs other; not hypoxic, denies SOB; repeat CXR 3/12 has resolved  - blood cultures checked on 3/9 and no growth to date  - fever may have been from perphenazine, tremors  - CK level was elevated but now decreasing, no rigidity on exam, vital signs other than temp stable, so NMS less likely, but held perphenazine starting 3/10  - 3/14 - afebrile today, no new symptoms  - Cont to monitor closely     Right facial droop, left upper and lower extremity weakness, left lower extremity ataxia  RRT called on 3/7 for code stroke   Patient was sitting on commode feeling weak leaning over towards the right side.  RN describes as decreased level of consciousness, however patient did not lose consciousness.  CT and CTA head and neck done returned negative eccept segmental severe stenoses of the right the V3 segment. Focal high-grade stenosis versus short segment occlusion of the distal right V3 segment with reconstitution of normal caliber right vertebral artery immediately prior to piercing the dura.  Multiple mild and moderate stenoses of the right vertebral artery throughout the V2 segment  - MRI brain with chronic microvascular ischemic disease, no acute infarct  - Neurology consulted - appreciate their input; metabolic workup, consider MRI c-spine (pt with too much motion artifact at this time to successfully complete), anemia workup; not likely stroke  - PT/OT  - Neurochecks Q 4hours initially, now q8h and remains stable  - Also had RRT on 3/11 for Vfib on monitor which was due to tremors; pt had no change  in clinical status or loss of pulse  - Telemetry discontinued due to ongoing tremors  - Started on regular aspirin    - 3/14 - no new findings concerning for stroke     Hyperchloremic hypernatremia  Hypokalemia  Hypophosphatemia  Na 151 on admission and Cl 118.  BUN  38 and Cr 0.58.  Patient was started on LR at 100 mL/h on admission  - Switched to D5W at 100ml per hour for Na 152 on 3/7 with potassium of 2.9  - Started on potassium replacement protocol  - On K Phos replacement protocol   - Nephrology consulted and Na correcting as expected so decreased rate to 75ml/hr on 3/9  - Discontinued D5W on 3/12 since Na has normalized and is stable  - 3/14 - de-escalate daily labs and protocols as long as she is eating  - BMP in AM 3/15     Nontraumatic rhabdomyolysis, mild  Worsening involuntary movements  Reported history of tardive dyskinesia  Patient with frequent muscle flexing posturing at times.  No falls or substantial injuries noted.  History of TD years ago was reportedly primarily in the face.  Patient is awake and alert during these times.  CK elevated at 682 which is greater than 3 times upper limit of normal.  Renal function appears intact although she is quite dehydrated on admission likely from diarrhea as above.  Shaking/spasms could be in part related to dehydration and electrolyte abnormalities as above.  -785  - benadryl and lorazepam attempted in ED due to shaking/spasm like movements, some improvement  - Patient was on perphenazine antipsychotic to help with her schizophrenia which may have been contributing to tardive dyskinesia and elevated CPK so it was held  - Psych consult placed for medical management  - Cont as needed Valium 2mg PO [id number] PRN, as well as prn benadryl to help with tardive dyskinesia and tremors  - Perphenazine held initially and after initial Psych consult was restarted at 4mg PO BID; given fever and worsening clinical status over the last several days, held perphenazine  starting 3/10 - pt has had some improvement in clinical status; fewer TD movements, still with some tremors  - General Neurology consult placed for evaluation and management of tremors; appreciate their input - f/u with Psych for tx of EPS, consider MRI of c-spine given pt's pattern of weakness, although will not be able to do that with increased involuntary movements currently, metabolic workup in process  - 3/14 - appreciate psychiatry assistance as below     History of schizophrenia  History of depression  Group home resident  Acute encephalopathy secondary to metabolic with hypernatremia and delirium with hospitalization  - Patient apparently is her own decision maker, does live in a group home   - PTA Perphenazine discontinued due to increasing tardive dyskinesia tremors and elevated CPK  - As needed Valium ordered  - Patient was agitated earlier in admission needing Ativan and Benadryl and Atarax   - Bedside sitter in place previously  - Psych consultation requested initially.  Appreciate assistance - they actually restarted pt's perphenazine but given fever and ongoing issues, held starting 3/10  - pt's TD movements have decreased, although still with some tremors  - reconsulted Psych to see pt again on 3/13 for any further psych med recs  -- appreciate updates from psych when available - they are looking to obtain further outside records as of 3/13 - 3/14  - CT/SW consult for assistance with discharge planning     Hypertension  BP acceptable in ED initially.   Blood pressure currently systolics in the 130s     Moderate to severe malnutrition with a BMI of 17.3  Nutrition services consult for supplementation ordering        Diet: Snacks/Supplements Adult: Ensure Enlive; With Meals  Regular Diet Adult  Room Service    DVT Prophylaxis: Pneumatic Compression Devices  Liu Catheter: Not present  Lines: None     Cardiac Monitoring: None  Code Status: Full Code      Clinically Significant Risk Factors         #  Hypernatremia: Highest Na = 146 mmol/L in last 2 days, will monitor as appropriate                # Cachexia: Estimated body mass index is 17.26 kg/m  as calculated from the following:    Height as of this encounter: 1.524 m (5').    Weight as of this encounter: 40.1 kg (88 lb 6.4 oz).   # Moderate Malnutrition: based on nutrition assessment        Disposition Plan     Expected Discharge Date: 03/15/2023                  Toni Cantu MD  Hospitalist Service  [facility]  Securely message with [employer] (more info)  Text page via [facility] Paging/Directory   ______________________________________________________________________    Interval History   Care assumed today. Familiar to me from early in adm.  Seen and examined. No new major complaints/issues.  Tremor/jerks ongoing but reportedly somewhat improved. No sob, fevers/chills, or new pain.        Physical Exam   Vital Signs: Temp: 97.8  F (36.6  C) Temp src: Oral BP: 121/68 Pulse: 62   Resp: 16 SpO2: 95 % O2 Device: None (Room air)    Weight: 88 lbs 6.4 oz    Gen: NAD, pleasant  HEENT: EOMI, MMM  Resp: no focal crackles,  no wheezes, no increased work of resp  CV: S1S2 heard, reg rhythm, reg rate  Abdo: soft, nontender, nondistended, bowel sounds present  Ext: calves nontender, well perfused  Neuro: aa, conversing, jerks throughout persist, CN grossly intact, no facial asymmetry      Medical Decision Making       44 MINUTES SPENT BY ME on the date of service doing chart review, history, exam, documentation & further activities per the note.      Data     I have personally reviewed the following data over the past 24 hrs:    6.5  \   8.9 (L)   / 385     146 (H) 110 (H) 33.0 (H) /  96   4.0 28 0.54 \       Imaging results reviewed over the past 24 hrs:   No results found for this or any previous visit (from the past 24 hour(s)).

## 2023-03-14 NOTE — PROGRESS NOTES
Reason for Admission:  Diarrhea, increased tremmors  Cognitive/Mentation: Oriented to self  Neuros/CMS: No neuro changes overnight -neuro checks q8hrs  VS: Slight HTN, other VSS  GI: BS active, + flatus,   : Voiding w/o concerns, incontinent of B/B  Pulmonary: LS clear.  Pain: Reports generalized pain, prn oxycodone given      Drains/Lines: PIV SL  Skin: intact   Activity: A2/GB/W  Diet:  Regular diet  Therapies recs: Pending  Discharge:  Pending clinical improvement      End of shift summary: Patient had episodes of increase tremors. Prn Valium given without effect, 25 mg of prn benadryl iv given with some effect. Remained on long arm for safety. No signs of SOB noted.

## 2023-03-14 NOTE — PROGRESS NOTES
"   03/14/23 0932   Appointment Info   Signing Clinician's Name / Credentials (SLP) Jade Flaherty MS CCC-SLP   General Information   Onset of Illness/Injury or Date of Surgery 03/06/23   Patient/Family Therapy Goal Statement (SLP) Ativan   Pertinent History of Current Problem \"Kortney Germain is a 67 year old female with history of schizophrenia, tardive dyskinesia, HTN, depression, ADD, and UTI who resides in a group home who presented to the ED with diarrhea on 3/6/23.  Patient is actually seen in the ED day PTA for increased shaking from her apparent baseline of tremors as well as ongoing diarrhea.  Work-up was fairly unremarkable and she discharged back to her group home.  EMS was called again due to ongoing diarrhea with abdominal discomfort.  She was found to be fairly dehydrated and this did improve with IVF. She had fevers and initially was felt to have UTI (completed 3 days of cefepime - see below) but had initial urine cx that documented contamination and second that had no growth. Her perphenazine was held due to concern that it may be worsening her tardive dyskinesia and tremors and her fevers have resolved after this was discontinued. Neurology and Psychiatry have also both consulted - see below for further details.\"   General Observations No hx of dysphagia symptoms   Type of Evaluation   Type of Evaluation Swallow Evaluation   Oral Motor   Oral Musculature unable to assess due to poor participation/comprehension   Vocal Quality/Secretion Management (Oral Motor)   Vocal Quality (Oral Motor) hypophonic;hoarse;harsh   Secretion Management (Oral Motor) WNL   General Swallowing Observations   Past History of Dysphagia No documented or reported hx   Respiratory Support (General Swallowing Observations) none   Current Diet/Method of Nutritional Intake (General Swallowing Observations, NIS) regular diet;thin liquids (level 0)  (tolerating small amounts since admission per notes)   Swallowing Evaluation Clinical " swallow evaluation   Clinical Swallow Evaluation   Feeding Assistance dependent   Swallowing Recommendations   Diet Consistency Recommendations regular diet;thin liquids (level 0)   Supervision Level for Intake 1:1 supervision needed   Mode of Delivery Recommendations bolus size, small;slow rate of intake;food moistened   Swallowing Maneuver Recommendations alternate food and liquid intake   Monitoring/Assistance Required (Eating/Swallowing) check mouth frequently for oral residue/pocketing;cue for finger/lingual sweep if oral pocketing present;stop eating activities when fatigue is present;monitor for cough or change in vocal quality with intake   Recommended Feeding/Eating Techniques (Swallow Eval) maintain upright sitting position for eating;maintain upright posture during/after eating for 30 minutes   Medication Administration Recommendations, Swallowing (SLP) in puree carrier or crushed as needed   Clinical Impression   Criteria for Skilled Therapeutic Interventions Met (SLP Eval) Current level of function same as previous level of function   Risks & Benefits of therapy have been explained evaluation/treatment results reviewed;care plan/treatment goals reviewed;risks/benefits reviewed;current/potential barriers reviewed;participants voiced agreement with care plan;participants included;patient   Clinical Impression Comments SLP: Pt alert and following conversation, but expressed discomfort with increase in tremors. Pt denied any pharyngeal dysphagia symptoms or s/sx of aspiration. Limited intake reported d/t movement and prolonged mastication/difficulty coordinating. Pt accepted chips at bedside with prolonged mastication and liquid wash required to clear. No overt s/sx of aspiration noted. Pt reported that she is at her baseline and declined need for softer foods or SLP follow up. RN notified of pt request for Ativan. Will complete IP SLP orders. Consider home health SLP to assess in pt's home environment and  train pt/caregivers. Continue following with RD.   SLP Total Evaluation Time   Eval: oral/pharyngeal swallow function, clinical swallow Minutes (82151) 10   SLP Discharge Planning   SLP Plan DC   SLP Discharge Recommendation home with home care speech therapy   SLP Rationale for DC Rec Consider home health SLP to assess in pt's home environment and train pt/caregivers. Continue following with RD.   SLP Brief overview of current status  Continue regular diet/selection of softer foods (pt like pancakes) with a sip of liquid after every bite   Total Session Time   Total Session Time (sum of timed and untimed services) 10

## 2023-03-14 NOTE — PLAN OF CARE
0700-1930H  AO to self and to place. A1GBW. R PIV SL. Regular diet with good appetite. Noted tremors, PRN Valium given 1x.. Complained of Neck pain, PRN Tylenol and Oxy given 1x.  No BM this shift. Wound care done in the coccyx area, opened to air. Bladder/Bowel incontinent. Noted abrasions to right ankle, cleansed and applied mepilex. Seen by Dr Cantu, daily labs discontinued. Repeat BMP in AM. Seen by PT and ambulated in the hallway. Discharge pending overall improvement.

## 2023-03-14 NOTE — PLAN OF CARE
Goal Outcome Evaluation:    [id number]    A/Ox4, VSS. Slept for most of shift. Denies pain. Up A2 GB+W. Regular diet. Took meds whole in pudding. One episode of urinary incontinence. Open skin wounds to bottom, triad paste applied. PIV SL.

## 2023-03-15 NOTE — PROGRESS NOTES
[facility] Nurse Inpatient Assessment      Consulted for: wounds on buttocks          Areas Assessed:       Areas visualized during today's visit: Focused: and Sacrum/coccyx     Wound location: buttock     Last photo: 3/15  Wound due to: Moisture Associated Skin Damage (MASD) with pressure and friction components   Wound history/plan of care: found with sheets and gown moist with sweat, found wearing diaper, found wearing mepilex   Wound base: 100 % dermis     Palpation of the wound bed: normal      Drainage: scant to none, sweat noted      Description of drainage: none noted      Measurements (length x width x depth, in cm): largest now to right buttock 0.3 x 0.3cm x 0.1cm     Tunneling: N/A     Undermining: N/A  Periwound skin: Irritant dermatitis resolved, intact 3/15      Color: normal and consistent with surrounding tissue      Temperature: normal   Odor: none  Pain: no grimacing or signs of discomfort, none  Pain interventions prior to dressing change: patient tolerated well  Treatment goal: Heal  and Decrease moisture  STATUS: improved  Supplies ordered: supplies stored on unit        Treatment Plan:       Wound care  EVERY SHIFT        Comments: Location: buttock and perineal   Care: provided qshift by primary RN   1. Cleanse with each incontinence episode with wei cleanser and soft dry wipes   2. Apply Triad paste ( #[id number]) over wounds and perianal   3. Do not apply diapers   4. Do not apply mepilex for this patients buttock wound plan   5. Apply covidien underpads for incontinence, ok to toilet patient or use purewick          Wound care  EVERY SHIFT        Comments: Location: skin care in general, to prevent formation of friction and pressure injuries   Care: provided qshift by primary RN   1. Change patient sheets and gown qshift for sweating and incontinence, to reduce friction injuries   2. Do not apply diapers while in bed, use covidien under pad and toilet patient  "as needed, change covidien pad qshift due to sweating, to reduce moisture and friction injuries   3. Pad BLE with pillows to prevent injuries from spasms         Skin care precautions  EFFECTIVE NOW        Comments: Pressure Injury Prevention (PIP) Plan:   If patient is declining pressure injury prevention interventions: Explore reason why and address patient's concerns, Educate on pressure injury risk and prevention intervention(s), If patient is still declining, document \"informed refusal\" , and Ensure Care team is aware ( provider, charge nurse, etc)   Mattress: Follow bed algorithm, reassess daily and order specialty mattress, if indicated.   HOB: Maintain at or below 30 degrees, unless contraindicated   Repositioning in bed: Every 1-2 hours , Left/right positioning; avoid supine, Raise foot of bed prior to raising head of bed, to reduce patient sliding down (shear), and Frequent microturns using TAPS wedges, as patient tolerates   Heels: Keep elevated off mattress and Pillows under calves   Protective Dressing: please see orders for Triad paste, separate care order   Chair positioning: Chair cushion (#[id number]) , Assist patient to reposition hourly, and Do NOT use a donut for sitting (this increases pressure to smaller area and creates a higher potential for injury)     If patient has a buttock pressure injury, or high risk for PI use chair cushion or SPS.   Moisture Management: Perineal cleansing /protection: Follow Incontinence Protocol, Avoid brief in bed, Clean and dry skin folds with bathing , and Moisturize dry skin   Under Devices: Inspect skin under all medical devices during skin inspection , Ensure tubes are stabilized without tension, and Ensure patient is not lying on medical devices or equipment when repositioned   Ask provider to discontinue device when no longer needed.             Orders: reviewed, updated      RECOMMEND PRIMARY TEAM ORDER: None, at this time  Education provided: importance of " "repositioning, plan of care, Moisture management, Hygiene and Off-loading pressure  Discussed plan of care with: Patient and Nurse  WO nurse follow-up plan: weekly  Notify WOC if wound(s) deteriorate.  Nursing to notify the Provider(s) and re-consult the WOC Nurse if new skin concern.     DATA:      Current support surface: Standard  Standard gel/foam mattress (IsoFlex, Atmos air, etc)  Containment of urine/stool: Incontinence Protocol  BMI: Body mass index is 17.3 kg/m .   Active diet order: Orders Placed This Encounter      Regular Diet Adult     Output: I/O last 3 completed shifts:  In: 1046 [P.O.:120; I.V.:926]  Out: -           Labs: Recent Labs   Lab 03/06/23  1413   ALBUMIN 4.1   HGB 9.9*   WBC 14.2*      Pressure injury risk assessment:   Sensory Perception: 3-->slightly limited  Moisture: 3-->occasionally moist  Activity: 2-->chairfast  Mobility: 2-->very limited  Nutrition: 2-->probably inadequate  Friction and Shear: 1-->problem  Lius Score: 13     Anisa CWOCN   1st: [employer], call \"Anisa Fabian\" or call Group \"[facility]\"   (2nd option: leave a voicemail at Dept. Office Number: [phone]. Messages checked occasionally M-F)      "

## 2023-03-15 NOTE — PLAN OF CARE
Goal Outcome Evaluation:       A/Ox4. VSS on RA. Tremors/tardive dyskinesia. Pt states was worse this morning than yesterday.. moderately resolved w/ morning medications. PRN valium.  Denies pain. Regular diet- needs assistance eating. Incontinent @ times- incontinent cares use triad paste and no briefs. DIC pending

## 2023-03-15 NOTE — PROGRESS NOTES
Documentation of Face to Face and Certification for Home Health Services    I certify that patient: Kortney Germain is under my care and that I, or a nurse practitioner or physician's assistant working with me, had a face-to-face encounter that meets the physician face-to-face encounter requirements with this patient on: 3/15/2023.    This encounter with the patient was in whole, or in part, for the following medical condition, which is the primary reason for home health care: severe tardive dyskinesia.    I certify that, based on my findings, the following services are medically necessary home health services: Physical Therapy and OT    My clinical findings support the need for the above services because: Physical Therapy Services are needed to assess and treat the following functional impairments: Tardive dyskinesia.    Further, I certify that my clinical findings support that this patient is homebound (i.e. absences from home require considerable and taxing effort and are for medical reasons or Spiritism services or infrequently or of short duration when for other reasons) because: Leaving home is medically contraindicated for the following reason(s): Dyspnea on exertion that makes it so they cannot leave their home for needed services without clinical deterioration...    Based on the above findings. I certify that this patient is confined to the home and needs intermittent skilled nursing care, physical therapy and/or speech therapy.  The patient is under my care, and I have initiated the establishment of the plan of care.  This patient will be followed by a physician who will periodically review the plan of care.  Physician/Provider to provide follow up care: Clinic, [facility]    Attending hospital physician (the Medicare certified EUGEEN provider): Rocky Nguyen MD  Physician Signature: See electronic signature associated with these discharge orders.  Date: 3/15/2023

## 2023-03-15 NOTE — PROGRESS NOTES
Care Management Follow Up    Length of Stay (days): 8    Expected Discharge Date: 03/15/2023     Concerns to be Addressed:       Patient plan of care discussed at interdisciplinary rounds: Yes    Anticipated Discharge Disposition: Group Home     Anticipated Discharge Services: None  Anticipated Discharge DME: None    Patient/family educated on Medicare website which has current facility and service quality ratings: no  Education Provided on the Discharge Plan:    Patient/Family in Agreement with the Plan: yes    Referrals Placed by CM/SW: Internal Clinic Care Coordination, Transportation, Communication hand-offs to next level of Care Providers  Private pay costs discussed: Not applicable    Additional Information:  Per MD Pt is ready for discharge.  Notes indicated back to  with [facility] PT/OT.     CC called  Manager Emely [phone] who stated Pt can return.  Ideally tomorrow before 12 noon.   Facility does not have fax so just send orders with.  New Meds to [facility] (in epic)  They are fine with any agency.     Referral sent to [facility] via Hub/standard process    [facility] (due to tardive dyskinesia) for 10am.3/16 (Brenda)  PCS on chart.     MD paged to update of discharge time, med location, and need for C orders with [facility].    Bedside RN updated.         Chasity Mckeon, RN

## 2023-03-15 NOTE — PROGRESS NOTES
[facility]  Hospitalist Progress Note  Rocky Nguyen MD  03/15/2023    Assessment & Plan   Kortney Germain is a 67 year old female with history of schizophrenia, tardive dyskinesia, HTN, depression, ADD, and UTI who resides in a group home who presented to the ED with diarrhea on 3/6/23.  Patient is actually seen in the ED day PTA for increased shaking from her apparent baseline of tremors as well as ongoing diarrhea.  Work-up was fairly unremarkable and she discharged back to her group home.  EMS was called again due to ongoing diarrhea with abdominal discomfort.  She was found to be fairly dehydrated and this did improve with IVF. She had fevers and initially was felt to have UTI (completed 3 days of cefepime - see below) but had initial urine cx that documented contamination and second that had no growth. Her perphenazine was held due to concern that it may be worsening her tardive dyskinesia and tremors and her fevers have resolved after this was discontinued. Neurology and Psychiatry have also both consulted - see below for further details.     Diarrhea  Dehydration  Fever  Suspected UTI  Patient was seen in ED 3/5 with similar symptoms and fairly unremarkable work up and discharged back to group home. Abdominal discomfort and diarrhea apparently continued and staff summoned EMS again.  Patient denied abdominal pain, bloody or black stool - she was not the best historian. No report of recent abx or hospitalization. No BM activity in ED. Benign abdominal exam, afebrile, and nontoxic.   - C. Difficile, enteric pathogen panel negative   - first UA appeared infected, culture grew >100,000 CFU multiple organisms c/w contamination  - WBC count 14.2 on 3/6, down to 9.9 on 3/10  - given fever on 3/9 and previous urine cx contaminated, repeat U/A C&S (straight cath) - continued to appear infected, however, 2nd urine culture did not grow any organism  - initially on ceftriaxone; given  grossly infected appearing repeat U/A despite CTX and continued significant fevers, switched to cefepime on 3/10 for broader coverage and has had improvements on this so continued through 3/12 (3 doses) based on U/A, clinical status  - will discontinue 3/13 and monitor for any signs of recurrent fever, clinical decline  - CXR 3/9 with increased interstitial opacities which could be edema vs pneumonitis vs other; not hypoxic, denies SOB; repeat CXR 3/12 has resolved  - blood cultures checked on 3/9 and no growth to date  - fever may have been from perphenazine, tremors  - CK level was elevated but now decreasing, no rigidity on exam, vital signs other than temp stable, so NMS less likely      Right facial droop, left upper and lower extremity weakness, left lower extremity ataxia  Right vertebral artery stenosis  RRT called on 3/7 for code stroke   Patient was sitting on commode feeling weak leaning over towards the right side.  RN describes as decreased level of consciousness, however patient did not lose consciousness.  CT and CTA head and neck done returned negative eccept segmental severe stenoses of the right the V3 segment. Focal high-grade stenosis versus short segment occlusion of the distal right V3 segment with reconstitution of normal caliber right vertebral artery immediately prior to piercing the dura.  Multiple mild and moderate stenoses of the right vertebral artery throughout the V2 segment  - MRI brain with chronic microvascular ischemic disease, no acute infarct  - Neurology consulted - appreciate their input; metabolic workup, consider MRI c-spine (pt with too much motion artifact at this time to successfully complete), anemia workup; not likely stroke  - PT/OT  - Neurochecks Q 4hours initially, now q8h and remains stable  - Also had RRT on 3/11 for Vfib on monitor which was due to tremors; pt had no change in clinical status or loss of pulse  - Telemetry discontinued due to ongoing tremors  -  Started on regular aspirin    -  no new findings concerning for stroke      Hyperchloremic hypernatremia  Hypokalemia  Hypophosphatemia  Na 151 on admission and Cl 118.  BUN  38 and Cr 0.58.  Patient was started on LR at 100 mL/h on admission  - Switched to D5W at 100ml per hour for Na 152 on 3/7 with potassium of 2.9  - Started on potassium replacement protocol  - On K Phos replacement protocol   - Nephrology consulted and Na correcting as expected so decreased rate to 75ml/hr on 3/9  - Discontinued D5W on 3/12 since Na has normalized and is stable  - Na 145, encourage free water     Nontraumatic rhabdomyolysis, mild  Worsening involuntary movements  Reported history of tardive dyskinesia  Patient with frequent muscle flexing posturing at times.  No falls or substantial injuries noted.  History of TD years ago was reportedly primarily in the face.  Patient is awake and alert during these times.  CK elevated at 682 which is greater than 3 times upper limit of normal.  Renal function appears intact although she is quite dehydrated on admission likely from diarrhea as above.  Shaking/spasms could be in part related to dehydration and electrolyte abnormalities as above.  -785  - benadryl and lorazepam attempted in ED due to shaking/spasm like movements, some improvement  - Patient was on perphenazine antipsychotic to help with her schizophrenia which may have been contributing to tardive dyskinesia and elevated CPK so it was held  - Psych consult placed for medical management  - Cont as needed Valium 2mg PO [id number] PRN, as well as prn benadryl to help with tardive dyskinesia and tremors  - Perphenazine held initially and after initial Psych consult was restarted at 4mg PO BID; given fever and worsening clinical status over the last several days, held perphenazine starting 3/10 - pt has had some improvement in clinical status; fewer TD movements, still with some tremors.   - General Neurology consult placed for  evaluation and management of tremors; appreciate their input - f/u with Psych for tx of EPS, consider MRI of c-spine given pt's pattern of weakness, although will not be able to do that with increased involuntary movements currently, metabolic workup in process  - 3/14 - appreciate psychiatry assistance as below, currently holding perphenazine and will discuss with Group home to see if she can be discharged without the antipsychotic.  Called and left a message to Emely, the  manager.     History of schizophrenia  History of depression  Group home resident  Acute encephalopathy secondary to metabolic with hypernatremia and delirium with hospitalization  - Patient apparently is her own decision maker, does live in a group home   - PTA Perphenazine discontinued due to increasing tardive dyskinesia tremors and elevated CPK  - As needed Valium ordered while inpatient  - Patient was agitated earlier in admission needing Ativan and Benadryl and Atarax   - Bedside sitter in place previously  - Psych consultation requested initially.  Appreciate assistance - they actually restarted pt's perphenazine but given fever and ongoing issues, held starting 3/10  - pt's TD movements have decreased, although still with some tremors  - CT/SW consult for assistance with discharge planning  - perphenazine currently on hold.     Hypertension  BP acceptable in ED initially.   Blood pressure currently systolics in the 130s     Moderate to severe malnutrition with a BMI of 17.3  Nutrition services consult for supplementation ordering    Diet: Snacks/Supplements Adult: Ensure Enlive; With Meals  Regular Diet Adult  Room Service    DVT Prophylaxis: Pneumatic Compression Devices  Liu Catheter: Not present  Lines: None     Cardiac Monitoring: None  Code Status: Full Code       Clinically Significant Risk Factors      # Hypernatremia: Highest Na = 146 mmol/L in last 2 days, will monitor as appropriate                # Cachexia: Estimated body  mass index is 17.26 kg/m  as calculated from the following:    Height as of this encounter: 1.524 m (5').    Weight as of this encounter: 40.1 kg (88 lb 6.4 oz).   # Moderate Malnutrition: based on nutrition assessment      Disposition Plan   -- back to group home     Expected Discharge Date: 03/16/2023                 Interval History   -- chart reviewed  -- patient feels ready for discharge  -- discussed with RN and social work    -Data reviewed today: I reviewed all new labs and imaging over the last 24 hours. I personally reviewed no images or EKG's today.    Physical Exam    , Blood pressure (!) 146/88, pulse 88, temperature 97.8  F (36.6  C), temperature source Axillary, resp. rate 20, height 1.524 m (5'), weight 41 kg (90 lb 6.2 oz), SpO2 92 %.  Vitals:    03/09/23 0515 03/11/23 0520 03/15/23 0500   Weight: 44 kg (97 lb 1.6 oz) 40.1 kg (88 lb 6.4 oz) 41 kg (90 lb 6.2 oz)     Vital Signs with Ranges  Temp:  [97.5  F (36.4  C)-98.6  F (37  C)] 97.8  F (36.6  C)  Pulse:  [62-88] 88  Resp:  [16-20] 20  BP: (120-146)/(64-88) 146/88  SpO2:  [92 %-96 %] 92 %  I/O's Last 24 hours  I/O last 3 completed shifts:  In: 1320 [P.O.:1320]  Out: 200 [Urine:200]    Constitutional: Awake, alert, cooperative, no apparent distress  Respiratory: Clear to auscultation bilaterally, no crackles or wheezing  Cardiovascular: Regular rate and rhythm, normal S1 and S2, and no murmur noted  GI: Normal bowel sounds, soft, non-distended, non-tender  Skin/Integumen: No rashes, no cyanosis, no edema  Other: Severe TD involving her whole trunk    Medications   All medications were reviewed.      aspirin  325 mg Oral Daily     escitalopram  20 mg Oral Daily     hydrOXYzine  25 mg Oral TID     LORazepam  0.25 mg Oral At Bedtime     LORazepam  0.5 mg Oral BID     miconazole   Topical BID     NIFEdipine ER  30 mg Oral Daily     [Held by provider] perphenazine  4 mg Oral QAM     [Held by provider] perphenazine  8 mg Oral At Bedtime      senna-docusate  1 tablet Oral BID    Or     senna-docusate  2 tablet Oral BID     sodium chloride (PF)  3 mL Intracatheter Q8H        Data   Recent Labs   Lab 03/15/23  1101 03/14/23  0819 03/13/23  0959 03/12/23  0757   WBC  --  6.5 7.5 7.6   HGB  --  8.9* 9.4* 9.6*   MCV  --  89 89 89   PLT  --  385 382 383    146* 145 142   POTASSIUM 4.0 4.0 3.7 3.9   CHLORIDE 108* 110* 107 104   CO2 27 28 27 28   BUN 35.9* 33.0* 28.4* 21.9   CR 0.57 0.54 0.60 0.52   ANIONGAP 10 8 11 10   LULU 8.7* 8.8 9.0 9.0   * 96 122* 96       No results found for this or any previous visit (from the past 24 hour(s)).    Rocky Nguyen MD  Text Page  (7am to 6pm)

## 2023-03-15 NOTE — PLAN OF CARE
Pt was A/Ox4. Pain to neck was improved with scheduled medication. Baseline tremors noted. Pt was able to make needs known, voiding in BSC, but incontinent while sleeping. Small sacral/coccyx wound, pink/white. Mepilex to R foot. Pt states baseline ambulation is ind/walker. Per mental health note, group home is not concerned about her returning home on current meds or additional psych follow up.

## 2023-03-16 NOTE — PLAN OF CARE
Shift Note:    Opens eye spontaneously, has tremors this morning involving both upper and lower limbs. Tab Valium 2 mg was given, blood pressure checked this morning was 166/92, other vitals are stable. The chest is clear, heart sounds are normal, bowel sound active.

## 2023-03-16 NOTE — DISCHARGE SUMMARY
[facility]  Hospitalist Discharge Summary      Date of Admission:  3/6/2023  Date of Discharge:  3/16/2023 10:29 AM  Discharging Provider: Zach Bowman MD  Discharge Service: Hospitalist Service    Discharge Diagnoses     Diarrhea  Dehydration  Fever  Suspected UTI    Follow-ups Needed After Discharge   Follow-up Appointments     Follow Up and recommended labs and tests      Follow up with psychiatrist as outpatient.         Follow-up and recommended labs and tests       No renal follow-up necessary                Unresulted Labs Ordered in the Past 30 Days of this Admission     No orders found from 2/4/2023 to 3/7/2023.        Discharge Disposition     Discharged to group home  Condition at discharge: Stable    Hospital Course      Kortney Germain is a 67 year old female with history of schizophrenia, tardive dyskinesia, HTN, depression, ADD, and UTI who resides in a group home who presented to the ED with diarrhea on 3/6/23.  Patient is actually seen in the ED day PTA for increased shaking from her apparent baseline of tremors as well as ongoing diarrhea.  Work-up was fairly unremarkable and she discharged back to her group home.  EMS was called again due to ongoing diarrhea with abdominal discomfort.  She was found to be fairly dehydrated and this did improve with IVF. She had fevers and initially was felt to have UTI (completed 3 days of cefepime - see below) but had initial urine cx that documented contamination and second that had no growth. Her perphenazine was held due to concern that it may be worsening her tardive dyskinesia and tremors and her fevers have resolved after this was discontinued. Neurology and Psychiatry have also both consulted - see below for further details.     Diarrhea  Dehydration  Fever  Suspected UTI  Patient was seen in ED 3/5 with similar symptoms and fairly unremarkable work up and discharged back to group home. Abdominal discomfort and diarrhea apparently  continued and staff summoned EMS again.  Patient denied abdominal pain, bloody or black stool - she was not the best historian. No report of recent abx or hospitalization. No BM activity in ED. Benign abdominal exam, afebrile, and nontoxic.   - C. Difficile, enteric pathogen panel negative   - first UA appeared infected, culture grew >100,000 CFU multiple organisms c/w contamination  - WBC count 14.2 on 3/6, down to 9.9 on 3/10  - given fever on 3/9 and previous urine cx contaminated, repeat U/A C&S (straight cath) - continued to appear infected, however, 2nd urine culture did not grow any organism  - initially on ceftriaxone; given grossly infected appearing repeat U/A despite CTX and continued significant fevers, switched to cefepime on 3/10 for broader coverage and has had improvements on this so continued through 3/12 (3 doses) based on U/A, clinical status -> discontinued 3/13  - CXR 3/9 with increased interstitial opacities which could be edema vs pneumonitis vs other; not hypoxic, denies SOB; repeat CXR 3/12 has resolved  - blood cultures checked on 3/9 and no growth to date  - fever may have been from perphenazine, tremors  - CK level was elevated but now decreasing, no rigidity on exam, vital signs other than temp stable, so NMS less likely      Right facial droop, left upper and lower extremity weakness, left lower extremity ataxia  Right vertebral artery stenosis  RRT called on 3/7 for code stroke   Patient was sitting on commode feeling weak leaning over towards the right side.  RN describes as decreased level of consciousness, however patient did not lose consciousness.  CT and CTA head and neck done returned negative eccept segmental severe stenoses of the right the V3 segment. Focal high-grade stenosis versus short segment occlusion of the distal right V3 segment with reconstitution of normal caliber right vertebral artery immediately prior to piercing the dura.  Multiple mild and moderate stenoses of  the right vertebral artery throughout the V2 segment  - MRI brain with chronic microvascular ischemic disease, no acute infarct  - Neurology was consulted - appreciate their input; metabolic workup, consider MRI c-spine (pt with too much motion artifact at this time to successfully complete), anemia workup; not likely stroke  - Also had RRT on 3/11 for Vfib on monitor which was due to tremors; pt had no change in clinical status or loss of pulse  - Started on regular aspirin    -  no new findings concerning for stroke      Hyperchloremic hypernatremia  Hypokalemia  Hypophosphatemia  Na 151 on admission and Cl 118.  BUN  38 and Cr 0.58.  Patient was started on LR at 100 mL/h on admission  - Switched to D5W at 100ml per hour for Na 152 on 3/7 with potassium of 2.9  - Nephrology was consulted and Na correcting as expected so decreased rate to 75ml/hr on 3/9  - Discontinued D5W on 3/12 since Na has normalized and is stable     Nontraumatic rhabdomyolysis, mild  Worsening involuntary movements  Reported history of tardive dyskinesia  Patient with frequent muscle flexing posturing at times.  No falls or substantial injuries noted.  History of TD years ago was reportedly primarily in the face.  Patient is awake and alert during these times.  CK elevated at 682 which is greater than 3 times upper limit of normal.  Renal function appears intact although she is quite dehydrated on admission likely from diarrhea as above.  Shaking/spasms could be in part related to dehydration and electrolyte abnormalities as above.  -785  - benadryl and lorazepam attempted in ED due to shaking/spasm like movements, some improvement  - Patient was on perphenazine antipsychotic to help with her schizophrenia which may have been contributing to tardive dyskinesia and elevated CPK so it was held  - Psych consult placed for medical management  - Cont as needed Valium 2mg PO [id number] PRN, as well as prn benadryl to help with tardive  dyskinesia and tremors  - Perphenazine held initially and after initial Psych consult was restarted at 4mg PO BID; given fever and worsening clinical status over the last several days, held perphenazine starting 3/10 - pt has had some improvement in clinical status; fewer TD movements, still with some tremors.   - General Neurology consult placed for evaluation and management of tremors; appreciate their input - f/u with Psych for tx of EPS, consider MRI of c-spine given pt's pattern of weakness, although will not be able to do that with increased involuntary movements currently, metabolic workup in process  - 3/14 - appreciate psychiatry assistance as below, currently holding perphenazine and will discuss with Group home to see if she can be discharged without the antipsychotic.  Called and left a message to Emely, the  manager.     History of schizophrenia  History of depression  Group home resident  Acute encephalopathy secondary to metabolic with hypernatremia and delirium with hospitalization  - Patient apparently is her own decision maker, does live in a group home   - PTA Perphenazine discontinued due to increasing tardive dyskinesia tremors and elevated CPK  - As needed Valium ordered while inpatient  - Patient was agitated earlier in admission needing Ativan and Benadryl and Atarax   - Bedside sitter in place previously  - Psych consultation requested initially.  Appreciate assistance - they actually restarted pt's perphenazine but given fever and ongoing issues, held starting 3/10  - pt's TD movements have decreased, although still with some tremors  - CT/SW consult for assistance with discharge planning  - perphenazine currently on hold.     Hypertension  BP acceptable in ED initially.   Blood pressure currently systolics in the 130s     Moderate to severe malnutrition with a BMI of 17.3  Nutrition services consult for supplementation ordering    Consultations This Hospital Stay   CARE MANAGEMENT /  SOCIAL WORK IP CONSULT  WOUND OSTOMY CONTINENCE NURSE  IP CONSULT  PSYCHIATRY IP CONSULT  NUTRITION SERVICES ADULT IP CONSULT  PHYSICAL THERAPY ADULT IP CONSULT  OCCUPATIONAL THERAPY ADULT IP CONSULT  WOUND OSTOMY CONTINENCE NURSE  IP CONSULT  NEUROLOGY IP CONSULT  VASCULAR ACCESS ADULT IP CONSULT  NEPHROLOGY IP CONSULT  SPEECH LANGUAGE PATH ADULT IP CONSULT  VASCULAR ACCESS ADULT IP CONSULT  PSYCHIATRY IP CONSULT    Code Status   Full Code    Time Spent on this Encounter   I, Zach Bowman MD, personally saw the patient today and spent greater than 30 minutes discharging this patient.       Zach Bowman MD  [place] [address] [facility]  [address] [place] [address], [address]  [facility] [address]  Phone: [phone]  ______________________________________________________________________    Physical Exam   Vital Signs: Temp: 97.3  F (36.3  C) Temp src: Axillary BP: 101/62 Pulse: 89   Resp: 16 SpO2: 92 % O2 Device: None (Room air)    Weight: 90 lbs 6.22 oz  ----------------------------------------------------------------------------------------       Primary Care Physician   [facility]    Discharge Orders      Home Care Referral      Follow-up and recommended labs and tests     No renal follow-up necessary        Mantoux instructions    Give two-step Mantoux (PPD) Per Facility Policy Yes     Follow Up and recommended labs and tests    Follow up with psychiatrist as outpatient.     Reason for your hospital stay    Diarrhea, dehydration, UTI, non traumatic rhado     Activity - Up with nursing assistance     Full Code     Diet    Follow this diet upon discharge: Orders Placed This Encounter      Snacks/Supplements Adult: Ensure Enlive; With Meals      Room Service      Regular Diet Adult       Significant Results and Procedures   Most Recent 3 CBC's:  Recent Labs   Lab Test 03/14/23  0819 03/13/23  0959 03/12/23  0757   WBC 6.5 7.5 7.6   HGB 8.9* 9.4* 9.6*   MCV 89 89 89    382 383     Most Recent  3 BMP's:  Recent Labs   Lab Test 03/15/23  1101 03/14/23  0819 03/13/23  0959    146* 145   POTASSIUM 4.0 4.0 3.7   CHLORIDE 108* 110* 107   CO2 27 28 27   BUN 35.9* 33.0* 28.4*   CR 0.57 0.54 0.60   ANIONGAP 10 8 11   LULU 8.7* 8.8 9.0   * 96 122*     Most Recent 2 LFT's:  Recent Labs   Lab Test 03/06/23  1413 03/05/23  1323   AST 39* 19   ALT 21 15   ALKPHOS 83 80   BILITOTAL <0.2 <0.2     Most Recent 3 INR's:  Recent Labs   Lab Test 03/07/23  1629   INR 1.15     Most Recent 3 Troponin's:No lab results found.  Most Recent 3 BNP's:No lab results found.  7-Day Micro Results     Collected Updated Procedure Result Status      03/10/2023 0932 03/12/2023 0724 Urine Culture [[id number]]   Urine, Straight Catheter    Final result Component Value   Culture No Growth               03/10/2023 0932 03/12/2023 0759 Urine Culture [[id number]]   Urine, Catheter    Final result Component Value   Culture No Growth               03/09/2023 1858 03/14/2023 2216 Blood Culture Hand, Right [[id number]]   Blood from Hand, Right    Final result Component Value   Culture No Growth               03/09/2023 1851 03/14/2023 2216 Blood Culture Hand, Left [[id number]]   Blood from Hand, Left    Final result Component Value   Culture No Growth                   Most Recent Urinalysis:  Recent Labs   Lab Test 03/10/23  0932   COLOR Light Yellow   APPEARANCE Clear   URINEGLC Negative   URINEBILI Negative   URINEKETONE Negative   SG 1.015   UBLD Negative   URINEPH 7.0   PROTEIN Negative   NITRITE Positive*   LEUKEST Large*   RBCU 2   WBCU >182*   ,   Results for orders placed or performed during the hospital encounter of 03/06/23   CT Head Perfusion w Contrast    Narrative    EXAM: CT HEAD W/O CONTRAST, CTA HEAD NECK W CONTRAST, CT HEAD PERFUSION W CONTRAST  LOCATION: [facility]  DATE/TIME: 3/7/2023 4:35 PM    INDICATION: stroke  COMPARISON: 12/16/2021  TECHNIQUE: Head and neck CT angiogram with IV  contrast. Noncontrast head CT followed by axial helical CT images of the head and neck vessels obtained during the arterial phase of intravenous contrast administration. Axial 2D reconstructed images and   multiplanar 3D MIP reconstructed images of the head and neck vessels were performed by the technologist. Additional CT cerebral perfusion was performed utilizing a second contrast bolus. Perfusion data were post processed with generation of standard   perfusion maps and estimation of ischemic/infarcted volumes utilizing standard threshold values. Dose reduction techniques were used. All stenosis measurements made according to NASCET criteria unless otherwise specified.  CONTRAST: 75 mL Isovue 370 (accession [id number]), 75 mL Isovue 370 (accession [id number]), 50mL Isovue 370     (accession [id number])    FINDINGS:   NONCONTRAST HEAD CT:   INTRACRANIAL CONTENTS: No intracranial hemorrhage, extraaxial collection, or mass effect.  No CT evidence of acute infarct. Moderate presumed chronic small vessel ischemic changes. Normal ventricles and sulci.     VISUALIZED ORBITS/SINUSES/MASTOIDS: No intraorbital abnormality. No paranasal sinus mucosal disease. No middle ear or mastoid effusion.    BONES/SOFT TISSUES: No acute abnormality.    HEAD CTA:  ANTERIOR CIRCULATION: No stenosis/occlusion, aneurysm, or high flow vascular malformation. Fetal origin of the right posterior cerebral artery from the anterior circulation. Partially fetal origin of the left posterior cerebral artery from the anterior   circulation.    POSTERIOR CIRCULATION: Moderate stenosis of the right vertebral artery as it pierces the dura. Mild irregularity of the proximal basilar artery and mild stenoses noted. Moderate stenosis of the proximal basilar artery. No stenosis/occlusion, aneurysm, or   high flow vascular malformation. Dominant right vertebral artery supplies the basilar artery with a small left vertebral artery supplying the left posterior  inferior cerebellar artery (PICA).     DURAL VENOUS SINUSES: Expected enhancement of the major dural venous sinuses.    NECK CTA:  RIGHT CAROTID: No measurable stenosis or dissection.    LEFT CAROTID: No measurable stenosis or dissection.    VERTEBRAL ARTERIES: There are multiple mild and moderate stenoses of the right vertebral artery throughout the V2 segment. Severe, segmental stenosis of the right V3 segment . Focal high-grade stenosis versus short segment occlusion of the distal right   V3 segment. Just prior to piercing the dura of the distal right vertebral artery in the neck reconstitutes and becomes normal in caliber again. Of note the right posterior inferior cerebellar artery arises prior to piercing the dura. Dominant right and   smaller left vertebral arteries.    AORTIC ARCH: Classic aortic arch anatomy with no significant stenosis at the origin of the great vessels.    NONVASCULAR STRUCTURES: Unremarkable.    CT PERFUSION:  PERFUSION MAPS: Symmetrical cerebral perfusion. No focal deficits in cerebral blood flow or volume to suggest ischemia/oligemia. There is a small region of parenchyma on the region of the right occipital lobe with a Tmax greater than 4 seconds though no   corresponding reduced cerebral blood flow. Finding is nonspecific it may be incidental. Mild hypoperfusion in this region is possible.    RAPID ANALYSIS:  CBF<30%: 0 mL  Tmax>6sec: 0 mL  Mismatch volume: 0 mL  Mismatch ratio: None      Impression    IMPRESSION:   HEAD CT:  1.  No CT evidence for acute intracranial process.  2.  Brain atrophy and presumed chronic microvascular ischemic changes as above.    HEAD CTA:   1.  No large vessel occlusion.  2.  Moderate stenosis of the right vertebral artery as it pierces the dura.  3.  Moderate stenosis of the mid right basilar artery.    NECK CTA:  1.  Segmental severe stenoses of the right the V3 segment.  2.  Focal high-grade stenosis versus short segment occlusion of the distal right  V3 segment with reconstitution of normal caliber right vertebral artery immediately prior to piercing the dura.  3.  Multiple mild and moderate stenoses of the right vertebral artery throughout the V2 segment.    CT PERFUSION:  1.  Normal cerebral perfusion. No focal deficits to suggest ischemia.  2.  Small region of normally prolonged Tmax in the right occipital lobe without corresponding reduced cerebral blood flow is nonspecific. Mild hypoperfusion is possible. An MRI could further evaluate if clinically indicated.    Findings were discussed with nurse practitioner Jeremy Sims at 4:48 PM on 03/07/2023.   CT Head w/o Contrast    Narrative    EXAM: CT HEAD W/O CONTRAST, CTA HEAD NECK W CONTRAST, CT HEAD PERFUSION W CONTRAST  LOCATION: [facility]  DATE/TIME: 3/7/2023 4:35 PM    INDICATION: stroke  COMPARISON: 12/16/2021  TECHNIQUE: Head and neck CT angiogram with IV contrast. Noncontrast head CT followed by axial helical CT images of the head and neck vessels obtained during the arterial phase of intravenous contrast administration. Axial 2D reconstructed images and   multiplanar 3D MIP reconstructed images of the head and neck vessels were performed by the technologist. Additional CT cerebral perfusion was performed utilizing a second contrast bolus. Perfusion data were post processed with generation of standard   perfusion maps and estimation of ischemic/infarcted volumes utilizing standard threshold values. Dose reduction techniques were used. All stenosis measurements made according to NASCET criteria unless otherwise specified.  CONTRAST: 75 mL Isovue 370 (accession [id number]), 75 mL Isovue 370 (accession [id number]), 50mL Isovue 370     (accession [id number])    FINDINGS:   NONCONTRAST HEAD CT:   INTRACRANIAL CONTENTS: No intracranial hemorrhage, extraaxial collection, or mass effect.  No CT evidence of acute infarct. Moderate presumed chronic small vessel ischemic changes. Normal  ventricles and sulci.     VISUALIZED ORBITS/SINUSES/MASTOIDS: No intraorbital abnormality. No paranasal sinus mucosal disease. No middle ear or mastoid effusion.    BONES/SOFT TISSUES: No acute abnormality.    HEAD CTA:  ANTERIOR CIRCULATION: No stenosis/occlusion, aneurysm, or high flow vascular malformation. Fetal origin of the right posterior cerebral artery from the anterior circulation. Partially fetal origin of the left posterior cerebral artery from the anterior   circulation.    POSTERIOR CIRCULATION: Moderate stenosis of the right vertebral artery as it pierces the dura. Mild irregularity of the proximal basilar artery and mild stenoses noted. Moderate stenosis of the proximal basilar artery. No stenosis/occlusion, aneurysm, or   high flow vascular malformation. Dominant right vertebral artery supplies the basilar artery with a small left vertebral artery supplying the left posterior inferior cerebellar artery (PICA).     DURAL VENOUS SINUSES: Expected enhancement of the major dural venous sinuses.    NECK CTA:  RIGHT CAROTID: No measurable stenosis or dissection.    LEFT CAROTID: No measurable stenosis or dissection.    VERTEBRAL ARTERIES: There are multiple mild and moderate stenoses of the right vertebral artery throughout the V2 segment. Severe, segmental stenosis of the right V3 segment . Focal high-grade stenosis versus short segment occlusion of the distal right   V3 segment. Just prior to piercing the dura of the distal right vertebral artery in the neck reconstitutes and becomes normal in caliber again. Of note the right posterior inferior cerebellar artery arises prior to piercing the dura. Dominant right and   smaller left vertebral arteries.    AORTIC ARCH: Classic aortic arch anatomy with no significant stenosis at the origin of the great vessels.    NONVASCULAR STRUCTURES: Unremarkable.    CT PERFUSION:  PERFUSION MAPS: Symmetrical cerebral perfusion. No focal deficits in cerebral blood flow  or volume to suggest ischemia/oligemia. There is a small region of parenchyma on the region of the right occipital lobe with a Tmax greater than 4 seconds though no   corresponding reduced cerebral blood flow. Finding is nonspecific it may be incidental. Mild hypoperfusion in this region is possible.    RAPID ANALYSIS:  CBF<30%: 0 mL  Tmax>6sec: 0 mL  Mismatch volume: 0 mL  Mismatch ratio: None      Impression    IMPRESSION:   HEAD CT:  1.  No CT evidence for acute intracranial process.  2.  Brain atrophy and presumed chronic microvascular ischemic changes as above.    HEAD CTA:   1.  No large vessel occlusion.  2.  Moderate stenosis of the right vertebral artery as it pierces the dura.  3.  Moderate stenosis of the mid right basilar artery.    NECK CTA:  1.  Segmental severe stenoses of the right the V3 segment.  2.  Focal high-grade stenosis versus short segment occlusion of the distal right V3 segment with reconstitution of normal caliber right vertebral artery immediately prior to piercing the dura.  3.  Multiple mild and moderate stenoses of the right vertebral artery throughout the V2 segment.    CT PERFUSION:  1.  Normal cerebral perfusion. No focal deficits to suggest ischemia.  2.  Small region of normally prolonged Tmax in the right occipital lobe without corresponding reduced cerebral blood flow is nonspecific. Mild hypoperfusion is possible. An MRI could further evaluate if clinically indicated.    Findings were discussed with nurse practitioner Jeremy Sims at 4:48 PM on 03/07/2023.   CTA Head Neck w Contrast    Narrative    EXAM: CT HEAD W/O CONTRAST, CTA HEAD NECK W CONTRAST, CT HEAD PERFUSION W CONTRAST  LOCATION: [facility]  DATE/TIME: 3/7/2023 4:35 PM    INDICATION: stroke  COMPARISON: 12/16/2021  TECHNIQUE: Head and neck CT angiogram with IV contrast. Noncontrast head CT followed by axial helical CT images of the head and neck vessels obtained during the arterial phase of  intravenous contrast administration. Axial 2D reconstructed images and   multiplanar 3D MIP reconstructed images of the head and neck vessels were performed by the technologist. Additional CT cerebral perfusion was performed utilizing a second contrast bolus. Perfusion data were post processed with generation of standard   perfusion maps and estimation of ischemic/infarcted volumes utilizing standard threshold values. Dose reduction techniques were used. All stenosis measurements made according to NASCET criteria unless otherwise specified.  CONTRAST: 75 mL Isovue 370 (accession [id number]), 75 mL Isovue 370 (accession [id number]), 50mL Isovue 370     (accession [id number])    FINDINGS:   NONCONTRAST HEAD CT:   INTRACRANIAL CONTENTS: No intracranial hemorrhage, extraaxial collection, or mass effect.  No CT evidence of acute infarct. Moderate presumed chronic small vessel ischemic changes. Normal ventricles and sulci.     VISUALIZED ORBITS/SINUSES/MASTOIDS: No intraorbital abnormality. No paranasal sinus mucosal disease. No middle ear or mastoid effusion.    BONES/SOFT TISSUES: No acute abnormality.    HEAD CTA:  ANTERIOR CIRCULATION: No stenosis/occlusion, aneurysm, or high flow vascular malformation. Fetal origin of the right posterior cerebral artery from the anterior circulation. Partially fetal origin of the left posterior cerebral artery from the anterior   circulation.    POSTERIOR CIRCULATION: Moderate stenosis of the right vertebral artery as it pierces the dura. Mild irregularity of the proximal basilar artery and mild stenoses noted. Moderate stenosis of the proximal basilar artery. No stenosis/occlusion, aneurysm, or   high flow vascular malformation. Dominant right vertebral artery supplies the basilar artery with a small left vertebral artery supplying the left posterior inferior cerebellar artery (PICA).     DURAL VENOUS SINUSES: Expected enhancement of the major dural venous sinuses.    NECK CTA:  RIGHT  CAROTID: No measurable stenosis or dissection.    LEFT CAROTID: No measurable stenosis or dissection.    VERTEBRAL ARTERIES: There are multiple mild and moderate stenoses of the right vertebral artery throughout the V2 segment. Severe, segmental stenosis of the right V3 segment . Focal high-grade stenosis versus short segment occlusion of the distal right   V3 segment. Just prior to piercing the dura of the distal right vertebral artery in the neck reconstitutes and becomes normal in caliber again. Of note the right posterior inferior cerebellar artery arises prior to piercing the dura. Dominant right and   smaller left vertebral arteries.    AORTIC ARCH: Classic aortic arch anatomy with no significant stenosis at the origin of the great vessels.    NONVASCULAR STRUCTURES: Unremarkable.    CT PERFUSION:  PERFUSION MAPS: Symmetrical cerebral perfusion. No focal deficits in cerebral blood flow or volume to suggest ischemia/oligemia. There is a small region of parenchyma on the region of the right occipital lobe with a Tmax greater than 4 seconds though no   corresponding reduced cerebral blood flow. Finding is nonspecific it may be incidental. Mild hypoperfusion in this region is possible.    RAPID ANALYSIS:  CBF<30%: 0 mL  Tmax>6sec: 0 mL  Mismatch volume: 0 mL  Mismatch ratio: None      Impression    IMPRESSION:   HEAD CT:  1.  No CT evidence for acute intracranial process.  2.  Brain atrophy and presumed chronic microvascular ischemic changes as above.    HEAD CTA:   1.  No large vessel occlusion.  2.  Moderate stenosis of the right vertebral artery as it pierces the dura.  3.  Moderate stenosis of the mid right basilar artery.    NECK CTA:  1.  Segmental severe stenoses of the right the V3 segment.  2.  Focal high-grade stenosis versus short segment occlusion of the distal right V3 segment with reconstitution of normal caliber right vertebral artery immediately prior to piercing the dura.  3.  Multiple mild and  moderate stenoses of the right vertebral artery throughout the V2 segment.    CT PERFUSION:  1.  Normal cerebral perfusion. No focal deficits to suggest ischemia.  2.  Small region of normally prolonged Tmax in the right occipital lobe without corresponding reduced cerebral blood flow is nonspecific. Mild hypoperfusion is possible. An MRI could further evaluate if clinically indicated.    Findings were discussed with nurse practitioner Jeremy Sims at 4:48 PM on 03/07/2023.   MR Brain w/o & w Contrast    Narrative    MRI BRAIN WITHOUT AND WITH CONTRAST  3/8/2023 2:33 PM     HISTORY:  Follow up Code Stroke.      TECHNIQUE:  Multiplanar, multisequence MRI of the brain without and  with 4 mL Gadavist.     COMPARISON: Head CT 3/7/2023.     FINDINGS: There is no evidence of acute infarct, hemorrhage, mass, or  herniation. Moderate patchy periventricular white matter T2  hyperintensities which are nonspecific, but likely related to chronic  microvascular ischemic disease. Ventricular size within normal limits  without hydrocephalus.     There is no abnormal intracranial enhancement.     The facial structures appear normal. The major arterial T2 flow voids  at the base of the brain appear patent.       Impression    IMPRESSION:    1. No evidence of acute infarct, mass, hemorrhage, or herniation.  2. Moderate nonspecific white matter changes likely due to chronic  microvascular ischemic disease.    ANDRE PEREZ MD         SYSTEM ID:  [id number]   XR Abdomen 1 View    Narrative    ABDOMEN ONE VIEW  3/8/2023 12:00 PM    HISTORY: MRI clearance.    COMPARISON: None.      Impression    IMPRESSION: Electronic leads overlie the patient in the right lower  quadrant. No additional radiopaque foreign bodies. There are  calcifications in the pelvis that are stable from previous, possibly  representing calcified fibroids. Bowel gas pattern is nonobstructed.  Okay for MRI.    KALE SEGOVIA MD         SYSTEM ID:  [id number]   XR Chest 1  View    Narrative    CHEST ONE VIEW  3/8/2023 12:00 PM       INDICATION: Clearance of MRI.    COMPARISON: None.      Impression    IMPRESSION: Cardiomegaly with pulmonary vascular congestion. Old rib  fractures. No radiopaque foreign bodies in the chest. Okay for MRI.    KALE SEGOVIA MD         SYSTEM ID:  [id number]   XR Skull 1/3 Views    Narrative    XR SKULL 1/3 VIEWS   3/8/2023 12:00 PM     HISTORY: MRI clearance    COMPARISON: None.      Impression    IMPRESSION: No intraorbital metal identified.    ADRIAN WILLIS MD         SYSTEM ID:  [id number]   XR Chest Port 1 View    Narrative    EXAM: XR CHEST PORT 1 VIEW  LOCATION: [facility]  DATE/TIME: 3/9/2023 5:44 PM    INDICATION: new onset fever  COMPARISON: None.      Impression    IMPRESSION: Heart size is normal. Thoracic aorta is tortuous with calcifications in the arch. There are increased interstitial opacities throughout both lungs with differential considerations include interstitial edema and multifocal interstitial   pneumonitis. No lobar consolidation. No pneumothorax. Multiple old rib fractures.   XR Chest Port 1 View    Narrative    EXAM: XR CHEST PORT 1 VIEW  LOCATION: [facility]  DATE/TIME: 3/12/2023 4:23 PM    INDICATION: Prior lung opacities. Evaluate for clearance.  COMPARISON: Chest radiograph 2023.      Impression    IMPRESSION: Prior interstitial opacities have resolved. No new airspace opacities. No pleural effusions or pneumothorax. Stable, nonenlarged cardiac silhouette. Aortic arch calcifications. Multiple remote healed rib fractures.   Echocardiogram Complete     Value    LVEF  60-65%    Narrative    [id number]  [id number]  [id number]  [id number]^KRISTEN^YVETTE     [facility]  Echocardiography [facility]  [address] [place] [address]     Name: MAXIMUS PERAZA  MRN: [id number]  : 1955  Study Date: 2023 11:15 AM  Age: 67 yrs  Gender:  Female  Patient Location: [facility]  Reason For Study: SOB  Ordering Physician: YVETTE PETERSON  Referring Physician: Tip Parker DO  Performed By: Britany Montgomery RDCS     BSA: 1.4 m2  Height: 60 in  Weight: 98 lb  HR: 66  BP: 131/68 mmHg  ______________________________________________________________________________  Procedure  Complete Portable Echo Adult.  ______________________________________________________________________________  Interpretation Summary     Left ventricular size, global systolic function, and wall motion are normal,  estimated LVEF 60-65%.  Right ventricular global function is normal.  No significant valvular abnormalities.  The inferior vena cava was normal in size with preserved respiratory  variability.     There are no prior studies available for comparison.  ______________________________________________________________________________  Left Ventricle  The left ventricle is normal in size. A sigmoid septum is present. Left  ventricular systolic function is normal. The visual ejection fraction is 60-  65%. Left ventricular diastolic function is indeterminate. No regional wall  motion abnormalities noted.     Right Ventricle  The right ventricle is normal in structure, function and size.     Atria  The left atrium is mildly dilated. Right atrial size is normal. There is no  color Doppler evidence of an atrial shunt.     Mitral Valve  There is trace mitral regurgitation.     Tricuspid Valve  The tricuspid valve is not well visualized, but is grossly normal. There is  trace tricuspid regurgitation.     Aortic Valve  The aortic valve is normal in structure and function.     Pulmonic Valve  The pulmonic valve is not well seen, but is grossly normal.     Vessels  The aortic root is normal size. Normal size ascending aorta. The inferior vena  cava was normal in size with preserved respiratory variability.     Pericardium  There is no pericardial effusion.     Rhythm  Sinus rhythm was  noted.  ______________________________________________________________________________  MMode/2D Measurements & Calculations  IVSd: 1.0 cm     LVIDd: 4.3 cm  LVIDs: 2.7 cm  LVPWd: 0.97 cm  FS: 37.9 %  LV mass(C)d: 144.6 grams  LV mass(C)dI: 104.9 grams/m2  Ao root diam: 3.0 cm  LA dimension: 3.4 cm  asc Aorta Diam: 3.6 cm  LA/Ao: 1.1  LA Volume (BP): 51.9 ml  LA Volume Index (BP): 37.6 ml/m2  RWT: 0.45  TAPSE: 2.4 cm     Doppler Measurements & Calculations  MV E max suly: 67.6 cm/sec  MV A max suly: 110.0 cm/sec  MV E/A: 0.61  MV dec time: 0.23 sec  PA acc time: 0.13 sec  E/E' av.6  Lateral E/e': 9.0  Medial E/e': 10.2     ______________________________________________________________________________  Report approved by: Caitlyn Vogel 2023 02:08 PM               Discharge Medications   Discharge Medication List as of 3/16/2023 12:25 PM      START taking these medications    Details   aspirin (ASA) 325 MG EC tablet Take 1 tablet (325 mg) by mouth daily for 30 days, Disp-30 tablet, R-0, E-Prescribe         CONTINUE these medications which have CHANGED    Details   miconazole (MICATIN) 2 % external powder Apply topically 2 times dailyDisp-43 g, [id number]         CONTINUE these medications which have NOT CHANGED    Details   acetaminophen (TYLENOL) 500 MG tablet Take 1,000 mg by mouth 3 times daily 0800 / 1400 / 2000, Historical      alendronate (FOSAMAX) 70 MG tablet Take 70 mg by mouth every 7 days  at 0700, Historical      cholecalciferol 50 MCG (2000) tablet Take 1 tablet by mouth daily 0800, Historical      diphenhydrAMINE (BENADRYL) 50 MG capsule Take 50 mg by mouth 3 times daily 08 / 1500, Historical      escitalopram (LEXAPRO) 20 MG tablet Take 20 mg by mouth daily 0800, Historical      ibuprofen (ADVIL/MOTRIN) 200 MG tablet Take 400 mg by mouth every 4 hours as needed for pain, Historical      !! LORazepam (ATIVAN) 0.5 MG tablet Take 0.5 mg by mouth 2 times daily 0800/,  Historical      !! LORazepam (ATIVAN) 0.5 MG tablet Take 0.25 mg by mouth At Bedtime 2000, Historical      multivitamin w/minerals (THERA-VIT-M) tablet Take 1 tablet by mouth daily 0800, Historical      muscle rub (ARTHRITIS HOT) 10-15 % CREA Apply topically 4 times daily Back pain - 0700/1100/1500/2000, Historical      NIFEdipine ER (ADALAT CC) 30 MG 24 hr tablet Take 30 mg by mouth daily 0800, Historical       !! - Potential duplicate medications found. Please discuss with provider.      STOP taking these medications       perphenazine 4 MG tablet Comments:   Reason for Stopping:         perphenazine 8 MG tablet Comments:   Reason for Stopping:             Allergies   Allergies   Allergen Reactions     Amlodipine      Clozapine      Egg [Chicken-Derived Products (Egg)]      Penicillins      Potassium      Poultry Meal

## 2023-03-16 NOTE — PROGRESS NOTES
Care Management Discharge Note    Discharge Date: 03/16/2023       Discharge Disposition: Group Home    Discharge Services: None    Discharge DME: None    Discharge Transportation: health plan transportation    Private pay costs discussed: Not applicable    PAS Confirmation Code:    Patient/family educated on Medicare website which has current facility and service quality ratings: no    Education Provided on the Discharge Plan:    Persons Notified of Discharge Plans: Group home  Patient/Family in Agreement with the Plan: yes    Handoff Referral Completed: No    Additional Information:  Pt discharge to Group Home with C    Pt accepted for HHC by [facility].   Orders sent via [facility]    CC called  manager Emely to update of HHC and patient leaving.     Bedside RN to review AVS. HUC to make packet to send with.         Chasity Mckeon RN

## 2023-03-16 NOTE — DISCHARGE SUMMARY
Discharge Note    Patient discharged to Group home via EMS/BLS accompanied by no family/friend .  IV: Discontinued  Prescriptions sent with patient to discharge facility .   Belongings reviewed and sent with patient.   Home medications returned to patient: NA  Equipment sent with: N/A.   patient verbalizes understanding of discharge instructions. AVS given to patient.

## 2023-03-16 NOTE — PLAN OF CARE
Physical Therapy Discharge Summary    Reason for therapy discharge:    Discharged to home with home therapy.    Progress towards therapy goal(s). See goals on Care Plan in [facility] electronic health record for goal details.  Goals partially met.  Barriers to achieving goals:   discharge from facility.    Therapy recommendation(s):    Continued therapy is recommended.  Rationale/Recommendations:  Per chart, pt uses FWW for ambulation and A x1 at group home. Pt currently able to ambulate ~30' w/ FWW and min A and is contact guard assist for bed mobility and transfers. Pt is reporting that she has 24/7 assistance at her group home. Recommend discharge back to group home with continued 24/7 assistance from group home staff for mobility and ADLs. Recommend discharge with HHPT to continue increasing safety and independence with functional mobility to return to PLOF.

## 2023-03-16 NOTE — PLAN OF CARE
Occupational Therapy Discharge Summary    Reason for therapy discharge:    Discharged to home with home therapy.    Progress towards therapy goal(s). See goals on Care Plan in [facility] electronic health record for goal details.  Goals partially met.  Barriers to achieving goals:   discharge from facility.    Therapy recommendation(s):    Pt was completing ADLs ind prior to admit, pt now requires A x 1 for self cares and functional mobility. Pt would benefit from skilled TCU for increased ind with ADL completion prior to return to Group Home. If pt can recieve 24/7 assist with functional mobility and all I/ADLs at Group Home, pt may progress for safe d/c with this level of assist and [facility] OT for home safety eval.

## 2023-03-16 NOTE — PLAN OF CARE
Goal Outcome Evaluation:    Pt A/O x4.  VSS on RA. Noted tremors. Pain managed with PRN PO oxycodone and IV benadryl x 1. A/1 with walker and GB. Bowel/bladder incontinent. R PIV SL. Medications whole with pudding.  Regular diet with assist of 1 feeding. Discharge plan pending.

## 2023-03-22 NOTE — TELEPHONE ENCOUNTER
Verbal ok to talk to Katiuska    Caregiver is wondering where to put the miconazole powder on the patient    Barbara Brown RN on 3/21/2023 at 7:16 PM      Reason for Disposition    Medication questions    Caller has medicine question only, adult not sick, AND triager answers question    Protocols used: INFORMATION ONLY CALL - NO TRIAGE-A-AH, MEDICATION QUESTION CALL-A-AH

## 2023-06-08 NOTE — PROGRESS NOTES
Chief Complaint   Patient presents with     Urgent Care     Coughing and sore throat          ICD-10-CM    1. Viral URI with cough  J06.9       2. Throat pain  R07.0 Streptococcus A Rapid Screen w/Reflex to PCR - Clinic Collect     Group A Streptococcus PCR Throat Swab      3. Essential hypertension  I10       Recommended salt water gargles, Tylenol as needed for discomfort.  Suggested Delsym cough syrup and to return if she develops worsening or other concerning symptoms.      Recommended she check her blood pressure twice a day and keep a log of this.  If it remains elevated she should discuss with primary care provider.  She did take her nifedipine today.  Advised her to continue taking medication as prescribed for now.  Decrease salt intake.      Results for orders placed or performed in visit on 06/08/23 (from the past 24 hour(s))   Streptococcus A Rapid Screen w/Reflex to PCR - Clinic Collect    Specimen: Throat; Swab   Result Value Ref Range    Group A Strep antigen Negative Negative   Group A Streptococcus PCR Throat Swab    Specimen: Throat; Swab   Result Value Ref Range    Group A strep by PCR Not Detected Not Detected    Narrative    The Xpert Xpress Strep A test, performed on the [employer], is a rapid, qualitative in vitro diagnostic test for the detection of Streptococcus pyogenes (Group A ß-hemolytic Streptococcus, Strep A) in throat swab specimens from patients with signs and symptoms of pharyngitis. The Xpert Xpress Strep A test can be used as an aid in the diagnosis of Group A Streptococcal pharyngitis. The assay is not intended to monitor treatment for Group A Streptococcus infections. The Xpert Xpress Strep A test utilizes an automated real-time polymerase chain reaction (PCR) to detect Streptococcus pyogenes DNA.       Subjective     Kortney Germain is an 67 year old female who presents to clinic today for cough and sore throat for 2 days. COVID test was negative at  home.    She also has a history of high blood pressure.      ROS: 10 point ROS neg other than the symptoms noted above in the HPI.       Objective    BP (!) 176/74   Pulse 74   Temp 97.6  F (36.4  C)   Wt 40.8 kg (90 lb)   SpO2 97%   BMI 17.58 kg/m    Nurses notes and VS have been reviewed.    Physical Exam         GENERAL APPEARANCE: healthy appearing, alert     EYES: PERRL, EOMI, sclera non-icteric     HENT: oral exam benign, mucus membranes intact, without ulcers or lesions     NECK: no adenopathy or asymmetry, thyroid normal to palpation     RESP: lungs clear to auscultation - no rales, rhonchi or wheezes     CV: regular rates and rhythm, no murmurs, rubs, or gallop     ABDOMEN:  soft, nontender, no HSM or masses and bowel sounds normal     MS: extremities normal- no gross deformities noted; normal muscle tone.     SKIN: no suspicious lesions or rashes     NEURO: Normal strength and tone, mentation intact and speech normal     PSYCH: normal thought process; no significant mood disturbance      TAMI Miller, CNP  [facility] Provider    The use of Dragon/Sourcebits dictation services may have been used to construct the content in this note; any grammatical or spelling errors are non-intentional. Please contact the author of this note directly if you are in need of any clarification.

## 2023-06-08 NOTE — PATIENT INSTRUCTIONS
"May try Delsym cough syrup.    Return if you develop there concerning symptoms.    Please purchase a blood pressure monitoring cuff that can go on the wrist or arm.  If you have larger arms please use a wrist monitor.  Check your blood pressure twice a day for the next 2 weeks and keep a log of this.  If the top number is averaging over 140 and the bottom number is averaging greater than 90 please make appointment to see your primary care provider to discuss possible treatment.    High Blood Pressure (Essential Hypertension)   What is essential hypertension?   Hypertension is the term for blood pressure that is consistently higher than normal. Hypertension is called essential or primary when no cause for the high blood pressure can be found. (When the cause of hypertension is known, such as kidney disease and tumors, it is called secondary hypertension.) About 95% of all people with high blood pressure have essential hypertension.   Normal blood pressure ranges up to 120/80 (\"120 over 80\") but blood pressure can rise and fall with exercise, rest, or emotions. The pressures are measured in millimeters of mercury. The upper number (120) is the pressure when the heart pushes blood out to the rest of the body (systolic pressure). The bottom number (80) is the pressure when the heart rests between beats (diastolic pressure).     Healthy blood pressure is less than 120/80.   Pre-high blood pressure (prehypertension) is from 120/80 to 139/90.   Stage I high blood pressure ranges from 140/90 to 159/99.   Stage II high blood pressure is over 160/100.     If repeated checks of your blood pressure show that it is higher than 140/90, you have hypertension.   Why is high blood pressure a problem?   When your blood pressure is high, your heart has to work harder just to pump a normal amount of blood through your body. The higher pressure in your arteries may cause them to weaken and bleed, resulting in a stroke. Over time, blood " vessels may become hardened. This often occurs as people age. High blood pressure speeds this process. Blood vessel damage is bad because hardened or narrowed arteries may be unable to supply the amount of blood the body's organs need. The higher artery pressure may lead to atherosclerosis, in which deposits of cholesterol, fatty substances, and blood cells clog up an artery. Atherosclerosis is the leading cause of heart attacks. It can also cause strokes.   The added workload on the heart causes thickening of the heart muscle. Over time, the thickening damages the heart muscle so that it can no longer pump normally. This can lead to a disease called heart failure. Your kidneys or eyes may also be damaged. The longer you have high blood pressure and the higher it is, the more likely it is you will develop problems.   How does it occur?  There are no clear causes of essential hypertension. However, many different factors can increase blood pressure, such:  being overweight   smoking   eating a diet high in salt   drinking a lot of alcohol.   Other important factors include:   Race.  Americans are more likely to develop high blood pressure.   Gender. Males have a greater chance of developing high blood pressure than women until age 55. However, after the age of 75, women are more likely to develop high blood pressure than men.   Heredity. If your parents had high blood pressure, you are more at risk.   Age. The older you get, the more likely you are to develop high blood pressure.   Some medicines increase blood pressure. Stress and drinking caffeine can make blood pressure go up for a while, but the long-term effects aren't yet clear.   What are the symptoms?   One of the sneaky things about high blood pressure is that you can have it for a long time without symptoms. That's why it is important for you have your blood pressure checked at least once a year.   If you do have symptoms, they may be:   Headaches,  getting tired easily, dizziness, nosebleeds, chest pain, shortness of breath.   Although it happens rarely, the first symptom may be a stroke.   How is it diagnosed?   Because it is such a common problem, blood pressure is checked at most health care visits. High blood pressure is usually discovered during one of these visits. If your blood pressure is high, you will be asked to return for follow-up checks. If your pressure stays high for 3 visits, you probably have hypertension.   Your health care provider will ask about your life situation, what you eat and drink, and if high blood pressure runs in your family. You may have urine and blood tests. Your provider may order a chest x-ray and an electrocardiogram (ECG). You may be asked to use a portable blood-pressure measuring device, which will take your pressure at different times during day and night. All of this testing is done to look for a possible cause of your high blood pressure.   How is it treated?   If your blood pressure is above normal (prehypertension), you may be able to bring it down to a normal level without medicine. Weight loss, changes in your diet, and exercise may be the only treatment you need. If you also have diabetes, you may need additional treatment.   If these lifestyle changes do not lower your blood pressure enough, your health care provider may prescribe medicine. Some of the types of medicines that can help are diuretics, beta blockers, ACE inhibitors, calcium channel blockers, and vasodilators. These medicines work in different ways. Many people need to take two or more medicines to bring their blood pressure down to a healthy level.     When you start taking medicine, it is important to:   Take the medicine regularly, exactly as prescribed.   Tell your health care provider about any side effects right away.   Have regular follow-up visits with your health care provider.   It may not be possible to know at first which drug or mix of  drugs will work best for you. It may take several weeks or months to find the best treatment for you.   How long will the effects last?   You may need treatment for high blood pressure for the rest of your life. However, proper treatment can control your blood pressure and help prevent or delay problems. If you already have some complications, lowering your blood pressure may make their effects less severe.   How can I take care of myself?   Your treatment will be much more effective if you follow these guidelines:   Always follow your health care provider's instructions for taking medicines. Don't take less medicine or stop taking medicine without talking to your provider first. It can be dangerous to suddenly stop taking blood pressure medicine. Also, do not increase your dosage of any medicine without first talking with your provider.   Check your blood pressure (or have it checked) as often as your health care provider advises. Keep a chart of the readings.   Do not smoke.   Follow the DASH diet. This diet is low in fat, cholesterol, red meat, and sweets. It emphasizes fruits, vegetables, and low-fat dairy foods. The DASH diet also includes whole-grain products, fish, poultry, and nuts.   Use less salt. Check the levels of sodium listed on food labels. Avoid canned and prepared foods unless the label says no salt is added.   Get regular exercise, according to your health care provider's advice. For example, you might walk, bike, or swim at least 30 minutes 3 to 5 times a week.   Limit the amount of alcohol you drink. If you are a man, drink no more than two 1-ounce drinks of hard liquor, two beers, or two 6-ounce glasses of wine a day. Women should have no more than 1 ounce of liquor, one beer, or one glass of wine a day.   Limit the amount of caffeine you drink.   Try to reduce the stress in your life or learn how to deal better with situations that make you feel anxious.   Ask your health care provider or  pharmacist for information about the drugs you are taking.   Lose weight if you need to.   Tell your health care provider about any side effects you have from your medicines.   Developed by Kendrick Crawford MD; Arianne Lira RN, [place]; and [employer].                   Last modified: 2004-05-24      This content is reviewed periodically and is subject to change as new health information becomes available. The information is intended to inform and educate and is not a replacement for medical evaluation, advice, diagnosis or treatment by a healthcare professional.   Copyright   2004 [employer] and/or one of its subsidiaries. All Rights Reserved.

## 2023-09-28 PROBLEM — K59.00 CONSTIPATION, UNSPECIFIED CONSTIPATION TYPE: Status: ACTIVE | Noted: 2023-01-01

## 2023-09-28 PROBLEM — D64.9 ANEMIA, UNSPECIFIED TYPE: Status: ACTIVE | Noted: 2023-01-01

## 2023-09-28 PROBLEM — R33.9 URINARY RETENTION: Status: ACTIVE | Noted: 2023-01-01

## 2023-09-28 PROBLEM — N30.00 ACUTE CYSTITIS WITHOUT HEMATURIA: Status: ACTIVE | Noted: 2023-01-01

## 2023-09-28 NOTE — ED PROVIDER NOTES
History     Chief Complaint:  Back Pain       The history is provided by the patient.      Kortney Germain is a 68 year old female with a history of schizophrenia, hypertension, and FELICITY who presents with Altered Mental Status. The staff at her group home called EMS today because she was moaning in pain. Upon arrival, the staff reported that she possibly had a recent fall were she fell on her buttock. At bedside, the patient is not able to verbally communicate, but is moaning in pain and reacts to palpation of her back. She has  a history of chronic pain, and usually takes Tylenol for this. The EMS personnel note that the group home staff was concerned that she might be over using the Tylenol based on the number of bottles in her room.     Independent Historian:   The EMS personnel     Review of External Notes:   I reviewed the forms from her group  home      Medications:    Fosamax   Benadryl   Lexapro   Ativan   Adalat     Past Medical History:    Depressive disorder   HTN   Schizoaffective schizophrenia   Anxiety   Hypovitaminosis D   Adjustment disorder   FELICITY    Past Surgical History:    The patient was unable to provide past surgical history secondary to altered mental status.    Physical Exam   Patient Vitals for the past 24 hrs:   BP Temp Temp src Pulse Resp SpO2   09/28/23 1521 -- -- -- 102 20 93 %   09/28/23 1505 127/75 99.5  F (37.5  C) -- -- 18 --   09/28/23 1503 127/75 -- -- 96 30 --   09/28/23 1406 -- -- -- 94 29 --   09/28/23 1229 (!) 146/68 98.6  F (37  C) Oral 55 18 96 %        Physical Exam  General: Well-nourished, appears to be in pain  Eyes: PERRL, conjunctivae pink no scleral icterus or conjunctival injection  ENT:  Moist mucus membranes, posterior oropharynx clear without erythema or exudates  Respiratory:  Lungs clear to auscultation bilaterally, no crackles/rubs/wheezes.  Good air movement  CV: Normal rate and rhythm, no murmurs/rubs/gallops  GI:  Abdomen soft and non-distended.   Normoactive BS.  No tenderness, guarding or rebound  : Rectal stool firm and brown  Skin: Warm, dry.  No rashes or petechiae  Musculoskeletal: No peripheral edema or calf tenderness  Neuro: Alert and oriented to person. Minimally verbal but does answer that her low back hurts. Tic like movements consistent with known history of tardive dyskinesia. Moving all four extremities. Normal saddle sensation.  Normal and symmetric reflexes at patella tendon bilaterally.  Normal and symmetric strength at BLE.  Psychiatric: Anxious affect      Emergency Department Course   Imaging:  CT Abdomen Pelvis w Contrast  Final Result  IMPRESSION:   1.  Enlarged uterus with multiple fibroids.  2.  Rectum is distended with stool.  3.  Urinary bladder is distended.    Lumbar spine CT w/o contrast  Final Result  IMPRESSION:    No acute fracture in the lumbar spine.   OLIVA GOLDSMITH MD     Report per radiology    Laboratory:  Labs Ordered and Resulted from Time of ED Arrival to Time of ED Departure   COMPREHENSIVE METABOLIC PANEL - Abnormal       Result Value    Sodium 147 (*)     Potassium 4.8      Carbon Dioxide (CO2) 17 (*)     Anion Gap 12      Urea Nitrogen 43.3 (*)     Creatinine 0.81      GFR Estimate 79      Calcium 9.3      Chloride 118 (*)     Glucose 115 (*)     Alkaline Phosphatase 63      AST 14      ALT 15      Protein Total 6.9      Albumin 4.2      Bilirubin Total <0.2     ACETAMINOPHEN LEVEL - Abnormal    Acetaminophen <5.0 (*)    ROUTINE UA WITH MICROSCOPIC REFLEX TO CULTURE - Abnormal    Color Urine Yellow      Appearance Urine Clear      Glucose Urine Negative      Bilirubin Urine Negative      Ketones Urine Negative      Specific Gravity Urine 1.027      Blood Urine Negative      pH Urine 5.0      Protein Albumin Urine Negative      Urobilinogen Urine Normal      Nitrite Urine Positive (*)     Leukocyte Esterase Urine Moderate (*)     Bacteria Urine Moderate (*)     Mucus Urine Present (*)     RBC Urine <1      WBC  Urine 18 (*)    CBC WITH PLATELETS AND DIFFERENTIAL - Abnormal    WBC Count 8.8      RBC Count 3.35 (*)     Hemoglobin 7.2 (*)     Hematocrit 25.5 (*)     MCV 76 (*)     MCH 21.5 (*)     MCHC 28.2 (*)     RDW 16.2 (*)     Platelet Count 526 (*)     % Neutrophils 65      % Lymphocytes 23      % Monocytes 9      % Eosinophils 1      % Basophils 1      % Immature Granulocytes 1      NRBCs per 100 WBC 0      Absolute Neutrophils 5.8      Absolute Lymphocytes 2.0      Absolute Monocytes 0.8      Absolute Eosinophils 0.1      Absolute Basophils 0.1      Absolute Immature Granulocytes 0.0      Absolute NRBCs 0.0     ISTAT GASES LACTATE VENOUS POCT - Abnormal    Lactic Acid POCT 1.1      Bicarbonate Venous POCT 15 (*)     O2 Sat, Venous POCT 49 (*)     pCO2 Venous POCT 26 (*)     pH Venous POCT 7.38      pO2 Venous POCT 26     ETHYL ALCOHOL LEVEL - Normal    Alcohol ethyl <0.01     INR - Normal    INR 1.04     PARTIAL THROMBOPLASTIN TIME - Normal    aPTT 25     SALICYLATE LEVEL - Normal    Salicylate <0.3     URINE CULTURE   BLOOD CULTURE   BLOOD CULTURE     Emergency Department Course & Assessments:    Interventions:  Medications   LORazepam (ATIVAN) injection 0.5 mg (0.5 mg Intravenous $Given 9/28/23 1609)   sodium chloride 0.9% BOLUS 1,000 mL (1,000 mLs Intravenous $New Bag 9/28/23 1609)   iopamidol (ISOVUE-370) solution 45 mL (45 mLs Intravenous $Given 9/28/23 1638)   Saline Flush (60 mLs Intravenous $Given 9/28/23 1649)      Assessments:  1227 I obtained history and examined the patient as noted above.  1733 I rechecked the patient and explained findings.    Independent Interpretation (X-rays, CTs, rhythm strip):  None    Consultations/Discussion of Management or Tests:   I spoke with Dr. Gama from [facility], who accepts the patient for admission.  I consulted with Dr. Lucia regarding bleeding after enema.    Social Determinants of Health affecting care:   Healthcare Access/Compliance, Education/Literacy,  and Stress/Adjustment Disorders    Disposition:  The patient was admitted to the hospital under the care of Dr. 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  She has been having a difficult time providing history to us.  There was some concern about Tylenol overuse but her Tylenol levels are nondetectable, her coags are normal and her liver enzymes are normal so I think this is unlikely.  Lumbar spine CT scan was obtained given report of possible trauma and pain.  This was negative without signs of fracture.  Patient seem to be constipated and having difficulty urinating.  She appears to have some urinary retention and she does appear to have severe constipation.  CT of the abdomen pelvis was obtained and shows no concerning intra-abdominal findings.  Rectal disimpaction was attempted at the bedside but was unsuccessful.  We attempted to place an enema and she developed some bleeding, likely from trauma with the enema.  She does, however, have an unexplained anemia and so she was typed and screened and this was monitored closely for concern of a lower GI bleed.  Etiology for her symptoms are not clear.  Lab work was otherwise unrevealing.  She will be admitted to the hospital under the care of Dr. Gama for further monitoring, treatment of her constipation, serial hemoglobins and gastroenterology care if this becomes necessary.      Diagnosis:    ICD-10-CM    1. Acute cystitis without hematuria  N30.00       2. Constipation, unspecified constipation type  K59.00       3. Anemia, unspecified type  D64.9       4. Urinary retention  R33.9       5. Lower GI bleed  K92.2            Discharge Medications:  New Prescriptions    No medications on file          Scribe Disclosure:  Jeet NIETO  Juan, am serving as a scribe at 2:10 PM on 9/28/2023 to document services personally performed by Arlene Puentes MD based on my observations and the provider's statements to me.   9/28/2023   Arlene Puentes MD Cho, Amy C, MD  09/30/23 0304

## 2023-09-28 NOTE — CONSULTS
Rectal bleeding  Fecal impaction, Urinary retention.  Anemia  67 year old female with history of schizophrenia, tardive dyskinesia, HTN, depression, ADD, and UTI who resides in a group home who presented to the ED   Rectal bleeding after enema. Likely mucosal injury  Monitor Hb  Type and cross  GI will follow along    [facility]

## 2023-09-28 NOTE — ED TRIAGE NOTES
Pt arrives via EMS from a group home w c/o lower back pain. EMS reports pt to be nonverbal. On arrival pt is making sounds along with sitting up in bed and throwing her body weight back onto the stretcher. EMS reports pt may have had a fall at some point but is unsure of when the fall took place. Hx chronic pain, tylenol overuse, and schizoaffective disorder.      Triage Assessment       Row Name 09/28/23 1232       Triage Assessment (Adult)    Airway WDL WDL       Respiratory WDL    Respiratory WDL WDL       Skin Circulation/Temperature WDL    Skin Circulation/Temperature WDL WDL       Cardiac WDL    Cardiac WDL WDL

## 2023-09-28 NOTE — ED NOTES
Bed: ED26  Expected date:   Expected time:   Means of arrival:   Comments:  Joy - 525 - 68 F pain eta 1216

## 2023-09-28 NOTE — H&P
[facility]    History and Physical - Hospitalist Service       Date of Admission:  9/28/2023    Assessment & Plan      Kortney Germain is a 68 year old female admitted on 9/28/2023. She   Kortney Germain is a 67 year old female with history of schizophrenia, tardive dyskinesia, HTN, depression, ADD, and UTI who resides in a group home who presented to the ED with abdominal and back pain    # Dehydration  # UTI   # Encephalopathy  -Admit as inpatient  -CT scan of the abdomen shows rectum distended with stool and bladder distention with no pyelonephritis  -Patient confused in the emergency room.  She was given Ativan for the CAT scan.  Appeared dehydrated.  UA positive for UTI  -will start her on ceftriaxone  -Follow urine culture  -Follow blood cultures  -Keep patient n.p.o.  -Avoid further benzodiazepines  -Delirium precautions      #Concern for Tylenol overuse  -As per the group home could patient could have using more Tylenol based on the number of bottles in the room  -Patient's Tylenol level has been normal  -She has normal liver function tests  will recheck LFTs in the morning        #Hypernatremia  -Mild hypernatremia due to dehydration   -D5W  -Repeat sodium in the morning      #Constipation  -Enema was given in the ED.  -Bowel regimen.      #GI bleed  #Acute on chronic anemia    -Likely mucosal injury due to trauma after the patient received anemia  -Continue to check hemoglobin every 6 hours  -Type and screen  -Transfuse if hemoglobin less than 7      #Back pain  -CT of the lumbar spine shows no fractures likely due to constipation.  Continue bowel regimen.            History of schizophrenia  History of depression  #Tremors  Group home resident  -Hold PTA meds due to confusion  -Consider psychiatry consult      #Incidental finding of enlarged uterus with fibroids  -Outpatient follow-up       Diet: Npo  DVT Prophylaxis: Pneumatic Compression Devices  Liu Catheter: Not  present  Lines: None     Cardiac Monitoring: None  Code Status:  Prior code status     Clinically Significant Risk Factors Present on Admission         # Hypernatremia: Highest Na = 147 mmol/L in last 2 days, will monitor as appropriate                          Disposition Plan      Expected Discharge Date: 09/30/2023                  Bridgett Gama MD  Hospitalist Service  [facility]  Securely message with [employer] (more info)  Text page via [employer] Paging/Directory     ______________________________________________________________________    Chief Complaint   Back pain    History is obtained from the patient    History of Present Illness   Kortney Germain is a 68 year old female who josefina Germain is a 67 year old female with history of schizophrenia, tardive dyskinesia, HTN, depression, ADD, and UTI who resides in a group home who presented to the ED with abdominal and back pain    History could not be obtained from the patient due to confusion.  Patient is a resident of group home.  As per the EMS run patient may have had a fall    Patient has been confused.  The group home are concerned that she will might be overusing Tylenol because of the number of bottles in the room    Past Medical History    Past Medical History:   Diagnosis Date    Allergic state     Depressive disorder     Dyskinesia     Hypertension     Schizo affective schizophrenia (H)        Past Surgical History   No past surgical history on file.    Prior to Admission Medications   Prior to Admission Medications   Prescriptions Last Dose Informant Patient Reported? Taking?   LORazepam (ATIVAN) 0.5 MG tablet  Nursing Home Yes Yes   Sig: Take 0.5 mg by mouth 2 times daily 0800/1400   LORazepam (ATIVAN) 0.5 MG tablet  Nursing Home Yes Yes   Sig: Take 0.25 mg by mouth At Bedtime 2000   NIFEdipine ER (ADALAT CC) 60 MG 24 hr tablet  Nursing Home Yes Yes   Sig: Take 60 mg by mouth daily   acetaminophen (TYLENOL) 500  MG tablet  Nursing Home Yes Yes   Sig: Take 1,000 mg by mouth 3 times daily 0800 / 1400 / 2000   albuterol (PROVENTIL) (2.5 MG/3ML) 0.083% neb solution  halfway Yes Yes   Sig: Take 2.5 mg by nebulization every 4 hours as needed for shortness of breath, wheezing or cough   alendronate (FOSAMAX) 70 MG tablet  Nursing Home Yes Yes   Sig: Take 70 mg by mouth every 7 days Thursdays at 0700   cholecalciferol 50 MCG (2000 UT) tablet  Nursing Home Yes Yes   Sig: Take 1 tablet by mouth daily 0800   cyclobenzaprine (FLEXERIL) 5 MG tablet  Nursing Home Yes Yes   Sig: Take 5 mg by mouth 3 times daily as needed for muscle spasms   diphenhydrAMINE (BENADRYL) 50 MG capsule  Nursing Home Yes Yes   Sig: Take 50 mg by mouth 3 times daily 0800 / 1500 / 2000   escitalopram (LEXAPRO) 20 MG tablet  Nursing Home Yes Yes   Sig: Take 20 mg by mouth daily 0800   multivitamin w/minerals (THERA-VIT-M) tablet  Nursing Home Yes Yes   Sig: Take 1 tablet by mouth daily 0800   muscle rub (ARTHRITIS HOT) 10-15 % CREA  Nursing Home Yes Yes   Sig: Apply topically 4 times daily Back pain - 0700/1100/1500/2000   perphenazine 4 MG tablet  Nursing Home Yes Yes   Sig: Take 4 mg by mouth every morning   perphenazine 8 MG tablet  Nursing Home Yes Yes   Sig: Take 8 mg by mouth At Bedtime      Facility-Administered Medications: None           Physical Exam   Vital Signs: Temp: 99.5  F (37.5  C) Temp src: Oral BP: (!) 146/76 Pulse: 102   Resp: 20 SpO2: 93 % O2 Device: None (Room air)    Weight: 0 lbs 0 oz  Physical Exam  Cardiovascular:      Rate and Rhythm: Normal rate and regular rhythm.   Pulmonary:      Effort: Pulmonary effort is normal. No respiratory distress.   Abdominal:      General: There is no distension.      Palpations: Abdomen is soft.      Tenderness: There is no abdominal tenderness.   Skin:     General: Skin is warm.   Neurological:      Mental Status: She is disoriented.      Comments: Tremors +          Medical Decision Making              Data     I have personally reviewed the following data over the past 24 hrs:    8.8  \   7.2 (L)   / 526 (H)     147 (H) 118 (H) 43.3 (H) /  115 (H)   4.8 17 (L) 0.81 \     ALT: 15 AST: 14 AP: 63 TBILI: <0.2   ALB: 4.2 TOT PROTEIN: 6.9 LIPASE: N/A     Procal: N/A CRP: N/A Lactic Acid: 1.1       INR:  1.04 PTT:  25   D-dimer:  N/A Fibrinogen:  N/A       Imaging results reviewed over the past 24 hrs:   Recent Results (from the past 24 hour(s))   Lumbar spine CT w/o contrast    Narrative    CT LUMBAR SPINE WITHOUT CONTRAST  9/28/2023 1:56 PM     HISTORY: fall, low back pain      TECHNIQUE: Axial images of the lumbar spine were obtained without  intravenous contrast. Multiplanar reformations were performed.   Radiation dose for this scan was reduced using automated exposure  control, adjustment of the mA and/or kV according to patient size, or  iterative reconstruction technique.     COMPARISON: None.     FINDINGS: Vertebral body heights are maintained. Schmorl's node  involves the superior endplate of T12. No anterolisthesis or  retrolisthesis. Multilevel facet disease. Transverse processes are  intact. No evidence of an acute fracture. Paravertebral soft tissues  are unremarkable. Calcified uterine fibroids incidentally noted.  Moderate stool burden in the colon.      Impression    IMPRESSION:      No acute fracture in the lumbar spine.     OLIVA GOLDSMITH MD         SYSTEM ID:  [id number]   CT Abdomen Pelvis w Contrast    Narrative    EXAM: CT ABDOMEN PELVIS W CONTRAST  LOCATION: [facility]  DATE: 9/28/2023    INDICATION: Questionable abdominal pain.  COMPARISON: None.  TECHNIQUE: CT scan of the abdomen and pelvis was performed following injection of IV contrast. Multiplanar reformats were obtained. Dose reduction techniques were used.  CONTRAST: 45 mL Isovue 370    FINDINGS:   LOWER CHEST: Small esophageal hiatal hernia. Cardiac enlargement.    HEPATOBILIARY: Normal.    PANCREAS:  Normal.    SPLEEN: Normal.    ADRENAL GLANDS: Normal.    KIDNEYS/BLADDER: Simple cyst left kidney. No follow-up is needed.    BOWEL: Rectum distended with stool.    LYMPH NODES: Normal.    VASCULATURE: Unremarkable.    PELVIC ORGANS: Enlargement of the uterus with multiple fibroids. The urinary bladder is distended.    MUSCULOSKELETAL: Normal.      Impression    IMPRESSION:   1.  Enlarged uterus with multiple fibroids.    2.  Rectum is distended with stool.    3.  Urinary bladder is distended.

## 2023-09-29 NOTE — PROGRESS NOTES
X-cover    Pt with ongoing high fevers, shaking. Concern for bacteremia in setting of UTI. Penicillin allergy  - change to meropenem for pseudomonal coverage    Steffen Melchor MD

## 2023-09-29 NOTE — CODE/RAPID RESPONSE
[facility]    RRT Note  9/29/2023   Time Called: 1100    RRT called for: Anticipated need for intubation    Assessment & Plan   IMPRESSION & PLAN:    Kortney Germain is a 68 year old female w/ PMH of schizophrenia, tardive dyskinesia, hypertension who was admitted on 9/28/2023 for encephalopathy abdominal pain.  She was found to have urinary retention and a stool ball, acute anemia multiple electrolyte derangements including but not limited to hyponatremia and anion gap acidosis as well as complicated cystitis.    Patient has been febrile since admission, RN staff reports she has been shaking all night.  Patient was value by the hospitalist attending physician who felt additional neurodiagnostics were warranted but her mentation prohibited safe completion of those.  RRT was activated to facilitate intubation so as to complete her diagnostics    /65, heart rate 92, SPO2 91% on room air patient has generalized shaking she does not follow commands periodically seems to move purposefully.  There is no verbalization    Impression  Shaking may be rigors given her fever but unclear how much is her tardive dyskinesia versus seizure versus septic encephalopathy  Encephalopathy, likely metabolic, with CNS failure and loss of protective airway reflexes probably a component of septic encephalopathy from complicated cystitis source but cannot necessarily rule out that she has a CNS etiology of her sepsis.    INTERVENTIONS:  -more IV access secured  -rectal temp 101.8  -gluc 127mg/dL  -blood cultures  -repeat lactic, vbg  -anesthesia provider intubated pt at 1145am w/ 100mg etomidate, 60mg succinylcholine, 100mcg phenylephrine, #7 ETT, 21cm at teeth, 22cm at lip  -vent settings AC f 16, Vt 400mL, 0.5 FiO2, 5cmH20 peep  -post intubation sedation w/ versed bolus followed by gtt (egg allergy so avoiding propofol)  -benadryl 50mg IVx1 in case this is from tardive dyskinesia  -dilaudid 0.5mg IVx1 for  analagosedation  -CT head w/o, CXR to eval ETT, LP, all completed  -1h post intubation ABG  -Restraints to bilateral wrist  -Patient required 2 doses of fentanyl 50 mcg IV before and after LP  -Patient was hemodynamically stable did not require vasopressor support at any point    Working diagnosis:  Complicated cystitis/pyelo  Multiple electrolyte derangements including but not limited to hyponatremia, hypokalemia, hyperchloremia  anion gap acidosis,  Acute anemia    At the end of the RRT patient been intubated, neurodiagnostics have been completed and patient was transferred to ICU approximately 1:20 PM    disposition to ICU    Discussed with and defer further cares to hospitalist attending physician Dr. Polanco and see attending physician      Code Status: Full Code    Allergies   Allergies   Allergen Reactions    Amlodipine     Clozapine     Egg [Chicken-Derived Products (Egg)]     Penicillins     Potassium     Poultry Meal        Physical Exam   Vital Signs with Ranges:  Temp:  [97.6  F (36.4  C)-102.7  F (39.3  C)] 102.7  F (39.3  C)  Pulse:  [] 84  Resp:  [18-55] 55  BP: (126-183)/() 183/90  FiO2 (%):  [40 %-50 %] 40 %  SpO2:  [92 %-98 %] 98 %  I/O last 3 completed shifts:  In: 563.75 [I.V.:563.75]  Out: 450 [Urine:450]    Constitutional: vs as above and/or per EMR  General:  adult pt lying in bed acutely ill appearing   GCS:   Motor 4=Withdraws from pain   Verbal 1   Eye Opening 3=To speech   Total: 8     Neuro: There is no follows commands wiggle toes, nonverbal, shaking rigors toes somewhat contracted in a downward position, hands are contracted  Eyes pupils equal round 3mm briskly reactive bilat, sclera nonicteric, noninjected, conjunctiva pink,  Head, ENT & mouth: NC/AT, dry oral mucosa  Neck: supple  CV S1S2  resp: CTAB upper and lower lobes  gi:normoactive bowel sounds, soft, nontender, nondisteded  Ext: no edema or mottling  Skin: no rashes on exposed skin  Lymph:  defer  Musculoskeletal no bony joint deformities      Data     ABG:  -  Recent Labs   Lab 09/29/23  1253   PH 7.39   PCO2 31*   PO2 208*   HCO3 19*   O2PER 50     IMAGING: (X-ray/CT/MRI)   Recent Results (from the past 24 hour(s))   Lumbar spine CT w/o contrast    Narrative    CT LUMBAR SPINE WITHOUT CONTRAST  9/28/2023 1:56 PM     HISTORY: fall, low back pain      TECHNIQUE: Axial images of the lumbar spine were obtained without  intravenous contrast. Multiplanar reformations were performed.   Radiation dose for this scan was reduced using automated exposure  control, adjustment of the mA and/or kV according to patient size, or  iterative reconstruction technique.     COMPARISON: None.     FINDINGS: Vertebral body heights are maintained. Schmorl's node  involves the superior endplate of T12. No anterolisthesis or  retrolisthesis. Multilevel facet disease. Transverse processes are  intact. No evidence of an acute fracture. Paravertebral soft tissues  are unremarkable. Calcified uterine fibroids incidentally noted.  Moderate stool burden in the colon.      Impression    IMPRESSION:      No acute fracture in the lumbar spine.     OLIVA GOLDSMITH MD         SYSTEM ID:  [id number]   CT Abdomen Pelvis w Contrast    Narrative    EXAM: CT ABDOMEN PELVIS W CONTRAST  LOCATION: [facility]  DATE: 9/28/2023    INDICATION: Questionable abdominal pain.  COMPARISON: None.  TECHNIQUE: CT scan of the abdomen and pelvis was performed following injection of IV contrast. Multiplanar reformats were obtained. Dose reduction techniques were used.  CONTRAST: 45 mL Isovue 370    FINDINGS:   LOWER CHEST: Small esophageal hiatal hernia. Cardiac enlargement.    HEPATOBILIARY: Normal.    PANCREAS: Normal.    SPLEEN: Normal.    ADRENAL GLANDS: Normal.    KIDNEYS/BLADDER: Simple cyst left kidney. No follow-up is needed.    BOWEL: Rectum distended with stool.    LYMPH NODES: Normal.    VASCULATURE: Unremarkable.    PELVIC  ORGANS: Enlargement of the uterus with multiple fibroids. The urinary bladder is distended.    MUSCULOSKELETAL: Normal.      Impression    IMPRESSION:   1.  Enlarged uterus with multiple fibroids.    2.  Rectum is distended with stool.    3.  Urinary bladder is distended.   CT Head w/o Contrast    Narrative    CT SCAN OF THE HEAD WITHOUT CONTRAST September 29, 2023 1:00 PM     HISTORY: Decreased level of consciousness.    TECHNIQUE: Axial images of the head and coronal reformations without  IV contrast material. Radiation dose for this scan was reduced using  automated exposure control, adjustment of the mA and/or kV according  to patient size, or iterative reconstruction technique.    COMPARISON: MRI brain 3/8/2023.    FINDINGS: There is no evidence of intracranial hemorrhage, mass, acute  infarct or anomaly. The ventricles are normal in size, shape and  configuration. Mild diffuse parenchymal volume loss. Mild-to-moderate  scattered patchy cerebral white matter hypodensities which are  nonspecific, but likely related to chronic microvascular ischemic  disease.     The visualized portions of the sinuses and mastoids appear normal. The  bony calvarium and bones of the skull base appear intact. Presumed  cerumen in the bilateral external auditory canals.      Impression    IMPRESSION:  1. No CT findings of acute intracranial process.  2. Brain atrophy and presumed chronic small vessel ischemic changes,  as described.   XR Chest Port 1 View    Narrative    XR CHEST PORT 1 VIEW 9/29/2023 1:21 PM    HISTORY: check ETT placemet    COMPARISON: 3/12/2023    FINDINGS: Endotracheal tube tip is 4.2 cm above the tin. No  consolidation. No pleural effusions or pneumothorax. Normal cardiac  size. Aortic atherosclerosis. Old bilateral rib fractures.    AVEL PANTOJA MD         SYSTEM ID:  [id number]   XR Lumbar Puncture Spinal Tap Diag    Narrative    LUMBAR PUNCTURE WITH FLUOROSCOPIC GUIDANCE 9/29/2023 1:25 PM      HISTORY: Confusion, altered mental status, encephalopathy.    CONSENT: Emergent consent. The procedure was performed in an emergent  situation. Dr. Faith Polanco, hospitalist, deemed the procedure as  emergent. Additionally, the patient reportedly has no next of kin able  to give consent at this time.     PROCEDURE:  The patient was placed in prone position. The L1-L2 interspace was  identified and the overlying skin was marked. The patient was then  prepped, draped and anesthetized using sterile technique. Local  anesthesia was performed using 1% lidocaine.    A 22-gauge 3.5 inch spinal needle was inserted under fluoroscopic  guidance into the CSF space with return of clear fluid. 8 mL of clear  cerebrospinal fluid was sent to the laboratory for analysis. The  stylet was reinserted and the spinal needle was removed. There were no  complications.     FLUOROSCOPY TIME: 0.2 minutes.       Impression    IMPRESSION: Uncomplicated fluoroscopic-guided lumbar puncture.         CBC with Diff:  Recent Labs   Lab Test 09/29/23  1136 09/28/23  2245 09/28/23  1244   WBC 11.4*   < > 8.8   HGB 7.0*   < > 7.2*   MCV 75*   < > 76*   *   < > 526*   INR  --   --  1.04    < > = values in this interval not displayed.        Lactic Acid:    Lab Results   Component Value Date    LACT 1.3 09/29/2023           Comprehensive Metabolic Panel:  Recent Labs   Lab 09/29/23  1131 09/29/23  0855 09/29/23  0518   NA  --  148* 147*   POTASSIUM  --  3.2* 3.6   CHLORIDE  --  114* 115*   CO2  --  15* 14*   ANIONGAP  --  19* 18*   * 153* 156*   BUN  --  25.1* 26.1*   CR  --  0.68 0.63   GFRESTIMATED  --  >90 >90   LULU  --  8.6* 8.6*   PROTTOTAL  --   --  6.7   ALBUMIN  --   --  4.2   BILITOTAL  --   --  0.2   ALKPHOS  --   --  77   AST  --   --  22   ALT  --   --  16       INR:    Recent Labs   Lab Test 09/28/23  1244   INR 1.04     UA:  Recent Labs   Lab 09/28/23  1606   COLOR Yellow   APPEARANCE Clear   URINEGLC Negative   URINEBILI  Negative   URINEKETONE Negative   SG 1.027   UBLD Negative   URINEPH 5.0   PROTEIN Negative   NITRITE Positive*   LEUKEST Moderate*   RBCU <1   WBCU 18*       Time Spent on this Encounter   I spent 75 minutes (1045 - 12pm) of critical care time on the unit/floor managing the care of Kortney Germain. Upon evaluation, this patient had a high probability of imminent or life-threatening deterioration due to need for intubation mechanical ventilation/CNS failure which required my direct attention, intervention, and personal management including coordination of intubation, postintubation monitoring 100% of my time was spent at the bedside counseling the patient and/or coordinating care regarding services listed in this note.    TAMI Alan [facility]  Hospitalist Service  [facility]  Securely message with [employer] (more info)  Text page via [facility] Paging/Directory

## 2023-09-29 NOTE — PROGRESS NOTES
Intensivist:  Changed meropenem to cefepime to potentiate possible use of valproate as an antiepileptic.

## 2023-09-29 NOTE — ANESTHESIA PROCEDURE NOTES
Airway       Patient location during procedure: Floor       Procedure Start/Stop Times: 9/29/2023 11:45 AM  Staff -        CRNA: Corona Polanco APRN CRNA       Performed By: CRNA  Consent for Airway        Urgency: emergent       Consent: The procedure was performed in an emergent situation.  Report Obtained from Primary Care Team       History regarding most recent potassium obtained: Yes       History regarding presence/absence of renal failure obtained:Yes       History regarding stroke/CVA obtained:Yes       History regarding presence/absence of NM disorder: YesIndications and Patient Condition       Indications for airway management: airway protection, altered level of consciousness and respiratory insufficiency       Mallampati: Not Assessed     Induction type:intravenous       Mask difficulty assessment: 1 - vent by mask    Final Airway Details       Final airway type: endotracheal airway       Successful airway: ETT - single  Endotracheal Airway Details        ETT size (mm): 7.0       Cuffed: yes       Successful intubation technique: video laryngoscopy       VL Blade Size: Grossman 3       Grade View of Cords: 1       Adjucts: stylet       Position: Center       Measured from: gums/teeth       Secured at (cm): 21       Bite block used: None    Post intubation assessment        ETT secured, Vent settings by primary/ICU team, Primary/ICU team to review CXR, Sedation to be ordered by primary/ICU team and No apparent complications       Placement verified by: capnometry and equal breath sounds        Number of attempts at approach: 2       Secured with: commercial tube castrejon       Ease of procedure: easy       Dentition: Intact    Medication(s) Administered   Medication Administration Time: 9/29/2023 11:45 AM    Additional Comments       Pro oxygenation with BVM assisted ventilation with 100% O2. Video laryngoscope utilized. Gr 1. Cords open/clear. ET intubation without difficulty. +ET CO2, +BBS. ET tube  secured and patient placed on mechanical ventilation. VSS

## 2023-09-29 NOTE — CONSULTS
Critical Care  Note      09/29/2023    Name: Kortney Germain MRN#: [id number]   Age: 68 year old YOB: 1955     [facility]  [facility]    MV DAY #1      We are consulted by Dr. Polanco of the hospitalist service for management of fever with rigors         Problem List:   Principal Problem:    Urinary retention  Active Problems:    Acute cystitis without hematuria    Constipation, unspecified constipation type    Anemia, unspecified type           Summary/Hospital Course:   68F pmh of schizo-affective schizophrenia, HTN, tardive dyskinesia now presented yesterday from group home with back pain and fever, Ct abd showed rectum distended with stool and urinary bladder distended, now s/p quinonez placement.  Arrival UA c/w UTI.  Since arrival from the ED she has been alert but not generally interactive (?part of this may be due to underlying psychiatric illness?).  This morning she was noted to be febrile and shaking prompting intubation to facilitate head CT and LP.      Assessment and plan :     Kortney Germain IS a 68 year old female admitted on 9/28/2023 for fever and UTI.   I have personally reviewed the daily labs, imaging studies, cultures and discussed the case with referring physician and consulting physicians.     My assessment and plan by system for this patient is as follows:    Neurology/Psychiatry:   1. Underlying schizoaffective schizophrenia and h/o tardive dyskinesia: appreciate psych recommendations.    2. Rigidity/shaking:  differential includes rigors (pt is actively febrile), tardive dyskinesia (known h/o this), possible benzo withdrawal (on standing ativan at home), possible serotonin syndrome (on lexapro), or possible seizures (though no history of this)  3. Sedation--propofol drip  4.  Analgesia--prn dilaudid    Cardiovascular:   1.Hypertension:  no h/o essential hypertension.  ?pain/discomfort related? Vs other syndrome as above (e.g. serotonin syndrome, benzo w/d,  etc).    Pulmonary/Ventilator Management:   1. Intubated to facilitate workup of fever and shaking.  Wean to extubate.    GI and Nutrition :   1. NPO for now.  RD consult if not extubated within 24 hours  2. PPI for pud prophy    Renal/Fluids/Electrolytes:   1. Creat ok 0.68  2. hyperNa 148--start d5  3.  hypoK 3.2--replete    Infectious Disease:   1. UTI--continues on meropenem.  UC pending.  Does not meet sepsis criteria at this time (sofa is 0-1 with possible point for mental status).  2. Concern for meningitis:  stop acyclovir and ctx pending LP outcome    Endocrine:   1. Stress induced hyperglycemia:  Fsg ok 127--ssi prn    Hematology/Oncology:   1. Hgb 7.0--acute on chronic anemia due to critical illness, no apparent blood loss.  2. Leukocytosis:  to 11.4, in setting of known UTI  3. pltls 501--thrombocytosis    ICU Prophylaxis:   1. DVT: Hep Subq/ mechanical  2. VAP: HOB 30 degrees, chlorhexidine rinse  3. Stress Ulcer: PPI  4. Restraints: Nonviolent soft two point restraints required and necessary for patient safety and continued cares and good effect as patient continues to pull at necessary lines, tubes despite education and distraction. Will readdress daily.   5. Wound care  -   6. Feeding - npo for now  7. Family Update:apparently has no family.  Group home director is listed contact.  8. Disposition - icu    Clinically Significant Risk Factors Present on Admission        # Hypokalemia: Lowest K = 3.2 mmol/L in last 2 days, will replace as needed  # Hypernatremia: Highest Na = 148 mmol/L in last 2 days, will monitor as appropriate     # Anion Gap Metabolic Acidosis: Highest Anion Gap = 19 mmol/L in last 2 days, will monitor and treat as appropriate                   # Anemia: based on hgb <11                        Key Medications:      acyclovir  10 mg/kg Intravenous Q8H PHIL    cefTRIAXone  2 g Intravenous Q12H    [Held by provider] diphenhydrAMINE  50 mg Oral TID    docusate sodium  100 mg Oral BID     [Held by provider] escitalopram  20 mg Oral Daily    lactated ringers  1,000 mL Intravenous Once    [Held by provider] LORazepam  0.25 mg Oral At Bedtime    [Held by provider] LORazepam  0.5 mg Oral BID    meropenem  1 g Intravenous Q8H    [Held by provider] multivitamin w/minerals  1 tablet Oral Daily    norepinephrine        [Held by provider] perphenazine  4 mg Oral QAM    [Held by provider] perphenazine  8 mg Oral At Bedtime    polyethylene glycol  17 g Oral BID    sodium chloride (PF)  3 mL Intracatheter Q8H      lactated ringers 125 mL/hr at 09/29/23 0847    midazolam 4 mg/hr (09/29/23 1202)    norepinephrine                Physical Examination:   Temp:  [97.6  F (36.4  C)-102.7  F (39.3  C)] 102.7  F (39.3  C)  Pulse:  [] 84  Resp:  [18-55] 55  BP: (126-183)/() 183/90  SpO2:  [92 %-98 %] 98 %      Intake/Output Summary (Last 24 hours) at 9/29/2023 1329  Last data filed at 9/29/2023 0900  Gross per 24 hour   Intake 563.75 ml   Output 750 ml   Net -186.25 ml         Wt Readings from Last 4 Encounters:   09/29/23 42.3 kg (93 lb 4.1 oz)   06/08/23 40.8 kg (90 lb)   03/15/23 41 kg (90 lb 6.2 oz)   02/02/22 52.2 kg (115 lb)     BP - Mean:  [] 126  Resp: (!) 55    Recent Labs   Lab 09/29/23  1141 09/29/23  0855   O2PER 4 0       GEN: no acute distress   HEENT: head ncat, sclera anicteric, OP patent, trachea midline   PULM: unlabored synchronous with vent, clear anteriorly    CV/COR: RRR S1S2 no gallop,  No rub, no murmur  ABD: soft nontender, hypoactive bowel sounds, no mass  EXT:  warm and well perfused x4  NEURO: PERRL, no obvious deficits  SKIN: no obvious rash  LINES: clean, dry intact         Data:   All data and imaging reviewed     ROUTINE ICU LABS (Last four results)  CMP  Recent Labs   Lab 09/29/23  1131 09/29/23  0855 09/29/23  0518 09/29/23  0424 09/28/23  1244   NA  --  148* 147*  --  147*   POTASSIUM  --  3.2* 3.6  --  4.8   CHLORIDE  --  114* 115*  --  118*   CO2  --  15* 14*  --  17*    ANIONGAP  --  19* 18*  --  12   * 153* 156* 191* 115*   BUN  --  25.1* 26.1*  --  43.3*   CR  --  0.68 0.63  --  0.81   GFRESTIMATED  --  >90 >90  --  79   LULU  --  8.6* 8.6*  --  9.3   PROTTOTAL  --   --  6.7  --  6.9   ALBUMIN  --   --  4.2  --  4.2   BILITOTAL  --   --  0.2  --  <0.2   ALKPHOS  --   --  77  --  63   AST  --   --  22  --  14   ALT  --   --  16  --  15     CBC  Recent Labs   Lab 09/29/23  1136 09/29/23  0855 09/29/23  0518 09/28/23  2359 09/28/23  2245 09/28/23  1244   WBC 11.4*  --  14.8*  --   --  8.8   RBC 3.23*  --  3.37*  --   --  3.35*   HGB 7.0* 7.5* 7.4* 6.9*   < > 7.2*   HCT 24.3*  --  25.3*  --   --  25.5*   MCV 75*  --  75*  --   --  76*   MCH 21.7*  --  22.0*  --   --  21.5*   MCHC 28.8*  --  29.2*  --   --  28.2*   RDW 16.5*  --  16.1*  --   --  16.2*   *  --  521*  --   --  526*    < > = values in this interval not displayed.     INR  Recent Labs   Lab 09/28/23  1244   INR 1.04     Arterial Blood Gas  Recent Labs   Lab 09/29/23  1141 09/29/23  0855   O2PER 4 0       All cultures:  No results for input(s): CULT in the last 168 hours.  Recent Results (from the past 24 hour(s))   Lumbar spine CT w/o contrast    Narrative    CT LUMBAR SPINE WITHOUT CONTRAST  9/28/2023 1:56 PM     HISTORY: fall, low back pain      TECHNIQUE: Axial images of the lumbar spine were obtained without  intravenous contrast. Multiplanar reformations were performed.   Radiation dose for this scan was reduced using automated exposure  control, adjustment of the mA and/or kV according to patient size, or  iterative reconstruction technique.     COMPARISON: None.     FINDINGS: Vertebral body heights are maintained. Schmorl's node  involves the superior endplate of T12. No anterolisthesis or  retrolisthesis. Multilevel facet disease. Transverse processes are  intact. No evidence of an acute fracture. Paravertebral soft tissues  are unremarkable. Calcified uterine fibroids incidentally noted.  Moderate  stool burden in the colon.      Impression    IMPRESSION:      No acute fracture in the lumbar spine.     OLIVA GOLDSMITH MD         SYSTEM ID:  [id number]   CT Abdomen Pelvis w Contrast    Narrative    EXAM: CT ABDOMEN PELVIS W CONTRAST  LOCATION: [facility]  DATE: 9/28/2023    INDICATION: Questionable abdominal pain.  COMPARISON: None.  TECHNIQUE: CT scan of the abdomen and pelvis was performed following injection of IV contrast. Multiplanar reformats were obtained. Dose reduction techniques were used.  CONTRAST: 45 mL Isovue 370    FINDINGS:   LOWER CHEST: Small esophageal hiatal hernia. Cardiac enlargement.    HEPATOBILIARY: Normal.    PANCREAS: Normal.    SPLEEN: Normal.    ADRENAL GLANDS: Normal.    KIDNEYS/BLADDER: Simple cyst left kidney. No follow-up is needed.    BOWEL: Rectum distended with stool.    LYMPH NODES: Normal.    VASCULATURE: Unremarkable.    PELVIC ORGANS: Enlargement of the uterus with multiple fibroids. The urinary bladder is distended.    MUSCULOSKELETAL: Normal.      Impression    IMPRESSION:   1.  Enlarged uterus with multiple fibroids.    2.  Rectum is distended with stool.    3.  Urinary bladder is distended.     D/w NP Flor       Past Medical/surgical hx, meds, allergies, social history, family history     Past Medical History:   Diagnosis Date    Allergic state     Depressive disorder     Dyskinesia     Hypertension     Schizo affective schizophrenia (H)      No past surgical history on file.  No current facility-administered medications on file prior to encounter.  acetaminophen (TYLENOL) 500 MG tablet, Take 1,000 mg by mouth 3 times daily 0800 / 1400 / 2000  albuterol (PROVENTIL) (2.5 MG/3ML) 0.083% neb solution, Take 2.5 mg by nebulization every 4 hours as needed for shortness of breath, wheezing or cough  alendronate (FOSAMAX) 70 MG tablet, Take 70 mg by mouth every 7 days Thursdays at 0700  cholecalciferol 50 MCG (2000 UT) tablet, Take 1 tablet by mouth  daily 0800  cyclobenzaprine (FLEXERIL) 5 MG tablet, Take 5 mg by mouth 3 times daily as needed for muscle spasms  diphenhydrAMINE (BENADRYL) 50 MG capsule, Take 50 mg by mouth 3 times daily 0800 / 1500 / 2000  escitalopram (LEXAPRO) 20 MG tablet, Take 20 mg by mouth daily 0800  LORazepam (ATIVAN) 0.5 MG tablet, Take 0.5 mg by mouth 2 times daily 0800/1400  LORazepam (ATIVAN) 0.5 MG tablet, Take 0.25 mg by mouth At Bedtime 2000  multivitamin w/minerals (THERA-VIT-M) tablet, Take 1 tablet by mouth daily 0800  muscle rub (ARTHRITIS HOT) 10-15 % CREA, Apply topically 4 times daily Back pain - 0700/1100/1500/2000  NIFEdipine ER (ADALAT CC) 60 MG 24 hr tablet, Take 60 mg by mouth daily  perphenazine 4 MG tablet, Take 4 mg by mouth every morning  perphenazine 8 MG tablet, Take 8 mg by mouth At Bedtime         Allergies   Allergen Reactions    Amlodipine     Clozapine     Egg [Chicken-Derived Products (Egg)]     Penicillins     Potassium     Poultry Meal      Social History     Socioeconomic History    Marital status: Single     Spouse name: Not on file    Number of children: Not on file    Years of education: Not on file    Highest education level: Not on file   Occupational History    Not on file   Tobacco Use    Smoking status: Never    Smokeless tobacco: Never   Substance and Sexual Activity    Alcohol use: No    Drug use: No    Sexual activity: Never   Other Topics Concern    Not on file   Social History Narrative    Not on file     Social Determinants of Health     Financial Resource Strain: Not on file   Food Insecurity: Not on file   Transportation Needs: Not on file   Physical Activity: Not on file   Stress: Not on file   Social Connections: Not on file   Interpersonal Safety: Not on file   Housing Stability: Not on file     No family history on file.

## 2023-09-29 NOTE — CONSULTS
[facility]  Gastroenterology Consultation         Kortney Germain  [address]  [facility] [address]  68 year old female    Admission Date/Time: 9/28/2023  Primary Care Provider: Juan Manuel, a Internal Medicine  Referring / Attending Physician:  Dr. Bridgett Gama    We were asked to see the patient in consultation by Dr. Bridgett Gama for evaluation of rectal bleeding.      CC: abdominal pain    HPI:  Kortney Germain is a 68 year old female who has a past medical history of schizophrenia, tardive dyskinesia, HTN, depression, ADD, and UTI who resides in a group home who presented with abdominal and back pain. No history has been obtained other than chart review and patient is confused at this time.    From RN report with discussion with group home, patient at baseline able to walk and talk. Today she appears to have rigors, fixed stare, and uncomfortable. She is noted to have a UTI on labs. Had noted large amount of rectal stool on CT and given an enema and developed bright red rectal bleeding after. Has had no rectal bleeding since. Has had some soft watery stools overnight.    ROS: A comprehensive ten point review of systems was negative aside from those in mentioned in the HPI.      PAST MED HX:  I have reviewed this patient's medical history and updated it with pertinent information if needed.   Past Medical History:   Diagnosis Date    Allergic state     Depressive disorder     Dyskinesia     Hypertension     Schizo affective schizophrenia (H)        MEDICATIONS:   Prior to Admission Medications   Prescriptions Last Dose Informant Patient Reported? Taking?   LORazepam (ATIVAN) 0.5 MG tablet  Nursing Home Yes Yes   Sig: Take 0.5 mg by mouth 2 times daily 0800/1400   LORazepam (ATIVAN) 0.5 MG tablet  Nursing Home Yes Yes   Sig: Take 0.25 mg by mouth At Bedtime 2000   NIFEdipine ER (ADALAT CC) 60 MG 24 hr tablet  Nursing Home Yes Yes   Sig: Take 60 mg by mouth daily    acetaminophen (TYLENOL) 500 MG tablet  Nursing Home Yes Yes   Sig: Take 1,000 mg by mouth 3 times daily 0800 / 1400 / 2000   albuterol (PROVENTIL) (2.5 MG/3ML) 0.083% neb solution  [facility] Yes Yes   Sig: Take 2.5 mg by nebulization every 4 hours as needed for shortness of breath, wheezing or cough   alendronate (FOSAMAX) 70 MG tablet  Nursing Home Yes Yes   Sig: Take 70 mg by mouth every 7 days Thursdays at 0700   cholecalciferol 50 MCG (2000 UT) tablet  Nursing Home Yes Yes   Sig: Take 1 tablet by mouth daily 0800   cyclobenzaprine (FLEXERIL) 5 MG tablet  Nursing Home Yes Yes   Sig: Take 5 mg by mouth 3 times daily as needed for muscle spasms   diphenhydrAMINE (BENADRYL) 50 MG capsule  Nursing Home Yes Yes   Sig: Take 50 mg by mouth 3 times daily 0800 / 1500 / 2000   escitalopram (LEXAPRO) 20 MG tablet  Nursing Home Yes Yes   Sig: Take 20 mg by mouth daily 0800   multivitamin w/minerals (THERA-VIT-M) tablet  Nursing Home Yes Yes   Sig: Take 1 tablet by mouth daily 0800   muscle rub (ARTHRITIS HOT) 10-15 % CREA  Nursing Home Yes Yes   Sig: Apply topically 4 times daily Back pain - 0700/1100/1500/2000   perphenazine 4 MG tablet  Nursing Home Yes Yes   Sig: Take 4 mg by mouth every morning   perphenazine 8 MG tablet  Nursing Home Yes Yes   Sig: Take 8 mg by mouth At Bedtime      Facility-Administered Medications: None       ALLERGIES:   Allergies   Allergen Reactions    Amlodipine     Clozapine     Egg [Chicken-Derived Products (Egg)]     Penicillins     Potassium     Poultry Meal        SOCIAL HISTORY:  Social History     Tobacco Use    Smoking status: Never    Smokeless tobacco: Never   Substance Use Topics    Alcohol use: No    Drug use: No       FAMILY HISTORY:  No family history on file.    PHYSICAL EXAM:   General  alert, appears uncomfortable  Vital Signs with Ranges  Temp: (!) 102.7  F (39.3  C) Temp src: Axillary BP: (!) 159/82 Pulse: 93   Resp: 22 SpO2: 95 % O2 Device: None (Room air)    I/O last 3  completed shifts:  In: 563.75 [I.V.:563.75]  Out: 450 [Urine:450]    Constitutional:thin, alert, and moderate distress   Cardiovascular: negative, PMI normal. No lifts, heaves, or thrills. RRR. No murmurs, clicks gallops or rub  Respiratory: negative, Percussion normal. Good diaphragmatic excursion. Lungs clear  Abdomen: Abdomen soft, non-tender. BS normal. No masses, organomegaly          ADDITIONAL COMMENTS:   I reviewed the patient's new clinical lab test results.   Recent Labs   Lab Test 09/29/23  0518 09/28/23  2359 09/28/23  2245 09/28/23  1244 03/14/23  0819 03/08/23  0830 03/07/23  1629   WBC 14.8*  --   --  8.8 6.5   < > 11.8*   HGB 7.4* 6.9* 7.1* 7.2* 8.9*   < > 9.3*   MCV 75*  --   --  76* 89   < > 90   *  --   --  526* 385   < > 334   INR  --   --   --  1.04  --   --  1.15    < > = values in this interval not displayed.     Recent Labs   Lab Test 09/29/23 0518 09/28/23  1244 03/15/23  1101   POTASSIUM 3.6 4.8 4.0   CHLORIDE 115* 118* 108*   CO2 14* 17* 27   BUN 26.1* 43.3* 35.9*   ANIONGAP 18* 12 10     Recent Labs   Lab Test 09/29/23 0518 09/28/23  1606 09/28/23  1244 03/10/23  0932 03/09/23  0224 03/06/23  1413   ALBUMIN 4.2  --  4.2  --   --  4.1   BILITOTAL 0.2  --  <0.2  --   --  <0.2   ALT 16  --  15  --   --  21   AST 22  --  14  --   --  39*   PROTEIN  --  Negative  --  Negative 30*  --    LIPASE  --   --   --   --   --  22       I reviewed the patient's new imaging results.        CONSULTATION ASSESSMENT AND PLAN:    Kortney Germain is a 67 year old female with history of schizophrenia, tardive dyskinesia, HTN, depression, ADD, and UTI who resides in a group home who presented to the ED with abdominal and back pain and enema was given for constipation developed bright red rectal bleeding.    Constipation  Anemia, unspecified type  Rectal bleeding  Hemoglobin 6.9 with baseline in 7-8 range  9/28 CT A/P noted enlarge ulterus with fibroids, rectum distened with stool and urinary bladder  distended  One episode of rectal bleeding post enema  Suspect mucosal injury due to trauma of enema    - will start bowel regimen with miralax BID and docusate BID  - continue to monitor for recurrent bloody stools  - daily cbc    UTI  Tmax 102.5  Positive Nitrites on UA. Negative blood cultures to date  WBC 14.8 today    - continue antibiotics  - suspect this is source of abdominal pain as well as constipation        [facility].  Office: [phone]  Cell : [phone] (Dr. Lucia)  Cell: [phone] (Shakila Jimenez PA-C)

## 2023-09-29 NOTE — PROGRESS NOTES
Intubation Note    Date of intubation: 09/29/2023  Time of intubation: 1145  [place] [facility] [place]3rd [place] [facility]   Intubated by: Anesthesia   Size of ETT: 7.0  Location (cm): 21 cm teeth    ETT placement confirmed by: Colorimetric and CXR  Comments: Patient intubated during RRT for airway protection for a work up for fever and shaking.     Settings: A/C 16/400/50% and 5 of PEEP. Transported to CT Scan, portable CXR and LP done. ABG drawn per orders. Taken to ICU.   RT to continue to follow.

## 2023-09-29 NOTE — PROGRESS NOTES
All treatment at this point is emergency:  She has no family per . Patient makes her own decisions.   Spoke with the  supervisor and there is no family.   Left message for the  attempted however mailbox full and cannot leave a message.   Sherri

## 2023-09-29 NOTE — CONSULTS
Care Management Initial Consult    General Information  Assessment completed with: Care Team Member, [facility] ([phone])  Type of CM/SW Visit: Initial Assessment    Primary Care Provider verified and updated as needed: Yes [place] [place]   Readmission within the last 30 days: no previous admission in last 30 days      Reason for Consult: discharge planning  Advance Care Planning: Advance Care Planning Reviewed: concerns discussed, other (see comments)  Patient does not have a HCD       Communication Assessment  Patient's communication style: spoken language (English or Bilingual)             Cognitive  Cognitive/Neuro/Behavioral: .WDL except  Level of Consciousness: alert  Arousal Level: arouses to voice  Orientation: other (see comments) (ANN)  Mood/Behavior: restless     Speech: ANN (unable to assess) (moans)    Living Environment:   People in home: facility resident     Current living Arrangements: group home      Able to return to prior arrangements: yes (if independent or assist of 1 and on oral antibiotics)  Living Arrangement Comments: 4 resident group home that is fully staffed    Family/Social Support:  Care provided by: self, other (see comments) (group home staff occassionally help if patient is feeling weak)  Provides care for: no one  Marital Status: Single             Description of Support System:           Current Resources:   Patient receiving home care services: No     Community Resources: County Programs, County Worker, Financial/Insurance, Group Home, Transportation Services  Equipment currently used at home:    Supplies currently used at home: None    Employment/Financial:  Employment Status: disabled        Financial Concerns: No concerns identified           Does the patient's insurance plan have a 3 day qualifying hospital stay waiver?  No    Lifestyle & Psychosocial Needs:  Social Determinants of Health     Food Insecurity: Not on file   Depression: Not on file    Housing Stability: Not on file   Tobacco Use: Low Risk  (6/8/2023)    Patient History     Smoking Tobacco Use: Never     Smokeless Tobacco Use: Never     Passive Exposure: Not on file   Financial Resource Strain: Not on file   Alcohol Use: Not on file   Transportation Needs: Not on file   Physical Activity: Not on file   Interpersonal Safety: Not on file   Stress: Not on file   Social Connections: Not on file       Functional Status:  Prior to admission patient needed assistance:   Dependent ADLs:: Ambulation-walker  Dependent IADLs:: Cooking, Meal Preparation, Medication Management, Transportation       Mental Health Status:  Mental Health Status: Current Concern  Mental Health Management: Medication, Psychiatrist    Chemical Dependency Status:  Chemical Dependency Status: No Current Concerns             Values/Beliefs:  Spiritual, Cultural Beliefs, Anabaptism Practices, Values that affect care: no               Additional Information:  Patient is currently not responding to voice.  She exhibits current rigors with an axillary temp of 102.7.  Patient has a health history of schizophrenia, tardive dyskinesia, HTN, depression, ADD.    Conversed with Emely, Manager ([phone]) of the [facility] in [place].  This home is managed by [facility].  At baseline, patient ambulates with a walker and is independent with her ADLs.  Patient does not like people to get real involved with her.  Patient has no family.  She has never been  and has no children.  There has been concern as patient has chronic pain.  She takes [employer] and purchases over the counter pain meds.  She saw her PCP at [facility] two days ago.  Her Psychiatrist is Dr Goldstein also thru [facility].  Patient last saw this provider on 8/10/23 and her next appointment is on 10/17/23.  Emely noted patient will often cancel her scheduled appointments.    Patient's [facility] is Gisele Aranda ([phone]) thru "[employer].  An e-mail has been sent to this  CM questioning if [facility] has emergency Guardianship, will await call back.  Emely noted the patient becomes very abstinent around this CM and tells her she does not trust her.  Hopefully patient will improve to be able to make her own decisions.  In the past, there has not been any ability to be able to get an emergency Guardian from Rad[facility] Meraz.    The group home is able to accept back if she is needing some assistance.  They are not able to manage IVs in the event patient would require IV antibiotics longer term.  If she is able to transition back to this group home, new medications to be filled at [facility] and she will need transportation arranged.    Care Management will continue to follow and be available to help as needed.    Ariela Collins, RN  Inpatient Care Management  [phone]

## 2023-09-29 NOTE — CONSULTS
"[facility]    NCC Consult Note    Reason for Consult:  possible seizure     Chief Complaint: Back Pain       HPI  Kortney Germain is a 68 year old female with PMHx significant for anxiety/depression, schizoaffective disorder (living in group home), HTN, odynophagia, tardive dyskinesia, HTN. She presented 9/28 with lower back pain and abdominal pain. She was found to have UTI, anemia, and constipation. She had decreased LOC and generalized shaking overnight. She remained febrile. RRT called earlier today for encephalopathy and concern that should would be unable to protect her airway. She has since undergone LP and head CT.     Evaluation Summarized    MRI/Head CT CT: no acute intracranial process, atrophy and presumed CSVID   Intracranial Vasculature N/A   Cervical Vasculature N/A     Echocardiogram N/A   EKG/Telemetry N/A   Other Testing cEEG in process       CSF studies:  - culture pending  - HSV pending  - 2 TNC, 0 RBC   - protein 22.8  - glucose 98     LDL  No lab value available in past 30 days   A1C  No lab value available in past 90 days   Troponin No lab value available in past 48 hrs       Impression  Shaking/tremors: seizure vs rigors vs tremor vs catatonia vs serotonin syndrome    Recommendations   - MRI brain w/ and w/out (ordered)  - cEEG (ordered)  - consider switching meropenem (can lower seizure threshold) to cefepime   - perphanazine and escitalopram already being held, agree with same   - psychiatry following, concern for catatonia   - restart versed, keep deeply sedated overnight   - vimpat 200 mg load x 1 then 100 mg BID IV     Patient Follow-up    - final recommendation pending work-up    Thank you for this consult. We will continue to follow.     Ana Chilel NP  Vascular Neurology    To page me or covering stroke neurology team member, click here: [facility]  Choose \"On Call\" tab at top, then select \"NEUROLOGY/ALL SITES\" from middle drop-down box, press " "Enter, then look for \"stroke\" or \"telestroke\" for your site.  _____________________________________________________    Clinically Significant Risk Factors Present on Admission        # Hypokalemia: Lowest K = 3.2 mmol/L in last 2 days, will replace as needed  # Hypernatremia: Highest Na = 148 mmol/L in last 2 days, will monitor as appropriate     # Anion Gap Metabolic Acidosis: Highest Anion Gap = 19 mmol/L in last 2 days, will monitor and treat as appropriate                      Past Medical History    Past Medical History:   Diagnosis Date    Allergic state     Depressive disorder     Dyskinesia     Hypertension     Schizo affective schizophrenia (H)      Medications   Home Meds  Prior to Admission medications    Medication Sig Start Date End Date Taking? Authorizing Provider   acetaminophen (TYLENOL) 500 MG tablet Take 1,000 mg by mouth 3 times daily 0800 / 1400 / 2000   Yes Unknown, Entered By History   albuterol (PROVENTIL) (2.5 MG/3ML) 0.083% neb solution Take 2.5 mg by nebulization every 4 hours as needed for shortness of breath, wheezing or cough   Yes Unknown, Entered By History   alendronate (FOSAMAX) 70 MG tablet Take 70 mg by mouth every 7 days Thursdays at 0700   Yes Unknown, Entered By History   cholecalciferol 50 MCG (2000 UT) tablet Take 1 tablet by mouth daily 0800   Yes Unknown, Entered By History   cyclobenzaprine (FLEXERIL) 5 MG tablet Take 5 mg by mouth 3 times daily as needed for muscle spasms   Yes Unknown, Entered By History   diphenhydrAMINE (BENADRYL) 50 MG capsule Take 50 mg by mouth 3 times daily 0800 / 1500 / 2000   Yes Unknown, Entered By History   escitalopram (LEXAPRO) 20 MG tablet Take 20 mg by mouth daily 0800   Yes Unknown, Entered By History   LORazepam (ATIVAN) 0.5 MG tablet Take 0.5 mg by mouth 2 times daily 0800/1400   Yes Unknown, Entered By History   LORazepam (ATIVAN) 0.5 MG tablet Take 0.25 mg by mouth At Bedtime 2000   Yes Unknown, Entered By History   multivitamin " w/minerals (THERA-VIT-M) tablet Take 1 tablet by mouth daily 0800   Yes Unknown, Entered By History   muscle rub (ARTHRITIS HOT) 10-15 % CREA Apply topically 4 times daily Back pain - 0700/1100/1500/2000   Yes Unknown, Entered By History   NIFEdipine ER (ADALAT CC) 60 MG 24 hr tablet Take 60 mg by mouth daily   Yes Unknown, Entered By History   perphenazine 4 MG tablet Take 4 mg by mouth every morning   Yes Unknown, Entered By History   perphenazine 8 MG tablet Take 8 mg by mouth At Bedtime   Yes Unknown, Entered By History       Scheduled Meds   acyclovir  10 mg/kg Intravenous Q8H PHIL    chlorhexidine  15 mL Mouth/Throat Q12H    [Held by provider] diphenhydrAMINE  50 mg Oral TID    docusate sodium  100 mg Oral BID    fentaNYL  50 mcg Intravenous Once    fentaNYL  50 mcg Intravenous Once    insulin aspart  1-6 Units Subcutaneous Q4H    [Held by provider] LORazepam  0.25 mg Oral At Bedtime    [Held by provider] LORazepam  0.5 mg Oral BID    meropenem  1 g Intravenous Q8H    [Held by provider] multivitamin w/minerals  1 tablet Oral Daily    [START ON 9/30/2023] pantoprazole  40 mg Per Feeding Tube QAM AC    Or    [START ON 9/30/2023] pantoprazole  40 mg Intravenous QAM AC    [Held by provider] perphenazine  4 mg Oral QAM    [Held by provider] perphenazine  8 mg Oral At Bedtime    polyethylene glycol  17 g Oral BID    sodium chloride (PF)  3 mL Intracatheter Q8H    vancomycin  1,000 mg Intravenous Q12H       Infusion Meds   D5W 75 mL/hr at 09/29/23 1417    midazolam 4 mg/hr (09/29/23 1202)    norepinephrine Stopped (09/29/23 1419)    propofol Stopped (09/29/23 1448)       Allergies   Allergies   Allergen Reactions    Amlodipine     Clozapine     Egg [Chicken-Derived Products (Egg)]     Penicillins     Potassium     Poultry Meal           PHYSICAL EXAMINATION   Temp:  [97.6  F (36.4  C)-102.7  F (39.3  C)] 102.7  F (39.3  C)  Pulse:  [69-95] 73  Resp:  [10-46] 26  BP: (103-192)/() 170/77  FiO2 (%):  [40 %-50 %] 40  %  SpO2:  [92 %-100 %] 100 %    Neurologic  Mental Status:  obtunded, not following commands   Cranial Nerves:  PERRL, intermittently tracking  Motor:  increased tonicity in all limbs, lower face repetitive movements, able to move all limbs spontaneously, generalized tremulousness   Reflexes:  2+ and symmetric patellar, brachioradialis   Sensory:   reacts to noxious stimuli in all limbs       Imaging  I personally reviewed all imaging; relevant findings per HPI.    Labs Data   CBC  Recent Labs   Lab 09/29/23  1136 09/29/23  0855 09/29/23  0518 09/28/23  2245 09/28/23  1244   WBC 11.4*  --  14.8*  --  8.8   RBC 3.23*  --  3.37*  --  3.35*   HGB 7.0* 7.5* 7.4*   < > 7.2*   HCT 24.3*  --  25.3*  --  25.5*   *  --  521*  --  526*    < > = values in this interval not displayed.     Basic Metabolic Panel   Recent Labs   Lab 09/29/23  1343 09/29/23  1131 09/29/23  0855 09/29/23  0518 09/29/23  0424 09/28/23  1244   NA  --   --  148* 147*  --  147*   POTASSIUM  --   --  3.2* 3.6  --  4.8   CHLORIDE  --   --  114* 115*  --  118*   CO2  --   --  15* 14*  --  17*   BUN  --   --  25.1* 26.1*  --  43.3*   CR  --   --  0.68 0.63  --  0.81   * 127* 153* 156*   < > 115*   LULU  --   --  8.6* 8.6*  --  9.3    < > = values in this interval not displayed.     Liver Panel  Recent Labs   Lab 09/29/23  0518 09/28/23  1244   PROTTOTAL 6.7 6.9   ALBUMIN 4.2 4.2   BILITOTAL 0.2 <0.2   ALKPHOS 77 63   AST 22 14   ALT 16 15     INR    Recent Labs   Lab Test 09/28/23  1244 03/07/23  1629   INR 1.04 1.15           Stroke Consult Data Data   This was a non-emergent, non-telestroke consult.  I personally examined and evaluated the patient today. At the time of my evaluation and management the patient was in critical condition today due to concern for ongoing seizure. I personally managed review of labs and images, neuro exam. Key decisions made today included: further neurodiagnostics. I spent a total of 40 minutes providing critical  care services, evaluating the patient, directing care and reviewing laboratory values and radiologic reports.

## 2023-09-29 NOTE — ED NOTES
[facility]  ED Nurse Handoff Report    ED Chief complaint: Back Pain      ED Diagnosis:   Final diagnoses:   Acute cystitis without hematuria   Constipation, unspecified constipation type   Anemia, unspecified type   Urinary retention   Lower GI bleed       Code Status: UNSURE    Allergies:   Allergies   Allergen Reactions    Amlodipine     Clozapine     Egg [Chicken-Derived Products (Egg)]     Penicillins     Potassium     Poultry Meal        Patient Story: Pt arrives via EMS from a group home w c/o lower back pain. EMS reports pt to be nonverbal. On arrival pt is making sounds along with sitting up in bed and throwing her body weight back onto the stretcher. EMS reports pt may have had a fall at some point but is unsure of when the fall took place. Hx chronic pain, tylenol overuse, and schizoaffective disorder.   Focused Assessment:    Medications   LORazepam (ATIVAN) injection 0.5 mg (0.5 mg Intravenous $Given 9/28/23 1609)   sodium chloride 0.9% BOLUS 1,000 mL (1,000 mLs Intravenous $New Bag 9/28/23 1609)   iopamidol (ISOVUE-370) solution 45 mL (45 mLs Intravenous $Given 9/28/23 1638)   Saline Flush (60 mLs Intravenous $Given 9/28/23 1649)   Enema Compound (docusate/mineral oil/NaPhos) NO MAG CIT PREMIX (226 mLs Rectal $Given 9/28/23 1800)   LORazepam (ATIVAN) injection 0.5 mg (0.5 mg Intravenous $Given 9/28/23 1849)     Labs Ordered and Resulted from Time of ED Arrival to Time of ED Departure   COMPREHENSIVE METABOLIC PANEL - Abnormal       Result Value    Sodium 147 (*)     Potassium 4.8      Carbon Dioxide (CO2) 17 (*)     Anion Gap 12      Urea Nitrogen 43.3 (*)     Creatinine 0.81      GFR Estimate 79      Calcium 9.3      Chloride 118 (*)     Glucose 115 (*)     Alkaline Phosphatase 63      AST 14      ALT 15      Protein Total 6.9      Albumin 4.2      Bilirubin Total <0.2     ACETAMINOPHEN LEVEL - Abnormal    Acetaminophen <5.0 (*)    ROUTINE UA WITH MICROSCOPIC REFLEX TO CULTURE -  Abnormal    Color Urine Yellow      Appearance Urine Clear      Glucose Urine Negative      Bilirubin Urine Negative      Ketones Urine Negative      Specific Gravity Urine 1.027      Blood Urine Negative      pH Urine 5.0      Protein Albumin Urine Negative      Urobilinogen Urine Normal      Nitrite Urine Positive (*)     Leukocyte Esterase Urine Moderate (*)     Bacteria Urine Moderate (*)     Mucus Urine Present (*)     RBC Urine <1      WBC Urine 18 (*)    CBC WITH PLATELETS AND DIFFERENTIAL - Abnormal    WBC Count 8.8      RBC Count 3.35 (*)     Hemoglobin 7.2 (*)     Hematocrit 25.5 (*)     MCV 76 (*)     MCH 21.5 (*)     MCHC 28.2 (*)     RDW 16.2 (*)     Platelet Count 526 (*)     % Neutrophils 65      % Lymphocytes 23      % Monocytes 9      % Eosinophils 1      % Basophils 1      % Immature Granulocytes 1      NRBCs per 100 WBC 0      Absolute Neutrophils 5.8      Absolute Lymphocytes 2.0      Absolute Monocytes 0.8      Absolute Eosinophils 0.1      Absolute Basophils 0.1      Absolute Immature Granulocytes 0.0      Absolute NRBCs 0.0     ISTAT GASES LACTATE VENOUS POCT - Abnormal    Lactic Acid POCT 1.1      Bicarbonate Venous POCT 15 (*)     O2 Sat, Venous POCT 49 (*)     pCO2 Venous POCT 26 (*)     pH Venous POCT 7.38      pO2 Venous POCT 26     ETHYL ALCOHOL LEVEL - Normal    Alcohol ethyl <0.01     INR - Normal    INR 1.04     PARTIAL THROMBOPLASTIN TIME - Normal    aPTT 25     SALICYLATE LEVEL - Normal    Salicylate <0.3     OCCULT BLOOD STOOL - Normal    Occult Blood Negative     TYPE AND SCREEN, ADULT    ABO/RH(D) B POS      SPECIMEN EXPIRATION DATE [id number]     URINE CULTURE   BLOOD CULTURE   BLOOD CULTURE   ABO/RH TYPE AND SCREEN     CT Abdomen Pelvis w Contrast   Final Result   IMPRESSION:    1.  Enlarged uterus with multiple fibroids.      2.  Rectum is distended with stool.      3.  Urinary bladder is distended.      Lumbar spine CT w/o contrast   Final Result   IMPRESSION:        No  acute fracture in the lumbar spine.       OLIVA GOLDSMITH MD            SYSTEM ID:  [id number]             Rectal bleeding  Fecal impaction, Urinary retention.  Anemia  67 year old female with history of schizophrenia, tardive dyskinesia, HTN, depression, ADD, and UTI who resides in a group home who presented to the ED   Rectal bleeding after enema. Likely mucosal injury  Monitor Hb  Type and cross  GI will follow along     [facility]          Treatments and/or interventions provided: SEE notes  Patient's response to treatments and/or interventions: NOT WELL    To be done/followed up on inpatient unit:  will need a sitter    Does this patient have any cognitive concerns?:  Psych Cosult    Activity level - Baseline/Home:  Stand with Assist and Walker  Activity Level - Current:   Stand with Assist    Patient's Preferred language: English   Needed?: No    Isolation: None  Infection: Not Applicable  Patient tested for COVID 19 prior to admission: NO  Bariatric?: No    Vital Signs:   Vitals:    09/28/23 1503 09/28/23 1505 09/28/23 1521 09/28/23 1730   BP: 127/75 127/75  (!) 146/76   Pulse: 96  102    Resp: 30 18 20    Temp:  99.5  F (37.5  C)     TempSrc:       SpO2:   93% 93%       Cardiac Rhythm:     Was the PSS-3 completed:   Yes  What interventions are required if any?               Family Comments: NA  OBS brochure/video discussed/provided to patient/family: N/A        UNSURE IF OBS     For the majority of the shift this patient's behavior was RED  Behavioral interventions performed were Lux is not redirectable, has psych issues and lives at a Group home, she is very agitated requiring several hours of one to one and medications.  She now has a sitter. .    ED NURSE PHONE NUMBER: [id number]

## 2023-09-29 NOTE — PLAN OF CARE
Arrived to floor from ED at 1am. IMC status. Pt alert, unable to assess orientation. Non-verbal, does moan/grunt. Does not follow commands. Has tremors/spasticity in arms & mouth. VSS on RA. Tele SR. Febrile - tmax 102.5 rectally, pt skin hot to touch. Rectal tylenol given prn [id number] for temp/pain. LS diminished. +BS, +Gas. Incontinent on bowel, loose brown stools, no blood noted. Liu placed for retention/I&Os with AUO. Small open area on buttocks & blanchable redness on sacrum - mepliex applied & WOC consulted. Bedrest, repositioned [id number]. Strict NPO, oral cares provided. IVFS infusing @ 75/hr. GI/psych consulted. BC pending. IV antibiotics. Hgb [id number], AM check 7.4. Sitter at bedside for safety.

## 2023-09-29 NOTE — CONSULTS
Initial Psychiatric Consult   Consult date: September 29, 2023         Reason for Consult, requesting source:      Tardive dyskinesia    Requesting source: Bridgett Gama    Labs and imaging reviewed. Patient seen and evaluated by Milagros Parker MD          HPI:     This is a 68-year-old female with a history of schizophrenia and tardive dyskinesia.  She lives in a group home.  I saw her back in March related to concerns for dyskinesia symptoms.  Now presents with abdominal pain as well as back pain.  She has been found to have a UTI, and altered mentation.  Patient also may have been overusing Tylenol.  She presents with hyponatremia, and constipation as well.  Overnight, the patient had ongoing high fevers and rigors.  She had a change in her antibiotic.  She is anemic with hemoglobin of 6.9 when she presented, now 7.5.  GI is following because the patient had bright red rectal bleeding after an enema yesterday.  Patient's liver transaminases and bilirubin are normal.  CK this morning is 105.  TSH was normal in March, as well as vitamin B12 level.  CT abdomen and pelvis without contrast shows multiple uterine fibroids, and rectal distention related to stool and bladder distention.  Current working diagnosis is sepsis related to UTI.  Temperature this morning is 102.7 and has been trending up overnight.    Patient was seen today just after she was intubated.  She was on a gurney and getting ready to move to the ICU.  Discussed with Dr. Polanco and Shakila Vargas NP.  Patient apparently did have some rigidity and was posturing with both feet and hands.  She was also continuing to shake.  Unclear if these were rigors, or shaking from another etiology.    We discussed the fact that this could be catatonia.  The patient's CK is normal.    Patient is hypertensive, so has autonomic instability, she is febrile, was rigid this morning and posturing.  We are awaiting head imaging results as well as LP.   Neurology is on board.  Constellation of symptoms highly concerning for malignant catatonia.          Past Psychiatric History:   Patient's home meds include Ativan 0.25 mg p.o. nightly and 0.5 mg p.o. twice daily per outpatient med reconciliation.  PDMP indicates the patient only gets #75 Ativan 0.5 mg tablets per 30 days.  This would not be enough for that dose.  Their medications include escitalopram 20 mg p.o. daily, diphenhydramine 50 mg p.o. 3 times daily, perphenazine 4 mg p.o. every morning and 8 mg p.o. nightly.    From my last visit with the patient in March, she first developed mental health symptoms after a traumatic head injury at age 29.  She fell on the ice, and hit her head.  She has had psychiatric issues since then.  She has carried a diagnosis of schizoaffective disorder historically, and first began seeing psychiatry in the 80s.  It may have been prior to that, but that is as far back as epic charting goes.        Substance Use and History:   none        Past Medical History:   PAST MEDICAL HISTORY:   Past Medical History:   Diagnosis Date    Allergic state     Depressive disorder     Dyskinesia     Hypertension     Schizo affective schizophrenia (H)        PAST SURGICAL HISTORY: No past surgical history on file.          Family History:   FAMILY HISTORY: No family history on file.    Family Psychiatric History:         Social History:   SOCIAL HISTORY:   Social History     Tobacco Use    Smoking status: Never    Smokeless tobacco: Never   Substance Use Topics    Alcohol use: No                Physical ROS:   The 10 point Review of Systems is negative other than noted in the HPI or here.           Medications:      [Held by provider] diphenhydrAMINE  50 mg Oral TID    docusate sodium  100 mg Oral BID    [Held by provider] escitalopram  20 mg Oral Daily    lactated ringers  1,000 mL Intravenous Once    [Held by provider] LORazepam  0.25 mg Oral At Bedtime    [Held by provider] LORazepam  0.5 mg  Oral BID    meropenem  1 g Intravenous Q8H    [Held by provider] multivitamin w/minerals  1 tablet Oral Daily    [Held by provider] perphenazine  4 mg Oral QAM    [Held by provider] perphenazine  8 mg Oral At Bedtime    polyethylene glycol  17 g Oral BID    sodium chloride (PF)  3 mL Intracatheter Q8H              Allergies:     Allergies   Allergen Reactions    Amlodipine     Clozapine     Egg [Chicken-Derived Products (Egg)]     Penicillins     Potassium     Poultry Meal           Labs:     Recent Results (from the past 48 hour(s))   Comprehensive metabolic panel    Collection Time: 09/28/23 12:44 PM   Result Value Ref Range    Sodium 147 (H) 135 - 145 mmol/L    Potassium 4.8 3.4 - 5.3 mmol/L    Carbon Dioxide (CO2) 17 (L) 22 - 29 mmol/L    Anion Gap 12 7 - 15 mmol/L    Urea Nitrogen 43.3 (H) 8.0 - 23.0 mg/dL    Creatinine 0.81 0.51 - 0.95 mg/dL    GFR Estimate 79 >60 mL/min/1.73m2    Calcium 9.3 8.8 - 10.2 mg/dL    Chloride 118 (H) 98 - 107 mmol/L    Glucose 115 (H) 70 - 99 mg/dL    Alkaline Phosphatase 63 35 - 104 U/L    AST 14 0 - 45 U/L    ALT 15 0 - 50 U/L    Protein Total 6.9 6.4 - 8.3 g/dL    Albumin 4.2 3.5 - 5.2 g/dL    Bilirubin Total <0.2 <=1.2 mg/dL   Alcohol ethyl    Collection Time: 09/28/23 12:44 PM   Result Value Ref Range    Alcohol ethyl <0.01 <=0.01 g/dL   INR    Collection Time: 09/28/23 12:44 PM   Result Value Ref Range    INR 1.04 0.85 - 1.15   PTT    Collection Time: 09/28/23 12:44 PM   Result Value Ref Range    aPTT 25 22 - 38 Seconds   Acetaminophen level    Collection Time: 09/28/23 12:44 PM   Result Value Ref Range    Acetaminophen <5.0 (L) 10.0 - 30.0 ug/mL   Salicylate level    Collection Time: 09/28/23 12:44 PM   Result Value Ref Range    Salicylate <0.3   mg/dL   CBC with platelets and differential    Collection Time: 09/28/23 12:44 PM   Result Value Ref Range    WBC Count 8.8 4.0 - 11.0 10e3/uL    RBC Count 3.35 (L) 3.80 - 5.20 10e6/uL    Hemoglobin 7.2 (L) 11.7 - 15.7 g/dL     Hematocrit 25.5 (L) 35.0 - 47.0 %    MCV 76 (L) 78 - 100 fL    MCH 21.5 (L) 26.5 - 33.0 pg    MCHC 28.2 (L) 31.5 - 36.5 g/dL    RDW 16.2 (H) 10.0 - 15.0 %    Platelet Count 526 (H) 150 - 450 10e3/uL    % Neutrophils 65 %    % Lymphocytes 23 %    % Monocytes 9 %    % Eosinophils 1 %    % Basophils 1 %    % Immature Granulocytes 1 %    NRBCs per 100 WBC 0 <1 /100    Absolute Neutrophils 5.8 1.6 - 8.3 10e3/uL    Absolute Lymphocytes 2.0 0.8 - 5.3 10e3/uL    Absolute Monocytes 0.8 0.0 - 1.3 10e3/uL    Absolute Eosinophils 0.1 0.0 - 0.7 10e3/uL    Absolute Basophils 0.1 0.0 - 0.2 10e3/uL    Absolute Immature Granulocytes 0.0 <=0.4 10e3/uL    Absolute NRBCs 0.0 10e3/uL   UA with Microscopic reflex to Culture    Collection Time: 09/28/23  4:06 PM    Specimen: Urine, Midstream   Result Value Ref Range    Color Urine Yellow Colorless, Straw, Light Yellow, Yellow    Appearance Urine Clear Clear    Glucose Urine Negative Negative mg/dL    Bilirubin Urine Negative Negative    Ketones Urine Negative Negative mg/dL    Specific Gravity Urine 1.027 1.003 - 1.035    Blood Urine Negative Negative    pH Urine 5.0 5.0 - 7.0    Protein Albumin Urine Negative Negative mg/dL    Urobilinogen Urine Normal Normal, 2.0 mg/dL    Nitrite Urine Positive (A) Negative    Leukocyte Esterase Urine Moderate (A) Negative    Bacteria Urine Moderate (A) None Seen /HPF    Mucus Urine Present (A) None Seen /LPF    RBC Urine <1 <=2 /HPF    WBC Urine 18 (H) <=5 /HPF   Urine Culture    Collection Time: 09/28/23  4:06 PM    Specimen: Urine, Midstream   Result Value Ref Range    Culture Culture in progress    iStat Gases (lactate) venous, POCT    Collection Time: 09/28/23  4:09 PM   Result Value Ref Range    Lactic Acid POCT 1.1 <=2.0 mmol/L    Bicarbonate Venous POCT 15 (L) 21 - 28 mmol/L    O2 Sat, Venous POCT 49 (L) 94 - 100 %    pCO2 Venous POCT 26 (L) 40 - 50 mm Hg    pH Venous POCT 7.38 7.32 - 7.43    pO2 Venous POCT 26 25 - 47 mm Hg   Blood Culture  Peripheral Blood    Collection Time: 09/28/23  5:28 PM    Specimen: Peripheral Blood   Result Value Ref Range    Culture No growth after 12 hours    Blood Culture Peripheral Blood    Collection Time: 09/28/23  5:28 PM    Specimen: Peripheral Blood   Result Value Ref Range    Culture No growth after 12 hours    Occult blood stool    Collection Time: 09/28/23  6:08 PM   Result Value Ref Range    Occult Blood Negative Negative   Adult Type and Screen    Collection Time: 09/28/23  6:32 PM   Result Value Ref Range    ABO/RH(D) B POS     Antibody Screen Negative Negative    SPECIMEN EXPIRATION DATE [id number]    Hemoglobin    Collection Time: 09/28/23 10:45 PM   Result Value Ref Range    Hemoglobin 7.1 (L) 11.7 - 15.7 g/dL   Hemoglobin    Collection Time: 09/28/23 11:59 PM   Result Value Ref Range    Hemoglobin 6.9 (LL) 11.7 - 15.7 g/dL   Glucose by meter    Collection Time: 09/29/23  4:24 AM   Result Value Ref Range    GLUCOSE BY METER POCT 191 (H) 70 - 99 mg/dL   Basic metabolic panel    Collection Time: 09/29/23  5:18 AM   Result Value Ref Range    Sodium 147 (H) 135 - 145 mmol/L    Potassium 3.6 3.4 - 5.3 mmol/L    Chloride 115 (H) 98 - 107 mmol/L    Carbon Dioxide (CO2) 14 (L) 22 - 29 mmol/L    Anion Gap 18 (H) 7 - 15 mmol/L    Urea Nitrogen 26.1 (H) 8.0 - 23.0 mg/dL    Creatinine 0.63 0.51 - 0.95 mg/dL    GFR Estimate >90 >60 mL/min/1.73m2    Calcium 8.6 (L) 8.8 - 10.2 mg/dL    Glucose 156 (H) 70 - 99 mg/dL   Hepatic panel    Collection Time: 09/29/23  5:18 AM   Result Value Ref Range    Protein Total 6.7 6.4 - 8.3 g/dL    Albumin 4.2 3.5 - 5.2 g/dL    Bilirubin Total 0.2 <=1.2 mg/dL    Alkaline Phosphatase 77 35 - 104 U/L    AST 22 0 - 45 U/L    ALT 16 0 - 50 U/L    Bilirubin Direct <0.20 0.00 - 0.30 mg/dL   CBC with platelets    Collection Time: 09/29/23  5:18 AM   Result Value Ref Range    WBC Count 14.8 (H) 4.0 - 11.0 10e3/uL    RBC Count 3.37 (L) 3.80 - 5.20 10e6/uL    Hemoglobin 7.4 (L) 11.7 - 15.7 g/dL     Hematocrit 25.3 (L) 35.0 - 47.0 %    MCV 75 (L) 78 - 100 fL    MCH 22.0 (L) 26.5 - 33.0 pg    MCHC 29.2 (L) 31.5 - 36.5 g/dL    RDW 16.1 (H) 10.0 - 15.0 %    Platelet Count 521 (H) 150 - 450 10e3/uL   Hemoglobin    Collection Time: 09/29/23  8:55 AM   Result Value Ref Range    Hemoglobin 7.5 (L) 11.7 - 15.7 g/dL   Lactic acid whole blood    Collection Time: 09/29/23  8:55 AM   Result Value Ref Range    Lactic Acid 1.3 0.7 - 2.0 mmol/L   Blood gas venous    Collection Time: 09/29/23  8:55 AM   Result Value Ref Range    pH Venous 7.47 (H) 7.32 - 7.43    pCO2 Venous 24 (L) 40 - 50 mm Hg    pO2 Venous 56 (H) 25 - 47 mm Hg    Bicarbonate Venous 17 (L) 21 - 28 mmol/L    Base Excess/Deficit -5.2 -7.7 - 1.9 mmol/L    FIO2 0    Basic metabolic panel    Collection Time: 09/29/23  8:55 AM   Result Value Ref Range    Sodium 148 (H) 135 - 145 mmol/L    Potassium 3.2 (L) 3.4 - 5.3 mmol/L    Chloride 114 (H) 98 - 107 mmol/L    Carbon Dioxide (CO2) 15 (L) 22 - 29 mmol/L    Anion Gap 19 (H) 7 - 15 mmol/L    Urea Nitrogen 25.1 (H) 8.0 - 23.0 mg/dL    Creatinine 0.68 0.51 - 0.95 mg/dL    GFR Estimate >90 >60 mL/min/1.73m2    Calcium 8.6 (L) 8.8 - 10.2 mg/dL    Glucose 153 (H) 70 - 99 mg/dL   CK total    Collection Time: 09/29/23  8:55 AM   Result Value Ref Range     26 - 192 U/L   Extra Blood Bank Purple Top Tube    Collection Time: 09/29/23  8:55 AM   Result Value Ref Range    Hold Specimen [facility]           Physical and Psychiatric Examination:     BP (!) 162/88 (BP Location: Left arm, Patient Position: Semi-Machado's, Cuff Size: Adult Small)   Pulse 93   Temp (!) 102.7  F (39.3  C) (Axillary)   Resp (!) 46   Wt 42.3 kg (93 lb 4.1 oz)   SpO2 94%   BMI 18.21 kg/m    Weight is 93 lbs 4.07 oz  Body mass index is 18.21 kg/m .    Physical Exam:  I have reviewed the physical exam as documented by by the medical team and agree with findings and assessment and have no additional findings to add at this time.    Mental Status  Exam:    Appearance:  Sedated and severe distress  Attitude:   Unable to cooperate  Eye Contact:   Sedated  Mood:   Unable to assess  Affect:   Unable to assess  Speech:  mute  Language: Fluent in english   Psychomotor Behavior:  tremor observed  and posturing  Thought Process:   Patient sedated  Associations:   Patient sedated  Thought Content:   Unable to assess  Insight:   Unable to assess  Judgement:   Unable to assess  Oriented to:   Patient sedated  Attention Span and Concentration:   Patient sedated  Recent and Remote Memory:   Unable to assess  Fund of Knowledge: Appropriate   Gait and Station:                DSM-5 Diagnosis:   Schizoaffective disorder versus disorganized schizophrenia    Delirium, rule out catatonia versus malignant catatonia    UTI          Assessment:     This is a 68-year-old female with a history of schizoaffective disorder versus schizophrenia.  I have met her before, and she told me that this all happened after she sustained a head injury at age 29 when she fell on ice, and hit her head, losing consciousness.  I am not able to find any neurology notes from when this happened, as it was essentially 40 years ago.    She has had multiple hospitalizations including at the [facility] for psychiatric reasons.  She has tardive dyskinesia which she told me is better when she is on her Trilafon.  She also states Ativan helps her with this.    Patient has had altered mentation since presenting.  This morning, she was posturing, had some rigidity, and has noted to have a climbing fever overnight.  There is concern for sepsis related to UTI.  She was intubated by the time I saw her, but I am concerned that she is catatonic.  With her fever, and elevated blood pressure, another concern is malignant catatonia.  Patient CK is normal this morning at 105, so I am less concerned about neuroleptic malignant syndrome. Recommend following serial CKs.    Patient is getting Versed, clearly a  benzodiazepine, as sedation.  Typical treatment for catatonia is Ativan.  We would usually attempt Ativan at a dose of 1 mg IV as a lorazepam challenge to see if patient had clinical improvement with catatonia symptoms.  She is already intubated, so I am not sure how to manage this.  Will discuss with intensivist.      The other option is ECT.  We cannot do that here, so the patient will need to be transferred to the [facility].  I talked to my colleague at the [facility], and there are many logistical challenges with getting the patient over there.  Hopefully we can ascertain whether or not this is actually malignant catatonia, and aggressively treat with Ativan.  I do see some case reports of other benzodiazepines having benefit, though lorazepam is the standard of practice in terms of treatment.    There is a work-up underway to see if there is another etiology medically of what is going on with her.  She had an LP, and CT head.          Summary of Recommendations:     1.  Have discontinued Lexapro.    2.  This is not benzodiazepine withdrawal.  Patient is on very low-dose benzodiazepines in the community, getting only 75 pills of 0.5 mg Ativan for 30 days.  This would be roughly 1 mg total of Ativan daily.  This would not be enough to cause this sort of benzodiazepine withdrawal for the patient.    3.  I will discuss with intensivist today, and follow-up.      Milagros Parker MD  Consult/Liaison Psychiatry and Addiction Medicine  [facility]

## 2023-09-29 NOTE — PROCEDURES
[facility] Procedure Note          Lumbar Puncture:      Time: 1:24 PM  Performed by: Jesus Plasencia MD  Authorized by: Jesus Plasencia MD    Indications: confusion, encephalopathy    Consent:  Emergent consent: The procedure was performed in an emergent situation. Dr. Faith Polanco, hospitalist, deemed the procedure as emergent and reportedly the patient has no next of kin able to give consent at this time.   Prior to the start of the procedure and with procedural staff participation, I verbally confirmed the patient s identity using two indicators, relevant allergies, that the procedure was appropriate and matched the consent or emergent situation, and that the correct equipment/implants were available. Immediately prior to starting the procedure I conducted the Time Out with the procedural staff and re-confirmed the patient s name, procedure, and site/side. (The Joint Commission universal protocol was followed.) Yes    Under sterile conditions the patient was positioned prone. Betadine solution and sterile drapes were utilized.  Local anesthetic at the site: 2 ml of lidocaine 1% without epinephrine from the LP tray  A 22 G 3.5 inch pediatric spinal needle was inserted under intermittent fluoroscopic guidance at the L1-2 interspace.  Opening Pressure was not checked.  A total of 8mL of clear and colorless spinal fluid was obtained and sent to the laboratory.   After the needle was removed, a bandaid and pressure were applied and the patient was instructed to stay horizontal until the results were back.    Complications:  None    Patient tolerance: Patient tolerated the procedure well with no immediate complications.

## 2023-09-29 NOTE — PROGRESS NOTES
RECEIVING UNIT ED HANDOFF REVIEW    ED Nurse Handoff Report was reviewed by: Katiuska Mendoza RN on September 28, 2023 at 8:47 PM

## 2023-09-29 NOTE — PROGRESS NOTES
[facility] ICU RESPIRATORY NOTE        Date of Admission: 9/28/2023    Date of Intubation (most recent): 9/29/2023    Reason for Mechanical Ventilation: Airway protection     Number of Days on Mechanical Ventilation: 1    Met Criteria for Spontaneous Breathing Trial: No    Reason for No Spontaneous Breathing Trial: Per MD.     Significant Events Today: none    ABG Results:   Recent Labs   Lab 09/29/23  1253 09/29/23  1141 09/29/23  0855   PH 7.39  --   --    PCO2 31*  --   --    PO2 208*  --   --    HCO3 19*  --   --    O2PER 50 4 0         Current Vent Settings: Vent Mode: CMV/AC  (Continuous Mandatory Ventilation/ Assist Control)  FiO2 (%): 40 %  Resp Rate (Set): 16 breaths/min  Tidal Volume (Set, mL): 400 mL  PEEP (cm H2O): 5 cmH2O  Resp: 26      Skin Assessment: clean and dry.     Plan: Continue to wean from mechanical ventilation and oxygen as tolerated per protocol.

## 2023-09-29 NOTE — DISCHARGE INSTRUCTIONS
WOUND CARES  Left buttock and sacrum wound care: daily  1. Cleanse with incontinence cleanser and soft dry wipes  2. Swab periwound with skin prep, let dry.  3. Apply small amount zinc paste to wound beds only.  4. Cover with Mepilex Sacral (or like product).  Place dressing high above perianal area to minimize soilage.  5. Pressure injury prevention measures.  Avoid briefs in bed, apply cardinal covidien pad under patient

## 2023-09-29 NOTE — PHARMACY-ADMISSION MEDICATION HISTORY
Pharmacist Admission Medication History    Admission medication history is complete. The information provided in this note is only as accurate as the sources available at the time of the update.    Medication reconciliation/reorder completed by provider prior to medication history? No    Information Source(s): Facility ([facility]/) medication list/MAR via    Pertinent Information:     Changes made to PTA medication list:  Added: None  Deleted: None  Changed: None    Medication Affordability:       Allergies reviewed with patient and updates made in EHR: no    Medication History Completed By: Missy Boyle RPH 9/28/2023 8:02 PM    Prior to Admission medications    Medication Sig Last Dose Taking? Auth Provider Long Term End Date   acetaminophen (TYLENOL) 500 MG tablet Take 1,000 mg by mouth 3 times daily 0800 / 1400 / 2000  Yes Unknown, Entered By History     albuterol (PROVENTIL) (2.5 MG/3ML) 0.083% neb solution Take 2.5 mg by nebulization every 4 hours as needed for shortness of breath, wheezing or cough  Yes Unknown, Entered By History No    alendronate (FOSAMAX) 70 MG tablet Take 70 mg by mouth every 7 days Thursdays at 0700  Yes Unknown, Entered By History Yes    cholecalciferol 50 MCG (2000 UT) tablet Take 1 tablet by mouth daily 0800  Yes Unknown, Entered By History     cyclobenzaprine (FLEXERIL) 5 MG tablet Take 5 mg by mouth 3 times daily as needed for muscle spasms  Yes Unknown, Entered By History     diphenhydrAMINE (BENADRYL) 50 MG capsule Take 50 mg by mouth 3 times daily 0800 / 1500 / 2000  Yes Unknown, Entered By History     escitalopram (LEXAPRO) 20 MG tablet Take 20 mg by mouth daily 0800  Yes Unknown, Entered By History No    LORazepam (ATIVAN) 0.5 MG tablet Take 0.5 mg by mouth 2 times daily 0800/1400  Yes Unknown, Entered By History     LORazepam (ATIVAN) 0.5 MG tablet Take 0.25 mg by mouth At Bedtime 2000  Yes Unknown, Entered By History     multivitamin w/minerals (THERA-VIT-M) tablet Take 1  tablet by mouth daily 0800  Yes Unknown, Entered By History     muscle rub (ARTHRITIS HOT) 10-15 % CREA Apply topically 4 times daily Back pain - 0700/1100/1500/2000  Yes Unknown, Entered By History     NIFEdipine ER (ADALAT CC) 60 MG 24 hr tablet Take 60 mg by mouth daily  Yes Unknown, Entered By History No    perphenazine 4 MG tablet Take 4 mg by mouth every morning  Yes Unknown, Entered By History No    perphenazine 8 MG tablet Take 8 mg by mouth At Bedtime  Yes Unknown, Entered By History No      Missy CuiD

## 2023-09-29 NOTE — PROGRESS NOTES
Patient shaking, non-verbal, does not follow commands.Temperature between 101-102 (rectal and axillary). RRT called due to patient being in decorticate position. Patient sedated and intubated, LR bolus of 1000mL given, sent for LP and scans of head, then to ICU.

## 2023-09-29 NOTE — PROVIDER NOTIFICATION
[facility] temp 101.8 & Critical Hgb result of 6.9. Also, was straight cath in ED x2, no orders straight cath.  New orders placed for prn rectal tylenol & new order for Liu cath for retention&accurate I/Os. Continue to monitor Hgb for now, Hgb checks [id number], next check at 0600.

## 2023-09-29 NOTE — PHARMACY-VANCOMYCIN DOSING SERVICE
Pharmacy Vancomycin Initial Note  Date of Service 2023  Patient's  1955  68 year old, female    Indication: Meningitis rule out    Current estimated CrCl = Estimated Creatinine Clearance: 53.6 mL/min (based on SCr of 0.68 mg/dL).    Creatinine for last 3 days  2023: 12:44 PM Creatinine 0.81 mg/dL  2023:  5:18 AM Creatinine 0.63 mg/dL;  8:55 AM Creatinine 0.68 mg/dL    Recent Vancomycin Level(s) for last 3 days  No results found for requested labs within last 3 days.      Vancomycin IV Administrations (past 72 hours)        No vancomycin orders with administrations in past 72 hours.                    Nephrotoxins and other renal medications (From now, onward)      Start     Dose/Rate Route Frequency Ordered Stop    23 1400  vancomycin (VANCOCIN) 1,000 mg in 200 mL dextrose intermittent infusion         1,000 mg  200 mL/hr over 1 Hours Intravenous EVERY 12 HOURS 23 1343      23 1300  acyclovir (ZOVIRAX) 425 mg in sodium chloride 0.9 % 108.5 mL intermittent infusion         10 mg/kg × 42.3 kg  108.5 mL/hr over 1 Hours Intravenous EVERY 8 HOURS SCHEDULED 23 1119      23 1130  norepinephrine (LEVOPHED) 4 mg in  mL infusion PREMIX         0.01-0.6 mcg/kg/min × 42.3 kg  1.6-95.2 mL/hr  Intravenous CONTINUOUS 23 1118              Contrast Orders - past 72 hours (72h ago, onward)      Start     Dose/Rate Route Frequency Stop    23 1635  iopamidol (ISOVUE-370) solution 45 mL         45 mL Intravenous ONCE 23 1638                Plan:  Start vancomycin  1000 mg (~23mg/kg) IV q12h.   Vancomycin monitoring method: Trough (Method 1 = dosing nomogram)  Vancomycin therapeutic monitoring goal: 15-20 mg/L+  Pharmacy will check vancomycin levels as appropriate in 1-3 Days.    Serum creatinine levels will be ordered daily for the first week of therapy and at least twice weekly for subsequent weeks.      Kim Cha, RP, PharmD

## 2023-09-29 NOTE — ED NOTES
Pt noted restless, attempting to get out of bed. Bladder scanned and noted > 300 ml. Straight cath completed and drained 350 ml.

## 2023-09-29 NOTE — ED NOTES
Patient tolerated enema, no discomfort, difficult flow due to impaction, about 1/4 of enema bypassed out.   POST enema patient started to attempt to have a bowel movement she passed all of the PINK lady enema, but at the end she started to pass flecks of stool with bright red blood.  MD called to the bedside. GI consult, cross and match for hgb of 7.2, patient stated that she has abd pain and has been attempting to poop since admission, she also stated that she has to urinate.  RN to straight cath for possible bladder distention and discomfort.

## 2023-09-29 NOTE — PROGRESS NOTES
[facility]    Medicine Progress Note - Hospitalist Service    Date of Admission:  9/28/2023    Interval History   Patient seen this am. She is extremely shaky. Has rthymnic tremor on rounding and not talking.  Rhythmic movement of her jaw had fever 102.7   Baseline reported of tardive dyskinesia, schizophrenia.   She lives in a Group Home >> history from Group home that able to talk at baseline. Walks independently at baseline and sets up her medications.   Clinically patient appears to be much below her baseline.  Also requested input from psychiatry on rounding however patient intubated.  Stat labs drawn.  In the interim called back by nursing that patient questionable decorticate positioning with clenching of her wrists in flexion with hyperextension of her feet.   RRT called with plans to move forward with more aggressive and thorough evaluation requiring adequate sedation for evaluation    Assessment & Plan   Kortney Germain is a 67 year old female with history of schizophrenia, tardive dyskinesia, HTN, depression, ADD, and UTI who resides in a group home who presented to the ED with abdominal and back pain:     Sepsis:  UTI +/- other sources  Admit as inpatient  In ER found to have temp 99.5 and . TMAX overnight of 102.7   9/28 CT scan of the abdomen/pelvis shows rectum distended with stool and bladder distention with no pyelonephritis  Patient confused in the emergency room.  She was given Ativan for the CAT scan.  Appeared dehydrated.  UA positive for UTI  In the emergency room patient was started on ceftriaxone.  Continued to have fevers and on rounding this morning at 102.9  She appeared to have tremors/room rhythmic movements specifically upper upper extremities  Patient would not respond to stimuli or answer any questions.  Cannot really open her eyes even to voice  Stat labs drawn  9/29 RRT 2 facilitate rapid work-up  Patient intubated for safe and evaluation for sepsis  and neurologic changes >> plans for CT head as there is history of falls  In addition a lumbar puncture was ordered given her significantly altered mental status and challenged to know her baseline although by report from the group home this appears to be quite different  Extended coverage early this a.m. to [place] given her ongoing fevers  9/29 addition of acyclovir, Vanco.  And ceftriaxone for CNS coverage while awaiting LP  Urine positive on admission >> cultures pending  Clinical presentation of sepsis however challenging to separate her neurologic status (from encephalopathy, neuro, psych, medications) and or infection  Patient intubated with plans to transfer to the ICU  9/20 9 repeat stat a.m. labs with a bicarb of 14 anion gap 18 and a lactate of 1.3 >  Started on a 1 L bolus of LR for sepsis>> LFTs okay  VBG 7.47/24/56/17  9/29 chest x-ray with no infiltrate for pneumonia  LR at 150  Await LP, blood cultures, urine cultures      Encephalopathy:   Mental health history   Tardive Dyskinesia  Patient with rhythmic movements on rounding. Upper extremities with jerky movements, jaw movements.   DDX broad.   CT with history of trauma to exclude injury/findings.   Requested input from psychiatry and neurology.   Ordered EEG.   Given Ativan in ER but did not seem to stop her movements.   Following intubation she did stop having jerking movements while on versed.   Suspected multifactorial but difficult to tease apart. Rule out infection, EEG for seizure. LP for infection.   Await input from consulting services.   Holding home psychiatric meds.      Obstipation/constipation   Bleeding   CT abdomen/pelvis: enlarged uterus with fibroids. Rectum distended with stools. Urinary bladder distended  Enema tried in ER with resulting blood (traumatic? )  9/29 GI consult. Continue treat constipation    UTI:   Urinary retention   In ER noted to have urine retention with enlarged bladder  + UA with urine culture  Liu placed      Concern for Tylenol overuse  As per the group home could patient could have using more Tylenol based on the number of bottles in the room  Previous clinic notes suggest ibuprofen type meds   Patient's Tylenol level has been normal  Liver tests remain normal   Restarted on tylenol      Hypernatremia  Mild hypernatremia due to dehydration   1 liter in ER given then started on D5W  With sepsis concerns this am given 1 liter of fluid   Started on /hour     Constipation  Enema was given in the ED.  Bowel regimen.  Some stools out overnight. Soft,   Initial concerns of Blood per rectum after he enema         GI bleed  Acute on chronic anemia  Likely mucosal injury due to trauma after the patient received anemia  Continue to check hemoglobin every 6 hours  Type and screen  Transfuse if hemoglobin less than 7  No ongoing bleeding   9/29 GI consult      Back pain  CT of the lumbar spine shows no fractures     History of schizophrenia  History of depression  Tremors  Group home resident  Hold PTA meds due to confusion  psychiatry consult     #Incidental finding of enlarged uterus with fibroids  -Outpatient follow-up        Diet: Npo  DVT Prophylaxis: Pneumatic Compression Devices  Liu Catheter: Not present  Lines: None     Cardiac Monitoring: None  Code Status:  Prior code status         Clinically Significant Risk Factors Present on Admission          # Hypernatremia: Highest Na = 147 mmol/L in last 2 days, will monitor as appropriate                                Diet: NPO for Medical/Clinical Reasons Except for: No Exceptions    DVT Prophylaxis: Pneumatic Compression Devices  Liu Catheter: PRESENT, indication: Retention;Strict 1-2 Hour I&O  Lines: None     Cardiac Monitoring: ACTIVE order. Indication: imc  Code Status: Full Code      Clinically Significant Risk Factors Present on Admission         # Hypernatremia: Highest Na = 147 mmol/L in last 2 days, will monitor as appropriate                           Disposition Plan      Expected Discharge Date: 09/30/2023        Discharge Comments: 9/28 ED admit. [employer] [facility] [employer]. Henry ORTIZ MD  Hospitalist Service  [facility]  Securely message with [employer] (more info)  Text page via [employer] Paging/Directory   ______________________________________________________________________    Physical Exam   Vital Signs: Temp: 99.4  F (37.4  C) Temp src: Axillary BP: (!) 159/82 Pulse: 93   Resp: 22 SpO2: 95 % O2 Device: None (Room air)    Weight: 0 lbs 0 oz    General Appearance: Patient with rthythmic movements in her UE bilaterally   Respiratory: Clear to auscultation bilaterally   Cardiovascular: Regular rate with no gallop or rub  GI: + BS soft, non tender  Skin: no edema  Other: Does not wake to voice. Sternal rub with open eyes barely. Not verbal. Rthymnic jaw clenching      Medical Decision Making       55 MINUTES SPENT BY ME on the date of service doing chart review, history, exam, documentation & further activities per the note.      Data     I have personally reviewed the following data over the past 24 hrs:    11.4 (H)  \   6.3 (LL)   / 501 (H)     148 (H) 114 (H) 25.1 (H) /  128 (H)   3.2 (L) 15 (L) 0.68 \     ALT: 16 AST: 22 AP: 77 TBILI: 0.2   ALB: 4.2 TOT PROTEIN: 6.7 LIPASE: N/A     Procal: N/A CRP: N/A Lactic Acid: 1.4       Ferritin:  N/A % Retic:  1.8 LDH:  N/A       Imaging results reviewed over the past 24 hrs:   Recent Results (from the past 24 hour(s))   CT Head w/o Contrast    Narrative    CT SCAN OF THE HEAD WITHOUT CONTRAST September 29, 2023 1:00 PM     HISTORY: Decreased level of consciousness.    TECHNIQUE: Axial images of the head and coronal reformations without  IV contrast material. Radiation dose for this scan was reduced using  automated exposure control, adjustment of the mA and/or kV according  to patient size, or iterative reconstruction technique.    COMPARISON: MRI brain 3/8/2023.    FINDINGS: There is no evidence  of intracranial hemorrhage, mass, acute  infarct or anomaly. The ventricles are normal in size, shape and  configuration. Mild diffuse parenchymal volume loss. Mild-to-moderate  scattered patchy cerebral white matter hypodensities which are  nonspecific, but likely related to chronic microvascular ischemic  disease.     The visualized portions of the sinuses and mastoids appear normal. The  bony calvarium and bones of the skull base appear intact. Presumed  cerumen in the bilateral external auditory canals.      Impression    IMPRESSION:  1. No CT findings of acute intracranial process.  2. Brain atrophy and presumed chronic small vessel ischemic changes,  as described.    KVNG DICK MD         SYSTEM ID:  [id number]   XR Chest Port 1 View    Narrative    XR CHEST PORT 1 VIEW 9/29/2023 1:21 PM    HISTORY: check ETT placemet    COMPARISON: 3/12/2023    FINDINGS: Endotracheal tube tip is 4.2 cm above the tin. No  consolidation. No pleural effusions or pneumothorax. Normal cardiac  size. Aortic atherosclerosis. Old bilateral rib fractures.    AVEL PANTOJA MD         SYSTEM ID:  [id number]   XR Lumbar Puncture Spinal Tap Diag    Narrative    LUMBAR PUNCTURE WITH FLUOROSCOPIC GUIDANCE 9/29/2023 1:25 PM     HISTORY: Confusion, altered mental status, encephalopathy.    CONSENT: Emergent consent. The procedure was performed in an emergent  situation. Dr. Faith Polanco, hospitalist, deemed the procedure as  emergent. Additionally, the patient reportedly has no next of kin able  to give consent at this time.     PROCEDURE:  The patient was placed in prone position. The L1-L2 interspace was  identified and the overlying skin was marked. The patient was then  prepped, draped and anesthetized using sterile technique. Local  anesthesia was performed using 1% lidocaine.    A 22-gauge 3.5 inch spinal needle was inserted under fluoroscopic  guidance into the CSF space with return of clear fluid. 8 mL of  clear  cerebrospinal fluid was sent to the laboratory for analysis. The  stylet was reinserted and the spinal needle was removed. There were no  complications.     FLUOROSCOPY TIME: 0.2 minutes.       Impression    IMPRESSION: Uncomplicated fluoroscopic-guided lumbar puncture.    KVNG DICK MD         SYSTEM ID:  [id number]   XR Abdomen Port 1 View    Narrative    ABDOMEN ONE VIEW PORTABLE  9/29/2023 2:20 PM     HISTORY: NG tube placement.    COMPARISON: March 8, 2023      Impression    IMPRESSION: Enteric tube tip in the left upper quadrant in the  expected location of the stomach. Minimal amount of stool.   Nonobstructed bowel gas pattern.    KVNG MARC MD         SYSTEM ID:  [id number]   XR Abdomen Port 1 View    Narrative    EXAM: XR ABDOMEN PORT 1 VIEW  LOCATION: [facility]  DATE: 9/29/2023    INDICATION: MRI clearance. MRI safety rule out no metal.  COMPARISON: None.      Impression    IMPRESSION: No metallic implanted device in the abdomen. NG tube tip and sidehole in the stomach. Endotracheal tube with tip approximately 4 cm above the tin. Pelvic calcifications.   XR Chest Port 1 View    Narrative    EXAM: XR CHEST PORT 1 VIEW  LOCATION: [facility]  DATE: 9/29/2023    INDICATION: For MRI safety to rule out metal.  COMPARISON: Chest x-ray 09/29/2023.      Impression    IMPRESSION: ET tube is 2.8 cm proximal to the tin. Nasogastric tube identified. No acute airspace disease. Normal cardiac silhouette. The patient is rotated. No abnormal metallic foreign body identified.

## 2023-09-29 NOTE — CONSULTS
"[facility] Nurse Inpatient Assessment     Consulted for: Wound buttocks     Summary: patient with present on admission to [facility] skin damage to buttock related to moisture related to sweat and friction     Patient History (according to provider note(s):      \"68 year old female admitted on 9/28/2023. With history of schizophrenia, tardive dyskinesia, HTN, depression, ADD, and UTI who resides in a group home who presented to the ED with abdominal and back pain\"       Assessment:      Areas visualized during today's visit: Focused: and Sacrum/coccyx    Wound location: buttock bilateral     Last photo: 9/29  Wound due to: Friction and Moisture Associated Skin Damage (MASD)  Wound history/plan of care: found with sacral mepilex and diaper in use  Wound base:  epidermis and dermis     Palpation of the wound bed: normal      Drainage: none     Description of drainage: none     Measurements (length x width x depth, in cm): largest 0.5  x 0.5  x  0.1 cm      Tunneling: N/A     Undermining: N/A  Periwound skin:  pale, fragile       Color: pale      Temperature: normal   Odor: none  Pain: no grimacing or signs of discomfort, none  Pain interventions prior to dressing change: patient tolerated well  Treatment goal: Protection  STATUS: initial assessment  Supplies ordered: gathered, at bedside, and supplies stored on unit       Treatment Plan:       Wound care  Start:  09/29/23 1030,   EVERY SHIFT,   Routine        Comments: Location: buttock  Care: provided qshift by primary RN  1. Cleanse with each incontinence episode with wei cleanser and soft dry wipes  2. Do not apply mepilex for this patients buttock  3. Apply criticiad paste to perianal after each incontinence episode  4. Use covidien absorbent pad for incontinence, change every 2 hours with routine turns and repositioning, to keep dry from sweat and incontinence  5. Do not apply diapers          Orders: Written    RECOMMEND PRIMARY TEAM " ORDER: None, at this time  Education provided: plan of care, Moisture management, and Hygiene  Discussed plan of care with: Patient, Nurse, and 1:1  [facility] nurse follow-up plan: weekly  Notify WO if wound(s) deteriorate.  Nursing to notify the Provider(s) and re-consult the [facility] Nurse if new skin concern.    DATA:     Current support surface: Standard  Standard gel/foam mattress (IsoFlex, Atmos air, etc)  Containment of urine/stool: Incontinence Protocol, Incontinent pad in bed, and Indwelling catheter  BMI: Body mass index is 18.21 kg/m .   Active diet order: Orders Placed This Encounter      NPO for Medical/Clinical Reasons Except for: No Exceptions     Output: I/O last 3 completed shifts:  In: 563.75 [I.V.:563.75]  Out: 450 [Urine:450]     Labs:   Recent Labs   Lab 09/29/23  0855 09/29/23  0518 09/28/23  2245 09/28/23  1244   ALBUMIN  --  4.2  --  4.2   HGB 7.5* 7.4*   < > 7.2*   INR  --   --   --  1.04   WBC  --  14.8*  --  8.8    < > = values in this interval not displayed.     Pressure injury risk assessment:   Sensory Perception: 2-->very limited  Moisture: 3-->occasionally moist  Activity: 1-->bedfast  Mobility: 2-->very limited  Nutrition: 2-->probably inadequate  Friction and Shear: 2-->potential problem  Luis Score: 12    Anisa CWOCN   1st choice: Securely message with [employer] ([facility] [employer])   (2nd option: [facility] Office Phone [phone], messages checked periodically Mon-Fri 8a-4p)

## 2023-09-30 NOTE — PLAN OF CARE
Pt. Admitted to ICU at 1330, intubated, breath sounds clear, jerking movements and not following, CV: SR with good perfusion,  versed paused and propofol started, EEG performed and impact started, bedside X-rays performed in anticipation of MRI, HGB low towards end of shift, MD aware and PRBCs odered, small brown stools noted, good urine output through quinonez.

## 2023-09-30 NOTE — PROGRESS NOTES
[facility] ICU RESPIRATORY NOTE           Date of Admission: 9/28/2023     Date of Intubation (most recent): 9/29/2023     Reason for Mechanical Ventilation: Airway protection      Number of Days on Mechanical Ventilation: 2     Met Criteria for Spontaneous Breathing Trial: No     Reason for No Spontaneous Breathing Trial: Per MD.      Significant Events Today: none     ABG Results:   Recent Labs   Lab 09/29/23  1253 09/29/23  1141 09/29/23  0855   PH 7.39  --   --    PCO2 31*  --   --    PO2 208*  --   --    HCO3 19*  --   --    O2PER 50 4 0     Vent Mode: CMV/AC  (Continuous Mandatory Ventilation/ Assist Control)  FiO2 (%): 40 %  Resp Rate (Set): 16 breaths/min  Tidal Volume (Set, mL): 400 mL  PEEP (cm H2O): 5 cmH2O  Resp: 15    NIKA Martinez, RRT

## 2023-09-30 NOTE — PROGRESS NOTES
[facility]    Stroke/NCC Progress Note    Interval Events  Continues on sedation with propofol and versed. Continues to have repetitive movements of L>R upper extremities and jaw (chewing type motion); movements improve/resolve to stimuli and patient exhibits purposeful movements with sternal rub (grabs hand, pushes examiner away) making clinical impression of seizure less likely. EEG with periodic pattern-unclear if epileptogenic or not but with no notable change following lacosamide initiation.     HPI Summary  Kortney Germain is a 68 year old female with PMHx significant for anxiety/depression, schizoaffective disorder (living in group home), HTN, odynophagia, tardive dyskinesia, HTN. She presented 9/28 with lower back pain and abdominal pain. She was found to have UTI, anemia, and constipation. She had decreased LOC and generalized shaking overnight. She remained febrile. RRT called 9/29/23 for encephalopathy and concern that should would be unable to protect her airway. 1 PRBC given 9/29/23 due to anemia.     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   MRI: pending  60 minute EEG: abnormal consistent with a severe encephalopathy and ?status epilepticus; it is important to note patient has persistent head tremoring which may also produce a rhythmic artifact on the EEG and there is persistent electro myogenic artifact making this 1 hour segment difficult to interpret   cEEG:   -9/30: moderate to severe encephalopathy and periodic pattern which may be SIRPIDS (stimulus-induced rhythmic, periodic, or ictal discharges)  LP labs:  -glucose 98  -protein 22.8  -2 nucleated cells, RBC 0  -aerobic culture NGTD   -anaerobic culture pending   - HSV pending     Impression   Shaking/tremors:suspect toxic encephalopathy (ie serotonin syndrome, neuroleptic malignant syndrome etc) vs drug induced movement disorder vs malignant catatonia vs less  "likely seizures vs  other     Plan  -continue lacosamide 100mg BID  -brain MRI, pending  -continuous EEG  -continue to hold/limit dopaminergic, serotonergic agents   -consider alternative abx to cefepime  -attempt to wean sedation/simplify to one agent (versed) as clinically appropriate  -psychiatry following      Patient Follow-up    - final recommendation pending work-up    We will continue to follow.     TAMI Begum CNP  Vascular Neurology    To page me or covering stroke neurology team member, click here: [facility]  Choose \"On Call\" tab at top, then select \"NEUROLOGY/ALL SITES\" from middle drop-down box, press Enter, then look for \"stroke\" or \"telestroke\" for your site.  ______________________________________________________    Clinically Significant Risk Factors        # Hypokalemia: Lowest K = 2.7 mmol/L in last 2 days, will replace as needed  # Hypernatremia: Highest Na = 148 mmol/L in last 2 days, will monitor as appropriate  # Hypocalcemia: Lowest Ca = 7.5 mg/dL in last 2 days, will monitor and replace as appropriate    # Anion Gap Metabolic Acidosis: Highest Anion Gap = 19 mmol/L in last 2 days, will monitor and treat as appropriate  # Hypoalbuminemia: Lowest albumin = 3.4 g/dL at 9/30/2023  5:15 AM, will monitor as appropriate                         Medications   Scheduled Meds   ceFEPIme  2 g Intravenous Q8H    chlorhexidine  15 mL Mouth/Throat Q12H    [Held by provider] diphenhydrAMINE  50 mg Oral TID    insulin aspart  1-6 Units Subcutaneous Q4H    lacosamide (VIMPAT) 100 mg in sodium chloride 0.9 % 110 mL intermittent infusion  100 mg Intravenous BID    [Held by provider] LORazepam  0.25 mg Oral At Bedtime    [Held by provider] LORazepam  0.5 mg Oral BID    [Held by provider] multivitamin w/minerals  1 tablet Oral Daily    pantoprazole  40 mg Per Feeding Tube QAM AC    Or    pantoprazole  40 mg Intravenous QAM AC    [Held by provider] perphenazine  4 mg Oral QAM    [Held by provider] " perphenazine  8 mg Oral At Bedtime    polyethylene glycol  17 g Oral BID    potassium chloride  10 mEq Intravenous Q1H    sodium chloride (PF)  10-40 mL Intracatheter Q7 Days    sodium chloride (PF)  3 mL Intracatheter Q8H    vancomycin  1,000 mg Intravenous Q12H       Infusion Meds   midazolam 3 mg/hr (09/30/23 0658)    propofol 25 mcg/kg/min (09/30/23 0158)    sodium chloride         PRN Meds  acetaminophen **OR** acetaminophen, albuterol, [Held by provider] cyclobenzaprine, glucose **OR** dextrose **OR** glucagon, HYDROmorphone, lidocaine 4%, lidocaine 4%, lidocaine (buffered or not buffered), lidocaine (buffered or not buffered), melatonin, naloxone **OR** naloxone **OR** naloxone **OR** naloxone, nitroGLYcerin, ondansetron **OR** ondansetron, sodium chloride (PF), sodium chloride (PF), sodium chloride (PF), sodium chloride (PF)       PHYSICAL EXAMINATION  Temp:  [98.2  F (36.8  C)-99.4  F (37.4  C)] 98.2  F (36.8  C)  Pulse:  [57-94] 66  Resp:  [7-44] 14  BP: ()/() 162/71  FiO2 (%):  [40 %-50 %] 40 %  SpO2:  [86 %-100 %] 97 %      Neurologic (sedation held x30 minutes)  Mental Status:  not alert but with sternal rub partially opens eyes, sits up and grabs examiner's hand to push away (does this multiple times during examination), does not follow commands, no communication (intubated)  Cranial Nerves:  gaze conjugate, pupils equal, intermittently tracking  Motor:  increased tonicity in all limbs, lower face repetitive movements (chewing-like), repetitive movements of L>R upper extremities--all repetitive movements improve or cease with stimulation, able to move all limbs spontaneously with no evidence of focal/asymmetric weakenss   Reflexes:  3+ and symmetric throughout, toes upgoing bilaterally   Sensory:   reacts to noxious stimuli in all limbs     Imaging  I personally reviewed all imaging; relevant findings per HPI.     Lab Results Data   CBC  Recent Labs   Lab 09/30/23  0515 09/29/23  8712  09/29/23  1838 09/29/23  1136 09/29/23  0855 09/29/23  0518   WBC 11.4*  --   --  11.4*  --  14.8*   RBC 3.33*  --   --  3.23*  --  3.37*   HGB 7.8* 7.6* 6.3* 7.0*   < > 7.4*   HCT 25.5*  --   --  24.3*  --  25.3*     --   --  501*  --  521*    < > = values in this interval not displayed.     Basic Metabolic Panel    Recent Labs   Lab 09/30/23  0515 09/30/23  0402 09/29/23  2342 09/29/23  1131 09/29/23  0855 09/29/23  0518     --   --   --  148* 147*   POTASSIUM 2.7*  --   --   --  3.2* 3.6   CHLORIDE 111*  --   --   --  114* 115*   CO2 19*  --   --   --  15* 14*   BUN 18.4  --   --   --  25.1* 26.1*   CR 0.56  --   --   --  0.68 0.63   * 153* 133*   < > 153* 156*   LULU 7.5*  --   --   --  8.6* 8.6*    < > = values in this interval not displayed.     Liver Panel  Recent Labs   Lab 09/30/23  0515 09/29/23  0518 09/28/23  1244   PROTTOTAL 5.6* 6.7 6.9   ALBUMIN 3.4* 4.2 4.2   BILITOTAL 0.2 0.2 <0.2   ALKPHOS 66 77 63   AST 27 22 14   ALT 20 16 15     INR    Recent Labs   Lab Test 09/28/23  1244 03/07/23  1629   INR 1.04 1.15      Lipid Profile    Recent Labs   Lab Test 03/08/23  0830   CHOL 143   HDL 50   LDL 75   TRIG 88     A1C  No lab results found.  Troponin  No results for input(s): CTROPT, TROPONINIS, TROPONINI, GHTROP in the last 168 hours.       Data

## 2023-09-30 NOTE — PLAN OF CARE
Neuro: Not follow or tracking, Pupils reactive sluggish.Versed and Propofol continuous.  CV: S Cristo,   Pulmonary: LS diminish, moderated amount of oral secretion. GI/: NPO, NG in place, Liu in place with adequate UOP, have a BM x 2 this shift.  Lines: PIV x 3, all of them flush well, area around IV dark pink, showing symptoms of undergoing infiltration. Patient will need PICC line or more sustainable access.  Labs: HB 6.3 on the start of the shift, patient receive one unit RBC,at midnight HB 7.6  Plan: MRI some time today, patient have no POA , MRI check list is impossible to be complete, Per Skull XR No metallic foreign bodies identified in the face. Per Intensivist this problem need to be resolve during the day.

## 2023-09-30 NOTE — PROGRESS NOTES
Critical Care  Note      09/30/2023    Name: Kortney Germain MRN#: [id number]   Age: 68 year old YOB: 1955     Hsptl Day# 2  ICU DAY #2    MV DAY #2           Problem List:   Principal Problem:    Urinary retention  Active Problems:    Acute cystitis without hematuria    Constipation, unspecified constipation type    Anemia, unspecified type         Summary/Hospital Course:   68F pmh of schizo-affective schizophrenia, HTN, tardive dyskinesia now presented yesterday from group home with back pain and fever, Ct abd showed rectum distended with stool and urinary bladder distended, now s/p quinonez placement.  Arrival UA c/w UTI.  Since arrival from the ED she has been alert but not generally interactive (?part of this may be due to underlying psychiatric illness?).  This morning she was noted to be febrile and shaking prompting intubation to facilitate head CT and LP.      Assessment and plan :     Kortney Germain IS a 68 year old female admitted on 9/28/2023 for fever and UTI.   I have personally reviewed the daily labs, imaging studies, cultures and discussed the case with referring physician and consulting physicians.     My assessment and plan by system for this patient is as follows:    Neurology/Psychiatry:   1. Underlying schizoaffective schizophrenia and h/o tardive dyskinesia: appreciate psych recommendations.    2. Rigidity/shaking:  differential includes rigors, tardive dyskinesia (known h/o this), possible benzo withdrawal (on standing ativan at home), possible serotonin syndrome (on lexapro), or possible seizures (though no history of this)  3. Sedation--propofol drip  4.  Analgesia--prn dilaudid  5. Acute encephalopathy:  w/u as above    Cardiovascular:   1.Hypertension:  no h/o essential hypertension.  ?pain/discomfort related? Vs other syndrome as above (e.g. serotonin syndrome, benzo w/d, etc).    Pulmonary/Ventilator Management:   1. Loss of protective airway reflexes requiring  intubation and mechanical ventilation:  Neurologically inappropriate for extubation today.    GI and Nutrition:   1. RD consult today  2. PPI for pud prophy    Renal/Fluids/Electrolytes:   1. Creat ok 0.56  2. hyperNa 141--resolved  3.  hypoK 2.7--replete    Infectious Disease:   1. UTI--continues on cefepime and vanco  2. CSF unconcerning for meningitis.  Stopped acyclovir    Endocrine:   1. Stress induced hyperglycemia:  Fsg ok 127--ssi prn    Hematology/Oncology:   1. Hgb 7.8-- s/p 1 prbc. acute on chronic anemia due to critical illness, no apparent blood loss.  2. Leukocytosis:  to 11.4, in setting of known UTI  3. pltls 357    ICU Prophylaxis:   1. DVT: Hep Subq/ mechanical  2. VAP: HOB 30 degrees, chlorhexidine rinse  3. Stress Ulcer: PPI  4. Restraints: Nonviolent soft two point restraints required and necessary for patient safety and continued cares and good effect as patient continues to pull at necessary lines, tubes despite education and distraction. Will readdress daily.   5. Wound care  -   6. Feeding - npo for now  7. Family Update:apparently has no family.  Group home director is listed contact.  8. Disposition - icu    Clinically Significant Risk Factors        # Hypokalemia: Lowest K = 2.7 mmol/L in last 2 days, will replace as needed  # Hypernatremia: Highest Na = 148 mmol/L in last 2 days, will monitor as appropriate  # Hypocalcemia: Lowest Ca = 7.5 mg/dL in last 2 days, will monitor and replace as appropriate    # Anion Gap Metabolic Acidosis: Highest Anion Gap = 19 mmol/L in last 2 days, will monitor and treat as appropriate  # Hypoalbuminemia: Lowest albumin = 3.4 g/dL at 9/30/2023  5:15 AM, will monitor as appropriate                                Critical care time:  35 minutes, excluding procedures           Key Medications:      acyclovir  10 mg/kg Intravenous Q8H PHIL    ceFEPIme  2 g Intravenous Q8H    chlorhexidine  15 mL Mouth/Throat Q12H    [Held by provider] diphenhydrAMINE  50 mg  Oral TID    insulin aspart  1-6 Units Subcutaneous Q4H    lacosamide (VIMPAT) 100 mg in sodium chloride 0.9 % 110 mL intermittent infusion  100 mg Intravenous BID    [Held by provider] LORazepam  0.25 mg Oral At Bedtime    [Held by provider] LORazepam  0.5 mg Oral BID    [Held by provider] multivitamin w/minerals  1 tablet Oral Daily    pantoprazole  40 mg Per Feeding Tube QAM AC    Or    pantoprazole  40 mg Intravenous QAM AC    [Held by provider] perphenazine  4 mg Oral QAM    [Held by provider] perphenazine  8 mg Oral At Bedtime    polyethylene glycol  17 g Oral BID    sodium chloride (PF)  3 mL Intracatheter Q8H    vancomycin  1,000 mg Intravenous Q12H      D5W 75 mL/hr at 09/30/23 0200    midazolam 4 mg/hr (09/30/23 0000)    norepinephrine Stopped (09/29/23 1419)    propofol 25 mcg/kg/min (09/30/23 0158)              Physical Examination:   Temp:  [98.2  F (36.8  C)-102.7  F (39.3  C)] 98.2  F (36.8  C)  Pulse:  [57-94] 71  Resp:  [7-46] 11  BP: ()/() 147/78  FiO2 (%):  [40 %-50 %] 40 %  SpO2:  [86 %-100 %] 94 %        Intake/Output Summary (Last 24 hours) at 9/30/2023 0651  Last data filed at 9/30/2023 0600  Gross per 24 hour   Intake 1446.75 ml   Output 1355 ml   Net 91.75 ml         Wt Readings from Last 4 Encounters:   09/30/23 44.6 kg (98 lb 5.2 oz)   06/08/23 40.8 kg (90 lb)   03/15/23 41 kg (90 lb 6.2 oz)   02/02/22 52.2 kg (115 lb)     BP - Mean:  [] 107  Vent Mode: CMV/AC  (Continuous Mandatory Ventilation/ Assist Control)  FiO2 (%): 40 %  Resp Rate (Set): 16 breaths/min  Tidal Volume (Set, mL): 400 mL  PEEP (cm H2O): 5 cmH2O  Resp: 11    Recent Labs   Lab 09/29/23  1253 09/29/23  1141 09/29/23  0855   PH 7.39  --   --    PCO2 31*  --   --    PO2 208*  --   --    HCO3 19*  --   --    O2PER 50 4 0       GEN: no acute distress   HEENT: head ncat, sclera anicteric, OP patent, trachea midline   PULM: unlabored synchronous with vent, clear anteriorly    CV/COR: RRR S1S2 no gallop,  No rub,  no murmur  ABD: soft nontender, hypoactive bowel sounds, no mass  EXT:  warm and well perfused x4  NEURO: PERRL, no obvious deficits  SKIN: no obvious rash  LINES: clean, dry intact         Data:   All data and imaging reviewed     ROUTINE ICU LABS (Last four results)  CMP  Recent Labs   Lab 09/30/23 0515 09/30/23  0402 09/29/23  2342 09/29/23 2012 09/29/23  1131 09/29/23  0855 09/29/23  0518 09/29/23  0424 09/28/23  1244     --   --   --   --  148* 147*  --  147*   POTASSIUM 2.7*  --   --   --   --  3.2* 3.6  --  4.8   CHLORIDE 111*  --   --   --   --  114* 115*  --  118*   CO2 19*  --   --   --   --  15* 14*  --  17*   ANIONGAP 11  --   --   --   --  19* 18*  --  12   * 153* 133* 135*   < > 153* 156*   < > 115*   BUN 18.4  --   --   --   --  25.1* 26.1*  --  43.3*   CR 0.56  --   --   --   --  0.68 0.63  --  0.81   GFRESTIMATED >90  --   --   --   --  >90 >90  --  79   LULU 7.5*  --   --   --   --  8.6* 8.6*  --  9.3   PROTTOTAL 5.6*  --   --   --   --   --  6.7  --  6.9   ALBUMIN 3.4*  --   --   --   --   --  4.2  --  4.2   BILITOTAL 0.2  --   --   --   --   --  0.2  --  <0.2   ALKPHOS 66  --   --   --   --   --  77  --  63   AST 27  --   --   --   --   --  22  --  14   ALT 20  --   --   --   --   --  16  --  15    < > = values in this interval not displayed.     CBC  Recent Labs   Lab 09/30/23 0515 09/29/23  2353 09/29/23  1838 09/29/23  1136 09/29/23  0855 09/29/23  0518 09/28/23  2245 09/28/23  1244   WBC 11.4*  --   --  11.4*  --  14.8*  --  8.8   RBC 3.33*  --   --  3.23*  --  3.37*  --  3.35*   HGB 7.8* 7.6* 6.3* 7.0*   < > 7.4*   < > 7.2*   HCT 25.5*  --   --  24.3*  --  25.3*  --  25.5*   MCV 77*  --   --  75*  --  75*  --  76*   MCH 23.4*  --   --  21.7*  --  22.0*  --  21.5*   MCHC 30.6*  --   --  28.8*  --  29.2*  --  28.2*   RDW 18.2*  --   --  16.5*  --  16.1*  --  16.2*     --   --  501*  --  521*  --  526*    < > = values in this interval not displayed.     INR  Recent Labs    Lab 09/28/23  1244   INR 1.04     Arterial Blood Gas  Recent Labs   Lab 09/29/23  1253 09/29/23  1141 09/29/23  0855   PH 7.39  --   --    PCO2 31*  --   --    PO2 208*  --   --    HCO3 19*  --   --    O2PER 50 4 0       All cultures:  No results for input(s): CULT in the last 168 hours.  Recent Results (from the past 24 hour(s))   Lumbar spine CT w/o contrast    Narrative    CT LUMBAR SPINE WITHOUT CONTRAST  9/28/2023 1:56 PM     HISTORY: fall, low back pain      TECHNIQUE: Axial images of the lumbar spine were obtained without  intravenous contrast. Multiplanar reformations were performed.   Radiation dose for this scan was reduced using automated exposure  control, adjustment of the mA and/or kV according to patient size, or  iterative reconstruction technique.     COMPARISON: None.     FINDINGS: Vertebral body heights are maintained. Schmorl's node  involves the superior endplate of T12. No anterolisthesis or  retrolisthesis. Multilevel facet disease. Transverse processes are  intact. No evidence of an acute fracture. Paravertebral soft tissues  are unremarkable. Calcified uterine fibroids incidentally noted.  Moderate stool burden in the colon.      Impression    IMPRESSION:      No acute fracture in the lumbar spine.     OLIVA GOLDSMITH MD         SYSTEM ID:  [id number]   CT Abdomen Pelvis w Contrast    Narrative    EXAM: CT ABDOMEN PELVIS W CONTRAST  LOCATION: [facility]  DATE: 9/28/2023    INDICATION: Questionable abdominal pain.  COMPARISON: None.  TECHNIQUE: CT scan of the abdomen and pelvis was performed following injection of IV contrast. Multiplanar reformats were obtained. Dose reduction techniques were used.  CONTRAST: 45 mL Isovue 370    FINDINGS:   LOWER CHEST: Small esophageal hiatal hernia. Cardiac enlargement.    HEPATOBILIARY: Normal.    PANCREAS: Normal.    SPLEEN: Normal.    ADRENAL GLANDS: Normal.    KIDNEYS/BLADDER: Simple cyst left kidney. No follow-up is  needed.    BOWEL: Rectum distended with stool.    LYMPH NODES: Normal.    VASCULATURE: Unremarkable.    PELVIC ORGANS: Enlargement of the uterus with multiple fibroids. The urinary bladder is distended.    MUSCULOSKELETAL: Normal.      Impression    IMPRESSION:   1.  Enlarged uterus with multiple fibroids.    2.  Rectum is distended with stool.    3.  Urinary bladder is distended.     D/w NP Flor       Past Medical/surgical hx, meds, allergies, social history, family history     Past Medical History:   Diagnosis Date    Allergic state     Depressive disorder     Dyskinesia     Hypertension     Schizo affective schizophrenia (H)      No past surgical history on file.  No current facility-administered medications on file prior to encounter.  acetaminophen (TYLENOL) 500 MG tablet, Take 1,000 mg by mouth 3 times daily 0800 / 1400 / 2000  albuterol (PROVENTIL) (2.5 MG/3ML) 0.083% neb solution, Take 2.5 mg by nebulization every 4 hours as needed for shortness of breath, wheezing or cough  alendronate (FOSAMAX) 70 MG tablet, Take 70 mg by mouth every 7 days Thursdays at 0700  cholecalciferol 50 MCG (2000 UT) tablet, Take 1 tablet by mouth daily 0800  cyclobenzaprine (FLEXERIL) 5 MG tablet, Take 5 mg by mouth 3 times daily as needed for muscle spasms  diphenhydrAMINE (BENADRYL) 50 MG capsule, Take 50 mg by mouth 3 times daily 0800 / 1500 / 2000  escitalopram (LEXAPRO) 20 MG tablet, Take 20 mg by mouth daily 0800  LORazepam (ATIVAN) 0.5 MG tablet, Take 0.5 mg by mouth 2 times daily 0800/1400  LORazepam (ATIVAN) 0.5 MG tablet, Take 0.25 mg by mouth At Bedtime 2000  multivitamin w/minerals (THERA-VIT-M) tablet, Take 1 tablet by mouth daily 0800  muscle rub (ARTHRITIS HOT) 10-15 % CREA, Apply topically 4 times daily Back pain - 0700/1100/1500/2000  NIFEdipine ER (ADALAT CC) 60 MG 24 hr tablet, Take 60 mg by mouth daily  perphenazine 4 MG tablet, Take 4 mg by mouth every morning  perphenazine 8 MG tablet, Take 8 mg by mouth At  Bedtime         Allergies   Allergen Reactions    Amlodipine     Clozapine     Egg [Chicken-Derived Products (Egg)]     Penicillins     Potassium     Poultry Meal      Social History     Socioeconomic History    Marital status: Single     Spouse name: Not on file    Number of children: Not on file    Years of education: Not on file    Highest education level: Not on file   Occupational History    Not on file   Tobacco Use    Smoking status: Never    Smokeless tobacco: Never   Substance and Sexual Activity    Alcohol use: No    Drug use: No    Sexual activity: Never   Other Topics Concern    Not on file   Social History Narrative    Not on file     Social Determinants of Health     Financial Resource Strain: Not on file   Food Insecurity: Not on file   Transportation Needs: Not on file   Physical Activity: Not on file   Stress: Not on file   Social Connections: Not on file   Interpersonal Safety: Not on file   Housing Stability: Not on file     No family history on file.

## 2023-09-30 NOTE — PROGRESS NOTES
Brief Intensivist note  PICC line ordered in patient who has very limited vascular access.    Cristina morales  September 30, 2023

## 2023-09-30 NOTE — PROGRESS NOTES
Preliminary Video EEG impression on September 30, 2023  until 9:30 am    This video EEG is abnormal due to the presences of continuous generalized polymorphic delta/theta slowing with maximum slowing in the bifrontal region. There is superimposed generalized periodic pattern in nearly 50% of record that is stimulus induced when staff is working with patient.  This generalized periodic pattern is 2-3 hz, rhythmic at times, maximum bifrontal, waxes and wanes. When patient is not stimulated, this GPD is not noted.     IMPRESSION: Its not clear if this periodic pattern are seizures (non convulsive status epilepticus or SIRPDS metabolic process). SIRPIDS - stimulus-induced rhythmic, periodic, or ictal discharges may not respond to antiepileptic drug. We loaded with lacosamide and sedative medication was increased and there was no change in this stimulus sensitive pattern.     EEG is consistent with moderate to severe encephalopathy and periodic pattern which may be SIRPIDS. Clinical correlation is advised. Reviewed with neurology team.     Koki Gusman MD  Staff Epilepsy Neurologist   [phone]

## 2023-09-30 NOTE — PROGRESS NOTES
Intensivist:  Changed abx from cefepime to ctx at neurology request.  Will continue to follow and tailor abx as able but broad spectrum coverage is difficult in this case due to neurology concerns about cefepime causing encephalopathy, meropenem damping valproate activity, and pcn allergy.

## 2023-09-30 NOTE — CONSULTS
CLINICAL NUTRITION SERVICES  -  ASSESSMENT NOTE      Recommendations Ordered by Registered Dietitian (RD):   Once K >/=3, Mg >/=1.5 and Phos >/=1.9, begin TF Osmolite 1.5 at 10 mL/hr x 24 hrs.  If K, Mg, Phos acceptable by morning 10/1, increase TF to goal rate of 25 mL/hr    Osm 1.5 at 25 mL/hr = 900 kcal, 38 g protein, 122 g CHO, 0 g fiber and 457 mL free water  + Prosource TF 20 = 80 kcal, 20 g protein  + Propofol at 11.4 mL/hr = 301 kcal  Total provisions = 1281 kcal (28 kcal/kg), 58 g protein (1.3 g/kg)    FWF 60 mL q 4 hrs  Certavite daily     Need clarification on Potassium allergy in EMR     Malnutrition:   % Weight Loss: More information needed, no wt loss in the past 6 months   % Intake:  <75% for >/= 3 months (moderate malnutrition) - chronic limited food intake   Subcutaneous Fat Loss:  None acute changes observed, suspect chronic low stores (exam compared to prior hospitalization 6 months ago)  Muscle Loss:  None acute changes observed, suspect chronic low stores (exam compared to prior hospitalization 6 months ago)  Fluid Retention:  None noted    Malnutrition Diagnosis: Unable to determine d/t lack of information - pt remains at risk        REASON FOR ASSESSMENT  Kortney Germain is a 68 year old female seen by Registered Dietitian for Provider Order - Registered Dietitian to Assess and Order TF per Medical Nutrition Therapy Protocol      NUTRITION HISTORY  Chart reviewed  PMH includes schizo-affective schizophrenia, HTN, tardive dyskinesia   Resides in group home. Per prior nutrition history obtained 3/7/23, pt reported that she makes her own food and likes meat and vegetables (noted to be a questionable historian at that time)   Per MD note, no family - group home director listed as contact   Encephalopathic on admit - appeared dehydrated and found with positive UTI  She was assessed by this writer in March 2023, took nutritional supplements and met criteria for malnutrition     Allergy recorded to  "chicken, eggs, potassium --> these are patient reported per group home staff with unknown reactions   Called Emely and Katiuska from pt's group home --> pt is said to be a very picky eater. The only items she will eat is plain hamburger meat on a bun, cheese sandwiches and milk. Pt described to have hallucinations or paranoia and often think there is additional food items hidden in her meals causing her to refuse to eat them (for example, thinking grapes hidden in spaghetti or rice hidden in beef stew)     Estimated UBW between 80-90 lbs. Katiuska reports that pt refuses to be weighed at primary care visits and group home for >1 year   Unable to d/w patient given current condition       CURRENT NUTRITION ORDERS  Diet Order:     NPO    Current Intake/Tolerance:  N/a  Plan to start TF today   Intubated on 9/29 for airway protection  Noted constipation on CT    EEG done this morning- see neuro note     NUTRITION FOCUSED PHYSICAL ASSESSMENT FOR DIAGNOSING MALNUTRITION)  Yes           Observed:    No nutrition-related physical findings observed - global mild losses verses chronic low fat/muscle stores  Not using towards malnutrition criteria at this time     Obtained from Chart/Interdisciplinary Team:  per AXR \"NG tube tip and sidehole in the stomach\"    ANTHROPOMETRICS  Height: 5'  Weight: 98 lbs 5.2 oz  Body mass index is 19.2 kg/m .  Weight Status:  Normal BMI  IBW: 45.4 kg   % IBW: 98%  Weight History: Weight slightly up in the past 6 months. Potentially down 25 lbs from 2/2/22-->3/15/23 (21% in 1 year) - however, unable to determine accuracy of weights on file per report of multiple wt refusals in the past year  Wt Readings from Last 10 Encounters:   09/30/23 44.6 kg (98 lb 5.2 oz)   06/08/23 40.8 kg (90 lb)   03/15/23 41 kg (90 lb 6.2 oz)   02/02/22 52.2 kg (115 lb)   12/16/21 48.1 kg (106 lb)   06/24/20 48.1 kg (106 lb)   06/03/20 48.1 kg (106 lb)   08/09/19 49.9 kg (110 lb)   10/30/18 44 kg (97 lb)   10/08/18 54 kg " (119 lb)       LABS  Labs reviewed  Na 141  K 2.7 (L) - replacing   No Mg/Phos - add-ons pending   Recent Labs   Lab 09/30/23  0900 09/30/23  0515 09/30/23  0402 09/29/23  2342 09/29/23 2012 09/29/23  1747   * 141* 153* 133* 135* 128*     Lab Results   Component Value Date    URINEKETONE Negative 09/28/2023    URINEKETONE Negative 10/08/2018       MEDICATIONS  Medications reviewed  Miralax   D5 IVF at 75 mL/hr stopped this AM; NaCl started at 50 mL/hr   Propofol @ 11.4 mL/hr = 301 kcal    ASSESSED NUTRITION NEEDS PER APPROVED PRACTICE GUIDELINES:\  Dosing Weight 45 kg   Estimated Energy Needs: 8396-3917+ kcals (25-30+ Kcal/Kg)  Justification: maintenance and vented  Estimated Protein Needs: 54-68 grams protein (1.2-1.5 g pro/Kg)  Justification: preservation of lean body mass  Estimated Fluid Needs: 1 mL/kcal or per MD   Justification: maintenance    MALNUTRITION:  % Weight Loss: More information needed, no wt loss in the past 6 months   % Intake:  <75% for >/= 3 months (moderate malnutrition) - chronic limited food intake   Subcutaneous Fat Loss:  None acute changes observed, suspect chronic low stores (exam compared to prior hospitalization 6 months ago)  Muscle Loss:  None acute changes observed, suspect chronic low stores (exam compared to prior hospitalization 6 months ago)  Fluid Retention:  None noted    Malnutrition Diagnosis: Unable to determine d/t lack of information - pt remains at risk    NUTRITION DIAGNOSIS:  Inadequate protein-energy intake related to NPO, vented as evidenced by day 1 w/o nutrition      NUTRITION INTERVENTIONS  Recommendations / Nutrition Prescription  Osm 1.5 at 25 mL/hr = 900 kcal, 38 g protein, 122 g CHO, 0 g fiber and 457 mL free water  + Prosource TF 20 = 80 kcal, 20 g protein  + Propofol at 11.4 mL/hr = 301 kcal  Total provisions = 1281 kcal (28 kcal/kg), 58 g protein (1.3 g/kg)    FWF 60 mL q 4 hrs  Certavite daily     Implementation  Nutrition education: Not appropriate  at this time due to patient condition  EN Composition, EN Schedule, and Feeding Tube Flush: as above       Nutrition Goals  EN + Propofol + Prosource to meet % estimated needs within 48 hrs       MONITORING AND EVALUATION:  Progress towards goals will be monitored and evaluated per protocol and Practice Guidelines        Colleen Bobo RD, LD  Clinical Dietitian   Weekend pager [phone]

## 2023-09-30 NOTE — PROCEDURES
[facility]    Triple Lumen PICC Placement    Date/Time: 9/30/2023 8:30 AM    Performed by: Sherlyn Morales RN  Authorized by: Damian Nielsen MD  Indications: vascular access      UNIVERSAL PROTOCOL   Site Marked: Yes  Prior Images Obtained and Reviewed:  Yes  Required items: Required blood products, implants, devices and special equipment available    Patient identity confirmed:  Arm band and hospital-assigned identification number  NA - No sedation, light sedation, or local anesthesia  Confirmation Checklist:  Patient's identity using two indicators and correct equipment/implants were available  Time out: Immediately prior to the procedure a time out was called    Universal Protocol: the Joint Commission Universal Protocol was followed    Preparation: Patient was prepped and draped in usual sterile fashion    ESBL (mL):  2     ANESTHESIA    Local Anesthetic:  Lidocaine 1% without epinephrine  Anesthetic Total (mL):  1      SEDATION  Patient Sedated: Yes    Sedation Type:  Moderate (conscious) sedation  Sedation:  Propofol  Vital signs: Vital signs monitored during sedation        Preparation: skin prepped with ChloraPrep  Skin prep agent: skin prep agent completely dried prior to procedure  Sterile barriers: maximum sterile barriers were used: cap, mask, sterile gown, sterile gloves, and large sterile sheet  Hand hygiene: hand hygiene performed prior to central venous catheter insertion  Type of line used: PICC  Catheter type: triple lumen  Lumen type: valved and power PICC  Lumen Identification: Purple and White  Catheter size: 5 Fr  Brand: Bard  Lot number: [id number]  Placement method: venipuncture, ultrasound, MST and tip navigation system  Number of attempts: 1  Difficulty threading catheter: no  Successful placement: yes  Orientation: left    Location: basilic vein  Tip Location: SVC/RA Junction  Arm circumference: adults 10 cm  Extremity circumference: 26  Visible catheter  length: 5  Total catheter length: 40  Dressing and securement: site cleansed, occlusive dressing applied, dressing applied, subcutaneous anchor securement system, hemostatic agent and blood removed  Post procedure assessment: blood return through all ports and placement verified by 3CG technology  PROCEDURE   Patient Tolerance:  Patient tolerated the procedure well with no immediate complicationsDescribe Procedure: Patient's care team instruct on PICC placement. Patient unable to verbalized an understanding. Patient's provider gave written consent for PICC placement. VAT RN placed 5Fr catheter in basilic vein. Patient tolerated well. Blood return noted. PICC placement \was confirmed with 3 CG technology noting green highlights on rhythm strip. RN caring for patient updated that PICC is ok to use.     Disposal: sharps and needle count correct at the end of procedure, needles and guidewire disposed in sharps container

## 2023-10-01 NOTE — PROGRESS NOTES
[facility]    Stroke Progress Note    Interval Events  MRI with L hyperintensity on DWI/FLAIR without correlate on ADC or T1, favored to be artifact . Cefepime changed to ceftriaxone. Continues on vimpat. Exam similar to yesterday--opens eyes/briefly arouses with deep sternal rub, abnormal movements modify or cease with stimulation and some purposeful movements with noxious stimuli.     HPI Summary  Kortney Germain is a 68 year old female with PMHx significant for anxiety/depression, schizoaffective disorder (living in group home), HTN, odynophagia, tardive dyskinesia, HTN. She presented 9/28 with lower back pain and abdominal pain. She was found to have UTI, anemia, and constipation. She had decreased LOC and generalized shaking overnight. She remained febrile. RRT called 9/29/23 for encephalopathy and concern that should would be unable to protect her airway. 1 PRBC given 9/29/23 due to anemia. EEG suggestive of seizure activity, although high degree of muscle artifact and clinical exam with movements influenced by stimulation lowering concern for seizure.     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   MRI: L hyperintensity on DWI/FLAIR without correlate on ADC or T1, favored to be artifact    60 minute EEG: abnormal consistent with a severe encephalopathy and ?status epilepticus; it is important to note patient has persistent head tremoring which may also produce a rhythmic artifact on the EEG and there is persistent electro myogenic artifact making this 1 hour segment difficult to interpret     cEEG:   -9/30: moderate to severe encephalopathy and periodic pattern which may be SIRPIDS (stimulus-induced rhythmic, periodic, or ictal discharges)    Blood cultures: NGTD    LP labs:  -glucose 98  -protein 22.8  -2 nucleated cells, RBC 0  -aerobic culture NGTD   -anaerobic culture NGTD  - HSV negative    Impression  "  Shaking/tremors:suspect toxic encephalopathy (ie serotonin syndrome, neuroleptic malignant syndrome etc) vs drug induced movement disorder vs malignant catatonia vs less likely seizures vs  other     Plan  -continue lacosamide 100mg BID  -continuous EEG  -continue to hold/limit dopaminergic, serotonergic agents   -attempt to wean sedation/simplify to one agent (versed) as clinically appropriate  -psychiatry following      Patient Follow-up    - final recommendation pending work-up    We will continue to follow.     TAMI Begum CNP  Vascular Neurology    To page me or covering stroke neurology team member, click here: [facility]  Choose \"On Call\" tab at top, then select \"NEUROLOGY/ALL SITES\" from middle drop-down box, press Enter, then look for \"stroke\" or \"telestroke\" for your site.  ______________________________________________________    Clinically Significant Risk Factors        # Hypokalemia: Lowest K = 2.7 mmol/L in last 2 days, will replace as needed  # Hypernatremia: Highest Na = 148 mmol/L in last 2 days, will monitor as appropriate  # Hypocalcemia: Lowest Ca = 7 mg/dL in last 2 days, will monitor and replace as appropriate    # Anion Gap Metabolic Acidosis: Highest Anion Gap = 19 mmol/L in last 2 days, will monitor and treat as appropriate  # Hypoalbuminemia: Lowest albumin = 3.4 g/dL at 9/30/2023  5:15 AM, will monitor as appropriate                         Medications   Scheduled Meds   cefTRIAXone  1 g Intravenous Q24H    chlorhexidine  15 mL Mouth/Throat Q12H    [Held by provider] diphenhydrAMINE  50 mg Oral TID    insulin aspart  1-6 Units Subcutaneous Q4H    lacosamide (VIMPAT) 100 mg in sodium chloride 0.9 % 110 mL intermittent infusion  100 mg Intravenous BID    [Held by provider] LORazepam  0.25 mg Oral At Bedtime    [Held by provider] LORazepam  0.5 mg Oral BID    [Held by provider] multivitamin w/minerals  1 tablet Oral Daily    multivitamins w/minerals  15 mL Per Feeding Tube Daily    " pantoprazole  40 mg Per Feeding Tube QAM AC    Or    pantoprazole  40 mg Intravenous QAM AC    [Held by provider] perphenazine  4 mg Oral QAM    [Held by provider] perphenazine  8 mg Oral At Bedtime    polyethylene glycol  17 g Oral BID    protein modular  1 packet Per Feeding Tube Daily    sodium chloride (PF)  10-40 mL Intracatheter Q7 Days    sodium chloride (PF)  3 mL Intracatheter Q8H    sodium phosphate  15 mmol Intravenous Once    vancomycin  1,000 mg Intravenous Q12H       Infusion Meds   dextrose      midazolam 6 mg/hr (10/01/23 0741)    propofol 30 mcg/kg/min (10/01/23 0739)    sodium chloride 50 mL/hr at 10/01/23 0741       PRN Meds  acetaminophen **OR** acetaminophen, albuterol, [Held by provider] cyclobenzaprine, dextrose, glucose **OR** dextrose **OR** glucagon, HYDROmorphone, lidocaine 4%, lidocaine 4%, lidocaine (buffered or not buffered), lidocaine (buffered or not buffered), melatonin, midazolam, naloxone **OR** naloxone **OR** naloxone **OR** naloxone, nitroGLYcerin, sodium chloride (PF), sodium chloride (PF), sodium chloride (PF), sodium chloride (PF)       PHYSICAL EXAMINATION  Temp:  [97.6  F (36.4  C)-98.6  F (37  C)] 98.4  F (36.9  C)  Pulse:  [] 72  Resp:  [13-31] 17  BP: (103-175)/(63-89) 154/79  FiO2 (%):  [30 %] 30 %  SpO2:  [89 %-99 %] 98 %      Neurologic (sedation held x20 minutes)  Mental Status:  not alert but with sternal rub partially opens eyes, attempts to sit up and pushes examiner's hand away, does not follow commands, no communication (intubated)  Cranial Nerves:  gaze conjugate, pupils equal, no tracking  Motor:  increased tonicity in all limbs, lower face repetitive movements (chewing-like), repetitive movements of L>R upper extremities--all repetitive movements improve or cease with stimulation, able to move all limbs spontaneously with no evidence of focal/asymmetric weakenss   Sensory:   reacts to noxious stimuli in all limbs     Imaging  I personally reviewed all  imaging; relevant findings per HPI.     Lab Results Data   CBC  Recent Labs   Lab 10/01/23  0457 09/30/23  2315 09/30/23  1854 09/30/23  1321 09/30/23  0515 09/29/23  1838 09/29/23  1136   WBC 10.6  --   --   --  11.4*  --  11.4*   RBC 3.34*  --   --   --  3.33*  --  3.23*   HGB 7.9* 7.7* 8.2*   < > 7.8*   < > 7.0*   HCT 25.7*  --   --   --  25.5*  --  24.3*     --   --   --  357  --  501*    < > = values in this interval not displayed.     Basic Metabolic Panel    Recent Labs   Lab 10/01/23  0743 10/01/23  0457 10/01/23  0353 09/30/23 2317 09/30/23  2315 09/30/23  1849 09/30/23  1321 09/30/23  0900 09/30/23  0515 09/29/23  1131 09/29/23  0855   NA  --  136  --   --   --   --   --   --  141  --  148*   POTASSIUM  --  3.4  3.4  --   --  3.0*  --  3.0*  --  2.7*  --  3.2*   CHLORIDE  --  105  --   --   --   --   --   --  111*  --  114*   CO2  --  19*  --   --   --   --   --   --  19*  --  15*   BUN  --  6.7*  --   --   --   --   --   --  18.4  --  25.1*   CR  --  0.43*  --   --   --   --   --   --  0.56  --  0.68   GLC 98 145* 126*   < >  --    < >  --    < > 141*   < > 153*   LULU  --  7.0*  --   --   --   --   --   --  7.5*  --  8.6*    < > = values in this interval not displayed.     Liver Panel  Recent Labs   Lab 09/30/23  0515 09/29/23  0518 09/28/23  1244   PROTTOTAL 5.6* 6.7 6.9   ALBUMIN 3.4* 4.2 4.2   BILITOTAL 0.2 0.2 <0.2   ALKPHOS 66 77 63   AST 27 22 14   ALT 20 16 15     INR    Recent Labs   Lab Test 09/28/23  1244 03/07/23  1629   INR 1.04 1.15      Lipid Profile    Recent Labs   Lab Test 03/08/23  0830   CHOL 143   HDL 50   LDL 75   TRIG 88     A1C  No lab results found.  Troponin  No results for input(s): CTROPT, TROPONINIS, TROPONINI, GHTROP in the last 168 hours.       Data

## 2023-10-01 NOTE — PROGRESS NOTES
[facility] hospital ICU RESPIRATORY NOTE        Date of Admission: 9/28/2023    Date of Intubation (most recent): 9/29/2023    Reason for Mechanical Ventilation: Airway Protection     Number of Days on Mechanical Ventilation: 3    Met Criteria for Spontaneous Breathing Trial: No    Reason for No Spontaneous Breathing Trial: 3    Significant Events Today: None    ABG Results:   Recent Labs   Lab 09/30/23  0750 09/29/23  1253 09/29/23  1141 09/29/23  0855   PH 7.39 7.39  --   --    PCO2 36 31*  --   --    PO2 192* 208*  --   --    HCO3 22 19*  --   --    O2PER 40 50 4 0         Current Vent Settings: Vent Mode: CMV/AC  (Continuous Mandatory Ventilation/ Assist Control)  FiO2 (%): 30 %  Resp Rate (Set): 12 breaths/min  Tidal Volume (Set, mL): 400 mL  PEEP (cm H2O): 5 cmH2O  Resp:   (!) 6        Jeremy Coles, RT on 10/1/2023 at 5:45 PM

## 2023-10-01 NOTE — PLAN OF CARE
Neuro:  obtunded, deeply sedated overnight, pupils reactive, sluggish, not tracking, conjugates; Does not follow commands; spastic LUE, increased tone; tremulous/twitching-lower face, upper limbs    Cardiovascular:  SR, palpable peripheral pulses     Pulmonary:  Tolerating ventilator on propofol versed, Oxygen saturation > 92%    GI: TF started @2000, 10cc/hr w/ standard FWF, Bmx2- loose/soft, brown     :  Adequate UOP.    Endocrine: Glucose well controlled.    Skin:  MASD at sacrum/buttocks, critic aid at red areas,  Protective mepilexes applied, bruises at forearms from previous venipunctures?     Restraints:  In use and necessary.      Labs: k 3.0, pt w/ h/o K allergies, had previously received IV K s overt allergic reactions, correction given w/ Sign off from Night intensivist. Phosphorus 0.8-ongoing IV replacement     Continue to monitor closely.     10/01/23 04:57   Sodium 136   Potassium    Potassium 3.4  3.4   Chloride    Chloride 105   Carbon Dioxide    Carbon Dioxide (CO2) 19 (L)   Urea Nitrogen    Urea Nitrogen 6.7 (L)   Creatinine 0.43 (L)   GFR Estimate >90   GFR Estimate If Black    Calcium 7.0 (L)   Anion Gap 12   Magnesium 2.1   Phosphorus 0.8 (LL)     CBC  Recent Labs   Lab 10/01/23  0457 09/30/23  2315 09/30/23  1854 09/30/23  1321 09/30/23  0515 09/29/23  1838 09/29/23  1136 09/29/23  0855 09/29/23  0518   WBC 10.6  --   --   --  11.4*  --  11.4*  --  14.8*   RBC 3.34*  --   --   --  3.33*  --  3.23*  --  3.37*   HGB 7.9* 7.7* 8.2* 7.5* 7.8*   < > 7.0*   < > 7.4*   HCT 25.7*  --   --   --  25.5*  --  24.3*  --  25.3*   MCV 77*  --   --   --  77*  --  75*  --  75*   MCH 23.7*  --   --   --  23.4*  --  21.7*  --  22.0*   MCHC 30.7*  --   --   --  30.6*  --  28.8*  --  29.2*   RDW 17.9*  --   --   --  18.2*  --  16.5*  --  16.1*     --   --   --  357  --  501*  --  521*    < > = values in this interval not displayed.      09/30/23 19:30 09/30/23 23:17 10/01/23 03:53   GLUCOSE BY METER POCT  91 93 126 (H)   (H): Data is abnormally high  Intake/Output Summary (Last 24 hours) at 10/1/2023 0614  Last data filed at 10/1/2023 0600  Gross per 24 hour   Intake 2523.49 ml   Output 1475 ml   Net 1048.49 ml

## 2023-10-01 NOTE — PROGRESS NOTES
[facility] ICU RESPIRATORY NOTE        Date of Admission: 9/28/2023    Date of Intubation (most recent): 09/29/2023    Reason for Mechanical Ventilation: Airway protection.    Number of Days on Mechanical Ventilation: 3    Met Criteria for Spontaneous Breathing Trial: No    Reason for No Spontaneous Breathing Trial: per MD.    Significant Events Today: None.    ABG Results:   Recent Labs   Lab 09/30/23  0750 09/29/23  1253 09/29/23  1141 09/29/23  0855   PH 7.39 7.39  --   --    PCO2 36 31*  --   --    PO2 192* 208*  --   --    HCO3 22 19*  --   --    O2PER 40 50 4 0         Current Vent Settings: Vent Mode: CMV/AC  (Continuous Mandatory Ventilation/ Assist Control)  FiO2 (%): 30 %  Resp Rate (Set): 12 breaths/min  Tidal Volume (Set, mL): 400 mL  PEEP (cm H2O): 5 cmH2O  Resp: 19          Taylor Tran, RT on 10/1/2023 at 3:51 AM

## 2023-10-01 NOTE — PROGRESS NOTES
Critical Care  Note      10/01/2023    Name: Kortney Germain MRN#: [id number]   Age: 68 year old YOB: 1955     Hsptl Day# 3  ICU DAY #3    MV DAY #3           Problem List:   Principal Problem:    Urinary retention  Active Problems:    Acute cystitis without hematuria    Constipation, unspecified constipation type    Anemia, unspecified type         Summary/Hospital Course:   68F pmh of schizo-affective schizophrenia, HTN, tardive dyskinesia now presented 9/28 from group home with back pain and fever, Ct abd showed rectum distended with stool and urinary bladder distended, now s/p quinonez placement.  Arrival UA c/w UTI.  Since arrival from the ED she has been alert but not generally interactive (?part of this may be due to underlying psychiatric illness?).  Morning of 9/29 she was noted to be febrile and shaking prompting intubation to facilitate head CT and LP.      Assessment and plan :     Kortney Germain IS a 68 year old female admitted on 9/28/2023 for fever and UTI.   I have personally reviewed the daily labs, imaging studies, cultures and discussed the case with referring physician and consulting physicians.     My assessment and plan by system for this patient is as follows:    Neurology/Psychiatry:   1. Underlying schizoaffective schizophrenia and h/o tardive dyskinesia: appreciate psych recommendations.    2. Rigidity/shaking:  continues to have convulsive movements when stimulated, not seizures per eeg.  possible serotonin syndrome (on lexapro at home) but not having other sxs of serotonin syndrome (flushing, diarrhea, etc), or may be tardive dyskinesia.  Starting standing benadryl to see if this may in case it's tardive dyskinesia.  3. Sedation--propofol/midaz drip  4.  Analgesia--prn dilaudid  5. Acute encephalopathy:  w/u as above    Cardiovascular:   1.Hypertension:  no h/o essential hypertension.  Improved with pain/sedation mgmt.    Pulmonary/Ventilator Management:   1. Loss of  protective airway reflexes requiring intubation and mechanical ventilation:  Neurologically inappropriate for extubation today.    GI and Nutrition:   1. Tube feeding  2. PPI for pud prophy    Renal/Fluids/Electrolytes:   1. Creat ok 0.43    Infectious Disease:   1. UTI--on ctx and vanco, UC with mixed christina but >100k colonies. Stop vanco today.  2. CSF unconcerning for meningitis.    Endocrine:   1. Stress induced hyperglycemia:  Fsg ok 127--ssi prn    Hematology/Oncology:   1. Hgb 7.9-- s/p 1 prbc 9/29. acute on chronic anemia due to critical illness, no apparent blood loss.  2. Leukocytosis:  to 10.6, in setting of known UTI  3. pltls 342    ICU Prophylaxis:   1. DVT: Hep Subq/ mechanical  2. VAP: HOB 30 degrees, chlorhexidine rinse  3. Stress Ulcer: PPI  4. Restraints: Nonviolent soft two point restraints required and necessary for patient safety and continued cares and good effect as patient continues to pull at necessary lines, tubes despite education and distraction. Will readdress daily.   5. Wound care  -   6. Feeding - tf  7. Family Update:apparently has no family.  Group home director is listed contact.  8. Disposition - icu    Clinically Significant Risk Factors        # Hypokalemia: Lowest K = 2.7 mmol/L in last 2 days, will replace as needed   # Hypocalcemia: Lowest Ca = 7 mg/dL in last 2 days, will monitor and replace as appropriate     # Hypoalbuminemia: Lowest albumin = 3.4 g/dL at 9/30/2023  5:15 AM, will monitor as appropriate                                Critical care time:  35 minutes, excluding procedures           Key Medications:      cefTRIAXone  1 g Intravenous Q24H    chlorhexidine  15 mL Mouth/Throat Q12H    [Held by provider] diphenhydrAMINE  50 mg Oral TID    insulin aspart  1-6 Units Subcutaneous Q4H    lacosamide (VIMPAT) 100 mg in sodium chloride 0.9 % 110 mL intermittent infusion  100 mg Intravenous BID    [Held by provider] LORazepam  0.25 mg Oral At Bedtime    [Held by provider]  LORazepam  0.5 mg Oral BID    [Held by provider] multivitamin w/minerals  1 tablet Oral Daily    multivitamins w/minerals  15 mL Per Feeding Tube Daily    pantoprazole  40 mg Per Feeding Tube QAM AC    Or    pantoprazole  40 mg Intravenous QAM AC    [Held by provider] perphenazine  4 mg Oral QAM    [Held by provider] perphenazine  8 mg Oral At Bedtime    polyethylene glycol  17 g Oral BID    protein modular  1 packet Per Feeding Tube Daily    sodium chloride (PF)  10-40 mL Intracatheter Q7 Days    sodium chloride (PF)  3 mL Intracatheter Q8H    sodium phosphate  15 mmol Intravenous Once    vancomycin  1,000 mg Intravenous Q12H      dextrose      midazolam 6 mg/hr (09/30/23 2020)    propofol 30 mcg/kg/min (10/01/23 0035)    sodium chloride 50 mL/hr at 09/30/23 2000              Physical Examination:   Temp:  [97.6  F (36.4  C)-98.6  F (37  C)] 98.4  F (36.9  C)  Pulse:  [] 72  Resp:  [11-44] 17  BP: (103-175)/(59-89) 154/79  FiO2 (%):  [30 %] 30 %  SpO2:  [89 %-99 %] 98 %        Intake/Output Summary (Last 24 hours) at 10/1/2023 0709  Last data filed at 10/1/2023 0700  Gross per 24 hour   Intake 2565.19 ml   Output 1475 ml   Net 1090.19 ml         Wt Readings from Last 4 Encounters:   10/01/23 46.9 kg (103 lb 6.3 oz)   06/08/23 40.8 kg (90 lb)   03/15/23 41 kg (90 lb 6.2 oz)   02/02/22 52.2 kg (115 lb)     BP - Mean:  [] 110  Vent Mode: CMV/AC  (Continuous Mandatory Ventilation/ Assist Control)  FiO2 (%): 30 %  Resp Rate (Set): 12 breaths/min  Tidal Volume (Set, mL): 400 mL  PEEP (cm H2O): 5 cmH2O  Resp: 17    Recent Labs   Lab 09/30/23  0750 09/29/23  1253 09/29/23  1141 09/29/23  0855   PH 7.39 7.39  --   --    PCO2 36 31*  --   --    PO2 192* 208*  --   --    HCO3 22 19*  --   --    O2PER 40 50 4 0       GEN: no acute distress   HEENT: head ncat, sclera anicteric, OP patent, trachea midline   PULM: unlabored synchronous with vent, clear anteriorly    CV/COR: RRR S1S2 no gallop,  No rub, no murmur  ABD:  soft nontender, hypoactive bowel sounds, no mass  EXT:  warm and well perfused x4  NEURO: PERRL, no obvious deficits  SKIN: no obvious rash  LINES: clean, dry intact         Data:   All data and imaging reviewed     ROUTINE ICU LABS (Last four results)  CMP  Recent Labs   Lab 10/01/23  0457 10/01/23  0353 09/30/23  2317 09/30/23  2315 09/30/23  1930 09/30/23  1849 09/30/23  1321 09/30/23  0900 09/30/23  0515 09/29/23  1131 09/29/23  0855 09/29/23  0518 09/29/23  0424 09/28/23  1244     --   --   --   --   --   --   --  141  --  148* 147*  --  147*   POTASSIUM 3.4  3.4  --   --  3.0*  --   --  3.0*  --  2.7*  --  3.2* 3.6  --  4.8   CHLORIDE 105  --   --   --   --   --   --   --  111*  --  114* 115*  --  118*   CO2 19*  --   --   --   --   --   --   --  19*  --  15* 14*  --  17*   ANIONGAP 12  --   --   --   --   --   --   --  11  --  19* 18*  --  12   * 126* 93  --  91   < >  --    < > 141*   < > 153* 156*   < > 115*   BUN 6.7*  --   --   --   --   --   --   --  18.4  --  25.1* 26.1*  --  43.3*   CR 0.43*  --   --   --   --   --   --   --  0.56  --  0.68 0.63  --  0.81   GFRESTIMATED >90  --   --   --   --   --   --   --  >90  --  >90 >90  --  79   LULU 7.0*  --   --   --   --   --   --   --  7.5*  --  8.6* 8.6*  --  9.3   MAG 2.1  --   --   --   --   --   --   --  2.2  --   --   --   --   --    PHOS 0.8*  --   --   --   --   --   --   --  1.7*  --   --   --   --   --    PROTTOTAL  --   --   --   --   --   --   --   --  5.6*  --   --  6.7  --  6.9   ALBUMIN  --   --   --   --   --   --   --   --  3.4*  --   --  4.2  --  4.2   BILITOTAL  --   --   --   --   --   --   --   --  0.2  --   --  0.2  --  <0.2   ALKPHOS  --   --   --   --   --   --   --   --  66  --   --  77  --  63   AST  --   --   --   --   --   --   --   --  27  --   --  22  --  14   ALT  --   --   --   --   --   --   --   --  20  --   --  16  --  15    < > = values in this interval not displayed.     CBC  Recent Labs   Lab 10/01/23  8556  09/30/23  2315 09/30/23  1854 09/30/23  1321 09/30/23  0515 09/29/23  1838 09/29/23  1136 09/29/23  0855 09/29/23  0518   WBC 10.6  --   --   --  11.4*  --  11.4*  --  14.8*   RBC 3.34*  --   --   --  3.33*  --  3.23*  --  3.37*   HGB 7.9* 7.7* 8.2* 7.5* 7.8*   < > 7.0*   < > 7.4*   HCT 25.7*  --   --   --  25.5*  --  24.3*  --  25.3*   MCV 77*  --   --   --  77*  --  75*  --  75*   MCH 23.7*  --   --   --  23.4*  --  21.7*  --  22.0*   MCHC 30.7*  --   --   --  30.6*  --  28.8*  --  29.2*   RDW 17.9*  --   --   --  18.2*  --  16.5*  --  16.1*     --   --   --  357  --  501*  --  521*    < > = values in this interval not displayed.     INR  Recent Labs   Lab 09/28/23  1244   INR 1.04     Arterial Blood Gas  Recent Labs   Lab 09/30/23  0750 09/29/23  1253 09/29/23  1141 09/29/23  0855   PH 7.39 7.39  --   --    PCO2 36 31*  --   --    PO2 192* 208*  --   --    HCO3 22 19*  --   --    O2PER 40 50 4 0       All cultures:  No results for input(s): CULT in the last 168 hours.  Recent Results (from the past 24 hour(s))   Lumbar spine CT w/o contrast    Narrative    CT LUMBAR SPINE WITHOUT CONTRAST  9/28/2023 1:56 PM     HISTORY: fall, low back pain      TECHNIQUE: Axial images of the lumbar spine were obtained without  intravenous contrast. Multiplanar reformations were performed.   Radiation dose for this scan was reduced using automated exposure  control, adjustment of the mA and/or kV according to patient size, or  iterative reconstruction technique.     COMPARISON: None.     FINDINGS: Vertebral body heights are maintained. Schmorl's node  involves the superior endplate of T12. No anterolisthesis or  retrolisthesis. Multilevel facet disease. Transverse processes are  intact. No evidence of an acute fracture. Paravertebral soft tissues  are unremarkable. Calcified uterine fibroids incidentally noted.  Moderate stool burden in the colon.      Impression    IMPRESSION:      No acute fracture in the lumbar spine.      OLIVA GOLDSMITH MD         SYSTEM ID:  [id number]   CT Abdomen Pelvis w Contrast    Narrative    EXAM: CT ABDOMEN PELVIS W CONTRAST  LOCATION: [facility]  DATE: 9/28/2023    INDICATION: Questionable abdominal pain.  COMPARISON: None.  TECHNIQUE: CT scan of the abdomen and pelvis was performed following injection of IV contrast. Multiplanar reformats were obtained. Dose reduction techniques were used.  CONTRAST: 45 mL Isovue 370    FINDINGS:   LOWER CHEST: Small esophageal hiatal hernia. Cardiac enlargement.    HEPATOBILIARY: Normal.    PANCREAS: Normal.    SPLEEN: Normal.    ADRENAL GLANDS: Normal.    KIDNEYS/BLADDER: Simple cyst left kidney. No follow-up is needed.    BOWEL: Rectum distended with stool.    LYMPH NODES: Normal.    VASCULATURE: Unremarkable.    PELVIC ORGANS: Enlargement of the uterus with multiple fibroids. The urinary bladder is distended.    MUSCULOSKELETAL: Normal.      Impression    IMPRESSION:   1.  Enlarged uterus with multiple fibroids.    2.  Rectum is distended with stool.    3.  Urinary bladder is distended.     D/w NP Brokyl       Past Medical/surgical hx, meds, allergies, social history, family history     Past Medical History:   Diagnosis Date    Allergic state     Depressive disorder     Dyskinesia     Hypertension     Schizo affective schizophrenia (H)      No past surgical history on file.  No current facility-administered medications on file prior to encounter.  acetaminophen (TYLENOL) 500 MG tablet, Take 1,000 mg by mouth 3 times daily 0800 / 1400 / 2000  albuterol (PROVENTIL) (2.5 MG/3ML) 0.083% neb solution, Take 2.5 mg by nebulization every 4 hours as needed for shortness of breath, wheezing or cough  alendronate (FOSAMAX) 70 MG tablet, Take 70 mg by mouth every 7 days Thursdays at 0700  cholecalciferol 50 MCG (2000 UT) tablet, Take 1 tablet by mouth daily 0800  cyclobenzaprine (FLEXERIL) 5 MG tablet, Take 5 mg by mouth 3 times daily as needed for muscle  spasms  diphenhydrAMINE (BENADRYL) 50 MG capsule, Take 50 mg by mouth 3 times daily 0800 / 1500 / 2000  escitalopram (LEXAPRO) 20 MG tablet, Take 20 mg by mouth daily 0800  LORazepam (ATIVAN) 0.5 MG tablet, Take 0.5 mg by mouth 2 times daily 0800/1400  LORazepam (ATIVAN) 0.5 MG tablet, Take 0.25 mg by mouth At Bedtime 2000  multivitamin w/minerals (THERA-VIT-M) tablet, Take 1 tablet by mouth daily 0800  muscle rub (ARTHRITIS HOT) 10-15 % CREA, Apply topically 4 times daily Back pain - 0700/1100/1500/2000  NIFEdipine ER (ADALAT CC) 60 MG 24 hr tablet, Take 60 mg by mouth daily  perphenazine 4 MG tablet, Take 4 mg by mouth every morning  perphenazine 8 MG tablet, Take 8 mg by mouth At Bedtime         Allergies   Allergen Reactions    Amlodipine     Clozapine     Egg [Chicken-Derived Products (Egg)]     Penicillins     Potassium     Poultry Meal      Social History     Socioeconomic History    Marital status: Single     Spouse name: Not on file    Number of children: Not on file    Years of education: Not on file    Highest education level: Not on file   Occupational History    Not on file   Tobacco Use    Smoking status: Never    Smokeless tobacco: Never   Substance and Sexual Activity    Alcohol use: No    Drug use: No    Sexual activity: Never   Other Topics Concern    Not on file   Social History Narrative    Not on file     Social Determinants of Health     Financial Resource Strain: Not on file   Food Insecurity: Not on file   Transportation Needs: Not on file   Physical Activity: Not on file   Stress: Not on file   Social Connections: Not on file   Interpersonal Safety: Not on file   Housing Stability: Not on file     No family history on file.

## 2023-10-01 NOTE — PLAN OF CARE
Pt. Remains vented this 12H shift, breath sounds clear, jerking movements at times, particularly when sedation lightened for neuro exam, not following, CV: SR to SB with good perfusion,  propofol and versed increased per MD as EEG showed seizure activity after vecuronium dose administered, HGB checks stable, one small , good urine output, quinonez D/Cd and external placed.

## 2023-10-01 NOTE — PLAN OF CARE
Pt. Remains vented this 12H shift, breath sounds clear, jerking movements at times, particularly when sedation lightened for neuro exam, not following, CV: SR with good perfusion,  propofol and versed remain through MRI, plan to wean propofol of able after MRI, EEG remains on, HGB checks stable,no stool with plan to start tube feeding after MRI., good urine output through quinonez.

## 2023-10-01 NOTE — PROGRESS NOTES
[facility]  Gastroenterology Progress Note     Kortney Germain MRN# [id number]   YOB: 1955 Age: 68 year old          Assessment and Plan:       Urinary retention    Acute cystitis without hematuria    Constipation, unspecified constipation type    Anemia, unspecified type  Patient is still intubated patient is hemodynamically stable no further signs of lower GI bleed patient is passing brown stools.  Last hemoglobin is 7.5            Acute cystitis without hematuria  Constipation, unspecified constipation type  Anemia, unspecified type  Urinary retention  Lower GI bleed      Interval History:     doing well; no cp, sob, n/v/d, or abd pain. Intubated              Review of Systems:     C: NEGATIVE for fever, chills, change in weight  E/M: NEGATIVE for ear, mouth and throat problems  R: NEGATIVE for significant cough or SOB  CV: NEGATIVE for chest pain, palpitations or peripheral edema             Medications:   I have reviewed this patient's current medications   cefTRIAXone  1 g Intravenous Q24H    chlorhexidine  15 mL Mouth/Throat Q12H    [Held by provider] diphenhydrAMINE  50 mg Oral TID    insulin aspart  1-6 Units Subcutaneous Q4H    lacosamide (VIMPAT) 100 mg in sodium chloride 0.9 % 110 mL intermittent infusion  100 mg Intravenous BID    [Held by provider] LORazepam  0.25 mg Oral At Bedtime    [Held by provider] LORazepam  0.5 mg Oral BID    [Held by provider] multivitamin w/minerals  1 tablet Oral Daily    multivitamins w/minerals  15 mL Per Feeding Tube Daily    pantoprazole  40 mg Per Feeding Tube QAM AC    Or    pantoprazole  40 mg Intravenous QAM AC    [Held by provider] perphenazine  4 mg Oral QAM    [Held by provider] perphenazine  8 mg Oral At Bedtime    polyethylene glycol  17 g Oral BID    potassium chloride  10 mEq Intravenous Q1H    protein modular  1 packet Per Feeding Tube Daily    sodium chloride (PF)  10-40 mL Intracatheter Q7 Days    sodium chloride  (PF)  3 mL Intracatheter Q8H    vancomycin  1,000 mg Intravenous Q12H                  Physical Exam:   Vitals were reviewed  Vital Signs with Ranges  Temp:  [98.2  F (36.8  C)-98.6  F (37  C)] 98.6  F (37  C)  Pulse:  [] 73  Resp:  [7-44] 20  BP: (113-175)/(59-89) 156/89  FiO2 (%):  [30 %-40 %] 30 %  SpO2:  [89 %-100 %] 95 %  I/O last 3 completed shifts:  In: 2074.18 [I.V.:1774.18]  Out: 1460 [Urine:1185; Emesis/NG output:275]  Cardiovascular: negative, PMI normal. No lifts, heaves, or thrills. RRR. No murmurs, clicks gallops or rub  Respiratory: negative, Percussion normal. Good diaphragmatic excursion. Lungs clear  Head: Normocephalic. No masses, lesions, tenderness or abnormalities  Abdomen: Abdomen soft, non-tender. BS normal. No masses, organomegaly  SKIN: no suspicious lesions or rashes           Data:   I reviewed the patient's new clinical lab test results.   Recent Labs   Lab Test 09/30/23  1854 09/30/23  1321 09/30/23  0515 09/29/23  1838 09/29/23  1136 09/29/23  0855 09/29/23  0518 09/28/23  2245 09/28/23  1244 03/08/23  0830 03/07/23  1629   WBC  --   --  11.4*  --  11.4*  --  14.8*  --  8.8   < > 11.8*   HGB 8.2* 7.5* 7.8*   < > 7.0*   < > 7.4*   < > 7.2*   < > 9.3*   MCV  --   --  77*  --  75*  --  75*  --  76*   < > 90   PLT  --   --  357  --  501*  --  521*  --  526*   < > 334   INR  --   --   --   --   --   --   --   --  1.04  --  1.15    < > = values in this interval not displayed.     Recent Labs   Lab Test 09/30/23  1321 09/30/23  0515 09/29/23  0855 09/29/23  0518   POTASSIUM 3.0* 2.7* 3.2* 3.6   CHLORIDE  --  111* 114* 115*   CO2  --  19* 15* 14*   BUN  --  18.4 25.1* 26.1*   ANIONGAP  --  11 19* 18*     Recent Labs   Lab Test 09/30/23  0515 09/29/23  0518 09/28/23  1606 09/28/23  1244 03/10/23  0932 03/09/23  0224 03/06/23  1413   ALBUMIN 3.4* 4.2  --  4.2  --   --  4.1   BILITOTAL 0.2 0.2  --  <0.2  --   --  <0.2   ALT 20 16  --  15  --   --  21   AST 27 22  --  14  --   --  39*    PROTEIN  --   --  Negative  --  Negative 30*  --    LIPASE  --   --   --   --   --   --  22       I reviewed the patient's new imaging results.    All laboratory data reviewed  All imaging studies reviewed by me.    Hussain Lucia MD,  9/30/2023  [facility]  Office : [phone]  Cell: [phone]      [facility]  Ph: [phone] Fax: [phone]

## 2023-10-01 NOTE — PROGRESS NOTES
BRIEF Neurocritical care note    Discussed with Dr. Jaquez and ICU pharmacist re: optimal 2nd anti-epileptic drug to be added to patient's current regimen.   Given recent use of Meropenem about 2 days ago, it would be ideal to wait for 1 week from last dose of Meropenem to start on VPA. Therefore we will opt to initiate and maintain her on Fosphenytoin for now. Will load with 20 mg/kg PHT and then start on 100 mg q8hr. Will order for PHT levels tomorrow.    SHIRLEY Sierra  Vascular Neurology Fellow

## 2023-10-02 NOTE — PROGRESS NOTES
[facility]    Stroke/NCC Progress Note    Interval Events  Paralytic administered to eliminate muscle artifact from EEG; following this EEG had underlying cortical activity consistent with seizures. Fosphenytoin was added to lacosamide and sedation increased; 2 hours later EEG reported to show resolution of the rhythmic/seizure activity.     Today, on exam patient is deeply sedated with no abnormal or rhythmic movements in jaw, BUE or other locations. Total phenytoin 15.3, free phenytoin pending. Plan to keep deeply sedation at least 24 hours, then may consider sedation wean with EEG monitoring to assess for development of seizures.      HPI Summary  Kortney Germain is a 68 year old female with PMHx significant for anxiety/depression, schizoaffective disorder (living in group home), HTN, odynophagia, tardive dyskinesia, HTN. She presented 9/28 with lower back pain and abdominal pain. She was found to have UTI, anemia, and constipation. She had decreased LOC and generalized shaking overnight. She remained febrile. RRT called 9/29/23 for encephalopathy and concern that should would be unable to protect her airway. 1 PRBC given 9/29/23 due to anemia.    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   MRI: L hyperintensity on DWI/FLAIR without correlate on ADC or T1, favored to be artifact     60 minute EEG: abnormal consistent with a severe encephalopathy and ?status epilepticus; it is important to note patient has persistent head tremoring which may also produce a rhythmic artifact on the EEG and there is persistent electro myogenic artifact making this 1 hour segment difficult to interpret      cEEG:   -9/30: moderate to severe encephalopathy and periodic pattern which may be SIRPIDS (stimulus-induced rhythmic, periodic, or ictal discharges)  - 10/1 (after administration of paralytic to remove muscle artifact): per verbal report underlying  "cortical activity consistent with seizures     Blood cultures: NGTD     LP labs:  -glucose 98  -protein 22.8  -2 nucleated cells, RBC 0  -aerobic culture NGTD   -anaerobic culture NGTD  - HSV negative    Impression   Status epilepticus     Shaking/tremors, unclear etiology. May represent baseline tardive dyskinesia vs manifestation of status epilepticus vs toxic encephalopathy vs other     Plan  -continue lacosamide 100mg BID  -continue fosphenytoin 100mg every 8 hours  -recheck phenytoin free/total 10/3 AM, ordered   -continuous EEG  -continue deep sedation at least until 10/3 AM; NCC will reevaluate patient at that time and determine if appropriate to begin weaning  process with close EEG monitoring   -continue to hold/limit dopaminergic, serotonergic agents   -psychiatry following      Patient Follow-up    - final recommendation pending work-up    We will continue to follow.     TAMI Begum CNP  Vascular Neurology    To page me or covering stroke neurology team member, click here: [facility]COM  Choose \"On Call\" tab at top, then select \"NEUROLOGY/ALL SITES\" from middle drop-down box, press Enter, then look for \"stroke\" or \"telestroke\" for your site.  ______________________________________________________    Clinically Significant Risk Factors        # Hypokalemia: Lowest K = 3 mmol/L in last 2 days, will replace as needed  # Hypernatremia: Highest Na = 147 mmol/L in last 2 days, will monitor as appropriate      # Hypoalbuminemia: Lowest albumin = 3.4 g/dL at 9/30/2023  5:15 AM, will monitor as appropriate                         Medications   Scheduled Meds   cefTRIAXone  1 g Intravenous Q24H    chlorhexidine  15 mL Mouth/Throat Q12H    diphenhydrAMINE  50 mg Oral TID    fosphenytoin (CEREBYX) 100 mg PE in sodium chloride 0.9 % 100 mL intermittent infusion  100 mg PE Intravenous Q8H    insulin aspart  1-6 Units Subcutaneous Q4H    lacosamide (VIMPAT) 100 mg in sodium chloride 0.9 % 110 mL intermittent infusion "  100 mg Intravenous BID    [Held by provider] LORazepam  0.25 mg Oral At Bedtime    [Held by provider] LORazepam  0.5 mg Oral BID    [Held by provider] multivitamin w/minerals  1 tablet Oral Daily    multivitamins w/minerals  15 mL Per Feeding Tube Daily    pantoprazole  40 mg Per Feeding Tube QAM AC    Or    pantoprazole  40 mg Intravenous QAM AC    [Held by provider] perphenazine  4 mg Oral QAM    [Held by provider] perphenazine  8 mg Oral At Bedtime    polyethylene glycol  17 g Oral BID    protein modular  1 packet Per Feeding Tube Daily    sodium chloride (PF)  10-40 mL Intracatheter Q7 Days    sodium chloride (PF)  3 mL Intracatheter Q8H       Infusion Meds   dextrose      midazolam 12 mg/hr (10/02/23 0700)    norepinephrine 0.01 mcg/kg/min (10/02/23 0445)    propofol 60 mcg/kg/min (10/02/23 0206)    sodium chloride 50 mL/hr at 10/01/23 2000       PRN Meds  acetaminophen **OR** acetaminophen, albuterol, [Held by provider] cyclobenzaprine, dextrose, glucose **OR** dextrose **OR** glucagon, HYDROmorphone, lidocaine 4%, lidocaine 4%, lidocaine (buffered or not buffered), lidocaine (buffered or not buffered), melatonin, midazolam, naloxone **OR** naloxone **OR** naloxone **OR** naloxone, nitroGLYcerin, sodium chloride (PF), sodium chloride (PF), sodium chloride (PF), sodium chloride (PF)       PHYSICAL EXAMINATION  Temp:  [97.6  F (36.4  C)-99.4  F (37.4  C)] 97.6  F (36.4  C)  Pulse:  [50-86] 55  Resp:  [0-126] 12  BP: ()/(35-95) 100/56  FiO2 (%):  [30 %] 30 %  SpO2:  [93 %-100 %] 99 %      General Exam  General:  patient lying in bed without any acute distress    HEENT:  normocephalic/atraumatic  Pulmonary:  no respiratory distress, on mechanical ventilation    Neuro Exam (deep sedation with propofol and versed)   Mental Status:  comatose, does not respond to verbal, tactile or noxious stimuli  Cranial Nerves:  gaze conjugate, PERRL  Motor:  increased tone in all extremities but improved from prior, no  abnormal movements (cessation of previously noted mouth and BUE rhythmic movements)     Imaging  I personally reviewed all imaging; relevant findings per HPI.     Lab Results Data   CBC  Recent Labs   Lab 10/02/23  0410 10/02/23  0005 10/01/23  1850 10/01/23  1143 10/01/23  0457 09/30/23  1321 09/30/23  0515   WBC 6.5  --   --   --  10.6  --  11.4*   RBC 3.18*  --   --   --  3.34*  --  3.33*   HGB 7.6* 7.5* 7.2*   < > 7.9*   < > 7.8*   HCT 24.8*  --   --   --  25.7*  --  25.5*     --   --   --  342  --  357    < > = values in this interval not displayed.     Basic Metabolic Panel    Recent Labs   Lab 10/02/23  0410 10/02/23  0409 10/01/23  2357 10/01/23  0743 10/01/23  0457 09/30/23  2317 09/30/23  2315 09/30/23  0900 09/30/23  0515   *  --   --   --  136  --   --   --  141   POTASSIUM 3.6  --   --   --  3.4  3.4  --  3.0*   < > 2.7*   CHLORIDE 117*  --   --   --  105  --   --   --  111*   CO2 21*  --   --   --  19*  --   --   --  19*   BUN 7.6*  --   --   --  6.7*  --   --   --  18.4   CR 0.39*  --   --   --  0.43*  --   --   --  0.56   * 95 90   < > 145*   < >  --    < > 141*   LULU 6.6*  --   --   --  7.0*  --   --   --  7.5*    < > = values in this interval not displayed.     Liver Panel  Recent Labs   Lab 09/30/23  0515 09/29/23  0518 09/28/23  1244   PROTTOTAL 5.6* 6.7 6.9   ALBUMIN 3.4* 4.2 4.2   BILITOTAL 0.2 0.2 <0.2   ALKPHOS 66 77 63   AST 27 22 14   ALT 20 16 15     INR    Recent Labs   Lab Test 09/28/23  1244 03/07/23  1629   INR 1.04 1.15      Lipid Profile    Recent Labs   Lab Test 03/08/23  0830   CHOL 143   HDL 50   LDL 75   TRIG 88     A1C  No lab results found.  Troponin  No results for input(s): CTROPT, TROPONINIS, TROPONINI, GHTROP in the last 168 hours.       Data

## 2023-10-02 NOTE — PROGRESS NOTES
EEG CLINICAL NEUROPHYSIOLOGY PRELIMINARY REPORT     Video EEG monitoring through approximately 7 AM today reviewed.  Patient treated with midazolam 12 mg/day and propofol 60 mcg/kg/min.    Discontinuous record.  20 to 40  V runs of diffuse theta and delta with rare sharp components lasting 3 to 5 seconds in duration alternate with periods of significant attenuation in which little electrocerebral activity is seen at high sensitivities.  Periods of suppression last as long as 4 seconds.  Approximately 40% of ongoing record is suppressed.  No seizures.    Findings consistent with severe diffuse encephalopathy.  Findings are consistent with sedative drips at the doses reported.  Extent of underlying cerebral dysfunction separate from anesthetic effects difficult to ascertain.  No seizures.    Full report to follow.    Basilio Adame MD  Contact information for physicians covering Epilepsy and EEG is available on [facility].  Click search, enter neurology adult/[facility] in group name box, click on neurology adult/[facility], then click Staff Epilepsy and EEG.

## 2023-10-02 NOTE — PROGRESS NOTES
Initial Psychiatric Consult   Consult date: September 29, 2023         Reason for Consult, requesting source:      Tardive dyskinesia    Requesting source: Bridgett Gama    Labs and imaging reviewed. Patient seen and evaluated by Milagros Parker MD          HPI:     This is a 68-year-old female with a history of schizophrenia and tardive dyskinesia.  She lives in a group home.  I saw her back in March related to concerns for dyskinesia symptoms.  Now presents with abdominal pain as well as back pain.  She has been found to have a UTI, and altered mentation.  Patient also may have been overusing Tylenol.  She presents with hyponatremia, and constipation as well.  Overnight, the patient had ongoing high fevers and rigors.  She had a change in her antibiotic.  She is anemic with hemoglobin of 6.9 when she presented, now 7.5.  GI is following because the patient had bright red rectal bleeding after an enema yesterday.  Patient's liver transaminases and bilirubin are normal.  CK this morning is 105.  TSH was normal in March, as well as vitamin B12 level.  CT abdomen and pelvis without contrast shows multiple uterine fibroids, and rectal distention related to stool and bladder distention.  Current working diagnosis is sepsis related to UTI.  Temperature this morning is 102.7 and has been trending up overnight.    Patient was seen today just after she was intubated.  She was on a gurney and getting ready to move to the ICU.  Discussed with Dr. Polanco and Shakila Vargas NP.  Patient apparently did have some rigidity and was posturing with both feet and hands.  She was also continuing to shake.  Unclear if these were rigors, or shaking from another etiology.    We discussed the fact that this could be catatonia.  The patient's CK is normal.    Patient is hypertensive, so has autonomic instability, she is febrile, was rigid this morning and posturing.  We are awaiting head imaging results as well as LP.   Neurology is on board.  Constellation of symptoms highly concerning for malignant catatonia.      10/2/2023: Patient seen today for follow-up.  Discussed with ICU nursing.  Patient is sedated and intubated.  She apparently has been having status seizures.  Patient had been on Vimpat, and after she was administered the paralytic to eliminate muscle artifact, was still having seizure activity.  Fosphenytoin was added.        Past Psychiatric History:   Patient's home meds include Ativan 0.25 mg p.o. nightly and 0.5 mg p.o. twice daily per outpatient med reconciliation.  PDMP indicates the patient only gets #75 Ativan 0.5 mg tablets per 30 days.  This would not be enough for that dose.  Their medications include escitalopram 20 mg p.o. daily, diphenhydramine 50 mg p.o. 3 times daily, perphenazine 4 mg p.o. every morning and 8 mg p.o. nightly.    From my last visit with the patient in March, she first developed mental health symptoms after a traumatic head injury at age 29.  She fell on the ice, and hit her head.  She has had psychiatric issues since then.  She has carried a diagnosis of schizoaffective disorder historically, and first began seeing psychiatry in the 80s.  It may have been prior to that, but that is as far back as epic charting goes.        Substance Use and History:   none        Past Medical History:   PAST MEDICAL HISTORY:   Past Medical History:   Diagnosis Date    Allergic state     Depressive disorder     Dyskinesia     Hypertension     Schizo affective schizophrenia (H)        PAST SURGICAL HISTORY: No past surgical history on file.          Family History:   FAMILY HISTORY: No family history on file.    Family Psychiatric History:         Social History:   SOCIAL HISTORY:   Social History     Tobacco Use    Smoking status: Never    Smokeless tobacco: Never   Substance Use Topics    Alcohol use: No                Physical ROS:   The 10 point Review of Systems is negative other than noted in the  HPI or here.           Medications:      cefTRIAXone  1 g Intravenous Q24H    chlorhexidine  15 mL Mouth/Throat Q12H    diphenhydrAMINE  50 mg Oral or Feeding Tube BID    fosphenytoin (CEREBYX) 100 mg PE in sodium chloride 0.9 % 100 mL intermittent infusion  100 mg PE Intravenous Q8H    insulin aspart  1-6 Units Subcutaneous Q4H    lacosamide (VIMPAT) 100 mg in sodium chloride 0.9 % 110 mL intermittent infusion  100 mg Intravenous BID    [Held by provider] LORazepam  0.25 mg Oral At Bedtime    [Held by provider] LORazepam  0.5 mg Oral BID    [Held by provider] multivitamin w/minerals  1 tablet Oral Daily    [START ON 10/3/2023] multivitamins w/minerals  15 mL Oral or Feeding Tube Daily    [START ON 10/3/2023] pantoprazole  40 mg Oral or Feeding Tube QAM AC    Or    [START ON 10/3/2023] pantoprazole  40 mg Intravenous QAM AC    [Held by provider] perphenazine  4 mg Oral QAM    [Held by provider] perphenazine  8 mg Oral At Bedtime    polyethylene glycol  17 g Oral or Feeding Tube BID    protein modular  1 packet Per Feeding Tube Daily    sodium chloride (PF)  10-40 mL Intracatheter Q7 Days    sodium chloride (PF)  3 mL Intracatheter Q8H              Allergies:     Allergies   Allergen Reactions    Amlodipine     Clozapine     Egg [Chicken-Derived Products (Egg)]     Penicillins     Potassium     Poultry Meal           Labs:     Recent Results (from the past 48 hour(s))   Glucose by meter    Collection Time: 09/30/23  7:30 PM   Result Value Ref Range    GLUCOSE BY METER POCT 91 70 - 99 mg/dL   Potassium    Collection Time: 09/30/23 11:15 PM   Result Value Ref Range    Potassium 3.0 (L) 3.4 - 5.3 mmol/L   Hemoglobin    Collection Time: 09/30/23 11:15 PM   Result Value Ref Range    Hemoglobin 7.7 (L) 11.7 - 15.7 g/dL   Glucose by meter    Collection Time: 09/30/23 11:17 PM   Result Value Ref Range    GLUCOSE BY METER POCT 93 70 - 99 mg/dL   Glucose by meter    Collection Time: 10/01/23  3:53 AM   Result Value Ref Range     GLUCOSE BY METER POCT 126 (H) 70 - 99 mg/dL   CBC with platelets    Collection Time: 10/01/23  4:57 AM   Result Value Ref Range    WBC Count 10.6 4.0 - 11.0 10e3/uL    RBC Count 3.34 (L) 3.80 - 5.20 10e6/uL    Hemoglobin 7.9 (L) 11.7 - 15.7 g/dL    Hematocrit 25.7 (L) 35.0 - 47.0 %    MCV 77 (L) 78 - 100 fL    MCH 23.7 (L) 26.5 - 33.0 pg    MCHC 30.7 (L) 31.5 - 36.5 g/dL    RDW 17.9 (H) 10.0 - 15.0 %    Platelet Count 342 150 - 450 10e3/uL   Magnesium    Collection Time: 10/01/23  4:57 AM   Result Value Ref Range    Magnesium 2.1 1.7 - 2.3 mg/dL   Phosphorus    Collection Time: 10/01/23  4:57 AM   Result Value Ref Range    Phosphorus 0.8 (LL) 2.5 - 4.5 mg/dL   Basic metabolic panel    Collection Time: 10/01/23  4:57 AM   Result Value Ref Range    Sodium 136 135 - 145 mmol/L    Potassium 3.4 3.4 - 5.3 mmol/L    Chloride 105 98 - 107 mmol/L    Carbon Dioxide (CO2) 19 (L) 22 - 29 mmol/L    Anion Gap 12 7 - 15 mmol/L    Urea Nitrogen 6.7 (L) 8.0 - 23.0 mg/dL    Creatinine 0.43 (L) 0.51 - 0.95 mg/dL    GFR Estimate >90 >60 mL/min/1.73m2    Calcium 7.0 (L) 8.8 - 10.2 mg/dL    Glucose 145 (H) 70 - 99 mg/dL   Potassium    Collection Time: 10/01/23  4:57 AM   Result Value Ref Range    Potassium 3.4 3.4 - 5.3 mmol/L   Glucose by meter    Collection Time: 10/01/23  7:43 AM   Result Value Ref Range    GLUCOSE BY METER POCT 98 70 - 99 mg/dL   Glucose by meter    Collection Time: 10/01/23 11:02 AM   Result Value Ref Range    GLUCOSE BY METER POCT 114 (H) 70 - 99 mg/dL   Hemoglobin    Collection Time: 10/01/23 11:43 AM   Result Value Ref Range    Hemoglobin 7.6 (L) 11.7 - 15.7 g/dL   Phosphorus    Collection Time: 10/01/23  3:51 PM   Result Value Ref Range    Phosphorus 1.5 (L) 2.5 - 4.5 mg/dL   Glucose by meter    Collection Time: 10/01/23  3:51 PM   Result Value Ref Range    GLUCOSE BY METER POCT 115 (H) 70 - 99 mg/dL   Hemoglobin    Collection Time: 10/01/23  6:50 PM   Result Value Ref Range    Hemoglobin 7.2 (L) 11.7 - 15.7  g/dL   Glucose by meter    Collection Time: 10/01/23  7:54 PM   Result Value Ref Range    GLUCOSE BY METER POCT 101 (H) 70 - 99 mg/dL   Glucose by meter    Collection Time: 10/01/23 11:57 PM   Result Value Ref Range    GLUCOSE BY METER POCT 90 70 - 99 mg/dL   Hemoglobin    Collection Time: 10/02/23 12:05 AM   Result Value Ref Range    Hemoglobin 7.5 (L) 11.7 - 15.7 g/dL   Glucose by meter    Collection Time: 10/02/23  4:09 AM   Result Value Ref Range    GLUCOSE BY METER POCT 95 70 - 99 mg/dL   CBC with platelets    Collection Time: 10/02/23  4:10 AM   Result Value Ref Range    WBC Count 6.5 4.0 - 11.0 10e3/uL    RBC Count 3.18 (L) 3.80 - 5.20 10e6/uL    Hemoglobin 7.6 (L) 11.7 - 15.7 g/dL    Hematocrit 24.8 (L) 35.0 - 47.0 %    MCV 78 78 - 100 fL    MCH 23.9 (L) 26.5 - 33.0 pg    MCHC 30.6 (L) 31.5 - 36.5 g/dL    RDW 18.3 (H) 10.0 - 15.0 %    Platelet Count 296 150 - 450 10e3/uL   Magnesium    Collection Time: 10/02/23  4:10 AM   Result Value Ref Range    Magnesium 2.1 1.7 - 2.3 mg/dL   Phosphorus    Collection Time: 10/02/23  4:10 AM   Result Value Ref Range    Phosphorus 2.6 2.5 - 4.5 mg/dL   Basic metabolic panel    Collection Time: 10/02/23  4:10 AM   Result Value Ref Range    Sodium 147 (H) 135 - 145 mmol/L    Potassium 3.6 3.4 - 5.3 mmol/L    Chloride 117 (H) 98 - 107 mmol/L    Carbon Dioxide (CO2) 21 (L) 22 - 29 mmol/L    Anion Gap 9 7 - 15 mmol/L    Urea Nitrogen 7.6 (L) 8.0 - 23.0 mg/dL    Creatinine 0.39 (L) 0.51 - 0.95 mg/dL    GFR Estimate >90 >60 mL/min/1.73m2    Calcium 6.6 (L) 8.8 - 10.2 mg/dL    Glucose 103 (H) 70 - 99 mg/dL   Phenytoin level    Collection Time: 10/02/23  4:10 AM   Result Value Ref Range    Phenytoin 15.3   ug/mL   Glucose by meter    Collection Time: 10/02/23  8:32 AM   Result Value Ref Range    GLUCOSE BY METER POCT 80 70 - 99 mg/dL   Glucose by meter    Collection Time: 10/02/23 12:23 PM   Result Value Ref Range    GLUCOSE BY METER POCT 107 (H) 70 - 99 mg/dL   Glucose by meter     Collection Time: 10/02/23  4:05 PM   Result Value Ref Range    GLUCOSE BY METER POCT 111 (H) 70 - 99 mg/dL          Physical and Psychiatric Examination:     /60   Pulse 64   Temp 97.8  F (36.6  C) (Axillary)   Resp 14   Wt 47.4 kg (104 lb 8 oz)   SpO2 95%   BMI 20.41 kg/m    Weight is 104 lbs 7.97 oz  Body mass index is 20.41 kg/m .    Physical Exam:  I have reviewed the physical exam as documented by by the medical team and agree with findings and assessment and have no additional findings to add at this time.    Mental Status Exam:    Appearance:  Sedated and severe distress  Attitude:   Unable to cooperate  Eye Contact:   Sedated  Mood:   Unable to assess  Affect:   Unable to assess  Speech:  mute  Language: Fluent in english   Psychomotor Behavior:  tremor observed  and posturing  Thought Process:   Patient sedated  Associations:   Patient sedated  Thought Content:   Unable to assess  Insight:   Unable to assess  Judgement:   Unable to assess  Oriented to:   Patient sedated  Attention Span and Concentration:   Patient sedated  Recent and Remote Memory:   Unable to assess  Fund of Knowledge: Appropriate   Gait and Station:                DSM-5 Diagnosis:   Schizoaffective disorder versus disorganized schizophrenia    Delirium, rule out catatonia versus malignant catatonia    UTI          Assessment:     This is a 68-year-old female with a history of schizoaffective disorder versus schizophrenia.  I have met her before, and she told me that this all happened after she sustained a head injury at age 29 when she fell on ice, and hit her head, losing consciousness.  I am not able to find any neurology notes from when this happened, as it was essentially 40 years ago.    She has had multiple hospitalizations including at the [facility] for psychiatric reasons.  She has tardive dyskinesia which she told me is better when she is on her Trilafon.  She also states Ativan helps her with  this.    Patient has had altered mentation since presenting.  This morning, she was posturing, had some rigidity, and has noted to have a climbing fever overnight.  There is concern for sepsis related to UTI.  She was intubated by the time I saw her, but I am concerned that she is catatonic.  With her fever, and elevated blood pressure, another concern is malignant catatonia.  Patient CK is normal this morning at 105, so I am less concerned about neuroleptic malignant syndrome. Recommend following serial CKs.    Patient is getting Versed, clearly a benzodiazepine, as sedation.  Typical treatment for catatonia is Ativan.  We would usually attempt Ativan at a dose of 1 mg IV as a lorazepam challenge to see if patient had clinical improvement with catatonia symptoms.  She is already intubated, so I am not sure how to manage this.  Will discuss with intensivist.      The other option is ECT.  We cannot do that here, so the patient will need to be transferred to the [facility].  I talked to my colleague at the [facility], and there are many logistical challenges with getting the patient over there.  Hopefully we can ascertain whether or not this is actually malignant catatonia, and aggressively treat with Ativan.  I do see some case reports of other benzodiazepines having benefit, though lorazepam is the standard of practice in terms of treatment.    There is a work-up underway to see if there is another etiology medically of what is going on with her.  She had an LP, and CT head.    Patient has been found to have status seizures, and is under deep sedation currently.  She is on both Vimpat and fosphenytoin.  Patient does have a history of head injury when she was 29, when she fell on ice, and hit her head.  This was when she reported to me previously that she had onset of delusional and hallucinations.  She had no prior mental health history before this.          Summary of Recommendations:     1.  Have  discontinued Lexapro.    2.  Patient may or may not need to restart Trilafon once she awakens and clears.  We will follow along via chart.    3.  Discussed with RN that the patient does not appear to have a substitute decision-maker that nursing knows of.  I do note from care coordinator notes, there has been attempt to find out if the patient has anyone that might be able to act as a substitute decision-maker.      Milagros Parker MD  Consult/Liaison Psychiatry and Addiction Medicine  [facility]

## 2023-10-02 NOTE — PROGRESS NOTES
[facility] ICU RESPIRATORY NOTE           Date of Admission: 9/28/2023     Date of Intubation (most recent): 9/29/2023     Reason for Mechanical Ventilation: Airway Protection      Number of Days on Mechanical Ventilation: 4     Met Criteria for Spontaneous Breathing Trial: No     Reason for No Spontaneous Breathing Trial: 3     Significant Events Today: None     ABG Results:   Recent Labs   Lab 09/30/23  0750 09/29/23  1253 09/29/23  1141 09/29/23  0855   PH 7.39 7.39  --   --    PCO2 36 31*  --   --    PO2 192* 208*  --   --    HCO3 22 19*  --   --    O2PER 40 50 4 0     Vent Mode: CMV/AC  (Continuous Mandatory Ventilation/ Assist Control)  FiO2 (%): 30 %  Resp Rate (Set): 12 breaths/min  Tidal Volume (Set, mL): 400 mL  PEEP (cm H2O): 5 cmH2O  Resp: 13    NIKA Martinez, RRT

## 2023-10-02 NOTE — PROGRESS NOTES
Care Management Follow Up    Length of Stay (days): 4    Expected Discharge Date: 10/03/2023     Concerns to be Addressed:       Patient plan of care discussed at interdisciplinary rounds: Yes    Anticipated Discharge Disposition: Group Home     Anticipated Discharge Services: Transportation Services  Anticipated Discharge DME:      Patient/family educated on Medicare website which has current facility and service quality ratings:    Education Provided on the Discharge Plan:    Patient/Family in Agreement with the Plan:      Referrals Placed by CM/SW: [facility])  Private pay costs discussed: Not applicable    Additional Information:  Following patient.  ICU questioning if patient has a guardian.  Per previous notes Pt does not have a guardian or any NOK known.   See consult from 9/29.      [phone] ext [phone] to inquire.  Voice mailbox full, unable to leave message.     CC called  manager Emely [phone] and left message to discuss case    It was hoped that patient would improve and could make her own decisions.     CC forwarded information to ICU DIANN Barnes and Manager Tonia to follow.     Bedside RN updated.     Chasity Mckeon, RN

## 2023-10-02 NOTE — PLAN OF CARE
ICU End of Shift Summary: *for detailed VS and assessment, see flowsheets    Neuro:  obtunded, deeply sedated overnight c Propofol and Verses, pupils reactive, sluggish, not tracking, conjugates; Does not follow commands; spastic LUE, increased tone; tremulous/twitching-lower face, upper limbs, withdraws to simulation, purposeful movements on BLE      Cardiovascular:  SB, palpable peripheral pulses, Levophed started and maintained dt hypotension 2/2 increased rates of sedation     Pulmonary:  Tolerating ventilator on propofol versed, Oxygen saturation > 92%, AC 30/400/12/5, small ETT secretions      GI: TF continued at 10cc/hr w/ standard FWF, goal rate set at 10, labs c electrolytes within goal values, ready to increase rate?;   Bmx3- loose/soft, brown , pt had loose watery stools around 0650h, large     :  Adequate UOP.     Endocrine: Glucose well controlled.     Skin:  MASD at sacrum/buttocks, critic aid at red areas,  Protective mepilexes applied, bruises at forearms from previous venipunctures?      Restraints:  In use and necessary.      Continue to monitor closely.      ROUTINE IP LABS (Last four results)    Blood Chemistry      10/02/23 04:10   Sodium 147 (H)   Potassium 3.6   Chloride 117 (H)   Carbon Dioxide (CO2) 21 (L)   Urea Nitrogen 7.6 (L)   Creatinine 0.39 (L)   GFR Estimate >90   Calcium 6.6 (L)   Anion Gap 9   Magnesium 2.1   Phosphorus 2.6   Glucose 103 (H)       Hematology      10/02/23 00:05 10/02/23 04:10   WBC  6.5   Hemoglobin 7.5 (L) 7.6 (L)   Hematocrit  24.8 (L)   Platelet Count  296   RBC Count  3.18 (L)   MCV  78   MCH  23.9 (L)   MCHC  30.6 (L)   RDW  18.3 (H)        10/01/23 19:54 10/01/23 23:57 10/02/23 04:09   GLUCOSE POCT 101 (H) 90 95         Intake/Output Summary (Last 24 hours) at 10/2/2023 0702  Last data filed at 10/2/2023 0645  Gross per 24 hour   Intake 3065.66 ml   Output 2650 ml   Net 415.66 ml

## 2023-10-02 NOTE — PLAN OF CARE
Neuros/CMS: Withdraws from pain, PERRL but sluggish. Intubated and sedated. RASS goal -4 to -5  Tele: SR  GI: Incontinent, multiple loose BM, miralax held  : Incontinent, purewick removed.  Pulmonary: Vented, VC-AC. ETT at 21  Pain: CPOT 0    Drains: None  Drips: Propofol, Versed, and Levophed  Skin: Scattered bruising, blanchable redness to coccyx, triple lumen PICC to L arm, PIV to R hand  Activity: Bedrest  Diet: NPO, tube feed through NG at goal  Plan: Maintain deep sedation overnight, NCC to re-evaluate EEG tomorrow to see if sedation can be weaned.

## 2023-10-02 NOTE — PROGRESS NOTES
Critical Care  Note      10/02/2023    Name: Kortney Germain MRN#: [id number]   Age: 68 year old YOB: 1955     Hsptl Day# 4  ICU DAY #3    MV DAY #3           Problem List:   Principal Problem:    Urinary retention  Active Problems:    Acute cystitis without hematuria    Constipation, unspecified constipation type    Anemia, unspecified type         Summary/Hospital Course:   68F pmh of schizo-affective schizophrenia, HTN, tardive dyskinesia now presented 9/28 from group home with back pain and fever, Ct abd showed rectum distended with stool and urinary bladder distended, now s/p quinonez placement.  Arrival UA c/w UTI. Since arrival from the ED she has been alert but not generally interactive (?part of this may be due to underlying psychiatric illness?). Morning of 9/29 she was noted to be febrile and shaking prompting intubation to facilitate head CT and LP.  Placed on heavy sedation including benzodiazepine and propofol.  Increased her antiepileptic medication. No signs of seizures this morning.      Assessment and plan :     Kortney Germain IS a 68 year old female admitted on 9/28/2023 for fever and UTI.   I have personally reviewed the daily labs, imaging studies, cultures and discussed the case with referring physician and consulting physicians.     My assessment and plan by system for this patient is as follows:    Neurology/Psychiatry:   1. Underlying schizoaffective schizophrenia and h/o tardive dyskinesia: appreciate psych recommendations.    2. Rigidity/shaking:  continues to have convulsive movements when stimulated, not seizures per eeg.  possible serotonin syndrome (on lexapro at home) but not having other sxs of serotonin syndrome (flushing, diarrhea, etc), or may be tardive dyskinesia.  Keeping long standing benadryl in case it's tardive dyskinesia.  3. Sedation--propofol/midaz drip.  We will keep very low goal RASS for today.  Neurologist will reassess tomorrow.  4.  Analgesia--prn  dilaudid  5.  Unclear if she has capacity to make her own decisions and does not have a designated surrogate decision-maker.  Social work is working with her  to determine whether we currently have a surrogate decision maker and if not what the process would be to find 1.    Cardiovascular:   1.Hypertension:  no h/o essential hypertension.  Improved with pain/sedation mgmt.    Pulmonary/Ventilator Management:   1. Loss of protective airway reflexes requiring intubation and mechanical ventilation:  Neurologically inappropriate for extubation today.  -Continue lung protective ventilation.  Pressure support with goal of extubation whenever.    GI and Nutrition:   1. Tube feeding  2. PPI for pud prophy    Renal/Fluids/Electrolytes:   1. Creat ok 0.43    Infectious Disease:   1. UTI--on ctx and vanco, UC with mixed christina but >100k colonies.  Vancomycin was stopped.  On ceftriaxone for 5-day course  2. CSF unconcerning for meningitis.    Endocrine:   1. Stress induced hyperglycemia:  Fsg ok 127--ssi prn    Hematology/Oncology:   1. Hgb 7.9-- s/p 1 prbc 9/29. acute on chronic anemia due to critical illness, no apparent blood loss.  2. Leukocytosis: Improved with antibiotics for presumed UTI.  3. pltls 342    ICU Prophylaxis:   1. DVT: Hep Subq/ mechanical  2. VAP: HOB 30 degrees, chlorhexidine rinse  3. Stress Ulcer: PPI  4. Restraints: Nonviolent soft two point restraints required and necessary for patient safety and continued cares and good effect as patient continues to pull at necessary lines, tubes despite education and distraction. Will readdress daily.   5. Wound care  -   6. Feeding - tf  7. Family Update:apparently has no family.  The more in neuropsych section  8. Disposition - icu    Clinically Significant Risk Factors        # Hypokalemia: Lowest K = 3 mmol/L in last 2 days, will replace as needed  # Hypernatremia: Highest Na = 147 mmol/L in last 2 days, will monitor as appropriate      #  Hypoalbuminemia: Lowest albumin = 3.4 g/dL at 9/30/2023  5:15 AM, will monitor as appropriate                                Critical care time: 45 minutes, excluding procedures           Key Medications:      cefTRIAXone  1 g Intravenous Q24H    chlorhexidine  15 mL Mouth/Throat Q12H    diphenhydrAMINE  50 mg Oral TID    fosphenytoin (CEREBYX) 100 mg PE in sodium chloride 0.9 % 100 mL intermittent infusion  100 mg PE Intravenous Q8H    insulin aspart  1-6 Units Subcutaneous Q4H    lacosamide (VIMPAT) 100 mg in sodium chloride 0.9 % 110 mL intermittent infusion  100 mg Intravenous BID    [Held by provider] LORazepam  0.25 mg Oral At Bedtime    [Held by provider] LORazepam  0.5 mg Oral BID    [Held by provider] multivitamin w/minerals  1 tablet Oral Daily    multivitamins w/minerals  15 mL Per Feeding Tube Daily    pantoprazole  40 mg Per Feeding Tube QAM AC    Or    pantoprazole  40 mg Intravenous QAM AC    [Held by provider] perphenazine  4 mg Oral QAM    [Held by provider] perphenazine  8 mg Oral At Bedtime    polyethylene glycol  17 g Oral BID    protein modular  1 packet Per Feeding Tube Daily    sodium chloride (PF)  10-40 mL Intracatheter Q7 Days    sodium chloride (PF)  3 mL Intracatheter Q8H      dextrose      midazolam 12 mg/hr (10/02/23 0700)    norepinephrine 0.01 mcg/kg/min (10/02/23 0445)    propofol 60 mcg/kg/min (10/02/23 0828)    sodium chloride 50 mL/hr at 10/01/23 2000              Physical Examination:   Temp:  [95.8  F (35.4  C)-99.4  F (37.4  C)] 95.8  F (35.4  C)  Pulse:  [50-86] 60  Resp:  [0-126] 11  BP: ()/(35-95) 121/71  FiO2 (%):  [30 %] 30 %  SpO2:  [93 %-100 %] 97 %        Intake/Output Summary (Last 24 hours) at 10/2/2023 1043  Last data filed at 10/2/2023 0800  Gross per 24 hour   Intake 3026.46 ml   Output 2300 ml   Net 726.46 ml     +1.5 liters    Wt Readings from Last 4 Encounters:   10/02/23 47.4 kg (104 lb 8 oz)   06/08/23 40.8 kg (90 lb)   03/15/23 41 kg (90 lb 6.2 oz)    02/02/22 52.2 kg (115 lb)     BP - Mean:  [] 91  Vent Mode: CMV/AC  (Continuous Mandatory Ventilation/ Assist Control)  FiO2 (%): 30 %  Resp Rate (Set): 12 breaths/min  Tidal Volume (Set, mL): 400 mL  PEEP (cm H2O): 5 cmH2O  Resp: 11    Recent Labs   Lab 09/30/23  0750 09/29/23  1253 09/29/23  1141 09/29/23  0855   PH 7.39 7.39  --   --    PCO2 36 31*  --   --    PO2 192* 208*  --   --    HCO3 22 19*  --   --    O2PER 40 50 4 0         GEN: no acute distress   HEENT: head ncat, sclera anicteric, OP patent, trachea midline   PULM: unlabored synchronous with vent, clear anteriorly    CV/COR: RRR S1S2 no gallop,  No rub, no murmur  ABD: soft nontender, hypoactive bowel sounds, no mass  EXT:  cool  NEURO: PERRL, no obvious deficits  SKIN: multiple bruises, left sided picc  LINES: clean, dry intact         Data:   All data and imaging reviewed     ROUTINE ICU LABS (Last four results)  CMP  Recent Labs   Lab 10/02/23  0832 10/02/23  0410 10/02/23  0409 10/01/23  2357 10/01/23  1954 10/01/23  1551 10/01/23  0743 10/01/23  0457 09/30/23  2317 09/30/23  2315 09/30/23  1849 09/30/23  1321 09/30/23  0900 09/30/23  0515 09/29/23  1131 09/29/23  0855 09/29/23  0518 09/29/23  0424 09/28/23  1244   NA  --  147*  --   --   --   --   --  136  --   --   --   --   --  141  --  148* 147*  --  147*   POTASSIUM  --  3.6  --   --   --   --   --  3.4  3.4  --  3.0*  --  3.0*  --  2.7*  --  3.2* 3.6  --  4.8   CHLORIDE  --  117*  --   --   --   --   --  105  --   --   --   --   --  111*  --  114* 115*  --  118*   CO2  --  21*  --   --   --   --   --  19*  --   --   --   --   --  19*  --  15* 14*  --  17*   ANIONGAP  --  9  --   --   --   --   --  12  --   --   --   --   --  11  --  19* 18*  --  12   GLC 80 103* 95 90   < > 115*   < > 145*   < >  --    < >  --    < > 141*   < > 153* 156*   < > 115*   BUN  --  7.6*  --   --   --   --   --  6.7*  --   --   --   --   --  18.4  --  25.1* 26.1*  --  43.3*   CR  --  0.39*  --   --   --    --   --  0.43*  --   --   --   --   --  0.56  --  0.68 0.63  --  0.81   GFRESTIMATED  --  >90  --   --   --   --   --  >90  --   --   --   --   --  >90  --  >90 >90  --  79   LULU  --  6.6*  --   --   --   --   --  7.0*  --   --   --   --   --  7.5*  --  8.6* 8.6*  --  9.3   MAG  --  2.1  --   --   --   --   --  2.1  --   --   --   --   --  2.2  --   --   --   --   --    PHOS  --  2.6  --   --   --  1.5*  --  0.8*  --   --   --   --   --  1.7*  --   --   --   --   --    PROTTOTAL  --   --   --   --   --   --   --   --   --   --   --   --   --  5.6*  --   --  6.7  --  6.9   ALBUMIN  --   --   --   --   --   --   --   --   --   --   --   --   --  3.4*  --   --  4.2  --  4.2   BILITOTAL  --   --   --   --   --   --   --   --   --   --   --   --   --  0.2  --   --  0.2  --  <0.2   ALKPHOS  --   --   --   --   --   --   --   --   --   --   --   --   --  66  --   --  77  --  63   AST  --   --   --   --   --   --   --   --   --   --   --   --   --  27  --   --  22  --  14   ALT  --   --   --   --   --   --   --   --   --   --   --   --   --  20  --   --  16  --  15    < > = values in this interval not displayed.       CBC  Recent Labs   Lab 10/02/23  0410 10/02/23  0005 10/01/23  1850 10/01/23  1143 10/01/23  6507 09/30/23  1321 09/30/23  0515 09/29/23  1838 09/29/23  1136   WBC 6.5  --   --   --  10.6  --  11.4*  --  11.4*   RBC 3.18*  --   --   --  3.34*  --  3.33*  --  3.23*   HGB 7.6* 7.5* 7.2* 7.6* 7.9*   < > 7.8*   < > 7.0*   HCT 24.8*  --   --   --  25.7*  --  25.5*  --  24.3*   MCV 78  --   --   --  77*  --  77*  --  75*   MCH 23.9*  --   --   --  23.7*  --  23.4*  --  21.7*   MCHC 30.6*  --   --   --  30.7*  --  30.6*  --  28.8*   RDW 18.3*  --   --   --  17.9*  --  18.2*  --  16.5*     --   --   --  342  --  357  --  501*    < > = values in this interval not displayed.       INR  Recent Labs   Lab 09/28/23  1244   INR 1.04       Arterial Blood Gas  Recent Labs   Lab 09/30/23  0750 09/29/23  1253  09/29/23  1141 09/29/23  0855   PH 7.39 7.39  --   --    PCO2 36 31*  --   --    PO2 192* 208*  --   --    HCO3 22 19*  --   --    O2PER 40 50 4 0            Past Medical/surgical hx, meds, allergies, social history, family history     Past Medical History:   Diagnosis Date    Allergic state     Depressive disorder     Dyskinesia     Hypertension     Schizo affective schizophrenia (H)      No past surgical history on file.   cefTRIAXone  1 g Intravenous Q24H    chlorhexidine  15 mL Mouth/Throat Q12H    diphenhydrAMINE  50 mg Oral BID    fosphenytoin (CEREBYX) 100 mg PE in sodium chloride 0.9 % 100 mL intermittent infusion  100 mg PE Intravenous Q8H    insulin aspart  1-6 Units Subcutaneous Q4H    lacosamide (VIMPAT) 100 mg in sodium chloride 0.9 % 110 mL intermittent infusion  100 mg Intravenous BID    [Held by provider] LORazepam  0.25 mg Oral At Bedtime    [Held by provider] LORazepam  0.5 mg Oral BID    [Held by provider] multivitamin w/minerals  1 tablet Oral Daily    multivitamins w/minerals  15 mL Per Feeding Tube Daily    pantoprazole  40 mg Per Feeding Tube QAM AC    Or    pantoprazole  40 mg Intravenous QAM AC    [Held by provider] perphenazine  4 mg Oral QAM    [Held by provider] perphenazine  8 mg Oral At Bedtime    polyethylene glycol  17 g Oral BID    protein modular  1 packet Per Feeding Tube Daily    sodium chloride (PF)  10-40 mL Intracatheter Q7 Days    sodium chloride (PF)  3 mL Intracatheter Q8H      dextrose      midazolam 12 mg/hr (10/02/23 0926)    norepinephrine 0.03 mcg/kg/min (10/02/23 0936)    propofol 60 mcg/kg/min (10/02/23 0828)    sodium chloride 50 mL/hr at 10/02/23 0926          Allergies   Allergen Reactions    Amlodipine     Clozapine     Egg [Chicken-Derived Products (Egg)]     Penicillins     Potassium     Poultry Meal      Social History     Socioeconomic History    Marital status: Single     Spouse name: Not on file    Number of children: Not on file    Years of education: Not  on file    Highest education level: Not on file   Occupational History    Not on file   Tobacco Use    Smoking status: Never    Smokeless tobacco: Never   Substance and Sexual Activity    Alcohol use: No    Drug use: No    Sexual activity: Never   Other Topics Concern    Not on file   Social History Narrative    Not on file     Social Determinants of Health     Financial Resource Strain: Not on file   Food Insecurity: Not on file   Transportation Needs: Not on file   Physical Activity: Not on file   Stress: Not on file   Social Connections: Not on file   Interpersonal Safety: Not on file   Housing Stability: Not on file     No family history on file.

## 2023-10-02 NOTE — PROGRESS NOTES
Chart reviewed, patient was seen for a complete assessment on 9/30/23 -- see note with that date for details    Continues on tropic TF as follows ~    Nutrition Support Enteral:  Type of Feeding Tube: NG  Enteral Frequency:  Continuous  Enteral Regimen: Osmolite 1.5 at 10 mL/hr  Total Enteral Provisions: 360 kcal, 15 g protein, 49 g CHO, 0 g fiber, 183 mL H2O  Free Water Flush: 60 mL every 4 hours   + Prosource TF 20 = 80 kcal, 20 g protein   10/2:  Propofol at 15.2 mL/hr = 401 kcal   Total = 841 kcal (19 kcal/kg), 35 g protein (0.8 g/kg)    K/Mg/Phos normal  Na 148 (H)  BGM   BUN 7.6 (L) - May suggest inadequate nutrition   I/O 3107/2650, wt 47.4 kg (up slightly)  BM x 2 today and x 2 yesterday     Norepi drip  Propofol as above   Receiving Certavite daily     ASSESSED NEEDS:   DW 45 kg   Energy: 4542-6367+ kcals (25-30+ Kcal/Kg): maintenance and vented   Protein: 54-68 grams protein (1.2-1.5 g pro/Kg): preservation of lean body mass     Will increase TF to goal -->  Osmolite 1.5 at 25 mL/hr= 900 kcal, 38 g protein, 122 g CHO, 0 g fiber and 457 mL free water  Continue ProSource TF20 once daily = 80 kcal, 20 g protein  Total with Propofol = 1381 kcal (31 kcal/kg and 102% needs), 58 g protein (1.3 g/kg)    Libertad Romano, RD, LD, CNSC   Clinical Dietitian - [facility] [place]

## 2023-10-03 NOTE — PROGRESS NOTES
Count includes the [facility] ICU RESPIRATORY NOTE        Date of Admission: 9/28/2023    Date of Intubation (most recent): 9/29/2023    Reason for Mechanical Ventilation: Airway protection     Number of Days on Mechanical Ventilation: 5    Met Criteria for Spontaneous Breathing Trial: Yes      Significant Events Today: PS 5/5 for 2 hours    ABG Results:   Recent Labs   Lab 09/30/23  0750 09/29/23  1253 09/29/23  1141 09/29/23  0855   PH 7.39 7.39  --   --    PCO2 36 31*  --   --    PO2 192* 208*  --   --    HCO3 22 19*  --   --    O2PER 40 50 4 0         Current Vent Settings: Vent Mode: CMV/AC  (Continuous Mandatory Ventilation/ Assist Control)  FiO2 (%): 30 %  Resp Rate (Set): 12 breaths/min  Tidal Volume (Set, mL): 400 mL  PEEP (cm H2O): 5 cmH2O  Pressure Support (cm H2O): 5 cmH2O  Resp: 26      Skin Assessment: Skin intact     Plan: Continue full vent support and wean as tolerated    RT Shan on 10/3/2023 at 6:01 PM

## 2023-10-03 NOTE — PROGRESS NOTES
Initial Psychiatric Consult   Consult date: September 29, 2023         Reason for Consult, requesting source:      Tardive dyskinesia    Requesting source: Bridgett Gama    Labs and imaging reviewed. Patient seen and evaluated by Milagros Parker MD          HPI:     This is a 68-year-old female with a history of schizophrenia and tardive dyskinesia.  She lives in a group home.  I saw her back in March related to concerns for dyskinesia symptoms.  Now presents with abdominal pain as well as back pain.  She has been found to have a UTI, and altered mentation.  Patient also may have been overusing Tylenol.  She presents with hyponatremia, and constipation as well.  Overnight, the patient had ongoing high fevers and rigors.  She had a change in her antibiotic.  She is anemic with hemoglobin of 6.9 when she presented, now 7.5.  GI is following because the patient had bright red rectal bleeding after an enema yesterday.  Patient's liver transaminases and bilirubin are normal.  CK this morning is 105.  TSH was normal in March, as well as vitamin B12 level.  CT abdomen and pelvis without contrast shows multiple uterine fibroids, and rectal distention related to stool and bladder distention.  Current working diagnosis is sepsis related to UTI.  Temperature this morning is 102.7 and has been trending up overnight.    Patient was seen today just after she was intubated.  She was on a gurney and getting ready to move to the ICU.  Discussed with Dr. Polanco and Shakila Vargas NP.  Patient apparently did have some rigidity and was posturing with both feet and hands.  She was also continuing to shake.  Unclear if these were rigors, or shaking from another etiology.    We discussed the fact that this could be catatonia.  The patient's CK is normal.    Patient is hypertensive, so has autonomic instability, she is febrile, was rigid this morning and posturing.  We are awaiting head imaging results as well as LP.   Neurology is on board.  Constellation of symptoms highly concerning for malignant catatonia.      10/2/2023: Patient seen today for follow-up.  Discussed with ICU nursing.  Patient is sedated and intubated.  She apparently has been having status seizures.  Patient had been on Vimpat, and after she was administered the paralytic to eliminate muscle artifact, was still having seizure activity.  Fosphenytoin was added.    10/3/2023: Patient remains heavily sedated.  Neurology notes indicate that EEG is improved on sedative drips.  There is a pending ID consult because the patient has grown GPC in broth of LP.  There is a recommendation for a paraneoplastic panel.  We will continue to follow along.        Past Psychiatric History:   Patient's home meds include Ativan 0.25 mg p.o. nightly and 0.5 mg p.o. twice daily per outpatient med reconciliation.  PDMP indicates the patient only gets #75 Ativan 0.5 mg tablets per 30 days.  This would not be enough for that dose.  Their medications include escitalopram 20 mg p.o. daily, diphenhydramine 50 mg p.o. 3 times daily, perphenazine 4 mg p.o. every morning and 8 mg p.o. nightly.    From my last visit with the patient in March, she first developed mental health symptoms after a traumatic head injury at age 29.  She fell on the ice, and hit her head.  She has had psychiatric issues since then.  She has carried a diagnosis of schizoaffective disorder historically, and first began seeing psychiatry in the 80s.  It may have been prior to that, but that is as far back as epic charting goes.        Substance Use and History:   none        Past Medical History:   PAST MEDICAL HISTORY:   Past Medical History:   Diagnosis Date    Allergic state     Depressive disorder     Dyskinesia     Hypertension     Schizo affective schizophrenia (H)        PAST SURGICAL HISTORY: No past surgical history on file.          Family History:   FAMILY HISTORY: No family history on file.    Family  Psychiatric History:         Social History:   SOCIAL HISTORY:   Social History     Tobacco Use    Smoking status: Never    Smokeless tobacco: Never   Substance Use Topics    Alcohol use: No                Physical ROS:   The 10 point Review of Systems is negative other than noted in the HPI or here.           Medications:      cefTRIAXone  1 g Intravenous Q24H    chlorhexidine  15 mL Mouth/Throat Q12H    diphenhydrAMINE  50 mg Oral or Feeding Tube BID    fosphenytoin (CEREBYX) 100 mg PE in sodium chloride 0.9 % 100 mL intermittent infusion  100 mg PE Intravenous Q8H    insulin aspart  1-6 Units Subcutaneous Q4H    lacosamide (VIMPAT) 200 mg in sodium chloride 0.9 % 110 mL intermittent infusion  200 mg Intravenous BID    [Held by provider] LORazepam  0.25 mg Oral At Bedtime    [Held by provider] LORazepam  0.5 mg Oral BID    [Held by provider] multivitamin w/minerals  1 tablet Oral Daily    multivitamins w/minerals  15 mL Oral or Feeding Tube Daily    pantoprazole  40 mg Oral or Feeding Tube QAM AC    Or    pantoprazole  40 mg Intravenous QAM AC    [Held by provider] perphenazine  4 mg Oral QAM    [Held by provider] perphenazine  8 mg Oral At Bedtime    polyethylene glycol  17 g Oral or Feeding Tube BID    protein modular  1 packet Per Feeding Tube Daily    sodium chloride (PF)  10-40 mL Intracatheter Q7 Days    sodium chloride (PF)  3 mL Intracatheter Q8H              Allergies:     Allergies   Allergen Reactions    Amlodipine     Clozapine     Egg [Chicken-Derived Products (Egg)]     Penicillins     Potassium     Poultry Meal           Labs:     Recent Results (from the past 48 hour(s))   Phosphorus    Collection Time: 10/01/23  3:51 PM   Result Value Ref Range    Phosphorus 1.5 (L) 2.5 - 4.5 mg/dL   Glucose by meter    Collection Time: 10/01/23  3:51 PM   Result Value Ref Range    GLUCOSE BY METER POCT 115 (H) 70 - 99 mg/dL   Hemoglobin    Collection Time: 10/01/23  6:50 PM   Result Value Ref Range    Hemoglobin  7.2 (L) 11.7 - 15.7 g/dL   Glucose by meter    Collection Time: 10/01/23  7:54 PM   Result Value Ref Range    GLUCOSE BY METER POCT 101 (H) 70 - 99 mg/dL   Glucose by meter    Collection Time: 10/01/23 11:57 PM   Result Value Ref Range    GLUCOSE BY METER POCT 90 70 - 99 mg/dL   Hemoglobin    Collection Time: 10/02/23 12:05 AM   Result Value Ref Range    Hemoglobin 7.5 (L) 11.7 - 15.7 g/dL   Glucose by meter    Collection Time: 10/02/23  4:09 AM   Result Value Ref Range    GLUCOSE BY METER POCT 95 70 - 99 mg/dL   CBC with platelets    Collection Time: 10/02/23  4:10 AM   Result Value Ref Range    WBC Count 6.5 4.0 - 11.0 10e3/uL    RBC Count 3.18 (L) 3.80 - 5.20 10e6/uL    Hemoglobin 7.6 (L) 11.7 - 15.7 g/dL    Hematocrit 24.8 (L) 35.0 - 47.0 %    MCV 78 78 - 100 fL    MCH 23.9 (L) 26.5 - 33.0 pg    MCHC 30.6 (L) 31.5 - 36.5 g/dL    RDW 18.3 (H) 10.0 - 15.0 %    Platelet Count 296 150 - 450 10e3/uL   Magnesium    Collection Time: 10/02/23  4:10 AM   Result Value Ref Range    Magnesium 2.1 1.7 - 2.3 mg/dL   Phosphorus    Collection Time: 10/02/23  4:10 AM   Result Value Ref Range    Phosphorus 2.6 2.5 - 4.5 mg/dL   Basic metabolic panel    Collection Time: 10/02/23  4:10 AM   Result Value Ref Range    Sodium 147 (H) 135 - 145 mmol/L    Potassium 3.6 3.4 - 5.3 mmol/L    Chloride 117 (H) 98 - 107 mmol/L    Carbon Dioxide (CO2) 21 (L) 22 - 29 mmol/L    Anion Gap 9 7 - 15 mmol/L    Urea Nitrogen 7.6 (L) 8.0 - 23.0 mg/dL    Creatinine 0.39 (L) 0.51 - 0.95 mg/dL    GFR Estimate >90 >60 mL/min/1.73m2    Calcium 6.6 (L) 8.8 - 10.2 mg/dL    Glucose 103 (H) 70 - 99 mg/dL   Phenytoin level    Collection Time: 10/02/23  4:10 AM   Result Value Ref Range    Phenytoin 15.3   ug/mL   Glucose by meter    Collection Time: 10/02/23  8:32 AM   Result Value Ref Range    GLUCOSE BY METER POCT 80 70 - 99 mg/dL   Glucose by meter    Collection Time: 10/02/23 12:23 PM   Result Value Ref Range    GLUCOSE BY METER POCT 107 (H) 70 - 99 mg/dL    Glucose by meter    Collection Time: 10/02/23  4:05 PM   Result Value Ref Range    GLUCOSE BY METER POCT 111 (H) 70 - 99 mg/dL   Glucose by meter    Collection Time: 10/02/23  8:03 PM   Result Value Ref Range    GLUCOSE BY METER POCT 130 (H) 70 - 99 mg/dL   Glucose by meter    Collection Time: 10/03/23 12:06 AM   Result Value Ref Range    GLUCOSE BY METER POCT 126 (H) 70 - 99 mg/dL   Glucose by meter    Collection Time: 10/03/23  3:52 AM   Result Value Ref Range    GLUCOSE BY METER POCT 63 (L) 70 - 99 mg/dL   Glucose by meter    Collection Time: 10/03/23  4:04 AM   Result Value Ref Range    GLUCOSE BY METER POCT 103 (H) 70 - 99 mg/dL   CBC with platelets    Collection Time: 10/03/23  4:05 AM   Result Value Ref Range    WBC Count 5.6 4.0 - 11.0 10e3/uL    RBC Count 3.05 (L) 3.80 - 5.20 10e6/uL    Hemoglobin 7.0 (L) 11.7 - 15.7 g/dL    Hematocrit 24.1 (L) 35.0 - 47.0 %    MCV 79 78 - 100 fL    MCH 23.0 (L) 26.5 - 33.0 pg    MCHC 29.0 (L) 31.5 - 36.5 g/dL    RDW 18.8 (H) 10.0 - 15.0 %    Platelet Count 284 150 - 450 10e3/uL   Basic metabolic panel    Collection Time: 10/03/23  4:05 AM   Result Value Ref Range    Sodium 147 (H) 135 - 145 mmol/L    Potassium 3.3 (L) 3.4 - 5.3 mmol/L    Chloride 116 (H) 98 - 107 mmol/L    Carbon Dioxide (CO2) 21 (L) 22 - 29 mmol/L    Anion Gap 10 7 - 15 mmol/L    Urea Nitrogen 10.8 8.0 - 23.0 mg/dL    Creatinine 0.52 0.51 - 0.95 mg/dL    GFR Estimate >90 >60 mL/min/1.73m2    Calcium 7.3 (L) 8.8 - 10.2 mg/dL    Glucose 111 (H) 70 - 99 mg/dL   Phosphorus    Collection Time: 10/03/23  4:05 AM   Result Value Ref Range    Phosphorus 2.6 2.5 - 4.5 mg/dL   Magnesium    Collection Time: 10/03/23  4:05 AM   Result Value Ref Range    Magnesium 2.1 1.7 - 2.3 mg/dL   Phenytoin level    Collection Time: 10/03/23  4:05 AM   Result Value Ref Range    Phenytoin 17.2   ug/mL   Prepare red blood cells (unit)    Collection Time: 10/03/23  4:54 AM   Result Value Ref Range    Blood Component Type Red Blood  Cells     Product Code [id number]     Unit Status Transfused     Unit Number [id number]     CROSSMATCH Compatible     CODING SYSTEM [id number]     ISSUE DATE AND TIME [id number]     UNIT ABO/RH B-     UNIT TYPE ISBT 1700    Adult Type and Screen    Collection Time: 10/03/23  5:08 AM   Result Value Ref Range    ABO/RH(D) B POS     Antibody Screen Negative Negative    SPECIMEN EXPIRATION DATE [id number]    Glucose by meter    Collection Time: 10/03/23  7:55 AM   Result Value Ref Range    GLUCOSE BY METER POCT 120 (H) 70 - 99 mg/dL   Potassium    Collection Time: 10/03/23  9:56 AM   Result Value Ref Range    Potassium 3.5 3.4 - 5.3 mmol/L   Glucose by meter    Collection Time: 10/03/23 11:53 AM   Result Value Ref Range    GLUCOSE BY METER POCT 106 (H) 70 - 99 mg/dL   Glucose by meter    Collection Time: 10/03/23 12:02 PM   Result Value Ref Range    GLUCOSE BY METER POCT 93 70 - 99 mg/dL          Physical and Psychiatric Examination:     BP (!) 175/87 (BP Location: Right arm, Cuff Size: Adult Regular)   Pulse 73   Temp 99.1  F (37.3  C) (Oral)   Resp 28   Wt 48.7 kg (107 lb 5.8 oz)   SpO2 99%   BMI 20.97 kg/m    Weight is 107 lbs 5.82 oz  Body mass index is 20.97 kg/m .    Physical Exam:  I have reviewed the physical exam as documented by by the medical team and agree with findings and assessment and have no additional findings to add at this time.    Mental Status Exam:    Appearance:  Sedated and severe distress  Attitude:   Unable to cooperate  Eye Contact:   Sedated  Mood:   Unable to assess  Affect:   Unable to assess  Speech:  mute  Language: Fluent in english   Psychomotor Behavior:  tremor observed  and posturing  Thought Process:   Patient sedated  Associations:   Patient sedated  Thought Content:   Unable to assess  Insight:   Unable to assess  Judgement:   Unable to assess  Oriented to:   Patient sedated  Attention Span and Concentration:   Patient sedated  Recent and Remote Memory:   Unable to  assess  Fund of Knowledge: Appropriate   Gait and Station:                DSM-5 Diagnosis:   Schizoaffective disorder versus disorganized schizophrenia    Delirium, rule out catatonia versus malignant catatonia    UTI          Assessment:     This is a 68-year-old female with a history of schizoaffective disorder versus schizophrenia.  I have met her before, and she told me that this all happened after she sustained a head injury at age 29 when she fell on ice, and hit her head, losing consciousness.  I am not able to find any neurology notes from when this happened, as it was essentially 40 years ago.    She has had multiple hospitalizations including at the [facility] for psychiatric reasons.  She has tardive dyskinesia which she told me is better when she is on her Trilafon.  She also states Ativan helps her with this.    Patient has had altered mentation since presenting.  This morning, she was posturing, had some rigidity, and has noted to have a climbing fever overnight.  There is concern for sepsis related to UTI.  She was intubated by the time I saw her, but I am concerned that she is catatonic.  With her fever, and elevated blood pressure, another concern is malignant catatonia.  Patient CK is normal this morning at 105, so I am less concerned about neuroleptic malignant syndrome. Recommend following serial CKs.    Patient is getting Versed, clearly a benzodiazepine, as sedation.  Typical treatment for catatonia is Ativan.  We would usually attempt Ativan at a dose of 1 mg IV as a lorazepam challenge to see if patient had clinical improvement with catatonia symptoms.  She is already intubated, so I am not sure how to manage this.  Will discuss with intensivist.      The other option is ECT.  We cannot do that here, so the patient will need to be transferred to the [facility].  I talked to my colleague at the [facility], and there are many logistical challenges with getting the patient  over there.  Hopefully we can ascertain whether or not this is actually malignant catatonia, and aggressively treat with Ativan.  I do see some case reports of other benzodiazepines having benefit, though lorazepam is the standard of practice in terms of treatment.    There is a work-up underway to see if there is another etiology medically of what is going on with her.  She had an LP, and CT head.    Patient has been found to have status seizures, and is under deep sedation currently.  She is on both Vimpat and fosphenytoin.  Patient does have a history of head injury when she was 29, when she fell on ice, and hit her head.  This was when she reported to me previously that she had onset of delusional and hallucinations.  She had no prior mental health history before this.    Patient's EEG is improved.  She has grown GPC in CSF broth.  She still heavily sedated.          Summary of Recommendations:     1.  Have discontinued Lexapro.    2.  Patient may or may not need to restart Trilafon once she awakens and clears.  We will follow along via chart.    3.  Discussed with RN that the patient does not appear to have a substitute decision-maker that nursing knows of.  I do note from care coordinator notes, there has been attempt to find out if the patient has anyone that might be able to act as a substitute decision-maker.      Milagros Parker MD  Consult/Liaison Psychiatry and Addiction Medicine  [facility]

## 2023-10-03 NOTE — PROGRESS NOTES
[facility] ICU RESPIRATORY NOTE          Date of Admission: 9/28/2023     Date of Intubation (most recent): 9/29/2023     Reason for Mechanical Ventilation: Airway Protection      Number of Days on Mechanical Ventilation: 5     Met Criteria for Spontaneous Breathing Trial: No     Reason for No Spontaneous Breathing Trial: per MD     Significant Events Today: None    ABG Results:   Recent Labs   Lab 09/30/23  0750 09/29/23  1253 09/29/23  1141 09/29/23  0855   PH 7.39 7.39  --   --    PCO2 36 31*  --   --    PO2 192* 208*  --   --    HCO3 22 19*  --   --    O2PER 40 50 4 0         Current Vent Settings: Vent Mode: CMV/AC  (Continuous Mandatory Ventilation/ Assist Control)  FiO2 (%): 30 %  Resp Rate (Set): 12 breaths/min  Tidal Volume (Set, mL): 400 mL  PEEP (cm H2O): 5 cmH2O  Resp: 14      RT Jayashree on 10/3/2023 at 4:28 AM

## 2023-10-03 NOTE — PLAN OF CARE
Goal Outcome Evaluation:      Plan of Care Reviewed With: patient    Overall Patient Progress: no changeOverall Patient Progress: no change    Neuro: Will not respond to commands, will cough at times. RASS goal (-4)-(-5) maintained throughout shift. Pupils reactive round sluggish 2mm bilaterally. Occasionally moves BLE spontaneously.   CV: SR/SB. Pulses intact.  Resp: LS course. ETT 21 @ lip. Vent set at CMV/AC- RR 12, FIO2 30%, , PEEP 5, SaO2 maintained >92%. CPAP/PS for 2hrs 5/5 @ 30% FIO2- tolerated well.  GI: BG levels stable. Freq. Liquid BM- inserted rectal tube @ 0800. C-diff (-). NG in place 56 @ nare- running continuous Osmolite 1.5 @ 25 ml/hr w/ 150 ml FWF q4h.   : BS @ 0800 was 253 ml. BS @ 1200 was 349 ml- St cath @ 1200 was 400 ml. BS @ 1600 was 119 ml. BS @ 1800 was 153 ml.  Skin: Coccyx wound clean and dry- applied criticaid paste per woc orders. Scattered bruises. R wrist/hand +2 edema & BLE ankle/foot +1 edema.     Access: L Triple lumen PICC.   Gtts:  TKO 20ml/hr. Levo stopped @ 0804. Triglycerides resulted @ 1,372- Prop was 60 mcg/kg/min but discontinued, increased Versed 13 mg/hr & admin 4mg IV Ativan 1x.   Other: Per neuro crit- EEG still showing excitability, continue sedation, increased Vimpat to 200mg BID.

## 2023-10-03 NOTE — PROGRESS NOTES
Critical Care  Note      10/03/2023    Name: Kortney Germain MRN#: [id number]   Age: 68 year old YOB: 1955     Hsptl Day# 5  ICU DAY #3    MV DAY #3           Problem List:   Principal Problem:    Urinary retention  Active Problems:    Acute cystitis without hematuria    Constipation, unspecified constipation type    Anemia, unspecified type         Summary/Hospital Course:   68F pmh of schizo-affective schizophrenia, HTN, tardive dyskinesia now presented 9/28 from group home with back pain and fever, Ct abd showed rectum distended with stool and urinary bladder distended, now s/p quinonez placement.  Arrival UA c/w UTI. Since arrival from the ED she has been alert but not generally interactive (?part of this may be due to underlying psychiatric illness?). Morning of 9/29 she was noted to be febrile and shaking prompting intubation to facilitate head CT and LP.  Placed on heavy sedation including benzodiazepine and propofol.  Increased her antiepileptic medication. No signs of seizures since.       Assessment and plan :     Kortney Germain IS a 68 year old female admitted on 9/28/2023 for fever and UTI.   I have personally reviewed the daily labs, imaging studies, cultures and discussed the case with referring physician and consulting physicians.     My assessment and plan by system for this patient is as follows:    Neurology/Psychiatry:   1. Underlying schizoaffective schizophrenia and h/o tardive dyskinesia: appreciate psych recommendations.    2. Rigidity/shaking:  continues to have convulsive movements when stimulated, not seizures per eeg.  possible serotonin syndrome (on lexapro at home) but not having other sxs of serotonin syndrome (flushing, diarrhea, etc), or may be tardive dyskinesia.  Keeping long standing benadryl in case it's tardive dyskinesia.  3. Sedation--propofol/midaz drip.    -Increase RASS when okay with neurologist.    4.  Analgesia  --prn dilaudid  5.  Gram-positive cocci in  broth of CSF from 9/20.  -We will contact ID to see if they feel this is clinically significant and if so we will put in a formal consult.  6.  Unclear if she has capacity to make her own decisions and does not have a designated surrogate decision-maker.  Social work is working with her  to determine whether we currently have a surrogate decision maker and if not what the process would be to find 1.    Cardiovascular:   1.Hypertension:  no h/o essential hypertension.  Improved with pain/sedation mgmt.    Pulmonary/Ventilator Management:   1. Loss of protective airway reflexes requiring intubation and mechanical ventilation:  Neurologically inappropriate for extubation today.  -Continue lung protective ventilation.  Pressure support with goal of extubation whenever she wakes up.    GI and Nutrition:   1. Tube feeding  2. PPI for pud prophy    Renal/Fluids/Electrolytes:   1. Creat ok 0.43    Infectious Disease:   1. Sirs and possible UTI--on ctx and vanco, UC with mixed christina but >100k colonies.  Vancomycin was stopped.  On ceftriaxone for 5-day course  2. CSF preliminary studies unconcerning for meningitis. Gram positive cocci in anaerobic broth only which is a contaminant.    Endocrine:   1. Stress induced hyperglycemia:  Not needing insulin    Hematology/Oncology:   1. Anemia of critical illness.-- s/p 1 prbc 9/29. no apparent blood loss.  - Retransfuse when below 7.  2. Leukocytosis: Improved with antibiotics for presumed UTI.    ICU Prophylaxis:   1. DVT: Hep Subq/ mechanical  2. VAP: HOB 30 degrees, chlorhexidine rinse  3. Stress Ulcer: PPI  4. Restraints: Nonviolent soft two point restraints required and necessary for patient safety and continued cares and good effect as patient continues to pull at necessary lines, tubes despite education and distraction. Will readdress daily.   5. Wound care  -   6. Feeding - tf  7. Family Update:apparently has no family. more in neuropsych section  8. Disposition  - icu    Clinically Significant Risk Factors        # Hypokalemia: Lowest K = 3.3 mmol/L in last 2 days, will replace as needed  # Hypernatremia: Highest Na = 147 mmol/L in last 2 days, will monitor as appropriate      # Hypoalbuminemia: Lowest albumin = 3.4 g/dL at 9/30/2023  5:15 AM, will monitor as appropriate                                Critical care time: 40 minutes, excluding procedures           Key Medications:      cefTRIAXone  1 g Intravenous Q24H    chlorhexidine  15 mL Mouth/Throat Q12H    diphenhydrAMINE  50 mg Oral or Feeding Tube BID    fosphenytoin (CEREBYX) 100 mg PE in sodium chloride 0.9 % 100 mL intermittent infusion  100 mg PE Intravenous Q8H    insulin aspart  1-6 Units Subcutaneous Q4H    lacosamide (VIMPAT) 100 mg in sodium chloride 0.9 % 110 mL intermittent infusion  100 mg Intravenous Once    lacosamide (VIMPAT) 200 mg in sodium chloride 0.9 % 110 mL intermittent infusion  200 mg Intravenous BID    [Held by provider] LORazepam  0.25 mg Oral At Bedtime    [Held by provider] LORazepam  0.5 mg Oral BID    [Held by provider] multivitamin w/minerals  1 tablet Oral Daily    multivitamins w/minerals  15 mL Oral or Feeding Tube Daily    pantoprazole  40 mg Oral or Feeding Tube QAM AC    Or    pantoprazole  40 mg Intravenous QAM AC    [Held by provider] perphenazine  4 mg Oral QAM    [Held by provider] perphenazine  8 mg Oral At Bedtime    polyethylene glycol  17 g Oral or Feeding Tube BID    protein modular  1 packet Per Feeding Tube Daily    sodium chloride (PF)  10-40 mL Intracatheter Q7 Days    sodium chloride (PF)  3 mL Intracatheter Q8H      dextrose      midazolam 12 mg/hr (10/03/23 0847)    norepinephrine Stopped (10/03/23 0804)    propofol 15.2 mcg/kg/min (10/03/23 0845)    sodium chloride                Physical Examination:   Temp:  [97.4  F (36.3  C)-99.1  F (37.3  C)] 99.1  F (37.3  C)  Pulse:  [55-73] 73  Resp:  [10-28] 28  BP: ()/(47-87) 175/87  FiO2 (%):  [30 %] 30 %  SpO2:   [92 %-99 %] 99 %        Intake/Output Summary (Last 24 hours) at 10/3/2023 1328  Last data filed at 10/3/2023 1200  Gross per 24 hour   Intake 3912.43 ml   Output 1025 ml   Net 2887.43 ml   Net: +5 liters      Wt Readings from Last 4 Encounters:   10/03/23 48.7 kg (107 lb 5.8 oz)   06/08/23 40.8 kg (90 lb)   03/15/23 41 kg (90 lb 6.2 oz)   02/02/22 52.2 kg (115 lb)     BP - Mean:  [] 125  Vent Mode: CPAP/PS  (Continuous positive airway pressure with Pressure Support)  FiO2 (%): 30 %  Resp Rate (Set): 12 breaths/min  Tidal Volume (Set, mL): 400 mL  PEEP (cm H2O): 5 cmH2O  Pressure Support (cm H2O): 5 cmH2O  Resp: 28    Recent Labs   Lab 09/30/23  0750 09/29/23  1253 09/29/23  1141 09/29/23  0855   PH 7.39 7.39  --   --    PCO2 36 31*  --   --    PO2 192* 208*  --   --    HCO3 22 19*  --   --    O2PER 40 50 4 0         GEN: no acute distress   HEENT: head ncat, sclera anicteric, OP patent, trachea midline   PULM: unlabored synchronous with vent, clear anteriorly    CV/COR: RRR S1S2 no gallop,  No rub, no murmur  ABD: soft nontender, hypoactive bowel sounds, no mass  EXT:  warm, some edema  NEURO: PERRL, no obvious deficits  SKIN: multiple bruises, left sided picc  LINES: clean, dry intact         Data:   All data and imaging reviewed     ROUTINE ICU LABS (Last four results)  CMP  Recent Labs   Lab 10/03/23  1202 10/03/23  1153 10/03/23  0956 10/03/23  0755 10/03/23  0405 10/02/23  0832 10/02/23  0410 10/01/23  1954 10/01/23  1551 10/01/23  0743 10/01/23  0457 09/30/23  0900 09/30/23  0515 09/29/23  0855 09/29/23  0518 09/29/23  0424 09/28/23  1244   NA  --   --   --   --  147*  --  147*  --   --   --  136  --  141   < > 147*  --  147*   POTASSIUM  --   --  3.5  --  3.3*  --  3.6  --   --   --  3.4  3.4   < > 2.7*   < > 3.6  --  4.8   CHLORIDE  --   --   --   --  116*  --  117*  --   --   --  105  --  111*   < > 115*  --  118*   CO2  --   --   --   --  21*  --  21*  --   --   --  19*  --  19*   < > 14*  --  17*    ANIONGAP  --   --   --   --  10  --  9  --   --   --  12  --  11   < > 18*  --  12   GLC 93 106*  --  120* 111*   < > 103*   < > 115*   < > 145*   < > 141*   < > 156*   < > 115*   BUN  --   --   --   --  10.8  --  7.6*  --   --   --  6.7*  --  18.4   < > 26.1*  --  43.3*   CR  --   --   --   --  0.52  --  0.39*  --   --   --  0.43*  --  0.56   < > 0.63  --  0.81   GFRESTIMATED  --   --   --   --  >90  --  >90  --   --   --  >90  --  >90   < > >90  --  79   LULU  --   --   --   --  7.3*  --  6.6*  --   --   --  7.0*  --  7.5*   < > 8.6*  --  9.3   MAG  --   --   --   --  2.1  --  2.1  --   --   --  2.1  --  2.2  --   --   --   --    PHOS  --   --   --   --  2.6  --  2.6  --  1.5*  --  0.8*  --  1.7*   < >  --   --   --    PROTTOTAL  --   --   --   --   --   --   --   --   --   --   --   --  5.6*  --  6.7  --  6.9   ALBUMIN  --   --   --   --   --   --   --   --   --   --   --   --  3.4*  --  4.2  --  4.2   BILITOTAL  --   --   --   --   --   --   --   --   --   --   --   --  0.2  --  0.2  --  <0.2   ALKPHOS  --   --   --   --   --   --   --   --   --   --   --   --  66  --  77  --  63   AST  --   --   --   --   --   --   --   --   --   --   --   --  27  --  22  --  14   ALT  --   --   --   --   --   --   --   --   --   --   --   --  20  --  16  --  15    < > = values in this interval not displayed.       CBC  Recent Labs   Lab 10/03/23  0405 10/02/23  0410 10/02/23  0005 10/01/23  1850 10/01/23  1143 10/01/23  0457 09/30/23  1321 09/30/23  0515   WBC 5.6 6.5  --   --   --  10.6  --  11.4*   RBC 3.05* 3.18*  --   --   --  3.34*  --  3.33*   HGB 7.0* 7.6* 7.5* 7.2*   < > 7.9*   < > 7.8*   HCT 24.1* 24.8*  --   --   --  25.7*  --  25.5*   MCV 79 78  --   --   --  77*  --  77*   MCH 23.0* 23.9*  --   --   --  23.7*  --  23.4*   MCHC 29.0* 30.6*  --   --   --  30.7*  --  30.6*   RDW 18.8* 18.3*  --   --   --  17.9*  --  18.2*    296  --   --   --  342  --  357    < > = values in this interval not displayed.        INR  Recent Labs   Lab 09/28/23  1244   INR 1.04       Arterial Blood Gas  Recent Labs   Lab 09/30/23  0750 09/29/23  1253 09/29/23  1141 09/29/23  0855   PH 7.39 7.39  --   --    PCO2 36 31*  --   --    PO2 192* 208*  --   --    HCO3 22 19*  --   --    O2PER 40 50 4 0            Past Medical/surgical hx, meds, allergies, social history, family history     Past Medical History:   Diagnosis Date    Allergic state     Depressive disorder     Dyskinesia     Hypertension     Schizo affective schizophrenia (H)      No past surgical history on file.   cefTRIAXone  1 g Intravenous Q24H    chlorhexidine  15 mL Mouth/Throat Q12H    diphenhydrAMINE  50 mg Oral or Feeding Tube BID    fosphenytoin (CEREBYX) 100 mg PE in sodium chloride 0.9 % 100 mL intermittent infusion  100 mg PE Intravenous Q8H    insulin aspart  1-6 Units Subcutaneous Q4H    lacosamide (VIMPAT) 100 mg in sodium chloride 0.9 % 110 mL intermittent infusion  100 mg Intravenous Once    lacosamide (VIMPAT) 200 mg in sodium chloride 0.9 % 110 mL intermittent infusion  200 mg Intravenous BID    [Held by provider] LORazepam  0.25 mg Oral At Bedtime    [Held by provider] LORazepam  0.5 mg Oral BID    [Held by provider] multivitamin w/minerals  1 tablet Oral Daily    multivitamins w/minerals  15 mL Oral or Feeding Tube Daily    pantoprazole  40 mg Oral or Feeding Tube QAM AC    Or    pantoprazole  40 mg Intravenous QAM AC    [Held by provider] perphenazine  4 mg Oral QAM    [Held by provider] perphenazine  8 mg Oral At Bedtime    polyethylene glycol  17 g Oral or Feeding Tube BID    protein modular  1 packet Per Feeding Tube Daily    sodium chloride (PF)  10-40 mL Intracatheter Q7 Days    sodium chloride (PF)  3 mL Intracatheter Q8H      dextrose      midazolam 12 mg/hr (10/03/23 0847)    norepinephrine Stopped (10/03/23 0804)    propofol 15.2 mcg/kg/min (10/03/23 0845)    sodium chloride            Allergies   Allergen Reactions    Amlodipine     Clozapine      Egg [Chicken-Derived Products (Egg)]     Penicillins     Potassium     Poultry Meal      Social History     Socioeconomic History    Marital status: Single     Spouse name: Not on file    Number of children: Not on file    Years of education: Not on file    Highest education level: Not on file   Occupational History    Not on file   Tobacco Use    Smoking status: Never    Smokeless tobacco: Never   Substance and Sexual Activity    Alcohol use: No    Drug use: No    Sexual activity: Never   Other Topics Concern    Not on file   Social History Narrative    Not on file     Social Determinants of Health     Financial Resource Strain: Not on file   Food Insecurity: Not on file   Transportation Needs: Not on file   Physical Activity: Not on file   Stress: Not on file   Social Connections: Not on file   Interpersonal Safety: Not on file   Housing Stability: Not on file     No family history on file.

## 2023-10-03 NOTE — PLAN OF CARE
Care provided from 7p to 7a    Neuro: RASS goal -4 to -5, maintained with prop and versed. PERRL, withdraws on all extremities. Instructions from neuro to not adjust sedation until AM evaluation by team.    CV: MAP goal >65, maintained with levo. SR/sinus dudley at times to mid-50s. Pulses +2.    Resp: vented, coarse LS.    GI/: BG have been in range, low BG at 0400 in results was error, with following BG accurate. Multiple loose stools during shift. Hypoactive BS, TF at goal. Straight cath for retention with 325 output. Last bladder scan was 173, didn't straight cath.     Skin: WOC orders specify no foam dressing on sacrum. Reddened coccyx, scattered bruising, sternum bruise.    Misc: potassium replaced, recheck at 0950. Na elevated, MD aware, no orders changed. Hgb was 7, one unit blood to be given this AM.

## 2023-10-03 NOTE — PROGRESS NOTES
NCC fellow notified of propofol discontinuation due to elevated triglycerides. Versed gtt increased and lorazepam prn orders obtained. CTM.

## 2023-10-03 NOTE — PROGRESS NOTES
[facility]    Stroke/NCC Progress Note    Interval Events  EEG with generalized periodic discharges, does not meet criteria for NCSE. Vimpat increased to 200 mg BID. Continues on Propofol 60 mcg/kg/min and Versed 12 mg/hr.     HPI Summary  Kortney Germain is a 68 year old female with PMHx significant for anxiety/depression, schizoaffective disorder (living in group home), HTN, odynophagia, tardive dyskinesia, HTN. She presented 9/28 with lower back pain and abdominal pain. She was found to have UTI, anemia, and constipation. She had decreased LOC and generalized shaking overnight. She remained febrile. RRT called 9/29/23 for encephalopathy and concern that should would be unable to protect her airway. 1 PRBC given 9/29/23 due to anemia.    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   MRI: L hyperintensity on DWI/FLAIR without correlate on ADC or T1, favored to be artifact     60 minute EEG: abnormal consistent with a severe encephalopathy and ?status epilepticus; it is important to note patient has persistent head tremoring which may also produce a rhythmic artifact on the EEG and there is persistent electro myogenic artifact making this 1 hour segment difficult to interpret      cEEG:   -9/30: moderate to severe encephalopathy and periodic pattern which may be SIRPIDS (stimulus-induced rhythmic, periodic, or ictal discharges)  - 10/1 (after administration of paralytic to remove muscle artifact): per verbal report underlying cortical activity consistent with seizures  - 10/3: generalized periodic discharges present but less persistent than previously. Does not meet criteria for NCSE.     Blood cultures: NGTD     LP labs:  -glucose 98  -protein 22.8  -2 nucleated cells, RBC 0  -aerobic culture GPC in broth only   -anaerobic culture NGTD  - HSV negative    Impression   Status epilepticus, EEG improved on sedative  "drips.    Shaking/tremors, unclear etiology. May represent baseline tardive dyskinesia vs manifestation of status epilepticus vs toxic encephalopathy vs other     Plan  - Increase lacosamide to 200 mg BID  - continue fosphenytoin 100mg every 8 hours  - continuous EEG  - continue deep sedation pending further improvement in EEG  - continue to hold/limit dopaminergic, serotonergic agents   - psychiatry following    - [place] paraneoplastic panel ordered   - Recommend infectious disease consult given GPC in CSF    Patient Follow-up    - final recommendation pending work-up    We will continue to follow.     TAMI Maloney, CNP  Neurology  10/03/2023 12:44 PM  To page stroke neurology after hours or on a subsequent day, click here: [facility]  Choose \"On Call\" tab at top, then search dropdown box for \"Neurology Adult\" & press Enter, look for Neuro ICU/Stroke     _____________________________________________________    Clinically Significant Risk Factors        # Hypokalemia: Lowest K = 3.3 mmol/L in last 2 days, will replace as needed  # Hypernatremia: Highest Na = 147 mmol/L in last 2 days, will monitor as appropriate      # Hypoalbuminemia: Lowest albumin = 3.4 g/dL at 9/30/2023  5:15 AM, will monitor as appropriate                         Medications   Scheduled Meds   cefTRIAXone  1 g Intravenous Q24H    chlorhexidine  15 mL Mouth/Throat Q12H    diphenhydrAMINE  50 mg Oral or Feeding Tube BID    fosphenytoin (CEREBYX) 100 mg PE in sodium chloride 0.9 % 100 mL intermittent infusion  100 mg PE Intravenous Q8H    insulin aspart  1-6 Units Subcutaneous Q4H    lacosamide (VIMPAT) 100 mg in sodium chloride 0.9 % 110 mL intermittent infusion  100 mg Intravenous Once    lacosamide (VIMPAT) 200 mg in sodium chloride 0.9 % 110 mL intermittent infusion  200 mg Intravenous BID    [Held by provider] LORazepam  0.25 mg Oral At Bedtime    [Held by provider] LORazepam  0.5 mg Oral BID    [Held by provider] multivitamin w/minerals  1 " tablet Oral Daily    multivitamins w/minerals  15 mL Oral or Feeding Tube Daily    pantoprazole  40 mg Oral or Feeding Tube QAM AC    Or    pantoprazole  40 mg Intravenous QAM AC    [Held by provider] perphenazine  4 mg Oral QAM    [Held by provider] perphenazine  8 mg Oral At Bedtime    polyethylene glycol  17 g Oral or Feeding Tube BID    protein modular  1 packet Per Feeding Tube Daily    sodium chloride (PF)  10-40 mL Intracatheter Q7 Days    sodium chloride (PF)  3 mL Intracatheter Q8H       Infusion Meds   dextrose      midazolam 12 mg/hr (10/03/23 0847)    norepinephrine Stopped (10/03/23 0804)    propofol 15.2 mcg/kg/min (10/03/23 0845)    sodium chloride         PRN Meds  acetaminophen **OR** acetaminophen, albuterol, [Held by provider] cyclobenzaprine, dextrose, glucose **OR** dextrose **OR** glucagon, HYDROmorphone, lidocaine 4%, lidocaine (buffered or not buffered), melatonin, midazolam, naloxone **OR** naloxone **OR** naloxone **OR** naloxone, nitroGLYcerin, sodium chloride (PF), sodium chloride (PF), sodium chloride (PF), sodium chloride (PF)       PHYSICAL EXAMINATION  Temp:  [97.4  F (36.3  C)-99.1  F (37.3  C)] 99.1  F (37.3  C)  Pulse:  [55-73] 73  Resp:  [10-28] 28  BP: ()/(47-87) 175/87  FiO2 (%):  [30 %] 30 %  SpO2:  [92 %-99 %] 99 %      General Exam  General:  patient lying in bed without any acute distress    HEENT:  normocephalic/atraumatic  Pulmonary:  no respiratory distress, on mechanical ventilation    Neuro Exam (deep sedation with propofol and versed)   Mental Status:  comatose, does not respond to verbal, tactile or noxious stimuli  Cranial Nerves:  gaze conjugate, PERRL  Motor: no abnormal movements (cessation of previously noted mouth and BUE rhythmic movements), no movement to noxious in any extremity    Imaging  I personally reviewed all imaging; relevant findings per HPI.     Lab Results Data   CBC  Recent Labs   Lab 10/03/23  0405 10/02/23  0410 10/02/23  0005  10/01/23  1143 10/01/23  0457   WBC 5.6 6.5  --   --  10.6   RBC 3.05* 3.18*  --   --  3.34*   HGB 7.0* 7.6* 7.5*   < > 7.9*   HCT 24.1* 24.8*  --   --  25.7*    296  --   --  342    < > = values in this interval not displayed.     Basic Metabolic Panel    Recent Labs   Lab 10/03/23  1202 10/03/23  1153 10/03/23  0956 10/03/23  0755 10/03/23  0405 10/02/23  0832 10/02/23  0410 10/01/23  0743 10/01/23  0457   NA  --   --   --   --  147*  --  147*  --  136   POTASSIUM  --   --  3.5  --  3.3*  --  3.6  --  3.4  3.4   CHLORIDE  --   --   --   --  116*  --  117*  --  105   CO2  --   --   --   --  21*  --  21*  --  19*   BUN  --   --   --   --  10.8  --  7.6*  --  6.7*   CR  --   --   --   --  0.52  --  0.39*  --  0.43*   GLC 93 106*  --  120* 111*   < > 103*   < > 145*   LULU  --   --   --   --  7.3*  --  6.6*  --  7.0*    < > = values in this interval not displayed.     Liver Panel  Recent Labs   Lab 09/30/23  0515 09/29/23  0518 09/28/23  1244   PROTTOTAL 5.6* 6.7 6.9   ALBUMIN 3.4* 4.2 4.2   BILITOTAL 0.2 0.2 <0.2   ALKPHOS 66 77 63   AST 27 22 14   ALT 20 16 15     INR    Recent Labs   Lab Test 09/28/23  1244 03/07/23  1629   INR 1.04 1.15      Lipid Profile    Recent Labs   Lab Test 03/08/23  0830   CHOL 143   HDL 50   LDL 75   TRIG 88     A1C  No lab results found.  Troponin  No results for input(s): CTROPT, TROPONINIS, TROPONINI, GHTROP in the last 168 hours.       Data   Billing: I personally examined and evaluated the patient today. At the time of my evaluation and management the patient was critical condition today due to seizure. I personally managed examination. Key decisions made today included labs. I spent a total of 35 minutes providing critical care services, evaluating the patient, directing care and reviewing laboratory values and radiologic reports.

## 2023-10-03 NOTE — PROGRESS NOTES
EEG CLINICAL NEUROPHYSIOLOGY PRELIMINARY REPORT    EEG through around 9:15 AM today reviewed.  Midazolam 12 mg/h throughout.  Propofol 60 mcg/kg/min, reduced to 15 mcg/kg/min around midnight.    Discontinuous EEG through out.  20 to 30  V delta and brief runs alternating with periods of suppression.  Periods of suppression estimated to account for 40% of ongoing recording.  There was no significant improvement in amplitude or reduction of persistence of suppression with reduction of propofol.  Generalized periodic discharges reappear and are somewhat persistent between 3:50 AM and 4:20 AM.  During this period of time they occur in brief runs sometimes at 2 Hz.  Overall persistence less than 50%.  Generalized periodic discharges reappear briefly between 7:27 AM and 7:40 AM and are less persistent than previously.  At no point to the meet standard criteria for nonconvulsive status epilepticus.    Study consistent with severe diffuse encephalopathy.  Findings may in part be related to sedative drips employed.  No significant improvement in background activity with reduction of propofol.  Generalized periodic discharges reappear briefly following reduction of propofol as detailed above.  They do not meet standard criteria for nonconvulsive status epilepticus.    Full report to follow.    Basilio Adame MD  Contact information for physicians covering Epilepsy and EEG is available on [facility].  Click search, enter neurology adult/[facility] in group name box, click on neurology adult/[facility], then click Staff Epilepsy and EEG.

## 2023-10-04 NOTE — PROGRESS NOTES
Care Management Follow Up    Length of Stay (days): 6    Expected Discharge Date:       Concerns to be Addressed:       Patient plan of care discussed at interdisciplinary rounds: No    Anticipated Discharge Disposition: Group Home     Anticipated Discharge Services: Transportation Services  Anticipated Discharge DME:      Patient/family educated on Medicare website which has current facility and service quality ratings:    Education Provided on the Discharge Plan:    Patient/Family in Agreement with the Plan:      Referrals Placed by CM/SW: Community [facility] ([facility] Homes)  Private pay costs discussed: Not applicable    Additional Information:  Writer got a call from Emely ([facility]-Manager [phone]) and wanted to get an update. Writer updated Emely on Pt's status and would like to be called once the Pt is awake.       Garo Koenig, RN, BSN, Care Coordinator

## 2023-10-04 NOTE — PROGRESS NOTES
EEG CLINICAL NEUROPHYSIOLOGY PRELIMINARY REPORT    Video EEG through 8 AM today reviewed.  Since last note midazolam increased to 18 mg per hour.    A single run of generalized periodic discharges between 948 and 10:03 PM yesterday at the rate of 1 Hz, not particularly well organized.  Subsequently, only sporadic generalized sharp waves.  Background with low amplitude delta with occasional suppressions.    Background with moderate to severe diffuse encephalopathy, in part related to sedative drips employed.  Runs of generalized periodic disch four-port to follow.  arges eventually stopped following evolution of midazolam doses.  No electrographic seizures.    Full report to follow.    Basilio Adame MD

## 2023-10-04 NOTE — PLAN OF CARE
Care provided from 7p to 7a    Neuro: PERRL, sluggish. RASS goal -4 to -5, maintained with versed and fent drips. Versed increased to 18mg/hr as discussed with stroke neuro MD, Dr. Willis. Dr Willis want's patient versed 18-20mg/hr. Patient withdraws on BLE.     CV: Sinus rhythm, periodically dudley to mid 50s. Higher rate and BP with pain.    Resp: Vented, course LS.     GI/: Tube feed at goal. Rectal tube, minimal leaking. Liu placed today for retention, positional.    Drips: Fentanyl, versed.

## 2023-10-04 NOTE — PROGRESS NOTES
Psychiatry progress note  Consult date: September 29, 2023         Reason for Consult, requesting source:      Tardive dyskinesia    Requesting source: Bridgett Gama    Labs and imaging reviewed. Patient seen and evaluated by Milagros Parker MD          HPI:     This is a 68-year-old female with a history of schizophrenia and tardive dyskinesia.  She lives in a group home.  I saw her back in March related to concerns for dyskinesia symptoms.  Now presents with abdominal pain as well as back pain.  She has been found to have a UTI, and altered mentation.  Patient also may have been overusing Tylenol.  She presents with hyponatremia, and constipation as well.  Overnight, the patient had ongoing high fevers and rigors.  She had a change in her antibiotic.  She is anemic with hemoglobin of 6.9 when she presented, now 7.5.  GI is following because the patient had bright red rectal bleeding after an enema yesterday.  Patient's liver transaminases and bilirubin are normal.  CK this morning is 105.  TSH was normal in March, as well as vitamin B12 level.  CT abdomen and pelvis without contrast shows multiple uterine fibroids, and rectal distention related to stool and bladder distention.  Current working diagnosis is sepsis related to UTI.  Temperature this morning is 102.7 and has been trending up overnight.    Patient was seen today just after she was intubated.  She was on a gurney and getting ready to move to the ICU.  Discussed with Dr. Polanco and Shakila Vargas NP.  Patient apparently did have some rigidity and was posturing with both feet and hands.  She was also continuing to shake.  Unclear if these were rigors, or shaking from another etiology.    We discussed the fact that this could be catatonia.  The patient's CK is normal.    Patient is hypertensive, so has autonomic instability, she is febrile, was rigid this morning and posturing.  We are awaiting head imaging results as well as LP.  Neurology  is on board.  Constellation of symptoms highly concerning for malignant catatonia.      10/2/2023: Patient seen today for follow-up.  Discussed with ICU nursing.  Patient is sedated and intubated.  She apparently has been having status seizures.  Patient had been on Vimpat, and after she was administered the paralytic to eliminate muscle artifact, was still having seizure activity.  Fosphenytoin was added.    10/3/2023: Patient remains heavily sedated.  Neurology notes indicate that EEG is improved on sedative drips.  There is a pending ID consult because the patient has grown GPC in broth of LP.  There is a recommendation for a paraneoplastic panel.  We will continue to follow along.    10/4/2023: Patient seen today for follow-up.  She is still heavily sedated.  He continues to have epileptiform discharges despite deep sedation.  Discussed with RN.  Is unclear the patient has anyone who could act as a substitute decision-maker.  The patient does have a history of commitment, and likely has a .  Have asked LMHP from our team to see if she can find out who this  may be, to get further information.        Past Psychiatric History:   Patient's home meds include Ativan 0.25 mg p.o. nightly and 0.5 mg p.o. twice daily per outpatient med reconciliation.  PDMP indicates the patient only gets #75 Ativan 0.5 mg tablets per 30 days.  This would not be enough for that dose.  Their medications include escitalopram 20 mg p.o. daily, diphenhydramine 50 mg p.o. 3 times daily, perphenazine 4 mg p.o. every morning and 8 mg p.o. nightly.    From my last visit with the patient in March, she first developed mental health symptoms after a traumatic head injury at age 29.  She fell on the ice, and hit her head.  She has had psychiatric issues since then.  She has carried a diagnosis of schizoaffective disorder historically, and first began seeing psychiatry in the 80s.  It may have been prior to that, but that is  as far back as epic charting goes.        Substance Use and History:   none        Past Medical History:   PAST MEDICAL HISTORY:   Past Medical History:   Diagnosis Date    Allergic state     Depressive disorder     Dyskinesia     Hypertension     Schizo affective schizophrenia (H)        PAST SURGICAL HISTORY: No past surgical history on file.          Family History:   FAMILY HISTORY: No family history on file.    Family Psychiatric History:         Social History:   SOCIAL HISTORY:   Social History     Tobacco Use    Smoking status: Never    Smokeless tobacco: Never   Substance Use Topics    Alcohol use: No                Physical ROS:   The 10 point Review of Systems is negative other than noted in the HPI or here.           Medications:      chlorhexidine  15 mL Mouth/Throat Q12H    clobazam  10 mg Oral or Feeding Tube BID    diphenhydrAMINE  50 mg Oral or Feeding Tube BID    fiber modular  1 packet Per Feeding Tube Daily    fosphenytoin (CEREBYX) 100 mg PE in sodium chloride 0.9 % 100 mL intermittent infusion  100 mg PE Intravenous Q8H    insulin aspart  1-6 Units Subcutaneous Q4H    lacosamide (VIMPAT) 200 mg in sodium chloride 0.9 % 110 mL intermittent infusion  200 mg Intravenous BID    [Held by provider] LORazepam  0.25 mg Oral At Bedtime    [Held by provider] LORazepam  0.5 mg Oral BID    [Held by provider] multivitamin w/minerals  1 tablet Oral Daily    multivitamins w/minerals  15 mL Oral or Feeding Tube Daily    pantoprazole  40 mg Oral or Feeding Tube QAM AC    Or    pantoprazole  40 mg Intravenous QAM AC    [Held by provider] perphenazine  4 mg Oral QAM    [Held by provider] perphenazine  8 mg Oral At Bedtime    polyethylene glycol  17 g Oral or Feeding Tube BID    protein modular  1 packet Per Feeding Tube Daily    sodium chloride (PF)  10-40 mL Intracatheter Q7 Days    sodium chloride (PF)  3 mL Intracatheter Q8H              Allergies:     Allergies   Allergen Reactions    Amlodipine     Clozapine      Egg [Chicken-Derived Products (Egg)]     Penicillins     Potassium     Poultry Meal           Labs:     Recent Results (from the past 48 hour(s))   Glucose by meter    Collection Time: 10/02/23  8:03 PM   Result Value Ref Range    GLUCOSE BY METER POCT 130 (H) 70 - 99 mg/dL   Glucose by meter    Collection Time: 10/03/23 12:06 AM   Result Value Ref Range    GLUCOSE BY METER POCT 126 (H) 70 - 99 mg/dL   Glucose by meter    Collection Time: 10/03/23  3:52 AM   Result Value Ref Range    GLUCOSE BY METER POCT 63 (L) 70 - 99 mg/dL   Glucose by meter    Collection Time: 10/03/23  4:04 AM   Result Value Ref Range    GLUCOSE BY METER POCT 103 (H) 70 - 99 mg/dL   CBC with platelets    Collection Time: 10/03/23  4:05 AM   Result Value Ref Range    WBC Count 5.6 4.0 - 11.0 10e3/uL    RBC Count 3.05 (L) 3.80 - 5.20 10e6/uL    Hemoglobin 7.0 (L) 11.7 - 15.7 g/dL    Hematocrit 24.1 (L) 35.0 - 47.0 %    MCV 79 78 - 100 fL    MCH 23.0 (L) 26.5 - 33.0 pg    MCHC 29.0 (L) 31.5 - 36.5 g/dL    RDW 18.8 (H) 10.0 - 15.0 %    Platelet Count 284 150 - 450 10e3/uL   Basic metabolic panel    Collection Time: 10/03/23  4:05 AM   Result Value Ref Range    Sodium 147 (H) 135 - 145 mmol/L    Potassium 3.3 (L) 3.4 - 5.3 mmol/L    Chloride 116 (H) 98 - 107 mmol/L    Carbon Dioxide (CO2) 21 (L) 22 - 29 mmol/L    Anion Gap 10 7 - 15 mmol/L    Urea Nitrogen 10.8 8.0 - 23.0 mg/dL    Creatinine 0.52 0.51 - 0.95 mg/dL    GFR Estimate >90 >60 mL/min/1.73m2    Calcium 7.3 (L) 8.8 - 10.2 mg/dL    Glucose 111 (H) 70 - 99 mg/dL   Phosphorus    Collection Time: 10/03/23  4:05 AM   Result Value Ref Range    Phosphorus 2.6 2.5 - 4.5 mg/dL   Magnesium    Collection Time: 10/03/23  4:05 AM   Result Value Ref Range    Magnesium 2.1 1.7 - 2.3 mg/dL   Phenytoin level    Collection Time: 10/03/23  4:05 AM   Result Value Ref Range    Phenytoin 17.2   ug/mL   Prepare red blood cells (unit)    Collection Time: 10/03/23  4:54 AM   Result Value Ref Range    Blood  Component Type Red Blood Cells     Product Code [id number]     Unit Status Transfused     Unit Number [id number]     CROSSMATCH Compatible     CODING SYSTEM YRAM204     ISSUE DATE AND TIME [id number]     UNIT ABO/RH B-     UNIT TYPE ISBT 1700    Adult Type and Screen    Collection Time: 10/03/23  5:08 AM   Result Value Ref Range    ABO/RH(D) B POS     Antibody Screen Negative Negative    SPECIMEN EXPIRATION DATE [id number]    Glucose by meter    Collection Time: 10/03/23  7:55 AM   Result Value Ref Range    GLUCOSE BY METER POCT 120 (H) 70 - 99 mg/dL   Potassium    Collection Time: 10/03/23  9:56 AM   Result Value Ref Range    Potassium 3.5 3.4 - 5.3 mmol/L   Triglycerides    Collection Time: 10/03/23  9:56 AM   Result Value Ref Range    Triglycerides 1,372 (H) <150 mg/dL   Glucose by meter    Collection Time: 10/03/23 11:53 AM   Result Value Ref Range    GLUCOSE BY METER POCT 106 (H) 70 - 99 mg/dL   [facility]; ENS2; Encephalopathy, Autoimmune/Paraneoplastic evaluation, serum (Laboratory Miscellaneous Order)    Collection Time: 10/03/23 11:56 AM   Result Value Ref Range    See Scanned Result       Specimen received. Reordered and sent to performing laboratory. Report to follow up on completion.    Performing Laboratory [facility]     Test Name       Encephalopathy, Autoimmune/Paraneoplastic evaluation, serum    Test Code ENS2    Glucose by meter    Collection Time: 10/03/23 12:02 PM   Result Value Ref Range    GLUCOSE BY METER POCT 93 70 - 99 mg/dL   C. difficile Toxin B PCR with reflex to C. difficile Antigen and Toxins A/B EIA    Collection Time: 10/03/23  1:58 PM    Specimen: Per Rectum; Stool   Result Value Ref Range    C Difficile Toxin B by PCR Negative Negative   Glucose by meter    Collection Time: 10/03/23  4:02 PM   Result Value Ref Range    GLUCOSE BY METER POCT 114 (H) 70 - 99 mg/dL   Glucose by meter    Collection Time: 10/03/23  7:55 PM   Result Value Ref Range     GLUCOSE BY METER POCT 105 (H) 70 - 99 mg/dL   Glucose by meter    Collection Time: 10/03/23 11:57 PM   Result Value Ref Range    GLUCOSE BY METER POCT 121 (H) 70 - 99 mg/dL   Glucose by meter    Collection Time: 10/04/23  4:04 AM   Result Value Ref Range    GLUCOSE BY METER POCT 105 (H) 70 - 99 mg/dL   CBC with platelets    Collection Time: 10/04/23  4:06 AM   Result Value Ref Range    WBC Count 8.4 4.0 - 11.0 10e3/uL    RBC Count 3.43 (L) 3.80 - 5.20 10e6/uL    Hemoglobin 8.4 (L) 11.7 - 15.7 g/dL    Hematocrit 27.8 (L) 35.0 - 47.0 %    MCV 81 78 - 100 fL    MCH 24.5 (L) 26.5 - 33.0 pg    MCHC 30.2 (L) 31.5 - 36.5 g/dL    RDW 18.9 (H) 10.0 - 15.0 %    Platelet Count 233 150 - 450 10e3/uL   Basic metabolic panel    Collection Time: 10/04/23  4:06 AM   Result Value Ref Range    Sodium 145 135 - 145 mmol/L    Potassium 3.8 3.4 - 5.3 mmol/L    Chloride 112 (H) 98 - 107 mmol/L    Carbon Dioxide (CO2) 22 22 - 29 mmol/L    Anion Gap 11 7 - 15 mmol/L    Urea Nitrogen 10.9 8.0 - 23.0 mg/dL    Creatinine 0.50 (L) 0.51 - 0.95 mg/dL    GFR Estimate >90 >60 mL/min/1.73m2    Calcium 8.5 (L) 8.8 - 10.2 mg/dL    Glucose 114 (H) 70 - 99 mg/dL   Phosphorus    Collection Time: 10/04/23  4:06 AM   Result Value Ref Range    Phosphorus 3.2 2.5 - 4.5 mg/dL   Magnesium    Collection Time: 10/04/23  4:06 AM   Result Value Ref Range    Magnesium 1.9 1.7 - 2.3 mg/dL   Glucose by meter    Collection Time: 10/04/23  8:20 AM   Result Value Ref Range    GLUCOSE BY METER POCT 119 (H) 70 - 99 mg/dL   Glucose by meter    Collection Time: 10/04/23 12:14 PM   Result Value Ref Range    GLUCOSE BY METER POCT 127 (H) 70 - 99 mg/dL   Glucose by meter    Collection Time: 10/04/23  4:23 PM   Result Value Ref Range    GLUCOSE BY METER POCT 112 (H) 70 - 99 mg/dL          Physical and Psychiatric Examination:     /68 (BP Location: Right arm)   Pulse 67   Temp 99.5  F (37.5  C) (Bladder)   Resp 13   Wt 48.7 kg (107 lb 5.8 oz)   SpO2 96%   BMI  20.97 kg/m    Weight is 107 lbs 5.82 oz  Body mass index is 20.97 kg/m .    Physical Exam:  I have reviewed the physical exam as documented by by the medical team and agree with findings and assessment and have no additional findings to add at this time.    Mental Status Exam:    Appearance:  Sedated and severe distress  Attitude:   Unable to cooperate  Eye Contact:   Sedated  Mood:   Unable to assess  Affect:   Unable to assess  Speech:  mute  Language: Fluent in english   Psychomotor Behavior:  tremor observed  and posturing  Thought Process:   Patient sedated  Associations:   Patient sedated  Thought Content:   Unable to assess  Insight:   Unable to assess  Judgement:   Unable to assess  Oriented to:   Patient sedated  Attention Span and Concentration:   Patient sedated  Recent and Remote Memory:   Unable to assess  Fund of Knowledge: Appropriate   Gait and Station:                DSM-5 Diagnosis:   Schizoaffective disorder versus disorganized schizophrenia    Delirium, rule out catatonia versus malignant catatonia    UTI          Assessment:     This is a 68-year-old female with a history of schizoaffective disorder versus schizophrenia.  I have met her before, and she told me that this all happened after she sustained a head injury at age 29 when she fell on ice, and hit her head, losing consciousness.  I am not able to find any neurology notes from when this happened, as it was essentially 40 years ago.    She has had multiple hospitalizations including at the [facility] for psychiatric reasons.  She has tardive dyskinesia which she told me is better when she is on her Trilafon.  She also states Ativan helps her with this.    Patient has had altered mentation since presenting.  This morning, she was posturing, had some rigidity, and has noted to have a climbing fever overnight.  There is concern for sepsis related to UTI.  She was intubated by the time I saw her, but I am concerned that she is  catatonic.  With her fever, and elevated blood pressure, another concern is malignant catatonia.  Patient CK is normal this morning at 105, so I am less concerned about neuroleptic malignant syndrome. Recommend following serial CKs.    Patient is getting Versed, clearly a benzodiazepine, as sedation.  Typical treatment for catatonia is Ativan.  We would usually attempt Ativan at a dose of 1 mg IV as a lorazepam challenge to see if patient had clinical improvement with catatonia symptoms.  She is already intubated, so I am not sure how to manage this.  Will discuss with intensivist.      The other option is ECT.  We cannot do that here, so the patient will need to be transferred to the [facility].  I talked to my colleague at the [facility], and there are many logistical challenges with getting the patient over there.  Hopefully we can ascertain whether or not this is actually malignant catatonia, and aggressively treat with Ativan.  I do see some case reports of other benzodiazepines having benefit, though lorazepam is the standard of practice in terms of treatment.    There is a work-up underway to see if there is another etiology medically of what is going on with her.  She had an LP, and CT head.    Patient continues with status seizures despite deep sedation.  There had been concern that the patient had meningitis, but this appears to be unlikely, as growth in broth is likely contaminant.    Patient is very ill, and needs a surrogate decision-maker.  RN indicates that its not clear if there is anyone.  The patient likely had case management since she has a history of commitment.  Have asked Ayaka Killian [facility] from our team to investigate this and see if there may be somebody who is involved from the [facility] or state.          Summary of Recommendations:     1.  We will try to assist to find whether or not there may be involvement from the county or state given the patient's past history of commitment.   This person may know of family who can act as substitute decision-maker.    2.  We will follow along.  No other recommendations today as patient is very ill and deeply sedated.      Milagros Parker MD  Consult/Liaison Psychiatry and Addiction Medicine  [facility]

## 2023-10-04 NOTE — PROGRESS NOTES
EEG CLINICAL NEUROPHYSIOLOGY PRELIMINARY REPORT    Propofol stopped abruptly at 640 PM. Prolonged run of generalized periodic discharges at the rat eof 1.5 Hz started 7 PM and continued through 840 PM or so. No clear clinical correlate. Following this sporadic periodic discharges only through approx 9 PM. Midazolam increased to 13 mg.hour, has not received additional lorazapam.    Discussed with neurology. Altho we are not seeing persisting periodic discharges at present, am concerned that modest increase in midazolam may not provided long term control. Would consider further escalation of midazolam and more gradual reduction as appropriate.    Basilio Adame MD

## 2023-10-04 NOTE — PROGRESS NOTES
CLINICAL NUTRITION SERVICES - REASSESSMENT NOTE      Recommendations Ordered by Registered Dietitian (RD): Increase TF to Osmolite 1.5 at 30 mL/hr= 1080 kcal, 45 g protein, 147 g CHO, 0 g fiber, 549 mL H2O  Add Banatrol TF20 BID = 90 kcal, 10 g fiber, 4 g protein   Continue ProSource TF20 daily = 80 kcal and 20 g protein   Total = 1250 kcal (28 kcal/kg), 69 g protein (1.5 g/kg)   Malnutrition: (9/30)  % Weight Loss: More information needed, no wt loss in the past 6 months   % Intake:  <75% for >/= 3 months (moderate malnutrition) - chronic limited food intake   Subcutaneous Fat Loss:  None acute changes observed, suspect chronic low stores (exam compared to prior hospitalization 6 months ago)  Muscle Loss:  None acute changes observed, suspect chronic low stores (exam compared to prior hospitalization 6 months ago)  Fluid Retention:  None noted     Malnutrition Diagnosis: Unable to determine d/t lack of information - pt remains at risk       EVALUATION OF PROGRESS TOWARD GOALS   Diet:  NPO on vent     Nutrition Support:  Patient increased to goal TF regimen on 10/2 and continues as follows ~    Nutrition Support Enteral:  Type of Feeding Tube: NG  Enteral Frequency:  Continuous  Enteral Regimen: Osmolite 1.5 at 25 mL/hr  Total Enteral Provisions: 900 kcal, 38 g protein, 122 g CHO, 0 g fiber, 457 mL H2O  Free Water Flush: 150 mL every 4 hours   ProSource TF 20 = 80 kcal and 20 g protein   Total = 980 kcal (22 kcal/kg and 87% needs), 58 g protein (1.3 g/kg)      Intake/Tolerance:    K/Mg/Phos normal   BGM  with Medium sliding scale insulin  TG 1372 (H) - Propofol turned off 10/3  I/O 3948/865, no weight today   Stool = 125 mL (C.diff negative) (H/O constipation)      ASSESSED NUTRITION NEEDS:  Dosing Weight 45 kg   Estimated Energy Needs: 9906-9643+ kcals (25-30+ Kcal/Kg)  Justification: maintenance and vented  Estimated Protein Needs: 54-68 grams protein (1.2-1.5 g pro/Kg)  Justification: preservation of lean  body mass  Estimated Fluid Needs: 1 mL/kcal or per MD   Justification: maintenance      NEW FINDINGS:   9/30: MRI of brain = 1.  No acute intracranial process.   2.  Generalized brain atrophy and presumed microvascular ischemic changes as detailed above.     Patient receiving Certavite daily for supplementation     Previous Goals (9/30):   EN + Propofol + Prosource to meet % estimated needs within 48   Evaluation: Not met    Previous Nutrition Diagnosis (9/30):   Inadequate protein-energy intake related to NPO, vented as evidenced by day 1 w/o nutrition   Evaluation: Improving        CURRENT NUTRITION DIAGNOSIS  Inadequate energy intake related to TF at goal, Propofol now off as evidenced by meeting < 90% energy needs     INTERVENTIONS  Recommendations / Nutrition Prescription  Increase TF to Osmolite 1.5 at 30 mL/hr= 1080 kcal, 45 g protein, 147 g CHO, 0 g fiber, 549 mL H2O  Add Banatrol TF20 BID = 90 kcal, 10 g fiber, 4 g protein   Continue ProSource TF20 daily = 80 kcal and 20 g protein   Total = 1250 kcal (28 kcal/kg), 69 g protein (1.5 g/kg)    Implementation  EN Composition, Medical Food Supplement:  Above TF change orders written as above   Collaboration and Referral of Nutrition care:  Patient discussed today during interdisciplinary bedside rounds     Goals  TF regimen will meet % needs  Stooling volume will decrease with addition of Banatrol     MONITORING AND EVALUATION:  Progress towards goals will be monitored and evaluated per protocol and Practice Guidelines    Libertad Romano RD, LD, CNSC   Clinical Dietitian - [facility]

## 2023-10-04 NOTE — PROGRESS NOTES
Critical Care  Note      10/04/2023    Name: Kortney Germain MRN#: [id number]   Age: 68 year old YOB: 1955     Hsptl Day# 6  ICU DAY #3    MV DAY #3           Problem List:   Principal Problem:    Urinary retention  Active Problems:    Acute cystitis without hematuria    Constipation, unspecified constipation type    Anemia, unspecified type         Summary/Hospital Course:   68F pmh of schizo-affective schizophrenia, HTN, tardive dyskinesia now presented 9/28 from group home with back pain and fever, Ct abd showed rectum distended with stool and urinary bladder distended, now s/p quinonez placement.  Arrival UA c/w UTI. Since arrival from the ED she has been alert but not generally interactive (?part of this may be due to underlying psychiatric illness?). Morning of 9/29 she was noted to be febrile and shaking prompting intubation to facilitate head CT and LP.  Placed on heavy sedation including benzodiazepine and propofol.  EEG is not showing seizures since then but has shown evidence of potential new seizure foci.  And has been to continue heavy sedation.  Evening of 10/3 triglycerides returned critically high level. Gave 1 dose of Ativan shut off the propofol  After adding additional doses of as needed benzodiazepines and increasing the goal range of the midazolam. Did have more seizure-like activity which resolved with increasing benzodiazepines.      Assessment and plan :     Kortney Germain IS a 68 year old female admitted on 9/28/2023 for fever and UTI.   I have personally reviewed the daily labs, imaging studies, cultures and discussed the case with referring physician and consulting physicians.     My assessment and plan by system for this patient is as follows:    Neurology/Psychiatry:   1. Underlying schizoaffective schizophrenia and h/o tardive dyskinesia: appreciate psych recommendations.    2. Status epilepticus. Also shaking movements which are not associated with EEG.  Considered  serotonin syndrome but less likely now.  Psychiatrist also wondering about malignant catatonia.    3. Propofol intolerance - high triglyceride level.  -Increase RASS when okay with neurologist.    --prn dilaudid  --Appreciate regular check in by Psychiatrist  -- Unclear if she has capacity to make her own decisions and does not have a designated surrogate decision-maker.  Social work is working with her  to determine whether we currently have a surrogate decision maker and if not what the process would be to find 1.    Cardiovascular:   1.Hypertension:  no h/o essential hypertension.  Improves with pain/sedation mgmt.    Pulmonary/Ventilator Management:   1. Loss of protective airway reflexes requiring intubation and mechanical ventilation:  Neurologically inappropriate for extubation today.  -Continue lung protective ventilation.  Daily pressure support mode for conditioning    GI and Nutrition:   1. Tube feeding  2. PPI for pud prophy    Renal/Fluids/Electrolytes:   No acute issues.  - One dose diuretic today for positive fluid balance    Infectious Disease:   1. Sirs and possible UTI--on ctx and vanco, UC with mixed christina but >100k colonies.  Vancomycin was stopped.  On ceftriaxone for 5-day course  2. CSF preliminary studies unconcerning for meningitis. Gram positive cocci in anaerobic broth only which is a contaminant.    Endocrine:   1. Stress induced hyperglycemia:  Not needing insulin    Hematology/Oncology:   1. Anemia of critical illness.-- s/p 1 prbc 9/29. no apparent blood loss.  - Retransfuse when below 7.  2. Leukocytosis: Improved with antibiotics for presumed UTI.    ICU Prophylaxis:   1. DVT: Hep Subq/ mechanical  2. VAP: HOB 30 degrees, chlorhexidine rinse  3. Stress Ulcer: PPI  4. Restraints: Nonviolent soft two point restraints required and necessary for patient safety and continued cares and good effect as patient continues to pull at necessary lines, tubes despite education and  distraction. Will readdress daily.   5. Wound care  -   6. Feeding - tf  7. Family Update:apparently has no family. more in neuropsych section  8. Disposition - icu    Clinically Significant Risk Factors        # Hypokalemia: Lowest K = 3.3 mmol/L in last 2 days, will replace as needed  # Hypernatremia: Highest Na = 147 mmol/L in last 2 days, will monitor as appropriate      # Hypoalbuminemia: Lowest albumin = 3.4 g/dL at 9/30/2023  5:15 AM, will monitor as appropriate                            Critical care time: 40 minutes, excluding procedures           Key Medications:      cefTRIAXone  1 g Intravenous Q24H    chlorhexidine  15 mL Mouth/Throat Q12H    diphenhydrAMINE  50 mg Oral or Feeding Tube BID    fosphenytoin (CEREBYX) 100 mg PE in sodium chloride 0.9 % 100 mL intermittent infusion  100 mg PE Intravenous Q8H    insulin aspart  1-6 Units Subcutaneous Q4H    lacosamide (VIMPAT) 200 mg in sodium chloride 0.9 % 110 mL intermittent infusion  200 mg Intravenous BID    [Held by provider] LORazepam  0.25 mg Oral At Bedtime    [Held by provider] LORazepam  0.5 mg Oral BID    [Held by provider] multivitamin w/minerals  1 tablet Oral Daily    multivitamins w/minerals  15 mL Oral or Feeding Tube Daily    pantoprazole  40 mg Oral or Feeding Tube QAM AC    Or    pantoprazole  40 mg Intravenous QAM AC    [Held by provider] perphenazine  4 mg Oral QAM    [Held by provider] perphenazine  8 mg Oral At Bedtime    polyethylene glycol  17 g Oral or Feeding Tube BID    protein modular  1 packet Per Feeding Tube Daily    sodium chloride (PF)  10-40 mL Intracatheter Q7 Days    sodium chloride (PF)  3 mL Intracatheter Q8H      dextrose      fentaNYL 75 mcg/hr (10/04/23 0202)    midazolam 18 mg/hr (10/04/23 0353)    norepinephrine Stopped (10/03/23 0804)    sodium chloride 20 mL/hr at 10/03/23 1607              Physical Examination:   Temp:  [97.5  F (36.4  C)-99.7  F (37.6  C)] 99.1  F (37.3  C)  Pulse:  [55-73] 63  Resp:  [9-28]  11  BP: ()/(49-92) 116/59  FiO2 (%):  [30 %] 30 %  SpO2:  [93 %-99 %] 95 %      Intake/Output Summary (Last 24 hours) at 10/4/2023 0738  Last data filed at 10/4/2023 0600  Gross per 24 hour   Intake 3948.73 ml   Output 865 ml   Net 3083.73 ml   Net: +7.3 liters      Wt Readings from Last 4 Encounters:   10/03/23 48.7 kg (107 lb 5.8 oz)   06/08/23 40.8 kg (90 lb)   03/15/23 41 kg (90 lb 6.2 oz)   02/02/22 52.2 kg (115 lb)     BP - Mean:  [] 84  Vent Mode: CMV/AC  (Continuous Mandatory Ventilation/ Assist Control)  FiO2 (%): 30 %  Resp Rate (Set): 12 breaths/min  Tidal Volume (Set, mL): 400 mL  PEEP (cm H2O): 5 cmH2O  Pressure Support (cm H2O): 5 cmH2O  Resp: 11    Recent Labs   Lab 09/30/23  0750 09/29/23  1253 09/29/23  1141 09/29/23  0855   PH 7.39 7.39  --   --    PCO2 36 31*  --   --    PO2 192* 208*  --   --    HCO3 22 19*  --   --    O2PER 40 50 4 0         GEN: no acute distress   HEENT: head ncat, sclera anicteric, OP patent, trachea midline   PULM: unlabored synchronous with vent, clear anteriorly    CV/COR: RRR S1S2 no gallop,  No rub, no murmur  ABD: soft nontender, hypoactive bowel sounds, no mass  EXT:  warm, some edema  NEURO: PERRL, no obvious deficits  SKIN: multiple bruises, left sided picc  LINES: clean, dry intact         Data:   All data and imaging reviewed     ROUTINE ICU LABS (Last four results)  CMP  Recent Labs   Lab 10/04/23  0406 10/04/23  0404 10/03/23  2357 10/03/23  1955 10/03/23  1153 10/03/23  0956 10/03/23  0755 10/03/23  0405 10/02/23  0832 10/02/23  0410 10/01/23  1954 10/01/23  1551 10/01/23  0743 10/01/23  0457 09/30/23  0900 09/30/23  0515 09/29/23  0855 09/29/23  0518 09/29/23  0424 09/28/23  1244     --   --   --   --   --   --  147*  --  147*  --   --   --  136  --  141   < > 147*  --  147*   POTASSIUM 3.8  --   --   --   --  3.5  --  3.3*  --  3.6  --   --   --  3.4  3.4   < > 2.7*   < > 3.6  --  4.8   CHLORIDE 112*  --   --   --   --   --   --  116*  --   117*  --   --   --  105  --  111*   < > 115*  --  118*   CO2 22  --   --   --   --   --   --  21*  --  21*  --   --   --  19*  --  19*   < > 14*  --  17*   ANIONGAP 11  --   --   --   --   --   --  10  --  9  --   --   --  12  --  11   < > 18*  --  12   * 105* 121* 105*   < >  --    < > 111*   < > 103*   < > 115*   < > 145*   < > 141*   < > 156*   < > 115*   BUN 10.9  --   --   --   --   --   --  10.8  --  7.6*  --   --   --  6.7*  --  18.4   < > 26.1*  --  43.3*   CR 0.50*  --   --   --   --   --   --  0.52  --  0.39*  --   --   --  0.43*  --  0.56   < > 0.63  --  0.81   GFRESTIMATED >90  --   --   --   --   --   --  >90  --  >90  --   --   --  >90  --  >90   < > >90  --  79   LULU 8.5*  --   --   --   --   --   --  7.3*  --  6.6*  --   --   --  7.0*  --  7.5*   < > 8.6*  --  9.3   MAG 1.9  --   --   --   --   --   --  2.1  --  2.1  --   --   --  2.1  --  2.2   < >  --   --   --    PHOS 3.2  --   --   --   --   --   --  2.6  --  2.6  --  1.5*  --  0.8*  --  1.7*   < >  --   --   --    PROTTOTAL  --   --   --   --   --   --   --   --   --   --   --   --   --   --   --  5.6*  --  6.7  --  6.9   ALBUMIN  --   --   --   --   --   --   --   --   --   --   --   --   --   --   --  3.4*  --  4.2  --  4.2   BILITOTAL  --   --   --   --   --   --   --   --   --   --   --   --   --   --   --  0.2  --  0.2  --  <0.2   ALKPHOS  --   --   --   --   --   --   --   --   --   --   --   --   --   --   --  66  --  77  --  63   AST  --   --   --   --   --   --   --   --   --   --   --   --   --   --   --  27  --  22  --  14   ALT  --   --   --   --   --   --   --   --   --   --   --   --   --   --   --  20  --  16  --  15    < > = values in this interval not displayed.       CBC  Recent Labs   Lab 10/04/23  0406 10/03/23  0405 10/02/23  0410 10/02/23  0005 10/01/23  1143 10/01/23  0457   WBC 8.4 5.6 6.5  --   --  10.6   RBC 3.43* 3.05* 3.18*  --   --  3.34*   HGB 8.4* 7.0* 7.6* 7.5*   < > 7.9*   HCT 27.8* 24.1* 24.8*  --   --   25.7*   MCV 81 79 78  --   --  77*   MCH 24.5* 23.0* 23.9*  --   --  23.7*   MCHC 30.2* 29.0* 30.6*  --   --  30.7*   RDW 18.9* 18.8* 18.3*  --   --  17.9*    284 296  --   --  342    < > = values in this interval not displayed.       INR  Recent Labs   Lab 09/28/23  1244   INR 1.04       Arterial Blood Gas  Recent Labs   Lab 09/30/23  0750 09/29/23  1253 09/29/23  1141 09/29/23  0855   PH 7.39 7.39  --   --    PCO2 36 31*  --   --    PO2 192* 208*  --   --    HCO3 22 19*  --   --    O2PER 40 50 4 0            Past Medical/surgical hx, meds, allergies, social history, family history     Past Medical History:   Diagnosis Date    Allergic state     Depressive disorder     Dyskinesia     Hypertension     Schizo affective schizophrenia (H)      No past surgical history on file.   cefTRIAXone  1 g Intravenous Q24H    chlorhexidine  15 mL Mouth/Throat Q12H    diphenhydrAMINE  50 mg Oral or Feeding Tube BID    fosphenytoin (CEREBYX) 100 mg PE in sodium chloride 0.9 % 100 mL intermittent infusion  100 mg PE Intravenous Q8H    insulin aspart  1-6 Units Subcutaneous Q4H    lacosamide (VIMPAT) 200 mg in sodium chloride 0.9 % 110 mL intermittent infusion  200 mg Intravenous BID    [Held by provider] LORazepam  0.25 mg Oral At Bedtime    [Held by provider] LORazepam  0.5 mg Oral BID    [Held by provider] multivitamin w/minerals  1 tablet Oral Daily    multivitamins w/minerals  15 mL Oral or Feeding Tube Daily    pantoprazole  40 mg Oral or Feeding Tube QAM AC    Or    pantoprazole  40 mg Intravenous QAM AC    [Held by provider] perphenazine  4 mg Oral QAM    [Held by provider] perphenazine  8 mg Oral At Bedtime    polyethylene glycol  17 g Oral or Feeding Tube BID    protein modular  1 packet Per Feeding Tube Daily    sodium chloride (PF)  10-40 mL Intracatheter Q7 Days    sodium chloride (PF)  3 mL Intracatheter Q8H      dextrose      fentaNYL 75 mcg/hr (10/04/23 0202)    midazolam 18 mg/hr (10/04/23 3099)     norepinephrine Stopped (10/03/23 0804)    sodium chloride 20 mL/hr at 10/03/23 1607          Allergies   Allergen Reactions    Amlodipine     Clozapine     Egg [Chicken-Derived Products (Egg)]     Penicillins     Potassium     Poultry Meal      Social History     Socioeconomic History    Marital status: Single     Spouse name: Not on file    Number of children: Not on file    Years of education: Not on file    Highest education level: Not on file   Occupational History    Not on file   Tobacco Use    Smoking status: Never    Smokeless tobacco: Never   Substance and Sexual Activity    Alcohol use: No    Drug use: No    Sexual activity: Never   Other Topics Concern    Not on file   Social History Narrative    Not on file     Social Determinants of Health     Financial Resource Strain: Not on file   Food Insecurity: Not on file   Transportation Needs: Not on file   Physical Activity: Not on file   Stress: Not on file   Social Connections: Not on file   Interpersonal Safety: Not on file   Housing Stability: Not on file     No family history on file.

## 2023-10-04 NOTE — PROGRESS NOTES
[facility]    Stroke/NCC Progress Note    Interval Events  Propofol discontinued 10/3 due to elevate triglycerides. Following this, EEG demonstrated a prolonged run of generalized periodic discharges. Versed increased to 18 mg/hr. Overnight EEG with only sporadic generalized sharp waves, no seizures.     HPI Summary  Kortney Germain is a 68 year old female with PMHx significant for anxiety/depression, schizoaffective disorder (living in group home), HTN, odynophagia, tardive dyskinesia, HTN. She presented 9/28 with lower back pain and abdominal pain. She was found to have UTI, anemia, and constipation. She had decreased LOC and generalized shaking overnight. She remained febrile. RRT called 9/29/23 for encephalopathy and concern that should would be unable to protect her airway. 1 PRBC given 9/29/23 due to anemia.    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   MRI: L hyperintensity on DWI/FLAIR without correlate on ADC or T1, favored to be artifact     60 minute EEG: abnormal consistent with a severe encephalopathy and ?status epilepticus; it is important to note patient has persistent head tremoring which may also produce a rhythmic artifact on the EEG and there is persistent electro myogenic artifact making this 1 hour segment difficult to interpret      cEEG:   -9/30: moderate to severe encephalopathy and periodic pattern which may be SIRPIDS (stimulus-induced rhythmic, periodic, or ictal discharges)  - 10/1 (after administration of paralytic to remove muscle artifact): per verbal report underlying cortical activity consistent with seizures  - 10/3: generalized periodic discharges present but less persistent than previously. Does not meet criteria for NCSE.     Blood cultures: NGTD     LP labs:  -glucose 98  -protein 22.8  -2 nucleated cells, RBC 0  -aerobic culture GPC in broth only   -anaerobic culture NGTD  - HSV  "negative    Impression   Status epilepticus, EEG improved on sedative drips.    Shaking/tremors, unclear etiology. May represent baseline tardive dyskinesia vs manifestation of status epilepticus vs toxic encephalopathy vs other     Plan  - Continue lacosamide 200 mg BID  - continue fosphenytoin 100mg every 8 hours  - start clobazam 10 mg BID   - continuous EEG  - continue deep sedation pending further improvement in EEG  - continue to hold/limit dopaminergic, serotonergic agents   - [place]son paraneoplastic panel in process   - Consider ID consult given GPC in CSF    Patient Follow-up    - final recommendation pending work-up    We will continue to follow.     Gricelda Cody, TAMI, CNP  Neurology  10/04/2023 7:34 AM  To page stroke neurology after hours or on a subsequent day, click here: [facility]  Choose \"On Call\" tab at top, then search dropdown box for \"Neurology Adult\" & press Enter, look for Neuro ICU/Stroke     _____________________________________________________    Clinically Significant Risk Factors        # Hypokalemia: Lowest K = 3.3 mmol/L in last 2 days, will replace as needed  # Hypernatremia: Highest Na = 147 mmol/L in last 2 days, will monitor as appropriate      # Hypoalbuminemia: Lowest albumin = 3.4 g/dL at 9/30/2023  5:15 AM, will monitor as appropriate                         Medications   Scheduled Meds   cefTRIAXone  1 g Intravenous Q24H    chlorhexidine  15 mL Mouth/Throat Q12H    diphenhydrAMINE  50 mg Oral or Feeding Tube BID    fosphenytoin (CEREBYX) 100 mg PE in sodium chloride 0.9 % 100 mL intermittent infusion  100 mg PE Intravenous Q8H    insulin aspart  1-6 Units Subcutaneous Q4H    lacosamide (VIMPAT) 200 mg in sodium chloride 0.9 % 110 mL intermittent infusion  200 mg Intravenous BID    [Held by provider] LORazepam  0.25 mg Oral At Bedtime    [Held by provider] LORazepam  0.5 mg Oral BID    [Held by provider] multivitamin w/minerals  1 tablet Oral Daily    multivitamins w/minerals  15 mL " Oral or Feeding Tube Daily    pantoprazole  40 mg Oral or Feeding Tube QAM AC    Or    pantoprazole  40 mg Intravenous QAM AC    [Held by provider] perphenazine  4 mg Oral QAM    [Held by provider] perphenazine  8 mg Oral At Bedtime    polyethylene glycol  17 g Oral or Feeding Tube BID    protein modular  1 packet Per Feeding Tube Daily    sodium chloride (PF)  10-40 mL Intracatheter Q7 Days    sodium chloride (PF)  3 mL Intracatheter Q8H       Infusion Meds   dextrose      fentaNYL 75 mcg/hr (10/04/23 0202)    midazolam 18 mg/hr (10/04/23 0353)    norepinephrine Stopped (10/03/23 0804)    sodium chloride 20 mL/hr at 10/03/23 1607       PRN Meds  acetaminophen **OR** acetaminophen, albuterol, [Held by provider] cyclobenzaprine, dextrose, glucose **OR** dextrose **OR** glucagon, fentaNYL, HYDROmorphone, lidocaine 4%, lidocaine (buffered or not buffered), LORazepam, melatonin, midazolam, naloxone **OR** naloxone **OR** naloxone **OR** naloxone, nitroGLYcerin, sodium chloride (PF), sodium chloride (PF), sodium chloride (PF), sodium chloride (PF)       PHYSICAL EXAMINATION  Temp:  [97.5  F (36.4  C)-99.7  F (37.6  C)] 99.1  F (37.3  C)  Pulse:  [55-73] 63  Resp:  [9-28] 11  BP: ()/(49-92) 116/59  FiO2 (%):  [30 %] 30 %  SpO2:  [93 %-99 %] 95 %      General Exam  General:  patient lying in bed without any acute distress    HEENT:  normocephalic/atraumatic  Pulmonary:  no respiratory distress, on mechanical ventilation    Neuro Exam (deep sedation with versed)   Mental Status:  comatose, does not respond to verbal, tactile or noxious stimuli  Cranial Nerves:  gaze conjugate, PERRL  Motor: no abnormal movements, toe movement to noxious in LLE (not clearly withdrawal), no movement in other extremities    Imaging  I personally reviewed all imaging; relevant findings per HPI.     Lab Results Data   CBC  Recent Labs   Lab 10/04/23  0406 10/03/23  0405 10/02/23  0410   WBC 8.4 5.6 6.5   RBC 3.43* 3.05* 3.18*   HGB 8.4*  7.0* 7.6*   HCT 27.8* 24.1* 24.8*    284 296     Basic Metabolic Panel    Recent Labs   Lab 10/04/23  0406 10/04/23  0404 10/03/23  2357 10/03/23  1153 10/03/23  0956 10/03/23  0755 10/03/23  0405 10/02/23  0832 10/02/23  0410     --   --   --   --   --  147*  --  147*   POTASSIUM 3.8  --   --   --  3.5  --  3.3*  --  3.6   CHLORIDE 112*  --   --   --   --   --  116*  --  117*   CO2 22  --   --   --   --   --  21*  --  21*   BUN 10.9  --   --   --   --   --  10.8  --  7.6*   CR 0.50*  --   --   --   --   --  0.52  --  0.39*   * 105* 121*   < >  --    < > 111*   < > 103*   LUUL 8.5*  --   --   --   --   --  7.3*  --  6.6*    < > = values in this interval not displayed.     Liver Panel  Recent Labs   Lab 09/30/23  0515 09/29/23  0518 09/28/23  1244   PROTTOTAL 5.6* 6.7 6.9   ALBUMIN 3.4* 4.2 4.2   BILITOTAL 0.2 0.2 <0.2   ALKPHOS 66 77 63   AST 27 22 14   ALT 20 16 15     INR    Recent Labs   Lab Test 09/28/23  1244 03/07/23  1629   INR 1.04 1.15      Lipid Profile    Recent Labs   Lab Test 10/03/23  0956 03/08/23  0830   CHOL  --  143   HDL  --  50   LDL  --  75   TRIG 1,372* 88     A1C  No lab results found.  Troponin  No results for input(s): CTROPT, TROPONINIS, TROPONINI, GHTROP in the last 168 hours.       Data   Billing: I personally examined and evaluated the patient today. At the time of my evaluation and management the patient was critical condition today due to seizure. I personally managed examination. Key decisions made today included labs. I spent a total of 35 minutes providing critical care services, evaluating the patient, directing care and reviewing laboratory values and radiologic reports.

## 2023-10-04 NOTE — PROGRESS NOTES
[facility] ICU RESPIRATORY NOTE        Date of Admission: 9/28/2023    Date of Intubation (most recent): 9/29/23    Reason for Mechanical Ventilation: AW protection    Number of Days on Mechanical Ventilation: 6    Met Criteria for Spontaneous Breathing Trial: Yes - PS 5/5 for 2.5 hrs    Significant Events Today: None    ABG Results:   Recent Labs   Lab 09/30/23  0750 09/29/23  1253 09/29/23  1141 09/29/23  0855   PH 7.39 7.39  --   --    PCO2 36 31*  --   --    PO2 192* 208*  --   --    HCO3 22 19*  --   --    O2PER 40 50 4 0         Current Vent Settings: Vent Mode: CMV/AC  (Continuous Mandatory Ventilation/ Assist Control)  FiO2 (%): 30 %  Resp Rate (Set): 12 breaths/min  Tidal Volume (Set, mL): 400 mL  PEEP (cm H2O): 5 cmH2O  Pressure Support (cm H2O): 5 cmH2O  Resp: 15      Skin Assessment: Intact    Plan: Pt to remain on full vent support overnight    Bj Richey, RT

## 2023-10-04 NOTE — PROGRESS NOTES
[facility] ICU RESPIRATORY NOTE           Date of Admission: 9/28/2023     Date of Intubation (most recent): 9/29/2023     Reason for Mechanical Ventilation: Airway protection      Number of Days on Mechanical Ventilation: 6     Met Criteria for Spontaneous Breathing Trial: Yes       Significant Events Today: None overnight     ABG Results:   Recent Labs   Lab 09/30/23  0750 09/29/23  1253 09/29/23  1141 09/29/23  0855   PH 7.39 7.39  --   --    PCO2 36 31*  --   --    PO2 192* 208*  --   --    HCO3 22 19*  --   --    O2PER 40 50 4 0     Vent Mode: CMV/AC  (Continuous Mandatory Ventilation/ Assist Control)  FiO2 (%): 30 %  Resp Rate (Set): 12 breaths/min  Tidal Volume (Set, mL): 400 mL  PEEP (cm H2O): 5 cmH2O  Pressure Support (cm H2O): 5 cmH2O  Resp: 12    NIKA Martinez, RRT

## 2023-10-04 NOTE — PLAN OF CARE
Neuro: PERRL, no eye opening. RASS goal (-4)-(-5) maintained throughout shift. Occasionally moves BLEs spontaneously. + cough.  CV: SR/SB. Pulses intact. Normotensive.  Resp: LS course. ETT 21 @ lip. CMV 30%, PEEP 5. CPAP/PS for 2hrs - tolerated well.  GI: Flexiseal patent. NGT running at goal.   : quinonez for retention, positional. BG controlled. Lasix x1 today.  Skin: Coccyx wound clean and dry- applied criticaid paste per woc orders. Scattered bruises. R wrist/hand +2 edema & BLE ankle/foot +1 edema.     Access: L Triple lumen PICC.   Gtts:  versed, fent, tko  Other: Per neuro crit- EEG still showing excitability, continue sedation.

## 2023-10-05 NOTE — PROGRESS NOTES
EEG CLINICAL NEUROPHYSIOLOGY PRELIMINARY REPORT    Video EEG monitoring through approximately 915 today reviewed.  Midazolam 18 mg/h.  Normothermic.  Background largely continuous with polymorphic delta 20 to 30  V in amplitude.  Abundant superimposed diffuse alpha 10  V in amplitude.  Occasional discontinuities up to several seconds.  Generalized sharp waves occur rarely.  A brief run around 6 PM yesterday lasted about 20 seconds.  No electrographic seizures.    Study consistent with moderate to severe diffuse encephalopathy.  Findings may in part be related to sedative drips employed.  Extent of underlying cerebral dysfunction separate from anesthetic effects difficult to ascertain.  Generalized sharp waves are seen rarely.  Runs of generalized periodic discharges are very rare and do not meet criteria for nonconvulsive status epilepticus.    Full report to follow.    Basilio Adame MD  Contact information for physicians covering Epilepsy and EEG is available on [facility].  Click search, enter neurology adult/[facility] in group name box, click on neurology adult/[facility], then click Staff Epilepsy and EEG.

## 2023-10-05 NOTE — CONSULTS
"Care Management Follow Up    Length of Stay (days): 7    Expected Discharge Date:  Unknown     Concerns to be Addressed:  discharge planning and decision making     Patient plan of care discussed at interdisciplinary rounds: Yes    Anticipated Discharge Disposition: Group Home     Anticipated Discharge Services: Transportation Services  Anticipated Discharge DME:      Patient/family educated on Medicare website which has current facility and service quality ratings:    Education Provided on the Discharge Plan:  yes  Patient/Family in Agreement with the Plan:  unable to assess    Referrals Placed by CM/SW: Community [facility]idential [facility] (Group Homes)  Private pay costs discussed: Not applicable    Additional Information:  Writer received a call from bedside RN Intensivist is asking for an update on contact(s) for the patient as they are possibly discussing trach/peg and next steps.  Writer called the following:  Emely  Manager [phone] she stated no NOK and patient was out in community and \"secretive\" about what she was doing. She will update the Director of the  and provide the CCRN number if additional information is available.  [facility] [employer] Phone# [phone] ext [phone] (called and this number states no messages can be left for this voicemail)  Per Emely alternate number for [facility] isPhone#[phone] (left detailed message with call back regarding decision making)  Message to Melecio Nunez phone [phone] asking for a call back to get further direction.  Writer sent a message to Care Transitions Manager Tonia regarding further recommendations on getting assistance for decision making.    Milagros Mendenhall RN, BSN, ACM   Care Transitions Specialist  [facility]  Care Transitions Specialist  [email or web address] [email or web address]  [email or web address]  Office: [phone] Fax: [phone]  [facility]            "

## 2023-10-05 NOTE — PLAN OF CARE
Goal Outcome Evaluation:      Plan of Care Reviewed With: other (see comments) (Unable to update, attempts through care coordination in progress)               Sedated. RASS -5. PERLL. EEG continues. Fentanyl stopped per provider. Versed decreased to 8mg/hr, plan is to stay at this rate until directed by NCC team. Pt on full vent support, 2.5 hours of CPAP/PS this afternoon.  Awaiting for information from care coordination on the decision making process.

## 2023-10-05 NOTE — PROGRESS NOTES
[facility] ICU RESPIRATORY NOTE        Date of Admission: 9/28/2023    Date of Intubation (most recent): 9/29/2023    Reason for Mechanical Ventilation: Airway protection    Number of Days on Mechanical Ventilation: 6    Significant Events Today: PS 5/5 for 2.5 hours    ABG Results:   Recent Labs   Lab 09/30/23  0750 09/29/23  1253 09/29/23  1141 09/29/23  0855   PH 7.39 7.39  --   --    PCO2 36 31*  --   --    PO2 192* 208*  --   --    HCO3 22 19*  --   --    O2PER 40 50 4 0         Current Vent Settings: Vent Mode: CMV/AC  (Continuous Mandatory Ventilation/ Assist Control)  FiO2 (%): 30 %  Resp Rate (Set): 12 breaths/min  Tidal Volume (Set, mL): 400 mL  PEEP (cm H2O): 5 cmH2O  Pressure Support (cm H2O): 5 cmH2O  Resp: 14      Skin Assessment: Clean, dry, intact    Plan: Continue on full support overnight    RT ANNI on 10/4/2023 at 10:06 PM

## 2023-10-05 NOTE — PROGRESS NOTES
Count includes the [facility] ICU RESPIRATORY NOTE        Date of Admission: 9/28/2023    Date of Intubation (most recent): 9/29/2023    Reason for Mechanical Ventilation: Airway protection    Number of Days on Mechanical Ventilation: 7    Met Criteria for Spontaneous Breathing Trial: Yes - 35 %    Reason for No Spontaneous Breathing Trial: NA    Significant Events Today: Started PS trial at 1540    ABG Results:   Recent Labs   Lab 09/30/23  0750 09/29/23  1253 09/29/23  1141 09/29/23  0855   PH 7.39 7.39  --   --    PCO2 36 31*  --   --    PO2 192* 208*  --   --    HCO3 22 19*  --   --    O2PER 40 50 4 0         Current Vent Settings: Vent Mode: CPAP/PS  (Continuous positive airway pressure with Pressure Support)  FiO2 (%): 30 %  Resp Rate (Set): 12 breaths/min  Tidal Volume (Set, mL): 400 mL  PEEP (cm H2O): 5 cmH2O  Pressure Support (cm H2O): 5 cmH2O  Resp: 17      Skin Assessment: Intact     Plan: Continue full vent support with daily wean assessment    Junior Zheng, RRT on 10/5/2023 at 5:15 PM

## 2023-10-05 NOTE — PLAN OF CARE
Neuro: Sedated. RASS -4--5. PERLL.Continuous EEG.   CV: HTN. Resolves with sedation, pain control. SR +1-+2 Edema BUE.  Respiratory: LS dim, coarse. Clears with suction.  GI/: BS hypo. Rectal tube removed. UOP increasing.   Skin: Grossly intact. Scattered bruising.  Activity: BR.  Diet: TF. 25ml/hr. 150ml/4h.  Drips: Versed, Fentanyl.  Other: No guardian, family. SW involved.  Plan: Neuro to re evaluate today.

## 2023-10-05 NOTE — PROGRESS NOTES
Comprehensive Daily ICU Note        Kortney Germain MRN# [id number]   Age: 68 year old YOB: 1955     Date of Admission: 9/28/2023    Primary care provider: [facility]     CODE STATUS: FULL      Problem List:         Principal Problem:    Urinary retention  Active Problems:    Acute cystitis without hematuria    Constipation, unspecified constipation type    Anemia, unspecified type             Treatment goals for next 24 hours:   Lung protective ventilation  Goal RASS is directed by neurology      Ana Edmond MD        Subjective/ Last 24 hours:     68F pmh of schizo-affective schizophrenia, HTN, tardive dyskinesia now presented 9/28 from group home with back pain and fever, Ct abd showed rectum distended with stool and urinary bladder distended, now s/p quinonez placement.  Arrival UA c/w UTI. Since arrival from the ED she has been alert but not generally interactive (?part of this may be due to underlying psychiatric illness?). Morning of 9/29 she was noted to be febrile and shaking prompting intubation to facilitate head CT and LP.  Placed on heavy sedation including benzodiazepine and propofol.  EEG is not showing seizures since then but has shown evidence of potential new seizure foci.  And has been to continue heavy sedation.  Evening of 10/3 triglycerides returned critically high level. Gave 1 dose of Ativan shut off the propofol  After adding additional doses of as needed benzodiazepines and increasing the goal range of the midazolam. Did have more seizure-like activity which resolved with increasing benzodiazepines.  Today, 10/5 she is having some spontaneous movement of lower extremities.  Otherwise no changes.           Mechanical Ventilation/Vitalsigns/IsandOs:       Temp:  [98.8  F (37.1  C)-100  F (37.8  C)] 100  F (37.8  C)  Pulse:  [59-77] 70  Resp:  [10-30] 15  BP: (106-194)/(59-90) 168/81  FiO2 (%):  [30 %] 30 %  SpO2:  [93 %-99 %] 97 %      Vent Mode: CMV/AC   (Continuous Mandatory Ventilation/ Assist Control)  FiO2 (%): 30 %  Resp Rate (Set): 12 breaths/min  Tidal Volume (Set, mL): 400 mL  PEEP (cm H2O): 5 cmH2O  Pressure Support (cm H2O): 5 cmH2O  Resp: 15      Day since 9/29      Intake/Output Summary (Last 24 hours) at 10/5/2023 1421  Last data filed at 10/5/2023 1400  Gross per 24 hour   Intake 2220.15 ml   Output 3245 ml   Net -1024.85 ml     Net IO Since Admission: 5,963.73 mL [10/05/23 1421]           Physical Examination:     General: Stated age intubated sedated  HEENT: EEG leads ET tube feeding tube  Lungs: Synchronous with ventilator  CVS: Regular rate rhythm  Abdomen: Grossly normal  Extremities/musculoskeletal: No edema  Neurology: Unresponsive with the exception of withdrawal-like movements bilateral lower extremities with noxious stimuli.  She is also making these movements spontaneously.  Skin: Grossly normal  Psychiatry: Unable  Exam of Line sites:  PICC            Feeding/Glucose:     Orders Placed This Encounter      NPO for Medical/Clinical Reasons Except for: No Exceptions        Recent Labs   Lab 10/05/23  1200 10/05/23  0753 10/05/23  0434 10/05/23  0433 10/05/23  0032 10/04/23  1948   * 90 123* 109* 133* 117*                Medications:      chlorhexidine  15 mL Mouth/Throat Q12H    clobazam  10 mg Oral or Feeding Tube BID    diphenhydrAMINE  50 mg Oral or Feeding Tube BID    fiber modular  1 packet Per Feeding Tube Daily    fosphenytoin (CEREBYX) 100 mg PE in sodium chloride 0.9 % 100 mL intermittent infusion  100 mg PE Intravenous Q8H    insulin aspart  1-6 Units Subcutaneous Q4H    lacosamide (VIMPAT) 200 mg in sodium chloride 0.9 % 110 mL intermittent infusion  200 mg Intravenous BID    [Held by provider] LORazepam  0.25 mg Oral At Bedtime    [Held by provider] LORazepam  0.5 mg Oral BID    [Held by provider] multivitamin w/minerals  1 tablet Oral Daily    multivitamins w/minerals  15 mL Oral or Feeding Tube Daily    pantoprazole  40 mg  Oral or Feeding Tube QAM AC    Or    pantoprazole  40 mg Intravenous QAM AC    [Held by provider] perphenazine  4 mg Oral QAM    [Held by provider] perphenazine  8 mg Oral At Bedtime    polyethylene glycol  17 g Oral or Feeding Tube BID    protein modular  1 packet Per Feeding Tube Daily    sodium chloride (PF)  10-40 mL Intracatheter Q7 Days    sodium chloride (PF)  3 mL Intracatheter Q8H         dextrose      fentaNYL Stopped (10/05/23 1124)    midazolam 18 mg/hr (10/05/23 1331)    norepinephrine Stopped (10/03/23 0804)    sodium chloride 10 mL/hr at 10/04/23 2000              Labs:         ROUTINE ICU LABS (Last four results)  CMP  Recent Labs   Lab 10/05/23  1200 10/05/23  0753 10/05/23  0434 10/05/23  0433 10/04/23  0820 10/04/23  0406 10/03/23  1153 10/03/23  0956 10/03/23  0755 10/03/23  0405 10/02/23  0832 10/02/23  0410 09/30/23  0900 09/30/23  0515 09/29/23  0855 09/29/23  0518   NA  --   --  138  --   --  145  --   --   --  147*  --  147*   < > 141   < > 147*   POTASSIUM  --   --  3.9  --   --  3.8  --  3.5  --  3.3*  --  3.6   < > 2.7*   < > 3.6   CHLORIDE  --   --  102  --   --  112*  --   --   --  116*  --  117*   < > 111*   < > 115*   CO2  --   --  27  --   --  22  --   --   --  21*  --  21*   < > 19*   < > 14*   ANIONGAP  --   --  9  --   --  11  --   --   --  10  --  9   < > 11   < > 18*   * 90 123* 109*   < > 114*   < >  --    < > 111*   < > 103*   < > 141*   < > 156*   BUN  --   --  10.7  --   --  10.9  --   --   --  10.8  --  7.6*   < > 18.4   < > 26.1*   CR  --   --  0.42*  --   --  0.50*  --   --   --  0.52  --  0.39*   < > 0.56   < > 0.63   GFRESTIMATED  --   --  >90  --   --  >90  --   --   --  >90  --  >90   < > >90   < > >90   LULU  --   --  7.8*  --   --  8.5*  --   --   --  7.3*  --  6.6*   < > 7.5*   < > 8.6*   MAG  --   --  1.7  --   --  1.9  --   --   --  2.1  --  2.1   < > 2.2  --   --    PHOS  --   --  3.6  --   --  3.2  --   --   --  2.6  --  2.6   < > 1.7*  --   --     PROTTOTAL  --   --   --   --   --   --   --   --   --   --   --   --   --  5.6*  --  6.7   ALBUMIN  --   --   --   --   --   --   --   --   --   --   --   --   --  3.4*  --  4.2   BILITOTAL  --   --   --   --   --   --   --   --   --   --   --   --   --  0.2  --  0.2   ALKPHOS  --   --   --   --   --   --   --   --   --   --   --   --   --  66  --  77   AST  --   --   --   --   --   --   --   --   --   --   --   --   --  27  --  22   ALT  --   --   --   --   --   --   --   --   --   --   --   --   --  20  --  16    < > = values in this interval not displayed.     CBC  Recent Labs   Lab 10/05/23  0434 10/04/23  0406 10/03/23  0405 10/02/23  0410   WBC 9.2 8.4 5.6 6.5   RBC 3.87 3.43* 3.05* 3.18*   HGB 9.4* 8.4* 7.0* 7.6*   HCT 31.2* 27.8* 24.1* 24.8*   MCV 81 81 79 78   MCH 24.3* 24.5* 23.0* 23.9*   MCHC 30.1* 30.2* 29.0* 30.6*   RDW 19.7* 18.9* 18.8* 18.3*    233 284 296     INRNo lab results found in last 7 days.  Arterial Blood Gas  Recent Labs   Lab 09/30/23  0750 09/29/23  1253 09/29/23  1141 09/29/23  0855   PH 7.39 7.39  --   --    PCO2 36 31*  --   --    PO2 192* 208*  --   --    HCO3 22 19*  --   --    O2PER 40 50 4 0       Cultures:      Recent Labs   Lab 09/30/23  0740 09/30/23  0515 09/29/23  1304 09/29/23  1303 09/28/23  1728 09/28/23  1606   CULTURE No Growth No Growth No anaerobic organisms isolated after 5 days Isolated in broth only Staphylococcus capitis* No Growth  Positive on the 1st day of incubation*  Staphylococcus hominis* >100,000 CFU/mL Mixture of urogenital christina             Imaging/Other results:     No results found for this or any previous visit (from the past 24 hour(s)).                Assessment and Plan:     Summary:    Kortney Germain IS a 68 year old female admitted on 9/28/2023 for fever and UTI.   I have personally reviewed the daily labs, imaging studies, cultures and discussed the case with referring physician and consulting physicians.     My assessment and plan by  system for this patient is as follows:    Neurology/Psychiatry:   1. Underlying schizoaffective schizophrenia and h/o tardive dyskinesia:   2. Status epilepticus. Considered serotonin syndrome but less likely now.  Psychiatrist also wondering about malignant catatonia.    3. Propofol intolerance - high triglyceride level.  -Increase RASS when okay with neurologist.    --prn dilaudid  --Appreciate regular check in by Psychiatrist  -- Follow-up with  regarding progress on finding decision maker for this patient who has no family.    Cardiovascular:   1.Hypertension:  no h/o essential hypertension.  Improves with pain/sedation mgmt.    Pulmonary/Ventilator Management:   1. Loss of protective airway reflexes requiring intubation and mechanical ventilation:  Neurologically inappropriate for extubation today.  Does well on pressure support  - Daily pressure support mode for conditioning    GI and Nutrition:   1. Tube feeding  2. PPI for pud prophy    Renal/Fluids/Electrolytes:   Significant positive fluid balance.  Received 1 dose diuretic yesterday with good response and now is auto diuresing.  -Recheck panel later today but expect this will still be normal.      Infectious Disease:   1. Sirs and possible UTI--on ctx and vanco, UC with mixed christina but >100k colonies.  Vancomycin was stopped.  On ceftriaxone for 5-day course  2. CSF preliminary studies unconcerning for meningitis. Gram positive cocci in anaerobic broth only which is a contaminant.    Endocrine:   1. Stress induced hyperglycemia:  Not needing insulin    Hematology/Oncology:   1. Anemia of critical illness.-- s/p 1 prbc 9/29. no apparent blood loss.  - Retransfuse when below 7.  2. Leukocytosis: Improved with antibiotics for presumed UTI.    ICU Prophylaxis:   1. DVT: Hep Subq/ mechanical  2. VAP: HOB 30 degrees, chlorhexidine rinse  3. Stress Ulcer: PPI  4. Restraints: Nonviolent soft two point restraints required and necessary for patient  safety and continued cares and good effect as patient continues to pull at necessary lines, tubes despite education and distraction. Will readdress daily.   5. Wound care  -   6. Feeding - tf  7. Family Update:apparently has no family. more in neuropsych section  8. Disposition - icu    Clinically Significant Risk Factors              # Hypoalbuminemia: Lowest albumin = 3.4 g/dL at 9/30/2023  5:15 AM, will monitor as appropriate                       Lines/Drains/Tubes/Dressings     .  PICC 09/30/23 Triple Lumen Left Basilic ok to use (Active)   Site Assessment WDL 10/05/23 0900   External Cath Length (cm) 5 cm 10/01/23 0812   Extremity Circumference (cm) 26 cm 10/01/23 0812   Dressing Transparent 10/05/23 1200   Dressing Status clean;dry;intact 10/05/23 0900   Dressing Intervention dressing changed 10/01/23 0812   Dressing Change Due 10/08/23 10/02/23 0400   Line Necessity Yes, meets criteria 10/05/23 1200   Barney - Status infusing 10/05/23 1200   Purple - Status infusing 10/04/23 0400   Purple - Cap Change Due 10/07/23 09/30/23 0830   Purple - Intervention Cap change 09/30/23 0830   Red - Status saline locked 10/05/23 1200   White - Status infusing 10/05/23 1200   White - Cap Change Due 10/07/23 09/30/23 0830   White - Intervention Cap change 09/30/23 0830   Phlebitis Scale 0-->no symptoms 10/05/23 1200   Infiltration? no 10/05/23 1200   PICC Comment OK TO USE 09/30/23 0830   Number of days: 5       ETT Cuffed Single 7 mm (Active)   Secured at (cm) 21 cm 10/05/23 1200   Measured from Lips 10/05/23 1200   Position Right 10/05/23 1200   Secured by Commercial tube casterjon 10/05/23 1102   Bite Block None Present 10/05/23 1102   Site Appearance Clean 10/05/23 1102   Tube Care Site care done 10/05/23 1102   Cuff Assessment Minimal leak technique 10/05/23 1102   Safety Measures Manual resuscitator/mask/valve in room 10/05/23 1102   Number of days: 6       NG/OG/NJ Tube Nasogastric 16 fr Right nostril (Active)   Site  Description WDL 10/05/23 1200   Status Enteral Feedings 10/05/23 1200   Drainage Appearance Bile 09/30/23 1200   Placement Confirmation [place] unchanged 10/05/23 1200   [place] (cm marking) at nare/mouth 56 cm 10/05/23 0800   Intake (ml) 120 ml 10/05/23 0000   Flush/Free Water (mL) 150 mL 10/05/23 0800   Residual (mL) 0 mL 10/05/23 0800   Output (ml) 75 ml 09/30/23 1200   Number of days: 6       Urethral Catheter 10/04/23 (Active)   Tube Description Positional 10/04/23 2000   Catheter Care Done;Catheter wipes 10/04/23 2000   Collection Container Standard;Bag changed 10/05/23 1200   Securement Method Securing device (Describe) 10/05/23 1200   Rationale for Continued Use Retention 10/05/23 1200   Urine Output 50 mL 10/05/23 1400   Number of days: 1       Wound Coccyx Pressure injury community acquired Stage 2 (Active)   Number of days: 213       Wound Ankle Abrasion(s) NA (Active)   Number of days: 205       Wound Sternum Abrasion(s) (Active)   Wound Bed Other (Comment) 10/05/23 0800   Estimated Circumference ( if not measured tennis ball sized 10/04/23 2000   Drainage Amount None 10/05/23 0800   Dressing Open to air 10/05/23 0800   Dressing Change Due 10/01/23 09/30/23 1948   Number of days: 5      ..  .        Prognosis:    guarded      Family update: no family    Billing: total time spend providing critical care was 40 min, excluding procedure time.    Ana Edmond MD  [phone]

## 2023-10-05 NOTE — PROGRESS NOTES
[facility]    Stroke/NCC Progress Note    Interval Events  Clobazam added 10/4. EEG with moderate to severe encephalopathy, no evidence of seizures.     HPI Summary  Kortney Germain is a 68 year old female with PMHx significant for anxiety/depression, schizoaffective disorder (living in group home), HTN, odynophagia, tardive dyskinesia, HTN. She presented 9/28 with lower back pain and abdominal pain. She was found to have UTI, anemia, and constipation. She had decreased LOC and generalized shaking overnight. She remained febrile. RRT called 9/29/23 for encephalopathy and concern that should would be unable to protect her airway. 1 PRBC given 9/29/23 due to anemia.    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   MRI: L hyperintensity on DWI/FLAIR without correlate on ADC or T1, favored to be artifact     60 minute EEG: abnormal consistent with a severe encephalopathy and ?status epilepticus; it is important to note patient has persistent head tremoring which may also produce a rhythmic artifact on the EEG and there is persistent electro myogenic artifact making this 1 hour segment difficult to interpret      cEEG:   -9/30: moderate to severe encephalopathy and periodic pattern which may be SIRPIDS (stimulus-induced rhythmic, periodic, or ictal discharges)  - 10/1 (after administration of paralytic to remove muscle artifact): per verbal report underlying cortical activity consistent with seizures  - 10/3: generalized periodic discharges present but less persistent than previously. Does not meet criteria for NCSE.     Blood cultures: NGTD     LP labs:  -glucose 98  -protein 22.8  -2 nucleated cells, RBC 0  -aerobic culture GPC in broth only   -anaerobic culture NGTD  - HSV negative    Impression   Status epilepticus, EEG improved on sedative drips.    Shaking/tremors, unclear etiology. May represent baseline tardive dyskinesia vs  "manifestation of status epilepticus vs toxic encephalopathy vs other     Plan  - Continue lacosamide 200 mg BID  - continue fosphenytoin 100mg every 8 hours  - continue clobazam 10 mg BID (started 10/4)  - continuous EEG  - Decrease versed to 12 mg/hr now and then decrease 2 mg/hr thereafter.   - continue to hold/limit dopaminergic, serotonergic agents   - [place] paraneoplastic panel in process   - Consider ID consult given GPC in CSF    Patient Follow-up    - final recommendation pending work-up    We will continue to follow.     Gricelda Cody, TAMI, CNP  Neurology  10/05/2023 3:24 PM  To page stroke neurology after hours or on a subsequent day, click here: AMCOM  Choose \"On Call\" tab at top, then search dropdown box for \"Neurology Adult\" & press Enter, look for Neuro ICU/Stroke     _____________________________________________________    Clinically Significant Risk Factors              # Hypoalbuminemia: Lowest albumin = 3.4 g/dL at 9/30/2023  5:15 AM, will monitor as appropriate                         Medications   Scheduled Meds   chlorhexidine  15 mL Mouth/Throat Q12H    clobazam  10 mg Oral or Feeding Tube BID    diphenhydrAMINE  50 mg Oral or Feeding Tube BID    fiber modular  1 packet Per Feeding Tube Daily    fosphenytoin (CEREBYX) 100 mg PE in sodium chloride 0.9 % 100 mL intermittent infusion  100 mg PE Intravenous Q8H    insulin aspart  1-6 Units Subcutaneous Q4H    lacosamide (VIMPAT) 200 mg in sodium chloride 0.9 % 110 mL intermittent infusion  200 mg Intravenous BID    [Held by provider] LORazepam  0.25 mg Oral At Bedtime    [Held by provider] LORazepam  0.5 mg Oral BID    [Held by provider] multivitamin w/minerals  1 tablet Oral Daily    multivitamins w/minerals  15 mL Oral or Feeding Tube Daily    pantoprazole  40 mg Oral or Feeding Tube QAM AC    Or    pantoprazole  40 mg Intravenous QAM AC    [Held by provider] perphenazine  4 mg Oral QAM    [Held by provider] perphenazine  8 mg Oral At Bedtime "    polyethylene glycol  17 g Oral or Feeding Tube BID    protein modular  1 packet Per Feeding Tube Daily    sodium chloride (PF)  10-40 mL Intracatheter Q7 Days    sodium chloride (PF)  3 mL Intracatheter Q8H       Infusion Meds   dextrose      fentaNYL Stopped (10/05/23 1124)    midazolam 18 mg/hr (10/05/23 1331)    norepinephrine Stopped (10/03/23 0804)    sodium chloride 10 mL/hr at 10/04/23 2000       PRN Meds  acetaminophen **OR** acetaminophen, albuterol, [Held by provider] cyclobenzaprine, dextrose, glucose **OR** dextrose **OR** glucagon, fentaNYL, HYDROmorphone, lidocaine 4%, lidocaine (buffered or not buffered), LORazepam, melatonin, midazolam, naloxone **OR** naloxone **OR** naloxone **OR** naloxone, nitroGLYcerin, sodium chloride (PF), sodium chloride (PF), sodium chloride (PF), sodium chloride (PF)       PHYSICAL EXAMINATION  Temp:  [98.8  F (37.1  C)-100  F (37.8  C)] 100  F (37.8  C)  Pulse:  [59-77] 70  Resp:  [10-30] 15  BP: (106-194)/(59-90) 168/81  FiO2 (%):  [30 %] 30 %  SpO2:  [93 %-99 %] 97 %      General Exam  General:  patient lying in bed without any acute distress    HEENT:  normocephalic/atraumatic  Pulmonary:  no respiratory distress, on mechanical ventilation    Neuro Exam (deep sedation with versed)   Mental Status:  comatose, does not respond to verbal, tactile or noxious stimuli  Cranial Nerves:  gaze conjugate, pupils equal and sluggishly reactive  Motor: no abnormal movements, no movement to noxious in any extremity    Imaging  I personally reviewed all imaging; relevant findings per HPI.     Lab Results Data   CBC  Recent Labs   Lab 10/05/23  0434 10/04/23  0406 10/03/23  0405   WBC 9.2 8.4 5.6   RBC 3.87 3.43* 3.05*   HGB 9.4* 8.4* 7.0*   HCT 31.2* 27.8* 24.1*    233 284     Basic Metabolic Panel    Recent Labs   Lab 10/05/23  1427 10/05/23  1200 10/05/23  0753 10/05/23  0434 10/04/23  0820 10/04/23  0406     --   --  138  --  145   POTASSIUM 4.2  --   --  3.9  --   3.8   CHLORIDE 101  --   --  102  --  112*   CO2 29  --   --  27  --  22   BUN 16.1  --   --  10.7  --  10.9   CR 0.43*  --   --  0.42*  --  0.50*   * 108* 90 123*   < > 114*   LULU 8.2*  --   --  7.8*  --  8.5*    < > = values in this interval not displayed.     Liver Panel  Recent Labs   Lab 09/30/23  0515 09/29/23  0518   PROTTOTAL 5.6* 6.7   ALBUMIN 3.4* 4.2   BILITOTAL 0.2 0.2   ALKPHOS 66 77   AST 27 22   ALT 20 16     INR    Recent Labs   Lab Test 09/28/23  1244 03/07/23  1629   INR 1.04 1.15      Lipid Profile    Recent Labs   Lab Test 10/03/23  0956 03/08/23  0830   CHOL  --  143   HDL  --  50   LDL  --  75   TRIG 1,372* 88     A1C  No lab results found.  Troponin  No results for input(s): CTROPT, TROPONINIS, TROPONINI, GHTROP in the last 168 hours.       Data   Billing: I personally examined and evaluated the patient today. At the time of my evaluation and management the patient was critical condition today due to seizure. I personally managed examination. Key decisions made today included labs. I spent a total of 35 minutes providing critical care services, evaluating the patient, directing care and reviewing laboratory values and radiologic reports.

## 2023-10-05 NOTE — PROGRESS NOTES
Care Management  Care Management had sent an e-mail to patient's [facility] CM, Gislee Aranda ([email or web address]) ([phone]) on 9/29, questioning if Radha Meraz has ability to do an emergency Guardianship.  This  has not responded and all attempts to call this CM, by the Care Management team, have not been successful due to no answer and inability to leave a message due to mailbox being full.    Ariela Collins RN  Inpatient Care Management  [phone]

## 2023-10-05 NOTE — PROGRESS NOTES
Critical access hospital ICU RESPIRATORY NOTE           Date of Admission: 9/28/2023     Date of Intubation (most recent): 9/29/2023     Reason for Mechanical Ventilation: Airway protection      Number of Days on Mechanical Ventilation: 7     Met Criteria for Spontaneous Breathing Trial: Yes       Significant Events Today: None overnight     ABG Results:   Recent Labs   Lab 09/30/23  0750 09/29/23  1253 09/29/23  1141 09/29/23  0855   PH 7.39 7.39  --   --    PCO2 36 31*  --   --    PO2 192* 208*  --   --    HCO3 22 19*  --   --    O2PER 40 50 4 0     Vent Mode: CMV/AC  (Continuous Mandatory Ventilation/ Assist Control)  FiO2 (%): 30 %  Resp Rate (Set): 12 breaths/min  Tidal Volume (Set, mL): 400 mL  PEEP (cm H2O): 5 cmH2O  Pressure Support (cm H2O): 5 cmH2O  Resp: 11    NIKA Martinez, RRT

## 2023-10-06 NOTE — CONSULTS
"[facility] Nurse Inpatient Assessment     Consulted for: Wound buttocks     Summary: patient with present on admission to Critical access hospital skin damage to buttock related to moisture related to sweat and friction, improved 10/6     Patient History (according to provider note(s):      \"68 year old female admitted on 9/28/2023. With history of schizophrenia, tardive dyskinesia, HTN, depression, ADD, and UTI who resides in a group home who presented to the ED with abdominal and back pain\"       Assessment:      Areas visualized during today's visit: Focused: and Sacrum/coccyx    Wound location: buttock bilateral     Last photo: 10/6  Wound due to: Friction and Moisture Associated Skin Damage (MASD)  Wound history/plan of care: found with criticaid in use per plan of care   Wound base:  epidermis and dermis     Palpation of the wound bed: normal      Drainage: none     Description of drainage: none     Measurements (length x width x depth, in cm): largest 0.4cm x 0.4cm x 0.1cm      Tunneling: N/A     Undermining: N/A  Periwound skin:  pale, fragile       Color: pale      Temperature: normal   Odor: none  Pain: no grimacing or signs of discomfort, none  Pain interventions prior to dressing change: patient tolerated well  Treatment goal: Protection  STATUS: improving  Supplies ordered: gathered, at bedside, and supplies stored on unit       Treatment Plan:       Wound care  Start:  09/29/23 1030,   EVERY SHIFT,   Routine        Comments: Location: buttock  Care: provided qshift by primary RN  1. Cleanse with each incontinence episode with wei cleanser and soft dry wipes  2. Do not apply mepilex for this patients buttock  3. Apply criticiad paste to perianal after each incontinence episode  4. Use covidien absorbent pad for incontinence, change every 2 hours with routine turns and repositioning, to keep dry from sweat and incontinence  5. Do not apply diapers          Orders: Reviewed    RECOMMEND PRIMARY " TEAM ORDER: None, at this time  Education provided: plan of care, Moisture management, and Hygiene  Discussed plan of care with: Patient and Nurse  [facility] nurse follow-up plan: signing off  Notify WO if wound(s) deteriorate.  Nursing to notify the Provider(s) and re-consult the [facility] Nurse if new skin concern.    DATA:     Current support surface: Standard  Standard gel/foam mattress (IsoFlex, Atmos air, etc)  Containment of urine/stool: Incontinence Protocol, Incontinent pad in bed, and Indwelling catheter  BMI: Body mass index is 22.39 kg/m .   Active diet order: Orders Placed This Encounter      NPO for Medical/Clinical Reasons Except for: No Exceptions     Output: I/O last 3 completed shifts:  In: 1859.15 [I.V.:704.15; NG/GT:450]  Out: 2455 [Urine:2455]     Labs:   Recent Labs   Lab 10/06/23  0435 09/30/23  1321 09/30/23  0515   ALBUMIN  --   --  3.4*   HGB 9.0*   < > 7.8*   WBC 9.1   < > 11.4*    < > = values in this interval not displayed.       Pressure injury risk assessment:   Sensory Perception: 2-->very limited  Moisture: 3-->occasionally moist  Activity: 1-->bedfast  Mobility: 1-->completely immobile  Nutrition: 3-->adequate  Friction and Shear: 1-->problem  Luis Score: 11    Anisa CWOCN   1st choice: Securely message with [employer] ([facility] [employer])   (2nd option: [facility] Office Phone [phone], messages checked periodically Mon-Fri 8a-4p)

## 2023-10-06 NOTE — PROGRESS NOTES
[facility]    Stroke/NCC Progress Note    Interval Events  Temp max 100.4  -192  WBC 9.1    EEG 10/5: severe encephalopathy, generalized periodic discharges, does not meet criteria for NCSE, much less persistent and less organized than in first 2 days or recording.    Current AEDs: clobazam 10 mg BID, fosphenytoin 100 mg TID, lacosamide 200 mg BID, midazolam at 8 mg/hr    Exam unchanged, comatose.    HPI Summary  Kortney Germain is a 68 year old female with PMHx significant for anxiety/depression, schizoaffective disorder (living in group home), HTN, odynophagia, tardive dyskinesia, HTN. She presented 9/28 with lower back pain and abdominal pain. She was found to have UTI, anemia, and constipation. She had decreased LOC and generalized shaking overnight. She remained febrile. RRT called 9/29/23 for encephalopathy and concern that should would be unable to protect her airway. 1 PRBC given 9/29/23 due to anemia.    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   MRI: L hyperintensity on DWI/FLAIR without correlate on ADC or T1, favored to be artifact     60 minute EEG: abnormal consistent with a severe encephalopathy and ?status epilepticus; it is important to note patient has persistent head tremoring which may also produce a rhythmic artifact on the EEG and there is persistent electro myogenic artifact making this 1 hour segment difficult to interpret      cEEG:   -9/30: moderate to severe encephalopathy and periodic pattern which may be SIRPIDS (stimulus-induced rhythmic, periodic, or ictal discharges)  - 10/1 (after administration of paralytic to remove muscle artifact): per verbal report underlying cortical activity consistent with seizures  - 10/3: generalized periodic discharges present but less persistent than previously. Does not meet criteria for NCSE.  - 10/4: severely abnormal.  1) varying degrees of encephalopathy  depending on sedative drips utilized.  Background activity consistent with moderate to severe diffuse encephalopathy throughout.  2) persistent generalized periodic discharges at a rate of 1.5 Hz appeared following abrupt discontinuation of propofol.  These were not accompanied by clinical changes but raised concerns about incipient nonconvulsive status epilepticus.  Escalation of midazolam resulted in resolution of generalized periodic discharges.  - 10/5 : Abnormal.  1) background activity on higher doses of midazolam consistent with severe diffuse encephalopathy.  There was modest improvement in background of midazolam was reduced but normal rhythms were not seen.  2) generalized periodic discharges become a more persistent as midazolam is reduced from 18 mg/h to 14 mg/h.  They occupy about 50% of ongoing recording.  This indicates ongoing cortical irritability and need for aggressive anticonvulsant treatment.  Features did not meet standard criteria for nonconvulsive status epilepticus even following reduction of midazolam.  - 10/6: Background largely continuous mixture of moderate amplitude diffuse delta and diffuse alpha. No clear posterior dominant rhythm. Intermittent generalized sharp waves, occasionally organized into brief runs of generalized periodic discharges, consistently less than 1.5 Hz. Persistence estimated about 20%. These do not meet criteria for nonconvulsive status epilepticus and are clearly much less organized and much less persistent than the intermittent nonconvulsive status epilepticus seen in the first 2 days of recording.      Blood cultures: NGTD     LP labs:  -glucose 98  -protein 22.8  -2 nucleated cells, RBC 0  -aerobic culture GPC in broth only   -anaerobic culture NGTD  - HSV negative    Impression   Status epilepticus, EEG improved on sedative drips - uncertain etiology of NCSE    Shaking/tremors, unclear etiology. May represent baseline tardive dyskinesia vs manifestation of  "status epilepticus vs toxic encephalopathy vs other     Plan  - Continue lacosamide 200 mg BID  - continue fosphenytoin 100mg every 8 hours  - Increase clobazam to 20 mg BID (10/6)  - continuous EEG  - Continue to wean midazolam by 1 mg/hr each hour until stopped. While weaning please notify us if there are any clinical changes. Have notified the epileptologist that we will be weaning midazolam in the coming hours.  - continue to hold/limit dopaminergic, serotonergic agents   - [place] paraneoplastic panel in process   - Consider ID consult given GPC in CSF    Patient Follow-up    - final recommendation pending work-up    We will continue to follow.     Radha Adan PA-C  Vascular Neurology    To page me or covering stroke neurology team member, click here: AMCOM   Choose \"On Call\" tab at top, then search dropdown box for \"Neurology Adult\", select location, press Enter, then look for stroke/neuro ICU/telestroke.       _____________________________________________________    Clinically Significant Risk Factors              # Hypoalbuminemia: Lowest albumin = 3.4 g/dL at 9/30/2023  5:15 AM, will monitor as appropriate                         Medications   Scheduled Meds   chlorhexidine  15 mL Mouth/Throat Q12H    clobazam  10 mg Oral or Feeding Tube BID    diphenhydrAMINE  50 mg Oral or Feeding Tube BID    fiber modular  1 packet Per Feeding Tube Daily    fosphenytoin (CEREBYX) 100 mg PE in sodium chloride 0.9 % 100 mL intermittent infusion  100 mg PE Intravenous Q8H    heparin ANTICOAGULANT  5,000 Units Subcutaneous Q8H    insulin aspart  1-6 Units Subcutaneous Q4H    lacosamide (VIMPAT) 200 mg in sodium chloride 0.9 % 110 mL intermittent infusion  200 mg Intravenous BID    [Held by provider] LORazepam  0.25 mg Oral At Bedtime    [Held by provider] LORazepam  0.5 mg Oral BID    [Held by provider] multivitamin w/minerals  1 tablet Oral Daily    multivitamins w/minerals  15 mL Oral or Feeding Tube Daily    NIFEdipine  10 " mg Oral Q6H    pantoprazole  40 mg Oral or Feeding Tube QAM AC    Or    pantoprazole  40 mg Intravenous QAM AC    [Held by provider] perphenazine  4 mg Oral QAM    [Held by provider] perphenazine  8 mg Oral At Bedtime    polyethylene glycol  17 g Oral or Feeding Tube BID    protein modular  1 packet Per Feeding Tube Daily    sodium chloride (PF)  10-40 mL Intracatheter Q7 Days    sodium chloride (PF)  3 mL Intracatheter Q8H       Infusion Meds   dextrose      midazolam 8 mg/hr (10/05/23 2307)    norepinephrine Stopped (10/03/23 0804)    sodium chloride 20 mL/hr at 10/05/23 2000       PRN Meds  acetaminophen **OR** acetaminophen, albuterol, [Held by provider] cyclobenzaprine, dextrose, glucose **OR** dextrose **OR** glucagon, fentaNYL, HYDROmorphone, lidocaine 4%, lidocaine (buffered or not buffered), LORazepam, melatonin, midazolam, naloxone **OR** naloxone **OR** naloxone **OR** naloxone, nitroGLYcerin, sodium chloride (PF), sodium chloride (PF), sodium chloride (PF), sodium chloride (PF)       PHYSICAL EXAMINATION  Temp:  [98.8  F (37.1  C)-100.4  F (38  C)] 99.1  F (37.3  C)  Pulse:  [59-80] 70  Resp:  [10-17] 12  BP: (106-192)/(62-88) 145/68  FiO2 (%):  [30 %] 30 %  SpO2:  [93 %-97 %] 97 %      General Exam  General:  patient lying in bed without any acute distress, intubated, sedated  HEENT:  normocephalic/atraumatic  Pulmonary:  no respiratory distress, on mechanical ventilation    Neuro Exam (deep sedation with versed)   Mental Status:  comatose, does not respond to verbal or tactile stimuli  Cranial Nerves:  slight dysconjugate gaze with both eyes slightly deviated out, PERRL, no blink to threat, per RN does have some cough with suction  Motor: no abnormal movements, no movement to noxious in any extremity to noxious  Reflexes: L toe upgoing    Imaging  I personally reviewed all imaging; relevant findings per HPI.     Lab Results Data   CBC  Recent Labs   Lab 10/06/23  0435 10/05/23  0434 10/04/23  3544    WBC 9.1 9.2 8.4   RBC 3.68* 3.87 3.43*   HGB 9.0* 9.4* 8.4*   HCT 29.7* 31.2* 27.8*    265 233       Basic Metabolic Panel    Recent Labs   Lab 10/06/23  0435 10/06/23  0434 10/06/23  0021 10/05/23  1610 10/05/23  1427 10/05/23  0753 10/05/23  0434     --   --   --  138  --  138   POTASSIUM 4.0  --   --   --  4.2  --  3.9   CHLORIDE 100  --   --   --  101  --  102   CO2 29  --   --   --  29  --  27   BUN 14.7  --   --   --  16.1  --  10.7   CR 0.43*  --   --   --  0.43*  --  0.42*   * 111* 100*   < > 131*   < > 123*   LULU 7.8*  --   --   --  8.2*  --  7.8*    < > = values in this interval not displayed.       Liver Panel  Recent Labs   Lab 09/30/23  0515   PROTTOTAL 5.6*   ALBUMIN 3.4*   BILITOTAL 0.2   ALKPHOS 66   AST 27   ALT 20       INR    Recent Labs   Lab Test 09/28/23  1244 03/07/23  1629   INR 1.04 1.15        Lipid Profile    Recent Labs   Lab Test 10/03/23  0956 03/08/23  0830   CHOL  --  143   HDL  --  50   LDL  --  75   TRIG 1,372* 88       A1C  No lab results found.  Troponin  No results for input(s): CTROPT, TROPONINIS, TROPONINI, GHTROP in the last 168 hours.       Data   Billing:  [id number]: I personally examined and evaluated the patient today. At the time of my evaluation and management the patient was critical condition today due to NCSE, coma. I personally managed chart review, exam, discussion with care team. Key decisions made today included wean midazolam, continue EEG. I spent a total of 45 minutes providing critical care services, evaluating the patient, directing care and reviewing laboratory values and radiologic reports. Greater than 50% was spent in counseling and coordination of care

## 2023-10-06 NOTE — PLAN OF CARE
Goal Outcome Evaluation:    Neuro: sedated with versed( weaning down). Rass -4    CV: SR BP wnl    Pulm: tolerating minimal vent settings, PST 5/5 since 0800, LS clear to coarse, small creamy secretions, copious oral secretions    GI/: TF at goal, BM X2 this shift. Liu with adequate UOP    Skin: pale, generalized bruising and dependent edema    Lines: SHAR PICC triple lumen    Restraints: NA    Plan: Rest overnight on vent., wean sedation as plan.

## 2023-10-06 NOTE — PLAN OF CARE
Neuro: Sedated. RASS -4--5. PERLL.Continuous EEG.   CV: BP's generally better. SR +1-+2 Edema BUE.  Respiratory: LS dim, coarse. Clears with suction.Copious Oral secretions.  GI/: BS hypo. UOP good.   Skin: Grossly intact. Scattered bruising.  Activity: BR.  Diet: TF. 30ml/hr. 150ml/4h.  Drips: Versed.  Other: No guardian, family. SW involved.  Plan: Neuro to re evaluate today.

## 2023-10-06 NOTE — PROGRESS NOTES
Care Management Follow Up    Length of Stay (days): 8    Expected Discharge Date: 10/12/2023     Concerns to be Addressed:       Patient plan of care discussed at interdisciplinary rounds: Yes    Anticipated Discharge Disposition: Group Home     Anticipated Discharge Services: Transportation Services  Anticipated Discharge DME:      Patient/family educated on Medicare website which has current facility and service quality ratings:    Education Provided on the Discharge Plan:    Patient/Family in Agreement with the Plan:      Referrals Placed by CM/SW: Community [facility]idential Settings (Group Homes)  Private pay costs discussed: Not applicable    Additional Information:  Writer completed Adult protection report for patient without any NOK or guardian for medical decision making. [facility] report is [id number]    Addendum - spoke to Severiano from [facility] Adult [facility]  who explained that [facility] would not pursue guardianship. They were able to see that patient does not have next of kin and list a  at [facility] as Emergency contact. Melecio Lockett [employer] and [employer] are all aware and working to see if there may be a decision maker that is appropriate or if we need to escalate to our Ethics committee. Betsy TIDWELL  is aware.     BABATUNDE John

## 2023-10-06 NOTE — PROGRESS NOTES
[facility] ICU RESPIRATORY NOTE        Date of Admission: 9/28/2023    Date of Intubation (most recent): 9/29/2023    Reason for Mechanical Ventilation: AW protection    Number of Days on Mechanical Ventilation: 8    Met Criteria for Spontaneous Breathing Trial: Yes    Reason for No Spontaneous Breathing Trial: None overnight    Significant Events Today: None     ABG Results:   Recent Labs   Lab 09/30/23  0750 09/29/23  1253 09/29/23  1141 09/29/23  0855   PH 7.39 7.39  --   --    PCO2 36 31*  --   --    PO2 192* 208*  --   --    HCO3 22 19*  --   --    O2PER 40 50 4 0         Current Vent Settings: Vent Mode: CMV/AC  (Continuous Mandatory Ventilation/ Assist Control)  FiO2 (%): 30 %  Resp Rate (Set): 12 breaths/min  Tidal Volume (Set, mL): 400 mL  PEEP (cm H2O): 5 cmH2O  Pressure Support (cm H2O): 5 cmH2O  Resp: 13        Salbador Castro, RT on 10/6/2023 at 6:02 AM

## 2023-10-06 NOTE — PROGRESS NOTES
[facility] ICU RESPIRATORY NOTE        Date of Admission: 9/28/2023    Date of Intubation (most recent): 9/29/2023    Reason for Mechanical Ventilation: AW protection    Number of Days on Mechanical Ventilation: 8    Met Criteria for Spontaneous Breathing Trial: Yes -     Significant Events Today: Pt. On PS since 8:00am    ABG Results:   Recent Labs   Lab 09/30/23  0750   PH 7.39   PCO2 36   PO2 192*   HCO3 22   O2PER 40         Current Vent Settings: Vent Mode: CPAP/PS  (Continuous positive airway pressure with Pressure Support)  FiO2 (%): 30 %  Resp Rate (Set): 12 breaths/min  Tidal Volume (Set, mL): 400 mL  PEEP (cm H2O): 5 cmH2O  Pressure Support (cm H2O): 5 cmH2O  Resp: 19      Plan: Will go back to full support at night     RT Bethany on 10/6/2023 at 6:17 PM

## 2023-10-06 NOTE — CONSULTS
Care Management Follow Up    Length of Stay (days): 8    Expected Discharge Date: 10/12/2023     Concerns to be Addressed: ongoing cares and recommendations decision maker    Patient plan of care discussed at interdisciplinary rounds: Yes    Anticipated Discharge Disposition: Unknown    Anticipated Discharge Services: Transportation Services  Anticipated Discharge DME:  unknown    Patient/family educated on Medicare website which has current facility and service quality ratings:  no  Education Provided on the Discharge Plan:  yes  Patient/Family in Agreement with the Plan:  unable to assess    Referrals Placed by CM/SW: Community Residential Settings (Group Homes)  Private pay costs discussed: Not applicable    Additional Information:  Writer received a call from the patient's [facility] CM through [employer] he informs this writer that they have no next of kin for the patient and if we needed further guidance on decision making we would need to connect with [facility] for possible Guardianship. Writer asked for Gisele's Supervisor to call and discuss with this writer further.  Writer received a call from Blanca Clinical Supervisor of [employer] phone [phone] she informed this writer that she has a call out to [facility] to inquire what their Contract agency should do as again patient does NOT have a Guardian or any known NOK per [facility] or Group home.  Blanca informed this writer that a couple of months ago they were inquiring about a commitment for the patient, however it was declined as there was not enough evident to support a commitment.    Blanca is also going to reach out to the  they work with for commitments for additional resources to help with decision making.  Writer forwarded this information to CTS Manager and SW Supervisor and SW following ICU.    Milagros Mendenhall RN, BSN, ACM   Care Transitions Specialist  [facility]  Care Transitions  Specialist  [id number] [id number]  [email or web address]  Office: [phone] Fax: [phone]  [facility]

## 2023-10-06 NOTE — CARE PLAN
Shift Summary    Neuro: Not responding. ZORAIDA. Started weaning versed 1mg/hr. RASS -4  Cardiac: SR. BP stable  Pulmonary: Vented  GI/: Vern Liu for retention with good UO  Skin: See chart  Activity: BR  Pain: appears comfortable

## 2023-10-06 NOTE — PROGRESS NOTES
EEG CLINICAL NEUROPHYSIOLOGY PRELIMINARY REPORT    Video EEG through approximately 9 AM today reviewed.  Midazolam 8 mg/h.  Background largely continuous mixture of moderate amplitude diffuse delta and diffuse alpha.  No clear posterior dominant rhythm.  Intermittent generalized sharp waves, occasionally organized into brief runs of generalized periodic discharges, consistently less than 1.5 Hz.  Persistence estimated about 20%.  These do not meet criteria for nonconvulsive status epilepticus and are clearly much less organized and much less persistent than the intermittent nonconvulsive status epilepticus seen in the first 2 days of recording.    Study continues consistent with moderate to diffuse encephalopathy.  There is continuing evidence of generalized cortical irritability.    Full report to follow.    Basilio Adame MD  Contact information for physicians covering Epilepsy and EEG is available on [facility].  Click search, enter neurology adult/[facility] in group name box, click on neurology adult/[facility], then click Staff Epilepsy and EEG.

## 2023-10-06 NOTE — PROGRESS NOTES
Comprehensive Daily ICU Note        Kortney Germain MRN# [id number]   Age: 68 year old YOB: 1955     Date of Admission: 9/28/2023    Primary care provider: [facility]     CODE STATUS: FULL      Problem List:         Principal Problem:    Urinary retention  Active Problems:    Acute cystitis without hematuria    Constipation, unspecified constipation type    Anemia, unspecified type             Treatment goals for next 24 hours:   Lung protective ventilation  Goal RASS is directed by neurology  Resume outpatient nifedipine      Ana Edmond MD        Subjective/ Last 24 hours:     68F pmh of schizo-affective schizophrenia, HTN, tardive dyskinesia now presented 9/28 from group home with back pain and fever, Ct abd showed rectum distended with stool and urinary bladder distended, now s/p quinonez placement.  Arrival UA c/w UTI. Since arrival from the ED she has been alert but not generally interactive (?part of this may be due to underlying psychiatric illness?). Morning of 9/29 she was noted to be febrile and shaking prompting intubation to facilitate head CT and LP.  Placed on heavy sedation including benzodiazepine and propofol.  EEG is not showing seizures since then but has shown evidence of potential new seizure foci.  And has been to continue heavy sedation.  Evening of 10/3 triglycerides returned critically high level. Gave 1 dose of Ativan shut off the propofol  After adding additional doses of as needed benzodiazepines and increasing the goal range of the midazolam. Did have more seizure-like activity which resolved with increasing benzodiazepines.  Has been seizure-free for couple of days.  We have been weaning benzodiazepines.         Mechanical Ventilation/Vitalsigns/IsandOs:       Temp:  [98.8  F (37.1  C)-100.4  F (38  C)] 100  F (37.8  C)  Pulse:  [59-80] 73  Resp:  [10-17] 13  BP: (106-192)/(60-88) 138/77  FiO2 (%):  [30 %] 30 %  SpO2:  [93 %-97 %] 96 %      Vent Mode:  CMV/AC  (Continuous Mandatory Ventilation/ Assist Control)  FiO2 (%): 30 %  Resp Rate (Set): 12 breaths/min  Tidal Volume (Set, mL): 400 mL  PEEP (cm H2O): 5 cmH2O  Pressure Support (cm H2O): 5 cmH2O  Resp: 13      Day since 9/29      Intake/Output Summary (Last 24 hours) at 10/6/2023 0941  Last data filed at 10/6/2023 0800  Gross per 24 hour   Intake 1816.4 ml   Output 2605 ml   Net -788.6 ml       Net IO Since Admission: 5.3 liter           Physical Examination:     General: Stated age intubated sedated  HEENT: EEG leads ET tube feeding tube  Lungs: Synchronous with ventilator  CVS: Regular rate rhythm  Abdomen: Grossly normal  Extremities/musculoskeletal: No edema  Neurology: Not expansive to voice today.  Skin: Grossly normal  Psychiatry: Unable  Exam of Line sites:  PICC            Feeding/Glucose:     Orders Placed This Encounter      NPO for Medical/Clinical Reasons Except for: No Exceptions        Recent Labs   Lab 10/06/23  0435 10/06/23  0434 10/06/23  0021 10/05/23  2006 10/05/23  1610 10/05/23  1427   * 111* 100* 130* 120* 131*                  Medications:      chlorhexidine  15 mL Mouth/Throat Q12H    clobazam  10 mg Oral or Feeding Tube BID    diphenhydrAMINE  50 mg Oral or Feeding Tube BID    fiber modular  1 packet Per Feeding Tube Daily    fosphenytoin (CEREBYX) 100 mg PE in sodium chloride 0.9 % 100 mL intermittent infusion  100 mg PE Intravenous Q8H    heparin ANTICOAGULANT  5,000 Units Subcutaneous Q8H    insulin aspart  1-6 Units Subcutaneous Q4H    lacosamide (VIMPAT) 200 mg in sodium chloride 0.9 % 110 mL intermittent infusion  200 mg Intravenous BID    [Held by provider] LORazepam  0.25 mg Oral At Bedtime    [Held by provider] LORazepam  0.5 mg Oral BID    [Held by provider] multivitamin w/minerals  1 tablet Oral Daily    multivitamins w/minerals  15 mL Oral or Feeding Tube Daily    pantoprazole  40 mg Oral or Feeding Tube QAM AC    Or    pantoprazole  40 mg Intravenous QAM AC     [Held by provider] perphenazine  4 mg Oral QAM    [Held by provider] perphenazine  8 mg Oral At Bedtime    polyethylene glycol  17 g Oral or Feeding Tube BID    protein modular  1 packet Per Feeding Tube Daily    sodium chloride (PF)  10-40 mL Intracatheter Q7 Days    sodium chloride (PF)  3 mL Intracatheter Q8H         dextrose      midazolam 8 mg/hr (10/05/23 2307)    norepinephrine Stopped (10/03/23 0804)    sodium chloride 20 mL/hr at 10/05/23 2000              Labs:         ROUTINE ICU LABS (Last four results)  CMP  Recent Labs   Lab 10/06/23  0435 10/06/23  0434 10/06/23  0021 10/05/23  2006 10/05/23  1610 10/05/23  1427 10/05/23  0753 10/05/23  0434 10/04/23  0820 10/04/23  0406 10/03/23  0755 10/03/23  0405 09/30/23  0900 09/30/23  0515     --   --   --   --  138  --  138  --  145  --  147*   < > 141   POTASSIUM 4.0  --   --   --   --  4.2  --  3.9  --  3.8   < > 3.3*   < > 2.7*   CHLORIDE 100  --   --   --   --  101  --  102  --  112*  --  116*   < > 111*   CO2 29  --   --   --   --  29  --  27  --  22  --  21*   < > 19*   ANIONGAP 9  --   --   --   --  8  --  9  --  11  --  10   < > 11   * 111* 100* 130*   < > 131*   < > 123*   < > 114*   < > 111*   < > 141*   BUN 14.7  --   --   --   --  16.1  --  10.7  --  10.9  --  10.8   < > 18.4   CR 0.43*  --   --   --   --  0.43*  --  0.42*  --  0.50*  --  0.52   < > 0.56   GFRESTIMATED >90  --   --   --   --  >90  --  >90  --  >90  --  >90   < > >90   LULU 7.8*  --   --   --   --  8.2*  --  7.8*  --  8.5*  --  7.3*   < > 7.5*   MAG 1.9  --   --   --   --   --   --  1.7  --  1.9  --  2.1   < > 2.2   PHOS 3.6  --   --   --   --   --   --  3.6  --  3.2  --  2.6   < > 1.7*   PROTTOTAL  --   --   --   --   --   --   --   --   --   --   --   --   --  5.6*   ALBUMIN  --   --   --   --   --   --   --   --   --   --   --   --   --  3.4*   BILITOTAL  --   --   --   --   --   --   --   --   --   --   --   --   --  0.2   ALKPHOS  --   --   --   --   --   --   --    --   --   --   --   --   --  66   AST  --   --   --   --   --   --   --   --   --   --   --   --   --  27   ALT  --   --   --   --   --   --   --   --   --   --   --   --   --  20    < > = values in this interval not displayed.       CBC  Recent Labs   Lab 10/06/23  0435 10/05/23  0434 10/04/23  0406 10/03/23  0405   WBC 9.1 9.2 8.4 5.6   RBC 3.68* 3.87 3.43* 3.05*   HGB 9.0* 9.4* 8.4* 7.0*   HCT 29.7* 31.2* 27.8* 24.1*   MCV 81 81 81 79   MCH 24.5* 24.3* 24.5* 23.0*   MCHC 30.3* 30.1* 30.2* 29.0*   RDW 20.1* 19.7* 18.9* 18.8*    265 233 284       INRNo lab results found in last 7 days.  Arterial Blood Gas  Recent Labs   Lab 09/30/23  0750 09/29/23  1253 09/29/23  1141 09/29/23  0855   PH 7.39 7.39  --   --    PCO2 36 31*  --   --    PO2 192* 208*  --   --    HCO3 22 19*  --   --    O2PER 40 50 4 0         Cultures:      Recent Labs   Lab 09/30/23  0740 09/30/23  0515 09/29/23  1304 09/29/23  1303   CULTURE No Growth No Growth No anaerobic organisms isolated after 6 days Isolated in broth only Staphylococcus capitis*               Imaging/Other results:     Recent Results (from the past 24 hour(s))   XR Chest Port 1 View    Narrative    CHEST ONE VIEW PORTABLE  10/5/2023 3:00 PM       INDICATION: Intubated.    COMPARISON: 9/29/2023.       Impression    IMPRESSION: Endotracheal tube in good position above the tin. NG  tube in the stomach. Left PICC line tip in the low SVC. There is new  infiltrate or atelectasis in the left lower lobe. The right lung  remains clear.    KALE SEGOVIA MD         SYSTEM ID:  [id number]                   Assessment and Plan:     Summary:    Kortney Germain IS a 68 year old female admitted on 9/28/2023 for fever and UTI.   I have personally reviewed the daily labs, imaging studies, cultures and discussed the case with referring physician and consulting physicians.     My assessment and plan by system for this patient is as follows:    Neurology/Psychiatry:   1. Underlying  schizoaffective schizophrenia and h/o tardive dyskinesia:   2. Status epilepticus. Considered serotonin syndrome but less likely now.  Psychiatrist also wondering about malignant catatonia.  Is on 3 antiseizure agents.  We are now slowly weaning benzodiazepines.  3. Propofol intolerance - high triglyceride level.  4. Has no surrogate decision maker.  Please see most recent note from care coordinator in 10/6 for details.  -Sedation as directed by neurologist  --prn dilaudid  --Appreciate regular check in by Psychiatrist  -- Any major decisions are required and unable to find a surrogate decision-maker will ensure that at least 2 physicians agree with the plan of care.    Cardiovascular:   1.Hypertension: Nifedipine at home.  Blood pressures are higher as were weaning sedation  -     Pulmonary/Ventilator Management:   1. Loss of protective airway reflexes requiring intubation and mechanical ventilation:  Neurologically inappropriate for extubation today.  Does well on pressure support  - Daily pressure support mode for conditioning  2.  Left lower lobe opacity of unclear significance  -Repeat x-ray obtained sputum culture    GI and Nutrition:   1. Tube feeding  2. PPI for pud prophy    Renal/Fluids/Electrolytes:   Significant positive fluid balance.  Received 1 dose diuretic with good response and now is auto diuresing.  -Address as necessary.  -Reduce Free water      Infectious Disease:   1. Sirs and possible UTI--on ctx and vanco, UC with mixed christina but >100k colonies.  Vancomycin was stopped.  On ceftriaxone for 5-day course  2. CSF preliminary studies unconcerning for meningitis. Gram positive cocci in anaerobic broth only which is a contaminant.  3. Some possibility of VAP  - As above, XRAY and culture    Endocrine:   1. Stress induced hyperglycemia:  Not needing insulin    Hematology/Oncology:   1. Anemia of critical illness.-- s/p 1 prbc 9/29. no apparent blood loss.  - Retransfuse when below 7.  2.  Leukocytosis: Improved with antibiotics for presumed UTI.    ICU Prophylaxis:   1. DVT: Hep Subq/ mechanical  2. VAP: HOB 30 degrees, chlorhexidine rinse  3. Stress Ulcer: PPI  4. Restraints: Nonviolent soft two point restraints required and necessary for patient safety and continued cares and good effect as patient continues to pull at necessary lines, tubes despite education and distraction. Will readdress daily.   5. Wound care  -   6. Feeding - tf  7. Family Update:apparently has no family. more in neuropsych section  8. Disposition - icu    Clinically Significant Risk Factors              # Hypoalbuminemia: Lowest albumin = 3.4 g/dL at 9/30/2023  5:15 AM, will monitor as appropriate                       Lines/Drains/Tubes/Dressings     .  PICC 09/30/23 Triple Lumen Left Basilic ok to use (Active)   Site Assessment WDL 10/05/23 0900   External Cath Length (cm) 5 cm 10/01/23 0812   Extremity Circumference (cm) 26 cm 10/01/23 0812   Dressing Transparent 10/05/23 2000   Dressing Status clean;dry;intact 10/05/23 2000   Dressing Intervention dressing changed 10/01/23 0812   Dressing Change Due 10/08/23 10/02/23 0400   Line Necessity Yes, meets criteria 10/05/23 2000   Barney - Status infusing 10/05/23 2000   Purple - Status infusing 10/04/23 0400   Purple - Cap Change Due 10/07/23 09/30/23 0830   Purple - Intervention Cap change 09/30/23 0830   Red - Status saline locked 10/05/23 2000   White - Status infusing 10/05/23 2000   White - Cap Change Due 10/07/23 09/30/23 0830   White - Intervention Cap change 09/30/23 0830   Phlebitis Scale 0-->no symptoms 10/05/23 2000   Infiltration? no 10/05/23 2000   PICC Comment OK TO USE 09/30/23 0830   Number of days: 6       ETT Cuffed Single 7 mm (Active)   Secured at (cm) 21 cm 10/06/23 0410   Measured from Lips 10/06/23 0410   Position Right 10/05/23 2000   Secured by Commercial tube castrejon 10/06/23 0410   Bite Block None Present 10/06/23 0410   Site Appearance Clean 10/06/23  0410   Tube Care Site care done 10/05/23 2000   Cuff Assessment Minimal leak technique 10/06/23 0410   Safety Measures Manual resuscitator/mask/valve in room 10/06/23 0410   Number of days: 7       NG/OG/NJ Tube Nasogastric 16 fr Right nostril (Active)   Site Description WDL 10/05/23 2000   Status Enteral Feedings 10/05/23 2000   Drainage Appearance Bile 09/30/23 1200   Placement Confirmation [place] unchanged 10/05/23 2000   [place] (cm marking) at nare/mouth 56 cm 10/05/23 2000   Intake (ml) 120 ml 10/05/23 0000   Flush/Free Water (mL) 150 mL 10/06/23 0000   Residual (mL) 0 mL 10/05/23 2000   Output (ml) 75 ml 09/30/23 1200   Number of days: 7       Urethral Catheter 10/04/23 (Active)   Tube Description Positional 10/05/23 2000   Catheter Care Done 10/05/23 2000   Collection Container Standard;Bag changed 10/05/23 2000   Securement Method Securing device (Describe) 10/05/23 2000   Rationale for Continued Use Retention 10/05/23 2000   Urine Output 235 mL 10/06/23 0000   Number of days: 2       Wound Coccyx Pressure injury community acquired Stage 2 (Active)   Number of days: 214       Wound Ankle Abrasion(s) NA (Active)   Number of days: 206       Wound Sternum Abrasion(s) (Active)   Wound Bed Other (Comment) 10/05/23 0800   Estimated Circumference ( if not measured tennis ball sized 10/05/23 2000   Drainage Amount None 10/05/23 2000   Dressing Open to air 10/05/23 2000   Dressing Change Due 10/01/23 09/30/23 1948   Number of days: 6      ..  .    Prognosis:    guarded        Billing: total time spend providing critical care was 40 min, excluding procedure time.    Ana Edmond MD  [phone]

## 2023-10-07 PROBLEM — I10 HTN (HYPERTENSION): Status: ACTIVE | Noted: 2023-01-01

## 2023-10-07 PROBLEM — G40.901 STATUS EPILEPTICUS (H): Status: ACTIVE | Noted: 2023-01-01

## 2023-10-07 NOTE — PROGRESS NOTES
Comprehensive Daily ICU Note        Korntey Germain MRN# [id number]   Age: 68 year old YOB: 1955     Date of Admission: 9/28/2023    Primary care provider: [facility]     CODE STATUS: FULL      Problem List:         Principal Problem:    Status epilepticus (H)  Active Problems:    Urinary retention    Acute cystitis without hematuria    Constipation, unspecified constipation type    Anemia, unspecified type    HTN (hypertension)             Treatment goals for next 24 hours:   Lung protective ventilation  Goal RASS is directed by neurology  Resume outpatient nifedipine        Subjective/ Last 24 hours:     68F pmh of schizo-affective schizophrenia, HTN, tardive dyskinesia now presented 9/28 from group home with back pain and fever, Ct abd showed rectum distended with stool and urinary bladder distended, now s/p quinonez placement.  Arrival UA c/w UTI. Since arrival from the ED she has been alert but not generally interactive (?part of this may be due to underlying psychiatric illness?). Morning of 9/29 she was noted to be febrile and shaking prompting intubation to facilitate head CT and LP.  Placed on heavy sedation including benzodiazepine and propofol.  EEG is not showing seizures since then but has shown evidence of potential new seizure foci.  And has been to continue heavy sedation.  Evening of 10/3 triglycerides returned critically high level. Gave 1 dose of Ativan shut off the propofol  After adding additional doses of as needed benzodiazepines and increasing the goal range of the midazolam. Did have more seizure-like activity which resolved with increasing benzodiazepines.  Has been seizure-free for couple of days.  We have been weaning benzodiazepines.    10/7  -Awaiting extubation          Mechanical Ventilation/Vitalsigns/IsandOs:       Temp:  [98.1  F (36.7  C)-99.9  F (37.7  C)] 99.1  F (37.3  C)  Pulse:  [64-77] 68  Resp:  [12-32] 17  BP: (120-158)/(65-83)  150/78  FiO2 (%):  [30 %] 30 %  SpO2:  [96 %-100 %] 99 %      Vent Mode: CPAP/PS  (Continuous positive airway pressure with Pressure Support)  FiO2 (%): 30 %  Resp Rate (Set): 12 breaths/min  Tidal Volume (Set, mL): 400 mL  PEEP (cm H2O): 5 cmH2O  Pressure Support (cm H2O): 5 cmH2O  Resp: 17      Day since 9/29      Intake/Output Summary (Last 24 hours) at 10/6/2023 0941  Last data filed at 10/6/2023 0800  Gross per 24 hour   Intake 1816.4 ml   Output 2605 ml   Net -788.6 ml       Net IO Since Admission: 5.3 liter           Physical Examination:     General: Stated age intubated sedated  HEENT: EEG leads ET tube feeding tube  Lungs: Synchronous with ventilator  CVS: Regular rate rhythm  Abdomen: Grossly normal  Extremities/musculoskeletal: No edema  Neurology: Not expansive to voice today.  Skin: Grossly normal  Psychiatry: Unable  Exam of Line sites:  PICC            Feeding/Glucose:     Orders Placed This Encounter      NPO for Medical/Clinical Reasons Except for: No Exceptions        Recent Labs   Lab 10/07/23  1729 10/07/23  1240 10/07/23  0838 10/07/23  0436 10/07/23  0432 10/07/23  0010   * 131* 140* 101* 108* 127*                Medications:      chlorhexidine  15 mL Mouth/Throat Q12H    clobazam  20 mg Oral or Feeding Tube BID    diphenhydrAMINE  50 mg Oral or Feeding Tube BID    fiber modular  1 packet Per Feeding Tube Daily    fosphenytoin (CEREBYX) 100 mg PE in sodium chloride 0.9 % 100 mL intermittent infusion  100 mg PE Intravenous Q8H    heparin ANTICOAGULANT  5,000 Units Subcutaneous Q8H    insulin aspart  1-6 Units Subcutaneous Q4H    lacosamide (VIMPAT) 200 mg in sodium chloride 0.9 % 110 mL intermittent infusion  200 mg Intravenous BID    [Held by provider] LORazepam  0.25 mg Oral At Bedtime    [Held by provider] LORazepam  0.5 mg Oral BID    [Held by provider] multivitamin w/minerals  1 tablet Oral Daily    multivitamins w/minerals  15 mL Oral or Feeding Tube Daily    NIFEdipine  10 mg Oral  Q6H    pantoprazole  40 mg Oral or Feeding Tube QAM AC    Or    pantoprazole  40 mg Intravenous QAM AC    [Held by provider] perphenazine  4 mg Oral QAM    [Held by provider] perphenazine  8 mg Oral At Bedtime    polyethylene glycol  17 g Oral or Feeding Tube BID    protein modular  1 packet Per Feeding Tube Daily    sodium chloride (PF)  10-40 mL Intracatheter Q7 Days    sodium chloride (PF)  3 mL Intracatheter Q8H         dextrose      midazolam Stopped (10/06/23 1942)    norepinephrine Stopped (10/03/23 0804)    sodium chloride 20 mL/hr at 10/06/23 1943              Labs:         ROUTINE ICU LABS (Last four results)  CMP  Recent Labs   Lab 10/07/23  1729 10/07/23  1240 10/07/23  0838 10/07/23  0436 10/07/23  0432 10/06/23  0844 10/06/23  0435 10/05/23  1610 10/05/23  1427 10/05/23  0753 10/05/23  0434 10/04/23  0820 10/04/23  0406   NA  --   --   --   --  143  --  138  --  138  --  138  --  145   POTASSIUM  --   --   --   --  3.8  --  4.0  --  4.2  --  3.9  --  3.8   CHLORIDE  --   --   --   --  104  --  100  --  101  --  102  --  112*   CO2  --   --   --   --  30*  --  29  --  29  --  27  --  22   ANIONGAP  --   --   --   --  9  --  9  --  8  --  9  --  11   * 131* 140* 101* 108*   < > 114*   < > 131*   < > 123*   < > 114*   BUN  --   --   --   --  15.3  --  14.7  --  16.1  --  10.7  --  10.9   CR  --   --   --   --  0.44*  --  0.43*  --  0.43*  --  0.42*  --  0.50*   GFRESTIMATED  --   --   --   --  >90  --  >90  --  >90  --  >90  --  >90   LULU  --   --   --   --  8.2*  --  7.8*  --  8.2*  --  7.8*  --  8.5*   MAG  --   --   --   --  2.2  --  1.9  --   --   --  1.7  --  1.9   PHOS  --   --   --   --  3.8  --  3.6  --   --   --  3.6  --  3.2    < > = values in this interval not displayed.     CBC  Recent Labs   Lab 10/07/23  0432 10/06/23  0435 10/05/23  0434 10/04/23  0406   WBC 8.1 9.1 9.2 8.4   RBC 3.57* 3.68* 3.87 3.43*   HGB 8.8* 9.0* 9.4* 8.4*   HCT 28.9* 29.7* 31.2* 27.8*   MCV 81 81 81 81   MCH  24.6* 24.5* 24.3* 24.5*   MCHC 30.4* 30.3* 30.1* 30.2*   RDW 20.4* 20.1* 19.7* 18.9*    270 265 233     INRNo lab results found in last 7 days.  Arterial Blood Gas  No lab results found in last 7 days.    Cultures:      Recent Labs   Lab 10/06/23  1323   CULTURE Culture in progress             Imaging/Other results:     No results found for this or any previous visit (from the past 24 hour(s)).                  Assessment and Plan:     Summary:    Kortney Germain IS a 68 year old female admitted on 9/28/2023 for fever and UTI.   I have personally reviewed the daily labs, imaging studies, cultures and discussed the case with referring physician and consulting physicians.     My assessment and plan by system for this patient is as follows:    Neurology/Psychiatry:   1. Underlying schizoaffective schizophrenia and h/o tardive dyskinesia:   2. Status epilepticus. Considered serotonin syndrome but less likely now.  Psychiatrist also wondering about malignant catatonia.  Is on 3 antiseizure agents.  We are now slowly weaning benzodiazepines.  3. Propofol intolerance - high triglyceride level.  4. Has no surrogate decision maker.  Please see most recent note from care coordinator in 10/6 for details.  -Sedation as directed by neurologist  --prn dilaudid  --Appreciate regular check in by Psychiatrist  -- Any major decisions are required and unable to find a surrogate decision-maker will ensure that at least 2 physicians agree with the plan of care.    Cardiovascular:   1.Hypertension: Nifedipine at home.  Blood pressures are higher as were weaning sedation  -     Pulmonary/Ventilator Management:   1. Loss of protective airway reflexes requiring intubation and mechanical ventilation:  Neurologically inappropriate for extubation today.  Does well on pressure support  - Daily pressure support mode for conditioning  2.  Left lower lobe opacity of unclear significance  -Repeat x-ray obtained sputum culture    GI and  Nutrition:   1. Tube feeding  2. PPI for pud prophy    Renal/Fluids/Electrolytes:   Significant positive fluid balance.  Received 1 dose diuretic with good response and now is auto diuresing.  -Address as necessary.  -Reduce Free water      Infectious Disease:   1. Sirs and possible UTI--on ctx and vanco, UC with mixed christina but >100k colonies.  Vancomycin was stopped.  On ceftriaxone for 5-day course  2. CSF preliminary studies unconcerning for meningitis. Gram positive cocci in anaerobic broth only which is a contaminant.  3. Some possibility of VAP  - As above, XRAY and culture    Endocrine:   1. Stress induced hyperglycemia:  Not needing insulin    Hematology/Oncology:   1. Anemia of critical illness.-- s/p 1 prbc 9/29. no apparent blood loss.  - Retransfuse when below 7.  2. Leukocytosis: Improved with antibiotics for presumed UTI.    ICU Prophylaxis:   1. DVT: Hep Subq/ mechanical  2. VAP: HOB 30 degrees, chlorhexidine rinse  3. Stress Ulcer: PPI  4. Restraints: Nonviolent soft two point restraints required and necessary for patient safety and continued cares and good effect as patient continues to pull at necessary lines, tubes despite education and distraction. Will readdress daily.   5. Wound care  -   6. Feeding - tf  7. Family Update:apparently has no family. more in neuropsych section  8. Disposition - icu    Clinically Significant Risk Factors              # Hypoalbuminemia: Lowest albumin = 3.4 g/dL at 9/30/2023  5:15 AM, will monitor as appropriate                       Lines/Drains/Tubes/Dressings     .  PICC 09/30/23 Triple Lumen Left Basilic ok to use (Active)   Site Assessment WDL 10/07/23 1600   External Cath Length (cm) 5 cm 10/01/23 0812   Extremity Circumference (cm) 26 cm 10/01/23 0812   Dressing Chlorhexidine disk;Transparent 10/07/23 1600   Dressing Status clean 10/07/23 1200   Dressing Intervention dressing changed 10/01/23 0812   Dressing Change Due 10/08/23 10/07/23 1200   Line Necessity  Yes, meets criteria 10/07/23 1600   Barney - Status infusing 10/07/23 1600   Purple - Status infusing 10/04/23 0400   Purple - Cap Change Due 10/07/23 09/30/23 0830   Purple - Intervention Cap change 09/30/23 0830   Red - Status saline locked 10/07/23 1600   White - Status infusing 10/07/23 1600   White - Cap Change Due 10/07/23 09/30/23 0830   White - Intervention Cap change 09/30/23 0830   Phlebitis Scale 0-->no symptoms 10/07/23 1600   Infiltration? no 10/07/23 1600   PICC Comment OK TO USE 09/30/23 0830   Number of days: 7       ETT Cuffed Single 7 mm (Active)   Secured at (cm) 22 cm 10/07/23 1800   Measured from Lips 10/07/23 1800   Position Right 10/07/23 1800   Secured by Commercial tube castrejon 10/07/23 1600   Bite Block None Present 10/07/23 1600   Site Appearance Clean;Dry 10/07/23 1600   Tube Care Site care done 10/07/23 1600   Cuff Assessment Minimal leak technique 10/07/23 1600   Safety Measures Manual resuscitator/mask/valve in room 10/07/23 1600   Number of days: 8       NG/OG/NJ Tube Nasogastric 16 fr Right nostril (Active)   Site Description WDL 10/07/23 1600   Status Enteral Feedings 10/07/23 1600   Drainage Appearance Bile 09/30/23 1200   Placement Confirmation [place] unchanged;Respiratory status unchanged 10/07/23 1600   [place] (cm marking) at nare/mouth 57 cm 10/07/23 1600   Intake (ml) 80 ml 10/06/23 2200   Flush/Free Water (mL) 100 mL 10/07/23 1600   Residual (mL) 0 mL 10/05/23 2000   Output (ml) 75 ml 09/30/23 1200   Number of days: 8       Urethral Catheter 10/04/23 (Active)   Tube Description Positional 10/06/23 1600   Catheter Care Catheter wipes 10/07/23 1600   Collection Container Standard 10/07/23 1600   Securement Method Securing device (Describe) 10/07/23 1600   Rationale for Continued Use Strict 1-2 Hour I&O 10/07/23 1600   Urine Output 200 mL 10/07/23 1600   Number of days: 3       Wound Coccyx Pressure injury community acquired Stage 2 (Active)   Number of days: 215       Wound  Ankle Abrasion(s) NA (Active)   Number of days: 207       Wound Sternum Abrasion(s) (Active)   Wound Bed Erythema, non-blanchable, skin intact 10/07/23 0800   Estimated Circumference ( if not measured tennis ball sized 10/05/23 2000   Drainage Amount None 10/07/23 0800   Dressing Open to air 10/07/23 0800   Dressing Change Due 10/01/23 09/30/23 1948   Number of days: 7      ..  .    Prognosis:    guarded        Billing: total time spend providing critical care was 40 min, excluding procedure time.    Hem Raymond MCMANUS  [phone]

## 2023-10-07 NOTE — PROGRESS NOTES
EEG CLINICAL NEUROPHYSIOLOGY PRELIMINARY REPORT    Video EEG through around 9 AM today reviewed.  As best as can be determined from electronic medical record, midazolam discontinued 6 PM 10/6.    Continuous low amplitude diffuse alpha and beta.  No well organized posterior dominant rhythm.  Diffuse polymorphic delta.  Infrequent generalized sharp waves, rarely organized into generalized periodic discharges at a rate of 1.3 Hz, typically no longer than 3 to 4 seconds.  No electrographic seizures.    Findings consistent with moderate diffuse encephalopathy.  Amount of generalized sharp waves and generalized periodic discharges decreased compared to last several days, even in the context of discontinuation of sedative drips.  This indicates some persistence of generalized cortical irritability.  No seizures, even following discontinuation of sedative drips.    Full report to follow.    Basilio Adame MD  Contact information for physicians covering Epilepsy and EEG is available on [facility].  Click search, enter neurology adult/[facility] in group name box, click on neurology adult/[facility], then click Staff Epilepsy and EEG.

## 2023-10-07 NOTE — PROGRESS NOTES
[facility]    Stroke/NCC Progress Note    Interval Events  Temp max 100.0  -170  WBC 8.1    EEG: moderate diffuse encephalopathy.  Amount of generalized sharp waves and generalized periodic discharges decreased compared to last several days, even in the context of discontinuation of sedative drips.  This indicates some persistence of generalized cortical irritability.  No seizures, even following discontinuation of sedative drips.     Current AEDs: clobazam 20 mg BID, fosphenytoin 100 mg TID, lacosamide 200 mg BID. Midazolam weaned yesterday, off since 1942.    Exam with subtle improvements: opens eyes to sternal rub, seems to resist some when I open her eyes, no fixed gaze preference, does not follow commands, did seem to withdraw to noxious BLEs today      HPI Summary  Kortney Germain is a 68 year old female with PMHx significant for anxiety/depression, schizoaffective disorder (living in group home), HTN, odynophagia, tardive dyskinesia, HTN. She presented 9/28 with lower back pain and abdominal pain. She was found to have UTI, anemia, and constipation. She had decreased LOC and generalized shaking overnight. She remained febrile. RRT called 9/29/23 for encephalopathy and concern that should would be unable to protect her airway. 1 PRBC given 9/29/23 due to anemia.    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   MRI: L hyperintensity on DWI/FLAIR without correlate on ADC or T1, favored to be artifact     60 minute EEG: abnormal consistent with a severe encephalopathy and ?status epilepticus; it is important to note patient has persistent head tremoring which may also produce a rhythmic artifact on the EEG and there is persistent electro myogenic artifact making this 1 hour segment difficult to interpret      cEEG:   -9/30: moderate to severe encephalopathy and periodic pattern which may be SIRPIDS (stimulus-induced rhythmic,  periodic, or ictal discharges)  - 10/1 (after administration of paralytic to remove muscle artifact): per verbal report underlying cortical activity consistent with seizures  - 10/3: generalized periodic discharges present but less persistent than previously. Does not meet criteria for NCSE.  - 10/4: severely abnormal.  1) varying degrees of encephalopathy depending on sedative drips utilized.  Background activity consistent with moderate to severe diffuse encephalopathy throughout.  2) persistent generalized periodic discharges at a rate of 1.5 Hz appeared following abrupt discontinuation of propofol.  These were not accompanied by clinical changes but raised concerns about incipient nonconvulsive status epilepticus.  Escalation of midazolam resulted in resolution of generalized periodic discharges.  - 10/5 : Abnormal.  1) background activity on higher doses of midazolam consistent with severe diffuse encephalopathy.  There was modest improvement in background of midazolam was reduced but normal rhythms were not seen.  2) generalized periodic discharges become a more persistent as midazolam is reduced from 18 mg/h to 14 mg/h.  They occupy about 50% of ongoing recording.  This indicates ongoing cortical irritability and need for aggressive anticonvulsant treatment.  Features did not meet standard criteria for nonconvulsive status epilepticus even following reduction of midazolam.  - 10/6: Background largely continuous mixture of moderate amplitude diffuse delta and diffuse alpha. No clear posterior dominant rhythm. Intermittent generalized sharp waves, occasionally organized into brief runs of generalized periodic discharges, consistently less than 1.5 Hz. Persistence estimated about 20%. These do not meet criteria for nonconvulsive status epilepticus and are clearly much less organized and much less persistent than the intermittent nonconvulsive status epilepticus seen in the first 2 days of recording.      Blood  "cultures: NGTD     LP labs:  -glucose 98  -protein 22.8  -2 nucleated cells, RBC 0  -aerobic culture GPC in broth only   -anaerobic culture NGTD  - HSV negative    Impression   Status epilepticus, EEG improved on sedative drips - uncertain etiology of NCSE. Will likely take some time to awaken given duration of NCSE and high dose of AEDs started.    Shaking/tremors, unclear etiology. May represent baseline tardive dyskinesia vs manifestation of status epilepticus vs toxic encephalopathy vs other     Plan  - Continue lacosamide 200 mg BID  - Continue fosphenytoin 100mg every 8 hours  - Continue clobazam to 20 mg BID (10/6)  - continuous EEG  - continue to hold/limit dopaminergic, serotonergic agents   - [place] paraneoplastic panel in process     Patient Follow-up    - final recommendation pending work-up    We will continue to follow.     Radha Adan PA-C  Vascular Neurology    To page me or covering stroke neurology team member, click here: [facility]   Choose \"On Call\" tab at top, then search dropdown box for \"Neurology Adult\", select location, press Enter, then look for stroke/neuro ICU/telestroke.       _____________________________________________________    Clinically Significant Risk Factors              # Hypoalbuminemia: Lowest albumin = 3.4 g/dL at 9/30/2023  5:15 AM, will monitor as appropriate     # Hypertension: Noted on problem list                     Medications   Scheduled Meds   chlorhexidine  15 mL Mouth/Throat Q12H    clobazam  20 mg Oral or Feeding Tube BID    diphenhydrAMINE  50 mg Oral or Feeding Tube BID    fiber modular  1 packet Per Feeding Tube Daily    fosphenytoin (CEREBYX) 100 mg PE in sodium chloride 0.9 % 100 mL intermittent infusion  100 mg PE Intravenous Q8H    heparin ANTICOAGULANT  5,000 Units Subcutaneous Q8H    insulin aspart  1-6 Units Subcutaneous Q4H    lacosamide (VIMPAT) 200 mg in sodium chloride 0.9 % 110 mL intermittent infusion  200 mg Intravenous BID    [Held by provider] " LORazepam  0.25 mg Oral At Bedtime    [Held by provider] LORazepam  0.5 mg Oral BID    [Held by provider] multivitamin w/minerals  1 tablet Oral Daily    multivitamins w/minerals  15 mL Oral or Feeding Tube Daily    NIFEdipine  10 mg Oral Q6H    pantoprazole  40 mg Oral or Feeding Tube QAM AC    Or    pantoprazole  40 mg Intravenous QAM AC    [Held by provider] perphenazine  4 mg Oral QAM    [Held by provider] perphenazine  8 mg Oral At Bedtime    polyethylene glycol  17 g Oral or Feeding Tube BID    protein modular  1 packet Per Feeding Tube Daily    sodium chloride (PF)  10-40 mL Intracatheter Q7 Days    sodium chloride (PF)  3 mL Intracatheter Q8H       Infusion Meds   dextrose      midazolam Stopped (10/06/23 1942)    norepinephrine Stopped (10/03/23 0804)    sodium chloride 20 mL/hr at 10/06/23 1943       PRN Meds  acetaminophen **OR** acetaminophen, albuterol, [Held by provider] cyclobenzaprine, dextrose, glucose **OR** dextrose **OR** glucagon, fentaNYL, HYDROmorphone, lidocaine 4%, lidocaine (buffered or not buffered), LORazepam, melatonin, midazolam, naloxone **OR** naloxone **OR** naloxone **OR** naloxone, nitroGLYcerin, sodium chloride (PF), sodium chloride (PF), sodium chloride (PF), sodium chloride (PF)       PHYSICAL EXAMINATION  Temp:  [98.6  F (37  C)-100  F (37.8  C)] 98.6  F (37  C)  Pulse:  [64-80] 65  Resp:  [12-21] 13  BP: (120-170)/(66-86) 149/73  FiO2 (%):  [30 %] 30 %  SpO2:  [96 %-100 %] 99 %      General Exam  General:  patient lying in bed without any acute distress, intubated  HEENT:  normocephalic/atraumatic  Pulmonary:  no respiratory distress, on mechanical ventilation    Neuro Exam   Mental Status:  opens eyes to noxious, does not track provider, does not follow commands, seems to resist eye opening  Cranial Nerves:  slight dysconjugate gaze without fixed gaze preference, PERRL, corneals intact, blinks to threat bilaterally  Motor: no abnormal movements, no movement BUE to noxious,  does withdraw to noxious BLEs today  Reflexes: withdraws to babinski    Imaging  I personally reviewed all imaging; relevant findings per HPI.     Lab Results Data   CBC  Recent Labs   Lab 10/07/23  0432 10/06/23  0435 10/05/23  0434   WBC 8.1 9.1 9.2   RBC 3.57* 3.68* 3.87   HGB 8.8* 9.0* 9.4*   HCT 28.9* 29.7* 31.2*    270 265       Basic Metabolic Panel    Recent Labs   Lab 10/07/23  0436 10/07/23  0432 10/07/23  0010 10/06/23  0844 10/06/23  0435 10/05/23  1610 10/05/23  1427   NA  --  143  --   --  138  --  138   POTASSIUM  --  3.8  --   --  4.0  --  4.2   CHLORIDE  --  104  --   --  100  --  101   CO2  --  30*  --   --  29  --  29   BUN  --  15.3  --   --  14.7  --  16.1   CR  --  0.44*  --   --  0.43*  --  0.43*   * 108* 127*   < > 114*   < > 131*   LULU  --  8.2*  --   --  7.8*  --  8.2*    < > = values in this interval not displayed.       Liver Panel  No results for input(s): PROTTOTAL, ALBUMIN, BILITOTAL, ALKPHOS, AST, ALT, BILIDIRECT in the last 168 hours.    INR    Recent Labs   Lab Test 09/28/23  1244 03/07/23  1629   INR 1.04 1.15        Lipid Profile    Recent Labs   Lab Test 10/03/23  0956 03/08/23  0830   CHOL  --  143   HDL  --  50   LDL  --  75   TRIG 1,372* 88       A1C  No lab results found.  Troponin  No results for input(s): CTROPT, TROPONINIS, TROPONINI, GHTROP in the last 168 hours.       Data   Billing:  [id number]: I personally examined and evaluated the patient today. At the time of my evaluation and management the patient was critical condition today due to NCSE, coma. I personally managed chart review, exam, discussion with care team. Key decisions made today included continue EEG. I spent a total of 35 minutes providing critical care services, evaluating the patient, directing care and reviewing laboratory values and radiologic reports. Greater than 50% was spent in counseling and coordination of care

## 2023-10-07 NOTE — PLAN OF CARE
Goal Outcome Evaluation:       1900-0730  Neuro: Pt sedated. Not following commands or tracking. Opens eyes to vigorous stimulation. PERRLA. On cont EEG. RASS goal -4 to -5.     CV/Tele: Goal to keep SBP < 180. Sinus tachycardia.     Resp: Intubated: FiO2 30%, , R 12, PEEP 5. ETT 22 @ lip. LS coarse. Thick oral secretions. Coughs w/ activity.     GI/: Liu in place for retention. 2 large, loose stools overnight. TF running at goal of 30 mL/hr w/ 100 mL FWF. Tube 57 @ nare.     Skin: Generalized edema. Nonblanchable redness to coccyx, no mepi on coccyx per WOC, barrier paste applied.     IV/infusion: L side 3 lumen PICC. Versed off at 1940. TKO running.     General info: NeuroCrit, Neurology, WOC, and Social Work following. From group home with no family contact or guardian.

## 2023-10-08 NOTE — PROGRESS NOTES
[facility]    Stroke/NCC Progress Note    Interval Events  Temp max 99.9  -153  WBC 6.4    EEG: Findings consistent with moderate diffuse encephalopathy.  Rare left temporal sharp waves indicate continuing focal cortical irritability.  Generalized periodic discharges not seen in today's samples.  No indication of nonconvulsive status epilepticus.    Current AEDs: clobazam 20 mg BID, fosphenytoin 100 mg TID, lacosamide 200 mg BID. Midazolam weaned 10/6 off.    Exam unchanged: opens eyes to sternal rub, no fixed gaze preference, does not follow commands, withdraws slightly to noxious BLEs      HPI Summary  Kortney Germain is a 68 year old female with PMHx significant for anxiety/depression, schizoaffective disorder (living in group home), HTN, odynophagia, tardive dyskinesia, HTN. She presented 9/28 with lower back pain and abdominal pain. She was found to have UTI, anemia, and constipation. She had decreased LOC and generalized shaking overnight. She remained febrile. RRT called 9/29/23 for encephalopathy and concern that should would be unable to protect her airway. 1 PRBC given 9/29/23 due to anemia.    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   MRI: L hyperintensity on DWI/FLAIR without correlate on ADC or T1, favored to be artifact     60 minute EEG: abnormal consistent with a severe encephalopathy and ?status epilepticus; it is important to note patient has persistent head tremoring which may also produce a rhythmic artifact on the EEG and there is persistent electro myogenic artifact making this 1 hour segment difficult to interpret      cEEG:   -9/30: moderate to severe encephalopathy and periodic pattern which may be SIRPIDS (stimulus-induced rhythmic, periodic, or ictal discharges)  - 10/1 (after administration of paralytic to remove muscle artifact): per verbal report underlying cortical activity consistent with  seizures  - 10/3: generalized periodic discharges present but less persistent than previously. Does not meet criteria for NCSE.  - 10/4: severely abnormal. 1) varying degrees of encephalopathy depending on sedative drips utilized.  Background activity consistent with moderate to severe diffuse encephalopathy throughout. 2) persistent generalized periodic discharges at a rate of 1.5 Hz appeared following abrupt discontinuation of propofol.  These were not accompanied by clinical changes but raised concerns about incipient nonconvulsive status epilepticus.  Escalation of midazolam resulted in resolution of generalized periodic discharges.  - 10/5 : Abnormal.  1) background activity on higher doses of midazolam consistent with severe diffuse encephalopathy.  There was modest improvement in background of midazolam was reduced but normal rhythms were not seen.  2) generalized periodic discharges become a more persistent as midazolam is reduced from 18 mg/h to 14 mg/h.  They occupy about 50% of ongoing recording.  This indicates ongoing cortical irritability and need for aggressive anticonvulsant treatment.  Features did not meet standard criteria for nonconvulsive status epilepticus even following reduction of midazolam.  - 10/6: Background largely continuous mixture of moderate amplitude diffuse delta and diffuse alpha. No clear posterior dominant rhythm. Intermittent generalized sharp waves, occasionally organized into brief runs of generalized periodic discharges, consistently less than 1.5 Hz. Persistence estimated about 20%. These do not meet criteria for nonconvulsive status epilepticus and are clearly much less organized and much less persistent than the intermittent nonconvulsive status epilepticus seen in the first 2 days of recording.   - 10/7: Findings consistent with moderate diffuse encephalopathy.  Amount of generalized sharp waves and generalized periodic discharges decreased compared to last several days,  "even in the context of discontinuation of sedative drips.  This indicates some persistence of generalized cortical irritability.  No seizures, even following discontinuation of sedative drips.   - 10/8: indings consistent with moderate diffuse encephalopathy. Rare left temporal sharp waves indicate continuing focal cortical irritability. Generalized periodic discharges not seen in today's samples. No indication of nonconvulsive status epilepticus.      Blood cultures: NGTD     LP labs:  -glucose 98  -protein 22.8  -2 nucleated cells, RBC 0  -aerobic culture GPC in broth only   -anaerobic culture NGTD  - HSV negative      Impression   Status epilepticus, EEG improved on 3 AEDs and sedative drips - uncertain etiology of NCSE. Will likely take some time to awaken given duration of NCSE and high dose of AEDs started.    Shaking/tremors, unclear etiology. May represent baseline tardive dyskinesia vs manifestation of status epilepticus vs toxic encephalopathy vs other     Plan  - Continue lacosamide 200 mg BID  - Continue fosphenytoin 100mg every 8 hours  - Continue clobazam to 20 mg BID (10/6)  - continuous EEG  - continue to hold/limit dopaminergic, serotonergic agents   - [place] paraneoplastic panel in process     Patient Follow-up    - final recommendation pending work-up    We will continue to follow.     Radha Adan PA-C  Vascular Neurology    To page me or covering stroke neurology team member, click here: [facility]COM   Choose \"On Call\" tab at top, then search dropdown box for \"Neurology Adult\", select location, press Enter, then look for stroke/neuro ICU/telestroke.       _____________________________________________________    Clinically Significant Risk Factors              # Hypoalbuminemia: Lowest albumin = 3.4 g/dL at 9/30/2023  5:15 AM, will monitor as appropriate     # Hypertension: Noted on problem list                       Medications   Scheduled Meds   chlorhexidine  15 mL Mouth/Throat Q12H    clobazam  20 mg " Oral or Feeding Tube BID    diphenhydrAMINE  50 mg Oral or Feeding Tube BID    fiber modular  1 packet Per Feeding Tube Daily    fosphenytoin (CEREBYX) 100 mg PE in sodium chloride 0.9 % 100 mL intermittent infusion  100 mg PE Intravenous Q8H    heparin ANTICOAGULANT  5,000 Units Subcutaneous Q8H    insulin aspart  1-6 Units Subcutaneous Q4H    lacosamide (VIMPAT) 200 mg in sodium chloride 0.9 % 110 mL intermittent infusion  200 mg Intravenous BID    [Held by provider] LORazepam  0.25 mg Oral At Bedtime    [Held by provider] LORazepam  0.5 mg Oral BID    [Held by provider] multivitamin w/minerals  1 tablet Oral Daily    multivitamins w/minerals  15 mL Oral or Feeding Tube Daily    NIFEdipine  10 mg Oral Q6H    pantoprazole  40 mg Oral or Feeding Tube QAM AC    Or    pantoprazole  40 mg Intravenous QAM AC    [Held by provider] perphenazine  4 mg Oral QAM    [Held by provider] perphenazine  8 mg Oral At Bedtime    polyethylene glycol  17 g Oral or Feeding Tube BID    protein modular  1 packet Per Feeding Tube Daily    sodium chloride (PF)  10-40 mL Intracatheter Q7 Days    sodium chloride (PF)  3 mL Intracatheter Q8H       Infusion Meds   dextrose      midazolam Stopped (10/06/23 1942)    norepinephrine Stopped (10/03/23 0804)    sodium chloride 20 mL/hr at 10/06/23 1943       PRN Meds  acetaminophen **OR** acetaminophen, albuterol, [Held by provider] cyclobenzaprine, dextrose, glucose **OR** dextrose **OR** glucagon, fentaNYL, HYDROmorphone, lidocaine 4%, lidocaine (buffered or not buffered), LORazepam, melatonin, midazolam, naloxone **OR** naloxone **OR** naloxone **OR** naloxone, nitroGLYcerin, sodium chloride (PF), sodium chloride (PF), sodium chloride (PF), sodium chloride (PF)       PHYSICAL EXAMINATION  Temp:  [97.9  F (36.6  C)-99.9  F (37.7  C)] 98.8  F (37.1  C)  Pulse:  [58-77] 65  Resp:  [11-32] 11  BP: (115-153)/() 153/76  FiO2 (%):  [30 %] 30 %  SpO2:  [96 %-100 %] 99 %      General Exam  General:   patient lying in bed without any acute distress, intubated  HEENT:  normocephalic/atraumatic  Pulmonary:  no respiratory distress, on mechanical ventilation    Neuro Exam   Mental Status:  opens eyes to noxious, does not track provider, does not follow commands  Cranial Nerves:  slight dysconjugate gaze without fixed gaze preference, PERRL, corneals intact, blinks to threat bilaterally  Motor: no abnormal movements, no movement BUE to noxious, does withdraw to noxious BLEs  Reflexes: withdraws to babinski    Imaging  I personally reviewed all imaging; relevant findings per HPI.     Lab Results Data   CBC  Recent Labs   Lab 10/08/23  0528 10/07/23  0432 10/06/23  0435   WBC 6.4 8.1 9.1   RBC 3.28* 3.57* 3.68*   HGB 8.0* 8.8* 9.0*   HCT 27.1* 28.9* 29.7*    294 270       Basic Metabolic Panel    Recent Labs   Lab 10/08/23  0528 10/08/23  0423 10/08/23  0003 10/07/23  0436 10/07/23  0432 10/06/23  0844 10/06/23  0435     --   --   --  143  --  138   POTASSIUM 4.2  --   --   --  3.8  --  4.0   CHLORIDE 104  --   --   --  104  --  100   CO2 31*  --   --   --  30*  --  29   BUN 17.3  --   --   --  15.3  --  14.7   CR 0.46*  --   --   --  0.44*  --  0.43*   * 103* 103*   < > 108*   < > 114*   LULU 8.1*  --   --   --  8.2*  --  7.8*    < > = values in this interval not displayed.       Liver Panel  No results for input(s): PROTTOTAL, ALBUMIN, BILITOTAL, ALKPHOS, AST, ALT, BILIDIRECT in the last 168 hours.    INR    Recent Labs   Lab Test 09/28/23  1244 03/07/23  1629   INR 1.04 1.15        Lipid Profile    Recent Labs   Lab Test 10/03/23  0956 03/08/23  0830   CHOL  --  143   HDL  --  50   LDL  --  75   TRIG 1,372* 88       A1C  No lab results found.  Troponin  No results for input(s): CTROPT, TROPONINIS, TROPONINI, GHTROP in the last 168 hours.       Data   Billing:  [id number]: I personally examined and evaluated the patient today. At the time of my evaluation and management the patient was  critical condition today due to NCSE, coma. I personally managed chart review, exam. Key decisions made today included continue POC. I spent a total of 30 minutes providing critical care services, evaluating the patient, directing care and reviewing laboratory values and radiologic reports. Greater than 50% was spent in counseling and coordination of care

## 2023-10-08 NOTE — PROGRESS NOTES
EEG CLINICAL NEUROPHYSIOLOGY PRELIMINARY REPORT    EEG through about 8 AM today reviewed.  As best as can be gathered from electronic medical record, patient not currently treated with a sedative drips.    Moderate amplitude diffuse polymorphic delta with superimposed polymorphic alpha predominates.  Continuous.  During other portions of the recording, lower amplitude slower polymorphic delta with less alpha.  No normal rhythms.  Rare left posterior temporal sharp waves.  No generalized periodic discharges.  No seizures.    Findings consistent with moderate diffuse encephalopathy.  Rare left temporal sharp waves indicate continuing focal cortical irritability.  Generalized periodic discharges not seen in today's samples.  No indication of nonconvulsive status epilepticus.    Full report to follow.

## 2023-10-08 NOTE — PLAN OF CARE
Goal Outcome Evaluation:       1900-0730  Neuro: Pt sedated. Not following commands or tracking. Opens eyes to touch. PERRLA. On cont EEG.     CV/Tele: Goal to keep SBP < 180. Hypertensive at times.     Resp: Intubated: FiO2 30%, , R 12, PEEP 5. ETT 22 @ lip. LS coarse. Thick oral secretions. Coughs w/ activity.     GI/: Liu in place for retention. Large loose stool overnight. TF running at goal of 30 mL/hr w/ 100 mL FWF. Tube 57 @ nare.     Skin: Generalized edema. Nonblanchable redness to coccyx, no mepi on coccyx per WOC, barrier paste applied.     IV/infusion: L side 3 lumen PICC. TKO w/ int infusions.     General info: NeuroCrit, Neurology, WOC, and Social Work following. From group home with no family contact or guardian.

## 2023-10-08 NOTE — PROGRESS NOTES
Care Management Follow Up    Length of Stay (days): 10    Expected Discharge Date: 10/20/2023     Concerns to be Addressed:       Patient plan of care discussed at interdisciplinary rounds: Yes    Anticipated Discharge Disposition: Group Home     Anticipated Discharge Services: Transportation Services  Anticipated Discharge DME:      Patient/family educated on Medicare website which has current facility and service quality ratings:    Education Provided on the Discharge Plan:    Patient/Family in Agreement with the Plan:      Referrals Placed by CM/SW: Community Residential Settings (Group Homes)  Private pay costs discussed: Not applicable    Additional Information:  Per ICU request, writer is helping with finding next of kin. Writer noted in patient's notes, patient has 2-3 brother and 1 sister.   -writer was able to confirm through birth records([employer]) that her brothers names are :  Juan F Germain DO  Earl Germain   1953  -writer tried 10 different numbers to no avail. Writer will continue the search for NOK.  Gissell Chery RN

## 2023-10-08 NOTE — PROGRESS NOTES
Comprehensive Daily ICU Note        Kortney Germain MRN# [id number]   Age: 68 year old YOB: 1955     Date of Admission: 9/28/2023    Primary care provider: [facility]     CODE STATUS: FULL      Problem List:         Principal Problem:    Status epilepticus (H)  Active Problems:    Urinary retention    Acute cystitis without hematuria    Constipation, unspecified constipation type    Anemia, unspecified type    HTN (hypertension)          Subjective/ Last 24 hours:     68F pmh of schizo-affective schizophrenia, HTN, tardive dyskinesia now presented 9/28 from group home with back pain and fever, Ct abd showed rectum distended with stool and urinary bladder distended, now s/p quinonez placement.  Arrival UA c/w UTI. Since arrival from the ED she has been alert but not generally interactive (?part of this may be due to underlying psychiatric illness?). Morning of 9/29 she was noted to be febrile and shaking prompting intubation to facilitate head CT and LP.  Placed on heavy sedation including benzodiazepine and propofol.  EEG is not showing seizures since then but has shown evidence of potential new seizure foci.  And has been to continue heavy sedation.  Evening of 10/3 triglycerides returned critically high level. Gave 1 dose of Ativan shut off the propofol  After adding additional doses of as needed benzodiazepines and increasing the goal range of the midazolam. Did have more seizure-like activity which resolved with increasing benzodiazepines.  Has been seizure-free for couple of days.  We have been weaning benzodiazepines.    10/7  -Awaiting extubation   10/8:   -  Awaiting for her to wake up.  No sedation for 48 hrs.   - Neuro is still hopefull.          Mechanical Ventilation/Vitalsigns/IsandOs:       Temp:  [97.9  F (36.6  C)-99.9  F (37.7  C)] 98.8  F (37.1  C)  Pulse:  [58-77] 65  Resp:  [11-32] 11  BP: (115-153)/() 153/76  FiO2 (%):  [30 %] 30 %  SpO2:  [96 %-100 %] 99  %      Vent Mode: CMV/AC  (Continuous Mandatory Ventilation/ Assist Control)  FiO2 (%): 30 %  Resp Rate (Set): 12 breaths/min  Tidal Volume (Set, mL): 400 mL  PEEP (cm H2O): 5 cmH2O  Pressure Support (cm H2O): 5 cmH2O  Resp: 11      Day since 9/29      Intake/Output Summary (Last 24 hours) at 10/6/2023 0941  Last data filed at 10/6/2023 0800  Gross per 24 hour   Intake 1816.4 ml   Output 2605 ml   Net -788.6 ml       Net IO Since Admission: 5.3 liter           Physical Examination:     General: Stated age intubated sedated  HEENT: EEG leads ET tube feeding tube  Lungs: Synchronous with ventilator  CVS: Regular rate rhythm  Abdomen: Grossly normal  Extremities/musculoskeletal: No edema  Neurology: Not expansive to voice today.  Skin: Grossly normal  Psychiatry: Unable  Exam of Line sites:  PICC            Feeding/Glucose:     Orders Placed This Encounter      NPO for Medical/Clinical Reasons Except for: No Exceptions        Recent Labs   Lab 10/08/23  0528 10/08/23  0423 10/08/23  0003 10/07/23  2026 10/07/23  1729 10/07/23  1240   * 103* 103* 131* 120* 131*                Medications:      chlorhexidine  15 mL Mouth/Throat Q12H    clobazam  20 mg Oral or Feeding Tube BID    diphenhydrAMINE  50 mg Oral or Feeding Tube BID    fiber modular  1 packet Per Feeding Tube Daily    fosphenytoin (CEREBYX) 100 mg PE in sodium chloride 0.9 % 100 mL intermittent infusion  100 mg PE Intravenous Q8H    heparin ANTICOAGULANT  5,000 Units Subcutaneous Q8H    insulin aspart  1-6 Units Subcutaneous Q4H    lacosamide (VIMPAT) 200 mg in sodium chloride 0.9 % 110 mL intermittent infusion  200 mg Intravenous BID    [Held by provider] LORazepam  0.25 mg Oral At Bedtime    [Held by provider] LORazepam  0.5 mg Oral BID    [Held by provider] multivitamin w/minerals  1 tablet Oral Daily    multivitamins w/minerals  15 mL Oral or Feeding Tube Daily    NIFEdipine  10 mg Oral Q6H    pantoprazole  40 mg Oral or Feeding Tube QAM AC    Or     pantoprazole  40 mg Intravenous QAM AC    [Held by provider] perphenazine  4 mg Oral QAM    [Held by provider] perphenazine  8 mg Oral At Bedtime    polyethylene glycol  17 g Oral or Feeding Tube BID    protein modular  1 packet Per Feeding Tube Daily    sodium chloride (PF)  10-40 mL Intracatheter Q7 Days    sodium chloride (PF)  3 mL Intracatheter Q8H         dextrose      midazolam Stopped (10/06/23 1942)    norepinephrine Stopped (10/03/23 0804)    sodium chloride 20 mL/hr at 10/06/23 1943              Labs:         ROUTINE ICU LABS (Last four results)  CMP  Recent Labs   Lab 10/08/23  0528 10/08/23  0423 10/08/23  0003 10/07/23  2026 10/07/23  0436 10/07/23  0432 10/06/23  0844 10/06/23  0435 10/05/23  1610 10/05/23  1427 10/05/23  0753 10/05/23  0434     --   --   --   --  143  --  138  --  138  --  138   POTASSIUM 4.2  --   --   --   --  3.8  --  4.0  --  4.2  --  3.9   CHLORIDE 104  --   --   --   --  104  --  100  --  101  --  102   CO2 31*  --   --   --   --  30*  --  29  --  29  --  27   ANIONGAP 6*  --   --   --   --  9  --  9  --  8  --  9   * 103* 103* 131*   < > 108*   < > 114*   < > 131*   < > 123*   BUN 17.3  --   --   --   --  15.3  --  14.7  --  16.1  --  10.7   CR 0.46*  --   --   --   --  0.44*  --  0.43*  --  0.43*  --  0.42*   GFRESTIMATED >90  --   --   --   --  >90  --  >90  --  >90  --  >90   LULU 8.1*  --   --   --   --  8.2*  --  7.8*  --  8.2*  --  7.8*   MAG 2.2  --   --   --   --  2.2  --  1.9  --   --   --  1.7   PHOS 3.6  --   --   --   --  3.8  --  3.6  --   --   --  3.6    < > = values in this interval not displayed.     CBC  Recent Labs   Lab 10/08/23  0528 10/07/23  0432 10/06/23  0435 10/05/23  0434   WBC 6.4 8.1 9.1 9.2   RBC 3.28* 3.57* 3.68* 3.87   HGB 8.0* 8.8* 9.0* 9.4*   HCT 27.1* 28.9* 29.7* 31.2*   MCV 83 81 81 81   MCH 24.4* 24.6* 24.5* 24.3*   MCHC 29.5* 30.4* 30.3* 30.1*   RDW 20.7* 20.4* 20.1* 19.7*    294 270 265     INRNo lab results found in  last 7 days.  Arterial Blood Gas  No lab results found in last 7 days.    Cultures:      Recent Labs   Lab 10/06/23  1323   CULTURE Culture in progress             Imaging/Other results:     No results found for this or any previous visit (from the past 24 hour(s)).                  Assessment and Plan:     Summary:    Kortney Germain IS a 68 year old female admitted on 9/28/2023 for fever and UTI.   I have personally reviewed the daily labs, imaging studies, cultures and discussed the case with referring physician and consulting physicians.     My assessment and plan by system for this patient is as follows:    Neurology/Psychiatry:   1. Underlying schizoaffective schizophrenia and h/o tardive dyskinesia:   2. Status epilepticus. Considered serotonin syndrome but less likely now.  Psychiatrist also wondering about malignant catatonia.  Is on 3 antiseizure agents.      -She was on multipel IV sedation meds they are all off since 10/6   -- Any major decisions are required and unable to find a surrogate decision-maker will ensure that at least 2 physicians agree with the plan of care.    Cardiovascular:   1.Hypertension:  restarted Nifedipine at home.  Blood pressures are higher as were weaning sedation    Pulmonary/Ventilator Management:   1. Loss of protective airway reflexes requiring intubation and mechanical ventilation:  Neurologically inappropriate for extubation today.  Does well on pressure support  - Daily pressure support mode for conditioning    GI and Nutrition:   1. Tube feeding  2. PPI for pud prophy    Renal/Fluids/Electrolytes:   Significant positive fluid balance.  Received 1 dose diuretic with good response and now is auto diuresing.  -Address as necessary.  -Reduce Free water      Infectious Disease:   1. Sirs and possible UTI--on ctx and vanco, UC with mixed christina but >100k colonies.   Finished Ceftx course.     Endocrine:   1. Stress induced hyperglycemia:  Not needing  insulin    Hematology/Oncology:   1. Anemia of critical illness.-- s/p 1 prbc 9/29. no apparent blood loss.  - Retransfuse when below 7.  2. Leukocytosis: Improved with antibiotics for presumed UTI.    ICU Prophylaxis:   1. DVT: Hep Subq/ mechanical  2. VAP: HOB 30 degrees, chlorhexidine rinse  3. Stress Ulcer: PPI  4. Restraints: Nonviolent soft two point restraints required and necessary for patient safety and continued cares and good effect as patient continues to pull at necessary lines, tubes despite education and distraction. Will readdress daily.   5. Wound care  -   6. Feeding - tf  7. Family Update:apparently has no family. more in neuropsych section.  consult placed.   8. Disposition - icu    Clinically Significant Risk Factors              # Hypoalbuminemia: Lowest albumin = 3.4 g/dL at 9/30/2023  5:15 AM, will monitor as appropriate                       Lines/Drains/Tubes/Dressings     .  PICC 09/30/23 Triple Lumen Left Basilic ok to use (Active)   Site Assessment WDL 10/08/23 0400   External Cath Length (cm) 5 cm 10/01/23 0812   Extremity Circumference (cm) 26 cm 10/01/23 0812   Dressing Chlorhexidine disk;Transparent 10/08/23 0400   Dressing Status clean;intact;dry 10/08/23 0400   Dressing Intervention dressing changed 10/01/23 0812   Dressing Change Due 10/08/23 10/07/23 1200   Line Necessity Yes, meets criteria 10/08/23 0400   Barney - Status infusing 10/08/23 0400   Purple - Status infusing 10/04/23 0400   Purple - Cap Change Due 10/07/23 09/30/23 0830   Purple - Intervention Cap change 09/30/23 0830   Red - Status saline locked 10/08/23 0400   White - Status infusing 10/08/23 0400   White - Cap Change Due 10/07/23 09/30/23 0830   White - Intervention Cap change 09/30/23 0830   Phlebitis Scale 0-->no symptoms 10/08/23 0400   Infiltration? no 10/08/23 0400   PICC Comment OK TO USE 09/30/23 0830   Number of days: 8       ETT Cuffed Single 7 mm (Active)   Secured at (cm) 22 cm 10/08/23 0710    Measured from Lips 10/08/23 0710   Position Left 10/08/23 0600   Secured by Commercial tube castrejon 10/08/23 0710   Bite Block None Present 10/08/23 0710   Site Appearance Clean;Dry 10/08/23 0710   Tube Care Site care done 10/08/23 0710   Cuff Assessment Minimal occluding volume 10/08/23 0710   Safety Measures Manual resuscitator/mask/valve in room 10/08/23 0710   Number of days: 9       NG/OG/NJ Tube Nasogastric 16 fr Right nostril (Active)   Site Description WDL 10/08/23 0400   Status Enteral Feedings 10/08/23 0400   Drainage Appearance Bile 09/30/23 1200   Placement Confirmation [place] unchanged 10/08/23 0400   [place] (cm marking) at nare/mouth 57 cm 10/07/23 2000   Intake (ml) 40 ml 10/08/23 0600   Flush/Free Water (mL) 100 mL 10/08/23 0400   Residual (mL) 0 mL 10/05/23 2000   Output (ml) 75 ml 09/30/23 1200   Number of days: 9       Urethral Catheter 10/04/23 (Active)   Tube Description Positional 10/06/23 1600   Catheter Care Catheter wipes;Done 10/08/23 0600   Collection Container Standard 10/08/23 0400   Securement Method Securing device (Describe) 10/08/23 0400   Rationale for Continued Use Strict 1-2 Hour I&O;Retention 10/08/23 0400   Urine Output 100 mL 10/08/23 0600   Number of days: 4       Wound Coccyx Pressure injury community acquired Stage 2 (Active)   Number of days: 216       Wound Ankle Abrasion(s) NA (Active)   Number of days: 208       Wound Sternum Abrasion(s) (Active)   Wound Bed Erythema, non-blanchable, skin intact 10/08/23 0000   Estimated Circumference ( if not measured tennis ball sized 10/05/23 2000   Drainage Amount None 10/08/23 0000   Dressing Open to air 10/08/23 0000   Dressing Change Due 10/01/23 09/30/23 1948   Number of days: 8      ..  .    Prognosis:    guarded        Billing: total time spend providing critical care was 38 min, excluding procedure time.    Hem Raymond MCMANUS  [phone]

## 2023-10-08 NOTE — PROGRESS NOTES
[facility] ICU RESPIRATORY NOTE        Date of Admission: 9/28/2023    Date of Intubation (most recent): 09/29/2023    Reason for Mechanical Ventilation: AW Protection    Number of Days on Mechanical Ventilation: 10    Met Criteria for Spontaneous Breathing Trial: Yes    Significant Events Today: PS 5/5, 30% since 1500.    ABG Results:   Recent Labs   Lab 10/08/23  0908   O2PER 30         Current Vent Settings: Vent Mode: (S) CPAP/PS  (Continuous positive airway pressure with Pressure Support)  FiO2 (%): 30 %  Resp Rate (Set): 12 breaths/min  Tidal Volume (Set, mL): 400 mL  PEEP (cm H2O): 5 cmH2O  Pressure Support (cm H2O): 5 cmH2O  Resp: 20      Jason Gibbs, RT on 10/8/2023 at 6:59 PM

## 2023-10-08 NOTE — PROGRESS NOTES
[facility] ICU RESPIRATORY NOTE        Date of Admission: 9/28/2023    Date of Intubation (most recent): 9/29/2023    Reason for Mechanical Ventilation: Airway Protection    Number of Days on Mechanical Ventilation: 9    Met Criteria for Spontaneous Breathing Trial: yes    Reason for No Spontaneous Breathing Trial: Rested for the night    Significant Events Today: none    ABG Results: No lab results found in last 7 days.      Current Vent Settings: Vent Mode: CMV/AC  (Continuous Mandatory Ventilation/ Assist Control)  FiO2 (%): 30 %  Resp Rate (Set): 12 breaths/min  Tidal Volume (Set, mL): 400 mL  PEEP (cm H2O): 5 cmH2O  Pressure Support (cm H2O): 5 cmH2O  Resp: 12      Skin Assessment: intact    Plan: Wean as patient tolerates    MeshRT Neftali on 10/8/2023 at 5:36 AM

## 2023-10-09 NOTE — PROGRESS NOTES
[facility] ICU RESPIRATORY NOTE        Date of Admission: 9/28/2023    Date of Intubation (most recent): 9/29/2023    Reason for Mechanical Ventilation: AW protection    Number of Days on Mechanical Ventilation: 10    Met Criteria for Spontaneous Breathing Trial: completed during the day    Reason for No Spontaneous Breathing Trial: To be completed during the day shift    Significant Events Today: None    ABG Results:   Recent Labs   Lab 10/08/23  0908   O2PER 30         Current Vent Settings: Vent Mode: CMV/AC  (Continuous Mandatory Ventilation/ Assist Control)  FiO2 (%): 30 %  Resp Rate (Set): 12 breaths/min  Tidal Volume (Set, mL): 400 mL  PEEP (cm H2O): 5 cmH2O  Pressure Support (cm H2O): 5 cmH2O  Resp: 11      Skin Assessment: Intact    Plan: Continue on full vent support    Heath Casarez RT on 10/9/2023 at 3:58 AM

## 2023-10-09 NOTE — PLAN OF CARE
Goal Outcome Evaluation:       1900-0730  Neuro: Pt sedated. Not following commands or tracking. Opens eyes to touch/activity. PERRLA. On cont EEG.     CV/Tele: Goal to keep SBP < 180. Hypertensive at times.     Resp: Intubated: FiO2 30%, , R 12, PEEP 5. ETT 22 @ lip. LS coarse. Thick oral secretions. Coughs w/ activity.     GI/: Liu in place for retention. Small loose stool overnight. TF running at goal of 30 mL/hr w/ 100 mL FWF. Tube 57 @ nare.     Skin: Generalized edema. Nonblanchable redness to coccyx, no mepi on coccyx per WOC, barrier paste applied.     IV/infusion: L side 3 lumen PICC. TKO w/ int infusions.     General info: NeuroCrit, Neurology, WOC, and Social Work following. From group home, family contacted - no response.

## 2023-10-09 NOTE — PROGRESS NOTES
Comprehensive Daily ICU Note        Kortney Germain MRN# [id number]   Age: 68 year old YOB: 1955     Date of Admission: 9/28/2023    Primary care provider: [facility]     CODE STATUS: FULL      Problem List:   Principal Problem:    Status epilepticus (H)  Active Problems:    Urinary retention    Acute cystitis without hematuria    Constipation, unspecified constipation type    Anemia, unspecified type    HTN (hypertension)          Subjective/ Last 24 hours:   68F pmh of schizo-affective schizophrenia, HTN, tardive dyskinesia now presented 9/28 from group home with back pain and fever, Ct abd showed rectum distended with stool and urinary bladder distended, now s/p quinonez placement.  Arrival UA c/w UTI. Since arrival from the ED she has been alert but not generally interactive (?part of this may be due to underlying psychiatric illness?). Morning of 9/29 she was noted to be febrile and shaking prompting intubation to facilitate head CT and LP.  Placed on heavy sedation including benzodiazepine and propofol.  EEG is not showing seizures since then but has shown evidence of potential new seizure foci.  And has been to continue heavy sedation.  Evening of 10/3 triglycerides returned critically high level. Gave 1 dose of Ativan shut off the propofol  After adding additional doses of as needed benzodiazepines and increasing the goal range of the midazolam. Did have more seizure-like activity which resolved with increasing benzodiazepines.  Has been seizure-free for couple of days.  We have been weaning benzodiazepines.    10/7  -Awaiting extubation   10/8:   -  Awaiting for her to wake up.  No sedation for 48 hrs.   - Neuro is still hopefull.   10/9:  - still sleepy  - still d/w neuro regarding if extubation possible           Assessment and Plan:     Summary:    Kortney Germain IS a 68 year old female admitted on 9/28/2023 for fever and UTI.   I have personally reviewed the daily labs,  imaging studies, cultures and discussed the case with referring physician and consulting physicians.   - intubated since 9/29/23    My assessment and plan by system for this patient is as follows:    Neurology/Psychiatry:   1. Underlying schizoaffective schizophrenia and h/o tardive dyskinesia:   2. Status epilepticus. Considered serotonin syndrome but less likely now.  Psychiatrist also wondering about malignant catatonia.  Is on 3 antiseizure agents.     - AEDs  clobazam 10  mg BID, fosphenytoin 200 mg at night, lacosamide 150 mg BID.    - [place] paraneoplastic panel pending   - ammonia level 14 10/9/2023`    -She was on multipel IV sedation meds they are all off since 10/6   -- Any major decisions are required and unable to find a surrogate decision-maker will ensure that at least 2 physicians agree with the plan of care.    Cardiovascular:   1.Hypertension:  restarted Nifedipine at home.  Blood pressures are higher as were weaning sedation    Pulmonary/Ventilator Management:   1. Loss of protective airway reflexes requiring intubation and mechanical ventilation:  Neurologically inappropriate for extubation today.  Does well on pressure support  - Daily pressure support mode for conditioning  - Awaiting mental status to improve for extubation  - intubated since 9/29/23    GI and Nutrition:   1. Tube feeding  2. PPI for pud prophy    Renal/Fluids/Electrolytes:   Significant positive fluid balance.  Received 1 dose diuretic with good response and now is auto diuresing.  -Address as necessary.  -Reduce Free water      Infectious Disease:   1. Sirs and possible UTI--on ctx and vanco, UC with mixed christina but >100k colonies.   Finished Ceftx course.     Endocrine:   1. Stress induced hyperglycemia:  Not needing insulin    Hematology/Oncology:   1. Anemia of critical illness.-- s/p 1 prbc 9/29. no apparent blood loss.  - Retransfuse when below 7.  2. Leukocytosis: Improved with antibiotics for presumed UTI.    ICU  Prophylaxis:   1. DVT: Hep Subq/ mechanical  2. VAP: HOB 30 degrees, chlorhexidine rinse  3. Stress Ulcer: PPI  4. Restraints: Nonviolent soft two point restraints required and necessary for patient safety and continued cares and good effect as patient continues to pull at necessary lines, tubes despite education and distraction. Will readdress daily.   5. Wound care  -   6. Feeding - tf  7. Family Update:apparently has no family. more in neuropsych section.  consult placed.   8. Disposition - icu    Clinically Significant Risk Factors              # Hypoalbuminemia: Lowest albumin = 3.4 g/dL at 9/30/2023  5:15 AM, will monitor as appropriate                       Lines/Drains/Tubes/Dressings     .  PICC 09/30/23 Triple Lumen Left Basilic ok to use (Active)   Site Assessment WDL 10/09/23 1200   External Cath Length (cm) 5 cm 10/01/23 0812   Extremity Circumference (cm) 26 cm 10/01/23 0812   Dressing Chlorhexidine disk;Transparent 10/09/23 1200   Dressing Status clean;dry;intact 10/09/23 1200   Dressing Intervention dressing changed 10/01/23 0812   Dressing Change Due 10/08/23 10/09/23 1200   Line Necessity Yes, meets criteria 10/09/23 1200   Barney - Status infusing 10/09/23 1200   Purple - Status infusing 10/04/23 0400   Purple - Cap Change Due 10/07/23 09/30/23 0830   Purple - Intervention Cap change 09/30/23 0830   Red - Status saline locked 10/09/23 1200   Red - Intervention Flushed 10/08/23 0800   White - Status infusing 10/09/23 1200   White - Cap Change Due 10/07/23 09/30/23 0830   White - Intervention Cap change 09/30/23 0830   Phlebitis Scale 0-->no symptoms 10/09/23 1200   Infiltration? no 10/09/23 1200   PICC Comment OK TO USE 09/30/23 0830   Number of days: 9       ETT Cuffed Single 7 mm (Active)   Secured at (cm) 22 cm 10/09/23 1140   Measured from Lips 10/09/23 1140   Position Left 10/09/23 1000   Secured by Commercial tube castrejon 10/09/23 1140   Bite Block None Present 10/09/23 1140   Site  Appearance Clean;Dry 10/09/23 1140   Tube Care Site care done 10/09/23 0000   Cuff Assessment Minimal occluding volume 10/09/23 0339   Safety Measures Manual resuscitator/PEEP valve in room;Manual resuscitator/mask/valve in room 10/09/23 1140   Number of days: 10       NG/OG/NJ Tube Nasogastric 16 fr Right nostril (Active)   Site Description WDL 10/09/23 1000   Status Enteral Feedings 10/09/23 1000   Drainage Appearance Bile 09/30/23 1200   Placement Confirmation [place] unchanged 10/09/23 0800   [place] (cm marking) at nare/mouth 57 cm 10/09/23 0800   Intake (ml) 60 ml 10/09/23 1123   Flush/Free Water (mL) 60 mL 10/09/23 1123   Residual (mL) 0 mL 10/05/23 2000   Output (ml) 75 ml 09/30/23 1200   Number of days: 10       Urethral Catheter 10/04/23 (Active)   Tube Description Positional 10/08/23 0800   Catheter Care Catheter wipes;Done 10/09/23 1000   Collection Container Standard;Patent 10/09/23 0800   Securement Method Securing device (Describe) 10/09/23 0800   Rationale for Continued Use Strict 1-2 Hour I&O;Retention 10/09/23 0800   Urine Output 150 mL 10/09/23 1000   Number of days: 5       Wound Coccyx Pressure injury community acquired Stage 2 (Active)   Number of days: 217       Wound Ankle Abrasion(s) NA (Active)   Number of days: 209       Wound Sternum Abrasion(s) (Active)   Wound Bed Erythema, non-blanchable, skin intact 10/09/23 1000   Estimated Circumference ( if not measured tennis ball sized 10/05/23 2000   Drainage Amount None 10/09/23 1000   Wound Care/Cleansing Barrier applied ;Other (Comment);Wound cleanser 10/09/23 1000   Dressing Open to air 10/09/23 1000   Dressing Change Due 10/01/23 09/30/23 1948   Number of days: 9      ..  .    Prognosis:    guarded        I have personally reviewed the daily labs, imaging studies, cultures and discussed the case with referring physician and consulting physicians.     This patient is critically ill and I have provided 45 minutes of critical care time  (excluding procedures) on 10/09/2023    Carl Muñiz MD  Pulmonary & Critical Care   Department of Medicine  Division of Pulmonary, Allergy, Critical Care and Sleep Medicine   [facility], [facility]  [phone] (Text Page)          Mechanical Ventilation/Vitalsigns/IsandOs:       Temp:  [98.6  F (37  C)-100.2  F (37.9  C)] 98.6  F (37  C)  Pulse:  [58-75] 61  Resp:  [11-21] 17  BP: (120-157)/(64-84) 131/64  FiO2 (%):  [30 %] 30 %  SpO2:  [98 %-100 %] 100 %      Vent Mode: CPAP/PS  (Continuous positive airway pressure with Pressure Support)  FiO2 (%): 30 %  Resp Rate (Set): 12 breaths/min  Tidal Volume (Set, mL): 400 mL  PEEP (cm H2O): 5 cmH2O  Pressure Support (cm H2O): 7 cmH2O  Resp: 17      Day since 9/29      Intake/Output Summary (Last 24 hours) at 10/6/2023 0941  Last data filed at 10/6/2023 0800  Gross per 24 hour   Intake 1816.4 ml   Output 2605 ml   Net -788.6 ml       Net IO Since Admission: 5.3 liter           Physical Examination:     General: Stated age intubated sedated  HEENT: EEG leads ET tube feeding tube  Lungs: Synchronous with ventilator  CVS: Regular rate rhythm  Abdomen: Grossly normal  Extremities/musculoskeletal: No edema  Neurology: Not expansive to voice today.  Skin: Grossly normal  Psychiatry: Unable  Exam of Line sites:  PICC            Feeding/Glucose:     Orders Placed This Encounter      NPO for Medical/Clinical Reasons Except for: No Exceptions        Recent Labs   Lab 10/09/23  1123 10/09/23  0804 10/09/23  0427 10/09/23  0426 10/09/23  0005 10/08/23  2022   * 100* 100* 100* 105* 112*                  Medications:      chlorhexidine  15 mL Mouth/Throat Q12H    clobazam  10 mg Oral or Feeding Tube BID    diphenhydrAMINE  50 mg Oral or Feeding Tube BID    fiber modular  1 packet Per Feeding Tube Daily    fosphenytoin (CEREBYX) 200 mg PE in sodium chloride 0.9 % 100 mL intermittent infusion  200 mg PE Intravenous At Bedtime    heparin ANTICOAGULANT  5,000 Units  Subcutaneous Q8H    insulin aspart  1-6 Units Subcutaneous Q4H    lacosamide (VIMPAT) 150 mg in sodium chloride 0.9 % 110 mL intermittent infusion  150 mg Intravenous BID    [Held by provider] LORazepam  0.25 mg Oral At Bedtime    [Held by provider] LORazepam  0.5 mg Oral BID    multivitamins w/minerals  15 mL Oral or Feeding Tube Daily    NIFEdipine  10 mg Oral Q6H    pantoprazole  40 mg Oral or Feeding Tube QAM AC    Or    pantoprazole  40 mg Intravenous QAM AC    [Held by provider] perphenazine  4 mg Oral QAM    [Held by provider] perphenazine  8 mg Oral At Bedtime    polyethylene glycol  17 g Oral or Feeding Tube BID    protein modular  1 packet Per Feeding Tube Daily    sodium chloride (PF)  10-40 mL Intracatheter Q7 Days    sodium chloride (PF)  3 mL Intracatheter Q8H         dextrose      midazolam Stopped (10/06/23 1942)    sodium chloride 20 mL/hr at 10/08/23 2024              Labs:         ROUTINE ICU LABS (Last four results)  CMP  Recent Labs   Lab 10/09/23  1123 10/09/23  0804 10/09/23  0427 10/09/23  0426 10/08/23  0907 10/08/23  0528 10/07/23  0436 10/07/23  0432 10/06/23  0844 10/06/23  0435   NA  --   --  141  --   --  141  --  143  --  138   POTASSIUM  --   --  4.2  --   --  4.2  --  3.8  --  4.0   CHLORIDE  --   --  103  --   --  104  --  104  --  100   CO2  --   --  28  --   --  31*  --  30*  --  29   ANIONGAP  --   --  10  --   --  6*  --  9  --  9   * 100* 100* 100*   < > 110*   < > 108*   < > 114*   BUN  --   --  17.2  --   --  17.3  --  15.3  --  14.7   CR  --   --  0.43*  --   --  0.46*  --  0.44*  --  0.43*   GFRESTIMATED  --   --  >90  --   --  >90  --  >90  --  >90   LULU  --   --  7.9*  --   --  8.1*  --  8.2*  --  7.8*   MAG  --   --  2.2  --   --  2.2  --  2.2  --  1.9   PHOS  --   --  3.4  --   --  3.6  --  3.8  --  3.6    < > = values in this interval not displayed.       CBC  Recent Labs   Lab 10/09/23  0427 10/08/23  0528 10/07/23  0432 10/06/23  0435   WBC 6.9 6.4 8.1 9.1    RBC 3.31* 3.28* 3.57* 3.68*   HGB 8.3* 8.0* 8.8* 9.0*   HCT 27.5* 27.1* 28.9* 29.7*   MCV 83 83 81 81   MCH 25.1* 24.4* 24.6* 24.5*   MCHC 30.2* 29.5* 30.4* 30.3*   RDW 21.0* 20.7* 20.4* 20.1*    291 294 270       INRNo lab results found in last 7 days.  Arterial Blood Gas  Recent Labs   Lab 10/08/23  0908   O2PER 30       Cultures:      Recent Labs   Lab 10/06/23  1323   CULTURE 1+ Normal christina               Imaging/Other results:     No results found for this or any previous visit (from the past 24 hour(s)).

## 2023-10-09 NOTE — PLAN OF CARE
Goal Outcome Evaluation:      Plan of Care Reviewed With:  (Interdisciplinary bedside rounds)    Overall Patient Progress: no changeOverall Patient Progress: no change    Outcome Evaluation: Goal TF meeting needs, diarrhea seems improved with addition of Banatrol.  See RD note dated 10/9/23 for details.    Libertad Romano RD, LD, CNSC   Clinical Dietitian - [facility]

## 2023-10-09 NOTE — PROGRESS NOTES
[facility]    Stroke/NCC Progress Note    Interval Events  Temp max 100.2  -153  WBC 6.9    EEG: abnormal due to the presences of continuous generalized polymorphic delta slowing. There is a  7 hz symmetric parieto-occipital rhythm.  This EEG is consistent with a moderate to severe diffuse encephalopathy. No epileptiform discharges or electrographic seizures were seen in this record.     Current AEDs: clobazam 10  mg BID, fosphenytoin 200 mg at night, lacosamide 150 mg BID. Midazolam weaned 10/6 off.    Exam unchanged: opens eyes to sternal rub, no fixed gaze preference, does not follow commands, withdraws to noxious in all extremities      HPI Summary  Kortney Germain is a 68 year old female with PMHx significant for anxiety/depression, schizoaffective disorder (living in group home), HTN, odynophagia, tardive dyskinesia, HTN. She presented 9/28 with lower back pain and abdominal pain. She was found to have UTI, anemia, and constipation. She had decreased LOC and generalized shaking overnight. She remained febrile. RRT called 9/29/23 for encephalopathy and concern that should would be unable to protect her airway. 1 PRBC given 9/29/23 due to anemia.    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   MRI: L hyperintensity on DWI/FLAIR without correlate on ADC or T1, favored to be artifact     60 minute EEG: abnormal consistent with a severe encephalopathy and ?status epilepticus; it is important to note patient has persistent head tremoring which may also produce a rhythmic artifact on the EEG and there is persistent electro myogenic artifact making this 1 hour segment difficult to interpret      cEEG:   -9/30: moderate to severe encephalopathy and periodic pattern which may be SIRPIDS (stimulus-induced rhythmic, periodic, or ictal discharges)  - 10/1 (after administration of paralytic to remove muscle artifact): per verbal report  underlying cortical activity consistent with seizures  - 10/3: generalized periodic discharges present but less persistent than previously. Does not meet criteria for NCSE.  - 10/4: severely abnormal. 1) varying degrees of encephalopathy depending on sedative drips utilized.  Background activity consistent with moderate to severe diffuse encephalopathy throughout. 2) persistent generalized periodic discharges at a rate of 1.5 Hz appeared following abrupt discontinuation of propofol.  These were not accompanied by clinical changes but raised concerns about incipient nonconvulsive status epilepticus.  Escalation of midazolam resulted in resolution of generalized periodic discharges.  - 10/5 : Abnormal.  1) background activity on higher doses of midazolam consistent with severe diffuse encephalopathy.  There was modest improvement in background of midazolam was reduced but normal rhythms were not seen.  2) generalized periodic discharges become a more persistent as midazolam is reduced from 18 mg/h to 14 mg/h.  They occupy about 50% of ongoing recording.  This indicates ongoing cortical irritability and need for aggressive anticonvulsant treatment.  Features did not meet standard criteria for nonconvulsive status epilepticus even following reduction of midazolam.  - 10/6: Background largely continuous mixture of moderate amplitude diffuse delta and diffuse alpha. No clear posterior dominant rhythm. Intermittent generalized sharp waves, occasionally organized into brief runs of generalized periodic discharges, consistently less than 1.5 Hz. Persistence estimated about 20%. These do not meet criteria for nonconvulsive status epilepticus and are clearly much less organized and much less persistent than the intermittent nonconvulsive status epilepticus seen in the first 2 days of recording.   - 10/7: Findings consistent with moderate diffuse encephalopathy.  Amount of generalized sharp waves and generalized periodic  "discharges decreased compared to last several days, even in the context of discontinuation of sedative drips.  This indicates some persistence of generalized cortical irritability.  No seizures, even following discontinuation of sedative drips.   - 10/8: indings consistent with moderate diffuse encephalopathy. Rare left temporal sharp waves indicate continuing focal cortical irritability. Generalized periodic discharges not seen in today's samples. No indication of nonconvulsive status epilepticus.   - 10/9: abnormal due to the presences of continuous generalized polymorphic delta slowing. There is a  7 hz symmetric parieto-occipital rhythm.  This EEG is consistent with a moderate to severe diffuse encephalopathy. No epileptiform discharges or electrographic seizures were seen in this record.      Blood cultures: NGTD     LP labs:  -glucose 98  -protein 22.8  -2 nucleated cells, RBC 0  -aerobic culture GPC in broth only   -anaerobic culture NGTD  - HSV negative      Impression   Status epilepticus, EEG improved on 3 AEDs and sedative drips - uncertain etiology of NCSE. Will likely take some time to awaken given duration of NCSE and high dose of AEDs started.    Shaking/tremors, unclear etiology. May represent baseline tardive dyskinesia vs manifestation of status epilepticus vs toxic encephalopathy vs other     Plan  - Decrease lacosamide to 150 mg BID  - Decrease fosphenytoin to 200 mg qHS  - Decrease clobazam to 10 mg BID (10/9)  - continuous EEG  - continue to hold/limit dopaminergic, serotonergic agents   - [place] paraneoplastic panel in process   - Check ammonia level  - Vimpat level prior to AM dose  - Total phenytoin level at 1800    Patient Follow-up    - final recommendation pending work-up    We will continue to follow.     Gricelda Cody, TAMI, [facility]  Neurology  10/09/2023 9:59 AM  To page stroke neurology after hours or on a subsequent day, click here: AMCOM  Choose \"On Call\" tab at top, then search " "dropdown box for \"Neurology Adult\" & press Enter, look for Neuro ICU/Stroke     _____________________________________________________    Clinically Significant Risk Factors              # Hypoalbuminemia: Lowest albumin = 3.4 g/dL at 9/30/2023  5:15 AM, will monitor as appropriate     # Hypertension: Noted on problem list                       Medications   Scheduled Meds   chlorhexidine  15 mL Mouth/Throat Q12H    clobazam  10 mg Oral or Feeding Tube BID    diphenhydrAMINE  50 mg Oral or Feeding Tube BID    fiber modular  1 packet Per Feeding Tube Daily    fosphenytoin (CEREBYX) 200 mg PE in sodium chloride 0.9 % 100 mL intermittent infusion  200 mg PE Intravenous At Bedtime    heparin ANTICOAGULANT  5,000 Units Subcutaneous Q8H    insulin aspart  1-6 Units Subcutaneous Q4H    lacosamide (VIMPAT) 150 mg in sodium chloride 0.9 % 100 mL intermittent infusion  150 mg Intravenous BID    [Held by provider] LORazepam  0.25 mg Oral At Bedtime    [Held by provider] LORazepam  0.5 mg Oral BID    multivitamins w/minerals  15 mL Oral or Feeding Tube Daily    NIFEdipine  10 mg Oral Q6H    pantoprazole  40 mg Oral or Feeding Tube QAM AC    Or    pantoprazole  40 mg Intravenous QAM AC    [Held by provider] perphenazine  4 mg Oral QAM    [Held by provider] perphenazine  8 mg Oral At Bedtime    polyethylene glycol  17 g Oral or Feeding Tube BID    protein modular  1 packet Per Feeding Tube Daily    sodium chloride (PF)  10-40 mL Intracatheter Q7 Days    sodium chloride (PF)  3 mL Intracatheter Q8H       Infusion Meds   dextrose      midazolam Stopped (10/06/23 1942)    sodium chloride 20 mL/hr at 10/08/23 2024       PRN Meds  acetaminophen **OR** acetaminophen, albuterol, [Held by provider] cyclobenzaprine, dextrose, glucose **OR** dextrose **OR** glucagon, fentaNYL, HYDROmorphone, lidocaine 4%, lidocaine (buffered or not buffered), LORazepam, melatonin, midazolam, naloxone **OR** naloxone **OR** naloxone **OR** naloxone, " nitroGLYcerin, sodium chloride (PF), sodium chloride (PF), sodium chloride (PF), sodium chloride (PF)       PHYSICAL EXAMINATION  Temp:  [99  F (37.2  C)-100.2  F (37.9  C)] 99.1  F (37.3  C)  Pulse:  [58-75] 67  Resp:  [11-21] 17  BP: (112-157)/(61-84) 132/67  FiO2 (%):  [30 %] 30 %  SpO2:  [98 %-100 %] 100 %      General Exam  General:  patient lying in bed without any acute distress, intubated  HEENT:  normocephalic/atraumatic  Pulmonary:  no respiratory distress, on mechanical ventilation    Neuro Exam   Mental Status:  opens eyes to noxious, does not track provider, does not follow commands  Cranial Nerves:  gaze midline, PERRL, blinks to threat bilaterally, face grossly symmetric  Motor: no abnormal movements, withdraws to noxious in all extremities    Imaging  I personally reviewed all imaging; relevant findings per HPI.     Lab Results Data   CBC  Recent Labs   Lab 10/09/23  0427 10/08/23  0528 10/07/23  0432   WBC 6.9 6.4 8.1   RBC 3.31* 3.28* 3.57*   HGB 8.3* 8.0* 8.8*   HCT 27.5* 27.1* 28.9*    291 294     Basic Metabolic Panel    Recent Labs   Lab 10/09/23  0804 10/09/23  0427 10/09/23  0426 10/08/23  0907 10/08/23  0528 10/07/23  0436 10/07/23  0432   NA  --  141  --   --  141  --  143   POTASSIUM  --  4.2  --   --  4.2  --  3.8   CHLORIDE  --  103  --   --  104  --  104   CO2  --  28  --   --  31*  --  30*   BUN  --  17.2  --   --  17.3  --  15.3   CR  --  0.43*  --   --  0.46*  --  0.44*   * 100* 100*   < > 110*   < > 108*   LULU  --  7.9*  --   --  8.1*  --  8.2*    < > = values in this interval not displayed.     Liver Panel  No results for input(s): PROTTOTAL, ALBUMIN, BILITOTAL, ALKPHOS, AST, ALT, BILIDIRECT in the last 168 hours.    INR    Recent Labs   Lab Test 09/28/23  1244 03/07/23  1629   INR 1.04 1.15      Lipid Profile    Recent Labs   Lab Test 10/03/23  0956 03/08/23  0830   CHOL  --  143   HDL  --  50   LDL  --  75   TRIG 1,372* 88     A1C  No lab results found.  Troponin   No results for input(s): CTROPT, TROPONINIS, TROPONINI, GHTROP in the last 168 hours.       Data   Billing:  [id number]: I personally examined and evaluated the patient today. At the time of my evaluation and management the patient was critical condition today due to NCSE, coma. I personally managed chart review, exam. Key decisions made today included continue POC. I spent a total of 30 minutes providing critical care services, evaluating the patient, directing care and reviewing laboratory values and radiologic reports. Greater than 50% was spent in counseling and coordination of care

## 2023-10-09 NOTE — PROGRESS NOTES
Preliminary Video EEG impression on October 9, 2023  Until 6am       This EEG is abnormal due to the presences of continuous generalized polymorphic delta slowing. There is a  7 hz symmetric parieto-occipital rhythm.  This EEG is consistent with a moderate to severe diffuse encephalopathy. No epileptiform discharges or electrographic seizures were seen in this record. Clinical correlation is advised.     Koki Gusman MD  Staff Epilepsy Neurologist   [phone]

## 2023-10-09 NOTE — PLAN OF CARE
Neuro: PERRL. Opens eyes to vigorous stimulation, not tracking. BUE and BLE withdraw to painful stimuli. + Cough.  CV: Tele NSR. Goal SBP <180.  Resp: FiO2 30%, , R 12, PEEP 5. LS coarse. CPAP/PS for ______hrs - tolerated well.   GI: Tolerating TF at goal of 30 ml/hr,  ml Q4H. No BM this shift.  : Urine output adequate. Liu removed. Straight cathed at 1800 for 350ml. Purewick in place.  Skin: Buttocks wound care completed per orders. Scattered bruising.  Lines/Access: L triple lumen PICC

## 2023-10-09 NOTE — PROGRESS NOTES
15:00-19:30    No changes. No seizures noted. cEEG. PS 5/5 for couple hours, tolerated, doesn't follow commands. Tube feed at goal with flush. Brown BM x1. Cloudy urine output, adequate. Q 2 hr turn and repo, skin care as ordered. Will cont to closely monitor.

## 2023-10-09 NOTE — PROGRESS NOTES
CLINICAL NUTRITION SERVICES - REASSESSMENT NOTE      Malnutrition: (9/30)  % Weight Loss: More information needed, no wt loss in the past 6 months   % Intake:  <75% for >/= 3 months (moderate malnutrition) - chronic limited food intake   Subcutaneous Fat Loss:  None acute changes observed, suspect chronic low stores (exam compared to prior hospitalization 6 months ago)  Muscle Loss:  None acute changes observed, suspect chronic low stores (exam compared to prior hospitalization 6 months ago)  Fluid Retention:  None noted     Malnutrition Diagnosis: Unable to determine d/t lack of information - pt remains at risk       EVALUATION OF PROGRESS TOWARD GOALS   Diet:  NPO on vent     Nutrition Support:  Patient continues on goal TF regimen as follows ~    Nutrition Support Enteral:  Type of Feeding Tube: NG  Enteral Frequency:  Continuous  Enteral Regimen: Osmolite 1.5 at 30 mL/hr  Total Enteral Provisions: 1080 kcal, 45 g protein, 147 g CHO, 0 g fiber, 549 mL H2O  Free Water Flush: 100 mL every 4 hours  ProSource TF 20 daily = 80 kcal and 20 g protein  Banatrol TF20 BID = 90 kcal, 10 g fiber, 4 g protein   Total = 1250 kcal (28 kcal/kg), 69 g protein (1.5 g/kg)       Intake/Tolerance:    K/Mg/Phos normal   BGM  - Medium sliding scale insulin  I/O 1500/1250, wt 52.6 kg (up from admit)  BM x 1 today, x 1 yesterday, and x 6 on 10/7 -- Improved with addition of Banatrol       ASSESSED NUTRITION NEEDS:  Dosing Weight 45 kg   Estimated Energy Needs: 6131-8381+ kcals (25-30+ Kcal/Kg)  Justification: maintenance and vented  Estimated Protein Needs: 54-68 grams protein (1.2-1.5 g pro/Kg)  Justification: preservation of lean body mass  Estimated Fluid Needs: 1 mL/kcal or per MD   Justification: maintenance      NEW FINDINGS:   10/6:  WOCN =   B/L buttocks   Friction and Moisture Associated Skin Damage (MASD)   STATUS: improving     Patient receiving Certavite daily     Previous Goals (10/4):   TF regimen will meet %  needs - Met   Stooling volume will decrease with addition of Banatrol - Met      Previous Nutrition Diagnosis (10/4):   Inadequate energy intake related to TF at goal, Propofol now off as evidenced by meeting < 90% energy needs   Evaluation: Resolved       CURRENT NUTRITION DIAGNOSIS  No nutrition diagnosis identified at this time    INTERVENTIONS  Recommendations / Nutrition Prescription  Continue goal TF regimen as above     Implementation  Collaboration and Referral of Nutrition care:  Patient discussed today during interdisciplinary bedside rounds     Goals  Goal TF regimen will continue to meet % needs     MONITORING AND EVALUATION:  Progress towards goals will be monitored and evaluated per protocol and Practice Guidelines    Libertad Romano RD, LD, CNSC   Clinical Dietitian - [facility]

## 2023-10-10 NOTE — PROGRESS NOTES
Care Management Follow Up    Length of Stay (days): 12    Expected Discharge Date: 10/19/2023     Concerns to be Addressed:       Patient plan of care discussed at interdisciplinary rounds: Yes    Anticipated Discharge Disposition: Group Home     Anticipated Discharge Services: Transportation Services  Anticipated Discharge DME:      Patient/family educated on Medicare website which has current facility and service quality ratings:    Education Provided on the Discharge Plan:    Patient/Family in Agreement with the Plan:      Referrals Placed by CM/SW: [facility])  Private pay costs discussed: Not applicable    Additional Information:  -writer left a voice mail message with Earl Germain. ( This was listed in been verified) Earl M. Carlson called writer back and said he did not have any family member named Kortney.  -writer continued to look and left a voicemail message with a Da Baldwinon, possible cousin. No call back was returned.     Gissell Chery RN

## 2023-10-10 NOTE — CONSULTS
Ethics Consultation      Note: The purpose of this note, including any accompanying recommendation, is advisory only concerning ethical principles and considerations related to this case. Determination of appropriate patient care decisions is the responsibility of the clinical team in consultation with patients and their decision makers and relevant institutional policy. Legal and policy questions should be addressed with Risk Management.    Date: 10/10/2023     Time:  12     Attending physician: Carl Muñiz, attending     Consult requested by: Carl Muñiz     Reason for consult: Unrepresented patient - decision-making for tracheostomy     Participants in Consult:        delaney Almazan     Decisional capacity: nondecisional     Advance directive:  none     Health Care Agent: none     Code status: Full Code     Background: (Medical)       From Chart: 68F pmh of schizo-affective schizophrenia, HTN, tardive dyskinesia now presented 9/28 from group home with back pain and fever, Ct abd showed rectum distended with stool and urinary bladder distended, now s/p quinonez placement.  Arrival UA c/w UTI. Since arrival from the ED she has been alert but not generally interactive (?part of this may be due to underlying psychiatric illness?). Morning of 9/29 she was noted to be febrile and shaking prompting intubation to facilitate head CT and LP.  Placed on heavy sedation including benzodiazepine and propofol.  EEG is not showing seizures since then but has shown evidence of potential new seizure foci.  And has been to continue heavy sedation.  Evening of 10/3 triglycerides returned critically high level. Gave 1 dose of Ativan shut off the propofol  After adding additional doses of as needed benzodiazepines and increasing the goal range of the midazolam. Did have more seizure-like activity which resolved with increasing benzodiazepines.       Social:       Lives at a group  home    County  has communicated that there are no family members available to act as a surrogate    Patient was her own decision-maker prior to losing capacity during this hospitalization    Previous attempt to secure legal guardianship was denied     Ethical Issue:      Decision-making for unrepresented patient     Ethics Analysis:      Unrepresented patient's lack decision-making capacity, have no advanced directive, no available surrogates to utilize substituted judgment on their behalf and no legally appointed guardian. When medical decisions arise for unrepresented patient's it is important that the care team engage in a diligent and thorough search for potential surrogates, informants, or advisors who could offer insight into the patient's goals, values, and wishes. Legal guardianship should be a last resort for decision-making with unrepresented patient's as securing a legal guardian often takes a significant amount of time, can be expensive, availability of guardians can be limited, and guardians are unlikely to know the patient's values and goals. Any information on the patient's values and goals in life is important in the process of determining how to proceed with decision-making. It is ethically appropriate to seek out information from a wide array of sources included , group home managers, or home health workers.    The most ethically appropriate way to make urgent, or time-sensitive medical decisions for unrepresented patients is through engagement with the interdisciplinary medical team. The level of engagement depends on the the risks or burdens of the potential intervention.  For routine interventions (low risk, clear benefit) it is appropriate to proceed if the care team believes the intervention will benefit the patient. For major interventions like anesthesia, surgery, invasive or irreversible procedures with a significant recovery period, the care team should concur  with specialists to determine if the benefits of the intervention outweigh the risks. When considering life-sustaining treatment, a larger interdisciplinary team involving ethics and risk should be convened to discuss the risks and benefits of treatment.    In this case, Ms Germain was her own decision-maker before this hospitalization. As Ms Germain lives in a group home environment, it is ethically appropriate to contact group home staff to glean any information about her wishes or values regarding her medical care. The decision to pursue tracheostomy to support ventilation is a major intervention, however as Ms Germain is already supported on a ventilator, forgoing tracheostomy might create opportunity for further harm. Typically, the decision to proceed with tracheostomy is determined by the patient's values and goals. Ms Germain was not able to articulate her goals before she lost capacity. Proceeding with tracheostomy would entail some risks, however forgoing tracheostomy or transitioning to a comfort focused care plan are significantly more burdensome, resulting in harm or death. Therefore, the most ethically appropriate action while social work engages in a diligent search for potential surrogates and information on goals and values is to proceed with tracheostomy.     Recommendations:      Ensure thorough search for family or friends who are able to serve as surrogate. Consider pursuing legal guardianship if no surrogates can be identified.    Proceed with a restorative plan of care until decision-maker is identified or treatment is identified as nonbeneficial, harmful, or medically inappropriate.    Consult risk management.       Please contact the service if we can be of any further assistance, service pager [phone].    Barber Baig, PhD

## 2023-10-10 NOTE — PROGRESS NOTES
[facility]    Stroke/NCC Progress Note    Interval Events  Temp max 98.8  -160  WBC 6.8    EEG: abnormal due to the presences of continuous generalized polymorphic delta slowing. There is a  7 hz symmetric parieto-occipital rhythm.  After 5 AM on October 10, 2023 patient is noted to have increased epileptiform discharges with maximum negativity at T5, T6, F4, C3-P3, sharp waves are superimposed on rhythmic delta activity.  EEG is consistent with a moderate to severe diffuse encephalopathy with multifocal cortical irritability.  EEG does not show clear electroclinical seizures, however there does seem to be increasing epileptiform discharges compared to prior days which suggest higher risk for seizure recurrence.     Current AEDs: clobazam 10  mg BID, fosphenytoin 200 mg at night, lacosamide 150 mg BID. Midazolam weaned 10/6 off.    HPI Summary  Kortney Germain is a 68 year old female with PMHx significant for anxiety/depression, schizoaffective disorder (living in group home), HTN, odynophagia, tardive dyskinesia, HTN. She presented 9/28 with lower back pain and abdominal pain. She was found to have UTI, anemia, and constipation. She had decreased LOC and generalized shaking overnight. She remained febrile. RRT called 9/29/23 for encephalopathy and concern that should would be unable to protect her airway. 1 PRBC given 9/29/23 due to anemia.    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   MRI: L hyperintensity on DWI/FLAIR without correlate on ADC or T1, favored to be artifact     60 minute EEG: abnormal consistent with a severe encephalopathy and ?status epilepticus; it is important to note patient has persistent head tremoring which may also produce a rhythmic artifact on the EEG and there is persistent electro myogenic artifact making this 1 hour segment difficult to interpret      cEEG:   -9/30: moderate to severe  encephalopathy and periodic pattern which may be SIRPIDS (stimulus-induced rhythmic, periodic, or ictal discharges)  - 10/1 (after administration of paralytic to remove muscle artifact): per verbal report underlying cortical activity consistent with seizures  - 10/3: generalized periodic discharges present but less persistent than previously. Does not meet criteria for NCSE.  - 10/4: severely abnormal. 1) varying degrees of encephalopathy depending on sedative drips utilized.  Background activity consistent with moderate to severe diffuse encephalopathy throughout. 2) persistent generalized periodic discharges at a rate of 1.5 Hz appeared following abrupt discontinuation of propofol.  These were not accompanied by clinical changes but raised concerns about incipient nonconvulsive status epilepticus.  Escalation of midazolam resulted in resolution of generalized periodic discharges.  - 10/5 : Abnormal.  1) background activity on higher doses of midazolam consistent with severe diffuse encephalopathy.  There was modest improvement in background of midazolam was reduced but normal rhythms were not seen.  2) generalized periodic discharges become a more persistent as midazolam is reduced from 18 mg/h to 14 mg/h.  They occupy about 50% of ongoing recording.  This indicates ongoing cortical irritability and need for aggressive anticonvulsant treatment.  Features did not meet standard criteria for nonconvulsive status epilepticus even following reduction of midazolam.  - 10/6: Background largely continuous mixture of moderate amplitude diffuse delta and diffuse alpha. No clear posterior dominant rhythm. Intermittent generalized sharp waves, occasionally organized into brief runs of generalized periodic discharges, consistently less than 1.5 Hz. Persistence estimated about 20%. These do not meet criteria for nonconvulsive status epilepticus and are clearly much less organized and much less persistent than the intermittent  nonconvulsive status epilepticus seen in the first 2 days of recording.   - 10/7: Findings consistent with moderate diffuse encephalopathy.  Amount of generalized sharp waves and generalized periodic discharges decreased compared to last several days, even in the context of discontinuation of sedative drips.  This indicates some persistence of generalized cortical irritability.  No seizures, even following discontinuation of sedative drips.   - 10/8: indings consistent with moderate diffuse encephalopathy. Rare left temporal sharp waves indicate continuing focal cortical irritability. Generalized periodic discharges not seen in today's samples. No indication of nonconvulsive status epilepticus.   - 10/9: abnormal due to the presences of continuous generalized polymorphic delta slowing. There is a  7 hz symmetric parieto-occipital rhythm.  This EEG is consistent with a moderate to severe diffuse encephalopathy. No epileptiform discharges or electrographic seizures were seen in this record.   - 10/10: abnormal due to the presences of continuous generalized polymorphic delta slowing. There is a  7 hz symmetric parieto-occipital rhythm.  After 5 AM on October 10, 2023 patient is noted to have increased epileptiform discharges with maximum negativity at T5, T6, F4, C3-P3, sharp waves are superimposed on rhythmic delta activity.  EEG is consistent with a moderate to severe diffuse encephalopathy with multifocal cortical irritability.  EEG does not show clear electroclinical seizures, however there does seem to be increasing epileptiform discharges compared to prior days which suggest higher risk for seizure recurrence.      Blood cultures: NGTD     LP labs:  -glucose 98  -protein 22.8  -2 nucleated cells, RBC 0  -aerobic culture GPC in broth only   -anaerobic culture NGTD  - HSV negative      Impression   Status epilepticus, EEG improved on 3 AEDs and sedative drips - uncertain etiology of NCSE. Will likely take some time  "to awaken given duration of NCSE and high dose of AEDs started.    Shaking/tremors, unclear etiology. May represent baseline tardive dyskinesia vs manifestation of status epilepticus vs toxic encephalopathy vs other     Plan  - Continue lacosamide to 150 mg BID  - Continue fosphenytoin to 200 mg qHS  - Continue clobazam to 10 mg BID (10/9)  - continuous EEG  - continue to hold/limit dopaminergic, serotonergic agents   - [place] paraneoplastic panel in process   - Vimpat level pending  - Total phenytoin level 17.8    Patient Follow-up    - final recommendation pending work-up    We will continue to follow.     TAMI Maloney, CNP  Neurology  10/10/2023 9:50 AM  To page stroke neurology after hours or on a subsequent day, click here: [facility]  Choose \"On Call\" tab at top, then search dropdown box for \"Neurology Adult\" & press Enter, look for Neuro ICU/Stroke     _____________________________________________________    Clinically Significant Risk Factors              # Hypoalbuminemia: Lowest albumin = 3.4 g/dL at 9/30/2023  5:15 AM, will monitor as appropriate     # Hypertension: Noted on problem list                       Medications   Scheduled Meds   chlorhexidine  15 mL Mouth/Throat Q12H    clobazam  10 mg Oral or Feeding Tube BID    diphenhydrAMINE  50 mg Oral or Feeding Tube BID    fiber modular  1 packet Per Feeding Tube Daily    fosphenytoin (CEREBYX) 200 mg PE in sodium chloride 0.9 % 100 mL intermittent infusion  200 mg PE Intravenous At Bedtime    heparin ANTICOAGULANT  5,000 Units Subcutaneous Q8H    insulin aspart  1-6 Units Subcutaneous Q4H    lacosamide (VIMPAT) 150 mg in sodium chloride 0.9 % 110 mL intermittent infusion  150 mg Intravenous BID    [Held by provider] LORazepam  0.25 mg Oral At Bedtime    [Held by provider] LORazepam  0.5 mg Oral BID    multivitamins w/minerals  15 mL Oral or Feeding Tube Daily    NIFEdipine  10 mg Oral Q6H    pantoprazole  40 mg Oral or Feeding Tube QAM AC    Or    " pantoprazole  40 mg Intravenous QAM AC    [Held by provider] perphenazine  4 mg Oral QAM    [Held by provider] perphenazine  8 mg Oral At Bedtime    polyethylene glycol  17 g Oral or Feeding Tube BID    protein modular  1 packet Per Feeding Tube Daily    sodium chloride (PF)  10-40 mL Intracatheter Q7 Days    sodium chloride (PF)  3 mL Intracatheter Q8H       Infusion Meds   dextrose      midazolam Stopped (10/06/23 1942)    sodium chloride 20 mL/hr at 10/10/23 0822       PRN Meds  acetaminophen **OR** acetaminophen, albuterol, [Held by provider] cyclobenzaprine, dextrose, glucose **OR** dextrose **OR** glucagon, fentaNYL, HYDROmorphone, lidocaine 4%, lidocaine (buffered or not buffered), LORazepam, melatonin, midazolam, naloxone **OR** naloxone **OR** naloxone **OR** naloxone, nitroGLYcerin, sodium chloride (PF), sodium chloride (PF), sodium chloride (PF), sodium chloride (PF)       PHYSICAL EXAMINATION  Temp:  [98.2  F (36.8  C)-99.3  F (37.4  C)] 98.8  F (37.1  C)  Pulse:  [61-71] 69  Resp:  [11-26] 15  BP: (114-167)/(64-92) 154/83  FiO2 (%):  [30 %] 30 %  SpO2:  [97 %-100 %] 97 %      General Exam  General:  patient lying in bed without any acute distress, intubated  HEENT:  normocephalic/atraumatic  Pulmonary:  no respiratory distress, on mechanical ventilation    Neuro Exam   Mental Status:  opens eyes to noxious, does not track provider, does not follow commands  Cranial Nerves:  gaze midline, PERRL, blinks to threat bilaterally, face grossly symmetric  Motor: no abnormal movements, withdraws to noxious in all extremities    Imaging  I personally reviewed all imaging; relevant findings per HPI.     Lab Results Data   CBC  Recent Labs   Lab 10/10/23  0633 10/09/23  0427 10/08/23  0528   WBC 6.8 6.9 6.4   RBC 3.32* 3.31* 3.28*   HGB 8.1* 8.3* 8.0*   HCT 27.1* 27.5* 27.1*    305 291     Basic Metabolic Panel    Recent Labs   Lab 10/10/23  0847 10/10/23  0633 10/10/23  0343 10/09/23  0804 10/09/23  0427  10/08/23  0907 10/08/23  0528   NA  --  139  --   --  141  --  141   POTASSIUM  --  4.2  --   --  4.2  --  4.2   CHLORIDE  --  103  --   --  103  --  104   CO2  --  27  --   --  28  --  31*   BUN  --  15.1  --   --  17.2  --  17.3   CR  --  0.43*  --   --  0.43*  --  0.46*   * 112* 116*   < > 100*   < > 110*   LULU  --  8.1*  --   --  7.9*  --  8.1*    < > = values in this interval not displayed.     Liver Panel  No results for input(s): PROTTOTAL, ALBUMIN, BILITOTAL, ALKPHOS, AST, ALT, BILIDIRECT in the last 168 hours.    INR    Recent Labs   Lab Test 09/28/23  1244 03/07/23  1629   INR 1.04 1.15      Lipid Profile    Recent Labs   Lab Test 10/03/23  0956 03/08/23  0830   CHOL  --  143   HDL  --  50   LDL  --  75   TRIG 1,372* 88     A1C  No lab results found.  Troponin  No results for input(s): CTROPT, TROPONINIS, TROPONINI, GHTROP in the last 168 hours.       Data   Billing:  [id number]: I personally examined and evaluated the patient today. At the time of my evaluation and management the patient was critical condition today due to NCSE, coma. I personally managed chart review, exam. Key decisions made today included continue POC. I spent a total of 30 minutes providing critical care services, evaluating the patient, directing care and reviewing laboratory values and radiologic reports. Greater than 50% was spent in counseling and coordination of care

## 2023-10-10 NOTE — ED NOTES
Bed: ED25  Expected date:   Expected time:   Means of arrival:   Comments:  william - 512 - 63 F tremors eta now   Received spouse's test results via fax from [facility]   Date Time Provider Department Center   10/11/2023  2:45 PM Gabbi Pike APN [facility] [address]   11/7/2023  3:15 PM Baltazar Alvarez MD [facility] [address]   11/9/2023  3:00 PM [id number]   12/7/2023  3:30 PM Larisa David MD [facility] [address]

## 2023-10-10 NOTE — PROGRESS NOTES
[facility] ICU RESPIRATORY NOTE        Date of Admission: 9/28/2023    Date of Intubation (most recent): 9/29/2023    Reason for Mechanical Ventilation: AW protection     Number of Days on Mechanical Ventilation: 11    Met Criteria for Spontaneous Breathing Trial: Yes    Significant Events Today: None Overnight    ABG Results:   Recent Labs   Lab 10/08/23  0908   O2PER 30         Current Vent Settings: Vent Mode: CMV/AC  (Continuous Mandatory Ventilation/ Assist Control)  FiO2 (%): 30 %  Resp Rate (Set): 12 breaths/min  Tidal Volume (Set, mL): 400 mL  PEEP (cm H2O): 5 cmH2O  Pressure Support (cm H2O): 7 cmH2O  Resp: 12      Skin Assessment: Intact    Plan: Continue full vent support and resume SBT as tolerated.    RT Nuris on 10/10/2023 at 4:23 AM     "Subjective:      Patient ID: Rosalio Muñoz is a 57 y.o. male.    Chief Complaint: Annual Exam    HPI  58 yo male here for annual review.  Feeling much better as BP has become better controlled.  Stopped smoking cigarettes, occ will smoke a cigar.  No Cp/SOB  Ready for Cscope  Does lung scr in Aug    Past Medical History:   Diagnosis Date    Hypertension      Family History   Problem Relation Age of Onset    Hyperlipidemia Mother     Hypertension Father     Diabetes Father     Kidney disease Father     Heart disease Father     Prostate cancer Maternal Grandfather     Colon cancer Neg Hx      Past Surgical History:   Procedure Laterality Date    WISDOM TOOTH EXTRACTION       Social History     Tobacco Use    Smoking status: Former     Types: Cigars, Cigarettes     Quit date: 10/4/2022     Years since quittin.7     Passive exposure: Never    Smokeless tobacco: Never   Substance Use Topics    Alcohol use: Yes     Alcohol/week: 4.0 - 6.0 standard drinks     Types: 4 - 6 Cans of beer per week    Drug use: No       /78   Pulse 76   Temp 98.1 °F (36.7 °C) (Tympanic)   Ht 6' 1" (1.854 m)   Wt 92.2 kg (203 lb 4.2 oz)   BMI 26.82 kg/m²     Review of Systems   Constitutional:  Negative for activity change, appetite change, chills, diaphoresis, fatigue, fever and unexpected weight change.   HENT:  Positive for hearing loss. Negative for tinnitus.    Eyes:  Negative for visual disturbance.   Respiratory:  Negative for cough, chest tightness, shortness of breath and wheezing.    Cardiovascular:  Negative for chest pain, palpitations and leg swelling.   Gastrointestinal:  Negative for abdominal distention and abdominal pain.   Genitourinary:  Negative for difficulty urinating.   Musculoskeletal:  Negative for arthralgias and back pain.   Neurological:  Negative for dizziness, weakness and headaches.     Objective:     Physical Exam  Vitals and nursing note reviewed.   Constitutional:       General: He is not " in acute distress.     Appearance: He is well-developed. He is not diaphoretic.   HENT:      Right Ear: External ear normal.      Left Ear: External ear normal.      Nose: Nose normal.   Eyes:      Conjunctiva/sclera: Conjunctivae normal.      Pupils: Pupils are equal, round, and reactive to light.   Neck:      Thyroid: No thyromegaly.   Cardiovascular:      Rate and Rhythm: Normal rate and regular rhythm.      Heart sounds: Normal heart sounds. No murmur heard.  Pulmonary:      Effort: Pulmonary effort is normal. No respiratory distress.      Breath sounds: Normal breath sounds. No wheezing.   Abdominal:      General: Bowel sounds are normal. There is no distension.      Palpations: Abdomen is soft.      Tenderness: There is no abdominal tenderness. There is no guarding.   Musculoskeletal:      Cervical back: Normal range of motion and neck supple.      Right lower leg: No edema.      Left lower leg: No edema.   Skin:     General: Skin is warm and dry.   Neurological:      Mental Status: He is alert and oriented to person, place, and time.      Cranial Nerves: No cranial nerve deficit.   Psychiatric:         Behavior: Behavior normal.         Thought Content: Thought content normal.         Judgment: Judgment normal.       Lab Results   Component Value Date    WBC 4.27 06/16/2023    HGB 15.5 06/16/2023    HCT 44.5 06/16/2023     06/16/2023    CHOL 146 06/16/2023    TRIG 58 06/16/2023    HDL 69 06/16/2023    ALT 27 06/16/2023    AST 24 06/16/2023     (L) 06/16/2023    K 4.2 06/16/2023     06/16/2023    CREATININE 0.8 06/16/2023    BUN 4 (L) 06/16/2023    CO2 23 06/16/2023    TSH 2.420 06/16/2023    PSA 0.23 06/16/2023    HGBA1C 5.1 06/16/2023       Assessment:     1. Annual physical exam    2. Colon cancer screening    3. Hyponatremia    4. Smoking hx    5. Screening for lung cancer    6. Bilateral hearing loss, unspecified hearing loss type         Plan:     Annual physical exam    Colon cancer  screening  -     Ambulatory referral/consult to Endo Procedure ; Future; Expected date: 06/27/2023    Hyponatremia  -     Basic Metabolic Panel; Future; Expected date: 07/26/2023    Smoking hx  -     CT Chest Lung Screening Low Dose; Future; Expected date: 08/04/2023    Screening for lung cancer  -     CT Chest Lung Screening Low Dose; Future; Expected date: 08/04/2023    Bilateral hearing loss, unspecified hearing loss type    Reviewed labs  Na is low//pt drinking 6 16 oz water bottles daily  Encourage him to try adding gatorade/powerade/liquid IV.  Recheck BMP in 3-4 wks  Update lung scr in Aug  Colon orders in  BP well controlled  Lipids well controlled  Cont with Cards

## 2023-10-10 NOTE — PROGRESS NOTES
Preliminary Video EEG impression on October 10, 2023  Until 6am       This EEG is abnormal due to the presences of continuous generalized polymorphic delta slowing. There is a  7 hz symmetric parieto-occipital rhythm.  After 5 AM on October 10, 2023 patient is noted to have increased epileptiform discharges with maximum negativity at T5, T6, F4, C3-P3, sharp waves are superimposed on rhythmic delta activity.  EEG is consistent with a moderate to severe diffuse encephalopathy with multifocal cortical irritability.  EEG does not show clear electroclinical seizures, however there does seem to be increasing epileptiform discharges compared to prior days which suggest higher risk for seizure recurrence.  Clinical correlation is advised.     Koki Gusman MD  Staff Epilepsy Neurologist   [phone]

## 2023-10-10 NOTE — PLAN OF CARE
Reason for Admission: encephalopathy, seizure activity     Neuro: Opens eyes to voice, withdraws to pain, does not follow commands, PERRLA    RASS Goal: 0 to -1  Hemodynamic Goal: SBP <180  Cardio: 60s  Tele: SR    GI/: Voiding adequately with PW. No BM today.   Pulmonary: LS coarse/clear. Vented @ 22 cm Lip , R 12, PEEP 5, 30%  Skin: Buttocks wound, scattered bruising  Pain: No signs of pain     Drains/Lines: L PICC   Infusions: None   Activity: BR  Diet: NPO; TF @ 30 ml/hr  ml Q4H     Precautions: NA    End of shift summary: No significant events today. Ethics consulted to find NOK. On PS since noon, tolerating well.      Plans/Procedures: Continue EEG to monitor seizure activity with decreased seizure meds, awaiting ethics committee for possible trach placement

## 2023-10-10 NOTE — PROGRESS NOTES
Comprehensive Daily ICU Note        Kortney Germain MRN# [id number]   Age: 68 year old YOB: 1955     Date of Admission: 9/28/2023    Primary care provider: [facility]     CODE STATUS: FULL    Plans today  Social work consult - help with verification of no known NOK, if no NOK found, needs guardianship  Ethics consult - assist with next steps in regards to possible trach given no known NOK  D/w neuro regarding recent changes... hopes in that med changes may help with mentation  Continue PST   Problem List:   Principal Problem:    Status epilepticus (H)  Active Problems:    Urinary retention    Acute cystitis without hematuria    Constipation, unspecified constipation type    Anemia, unspecified type    HTN (hypertension)          Subjective/ Last 24 hours:   68F pmh of schizo-affective schizophrenia, HTN, tardive dyskinesia now presented 9/28 from group home with back pain and fever, Ct abd showed rectum distended with stool and urinary bladder distended, now s/p quinonez placement.  Arrival UA c/w UTI. Since arrival from the ED she has been alert but not generally interactive (?part of this may be due to underlying psychiatric illness?). Morning of 9/29 she was noted to be febrile and shaking prompting intubation to facilitate head CT and LP.  Placed on heavy sedation including benzodiazepine and propofol.  EEG is not showing seizures since then but has shown evidence of potential new seizure foci.  And has been to continue heavy sedation.  Evening of 10/3 triglycerides returned critically high level. Gave 1 dose of Ativan shut off the propofol  After adding additional doses of as needed benzodiazepines and increasing the goal range of the midazolam. Did have more seizure-like activity which resolved with increasing benzodiazepines.  Has been seizure-free for couple of days.  We have been weaning benzodiazepines.    10/7  -Awaiting extubation   10/8:   -  Awaiting for her to wake up.   No sedation for 48 hrs.   - Neuro is still hopefull.   10/9:  - still sleepy  - still d/w neuro regarding if extubation possible  10/10  -only responsive to aggressive stimulation  -no acute events         Assessment and Plan:     Summary:  Kortney Germain IS a 68 year old female admitted on 9/28/2023 for fever and UTI.   I have personally reviewed the daily labs, imaging studies, cultures and discussed the case with referring physician and consulting physicians.   - intubated since 9/29/23    My assessment and plan by system for this patient is as follows:    Neurology/Psychiatry:   1. Underlying schizoaffective schizophrenia and h/o tardive dyskinesia:   2. Status epilepticus. Considered serotonin syndrome but less likely now.  Psychiatrist also wondering about malignant catatonia.  Is on 3 antiseizure agents.    3. Decision making: no formal guardian or decision maker. If mental status does not improve, it may not be safe to extubate and patient would need tracheostomy. Without decision maker or ability to ask patient, ethics consulted to assist with next steps. Only mental status is hindering extubation.    - ethics consult   - AEDs  clobazam 10  mg BID, fosphenytoin 200 mg at night, lacosamide 150 mg BID.    - [place] paraneoplastic panel pending   - ammonia level 14 10/9/2023`    -She was on multipel IV sedation meds they are all off since 10/6   -- Any major decisions are required and unable to find a surrogate decision-maker will ensure that at least 2 physicians agree with the plan of care.    Cardiovascular:   1.Hypertension:  restarted Nifedipine at home.  Blood pressures are higher as were weaning sedation    Pulmonary/Ventilator Management:   1. Loss of protective airway reflexes requiring intubation and mechanical ventilation:  Neurologically inappropriate for extubation today.  Does well on pressure support  - Daily pressure support mode for conditioning  - Awaiting mental status to improve for  extubation  - intubated since 9/29/23    GI and Nutrition:   1. Tube feeding  2. PPI for pud prophy    Renal/Fluids/Electrolytes:   Significant positive fluid balance.  Received 1 dose diuretic with good response and now is auto diuresing.  -Address as necessary.  -Reduce Free water    Infectious Disease:   1. Sirs and possible UTI--on ctx and vanco, UC with mixed christina but >100k colonies.   Finished Ceftx course.     Endocrine:   1. Stress induced hyperglycemia:  Not needing insulin    Hematology/Oncology:   1. Anemia of critical illness.-- s/p 1 prbc 9/29. no apparent blood loss.  - Retransfuse when below 7.  2. Leukocytosis: Improved with antibiotics for presumed UTI.    ICU Prophylaxis:   1. DVT: Hep Subq/ mechanical  2. VAP: HOB 30 degrees, chlorhexidine rinse  3. Stress Ulcer: PPI  4. Restraints: Nonviolent soft two point restraints required and necessary for patient safety and continued cares and good effect as patient continues to pull at necessary lines, tubes despite education and distraction. Will readdress daily.   5. Wound care  -   6. Feeding - tf  7. Family Update:apparently has no family. more in neuropsych section.  consult placed.   8. Disposition - icu    Clinically Significant Risk Factors              # Hypoalbuminemia: Lowest albumin = 3.4 g/dL at 9/30/2023  5:15 AM, will monitor as appropriate     # Hypertension: Noted on problem list                        .  Prognosis:    guarded        I have personally reviewed the daily labs, imaging studies, cultures and discussed the case with referring physician and consulting physicians.     This patient is critically ill and I have provided 40 minutes of critical care time (excluding procedures) on 10/10/2023    Carl Muñiz MD  Pulmonary & Critical Care   Department of Medicine  Division of Pulmonary, Allergy, Critical Care and Sleep Medicine   [facility], [employer]  [phone] (Text Page)          Mechanical  Ventilation/Vitalsigns/IsandOs:       Temp:  [98.2  F (36.8  C)-99.3  F (37.4  C)] 98.8  F (37.1  C)  Pulse:  [61-71] 68  Resp:  [11-26] 11  BP: (114-167)/(64-92) 142/76  FiO2 (%):  [30 %] 30 %  SpO2:  [97 %-100 %] 98 %      Vent Mode: CMV/AC  (Continuous Mandatory Ventilation/ Assist Control)  FiO2 (%): 30 %  Resp Rate (Set): 12 breaths/min  Tidal Volume (Set, mL): 400 mL  PEEP (cm H2O): 5 cmH2O  Pressure Support (cm H2O): 7 cmH2O  Resp: 11      Intake/Output Summary (Last 24 hours) at 10/10/2023 1135  Last data filed at 10/10/2023 1000  Gross per 24 hour   Intake 2360 ml   Output 1940 ml   Net 420 ml              Physical Examination:     General: Stated age intubated sedated  HEENT: EEG leads ET tube feeding tube  Lungs: Synchronous with ventilator  CVS: Regular rate rhythm  Abdomen: Grossly normal  Extremities/musculoskeletal: No edema  Neurology: Not expansive to voice today.  Skin: Grossly normal  Psychiatry: Unable  Exam of Line sites:  PICC            Feeding/Glucose:     Orders Placed This Encounter      NPO for Medical/Clinical Reasons Except for: No Exceptions        Recent Labs   Lab 10/10/23  0847 10/10/23  0633 10/10/23  0343 10/10/23  0014 10/09/23  1948 10/09/23  1600   * 112* 116* 98 124* 98                  Medications:      chlorhexidine  15 mL Mouth/Throat Q12H    clobazam  10 mg Oral or Feeding Tube BID    diphenhydrAMINE  50 mg Oral or Feeding Tube BID    fiber modular  1 packet Per Feeding Tube Daily    fosphenytoin (CEREBYX) 200 mg PE in sodium chloride 0.9 % 100 mL intermittent infusion  200 mg PE Intravenous At Bedtime    heparin ANTICOAGULANT  5,000 Units Subcutaneous Q8H    insulin aspart  1-6 Units Subcutaneous Q4H    lacosamide (VIMPAT) 150 mg in sodium chloride 0.9 % 110 mL intermittent infusion  150 mg Intravenous BID    [Held by provider] LORazepam  0.25 mg Oral At Bedtime    [Held by provider] LORazepam  0.5 mg Oral BID    multivitamins w/minerals  15 mL Oral or Feeding Tube  Daily    NIFEdipine  10 mg Oral Q6H    pantoprazole  40 mg Oral or Feeding Tube QAM AC    Or    pantoprazole  40 mg Intravenous QAM AC    [Held by provider] perphenazine  4 mg Oral QAM    [Held by provider] perphenazine  8 mg Oral At Bedtime    polyethylene glycol  17 g Oral or Feeding Tube BID    protein modular  1 packet Per Feeding Tube Daily    sodium chloride (PF)  10-40 mL Intracatheter Q7 Days    sodium chloride (PF)  3 mL Intracatheter Q8H         dextrose      midazolam Stopped (10/06/23 1942)    sodium chloride 20 mL/hr at 10/10/23 0822              Labs:         ROUTINE ICU LABS (Last four results)  CMP  Recent Labs   Lab 10/10/23  0847 10/10/23  0633 10/10/23  0343 10/10/23  0014 10/09/23  0804 10/09/23  0427 10/08/23  0907 10/08/23  0528 10/07/23  0436 10/07/23  0432   NA  --  139  --   --   --  141  --  141  --  143   POTASSIUM  --  4.2  --   --   --  4.2  --  4.2  --  3.8   CHLORIDE  --  103  --   --   --  103  --  104  --  104   CO2  --  27  --   --   --  28  --  31*  --  30*   ANIONGAP  --  9  --   --   --  10  --  6*  --  9   * 112* 116* 98   < > 100*   < > 110*   < > 108*   BUN  --  15.1  --   --   --  17.2  --  17.3  --  15.3   CR  --  0.43*  --   --   --  0.43*  --  0.46*  --  0.44*   GFRESTIMATED  --  >90  --   --   --  >90  --  >90  --  >90   LULU  --  8.1*  --   --   --  7.9*  --  8.1*  --  8.2*   MAG  --  2.2  --   --   --  2.2  --  2.2  --  2.2   PHOS  --  4.3  --   --   --  3.4  --  3.6  --  3.8    < > = values in this interval not displayed.       CBC  Recent Labs   Lab 10/10/23  0633 10/09/23  0427 10/08/23  0528 10/07/23  0432   WBC 6.8 6.9 6.4 8.1   RBC 3.32* 3.31* 3.28* 3.57*   HGB 8.1* 8.3* 8.0* 8.8*   HCT 27.1* 27.5* 27.1* 28.9*   MCV 82 83 83 81   MCH 24.4* 25.1* 24.4* 24.6*   MCHC 29.9* 30.2* 29.5* 30.4*   RDW 20.6* 21.0* 20.7* 20.4*    305 291 294       INRNo lab results found in last 7 days.  Arterial Blood Gas  Recent Labs   Lab 10/08/23  0908   O2PER 30          Cultures:      Recent Labs   Lab 10/06/23  1323   CULTURE 1+ Normal christina               Imaging/Other results:     No results found for this or any previous visit (from the past 24 hour(s)).

## 2023-10-10 NOTE — PLAN OF CARE
Goal Outcome Evaluation:      Plan of Care Reviewed With: patient    Overall Patient Progress: no changeOverall Patient Progress: no change    VSS on vent via ETT 30%/5. RASS score -3 to -4, currently without sedation. Pt opens eyes, does not track; cough/gag present; withdrawals in all 4 extremities.No evidence of seizure activity, EEG continues. Remains on TF at goal rate, 30 ml/hr with flushes 100 ml Q4h and with meds as ordered. Incontinent BM overnight. Purewick in place with good UO. PICC with meds, labs collected this AM. Tele SR. Bed alarms in use, pt not attempting to exit bed. Continue with plan of care.

## 2023-10-10 NOTE — PROGRESS NOTES
EEG [facility]  LTV Day 11 completed.  Ordered by Ana Chilel

## 2023-10-11 NOTE — PROGRESS NOTES
[facility] ICU RESPIRATORY NOTE        Date of Admission: 9/28/2023    Date of Intubation (most recent): 9/29/2023    Reason for Mechanical Ventilation: Airway protection     Number of Days on Mechanical Ventilation: 12    Met Criteria for Spontaneous Breathing Trial: Yes     Significant Events Today: PS of 7/5 for 4 hours then increased to 10/5 since 1530. MRI done without complications.     ABG Results:   Recent Labs   Lab 10/08/23  0908   O2PER 30         Current Vent Settings: Vent Mode: CPAP/PS  (Continuous positive airway pressure with Pressure Support)  FiO2 (%): 30 %  Resp Rate (Set): 12 breaths/min  Tidal Volume (Set, mL): 400 mL  PEEP (cm H2O): 5 cmH2O  Pressure Support (cm H2O): 10 cmH2O  Resp: 17      Skin Assessment: Skin intact     Plan: Continue full vent support and wean as tolerated    RT Shan on 10/11/2023 at 5:50 PM

## 2023-10-11 NOTE — PLAN OF CARE
Patient RASS scoring -2/-3. Opens eyes to repeated stimulation and occasionally to voice. No evidence of tracking and no command following. PERRL. Withdraws to pain in all extremities. Grimaces with suction and coughs with stimulation.    Unilateral seizure like (tonic-clonic) activity noted in LUE and mildly in LLE. Multiple 15-20 second episodes over the coarse of 15 consecutive minutes. Paged Neuro-Crit and provided update to Dr Willis. See provider note. No further seizure like activity noted throughout remainder of shift.     Afebrile. NSR. Labile blood pressures with SBP ranging 108 - 140. HR consistently 60s-70s. JVD noted; updated Dr Hopper. Very thick oral and ett secretions. Bilateral lung sounds generally clear but intermittently coarse. Adequate urine output. No insulin coverage needed for duration of shift.     Bed low with bed alarm on. Cares per poc.

## 2023-10-11 NOTE — PROGRESS NOTES
Care Management Follow Up    Length of Stay (days): 13    Expected Discharge Date: 10/20/2023     Concerns to be Addressed:     Next of Kin information  Patient plan of care discussed at interdisciplinary rounds: Yes    Anticipated Discharge Disposition: TBD     Anticipated Discharge Services: TBD  Anticipated Discharge DME:  N/A    Patient/family educated on Medicare website which has current facility and service quality ratings:  N/A  Education Provided on the Discharge Plan:  N/A  Patient/Family in Agreement with the Plan:  N/A    Referrals Placed by CM/SW: NOK search assistance  Private pay costs discussed: Not applicable    Additional Information:  Attempting to search for NOK.  Sent referral to [employer] to inquire as to whether they had any additional information than we have been able to find.  The information that has been given is that patient has been committed 7 times since 1983 with the last 3 being done by human services or a hospital.  Her father passed away in 2022 and two other family members from the 1990's have also passed away.  Sent an email to management for further direction.    Will continue to follow.      ANDERSON Carrera, [facility]    [phone]  [facility]

## 2023-10-11 NOTE — PLAN OF CARE
Reason for Admission: encephalopathy, seizure activity     Neuro: Opens eyes to voice, withdraws to pain, does not follow commands, PERRL    RASS Goal: 0 to -1  Hemodynamic Goal: SBP <180  Cardio: 60s  Tele: SR    GI/: Voiding adequately with PW. No BM today.   Pulmonary: LS coarse/clear. Vented @ 22 cm Lip , R 12, PEEP 5, 30%  Skin: Buttocks wound, scattered bruising  Pain: No signs of pain     Drains/Lines: L PICC   Infusions: None   Activity: BR  Diet: NPO; TF @ 30 ml/hr  ml Q4H     Precautions: NA    End of shift summary: No significant events today. On PS since noon, tolerating well.      Plans/Procedures: Continue EEG to monitor seizure activity with decreased seizure meds, awaiting ethics/SW for possible trach placement

## 2023-10-11 NOTE — PROGRESS NOTES
Critical Care Progress Note  [facility]  Kortney Germain MRN# [id number]   Age: 68 year old YOB: 1955     Date of Admission: 9/28/2023  Date of Service: 10/11/2023    Main Plans for Today   MH consulted to help with guardianship planning  Plans to given patient till 10/16 to wake up  Continue monitoring for seizures  MRI  Assessment & Plan    68F pmh of schizo-affective schizophrenia, HTN, tardive dyskinesia now presented 9/28 from group home with back pain and fever, Ct abd showed rectum distended with stool and urinary bladder distended, now s/p quinonez placement.  Arrival UA c/w UTI. Since arrival from the ED she has been alert but not generally interactive (?part of this may be due to underlying psychiatric illness?). Morning of 9/29 she was noted to be febrile and shaking prompting intubation to facilitate head CT and LP.  Placed on heavy sedation including benzodiazepine and propofol.  EEG is not showing seizures since then but has shown evidence of potential new seizure foci.  And has been to continue heavy sedation.  Evening of 10/3 triglycerides returned critically high level. Gave 1 dose of Ativan shut off the propofol  After adding additional doses of as needed benzodiazepines and increasing the goal range of the midazolam. Did have more seizure-like activity which resolved with increasing benzodiazepines.    - Weaned off benzos 10/6.   - Cut AEDs in half 10/9.   Currently giving time for patient's mentation to improve given this is hindering extubation and getting guardianship process started.    Neurological/Psychological    **Sedation: none    Toxicmetabolic Encephalopathy: unclear if from meds vs malignant catatonia, no evidence of anatomic etiology  Underlying schizoaffective schizophrenia and h/o tardive dyskinesia:   Status epilepticus. Considered serotonin syndrome but less likely now.  Psychiatrist also wondering about malignant catatonia.  Is on 3 antiseizure agents.                 - AEDs  clobazam 10  mg BID, fosphenytoin 200 mg at night, lacosamide 150 mg BID.               - [place] paraneoplastic panel pending              - ammonia level 14 10/9/2023   - MRI 10/11/2023 pending    Decision making: no formal guardian or decision maker. If mental status does not improve, it may not be safe to extubate and patient would need tracheostomy. Without decision maker or ability to ask patient, ethics consulted to assist with next steps. Only mental status is hindering extubation.               - ethics consult  -- Any major decisions are required and unable to find a surrogate decision-maker will ensure that at least 2 physicians agree with the plan of care.     Cardiovascular    Hypertension:  restarted Nifedipine at home.  Blood pressures are higher as were weaning sedation    Pulmonary    Loss of protective airway reflexes:  intubated/vented.  Inappropriate for extubation today. Due to mentation. Does well on pressure support  - Daily pressure support mode for conditioning  - Awaiting mental status to improve for extubation  - intubated since 9/29/23     Tracheostomy Plans: given mentation is the only hindrance to extubation, after discussion with neuro, we will wait 21 days given our last changes were 10/9 to proceed with tracheostomy if patient has not been extubated by that point. In the interim we will assess daily    Renal/Fluids/Electrolytes    Significant positive fluid balance.  Received 1 dose diuretic with good response and now is auto diuresing.  -Address as necessary.  -Reduce Free water    Misc Plans  - monitor function and electrolytes, replacement per ICU protocols.   - generally avoid nephrotoxic agents such as NSAID, IV contrast unless specifically required  - adjust medications as needed for renal clearance  - follow I/O's as appropriate.    **I/O last 24hrs:   Intake/Output Summary (Last 24 hours) at 10/11/2023 1337  Last data filed at 10/11/2023 1200  Gross per 24 hour    Intake 2550 ml   Output 1725 ml   Net 825 ml       Infectious Diseases    Sirs and possible UTI--on ctx and vanco, UC with mixed christina but >100k colonies.   Finished Ceftx course     Gastrointestinal/Genitourinary    No acute issues  - Tube feeds and PPI    **Nutrition: Diet: NPO for Medical/Clinical Reasons Except for: No Exceptions  Adult Formula Drip Feeding: Continuous Osmolite 1.5; Nasogastric tube; Goal Rate: 30; mL/hr; Increase TF to 30 mL/hr; Do not advance tube feeding rate unless K+ is = or > 3.0, Mg++ is = or > 1.5, and Phos is = or > 1.9        Endocrine/Metabolic    Stress induced hyperglycemia.  Plan  - ICU insulin protocol, goal sugar <180    Hematology/Oncology    Anemia of chronic illness: s/p prbc 9/29, no apparent blood loss    Rheumatology    no acute issues    Skin/Musculoskeletal    no acute issues    ------------------------------------------------------------------------------------------------------------------  Clinically Significant Risk Factors              # Hypoalbuminemia: Lowest albumin = 3.4 g/dL at 9/30/2023  5:15 AM, will monitor as appropriate     # Hypertension: Noted on problem list                        ------------------------------------------------------------------------------------------------------------------  Prophylaxis/ICU Checklist    -DVT: Subcutaneous Heparin    -VAP: HOB 30 degrees, chlorhexidine rinse    -Stress Ulcer: PPI    -Restraints: Nonviolent soft two point restraints required and necessary for patient safety and continued cares and good effect as patient continues to pull at necessary lines, tubes despite education and distraction. Will readdress daily.     -IV Access: Central access required and necessary for continued patient cares     -Feeding - Diet: NPO for Medical/Clinical Reasons Except for: No Exceptions  Adult Formula Drip Feeding: Continuous Osmolite 1.5; Nasogastric tube; Goal Rate: 30; mL/hr; Increase TF to 30 mL/hr; Do not advance tube  feeding rate unless K+ is = or > 3.0, Mg++ is = or > 1.5, and Phos is = or > 1.9        Consults: ethics, MH, neuro  Family: none  Code Status: Full Code      Disposition: guarded    I have personally reviewed the daily labs, imaging studies, cultures and discussed the case with referring physician and consulting physicians.   I personally managed the respiratory support devices, hemodynamics, sedation, analgesia, metabolic abnormalities and nutritional status.      This patient is critically ill and I have provided 50 minutes of critical care time (excluding procedures) on 10/11/2023    =========================================================  Carl Muñiz MD  Pulmonary & Critical Care   [phone] (Text Page)   Interval History   Did ok overnight, opening eyes more to less stimuli, otherwise no acute changes  Medications   Current Facility-Administered Medications   Medication Dose Route Frequency    chlorhexidine  15 mL Mouth/Throat Q12H    clobazam  10 mg Oral or Feeding Tube BID    diphenhydrAMINE  50 mg Oral or Feeding Tube BID    fiber modular  1 packet Per Feeding Tube Daily    fosphenytoin (CEREBYX) 200 mg PE in sodium chloride 0.9 % 100 mL intermittent infusion  200 mg PE Intravenous At Bedtime    heparin ANTICOAGULANT  5,000 Units Subcutaneous Q8H    insulin aspart  1-6 Units Subcutaneous Q4H    lacosamide (VIMPAT) 150 mg in sodium chloride 0.9 % 110 mL intermittent infusion  150 mg Intravenous BID    [Held by provider] LORazepam  0.25 mg Oral At Bedtime    [Held by provider] LORazepam  0.5 mg Oral BID    multivitamins w/minerals  15 mL Oral or Feeding Tube Daily    NIFEdipine  10 mg Oral Q6H    pantoprazole  40 mg Oral or Feeding Tube QAM AC    Or    pantoprazole  40 mg Intravenous QAM AC    [Held by provider] perphenazine  4 mg Oral QAM    [Held by provider] perphenazine  8 mg Oral At Bedtime    polyethylene glycol  17 g Oral or Feeding Tube BID    protein modular  1 packet Per Feeding Tube  Daily    sodium chloride (PF)  10-40 mL Intracatheter Q7 Days    sodium chloride (PF)  3 mL Intracatheter Q8H     Current Facility-Administered Medications   Medication Last Rate    dextrose      sodium chloride 20 mL/hr at 10/11/23 0755     Physical Exam   Vital Signs with Ranges  Temp:  [97.9  F (36.6  C)-101.1  F (38.4  C)] 98.6  F (37  C)  Pulse:  [64-80] 71  Resp:  [11-24] 15  BP: (108-171)/(60-83) 145/69  FiO2 (%):  [30 %] 30 %  SpO2:  [96 %-100 %] 99 %    Wt Readings from Last 1 Encounters:   10/11/23 51.4 kg (113 lb 5.1 oz)    Body mass index is 22.13 kg/m .     BP - Mean:  [] 104  Vent Mode: CPAP/PS  (Continuous positive airway pressure with Pressure Support)  FiO2 (%): 30 %  Resp Rate (Set): 12 breaths/min  Tidal Volume (Set, mL): 400 mL  PEEP (cm H2O): 5 cmH2O  Pressure Support (cm H2O): 7 cmH2O  Resp: 15    General: Stated age intubated sedated  HEENT: EEG leads ET tube feeding tube  Lungs: Synchronous with ventilator  CVS: Regular rate rhythm  Abdomen: Grossly normal  Extremities/musculoskeletal: No edema  Neurology: Not expansive to voice today.  Skin: Grossly normal  Psychiatry: Unable  Exam of Line sites:  PICC     I/O last 3 completed shifts:  In: 2605 [I.V.:790; NG/GT:1095]  Out: 2100 [Urine:2100]  Data   Labs & Studies of Note: I personally reviewed the following studies:    Imaging: All new imaging reviewed by me  No results found for this or any previous visit (from the past 24 hour(s)).  ROUTINE ICU LABS (Last four results)  ROUTINE ICU LABS (Last four results)  CMP  Recent Labs   Lab 10/11/23  1058 10/11/23  0749 10/11/23  0444 10/11/23  0441 10/10/23  0847 10/10/23  0633 10/09/23  0804 10/09/23  0427 10/08/23  0907 10/08/23  0528   NA  --   --   --  137  --  139  --  141  --  141   POTASSIUM  --   --   --  4.2  --  4.2  --  4.2  --  4.2   CHLORIDE  --   --   --  101  --  103  --  103  --  104   CO2  --   --   --  28  --  27  --  28  --  31*   ANIONGAP  --   --   --  8  --  9  --  10  --  " 6*   * 122* 112* 124*   < > 112*   < > 100*   < > 110*   BUN  --   --   --  16.6  --  15.1  --  17.2  --  17.3   CR  --   --   --  0.46*  --  0.43*  --  0.43*  --  0.46*   GFRESTIMATED  --   --   --  >90  --  >90  --  >90  --  >90   LULU  --   --   --  7.9*  --  8.1*  --  7.9*  --  8.1*   MAG  --   --   --  2.1  --  2.2  --  2.2  --  2.2   PHOS  --   --   --  4.2  --  4.3  --  3.4  --  3.6    < > = values in this interval not displayed.     CBC  Recent Labs   Lab 10/11/23  0441 10/10/23  0633 10/09/23  0427 10/08/23  0528   WBC 9.4 6.8 6.9 6.4   RBC 3.23* 3.32* 3.31* 3.28*   HGB 7.9* 8.1* 8.3* 8.0*   HCT 26.6* 27.1* 27.5* 27.1*   MCV 82 82 83 83   MCH 24.5* 24.4* 25.1* 24.4*   MCHC 29.7* 29.9* 30.2* 29.5*   RDW 20.5* 20.6* 21.0* 20.7*    331 305 291     INRNo lab results found in last 7 days.  Arterial Blood Gas  Recent Labs   Lab 10/08/23  0908   O2PER 30     Inflammatory Markers  No lab results found.  Immune Globulin Studies  No lab results found.  Microbiology:  No results found for: \"CULT\"  No lab results found.  Urine Studies:    Recent Labs   Lab Test 09/28/23  1606 03/10/23  0932 03/09/23  0224   LEUKEST Moderate* Large* Large*   NITRITE Positive* Positive* Positive*   WBCU 18* >182* >182*   RBCU <1 2 4*         "

## 2023-10-11 NOTE — PROGRESS NOTES
Interval Neurocritical Care Note    EEG is getting progressively more active. We were hoping to minimize AED doses to give her a chance to wake up more but, in light of EEG today, will have to adjust her dose of clobazam. Order placed for a one time dose of 5 mg and then will increase dose to 15 mg twice daily starting with this evening's dose.     Ana Chilel, NP

## 2023-10-11 NOTE — PROGRESS NOTES
Pt tolerated Pressure support trials for 11hrs today and RT switched her to full support for night. Pt had low vt and low minute ventilation. RT will maintain airway patency and try PS tomorrow.    Hailey Khan RT

## 2023-10-11 NOTE — PROGRESS NOTES
She had intermittent 10-20 second  left sided shaking that lasted for 15 minutes. Now resolved.   If any more episodes, will plan on PRN Ativan.

## 2023-10-11 NOTE — PROGRESS NOTES
Date of Admission: 9/28/2023     Date of Intubation (most recent): 9/29/2023     Reason for Mechanical Ventilation: AW protection     Number of Days on Mechanical Ventilation: 12     Met Criteria for Spontaneous Breathing Trial: lasted from 11am to 10pm     Reason for No Spontaneous Breathing Trial: To be completed during the day shift     Significant Events Today: None    Vent Mode: CMV/AC  (Continuous Mandatory Ventilation/ Assist Control)  FiO2 (%): 30 %  Resp Rate (Set): 12 breaths/min  Tidal Volume (Set, mL): 400 mL  PEEP (cm H2O): 5 cmH2O  Pressure Support (cm H2O): 7 cmH2O  Resp: 15      [facility] RT

## 2023-10-11 NOTE — PROGRESS NOTES
Care Management Follow Up    Length of Stay (days): 13    Expected Discharge Date: 10/19/2023     Concerns to be Addressed:       Patient plan of care discussed at interdisciplinary rounds: Yes    Anticipated Discharge Disposition: Group Home     Anticipated Discharge Services: Transportation Services  Anticipated Discharge DME:      Patient/family educated on Medicare website which has current facility and service quality ratings:    Education Provided on the Discharge Plan:    Patient/Family in Agreement with the Plan:      Referrals Placed by CM/SW: [facility] ([facility])  Private pay costs discussed: Not applicable    Additional Information:  Received call from Emely, Supervisor at pt's  [phone].  Per Emely, pt's  confirmed there is no legal next of kin.  Referred Emely to  as per  consult, if there is no legal next of kin-a commitment process will need to be started for guardianship.  Inpatient Care Coordination will continue to follow for discharge planning.   Marialuisa Cowan, RN  Marialuisa Cowan RN, BSN, OCN   Inpatient Care Coordination [facility]  Office: [phone]

## 2023-10-11 NOTE — PROGRESS NOTES
[facility]    Stroke Progress Note    Interval Events  - Intermittent left sided shaking lasting ~ 15 minutes   - SBPs 108-171, HR 64-78, afebrile  - EEG 10/10/23 continues to be abnormal; continuous generalized delta and theta slowing consistent with moderate encephalopathy; multifocal and generalized cortical irritability; no clear electroclinical seizures    - currently receiving clobazam 10 mg BID per FT, fosphenytoin 200 mg IV, lacosamide 150 mg IV BID    HPI Summary  Kortney Germain is a 68 year old female with PMHx significant for anxiety/depression, schizoaffective disorder (living in group home), HTN, odynophagia, tardive dyskinesia, HTN. She presented 9/28 with lower back pain and abdominal pain. She was found to have UTI, anemia, and constipation. She had decreased LOC and generalized shaking overnight on 9/28. She remained febrile. RRT called 9/29/23 for encephalopathy and concern that should would be unable to protect her airway. 1 PRBC given 9/29/23 due to anemia.     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   MRI: L hyperintensity on DWI/FLAIR without correlate on ADC or T1, favored to be artifact     60 minute EEG: abnormal consistent with a severe encephalopathy and ?status epilepticus; it is important to note patient has persistent head tremoring which may also produce a rhythmic artifact on the EEG and there is persistent electro myogenic artifact making this 1 hour segment difficult to interpret      cEEG:   -9/30: moderate to severe encephalopathy and periodic pattern which may be SIRPIDS (stimulus-induced rhythmic, periodic, or ictal discharges)  - 10/1 (after administration of paralytic to remove muscle artifact): per verbal report underlying cortical activity consistent with seizures  - 10/3: generalized periodic discharges present but less persistent than previously. Does not meet criteria for NCSE.  - 10/4:  severely abnormal. 1) varying degrees of encephalopathy depending on sedative drips utilized.  Background activity consistent with moderate to severe diffuse encephalopathy throughout. 2) persistent generalized periodic discharges at a rate of 1.5 Hz appeared following abrupt discontinuation of propofol.  These were not accompanied by clinical changes but raised concerns about incipient nonconvulsive status epilepticus.  Escalation of midazolam resulted in resolution of generalized periodic discharges.  - 10/5 : Abnormal.  1) background activity on higher doses of midazolam consistent with severe diffuse encephalopathy.  There was modest improvement in background of midazolam was reduced but normal rhythms were not seen.  2) generalized periodic discharges become a more persistent as midazolam is reduced from 18 mg/h to 14 mg/h.  They occupy about 50% of ongoing recording.  This indicates ongoing cortical irritability and need for aggressive anticonvulsant treatment.  Features did not meet standard criteria for nonconvulsive status epilepticus even following reduction of midazolam.  - 10/6: Background largely continuous mixture of moderate amplitude diffuse delta and diffuse alpha. No clear posterior dominant rhythm. Intermittent generalized sharp waves, occasionally organized into brief runs of generalized periodic discharges, consistently less than 1.5 Hz. Persistence estimated about 20%. These do not meet criteria for nonconvulsive status epilepticus and are clearly much less organized and much less persistent than the intermittent nonconvulsive status epilepticus seen in the first 2 days of recording.   - 10/7: Findings consistent with moderate diffuse encephalopathy.  Amount of generalized sharp waves and generalized periodic discharges decreased compared to last several days, even in the context of discontinuation of sedative drips.  This indicates some persistence of generalized cortical irritability.  No  seizures, even following discontinuation of sedative drips.   - 10/8: indings consistent with moderate diffuse encephalopathy. Rare left temporal sharp waves indicate continuing focal cortical irritability. Generalized periodic discharges not seen in today's samples. No indication of nonconvulsive status epilepticus.   - 10/9: abnormal due to the presences of continuous generalized polymorphic delta slowing. There is a  7 hz symmetric parieto-occipital rhythm.  This EEG is consistent with a moderate to severe diffuse encephalopathy. No epileptiform discharges or electrographic seizures were seen in this record.   - 10/10: abnormal due to the presences of continuous generalized polymorphic delta slowing. There is a  7 hz symmetric parieto-occipital rhythm.  After 5 AM on October 10, 2023 patient is noted to have increased epileptiform discharges with maximum negativity at T5, T6, F4, C3-P3, sharp waves are superimposed on rhythmic delta activity.  EEG is consistent with a moderate to severe diffuse encephalopathy with multifocal cortical irritability.  EEG does not show clear electroclinical seizures, however there does seem to be increasing epileptiform discharges compared to prior days which suggest higher risk for seizure recurrence.   - Video EEG day 11 is abnormal due to the presences of continuous generalized delta and theta slowing consistent with a moderate encephalopathy.  There are epileptiform discharges in the left temporoparietal region, right temporoparietal region, right frontal, and generalized discharges are suggestive of multifocal and generalized cortical irritability with increased risk for seizure potentials.  No clear electroclinical seizures were recorded on this day.        Blood cultures: NGTD     LP labs:  -glucose 98  -protein 22.8  -2 nucleated cells, RBC 0  -aerobic culture GPC in broth only   -anaerobic culture NGTD  - HSV negative    Impression   Status epilepticus, EEG improved on 3  "AEDs and sedative drips - uncertain etiology of NCSE. Will likely take some time to awaken given duration of NCSE and high dose of AEDs started. Decreased doses of all three AEDs on 10/9 to maximize her chances of regaining consciousness since we are reaching a point where she would need trach/PEG if unable to extubate.      Shaking/tremors, unclear etiology. May represent baseline tardive dyskinesia vs manifestation of status epilepticus vs toxic encephalopathy vs other     Plan  - dilantin trough level tomorrow (ordered)  - repeat MRI brain w/ and w/out contrast (ordered)  - continue lacosamide 150 mg BID  - continue fosphenytoin 200 mg at bedtime  - continue clobazam 10 mg BID  - cEEG, will likely need a hygiene break tomorrow   - limit dopaminergic/serotonergic agents   - Sprakers paraneoplastic panel (10/3) in process  - lacosamide level in process     Patient Follow-up    - final recommendation pending work-up    We will continue to follow.     Ana Chilel NP  Vascular Neurology    To page me or covering stroke neurology team member, click here: [facility]COM  Choose \"On Call\" tab at top, then select \"NEUROLOGY/ALL SITES\" from middle drop-down box, press Enter, then look for \"stroke\" or \"telestroke\" for your site.  ______________________________________________________    Clinically Significant Risk Factors              # Hypoalbuminemia: Lowest albumin = 3.4 g/dL at 9/30/2023  5:15 AM, will monitor as appropriate     # Hypertension: Noted on problem list                     Medications   Scheduled Meds   chlorhexidine  15 mL Mouth/Throat Q12H    clobazam  10 mg Oral or Feeding Tube BID    diphenhydrAMINE  50 mg Oral or Feeding Tube BID    fiber modular  1 packet Per Feeding Tube Daily    fosphenytoin (CEREBYX) 200 mg PE in sodium chloride 0.9 % 100 mL intermittent infusion  200 mg PE Intravenous At Bedtime    heparin ANTICOAGULANT  5,000 Units Subcutaneous Q8H    insulin aspart  1-6 Units Subcutaneous Q4H    " lacosamide (VIMPAT) 150 mg in sodium chloride 0.9 % 110 mL intermittent infusion  150 mg Intravenous BID    [Held by provider] LORazepam  0.25 mg Oral At Bedtime    [Held by provider] LORazepam  0.5 mg Oral BID    multivitamins w/minerals  15 mL Oral or Feeding Tube Daily    NIFEdipine  10 mg Oral Q6H    pantoprazole  40 mg Oral or Feeding Tube QAM AC    Or    pantoprazole  40 mg Intravenous QAM AC    [Held by provider] perphenazine  4 mg Oral QAM    [Held by provider] perphenazine  8 mg Oral At Bedtime    polyethylene glycol  17 g Oral or Feeding Tube BID    protein modular  1 packet Per Feeding Tube Daily    sodium chloride (PF)  10-40 mL Intracatheter Q7 Days    sodium chloride (PF)  3 mL Intracatheter Q8H       Infusion Meds   dextrose      sodium chloride 20 mL/hr at 10/11/23 0755       PRN Meds  acetaminophen **OR** acetaminophen, albuterol, [Held by provider] cyclobenzaprine, dextrose, glucose **OR** dextrose **OR** glucagon, fentaNYL, HYDROmorphone, lidocaine 4%, lidocaine (buffered or not buffered), LORazepam, melatonin, naloxone **OR** naloxone **OR** naloxone **OR** naloxone, nitroGLYcerin, sodium chloride (PF), sodium chloride (PF), sodium chloride (PF), sodium chloride (PF)       PHYSICAL EXAMINATION  Temp:  [97.9  F (36.6  C)-101.1  F (38.4  C)] 98.6  F (37  C)  Pulse:  [64-80] 68  Resp:  [11-24] 14  BP: (108-171)/(60-83) 138/72  FiO2 (%):  [30 %] 30 %  SpO2:  [96 %-100 %] 99 %      Neurologic  Mental Status:  obtunded - opens eyes to noxious stimuli but does not attend to/track examiner, not following commands  Cranial Nerves:  midline gaze, blinks to threat bilaterally, PERRL  Motor:  no abnormal movements, no spontaneous movements noted  Sensory:   withdraws from noxious stimuli in all limbs       Imaging  I personally reviewed all imaging; relevant findings per HPI.     Lab Results Data   CBC  Recent Labs   Lab 10/11/23  0441 10/10/23  0633 10/09/23  0427   WBC 9.4 6.8 6.9   RBC 3.23* 3.32* 3.31*  "  HGB 7.9* 8.1* 8.3*   HCT 26.6* 27.1* 27.5*    331 305     Basic Metabolic Panel    Recent Labs   Lab 10/11/23  1058 10/11/23  0749 10/11/23  0444 10/11/23  0441 10/10/23  0847 10/10/23  0633 10/09/23  0804 10/09/23  0427   NA  --   --   --  137  --  139  --  141   POTASSIUM  --   --   --  4.2  --  4.2  --  4.2   CHLORIDE  --   --   --  101  --  103  --  103   CO2  --   --   --  28  --  27  --  28   BUN  --   --   --  16.6  --  15.1  --  17.2   CR  --   --   --  0.46*  --  0.43*  --  0.43*   * 122* 112* 124*   < > 112*   < > 100*   LULU  --   --   --  7.9*  --  8.1*  --  7.9*    < > = values in this interval not displayed.     Liver Panel  No results for input(s): \"PROTTOTAL\", \"ALBUMIN\", \"BILITOTAL\", \"ALKPHOS\", \"AST\", \"ALT\", \"BILIDIRECT\" in the last 168 hours.  INR    Recent Labs   Lab Test 09/28/23  1244 03/07/23  1629   INR 1.04 1.15      Lipid Profile    Recent Labs   Lab Test 10/03/23  0956 03/08/23  0830   CHOL  --  143   HDL  --  50   LDL  --  75   TRIG 1,372* 88     A1C  No lab results found.  Troponin  No results for input(s): \"CTROPT\", \"TROPONINIS\", \"TROPONINI\", \"GHTROP\" in the last 168 hours.       Data   I personally examined and evaluated the patient today. At the time of my evaluation and management the patient was in critical condition today due to ongoing seizures and encephalopathy. I personally managed review of labs and images and neuro exam. Key decisions made today included: continue EEG. I spent a total of 35 minutes providing critical care services, evaluating the patient, directing care and reviewing laboratory values and radiologic reports.   "

## 2023-10-11 NOTE — CONSULTS
Care Management Follow Up    Length of Stay (days): 13    Expected Discharge Date: 10/20/2023     Concerns to be Addressed:       Patient plan of care discussed at interdisciplinary rounds: Yes    Anticipated Discharge Disposition: Group Home     Anticipated Discharge Services: Transportation Services  Anticipated Discharge DME:      Patient/family educated on Medicare website which has current facility and service quality ratings:    Education Provided on the Discharge Plan:    Patient/Family in Agreement with the Plan:      Referrals Placed by CM/SW: Community Residential Settings (Group Homes)  Private pay costs discussed: Not applicable    Additional Information:  Writer has been asked to find NOK or start commitment/guardianship process. Although ancestry searches provided  for brothers and a sister care management has not successfully spoken to any relatives. Patients , mental health case management team and [facility] adult protection have all indicated that there is no known NOK for this patient.  [facility] told writer they will not pursue guardianship.    To start commitment/guardianship process, first psychiatry needs to determine if a commitment is needed. SW requested that a psych consult is ordered. SW/CC team is following for appropriate next steps.     Addendum - Psychiatry stated no basis for commitment at this time. [facility] will not pursue guardianship based on vulnerable adult report made 10/06/2023    BABATUNDE John

## 2023-10-12 NOTE — PLAN OF CARE
Goal Outcome Evaluation:      Plan of Care Reviewed With:  (Interdisciplinary bedside rounds)    Overall Patient Progress: no changeOverall Patient Progress: no change    Outcome Evaluation: Goal TF meeting needs, continue current regimen.  See RD note dated 10/12 for details.    Libertad Romano RD, LD, CNSC   Clinical Dietitian - [facility]

## 2023-10-12 NOTE — PLAN OF CARE
Neuro: Patient can open eyes spontaneously. Withdraws to pain. EEG currently running. Had minor tremors to L arm and lower jaw movement. Does not follow commands.  Cardiac: NSR, BP WNL, R radial pulse +1 others +2  Resp: Full vent support at night   GI/: voids incontinently, two liquid green BM  Skin: large tear. No mepilex only barrier cream    Plan: social work, hygiene break from EEG

## 2023-10-12 NOTE — PROGRESS NOTES
[facility] Progress Note    Interval Events  - EEG was getting progressively more active over the course of yesterday, clobazam dose increased    - currently receiving clobazam 15 mg BID per FT, fosphenytoin 200 mg IV, lacosamide 150 mg BID  - afebrile   - brain MRI stable   - lacosamide level 4.1    HPI Summary  Kortney Germain is a 68 year old female with PMHx significant for anxiety/depression, schizoaffective disorder (living in group home), HTN, odynophagia, tardive dyskinesia, HTN. She presented 9/28 with lower back pain and abdominal pain. She was found to have UTI, anemia, and constipation. She had decreased LOC and generalized shaking overnight on 9/28. She remained febrile. RRT called 9/29/23 for encephalopathy and concern that should would be unable to protect her airway. 1 PRBC given 9/29/23 due to anemia.     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   MRI: L hyperintensity on DWI/FLAIR without correlate on ADC or T1, favored to be artifact  MRI 10/11: no acute infarct or hemorrhage, stable moderate-advanced brain atrophy and leukoaraiosis; CSVID     60 minute EEG: abnormal consistent with a severe encephalopathy and ?status epilepticus; it is important to note patient has persistent head tremoring which may also produce a rhythmic artifact on the EEG and there is persistent electro myogenic artifact making this 1 hour segment difficult to interpret      cEEG:   -9/30: moderate to severe encephalopathy and periodic pattern which may be SIRPIDS (stimulus-induced rhythmic, periodic, or ictal discharges)  - 10/1 (after administration of paralytic to remove muscle artifact): per verbal report underlying cortical activity consistent with seizures  - 10/3: generalized periodic discharges present but less persistent than previously. Does not meet criteria for NCSE.  - 10/4: severely abnormal. 1) varying degrees of  encephalopathy depending on sedative drips utilized.  Background activity consistent with moderate to severe diffuse encephalopathy throughout. 2) persistent generalized periodic discharges at a rate of 1.5 Hz appeared following abrupt discontinuation of propofol.  These were not accompanied by clinical changes but raised concerns about incipient nonconvulsive status epilepticus.  Escalation of midazolam resulted in resolution of generalized periodic discharges.  - 10/5 : Abnormal.  1) background activity on higher doses of midazolam consistent with severe diffuse encephalopathy.  There was modest improvement in background of midazolam was reduced but normal rhythms were not seen.  2) generalized periodic discharges become a more persistent as midazolam is reduced from 18 mg/h to 14 mg/h.  They occupy about 50% of ongoing recording.  This indicates ongoing cortical irritability and need for aggressive anticonvulsant treatment.  Features did not meet standard criteria for nonconvulsive status epilepticus even following reduction of midazolam.  - 10/6: Background largely continuous mixture of moderate amplitude diffuse delta and diffuse alpha. No clear posterior dominant rhythm. Intermittent generalized sharp waves, occasionally organized into brief runs of generalized periodic discharges, consistently less than 1.5 Hz. Persistence estimated about 20%. These do not meet criteria for nonconvulsive status epilepticus and are clearly much less organized and much less persistent than the intermittent nonconvulsive status epilepticus seen in the first 2 days of recording.   - 10/7: Findings consistent with moderate diffuse encephalopathy.  Amount of generalized sharp waves and generalized periodic discharges decreased compared to last several days, even in the context of discontinuation of sedative drips.  This indicates some persistence of generalized cortical irritability.  No seizures, even following discontinuation of  sedative drips.   - 10/8: indings consistent with moderate diffuse encephalopathy. Rare left temporal sharp waves indicate continuing focal cortical irritability. Generalized periodic discharges not seen in today's samples. No indication of nonconvulsive status epilepticus.   - 10/9: abnormal due to the presences of continuous generalized polymorphic delta slowing. There is a  7 hz symmetric parieto-occipital rhythm.  This EEG is consistent with a moderate to severe diffuse encephalopathy. No epileptiform discharges or electrographic seizures were seen in this record.   - 10/10: abnormal due to the presences of continuous generalized polymorphic delta slowing. There is a  7 hz symmetric parieto-occipital rhythm.  After 5 AM on October 10, 2023 patient is noted to have increased epileptiform discharges with maximum negativity at T5, T6, F4, C3-P3, sharp waves are superimposed on rhythmic delta activity.  EEG is consistent with a moderate to severe diffuse encephalopathy with multifocal cortical irritability.  EEG does not show clear electroclinical seizures, however there does seem to be increasing epileptiform discharges compared to prior days which suggest higher risk for seizure recurrence.   - Video EEG day 11 is abnormal due to the presences of continuous generalized delta and theta slowing consistent with a moderate encephalopathy.  There are epileptiform discharges in the left temporoparietal region, right temporoparietal region, right frontal, and generalized discharges are suggestive of multifocal and generalized cortical irritability with increased risk for seizure potentials.  No clear electroclinical seizures were recorded on this day.    - EEG day 12: abnormal due to the presences of continuous generalized delta and theta slowing consistent with a moderate encephalopathy.  There are epileptiform discharges in the left temporoparietal region, right temporoparietal region, right frontal, and generalized  "discharges are suggestive of multifocal and generalized cortical irritability with increased risk for seizure potentials.  No clear electroclinical seizures were recorded on this day.         Blood cultures: NGTD     LP labs:  -glucose 98  -protein 22.8  -2 nucleated cells, RBC 0  -aerobic culture GPC in broth only   -anaerobic culture NGTD  - HSV negative    Impression   Status epilepticus, EEG improved on 3 AEDs and sedative drips - uncertain etiology of NCSE. Will likely take some time to awaken given duration of NCSE and high dose of AEDs started. Decreased doses of all three AEDs on 10/9 to maximize her chances of regaining consciousness since we are reaching a point where she would need trach/PEG if unable to extubate.      Shaking/tremors, unclear etiology. May represent baseline tardive dyskinesia vs manifestation of status epilepticus vs toxic encephalopathy vs other     Plan  - dilantin trough level tonight (ordered)  - continue lacosamide 150 mg BID  - continue fosphenytoin 200 mg at bedtime  - continue clobazam 15 mg BID  - cEEG, hygiene break today   - limit dopaminergic/serotonergic agents   - [place] paraneoplastic panel (10/3) in process    Patient Follow-up    - final recommendation pending work-up    We will continue to follow.     Ana Chilel NP  Vascular Neurology    To page me or covering stroke neurology team member, click here: [facility]  Choose \"On Call\" tab at top, then select \"NEUROLOGY/ALL SITES\" from middle drop-down box, press Enter, then look for \"stroke\" or \"telestroke\" for your site.  ______________________________________________________    Clinically Significant Risk Factors              # Hypoalbuminemia: Lowest albumin = 3.4 g/dL at 9/30/2023  5:15 AM, will monitor as appropriate       # Hypertension: Noted on problem list                     Medications   Scheduled Meds   chlorhexidine  15 mL Mouth/Throat Q12H    clobazam  15 mg Oral or Feeding Tube BID    diphenhydrAMINE  50 mg " Oral or Feeding Tube BID    fiber modular  1 packet Per Feeding Tube Daily    fosphenytoin (CEREBYX) 200 mg PE in sodium chloride 0.9 % 100 mL intermittent infusion  200 mg PE Intravenous At Bedtime    heparin ANTICOAGULANT  5,000 Units Subcutaneous Q8H    insulin aspart  1-6 Units Subcutaneous Q4H    lacosamide (VIMPAT) 150 mg in sodium chloride 0.9 % 110 mL intermittent infusion  150 mg Intravenous BID    [Held by provider] LORazepam  0.25 mg Oral At Bedtime    [Held by provider] LORazepam  0.5 mg Oral BID    multivitamins w/minerals  15 mL Oral or Feeding Tube Daily    NIFEdipine  10 mg Oral Q6H    pantoprazole  40 mg Oral or Feeding Tube QAM AC    Or    pantoprazole  40 mg Intravenous QAM AC    [Held by provider] perphenazine  4 mg Oral QAM    [Held by provider] perphenazine  8 mg Oral At Bedtime    polyethylene glycol  17 g Oral or Feeding Tube BID    protein modular  1 packet Per Feeding Tube Daily    sodium chloride (PF)  10-40 mL Intracatheter Q7 Days    sodium chloride (PF)  3 mL Intracatheter Q8H       Infusion Meds   dextrose      sodium chloride 20 mL/hr at 10/12/23 0044       PRN Meds  acetaminophen **OR** acetaminophen, albuterol, [Held by provider] cyclobenzaprine, dextrose, glucose **OR** dextrose **OR** glucagon, HYDROmorphone, lidocaine 4%, lidocaine (buffered or not buffered), LORazepam, melatonin, naloxone **OR** naloxone **OR** naloxone **OR** naloxone, nitroGLYcerin, sodium chloride (PF), sodium chloride (PF), sodium chloride (PF), sodium chloride (PF)       PHYSICAL EXAMINATION  Temp:  [97.7  F (36.5  C)-98.6  F (37  C)] 97.7  F (36.5  C)  Pulse:  [60-82] 69  Resp:  [11-23] 11  BP: (100-163)/(56-79) 138/72  FiO2 (%):  [30 %] 30 %  SpO2:  [96 %-100 %] 98 %      Neurologic  Mental Status:  obtunded - opens eyes to noxious stimuli but does not attend to/track examiner, not following commands  Cranial Nerves:  midline gaze, blinks to threat bilaterally, PERRL  Motor:  no abnormal movements, no  "spontaneous movements noted  Sensory:   withdraws from noxious stimuli in all limbs       Imaging  I personally reviewed all imaging; relevant findings per HPI.     Lab Results Data   CBC  Recent Labs   Lab 10/12/23  0452 10/11/23  0441 10/10/23  0633   WBC 9.9 9.4 6.8   RBC 3.13* 3.23* 3.32*   HGB 7.8* 7.9* 8.1*   HCT 25.8* 26.6* 27.1*    349 331     Basic Metabolic Panel    Recent Labs   Lab 10/12/23  0454 10/12/23  0452 10/12/23  0024 10/11/23  0444 10/11/23  0441 10/10/23  0847 10/10/23  0633   NA  --  138  --   --  137  --  139   POTASSIUM  --  4.1  --   --  4.2  --  4.2   CHLORIDE  --  102  --   --  101  --  103   CO2  --  26  --   --  28  --  27   BUN  --  16.4  --   --  16.6  --  15.1   CR  --  0.48*  --   --  0.46*  --  0.43*   * 110* 131*   < > 124*   < > 112*   LULU  --  8.1*  --   --  7.9*  --  8.1*    < > = values in this interval not displayed.     Liver Panel  No results for input(s): \"PROTTOTAL\", \"ALBUMIN\", \"BILITOTAL\", \"ALKPHOS\", \"AST\", \"ALT\", \"BILIDIRECT\" in the last 168 hours.  INR    Recent Labs   Lab Test 09/28/23  1244 03/07/23  1629   INR 1.04 1.15      Lipid Profile    Recent Labs   Lab Test 10/03/23  0956 03/08/23  0830   CHOL  --  143   HDL  --  50   LDL  --  75   TRIG 1,372* 88     A1C  No lab results found.  Troponin  No results for input(s): \"CTROPT\", \"TROPONINIS\", \"TROPONINI\", \"GHTROP\" in the last 168 hours.       Data   I personally examined and evaluated the patient today. At the time of my evaluation and management the patient was in critical condition today due to ongoing seizures and encephalopathy. I personally managed review of labs and images and neuro exam. Key decisions made today included: continue EEG. I spent a total of 35 minutes providing critical care services, evaluating the patient, directing care and reviewing laboratory values and radiologic reports.   "

## 2023-10-12 NOTE — PROGRESS NOTES
[facility] ICU RESPIRATORY NOTE        Date of Admission: 9/28/2023    Date of Intubation (most recent): 9/29/2023    Reason for Mechanical Ventilation: Airway protection    Number of Days on Mechanical Ventilation: 13    Met Criteria for Spontaneous Breathing Trial: yes    Significant Events Today: pt was PS of 7/5 and 10/5  for a total of about 8 hr throughout the day. Placed back on full vent support @ 1900.     ABG Results:   Recent Labs   Lab 10/08/23  0908   O2PER 30         Current Vent Settings: Vent Mode: CMV/AC  (Continuous Mandatory Ventilation/ Assist Control)  FiO2 (%): 30 %  Resp Rate (Set): 12 breaths/min  Tidal Volume (Set, mL): 400 mL  PEEP (cm H2O): 5 cmH2O  Pressure Support (cm H2O): 10 cmH2O  Resp: 11      Skin Assessment: Intact    Plan:  continue full vent support and assess readiness for daily SBT.    RT Nuris on 10/12/2023 at 5:32 AM

## 2023-10-12 NOTE — PROGRESS NOTES
CLINICAL NUTRITION SERVICES - REASSESSMENT NOTE      Malnutrition: % Weight Loss:  None noted  % Intake:  Decreased intake does not meet criteria for malnutrition (getting goal TF)  Subcutaneous Fat Loss:  Orbital region mild-moderate depletion  Muscle Loss:  Temporal region moderate depletion, Patellar region moderate-severe depletion, and Anterior thigh region moderate depletion  Fluid Retention:  Mild (trace) - generalized     Malnutrition Diagnosis: Moderate malnutrition  In Context of:  Acute illness or injury       EVALUATION OF PROGRESS TOWARD GOALS   Diet:  NPO on vent     Nutrition Support:  Patient continues on goal TF regimen as follows ~    Nutrition Support Enteral:  Type of Feeding Tube: NG  Enteral Frequency:  Continuous  Enteral Regimen: Osmolite 1.5 at 30 mL/hr  Total Enteral Provisions: 1080 kcal, 45 g protein, 147 g CHO, 0 g fiber, 549 mL H2O  Free Water Flush: 100 mL every 4 hours   ProSource TF20 daily = 80 kcal and 20 g protein   Banatrol TF BID = 90 kcal, 10 g fiber, 4 g protein   Total = 1250 kcal (28 kcal/kg), 69 g protein (1.5 g/kg)    Intake/Tolerance:    K/Mg/Phos normal  BGM   I/O 2385/1200, wt 50.6 kg (up from admit)  BM x 2 today, x 1 yest, and x 1 on 10/9 - Holding Miralax, improved with addition of Banatrol TF on 10/5      ASSESSED NUTRITION NEEDS:  Dosing Weight 45 kg   Estimated Energy Needs: 8951-5789+ kcals (25-30+ Kcal/Kg)  Justification: maintenance and vented  Estimated Protein Needs: 54-68 grams protein (1.2-1.5 g pro/Kg)  Justification: preservation of lean body mass  Estimated Fluid Needs: 1 mL/kcal or per MD   Justification: maintenance      NEW FINDINGS:   SW working on locating NOK     Possible Trach Monday     Patient receiving Certavite daily for supplementation     Nutrition Focused Physical Exam completed --> Noted orbital fat loss + temporal, thigh, and knee muscle loss     Previous Goals (10/9):   Goal TF regimen will continue to meet % needs     Evaluation: Met    Previous Nutrition Diagnosis (10/9):   No nutrition diagnosis identified at this time   Evaluation: No change      MALNUTRITION  % Weight Loss:  None noted  % Intake:  Decreased intake does not meet criteria for malnutrition (getting goal TF)  Subcutaneous Fat Loss:  Orbital region mild-moderate depletion  Muscle Loss:  Temporal region moderate depletion, Patellar region moderate-severe depletion, and Anterior thigh region moderate depletion  Fluid Retention:  Mild (trace) - generalized     Malnutrition Diagnosis: Moderate malnutrition  In Context of:  Acute illness or injury    CURRENT NUTRITION DIAGNOSIS  No nutrition diagnosis identified at this time as TF regimen meeting needs     INTERVENTIONS  Recommendations / Nutrition Prescription  Continue goal TF regimen as above     Implementation  Collaboration and Referral of Nutrition care:  Patient discussed today during interdisciplinary bedside rounds     Goals  Goal TF regimen will continue to meet % needs     MONITORING AND EVALUATION:  Progress towards goals will be monitored and evaluated per protocol and Practice Guidelines    Libertad Romano RD, LD, CNSC   Clinical Dietitian - [facility]

## 2023-10-12 NOTE — PROGRESS NOTES
Interval NCC Note    Increasing EEG activity. Ordered ativan 2 mg x 1, clobazam 5 mg x 1. Increased clobazam dose to 20 mg BID starting with this evening's dose. Continue EEG.     Ana Chilel, NP

## 2023-10-12 NOTE — PROGRESS NOTES
Critical Care Progress Note  [facility]  Kortney Germain MRN# [id number]   Age: 68 year old YOB: 1955     Date of Admission: 9/28/2023  Date of Service: 10/12/2023    Main Plans for Today   Plans to given patient till 10/16 to wake up  Break for cEEG today for hygeine  Clobazam increased per neuro overnight  Continue daily assessments  Assessment & Plan    68F pmh of schizo-affective schizophrenia, HTN, tardive dyskinesia now presented 9/28 from group home with back pain and fever, Ct abd showed rectum distended with stool and urinary bladder distended, now s/p quinonez placement.  Arrival UA c/w UTI. Since arrival from the ED she has been alert but not generally interactive (?part of this may be due to underlying psychiatric illness?). Morning of 9/29 she was noted to be febrile and shaking prompting intubation to facilitate head CT and LP.  Placed on heavy sedation including benzodiazepine and propofol.  EEG is not showing seizures since then but has shown evidence of potential new seizure foci.  And has been to continue heavy sedation.  Evening of 10/3 triglycerides returned critically high level. Gave 1 dose of Ativan shut off the propofol  After adding additional doses of as needed benzodiazepines and increasing the goal range of the midazolam. Did have more seizure-like activity which resolved with increasing benzodiazepines.    - Weaned off benzos 10/6.   - Cut AEDs in half 10/9.   Currently giving time for patient's mentation to improve given this is hindering extubation and getting guardianship process started.    Neurological/Psychological    **Sedation: none    Toxicmetabolic Encephalopathy: unclear if from meds vs malignant catatonia, no evidence of anatomic etiology  Underlying schizoaffective schizophrenia and h/o tardive dyskinesia:   Status epilepticus. Considered serotonin syndrome but less likely now.  Psychiatrist also wondering about malignant catatonia.  Is on 3 antiseizure  agents.    ** MRI (10/11/23): no acute pathology to explain persistent encephalopathy              - AEDs  clobazam 15 mg BID, fosphenytoin 200 mg at night, lacosamide 150 mg BID.               - [place] paraneoplastic panel pending              - ammonia level 14 10/9/2023   - MRI 10/11/2023 pending    Decision making: no formal guardian or decision maker. If mental status does not improve, it may not be safe to extubate and patient would need tracheostomy. Without decision maker or ability to ask patient, ethics consulted to assist with next steps. Only mental status is hindering extubation.               - ethics consult  -- Any major decisions are required and unable to find a surrogate decision-maker will ensure that at least 2 physicians agree with the plan of care.     From SW note  Additional Information:  Attempting to search for NOK.  Sent referral to [employer] to inquire as to whether they had any additional information than we have been able to find.  The information that has been given is that patient has been committed 7 times since 1983 with the last 3 being done by human services or a hospital.  Her father passed away in 2022 and two other family members from the 1990's have also passed away.  Sent an email to management for further direction.    Cardiovascular    Hypertension:  restarted Nifedipine at home.  Blood pressures are higher as were weaning sedation    Pulmonary    Loss of protective airway reflexes:  intubated/vented.  Inappropriate for extubation today. Due to mentation. Does well on pressure support  - Daily pressure support mode for conditioning  - Awaiting mental status to improve for extubation  - intubated since 9/29/23     Tracheostomy Plans: given mentation is the only hindrance to extubation, after discussion with neuro, we will wait 21 days given our last changes were 10/9 to proceed with tracheostomy if patient has not been extubated by that point. In the  interim we will assess daily    Renal/Fluids/Electrolytes    Significant positive fluid balance.  Received 1 dose diuretic with good response and now is auto diuresing.  -Address as necessary.  -Reduce Free water    Misc Plans  - monitor function and electrolytes, replacement per ICU protocols.   - generally avoid nephrotoxic agents such as NSAID, IV contrast unless specifically required  - adjust medications as needed for renal clearance  - follow I/O's as appropriate.    **I/O last 24hrs:   In: 2385 [NG/GT:985; I.V.:680]  Out: 1200 [Urine:1200]    Infectious Diseases    Sirs and possible UTI--on ctx and vanco, UC with mixed christina but >100k colonies.   Finished Ceftx course     Gastrointestinal/Genitourinary    No acute issues  - Tube feeds and PPI    **Nutrition: Diet: NPO for Medical/Clinical Reasons Except for: No Exceptions  Adult Formula Drip Feeding: Continuous Osmolite 1.5; Nasogastric tube; Goal Rate: 30; mL/hr; Increase TF to 30 mL/hr; Do not advance tube feeding rate unless K+ is = or > 3.0, Mg++ is = or > 1.5, and Phos is = or > 1.9        Endocrine/Metabolic    Stress induced hyperglycemia.  Plan  - ICU insulin protocol, goal sugar <180    Hematology/Oncology    Anemia of chronic illness: s/p prbc 9/29, no apparent blood loss    Rheumatology    no acute issues    Skin/Musculoskeletal    no acute issues    ------------------------------------------------------------------------------------------------------------------  Clinically Significant Risk Factors              # Hypoalbuminemia: Lowest albumin = 3.4 g/dL at 9/30/2023  5:15 AM, will monitor as appropriate       # Hypertension: Noted on problem list                        ------------------------------------------------------------------------------------------------------------------  Prophylaxis/ICU Checklist    -DVT: Subcutaneous Heparin    -VAP: HOB 30 degrees, chlorhexidine rinse    -Stress Ulcer: PPI    -Restraints: Nonviolent soft two point  restraints required and necessary for patient safety and continued cares and good effect as patient continues to pull at necessary lines, tubes despite education and distraction. Will readdress daily.     -IV Access: Central access required and necessary for continued patient cares     -Feeding - Diet: NPO for Medical/Clinical Reasons Except for: No Exceptions  Adult Formula Drip Feeding: Continuous Osmolite 1.5; Nasogastric tube; Goal Rate: 30; mL/hr; Increase TF to 30 mL/hr; Do not advance tube feeding rate unless K+ is = or > 3.0, Mg++ is = or > 1.5, and Phos is = or > 1.9        Consults: ethics, MH, neuro  Family: none  Code Status: Full Code      Disposition: guarded    I have personally reviewed the daily labs, imaging studies, cultures and discussed the case with referring physician and consulting physicians.   I personally managed the respiratory support devices, hemodynamics, sedation, analgesia, metabolic abnormalities and nutritional status.      This patient is critically ill and I have provided 45 minutes of critical care time (excluding procedures) on 10/12/2023    =========================================================  Carl Muñiz MD  Pulmonary & Critical Care   [phone] (Text Page)   Interval History   Did ok overnight, opening eyes spontaneously with respositioning  Medications   Current Facility-Administered Medications   Medication Dose Route Frequency    chlorhexidine  15 mL Mouth/Throat Q12H    clobazam  15 mg Oral or Feeding Tube BID    diphenhydrAMINE  50 mg Oral or Feeding Tube BID    fiber modular  1 packet Per Feeding Tube Daily    fosphenytoin (CEREBYX) 200 mg PE in sodium chloride 0.9 % 100 mL intermittent infusion  200 mg PE Intravenous At Bedtime    heparin ANTICOAGULANT  5,000 Units Subcutaneous Q8H    insulin aspart  1-6 Units Subcutaneous Q4H    lacosamide (VIMPAT) 150 mg in sodium chloride 0.9 % 110 mL intermittent infusion  150 mg Intravenous BID    [Held by  provider] LORazepam  0.25 mg Oral At Bedtime    [Held by provider] LORazepam  0.5 mg Oral BID    multivitamins w/minerals  15 mL Oral or Feeding Tube Daily    NIFEdipine  10 mg Oral Q6H    pantoprazole  40 mg Oral or Feeding Tube QAM AC    Or    pantoprazole  40 mg Intravenous QAM AC    [Held by provider] perphenazine  4 mg Oral QAM    [Held by provider] perphenazine  8 mg Oral At Bedtime    polyethylene glycol  17 g Oral or Feeding Tube BID    protein modular  1 packet Per Feeding Tube Daily    sodium chloride (PF)  10-40 mL Intracatheter Q7 Days    sodium chloride (PF)  3 mL Intracatheter Q8H     Current Facility-Administered Medications   Medication Last Rate    dextrose      sodium chloride 10 mL/hr at 10/12/23 0800     Physical Exam   Vital Signs with Ranges  Temp:  [97.7  F (36.5  C)-98.6  F (37  C)] 97.7  F (36.5  C)  Pulse:  [60-82] 69  Resp:  [11-23] 11  BP: (100-163)/(56-79) 138/72  FiO2 (%):  [30 %] 30 %  SpO2:  [96 %-100 %] 98 %    Wt Readings from Last 1 Encounters:   10/12/23 50.6 kg (111 lb 8.8 oz)    Body mass index is 21.79 kg/m .     BP - Mean:  [] 86  Vent Mode: CMV/AC  (Continuous Mandatory Ventilation/ Assist Control)  FiO2 (%): 30 %  Resp Rate (Set): 12 breaths/min  Tidal Volume (Set, mL): 400 mL  PEEP (cm H2O): 5 cmH2O  Pressure Support (cm H2O): 10 cmH2O  Resp: 11    General: Stated age intubated sedated  HEENT: EEG leads ET tube feeding tube  Lungs: Synchronous with ventilator  CVS: Regular rate rhythm  Abdomen: Grossly normal  Extremities/musculoskeletal: No edema  Neurology: Not expansive to voice today.  Skin: Grossly normal  Psychiatry: Unable  Exam of Line sites:  PICC     I/O last 3 completed shifts:  In: 2385 [I.V.:680; NG/GT:985]  Out: 1200 [Urine:1200]  Data   Labs & Studies of Note: I personally reviewed the following studies:    Imaging: All new imaging reviewed by me  Recent Results (from the past 24 hour(s))   MR Brain w/o & w Contrast    Narrative    EXAM: MR BRAIN W/O &  W CONTRAST  10/11/2023 3:07 PM     HISTORY: seizures, ongoing encephalopathy.    COMPARISON: 9/30/2023    TECHNIQUE: Multiplanar, multisequence MR imaging of the head obtained  prior to, and after, intravenous contrast administration    CONTRAST: 5mL Gadavist.    FINDINGS:  No mass effect, midline shift, or intracranial hemorrhage. Stable  subcortical, periventricular areas of T2 FLAIR white matter  hyperintensities, mild to moderate symmetric generalized parenchymal  volume loss and compensatory ventricular dilatation. No MRI findings  of acute hydrocephalus. Preserved major intracranial vascular flow  voids. No pathologic postcontrast enhancement.    Normal skull marrow signal. No substantial paranasal sinus mucosal  disease. Clear mastoid air cells. Nonfocal pituitary gland, sella,  skull base and upper cervical spinal structures. The orbits are  normal.      Impression    IMPRESSION:  1. No acute intracranial infarct or intracranial hemorrhage.  2. Stable moderate to advanced brain atrophy and leukoaraiosis,  presumed sequela of chronic microvascular ischemic disease, as  detailed.    BANG KWON          SYSTEM ID:  [id number]     ROUTINE ICU LABS (Last four results)  ROUTINE ICU LABS (Last four results)  CMP  Recent Labs   Lab 10/12/23  0826 10/12/23  0454 10/12/23  0452 10/12/23  0024 10/11/23  0444 10/11/23  0441 10/10/23  0847 10/10/23  0633 10/09/23  0804 10/09/23  0427   NA  --   --  138  --   --  137  --  139  --  141   POTASSIUM  --   --  4.1  --   --  4.2  --  4.2  --  4.2   CHLORIDE  --   --  102  --   --  101  --  103  --  103   CO2  --   --  26  --   --  28  --  27  --  28   ANIONGAP  --   --  10  --   --  8  --  9  --  10   * 101* 110* 131*   < > 124*   < > 112*   < > 100*   BUN  --   --  16.4  --   --  16.6  --  15.1  --  17.2   CR  --   --  0.48*  --   --  0.46*  --  0.43*  --  0.43*   GFRESTIMATED  --   --  >90  --   --  >90  --  >90  --  >90   LULU  --   --  8.1*  --   --  7.9*  --   "8.1*  --  7.9*   MAG  --   --  2.1  --   --  2.1  --  2.2  --  2.2   PHOS  --   --  4.0  --   --  4.2  --  4.3  --  3.4    < > = values in this interval not displayed.     CBC  Recent Labs   Lab 10/12/23  0452 10/11/23  0441 10/10/23  0633 10/09/23  0427   WBC 9.9 9.4 6.8 6.9   RBC 3.13* 3.23* 3.32* 3.31*   HGB 7.8* 7.9* 8.1* 8.3*   HCT 25.8* 26.6* 27.1* 27.5*   MCV 82 82 82 83   MCH 24.9* 24.5* 24.4* 25.1*   MCHC 30.2* 29.7* 29.9* 30.2*   RDW 20.7* 20.5* 20.6* 21.0*    349 331 305     INRNo lab results found in last 7 days.  Arterial Blood Gas  Recent Labs   Lab 10/08/23  0908   O2PER 30     Inflammatory Markers  No lab results found.  Immune Globulin Studies  No lab results found.  Microbiology:  No results found for: \"CULT\"  No lab results found.  Urine Studies:    Recent Labs   Lab Test 09/28/23  1606 03/10/23  0932 03/09/23  0224   LEUKEST Moderate* Large* Large*   NITRITE Positive* Positive* Positive*   WBCU 18* >182* >182*   RBCU <1 2 4*         "

## 2023-10-12 NOTE — PLAN OF CARE
Neuro: RASS -2/-3, Opens eyes to repeated stimulation and occasionally to voice. No evidence of tracking and no command following. PERRL. Withdraws to pain in all extremities. Grimaces with suction and coughs with stimulation. Seizure activity noted on EEG, NP neurology ordered once Lorazepam 2 mg and Clobazam 20 mg.  CV: SR  Pulmonary:LSC, moderate oral and inline secretion.  GI/: TF at goal, Pure wick in place with adequate UOP.  Line: L PICC x3, PIV  Plan: Trach on Monday 10/ 16

## 2023-10-12 NOTE — PROGRESS NOTES
[facility] ICU RESPIRATORY NOTE        Date of Admission: 9/28/2023    Date of Intubation (most recent): 09/29/2023    Reason for Mechanical Ventilation: Airway protection    Number of Days on Mechanical Ventilation: 13    Met Criteria for Spontaneous Breathing Trial: Yes     Significant Events Today: PSV today on 10/5 30% since 1100 and remains on those settings at this time.  Will try to go for 8 hours again today.     ABG Results:   Recent Labs   Lab 10/08/23  0908   O2PER 30         Current Vent Settings: Changed to CPAP with PS 10/5 30% at 1100.     Skin Assessment: Skin integrity good and intact.     Plan: Continue to wean patient. Patient might get a trach on Monday.     Rosenda Melo RT on 10/12/2023 at 5:13 PM

## 2023-10-13 NOTE — PROGRESS NOTES
Major events:  Pt appeared to have seizure like activity this afternoon, EEG was currently off so no capture of activity, PRN ativan given and seizure like activity stopped. PA updated.    Neuro: RASS -4, Pupils are brisk,round, reactive, opens eyes to repeated vigorous stimulus, pt does not follow commands but does withdraw to pain. Coughs with stimulation from suction.     CV: tele SR,    Resp: LS coarse at times otherwise clear/diminished, vent pressure supported during the day 10/5 Fio2 of 30.     GI: BS active,held bowel meds this morning as night shift reported multiple loose stools. No stool this shift. On TF at goal rate of 30mL/hr.     : purewick in place.    Skin: trace edema,  coccyx has open wound- cream applied after each time performing pericare.    Additional: Trach/peg planned for Monday.

## 2023-10-13 NOTE — PROGRESS NOTES
[facility] ICU RESPIRATORY NOTE        Date of Admission: 9/28/2023    Date of Intubation (most recent): 9/29/2023    Reason for Mechanical Ventilation: Airway Protection    Number of Days on Mechanical Ventilation: 15    Met Criteria for Spontaneous Breathing Trial: Yes - Yes    Significant Events Today: On PS 10/5 since 8:40    ABG Results:   Recent Labs   Lab 10/08/23  0908   O2PER 30         Current Vent Settings: Vent Mode: CPAP/PS  (Continuous positive airway pressure with Pressure Support)  FiO2 (%): 30 %  Resp Rate (Set): 12 breaths/min  Tidal Volume (Set, mL): 400 mL  PEEP (cm H2O): 5 cmH2O  Pressure Support (cm H2O): 10 cmH2O  Resp: 17      Skin Assessment: Intact    Plan: will  be placed back on full support to get rest at night.    RT Bethany on 10/13/2023 at 6:12 PM

## 2023-10-13 NOTE — PROGRESS NOTES
Thoracic Surgery:    Consulted for tracheostomy    Patient is a 68-yr-old female admitted 9/29/21 and intubated since then    No next of kin/guardian/legal decision maker not able to be identified.   Ethics has been involved. D/W Dr. Muñiz and guidance is to pursue restorative care at this point.     Patient is thin, with no neck incisions, trachea palpable and midline  Not answering questions/prompts for me    Fi02 30%  PEEP 5    On subcutaneous heparin Q 8 hours    Patient will be added to Dr. Michael Benites' surgery schedule for Monday, Oct. 16. Hold subcutaneous heparin as per Gen Surg. NPO midnight prior. Time to de determined. Note that Gen Surg has been consulted for PEG placement and our offices will coordinate timing.     Chrissy Lyon PA-C with Dr. Michael Benites  [facility]  Cell [phone]

## 2023-10-13 NOTE — PLAN OF CARE
Neuro: Patient can open eyes spontaneously. Withdraws to pain. EEG currently running. Does not follow commands.  Cardiac: NSR, BP WNL  Resp: Full vent support at night   GI/: voids incontinently, loose BMs  Skin: large tear. No mepilex only barrier cream     Plan: social work, hygiene break from EEG, trach/peg  Monday

## 2023-10-13 NOTE — CONSULTS
General Surgery Consultation    Kortney Germain MRN#: [id number]   Age: 68 year old YOB: 1955     The surgical service was consulted by Carl Rossi MD to evaluate and/or treat this patient for gastrostomy tube placement .    Date of Admission:          9/28/2023       Assessment:   68 year old female with PMH of schizophrenia who presented on 9/28/2023 back pain and fever. Subsequently developed toxic encephalopathy, with ddx including medications vs status epilepticus vs serotonin syndrome vs malignant catatonia. Ethics involved and recommending restorative cares. ICU team is now planning for tracheostomy and gastrostomy tube placement. Thoracic surgery is planning tracheostomy on Monday 10/16. General surgery was consulted for evaluation for gastrostomy tube placement. Patient does not appear to have any abdominal surgeries in our records and there are no obvious incisions on physical exam. On review of CT abdomen from 9/28, patient has a large liver as well as colon overlying the bowel. For this reason, it may be safer to proceed with laparoscopic gastrostomy tube. Defer to thoracic surgery for timing of tracheostomy, we would likely plan to proceed with gastrostomy tube placement next week, with timing to be determined.         Plan:   -- Plan for gastrostomy tube placement next week, percutaneous vs laparoscopic, with final date and time to be determined   ___________________________________________________________________         Chief Complaint:     Chief Complaint   Patient presents with    Back Pain          History of Present Illness:   Patient unable to provide history secondary to her medical condition.         Past Medical History:     Past Medical History:   Diagnosis Date    Allergic state     Depressive disorder     Dyskinesia     Hypertension     Schizo affective schizophrenia (H)           Past Surgical History:   No past surgical history on file.Patient unable to provide  history secondary to her medical condition.         Medications:     Prior to Admission medications    Medication Sig Start Date End Date Taking? Authorizing Provider   acetaminophen (TYLENOL) 500 MG tablet Take 1,000 mg by mouth 3 times daily 0800 / 1400 / 2000   Yes Unknown, Entered By History   albuterol (PROVENTIL) (2.5 MG/3ML) 0.083% neb solution Take 2.5 mg by nebulization every 4 hours as needed for shortness of breath, wheezing or cough   Yes Unknown, Entered By History   alendronate (FOSAMAX) 70 MG tablet Take 70 mg by mouth every 7 days Thursdays at 0700   Yes Unknown, Entered By History   cholecalciferol 50 MCG (2000 UT) tablet Take 1 tablet by mouth daily 0800   Yes Unknown, Entered By History   cyclobenzaprine (FLEXERIL) 5 MG tablet Take 5 mg by mouth 3 times daily as needed for muscle spasms   Yes Unknown, Entered By History   diphenhydrAMINE (BENADRYL) 50 MG capsule Take 50 mg by mouth 3 times daily 0800 / 1500 / 2000   Yes Unknown, Entered By History   escitalopram (LEXAPRO) 20 MG tablet Take 20 mg by mouth daily 0800   Yes Unknown, Entered By History   LORazepam (ATIVAN) 0.5 MG tablet Take 0.5 mg by mouth 2 times daily 0800/1400   Yes Unknown, Entered By History   LORazepam (ATIVAN) 0.5 MG tablet Take 0.25 mg by mouth At Bedtime 2000   Yes Unknown, Entered By History   multivitamin w/minerals (THERA-VIT-M) tablet Take 1 tablet by mouth daily 0800   Yes Unknown, Entered By History   muscle rub (ARTHRITIS HOT) 10-15 % CREA Apply topically 4 times daily Back pain - 0700/1100/1500/2000   Yes Unknown, Entered By History   NIFEdipine ER (ADALAT CC) 60 MG 24 hr tablet Take 60 mg by mouth daily   Yes Unknown, Entered By History   perphenazine 4 MG tablet Take 4 mg by mouth every morning   Yes Unknown, Entered By History   perphenazine 8 MG tablet Take 8 mg by mouth At Bedtime   Yes Unknown, Entered By History          Current Medications:          chlorhexidine  15 mL Mouth/Throat Q12H    clobazam  20 mg  Oral or Feeding Tube BID    fiber modular  1 packet Per Feeding Tube Daily    fosphenytoin (CEREBYX) 200 mg PE in sodium chloride 0.9 % 100 mL intermittent infusion  200 mg PE Intravenous At Bedtime    heparin ANTICOAGULANT  5,000 Units Subcutaneous Q8H    insulin aspart  1-6 Units Subcutaneous Q4H    lacosamide (VIMPAT) 200 mg in sodium chloride 0.9 % 110 mL intermittent infusion  200 mg Intravenous BID    [Held by provider] LORazepam  0.25 mg Oral At Bedtime    [Held by provider] LORazepam  0.5 mg Oral BID    multivitamins w/minerals  15 mL Oral or Feeding Tube Daily    NIFEdipine  10 mg Oral or Feeding Tube Q6H    pantoprazole  40 mg Oral or Feeding Tube QAM AC    Or    pantoprazole  40 mg Intravenous QAM AC    [Held by provider] perphenazine  4 mg Oral QAM    [Held by provider] perphenazine  8 mg Oral At Bedtime    polyethylene glycol  17 g Oral or Feeding Tube BID    protein modular  1 packet Per Feeding Tube Daily    sodium chloride (PF)  10-40 mL Intracatheter Q7 Days    sodium chloride (PF)  3 mL Intracatheter Q8H     acetaminophen **OR** acetaminophen, albuterol, [Held by provider] cyclobenzaprine, dextrose, glucose **OR** dextrose **OR** glucagon, HYDROmorphone, lidocaine 4%, lidocaine (buffered or not buffered), LORazepam, melatonin, naloxone **OR** naloxone **OR** naloxone **OR** naloxone, nitroGLYcerin, sodium chloride (PF), sodium chloride (PF), sodium chloride (PF), sodium chloride (PF)         Allergies:     Allergies   Allergen Reactions    Amlodipine     Clozapine     Egg [Chicken-Derived Products (Egg)]     Penicillins     Potassium     Poultry Meal           Social History:     Social History     Tobacco Use    Smoking status: Never    Smokeless tobacco: Never   Substance Use Topics    Alcohol use: No          Family History:   No family history on file.          Review of Systems:   Patient unable to provide history secondary to her medical condition.         Physical Exam:   Blood pressure  129/65, pulse 71, temperature 99.7  F (37.6  C), temperature source Axillary, resp. rate 16, weight 49.9 kg (110 lb 0.2 oz), SpO2 97%.  I/O last 3 completed shifts:  In: 1903.83 [I.V.:543.83; NG/GT:700]  Out: 500 [Urine:500]  General: female of stated age, no acute distress  HEENT: Normocephalic. Anecteric. Moist mucous membranes.   Neck: Supple without masses or lymphadenopathy  Lungs: intubated, mechanically ventilated   Heart: Regular rate & rhythm   Abdomen: Soft, non-distended, no obvious surgical scars  Extremities: No edema  Neurologic: Does not wake up or follow commands  Skin: Warm and dry         Data:   Labs:  Recent Labs   Lab Test 10/13/23  0522 10/12/23  0452 10/11/23  0441   WBC 10.1 9.9 9.4   HGB 8.1* 7.8* 7.9*   HCT 26.6* 25.8* 26.6*   * 405 349     Recent Labs   Lab Test 10/13/23  1215 10/13/23  0803 10/13/23  0522 10/12/23  0454 10/12/23  0452 10/11/23  0444 10/11/23  0441   POTASSIUM  --   --  4.2  --  4.1  --  4.2   CHLORIDE  --   --  104  --  102  --  101   CO2  --   --  25  --  26  --  28   BUN  --   --  17.2  --  16.4  --  16.6   CR  --   --  0.44*  --  0.48*  --  0.46*   * 111* 109*   < > 110*   < > 124*    < > = values in this interval not displayed.     Recent Labs   Lab Test 09/30/23  0515 09/29/23  0518 09/28/23  1244 03/06/23  1413   BILITOTAL 0.2 0.2 <0.2 <0.2   DBIL  --  <0.20  --   --    ALT 20 16 15 21   AST 27 22 14 39*   ALKPHOS 66 77 63 83   LIPASE  --   --   --  22     Recent Labs   Lab Test 09/28/23  1244 03/07/23  1629   INR 1.04 1.15   PTT 25  --        CT scan of the abdomen 9/28/2023:   IMPRESSION:   1.  Enlarged uterus with multiple fibroids.     2.  Rectum is distended with stool.     3.  Urinary bladder is distended.    Thank you very much for this consult.     Time spent: 90 minutes total time spent on the date of this encounter doing: chart review, review of test results, patient visit/obtaining a history, physical exam, education, counseling, developing  plan of care, and documenting.     I have discussed the history, physical, and plan with Dr. Cortez, who has independently interviewed and examined the patient and agrees with the plan as stated.     Michael Comer MD on 10/13/2023 at 1:42 PM  General Surgery Resident [id number]

## 2023-10-13 NOTE — PROGRESS NOTES
[facility] Progress Note    Interval Events  - SBPs 105-160, HR 59-76, tmax 99   - increasing activity on EEG yesterday; clobazam increased to 20 mg BID  - EEG day 13: Abnormal due to the presence of continuous generalized delta and theta slowing consistent with a moderate encephalopathy.  There are epileptiform discharges in the left temporoparietal region, right temporoparietal region, right frontal, and generalized discharges are suggestive of multifocal and generalized cortical irritability with increased risk for seizure potentials.  On this day patient had waxing and waning pattern concerning for subclinical seizure activity consisting of right temporal lobe rhythmic delta activity with superimposed spike-wave complexes up to 2 Hz concerning for subclinical seizure activity.  Additionally at time 12: 38 she had an episode of rhythmic shoulder twitching correlated with this rhythmic pattern identified intermittently throughout the file.  Clinical correlation is advised.     - WBC 10.1, trending up  - phenytoin trough 10/12: 13.8    HPI Summary  Kortney Germain is a 68 year old female with PMHx significant for anxiety/depression, schizoaffective disorder (living in group home), HTN, odynophagia, tardive dyskinesia, HTN. She presented 9/28 with lower back pain and abdominal pain. She was found to have UTI, anemia (received 1 unit PRBC), and constipation. She had decreased LOC and generalized shaking overnight on 9/28. RRT called and pt was intubated on 9/29/23 for encephalopathy and concern that should would be unable to protect her airway. Initial EEG was concerning for status epilepticus for which she was started on AEDs. She has remained intubated and encephalopathic since admission.     Evaluation Summarized  EEG 09/29/23: Abnormal due to the presences of generalized periodic pattern consisting of 2-3.5 hz discharges, maximum negativity in bifrontal region, periodic  pattern occupy 90% of the record and the remaining portion consist of delta slowing. These VEEG findings are consistent with a severe encephalopathy and status epilepticus.  However it is important to note patient has persistent head tremoring which may also produce a rhythmic artifact on the EEG and there is persistent electro myogenic artifact making this 1 hour segment difficult to interpret.  It would be helpful to record more video EEG data and optimize current seizure medications to evaluate if there is changes in the EEG.  Based on the limited data that is interpretable on this 1 hour file EEG is concerning for status epilepticus.      CT Head 9/29: no acute intracranial pathology, brain atrophy, CSVID    MRI 9/30/23: no acute intracranial process, generalized atrophy, CSVID  MRI 10/11/23: no acute intracranial process, no hemorrhage, moderate-advanced brain atrophy and leukoaraiosis, CSVID     BC: NG  CSF analysis   -glucose 98  -protein 22.8  -2 nucleated cells, RBC 0  -aerobic culture GPC in broth only   -anaerobic culture NGTD  - HSV negative    Impression   Status epilepticus, EEG improved on 3 AEDs and sedative drips - uncertain etiology of NCSE. Will likely take some time to awaken given duration of NCSE and high dose of AEDs started. Decreased doses of all three AEDs on 10/9 to maximize her chances of regaining consciousness since we are reaching a point where she would need trach/PEG if unable to extubate. Unfortunately, she had increasing EEG activity on lower doses of AEDs.      Shaking/tremors, unclear etiology. May represent baseline tardive dyskinesia vs manifestation of status epilepticus vs toxic encephalopathy vs other     Plan  - dilantin trough level tonight (ordered)  - increase lacosamide to 200 mg BID  - continue fosphenytoin 200 mg at bedtime  - continue clobazam 20 mg BID  - cEEG, hygiene break today   - limit dopaminergic/serotonergic agents   - [place] paraneoplastic panel (10/3) in  "process    Patient Follow-up    - final recommendation pending work-up    We will continue to follow.     Ana Chilel NP  Vascular Neurology    To page me or covering stroke neurology team member, click here: [facility]  Choose \"On Call\" tab at top, then select \"NEUROLOGY/ALL SITES\" from middle drop-down box, press Enter, then look for \"stroke\" or \"telestroke\" for your site.  ______________________________________________________    Clinically Significant Risk Factors              # Hypoalbuminemia: Lowest albumin = 3.4 g/dL at 9/30/2023  5:15 AM, will monitor as appropriate     # Hypertension: Noted on problem list                     Medications   Scheduled Meds   chlorhexidine  15 mL Mouth/Throat Q12H    clobazam  20 mg Oral or Feeding Tube BID    diphenhydrAMINE  50 mg Oral or Feeding Tube BID    fiber modular  1 packet Per Feeding Tube Daily    fosphenytoin (CEREBYX) 200 mg PE in sodium chloride 0.9 % 100 mL intermittent infusion  200 mg PE Intravenous At Bedtime    heparin ANTICOAGULANT  5,000 Units Subcutaneous Q8H    insulin aspart  1-6 Units Subcutaneous Q4H    lacosamide (VIMPAT) 150 mg in sodium chloride 0.9 % 110 mL intermittent infusion  150 mg Intravenous BID    [Held by provider] LORazepam  0.25 mg Oral At Bedtime    [Held by provider] LORazepam  0.5 mg Oral BID    multivitamins w/minerals  15 mL Oral or Feeding Tube Daily    NIFEdipine  10 mg Oral Q6H    pantoprazole  40 mg Oral or Feeding Tube QAM AC    Or    pantoprazole  40 mg Intravenous QAM AC    [Held by provider] perphenazine  4 mg Oral QAM    [Held by provider] perphenazine  8 mg Oral At Bedtime    polyethylene glycol  17 g Oral or Feeding Tube BID    protein modular  1 packet Per Feeding Tube Daily    sodium chloride (PF)  10-40 mL Intracatheter Q7 Days    sodium chloride (PF)  3 mL Intracatheter Q8H       Infusion Meds   dextrose      sodium chloride 20 mL/hr at 10/13/23 0007       PRN Meds  acetaminophen **OR** acetaminophen, albuterol, " "[Held by provider] cyclobenzaprine, dextrose, glucose **OR** dextrose **OR** glucagon, HYDROmorphone, lidocaine 4%, lidocaine (buffered or not buffered), LORazepam, melatonin, naloxone **OR** naloxone **OR** naloxone **OR** naloxone, nitroGLYcerin, sodium chloride (PF), sodium chloride (PF), sodium chloride (PF), sodium chloride (PF)       PHYSICAL EXAMINATION  Temp:  [97.9  F (36.6  C)-99  F (37.2  C)] 99  F (37.2  C)  Pulse:  [59-76] 73  Resp:  [11-18] 11  BP: (105-160)/(54-81) 154/81  FiO2 (%):  [30 %] 30 %  SpO2:  [98 %-100 %] 100 %      Neurologic  Mental Status:  obtunded - opens eyes to noxious stimuli but does not attend to/track examiner, not following commands  Cranial Nerves:  midline gaze, blinks to threat bilaterally, PERRL  Motor:  no abnormal movements, no spontaneous movements noted  Sensory:   withdraws from noxious stimuli in all limbs     Imaging  I personally reviewed all imaging; relevant findings per HPI.     Lab Results Data   CBC  Recent Labs   Lab 10/13/23  0522 10/12/23  0452 10/11/23  0441   WBC 10.1 9.9 9.4   RBC 3.21* 3.13* 3.23*   HGB 8.1* 7.8* 7.9*   HCT 26.6* 25.8* 26.6*   * 405 349     Basic Metabolic Panel    Recent Labs   Lab 10/13/23  0803 10/13/23  0522 10/13/23  0521 10/12/23  0454 10/12/23  0452 10/11/23  0444 10/11/23  0441   NA  --  139  --   --  138  --  137   POTASSIUM  --  4.2  --   --  4.1  --  4.2   CHLORIDE  --  104  --   --  102  --  101   CO2  --  25  --   --  26  --  28   BUN  --  17.2  --   --  16.4  --  16.6   CR  --  0.44*  --   --  0.48*  --  0.46*   * 109* 107*   < > 110*   < > 124*   LULU  --  7.9*  --   --  8.1*  --  7.9*    < > = values in this interval not displayed.     Liver Panel  No results for input(s): \"PROTTOTAL\", \"ALBUMIN\", \"BILITOTAL\", \"ALKPHOS\", \"AST\", \"ALT\", \"BILIDIRECT\" in the last 168 hours.  INR    Recent Labs   Lab Test 09/28/23  1244 03/07/23  1629   INR 1.04 1.15      Lipid Profile    Recent Labs   Lab Test 10/03/23  0956 " "03/08/23  0830   CHOL  --  143   HDL  --  50   LDL  --  75   TRIG 1,372* 88     A1C  No lab results found.  Troponin  No results for input(s): \"CTROPT\", \"TROPONINIS\", \"TROPONINI\", \"GHTROP\" in the last 168 hours.       Data   I personally examined and evaluated the patient today. At the time of my evaluation and management the patient was in critical condition today due to seizures. I personally managed review of labs and images and neuro exam. Key decisions made today included: adjust AEDs. I spent a total of 30 minutes providing critical care services, evaluating the patient, directing care and reviewing laboratory values and radiologic reports.   "

## 2023-10-13 NOTE — PROGRESS NOTES
Critical Care Progress Note  [facility]  Kortney Germain MRN# [id number]   Age: 68 year old YOB: 1955     Date of Admission: 9/28/2023  Date of Service: 10/13/2023    Main Plans for Today   Plans to given patient till 10/16 to wake up  Thoracic surgery / general surgery consulted for Trach / PEG planning for 10/16  Clobazam increased per neuro overnight  Continue daily assessments  Assessment & Plan    68F pmh of schizo-affective schizophrenia, HTN, tardive dyskinesia now presented 9/28 from group home with back pain and fever, Ct abd showed rectum distended with stool and urinary bladder distended, now s/p quinonez placement.  Arrival UA c/w UTI. Since arrival from the ED she has been alert but not generally interactive (?part of this may be due to underlying psychiatric illness?). Morning of 9/29 she was noted to be febrile and shaking prompting intubation to facilitate head CT and LP.  Placed on heavy sedation including benzodiazepine and propofol.  EEG is not showing seizures since then but has shown evidence of potential new seizure foci.  And has been to continue heavy sedation.  Evening of 10/3 triglycerides returned critically high level. Gave 1 dose of Ativan shut off the propofol  After adding additional doses of as needed benzodiazepines and increasing the goal range of the midazolam. Did have more seizure-like activity which resolved with increasing benzodiazepines.    - Weaned off benzos 10/6.   - Cut AEDs in half 10/9.   Currently giving time for patient's mentation to improve given this is hindering extubation and getting guardianship process started.    Neurological/Psychological    **Sedation: none    Toxicmetabolic Encephalopathy: unclear if from meds vs malignant catatonia, no evidence of anatomic etiology  Underlying schizoaffective schizophrenia and h/o tardive dyskinesia:   Status epilepticus. Considered serotonin syndrome but less likely now.  Psychiatrist also wondering  about malignant catatonia.  Is on 3 antiseizure agents.    ** MRI (10/11/23): no acute pathology to explain persistent encephalopathy              - AEDs  clobazam 15 mg BID, fosphenytoin 200 mg at night, lacosamide 150 mg BID.               - [place] paraneoplastic panel pending              - ammonia level 14 10/9/2023   - MRI 10/11/2023 no acute abnormalities    Decision making: no formal guardian or decision maker. If mental status does not improve, it may not be safe to extubate and patient would need tracheostomy. Without decision maker or ability to ask patient, ethics consulted to assist with next steps. Only mental status is hindering extubation.               - ethics consult  -- Any major decisions are required and unable to find a surrogate decision-maker will ensure that at least 2 physicians agree with the plan of care.     From SW note  Additional Information:  Attempting to search for NOK.  Sent referral to [employer] to inquire as to whether they had any additional information than we have been able to find.  The information that has been given is that patient has been committed 7 times since 1983 with the last 3 being done by human services or a hospital.  Her father passed away in 2022 and two other family members from the 1990's have also passed away.  Sent an email to management for further direction.    Cardiovascular    Hypertension:  restarted Nifedipine at home.  Blood pressures are higher as were weaning sedation    Pulmonary    Loss of protective airway reflexes:  intubated/vented.  Inappropriate for extubation today. Due to mentation. Does well on pressure support  - Daily pressure support mode for conditioning  - Awaiting mental status to improve for extubation  - intubated since 9/29/23     Tracheostomy Plans: given mentation is the only hindrance to extubation, after discussion with neuro, we will wait 21 days given our last changes were 10/9 to proceed with tracheostomy  if patient has not been extubated by that point. In the interim we will assess daily    Renal/Fluids/Electrolytes    Significant positive fluid balance.  Received 1 dose diuretic with good response and now is auto diuresing.  -Address as necessary.  -Reduce Free water    Misc Plans  - monitor function and electrolytes, replacement per ICU protocols.   - generally avoid nephrotoxic agents such as NSAID, IV contrast unless specifically required  - adjust medications as needed for renal clearance  - follow I/O's as appropriate.    **I/O last 24hrs:   In: 1903.83 [NG/GT:700; I.V.:543.83]  Out: 500 [Urine:500]    Infectious Diseases    Sirs and possible UTI--on ctx and vanco, UC with mixed christina but >100k colonies.   Finished Ceftx course     Gastrointestinal/Genitourinary    No acute issues  - Tube feeds and PPI    **Nutrition: Diet: NPO for Medical/Clinical Reasons Except for: No Exceptions  Adult Formula Drip Feeding: Continuous Osmolite 1.5; Nasogastric tube; Goal Rate: 30; mL/hr; Increase TF to 30 mL/hr; Do not advance tube feeding rate unless K+ is = or > 3.0, Mg++ is = or > 1.5, and Phos is = or > 1.9        Endocrine/Metabolic    Stress induced hyperglycemia.  Plan  - ICU insulin protocol, goal sugar <180    Hematology/Oncology    Anemia of chronic illness: s/p prbc 9/29, no apparent blood loss    Rheumatology    no acute issues    Skin/Musculoskeletal    no acute issues    ------------------------------------------------------------------------------------------------------------------  Clinically Significant Risk Factors              # Hypoalbuminemia: Lowest albumin = 3.4 g/dL at 9/30/2023  5:15 AM, will monitor as appropriate       # Hypertension: Noted on problem list                        ------------------------------------------------------------------------------------------------------------------  Prophylaxis/ICU Checklist    -DVT: Subcutaneous Heparin    -VAP: HOB 30 degrees, chlorhexidine rinse     -Stress Ulcer: PPI    -Restraints: Nonviolent soft two point restraints required and necessary for patient safety and continued cares and good effect as patient continues to pull at necessary lines, tubes despite education and distraction. Will readdress daily.     -IV Access: Central access required and necessary for continued patient cares     -Feeding - Diet: NPO for Medical/Clinical Reasons Except for: No Exceptions  Adult Formula Drip Feeding: Continuous Osmolite 1.5; Nasogastric tube; Goal Rate: 30; mL/hr; Increase TF to 30 mL/hr; Do not advance tube feeding rate unless K+ is = or > 3.0, Mg++ is = or > 1.5, and Phos is = or > 1.9        Consults: ethics, MH, neuro  Family: none  Code Status: Full Code      Disposition: guarded    I have personally reviewed the daily labs, imaging studies, cultures and discussed the case with referring physician and consulting physicians.   I personally managed the respiratory support devices, hemodynamics, sedation, analgesia, metabolic abnormalities and nutritional status.      This patient is critically ill and I have provided 40 minutes of critical care time (excluding procedures) on 10/13/2023    =========================================================  Carl Muñiz MD  Pulmonary & Critical Care   [phone] (Text Page)   Interval History   Open eyes spontaneously, 8 hr of pressure support, afebrile  Clobazam dose increased  Medications   Current Facility-Administered Medications   Medication Dose Route Frequency    chlorhexidine  15 mL Mouth/Throat Q12H    clobazam  20 mg Oral or Feeding Tube BID    fiber modular  1 packet Per Feeding Tube Daily    fosphenytoin (CEREBYX) 200 mg PE in sodium chloride 0.9 % 100 mL intermittent infusion  200 mg PE Intravenous At Bedtime    heparin ANTICOAGULANT  5,000 Units Subcutaneous Q8H    insulin aspart  1-6 Units Subcutaneous Q4H    lacosamide (VIMPAT) 200 mg in sodium chloride 0.9 % 110 mL intermittent infusion  200 mg  Intravenous BID    [Held by provider] LORazepam  0.25 mg Oral At Bedtime    [Held by provider] LORazepam  0.5 mg Oral BID    multivitamins w/minerals  15 mL Oral or Feeding Tube Daily    NIFEdipine  10 mg Oral or Feeding Tube Q6H    pantoprazole  40 mg Oral or Feeding Tube QAM AC    Or    pantoprazole  40 mg Intravenous QAM AC    [Held by provider] perphenazine  4 mg Oral QAM    [Held by provider] perphenazine  8 mg Oral At Bedtime    polyethylene glycol  17 g Oral or Feeding Tube BID    protein modular  1 packet Per Feeding Tube Daily    sodium chloride (PF)  10-40 mL Intracatheter Q7 Days    sodium chloride (PF)  3 mL Intracatheter Q8H     Current Facility-Administered Medications   Medication Last Rate    dextrose      sodium chloride 20 mL/hr at 10/13/23 0007     Physical Exam   Vital Signs with Ranges  Temp:  [97.9  F (36.6  C)-99  F (37.2  C)] 99  F (37.2  C)  Pulse:  [59-82] 71  Resp:  [5-28] 5  BP: (105-160)/(54-86) 119/59  FiO2 (%):  [30 %] 30 %  SpO2:  [97 %-100 %] 97 %    Wt Readings from Last 1 Encounters:   10/13/23 49.9 kg (110 lb 0.2 oz)    Body mass index is 21.48 kg/m .     BP - Mean:  [] 83  Vent Mode: CPAP/PS  (Continuous positive airway pressure with Pressure Support)  FiO2 (%): 30 %  Resp Rate (Set): 12 breaths/min  Tidal Volume (Set, mL): 400 mL  PEEP (cm H2O): 5 cmH2O  Pressure Support (cm H2O): 10 cmH2O  Resp: (!) 5    General: Stated age intubated sedated  HEENT: EEG leads ET tube feeding tube  Lungs: Synchronous with ventilator  CVS: Regular rate rhythm  Abdomen: Grossly normal  Extremities/musculoskeletal: No edema  Neurology: Not expansive to voice today.  Skin: Grossly normal  Psychiatry: Unable  Exam of Line sites:  PICC     I/O last 3 completed shifts:  In: 1903.83 [I.V.:543.83; NG/GT:700]  Out: 500 [Urine:500]  Data   Labs & Studies of Note: I personally reviewed the following studies:    Imaging: All new imaging reviewed by me  No results found for this or any previous visit  "(from the past 24 hour(s)).    ROUTINE ICU LABS (Last four results)  ROUTINE ICU LABS (Last four results)  CMP  Recent Labs   Lab 10/13/23  0803 10/13/23  0522 10/13/23  0521 10/13/23  0027 10/12/23  0454 10/12/23  0452 10/11/23  0444 10/11/23  0441 10/10/23  0847 10/10/23  0633   NA  --  139  --   --   --  138  --  137  --  139   POTASSIUM  --  4.2  --   --   --  4.1  --  4.2  --  4.2   CHLORIDE  --  104  --   --   --  102  --  101  --  103   CO2  --  25  --   --   --  26  --  28  --  27   ANIONGAP  --  10  --   --   --  10  --  8  --  9   * 109* 107* 100*   < > 110*   < > 124*   < > 112*   BUN  --  17.2  --   --   --  16.4  --  16.6  --  15.1   CR  --  0.44*  --   --   --  0.48*  --  0.46*  --  0.43*   GFRESTIMATED  --  >90  --   --   --  >90  --  >90  --  >90   LULU  --  7.9*  --   --   --  8.1*  --  7.9*  --  8.1*   MAG  --  2.1  --   --   --  2.1  --  2.1  --  2.2   PHOS  --  3.9  --   --   --  4.0  --  4.2  --  4.3    < > = values in this interval not displayed.     CBC  Recent Labs   Lab 10/13/23  0522 10/12/23  0452 10/11/23  0441 10/10/23  0633   WBC 10.1 9.9 9.4 6.8   RBC 3.21* 3.13* 3.23* 3.32*   HGB 8.1* 7.8* 7.9* 8.1*   HCT 26.6* 25.8* 26.6* 27.1*   MCV 83 82 82 82   MCH 25.2* 24.9* 24.5* 24.4*   MCHC 30.5* 30.2* 29.7* 29.9*   RDW 20.7* 20.7* 20.5* 20.6*   * 405 349 331     INRNo lab results found in last 7 days.  Arterial Blood Gas  Recent Labs   Lab 10/08/23  0908   O2PER 30     Inflammatory Markers  No lab results found.  Immune Globulin Studies  No lab results found.  Microbiology:  No results found for: \"CULT\"  No lab results found.  Urine Studies:    Recent Labs   Lab Test 09/28/23  1606 03/10/23  0932 03/09/23  0224   LEUKEST Moderate* Large* Large*   NITRITE Positive* Positive* Positive*   WBCU 18* >182* >182*   RBCU <1 2 4*         "

## 2023-10-13 NOTE — PROGRESS NOTES
[facility] ICU RESPIRATORY NOTE        Date of Admission: 9/28/2023    Date of Intubation (most recent): 9/29/2023    Reason for Mechanical Ventilation: Airway protection    Number of Days on Mechanical Ventilation: 14    Met Criteria for Spontaneous Breathing Trial: Yes    Significant Events Today: Pt PS on 10/5 since 1100 and placed back on full vent support @ 2000. For Total of 8 Hr on PS.    ABG Results:   Recent Labs   Lab 10/08/23  0908   O2PER 30         Current Vent Settings: Vent Mode: CMV/AC  (Continuous Mandatory Ventilation/ Assist Control)  FiO2 (%): 30 %  Resp Rate (Set): 12 breaths/min  Tidal Volume (Set, mL): 400 mL  PEEP (cm H2O): 5 cmH2O  Pressure Support (cm H2O): 10 cmH2O  Resp: 12      Skin Assessment: Clean, dry, intact    Plan: Trach on Monday.    RT Nuris on 10/13/2023 at 4:21 AM

## 2023-10-14 NOTE — PROGRESS NOTES
[facility] ICU RESPIRATORY NOTE           Date of Admission: 9/28/2023     Date of Intubation (most recent): 9/29/2023     Reason for Mechanical Ventilation: Airway Protection     Number of Days on Mechanical Ventilation: 15     Met Criteria for Spontaneous Breathing Trial: Yes - Yes     Significant Events Today: Full support overnight     ABG Results:   Recent Labs   Lab 10/08/23  0908   O2PER 30     Vent Mode: CMV/AC  (Continuous Mandatory Ventilation/ Assist Control)  FiO2 (%): 30 %  Resp Rate (Set): 12 breaths/min  Tidal Volume (Set, mL): 400 mL  PEEP (cm H2O): 5 cmH2O  Pressure Support (cm H2O): 10 cmH2O  Resp: 12    NIKA Martinez, RRT

## 2023-10-14 NOTE — PROGRESS NOTES
Preliminary Video EEG impression on October 14 , 2023  Until 13:44        This EEG is abnormal due to the presences of continuous generalized polymorphic delta slowing. There are epileptiform discharges with maximum negativity at  generalized epileptiform discharges and focal epileptiform discharges with maximum negativity was concentrated in electrode  T5, T6, F4, C3-P3, sharp waves are superimposed on rhythmic delta activity.      Patient had tremoring event at 07:51 with head shaking side-to-side related with 2 Hz generalized spike-wave complexes and rhythmic delta activity.  This event is concerning for electro - clinical  seizure.      EEG is consistent with a moderate to severe diffuse encephalopathy with multifocal cortical irritability and electro - clinical  seizure. Clinical correlation is advised.      Koki Gusman MD  Staff Epilepsy Neurologist   [phone]

## 2023-10-14 NOTE — PROGRESS NOTES
[facility] Progress Note    Interval Events   Started back on propofol today due to worsening EEG findings.     - EEG: This EEG is abnormal due to the presences of continuous generalized polymorphic delta slowing. There are epileptiform discharges with maximum negativity at  generalized epileptiform discharges and focal epileptiform discharges with maximum negativity was concentrated in electrode  T5, T6, F4, C3-P3, sharp waves are superimposed on rhythmic delta activity.       Patient had tremoring event at 07:51 with head shaking side-to-side related with 2 Hz generalized spike-wave complexes and rhythmic delta activity.  This event is concerning for electro - clinical  seizure. EEG is consistent with a moderate to severe diffuse encephalopathy with multifocal cortical irritability and electro - clinical  seizure.    HPI Summary  Kortney Germain is a 68 year old female with PMHx significant for anxiety/depression, schizoaffective disorder (living in group home), HTN, odynophagia, tardive dyskinesia, HTN. She presented 9/28 with lower back pain and abdominal pain. She was found to have UTI, anemia (received 1 unit PRBC), and constipation. She had decreased LOC and generalized shaking overnight on 9/28. RRT called and pt was intubated on 9/29/23 for encephalopathy and concern that should would be unable to protect her airway. Initial EEG was concerning for status epilepticus for which she was started on AEDs. She has remained intubated and encephalopathic since admission.     Evaluation Summarized  EEG 09/29/23: Abnormal due to the presences of generalized periodic pattern consisting of 2-3.5 hz discharges, maximum negativity in bifrontal region, periodic pattern occupy 90% of the record and the remaining portion consist of delta slowing. These VEEG findings are consistent with a severe encephalopathy and status epilepticus.  However it is important to note patient has  persistent head tremoring which may also produce a rhythmic artifact on the EEG and there is persistent electro myogenic artifact making this 1 hour segment difficult to interpret.  It would be helpful to record more video EEG data and optimize current seizure medications to evaluate if there is changes in the EEG.  Based on the limited data that is interpretable on this 1 hour file EEG is concerning for status epilepticus.      CT Head 9/29: no acute intracranial pathology, brain atrophy, CSVID    MRI 9/30/23: no acute intracranial process, generalized atrophy, CSVID  MRI 10/11/23: no acute intracranial process, no hemorrhage, moderate-advanced brain atrophy and leukoaraiosis, CSVID     BC: NG  CSF analysis   -glucose 98  -protein 22.8  -2 nucleated cells, RBC 0  -aerobic culture GPC in broth only   -anaerobic culture NGTD  - HSV negative    Impression   Status epilepticus, EEG improved on 3 AEDs and sedative drips - uncertain etiology of NCSE. Will likely take some time to awaken given duration of NCSE and high dose of AEDs started. Decreased doses of all three AEDs on 10/9 to maximize her chances of regaining consciousness since we are reaching a point where she would need trach/PEG if unable to extubate. Unfortunately, she had increasing EEG activity on lower doses of AEDs.      Shaking/tremors, unclear etiology. May represent baseline tardive dyskinesia vs manifestation of status epilepticus vs toxic encephalopathy vs other     Plan  - continue lacosamide to 200 mg BID  - continue fosphenytoin 200 mg at bedtime  - continue clobazam 20 mg BID  - Restart propofol for seizure management   - limit dopaminergic/serotonergic agents   - [place] paraneoplastic panel (10/3) in process    Patient Follow-up    - final recommendation pending work-up    We will continue to follow.     Gricelda Cody, APRN, CNP  Neurology  10/14/2023 8:04 AM  To page stroke neurology after hours or on a subsequent day, click here:  "[facility]  Choose \"On Call\" tab at top, then search dropdown box for \"Neurology Adult\" & press Enter, look for Neuro ICU/Stroke     _____________________________________________________    Clinically Significant Risk Factors              # Hypoalbuminemia: Lowest albumin = 3.4 g/dL at 9/30/2023  5:15 AM, will monitor as appropriate       # Hypertension: Noted on problem list                     Medications   Scheduled Meds   chlorhexidine  15 mL Mouth/Throat Q12H    clobazam  20 mg Oral or Feeding Tube BID    fiber modular  1 packet Per Feeding Tube Daily    fosphenytoin (CEREBYX) 200 mg PE in sodium chloride 0.9 % 100 mL intermittent infusion  200 mg PE Intravenous At Bedtime    heparin ANTICOAGULANT  5,000 Units Subcutaneous Q8H    insulin aspart  1-6 Units Subcutaneous Q4H    lacosamide (VIMPAT) 200 mg in sodium chloride 0.9 % 110 mL intermittent infusion  200 mg Intravenous BID    [Held by provider] LORazepam  0.25 mg Oral At Bedtime    [Held by provider] LORazepam  0.5 mg Oral BID    multivitamins w/minerals  15 mL Oral or Feeding Tube Daily    NIFEdipine  10 mg Oral or Feeding Tube Q6H    pantoprazole  40 mg Oral or Feeding Tube QAM AC    Or    pantoprazole  40 mg Intravenous QAM AC    [Held by provider] perphenazine  4 mg Oral QAM    [Held by provider] perphenazine  8 mg Oral At Bedtime    polyethylene glycol  17 g Oral or Feeding Tube BID    protein modular  1 packet Per Feeding Tube Daily    sodium chloride (PF)  10-40 mL Intracatheter Q7 Days    sodium chloride (PF)  3 mL Intracatheter Q8H       Infusion Meds   dextrose      sodium chloride 20 mL/hr at 10/13/23 0007       PRN Meds  acetaminophen **OR** acetaminophen, albuterol, [Held by provider] cyclobenzaprine, dextrose, glucose **OR** dextrose **OR** glucagon, HYDROmorphone, lidocaine 4%, lidocaine (buffered or not buffered), LORazepam, melatonin, naloxone **OR** naloxone **OR** naloxone **OR** naloxone, nitroGLYcerin, sodium chloride (PF), sodium chloride " "(PF), sodium chloride (PF), sodium chloride (PF)       PHYSICAL EXAMINATION  Temp:  [97.7  F (36.5  C)-99.9  F (37.7  C)] 97.9  F (36.6  C)  Pulse:  [58-82] 66  Resp:  [5-28] 16  BP: (111-165)/(59-86) 145/80  FiO2 (%):  [30 %] 30 %  SpO2:  [97 %-100 %] 100 %      Neurologic  Mental Status:  obtunded - opens eyes to noxious stimuli but does not attend to/track examiner, not following commands  Cranial Nerves:  midline gaze, blinks to threat bilaterally, PERRL  Motor:  no abnormal movements, no spontaneous movements noted  Sensory:   withdraws from noxious stimuli in bilateral arms, flicker of muscle contraction to noxious in LLE, no movement to noxious in RLE    Imaging  I personally reviewed all imaging; relevant findings per HPI.     Lab Results Data   CBC  Recent Labs   Lab 10/14/23  0414 10/13/23  0522 10/12/23  0452   WBC 9.8 10.1 9.9   RBC 3.31* 3.21* 3.13*   HGB 8.2* 8.1* 7.8*   HCT 27.4* 26.6* 25.8*   * 454* 405     Basic Metabolic Panel    Recent Labs   Lab 10/14/23  0418 10/14/23  0414 10/14/23  0020 10/13/23  0803 10/13/23  0522 10/12/23  0454 10/12/23  0452   NA  --  137  --   --  139  --  138   POTASSIUM  --  4.2  --   --  4.2  --  4.1   CHLORIDE  --  100  --   --  104  --  102   CO2  --  25  --   --  25  --  26   BUN  --  16.5  --   --  17.2  --  16.4   CR  --  0.40*  --   --  0.44*  --  0.48*   GLC 97 104* 108*   < > 109*   < > 110*   LULU  --  8.1*  --   --  7.9*  --  8.1*    < > = values in this interval not displayed.     Liver Panel  No results for input(s): \"PROTTOTAL\", \"ALBUMIN\", \"BILITOTAL\", \"ALKPHOS\", \"AST\", \"ALT\", \"BILIDIRECT\" in the last 168 hours.  INR    Recent Labs   Lab Test 09/28/23  1244 03/07/23  1629   INR 1.04 1.15      Lipid Profile    Recent Labs   Lab Test 10/03/23  0956 03/08/23  0830   CHOL  --  143   HDL  --  50   LDL  --  75   TRIG 1,372* 88     A1C  No lab results found.  Troponin  No results for input(s): \"CTROPT\", \"TROPONINIS\", \"TROPONINI\", \"GHTROP\" in the last 168 " hours.       Data   I personally examined and evaluated the patient today. At the time of my evaluation and management the patient was in critical condition today due to seizures. I personally managed review of labs and images and neuro exam. Key decisions made today included: adjust AEDs. I spent a total of 30 minutes providing critical care services, evaluating the patient, directing care and reviewing laboratory values and radiologic reports.

## 2023-10-14 NOTE — PROGRESS NOTES
[facility] ICU RESPIRATORY NOTE        Date of Admission: 9/28/2023    Date of Intubation (most recent): 9/29/23    Reason for Mechanical Ventilation: Airway protection    Number of Days on Mechanical Ventilation: 16    Met Criteria for Spontaneous Breathing Trial:Yes. Pt was on PS 10/5 for 1 hr today. No plan for extubation today per MD due to neuro status.    Significant Events Today: None    ABG Results:   Recent Labs   Lab 10/14/23  0801 10/08/23  0908   O2PER 30 30         Current Vent Settings: Vent Mode: CMV/AC  (Continuous Mandatory Ventilation/ Assist Control)  FiO2 (%): 30 %  Resp Rate (Set): 12 breaths/min  Tidal Volume (Set, mL): 400 mL  PEEP (cm H2O): 5 cmH2O  Pressure Support (cm H2O): 10 cmH2O  Resp: 12      Skin Assessment: Intact    Plan: Will cont full vent support throughout the rest of the day and overnight and will then assess for another PS trial in the morning.    Bronwyn Ledesma RT on 10/14/2023 at 1:15 PM

## 2023-10-14 NOTE — PLAN OF CARE
Patient does not follow commands nor track but does withdraw to pain in all extremities. No obvious signs of seizure activity noted. Neurocrit called stating that the EEG waveforms worsened. One time dose of ativan given.

## 2023-10-14 NOTE — PROGRESS NOTES
Acute Care Surgery On Call    Will defer visit today; acute care team/surgery will plan to PEG/lap g-tube Monday    Dr. Gaines, surgery

## 2023-10-14 NOTE — PROGRESS NOTES
Critical Care Progress Note  [facility]  Kortney Germain MRN# [id number]   Age: 68 year old YOB: 1955     Date of Admission: 9/28/2023  Date of Service: 10/14/2023    Main Plans for Today   Plans to given patient till 10/16 to wake up  Thoracic surgery / general surgery consulted for Trach / PEG planning for 10/16  Ativan given overnight  Continue daily assessments  Retry PST this afternoon  Assessment & Plan    68F pmh of schizo-affective schizophrenia, HTN, tardive dyskinesia now presented 9/28 from group home with back pain and fever, Ct abd showed rectum distended with stool and urinary bladder distended, now s/p quinonez placement.  Arrival UA c/w UTI. Since arrival from the ED she has been alert but not generally interactive (?part of this may be due to underlying psychiatric illness?). Morning of 9/29 she was noted to be febrile and shaking prompting intubation to facilitate head CT and LP.  Placed on heavy sedation including benzodiazepine and propofol.  EEG is not showing seizures since then but has shown evidence of potential new seizure foci.  And has been to continue heavy sedation.  Evening of 10/3 triglycerides returned critically high level. Gave 1 dose of Ativan shut off the propofol  After adding additional doses of as needed benzodiazepines and increasing the goal range of the midazolam. Did have more seizure-like activity which resolved with increasing benzodiazepines.    - Weaned off benzos 10/6.   - Cut AEDs in half 10/9.   Currently giving time for patient's mentation to improve given this is hindering extubation and getting guardianship process started.    Neurological/Psychological    **Sedation: none    Toxicmetabolic Encephalopathy: unclear if from meds vs malignant catatonia, no evidence of anatomic etiology  Underlying schizoaffective schizophrenia and h/o tardive dyskinesia:   Status epilepticus. Considered serotonin syndrome but less likely now.  Psychiatrist also  wondering about malignant catatonia.  Is on 3 antiseizure agents.    ** MRI (10/11/23): no acute pathology to explain persistent encephalopathy              - AEDs  clobazam 15 mg BID, fosphenytoin 200 mg at night, lacosamide 150 mg BID.               - [place] paraneoplastic panel pending              - ammonia level 14 10/9/2023   - MRI 10/11/2023 no acute abnormalities    Decision making: no formal guardian or decision maker. If mental status does not improve, it may not be safe to extubate and patient would need tracheostomy. Without decision maker or ability to ask patient, ethics consulted to assist with next steps. Only mental status is hindering extubation.               - ethics consulted  -- Any major decisions are required and unable to find a surrogate decision-maker will ensure that at least 2 physicians agree with the plan of care.     From SW note  Additional Information:  Attempting to search for NOK.  Sent referral to [employer] to inquire as to whether they had any additional information than we have been able to find.  The information that has been given is that patient has been committed 7 times since 1983 with the last 3 being done by human services or a hospital.  Her father passed away in 2022 and two other family members from the 1990's have also passed away.  Sent an email to management for further direction.    Cardiovascular    Hypertension:  restarted Nifedipine at home.  Blood pressures are higher as were weaning sedation    Pulmonary    Loss of protective airway reflexes:  intubated/vented.  Inappropriate for extubation today. Due to mentation. Does well on pressure support  - Daily pressure support mode for conditioning  - Awaiting mental status to improve for extubation  - intubated since 9/29/23     Tracheostomy /  PEG Plans: given mentation is the only hindrance to extubation, after discussion with neuro, we will wait 21 days given our last changes were 10/9 to  proceed with tracheostomy if patient has not been extubated by that point. In the interim we will assess daily.   - Thoracic and General Surgery consulted   - Plans for Trach and PEG tentatively planned for 10/16 (see consult notes from 10/13/23 for more details)    Renal/Fluids/Electrolytes    Significant positive fluid balance.  Received 1 dose diuretic with good response and now is auto diuresing.  -Address as necessary.  -Reduce Free water    Misc Plans  - monitor function and electrolytes, replacement per ICU protocols.   - generally avoid nephrotoxic agents such as NSAID, IV contrast unless specifically required  - adjust medications as needed for renal clearance  - follow I/O's as appropriate.    **I/O last 24hrs:   In: 2553.83 [NG/GT:1015; I.V.:878.83]  Out: 950 [Urine:950]    Infectious Diseases    Sirs and possible UTI--on ctx and vanco, UC with mixed christina but >100k colonies.   Finished Ceftx course     Gastrointestinal/Genitourinary    No acute issues  - Tube feeds and PPI    **Nutrition: Diet: NPO for Medical/Clinical Reasons Except for: No Exceptions  Adult Formula Drip Feeding: Continuous Osmolite 1.5; Nasogastric tube; Goal Rate: 30; mL/hr; Increase TF to 30 mL/hr; Do not advance tube feeding rate unless K+ is = or > 3.0, Mg++ is = or > 1.5, and Phos is = or > 1.9        Endocrine/Metabolic    Stress induced hyperglycemia.  Plan  - ICU insulin protocol, goal sugar <180    Hematology/Oncology    Anemia of chronic illness: s/p prbc 9/29, no apparent blood loss    Rheumatology    no acute issues    Skin/Musculoskeletal    no acute issues    ------------------------------------------------------------------------------------------------------------------  Clinically Significant Risk Factors              # Hypoalbuminemia: Lowest albumin = 3.4 g/dL at 9/30/2023  5:15 AM, will monitor as appropriate       # Hypertension: Noted on problem list                         ------------------------------------------------------------------------------------------------------------------  Prophylaxis/ICU Checklist    -DVT: Subcutaneous Heparin    -VAP: HOB 30 degrees, chlorhexidine rinse    -Stress Ulcer: PPI    -Restraints: Nonviolent soft two point restraints required and necessary for patient safety and continued cares and good effect as patient continues to pull at necessary lines, tubes despite education and distraction. Will readdress daily.     -IV Access: Central access required and necessary for continued patient cares     -Feeding - Diet: NPO for Medical/Clinical Reasons Except for: No Exceptions  Adult Formula Drip Feeding: Continuous Osmolite 1.5; Nasogastric tube; Goal Rate: 30; mL/hr; Increase TF to 30 mL/hr; Do not advance tube feeding rate unless K+ is = or > 3.0, Mg++ is = or > 1.5, and Phos is = or > 1.9        Consults: ethics, MH, neuro  Family: none  Code Status: Full Code      Disposition: guarded    I have personally reviewed the daily labs, imaging studies, cultures and discussed the case with referring physician and consulting physicians.   I personally managed the respiratory support devices, hemodynamics, sedation, analgesia, metabolic abnormalities and nutritional status.      This patient is critically ill and I have provided 40 minutes of critical care time (excluding procedures) on 10/14/2023    =========================================================  Carl Muñiz MD  Pulmonary & Critical Care   [phone] (Text Page)   Interval History   Open eyes spontaneously, PST throughout day yesterday  Ativan x1 overnight  Failed PST this AM, retry this afternoon  Surg and thoracic surg have seen  Medications   Current Facility-Administered Medications   Medication Dose Route Frequency    chlorhexidine  15 mL Mouth/Throat Q12H    clobazam  20 mg Oral or Feeding Tube BID    fiber modular  1 packet Per Feeding Tube Daily    fosphenytoin (CEREBYX) 200 mg  PE in sodium chloride 0.9 % 100 mL intermittent infusion  200 mg PE Intravenous At Bedtime    heparin ANTICOAGULANT  5,000 Units Subcutaneous Q8H    insulin aspart  1-6 Units Subcutaneous Q4H    lacosamide (VIMPAT) 200 mg in sodium chloride 0.9 % 110 mL intermittent infusion  200 mg Intravenous BID    [Held by provider] LORazepam  0.25 mg Oral At Bedtime    [Held by provider] LORazepam  0.5 mg Oral BID    multivitamins w/minerals  15 mL Oral or Feeding Tube Daily    NIFEdipine  10 mg Oral or Feeding Tube Q6H    pantoprazole  40 mg Oral or Feeding Tube QAM AC    Or    pantoprazole  40 mg Intravenous QAM AC    [Held by provider] perphenazine  4 mg Oral QAM    [Held by provider] perphenazine  8 mg Oral At Bedtime    polyethylene glycol  17 g Oral or Feeding Tube BID    protein modular  1 packet Per Feeding Tube Daily    sodium chloride (PF)  10-40 mL Intracatheter Q7 Days    sodium chloride (PF)  3 mL Intracatheter Q8H     Current Facility-Administered Medications   Medication Last Rate    dextrose      sodium chloride 20 mL/hr at 10/13/23 0007     Physical Exam   Vital Signs with Ranges  Temp:  [97.7  F (36.5  C)-99.9  F (37.7  C)] 97.9  F (36.6  C)  Pulse:  [58-82] 66  Resp:  [5-28] 16  BP: (111-165)/(59-86) 145/80  FiO2 (%):  [30 %] 30 %  SpO2:  [97 %-100 %] 98 %    Wt Readings from Last 1 Encounters:   10/14/23 50.2 kg (110 lb 10.7 oz)    Body mass index is 21.61 kg/m .     BP - Mean:  [] 110  Vent Mode: CMV/AC  (Continuous Mandatory Ventilation/ Assist Control)  FiO2 (%): 30 %  Resp Rate (Set): 12 breaths/min  Tidal Volume (Set, mL): 400 mL  PEEP (cm H2O): 5 cmH2O  Pressure Support (cm H2O): 10 cmH2O  Resp: 16    General: Stated age intubated sedated  HEENT: EEG leads ET tube feeding tube  Lungs: Synchronous with ventilator  CVS: Regular rate rhythm  Abdomen: Grossly normal  Extremities/musculoskeletal: No edema  Neurology: Not expansive to voice today.  Skin: Grossly normal  Psychiatry: Unable  Exam of Line  "sites:  PICC     I/O last 3 completed shifts:  In: 2553.83 [I.V.:878.83; NG/GT:1015]  Out: 950 [Urine:950]  Data   Labs & Studies of Note: I personally reviewed the following studies:    Imaging: All new imaging reviewed by me  No results found for this or any previous visit (from the past 24 hour(s)).    ROUTINE ICU LABS (Last four results)  ROUTINE ICU LABS (Last four results)  CMP  Recent Labs   Lab 10/14/23  0418 10/14/23  0414 10/14/23  0020 10/13/23  1954 10/13/23  0803 10/13/23  0522 10/12/23  0454 10/12/23  0452 10/11/23  0444 10/11/23  0441   NA  --  137  --   --   --  139  --  138  --  137   POTASSIUM  --  4.2  --   --   --  4.2  --  4.1  --  4.2   CHLORIDE  --  100  --   --   --  104  --  102  --  101   CO2  --  25  --   --   --  25  --  26  --  28   ANIONGAP  --  12  --   --   --  10  --  10  --  8   GLC 97 104* 108* 117*   < > 109*   < > 110*   < > 124*   BUN  --  16.5  --   --   --  17.2  --  16.4  --  16.6   CR  --  0.40*  --   --   --  0.44*  --  0.48*  --  0.46*   GFRESTIMATED  --  >90  --   --   --  >90  --  >90  --  >90   LULU  --  8.1*  --   --   --  7.9*  --  8.1*  --  7.9*   MAG  --  2.2  --   --   --  2.1  --  2.1  --  2.1   PHOS  --  3.8  --   --   --  3.9  --  4.0  --  4.2    < > = values in this interval not displayed.     CBC  Recent Labs   Lab 10/14/23  0414 10/13/23  0522 10/12/23  0452 10/11/23  0441   WBC 9.8 10.1 9.9 9.4   RBC 3.31* 3.21* 3.13* 3.23*   HGB 8.2* 8.1* 7.8* 7.9*   HCT 27.4* 26.6* 25.8* 26.6*   MCV 83 83 82 82   MCH 24.8* 25.2* 24.9* 24.5*   MCHC 29.9* 30.5* 30.2* 29.7*   RDW 20.7* 20.7* 20.7* 20.5*   * 454* 405 349     INRNo lab results found in last 7 days.  Arterial Blood Gas  Recent Labs   Lab 10/08/23  0908   O2PER 30     Inflammatory Markers  No lab results found.  Immune Globulin Studies  No lab results found.  Microbiology:  No results found for: \"CULT\"  No lab results found.  Urine Studies:    Recent Labs   Lab Test 09/28/23  1606 03/10/23  0932 " 03/09/23  0224   LEUKEST Moderate* Large* Large*   NITRITE Positive* Positive* Positive*   WBCU 18* >182* >182*   RBCU <1 2 4*

## 2023-10-15 NOTE — PROGRESS NOTES
[facility] ICU RESPIRATORY NOTE        Date of Admission: 9/28/2023    Date of Intubation (most recent): 9/29/23    Reason for Mechanical Ventilation: Airway protection    Number of Days on Mechanical Ventilation: 17    Met Criteria for Spontaneous Breathing Trial: Yes    Significant Events Today:Pt was on PS 10/5 for 2 hrs today. No plan for extubation today per MD due to neuro status.    ABG Results:   Recent Labs   Lab 10/14/23  0801   O2PER 30         Current Vent Settings: Vent Mode: CMV/AC  (Continuous Mandatory Ventilation/ Assist Control)  FiO2 (%): 30 %  Resp Rate (Set): 12 breaths/min  Tidal Volume (Set, mL): 400 mL  PEEP (cm H2O): 5 cmH2O  Pressure Support (cm H2O): 10 cmH2O  Resp: 17      Skin Assessment:Intact    Plan:Will cont full vent support overnight and will assess for another PS trial in the morning.    Bronwyn Ledesma RT on 10/15/2023 at 5:10 PM

## 2023-10-15 NOTE — PLAN OF CARE
Patient remains sedated and intubated. She remains on Propofol for sedation. She does not follow commands or track. She is not moving extremities. Grimaces during cares. No seizure like activity noted. Tele: SR. Vent settings unchanged Peep 5 FiO2 30%. LS diminished. Tolerating tube feeding. Pure Wick utilized with good urine output. Plan is for PEG and Trach placement on Monday.

## 2023-10-15 NOTE — PROGRESS NOTES
Major events:  Around 0750 this am pt appeared to be having seizure like activity- captured on eeg and prn ativan given. Neurocrit updated.    Neuro: RASS -4, pupils brisk, round, reactive. opens eyes stimulation but does not track. Does not follow commands. Propofol started today.    CV: tele SR    Resp: LS diminished, vent at 30%Fio2 and peep 5.    GI: BS normoactive, TF at goal of 30mL/hr.    : purewick with good urine output.     Skin: trace edema, skin tear on coccyx

## 2023-10-15 NOTE — PROGRESS NOTES
Surgery progress note.    Surgery plans to place PEG tomorrow; Dr. José is on call and will participate.    Dr. Gaines

## 2023-10-15 NOTE — PROGRESS NOTES
[facility] Progress Note    Interval Events   Propofol restarted 10/14 due to increasing EEG activity. On 50 mcg/kg/min this AM. Propofol stopped and started on Versed by primary team around 1100. After switching to Versed, noted to be persistently hypertensive to 170s. No report of shaking/seizure like episodes today per RN.   Awaiting EEG report.    HPI Summary  Kortney Germain is a 68 year old female with PMHx significant for anxiety/depression, schizoaffective disorder (living in group home), HTN, odynophagia, tardive dyskinesia, HTN. She presented 9/28 with lower back pain and abdominal pain. She was found to have UTI, anemia (received 1 unit PRBC), and constipation. She had decreased LOC and generalized shaking overnight on 9/28. RRT called and pt was intubated on 9/29/23 for encephalopathy and concern that should would be unable to protect her airway. Initial EEG was concerning for status epilepticus for which she was started on AEDs. She has remained intubated and encephalopathic since admission.     Evaluation Summarized  EEG 09/29/23: Abnormal due to the presences of generalized periodic pattern consisting of 2-3.5 hz discharges, maximum negativity in bifrontal region, periodic pattern occupy 90% of the record and the remaining portion consist of delta slowing. These VEEG findings are consistent with a severe encephalopathy and status epilepticus.  However it is important to note patient has persistent head tremoring which may also produce a rhythmic artifact on the EEG and there is persistent electro myogenic artifact making this 1 hour segment difficult to interpret.  It would be helpful to record more video EEG data and optimize current seizure medications to evaluate if there is changes in the EEG.  Based on the limited data that is interpretable on this 1 hour file EEG is concerning for status epilepticus.      CT Head 9/29: no acute intracranial pathology,  "brain atrophy, CSVID    MRI 9/30/23: no acute intracranial process, generalized atrophy, CSVID  MRI 10/11/23: no acute intracranial process, no hemorrhage, moderate-advanced brain atrophy and leukoaraiosis, CSVID     BC: NG  CSF analysis   -glucose 98  -protein 22.8  -2 nucleated cells, RBC 0  -aerobic culture GPC in broth only   -anaerobic culture NGTD  - HSV negative    Impression   Status epilepticus, EEG improved on 3 AEDs and sedative drips - uncertain etiology of NCSE. Will likely take some time to awaken given duration of NCSE and high dose of AEDs started. Decreased doses of all three AEDs on 10/9 to maximize her chances of regaining consciousness since we are reaching a point where she would need trach/PEG if unable to extubate. Unfortunately, she had increasing EEG activity on lower doses of AEDs.      Shaking/tremors, unclear etiology. May represent baseline tardive dyskinesia vs manifestation of status epilepticus vs toxic encephalopathy vs other     Plan  - continue lacosamide to 200 mg BID  - continue fosphenytoin 200 mg at bedtime  - continue clobazam 20 mg BID  - Continue Versed for seizure management. Given increased EEG activity, give 5 mg Versed x1 and increase rate to 12 mg/hr  - Repeat phenytoin level (free/total) this evening   - limit dopaminergic/serotonergic agents   - [place] paraneoplastic panel (10/3) in process    Patient Follow-up    - final recommendation pending work-up    We will continue to follow.     Gricelda Cody, TAMI, CNP  Neurology  10/15/2023 12:46 PM  To page stroke neurology after hours or on a subsequent day, click here: [facility]  Choose \"On Call\" tab at top, then search dropdown box for \"Neurology Adult\" & press Enter, look for Neuro ICU/Stroke     _____________________________________________________    Clinically Significant Risk Factors              # Hypoalbuminemia: Lowest albumin = 3.4 g/dL at 9/30/2023  5:15 AM, will monitor as appropriate       # Hypertension: Noted " on problem list                     Medications   Scheduled Meds   chlorhexidine  15 mL Mouth/Throat Q12H    clobazam  20 mg Oral or Feeding Tube BID    fiber modular  1 packet Per Feeding Tube Daily    fosphenytoin (CEREBYX) 200 mg PE in sodium chloride 0.9 % 100 mL intermittent infusion  200 mg PE Intravenous At Bedtime    heparin ANTICOAGULANT  5,000 Units Subcutaneous Q8H    insulin aspart  1-6 Units Subcutaneous Q4H    lacosamide (VIMPAT) 200 mg in sodium chloride 0.9 % 110 mL intermittent infusion  200 mg Intravenous BID    [Held by provider] LORazepam  0.25 mg Oral At Bedtime    [Held by provider] LORazepam  0.5 mg Oral BID    multivitamins w/minerals  15 mL Oral or Feeding Tube Daily    NIFEdipine  10 mg Oral or Feeding Tube Q6H    pantoprazole  40 mg Oral or Feeding Tube QAM AC    Or    pantoprazole  40 mg Intravenous QAM AC    [Held by provider] perphenazine  4 mg Oral QAM    [Held by provider] perphenazine  8 mg Oral At Bedtime    polyethylene glycol  17 g Oral or Feeding Tube BID    protein modular  1 packet Per Feeding Tube Daily    sodium chloride (PF)  10-40 mL Intracatheter Q7 Days    sodium chloride (PF)  3 mL Intracatheter Q8H       Infusion Meds   dextrose      midazolam 3 mg/hr (10/15/23 1208)       PRN Meds  acetaminophen **OR** acetaminophen, albuterol, [Held by provider] cyclobenzaprine, dextrose, glucose **OR** dextrose **OR** glucagon, HYDROmorphone, lidocaine 4%, lidocaine (buffered or not buffered), LORazepam, melatonin, naloxone **OR** naloxone **OR** naloxone **OR** naloxone, nitroGLYcerin, sodium chloride (PF), sodium chloride (PF), sodium chloride (PF), sodium chloride (PF)       PHYSICAL EXAMINATION  Temp:  [97.6  F (36.4  C)-98.2  F (36.8  C)] 98  F (36.7  C)  Pulse:  [54-72] 70  Resp:  [10-19] 10  BP: (111-170)/(58-85) 170/83  FiO2 (%):  [30 %] 30 %  SpO2:  [96 %-100 %] 99 %      Neurologic  Mental Status:  does not open eyes to voice or noxious, not following commands  Cranial  "Nerves:  midline gaze, PERRL  Motor:  no abnormal movements, no spontaneous movements noted  Sensory:   no movement to noxious in upper extremities, flicker of muscle contraction to noxious in LLE, no movement to noxious in RLE    Imaging  I personally reviewed all imaging; relevant findings per HPI.     Lab Results Data   CBC  Recent Labs   Lab 10/15/23  0650 10/14/23  0414 10/13/23  0522   WBC 7.8 9.8 10.1   RBC 3.46* 3.31* 3.21*   HGB 8.5* 8.2* 8.1*   HCT 28.4* 27.4* 26.6*   * 489* 454*     Basic Metabolic Panel    Recent Labs   Lab 10/15/23  1214 10/15/23  0819 10/15/23  0650 10/14/23  0418 10/14/23  0414 10/13/23  0803 10/13/23  0522   NA  --   --  140  --  137  --  139   POTASSIUM  --   --  4.2  --  4.2  --  4.2   CHLORIDE  --   --  105  --  100  --  104   CO2  --   --  25  --  25  --  25   BUN  --   --  16.6  --  16.5  --  17.2   CR  --   --  0.41*  --  0.40*  --  0.44*   * 116* 109*   < > 104*   < > 109*   LULU  --   --  8.3*  --  8.1*  --  7.9*    < > = values in this interval not displayed.     Liver Panel  No results for input(s): \"PROTTOTAL\", \"ALBUMIN\", \"BILITOTAL\", \"ALKPHOS\", \"AST\", \"ALT\", \"BILIDIRECT\" in the last 168 hours.  INR    Recent Labs   Lab Test 09/28/23  1244 03/07/23  1629   INR 1.04 1.15      Lipid Profile    Recent Labs   Lab Test 10/03/23  0956 03/08/23  0830   CHOL  --  143   HDL  --  50   LDL  --  75   TRIG 1,372* 88     A1C  No lab results found.  Troponin  No results for input(s): \"CTROPT\", \"TROPONINIS\", \"TROPONINI\", \"GHTROP\" in the last 168 hours.       Data   I personally examined and evaluated the patient today. At the time of my evaluation and management the patient was in critical condition today due to seizures. I personally managed review of labs and images and neuro exam. Key decisions made today included: adjust AEDs. I spent a total of 30 minutes providing critical care services, evaluating the patient, directing care and reviewing laboratory values and " radiologic reports.

## 2023-10-15 NOTE — PROGRESS NOTES
Critical Care Progress Note  [facility]  Kortney Germain MRN# [id number]   Age: 68 year old YOB: 1955     Date of Admission: 9/28/2023  Date of Service: 10/15/2023    Main Plans for Today   Plans to given patient till 10/16 to wake up  Thoracic surgery / general surgery consulted for Trach / PEG planning for 10/16  Change propofol to versed gtt, check triglycerides  Continue daily assessments  Assessment & Plan    68F pmh of schizo-affective schizophrenia, HTN, tardive dyskinesia now presented 9/28 from group home with back pain and fever, Ct abd showed rectum distended with stool and urinary bladder distended, now s/p quinonez placement.  Arrival UA c/w UTI. Since arrival from the ED she has been alert but not generally interactive (?part of this may be due to underlying psychiatric illness?). Morning of 9/29 she was noted to be febrile and shaking prompting intubation to facilitate head CT and LP.  Placed on heavy sedation including benzodiazepine and propofol.  EEG is not showing seizures since then but has shown evidence of potential new seizure foci.  And has been to continue heavy sedation.  Evening of 10/3 triglycerides returned critically high level. Gave 1 dose of Ativan shut off the propofol  After adding additional doses of as needed benzodiazepines and increasing the goal range of the midazolam. Did have more seizure-like activity which resolved with increasing benzodiazepines.    - Weaned off benzos 10/6.   - Cut AEDs in half 10/9.   Currently giving time for patient's mentation to improve given this is hindering extubation and getting guardianship process started.    Neurological/Psychological    **Sedation: none    Toxicmetabolic Encephalopathy: unclear if from meds vs malignant catatonia, no evidence of anatomic etiology  Underlying schizoaffective schizophrenia and h/o tardive dyskinesia:   Status epilepticus. Considered serotonin syndrome but less likely now.  Psychiatrist also  wondering about malignant catatonia.  Is on 3 antiseizure agents.    ** MRI (10/11/23): no acute pathology to explain persistent encephalopathy              - AEDs  clobazam 20 mg BID, fosphenytoin 200 mg at night, lacosamide 150 mg BID.               - [place] paraneoplastic panel pending              - ammonia level 14 10/9/2023   - MRI 10/11/2023 no acute abnormalities    Decision making: no formal guardian or decision maker. If mental status does not improve, it may not be safe to extubate and patient would need tracheostomy. Without decision maker or ability to ask patient, ethics consulted to assist with next steps. Only mental status is hindering extubation.               - ethics consulted  -- Any major decisions are required and unable to find a surrogate decision-maker will ensure that at least 2 physicians agree with the plan of care.     From SW note  Additional Information:  Attempting to search for NOK.  Sent referral to [employer] to inquire as to whether they had any additional information than we have been able to find.  The information that has been given is that patient has been committed 7 times since 1983 with the last 3 being done by human services or a hospital.  Her father passed away in 2022 and two other family members from the 1990's have also passed away.  Sent an email to management for further direction.    Cardiovascular    Hypertension:  cannot be on Nifedipine (pta med).  Blood pressures are higher as were weaning sedation.    - monitor    Pulmonary    Loss of protective airway reflexes:  intubated/vented.  Inappropriate for extubation today. Due to mentation. Does well on pressure support  - Daily pressure support mode for conditioning  - Awaiting mental status to improve for extubation  - intubated since 9/29/23     Tracheostomy /  PEG Plans: given mentation is the only hindrance to extubation, after discussion with neuro, we will wait 21 days given our last changes  were 10/9 to proceed with tracheostomy if patient has not been extubated by that point. In the interim we will assess daily.   - Thoracic and General Surgery consulted   - Plans for Trach and PEG tentatively planned for 10/16 (see consult notes from 10/13/23 for more details)    Renal/Fluids/Electrolytes    Significant positive fluid balance.  Received 1 dose diuretic with good response and now is auto diuresing.  -Address as necessary.  -Reduce Free water    Misc Plans  - monitor function and electrolytes, replacement per ICU protocols.   - generally avoid nephrotoxic agents such as NSAID, IV contrast unless specifically required  - adjust medications as needed for renal clearance  - follow I/O's as appropriate.    **I/O last 24hrs:   In: 2797.19 [NG/GT:1080; I.V.:997.19]  Out: 2175 [Urine:2175]    Infectious Diseases    Sirs and possible UTI--on ctx and vanco, UC with mixed christina but >100k colonies.   Finished Ceftx course     Gastrointestinal/Genitourinary    No acute issues  - Tube feeds and PPI    **Nutrition: Diet: NPO for Medical/Clinical Reasons Except for: No Exceptions  Adult Formula Drip Feeding: Continuous Osmolite 1.5; Nasogastric tube; Goal Rate: 30; mL/hr; Increase TF to 30 mL/hr; Do not advance tube feeding rate unless K+ is = or > 3.0, Mg++ is = or > 1.5, and Phos is = or > 1.9  NPO for Medical/Clinical Reasons Except for: No Exceptions        Endocrine/Metabolic    Stress induced hyperglycemia.  Plan  - ICU insulin protocol, goal sugar <180    Hematology/Oncology    Anemia of chronic illness: s/p prbc 9/29, no apparent blood loss    Rheumatology    no acute issues    Skin/Musculoskeletal    no acute issues    ------------------------------------------------------------------------------------------------------------------  Clinically Significant Risk Factors              # Hypoalbuminemia: Lowest albumin = 3.4 g/dL at 9/30/2023  5:15 AM, will monitor as appropriate       # Hypertension: Noted on  problem list                        ------------------------------------------------------------------------------------------------------------------  Prophylaxis/ICU Checklist    -DVT: Subcutaneous Heparin    -VAP: HOB 30 degrees, chlorhexidine rinse    -Stress Ulcer: PPI    -Restraints: Nonviolent soft two point restraints required and necessary for patient safety and continued cares and good effect as patient continues to pull at necessary lines, tubes despite education and distraction. Will readdress daily.     -IV Access: Central access required and necessary for continued patient cares     -Feeding - Diet: NPO for Medical/Clinical Reasons Except for: No Exceptions  Adult Formula Drip Feeding: Continuous Osmolite 1.5; Nasogastric tube; Goal Rate: 30; mL/hr; Increase TF to 30 mL/hr; Do not advance tube feeding rate unless K+ is = or > 3.0, Mg++ is = or > 1.5, and Phos is = or > 1.9  NPO for Medical/Clinical Reasons Except for: No Exceptions        Consults: ethics, MH, neuro  Family: none  Code Status: Full Code      Disposition: guarded    I have personally reviewed the daily labs, imaging studies, cultures and discussed the case with referring physician and consulting physicians.   I personally managed the respiratory support devices, hemodynamics, sedation, analgesia, metabolic abnormalities and nutritional status.      This patient is critically ill and I have provided 35 minutes of critical care time (excluding procedures) on 10/15/2023    =========================================================  Carl Muñiz MD  Pulmonary & Critical Care   [phone] (Text Page)   Interval History   No acute events overnight  Nursing notes reviewed  Medications   Current Facility-Administered Medications   Medication Dose Route Frequency    chlorhexidine  15 mL Mouth/Throat Q12H    clobazam  20 mg Oral or Feeding Tube BID    fiber modular  1 packet Per Feeding Tube Daily    fosphenytoin (CEREBYX) 200 mg PE in  sodium chloride 0.9 % 100 mL intermittent infusion  200 mg PE Intravenous At Bedtime    heparin ANTICOAGULANT  5,000 Units Subcutaneous Q8H    insulin aspart  1-6 Units Subcutaneous Q4H    lacosamide (VIMPAT) 200 mg in sodium chloride 0.9 % 110 mL intermittent infusion  200 mg Intravenous BID    [Held by provider] LORazepam  0.25 mg Oral At Bedtime    [Held by provider] LORazepam  0.5 mg Oral BID    multivitamins w/minerals  15 mL Oral or Feeding Tube Daily    NIFEdipine  10 mg Oral or Feeding Tube Q6H    pantoprazole  40 mg Oral or Feeding Tube QAM AC    Or    pantoprazole  40 mg Intravenous QAM AC    [Held by provider] perphenazine  4 mg Oral QAM    [Held by provider] perphenazine  8 mg Oral At Bedtime    polyethylene glycol  17 g Oral or Feeding Tube BID    protein modular  1 packet Per Feeding Tube Daily    sodium chloride (PF)  10-40 mL Intracatheter Q7 Days    sodium chloride (PF)  3 mL Intracatheter Q8H     Current Facility-Administered Medications   Medication Last Rate    dextrose      midazolam 2 mg/hr (10/15/23 1130)     Physical Exam   Vital Signs with Ranges  Temp:  [97.6  F (36.4  C)-98.2  F (36.8  C)] 97.9  F (36.6  C)  Pulse:  [54-72] 59  Resp:  [11-19] 11  BP: (111-156)/(58-85) 138/68  FiO2 (%):  [30 %] 30 %  SpO2:  [96 %-100 %] 98 %    Wt Readings from Last 1 Encounters:   10/15/23 48.9 kg (107 lb 12.9 oz)    Body mass index is 21.05 kg/m .     BP - Mean:  [] 91  Vent Mode: CMV/AC  (Continuous Mandatory Ventilation/ Assist Control)  FiO2 (%): 30 %  Resp Rate (Set): 12 breaths/min  Tidal Volume (Set, mL): 400 mL  PEEP (cm H2O): 5 cmH2O  Pressure Support (cm H2O): 10 cmH2O  Resp: 11    General: Stated age intubated sedated  HEENT: EEG leads ET tube feeding tube  Lungs: Synchronous with ventilator  CVS: Regular rate rhythm  Abdomen: Grossly normal  Extremities/musculoskeletal: No edema  Neurology: Not expansive to voice today.  Skin: Grossly normal  Psychiatry: Unable  Exam of Line sites:  PICC  "    I/O last 3 completed shifts:  In: 2797.19 [I.V.:997.19; NG/GT:1080]  Out: 2175 [Urine:2175]  Data   Labs & Studies of Note: I personally reviewed the following studies:    Imaging: All new imaging reviewed by me  No results found for this or any previous visit (from the past 24 hour(s)).    ROUTINE ICU LABS (Last four results)  ROUTINE ICU LABS (Last four results)  CMP  Recent Labs   Lab 10/15/23  0819 10/15/23  0650 10/15/23  0418 10/15/23  0014 10/14/23  0418 10/14/23  0414 10/13/23  0803 10/13/23  0522 10/12/23  0454 10/12/23  0452   NA  --  140  --   --   --  137  --  139  --  138   POTASSIUM  --  4.2  --   --   --  4.2  --  4.2  --  4.1   CHLORIDE  --  105  --   --   --  100  --  104  --  102   CO2  --  25  --   --   --  25  --  25  --  26   ANIONGAP  --  10  --   --   --  12  --  10  --  10   * 109* 121* 97   < > 104*   < > 109*   < > 110*   BUN  --  16.6  --   --   --  16.5  --  17.2  --  16.4   CR  --  0.41*  --   --   --  0.40*  --  0.44*  --  0.48*   GFRESTIMATED  --  >90  --   --   --  >90  --  >90  --  >90   LULU  --  8.3*  --   --   --  8.1*  --  7.9*  --  8.1*   MAG  --  2.2  --   --   --  2.2  --  2.1  --  2.1   PHOS  --  4.5  --   --   --  3.8  --  3.9  --  4.0    < > = values in this interval not displayed.     CBC  Recent Labs   Lab 10/15/23  0650 10/14/23  0414 10/13/23  0522 10/12/23  0452   WBC 7.8 9.8 10.1 9.9   RBC 3.46* 3.31* 3.21* 3.13*   HGB 8.5* 8.2* 8.1* 7.8*   HCT 28.4* 27.4* 26.6* 25.8*   MCV 82 83 83 82   MCH 24.6* 24.8* 25.2* 24.9*   MCHC 29.9* 29.9* 30.5* 30.2*   RDW 20.8* 20.7* 20.7* 20.7*   * 489* 454* 405     INRNo lab results found in last 7 days.  Arterial Blood Gas  Recent Labs   Lab 10/14/23  0801   O2PER 30     Inflammatory Markers  No lab results found.  Immune Globulin Studies  No lab results found.  Microbiology:  No results found for: \"CULT\"  No lab results found.  Urine Studies:    Recent Labs   Lab Test 09/28/23  1606 03/10/23  0932 03/09/23  0224 "   LEUKEST Moderate* Large* Large*   NITRITE Positive* Positive* Positive*   WBCU 18* >182* >182*   RBCU <1 2 4*

## 2023-10-15 NOTE — PROGRESS NOTES
Neuro: Pt pupils have been a 2 and equal/round/reactive, does not track. She does not follow commands, sometimes withdraws to pain. Sedation needs were switched to versed. Per neuro-do not titrate down unless provider gives permission.     CV: tele SR. Hydralazine given x1 this shift for sbp >160.    Resp: LS diminished, vent at 30% peep of 5. Tolerated pressure support for 2 hrs today.     GI: BS active, BM smear, TF at goal rate of 30/hr. Pt will be NPO at midnight.     : purewick with good urine output    Skin: skin tear on coccyx, cream applied to coccyx as directed from woc. Scattered bruising.    Plan: Trach/peg procedure tomorrow.

## 2023-10-15 NOTE — PROGRESS NOTES
Preliminary Video EEG impression on October 14 , 2023  Until 3:30 pm        This EEG is abnormal due to the presences of continuous generalized polymorphic delta slowing. There are epileptiform discharges with maximum negativity at  generalized epileptiform discharges and focal epileptiform discharges with maximum negativity was concentrated in electrode  T5, T6, F4, C3-P3, sharp waves are superimposed on rhythmic delta activity.       After 11 AM on October 15 EEG appears worse with persistent 2 to 2.5 Hz generalized periodic discharges with maximum negativity in the bifrontal region.  There is no obvious clinical correlation to this pattern.      EEG is consistent with a moderate to severe diffuse encephalopathy with multifocal cortical irritability and electro - clinical  seizure.  EEG pattern after 11 AM is concerning for nonconvulsive status epilepticus.  Clinical correlation is advised.      Koki Gusman MD  Staff Epilepsy Neurologist   [phone]

## 2023-10-15 NOTE — PROGRESS NOTES
[facility] ICU RESPIRATORY NOTE           Date of Admission: 9/28/2023     Date of Intubation (most recent): 9/29/23     Reason for Mechanical Ventilation: Airway protection     Number of Days on Mechanical Ventilation: 17     Met Criteria for Spontaneous Breathing Trial:Yes.     Significant Events Today: None overnight     ABG Results:   Recent Labs   Lab 10/14/23  0801 10/08/23  0908   O2PER 30 30     Vent Mode: CMV/AC  (Continuous Mandatory Ventilation/ Assist Control)  FiO2 (%): 30 %  Resp Rate (Set): 12 breaths/min  Tidal Volume (Set, mL): 400 mL  PEEP (cm H2O): 5 cmH2O  Pressure Support (cm H2O): 10 cmH2O  Resp: 11    NIKA Martinez, RRT

## 2023-10-16 NOTE — CONSULTS
Care Management Follow Up    Length of Stay (days): 18    Expected Discharge Date: 10/23/2023     Concerns to be Addressed: discharge and ongoing plan opf care  Patient plan of care discussed at interdisciplinary rounds: Yes    Anticipated Discharge Disposition: Group Home     Anticipated Discharge Services: Transportation Services  Anticipated Discharge DME: to be determined    Patient/family educated on Medicare website which has current facility and service quality ratings:  no  Education Provided on the Discharge Plan:  yes  Patient/Family in Agreement with the Plan:  unable to assess    Referrals Placed by CM/SW: [facility] ([facility])  Private pay costs discussed: Not applicable    Additional Information:    Care Transitions had been working with [facility] , [facility] to seek alternate decision maker for next steps in ongoing cares with direction of providers.  Asking if it would be beneficial for a care conference with provider(s) intensivist, neuro and Psych who saw patient previously for ongoing discussion and plan of care.   Writer will ask rounding provider if this would be possible.  Also left messages with the following if provider(s) are agreeable:  [facility] phone [phone] (people incorporated)  2.  Clinical Supervisor People incorporated Blanca [phone]  3.   Emely [phone] (she answered and is willing to be available for a conference).  Care Transitions will continue to follow for further discharge planning needs as identified.    Addendum: 10/16 15:43  Intensivist is aware that we are trying to coordinate a possible meeting will work with Care Transitions Manager to get further feedback.       Milagros Mendenhall RN, BSN, ACM   Care Transitions Specialist  [facility]  Care Transitions Specialist  [email or web address] [email or web address]  [email or web address]  Office: [phone]  Fax: [phone]  [facility]  Services

## 2023-10-16 NOTE — PROGRESS NOTES
Brief [facility] overnight note    Patient still in status epileptcius.Versed gtt increased 15 mg/ hr (prev at 12 mg/hr)    Overseen by Attending, Dr. Leonid Alfred MD  Vascular Neurology Fellow

## 2023-10-16 NOTE — PROGRESS NOTES
[facility] Progress Note    Interval Events   Propofol restarted 10/14 due to increasing EEG activity. Propofol stopped 10/15 and started on Versed by primary team around 1100 due to concern for propofol toxicity.     EEG 10/14:  moderate to severe diffuse encephalopathy with multifocal cortical irritability and electro - clinical  seizure.  EEG pattern after 11 AM is concerning for nonconvulsive status epilepticus. Discussed with epileptologist this morning - reports EEG to be much improved, formal report pending.    Current AEDs: lacosamide 200 mg BID, fosphenytoin 200 mg at bedtime, clobazam 20 mg BID, Versed gtt at 15 mg/hr.     Phenytoin total 8.9, free pending today.  Temp max 98.6  WBC 11      HPI Summary  Kortney Germain is a 68 year old female with PMHx significant for anxiety/depression, schizoaffective disorder (living in group home), HTN, odynophagia, tardive dyskinesia, HTN. She presented 9/28 with lower back pain and abdominal pain. She was found to have UTI, anemia (received 1 unit PRBC), and constipation. She had decreased LOC and generalized shaking overnight on 9/28. RRT called and pt was intubated on 9/29/23 for encephalopathy and concern that should would be unable to protect her airway. Initial EEG was concerning for status epilepticus for which she was started on AEDs. She has remained intubated and encephalopathic since admission.     Attempted to decrease AED doses 10/9 to facilitate recovery of consciousness but had recurrent seizures and increasingly active EEG. Brain MRI was repeated 10/11 which again showed no acute findings.    Evaluation Summarized  EEG 09/29/23: Abnormal due to the presences of generalized periodic pattern consisting of 2-3.5 hz discharges, maximum negativity in bifrontal region, periodic pattern occupy 90% of the record and the remaining portion consist of delta slowing. These VEEG findings are consistent with a severe  encephalopathy and status epilepticus.  However it is important to note patient has persistent head tremoring which may also produce a rhythmic artifact on the EEG and there is persistent electro myogenic artifact making this 1 hour segment difficult to interpret.  It would be helpful to record more video EEG data and optimize current seizure medications to evaluate if there is changes in the EEG.  Based on the limited data that is interpretable on this 1 hour file EEG is concerning for status epilepticus.      CT Head 9/29: no acute intracranial pathology, brain atrophy, CSVID    MRI 9/30/23: no acute intracranial process, generalized atrophy, CSVID  MRI 10/11/23: no acute intracranial process, no hemorrhage, moderate-advanced brain atrophy and leukoaraiosis, CSVID     BC: NG  CSF analysis   -glucose 98  -protein 22.8  -2 nucleated cells, RBC 0  -aerobic culture GPC in broth only   -anaerobic culture NGTD  -HSV negative  -Vega encephalopathy autoimmune panel negative      Impression   Status epilepticus, EEG improved on 3 AEDs and sedative drips - uncertain etiology of NCSE. MRI brain x2 with no structural etiology for new seizures. Decreased doses of all three AEDs on 10/9 to maximize her chances of regaining consciousness. Unfortunately, she had increasing EEG activity on lower doses of AEDs.      Shaking/tremors, unclear etiology. May represent baseline tardive dyskinesia vs manifestation of status epilepticus vs toxic encephalopathy vs other       Plan  - continue lacosamide to 200 mg BID  - Increase fosphenytoin back to 100 mg TID   - continue clobazam 20 mg BID  - Continue Versed for seizure management - 15 mg/hr  - limit dopaminergic/serotonergic agents   - Ethics involved as patient does not have a guardian and potential need for Trach/PEG      Patient Follow-up    - final recommendation pending work-up    We will continue to follow.     Radha Adan PA-C  Vascular Neurology    To page me or covering stroke  "neurology team member, click here: [facility]   Choose \"On Call\" tab at top, then search dropdown box for \"Neurology Adult\", select location, press Enter, then look for stroke/neuro ICU/telestroke.     _____________________________________________________    Clinically Significant Risk Factors              # Hypoalbuminemia: Lowest albumin = 3.4 g/dL at 9/30/2023  5:15 AM, will monitor as appropriate       # Hypertension: Noted on problem list                     Medications   Scheduled Meds   chlorhexidine  15 mL Mouth/Throat Q12H    clobazam  20 mg Oral or Feeding Tube BID    fiber modular  1 packet Per Feeding Tube Daily    fosphenytoin (CEREBYX) 200 mg PE in sodium chloride 0.9 % 100 mL intermittent infusion  200 mg PE Intravenous At Bedtime    [Held by provider] heparin ANTICOAGULANT  5,000 Units Subcutaneous Q8H    insulin aspart  1-6 Units Subcutaneous Q4H    lacosamide (VIMPAT) 200 mg in sodium chloride 0.9 % 110 mL intermittent infusion  200 mg Intravenous BID    [Held by provider] LORazepam  0.25 mg Oral At Bedtime    [Held by provider] LORazepam  0.5 mg Oral BID    multivitamins w/minerals  15 mL Oral or Feeding Tube Daily    pantoprazole  40 mg Oral or Feeding Tube QAM AC    Or    pantoprazole  40 mg Intravenous QAM AC    [Held by provider] perphenazine  4 mg Oral QAM    [Held by provider] perphenazine  8 mg Oral At Bedtime    polyethylene glycol  17 g Oral or Feeding Tube BID    protein modular  1 packet Per Feeding Tube Daily    sodium chloride (PF)  10-40 mL Intracatheter Q7 Days    sodium chloride (PF)  3 mL Intracatheter Q8H       Infusion Meds   dextrose      midazolam 15 mg/hr (10/16/23 0315)       PRN Meds  acetaminophen **OR** acetaminophen, albuterol, [Held by provider] cyclobenzaprine, dextrose, glucose **OR** dextrose **OR** glucagon, hydrALAZINE, HYDROmorphone, lidocaine 4%, lidocaine (buffered or not buffered), LORazepam, melatonin, naloxone **OR** naloxone **OR** naloxone **OR** naloxone, " "nitroGLYcerin, sodium chloride (PF), sodium chloride (PF), sodium chloride (PF), sodium chloride (PF)       PHYSICAL EXAMINATION  Temp:  [97.9  F (36.6  C)-98.6  F (37  C)] 98  F (36.7  C)  Pulse:  [59-77] 69  Resp:  [6-17] 12  BP: (105-179)/(60-88) 143/78  FiO2 (%):  [30 %] 30 %  SpO2:  [97 %-100 %] 98 %      Neurologic  Mental Status:  slightly opens eyes to noxious, does not open eyes to voice, not following commands  Cranial Nerves:  midline gaze, PERRL  Motor:  no abnormal movements, no spontaneous movements noted, triple flexion BLEs to noxious  Sensory:   no movement to noxious in upper extremities, triple flexion BLEs to noxious    Imaging  I personally reviewed all imaging; relevant findings per HPI.     Lab Results Data   CBC  Recent Labs   Lab 10/16/23  0623 10/15/23  0650 10/14/23  0414   WBC 11.0 7.8 9.8   RBC 3.47* 3.46* 3.31*   HGB 8.5* 8.5* 8.2*   HCT 28.5* 28.4* 27.4*   * 534* 489*     Basic Metabolic Panel    Recent Labs   Lab 10/16/23  0623 10/16/23  0413 10/16/23  0025 10/15/23  0819 10/15/23  0650 10/14/23  0418 10/14/23  0414     --   --   --  140  --  137   POTASSIUM 4.2  --   --   --  4.2  --  4.2   CHLORIDE 104  --   --   --  105  --  100   CO2 23  --   --   --  25  --  25   BUN 16.6  --   --   --  16.6  --  16.5   CR 0.42*  --   --   --  0.41*  --  0.40*   GLC 95 103* 128*   < > 109*   < > 104*   LULU 8.1*  --   --   --  8.3*  --  8.1*    < > = values in this interval not displayed.     Liver Panel  No results for input(s): \"PROTTOTAL\", \"ALBUMIN\", \"BILITOTAL\", \"ALKPHOS\", \"AST\", \"ALT\", \"BILIDIRECT\" in the last 168 hours.  INR    Recent Labs   Lab Test 09/28/23  1244 03/07/23  1629   INR 1.04 1.15      Lipid Profile    Recent Labs   Lab Test 10/15/23  0650 10/03/23  0956 03/08/23  0830   CHOL  --   --  143   HDL  --   --  50   LDL  --   --  75   TRIG 136 1,372* 88     A1C  No lab results found.  Troponin  No results for input(s): \"CTROPT\", \"TROPONINIS\", \"TROPONINI\", \"GHTROP\" in " the last 168 hours.       Data   Billing:  [id number]: I personally examined and evaluated the patient today. At the time of my evaluation and management the patient was critical condition today due to status epilepticus. I personally managed chart review, exam, discussion with care team. Key decisions made today included increase fosphenytoin. I spent a total of 45 minutes providing critical care services, evaluating the patient, directing care and reviewing laboratory values and radiologic reports. Greater than 50% was spent in counseling and coordination of care

## 2023-10-16 NOTE — PROGRESS NOTES
Surgery brief progress note    PEG tube placement cancelled due to consent issues.  Guardianship being worked on.  At the moment, no emergent medical need for PEG tube.     Surgery available to assist with PEG tube placement after guardianship resolved.

## 2023-10-16 NOTE — PROGRESS NOTES
[facility] hospital ICU RESPIRATORY NOTE           Date of Admission: 9/28/2023     Date of Intubation (most recent): 9/29/23     Reason for Mechanical Ventilation: Airway protection     Number of Days on Mechanical Ventilation: 18     Met Criteria for Spontaneous Breathing Trial: Yes     Significant Events Today: None overnight     ABG Results:   Recent Labs   Lab 10/14/23  0801   O2PER 30     Vent Mode: CMV/AC  (Continuous Mandatory Ventilation/ Assist Control)  FiO2 (%): 30 %  Resp Rate (Set): 12 breaths/min  Tidal Volume (Set, mL): 400 mL  PEEP (cm H2O): 5 cmH2O  Pressure Support (cm H2O): 10 cmH2O  Resp: 12    NIKA Martinez, RRT

## 2023-10-16 NOTE — PROGRESS NOTES
Critical Care Progress Note  [facility]  Kortney Germain MRN# [id number]   Age: 68 year old YOB: 1955     Date of Admission: 9/28/2023  Date of Service: 10/16/2023    Assessment & Plan    68F pmh of schizo-affective schizophrenia, HTN, tardive dyskinesia now presented 9/28 from group home with back pain and fever, Ct abd showed rectum distended with stool and urinary bladder distended, now s/p quinonez placement.  Arrival UA c/w UTI. Since arrival from the ED she was alert but not generally interactive (?part of this may be due to underlying psychiatric illness?). Morning of 9/29 she was noted to be febrile and shaking prompting intubation to facilitate head CT and LP.  Placed on heavy sedation including benzodiazepine and propofol after EEG showed evidence of potential new seizure foci.  Evening of 10/3 triglycerides returned critically high. Eventually able to shut off the propofol after adding additional doses of as needed benzodiazepines and increasing the goal range of the midazolam. Following this though she had more seizure-like activity which resolved with increasing benzodiazepines.    Unfortunately she has not regained consciousness since getting control of her seizures.  AED doses were decreased to increase her wakefulness but she then started having breakthrough seizures.  AED dosing has been re-increased.    Neurological/Psychological    **Sedation: none    Acute Encephalopathy: likely due to seizure activity vs post-ictal state vs side effects of seizure meds  Underlying schizoaffective schizophrenia and h/o tardive dyskinesia.  Status epilepticus. Considered serotonin syndrome but this is less likely.  Psychiatrist also wondering about malignant catatonia, but this is unlikely now that she's been on signficant doses of benzos and there is evidence of seizures on eeg.  Is on 3 antiseizure agents.    ** MRI (10/11/23): no acute pathology to explain persistent encephalopathy               - AEDs  clobazam 20 mg BID, fosphenytoin 100 tid, lacosamide 200 mg BID.               - [place] paraneoplastic panel pending              - ammonia level 14 10/9/2023   - MRI 10/11/2023 no acute abnormalities    Decision making: no formal guardian or decision maker. If mental status does not improve, it may not be safe to extubate and patient would need tracheostomy. Without decision maker or ability to ask patient, ethics consulted to assist with next steps. Only mental status is hindering extubation.               - ethics consulted  -- Any major decisions are required and unable to find a surrogate decision-maker will ensure that at least 2 physicians agree with the plan of care.     From  note  Additional Information:  Attempting to search for NOK.  Sent referral to [employer] to inquire as to whether they had any additional information than we have been able to find.  The information that has been given is that patient has been committed 7 times since 1983 with the last 3 being done by human services or a hospital.  Her father passed away in 2022 and two other family members from the 1990's have also passed away.    Cardiovascular    Hypertension:  cannot be on Nifedipine (pta med).  Blood pressures are higher as were weaning sedation.    - monitor    Pulmonary    Loss of protective airway reflexes:  intubated/vented.  Inappropriate for extubation today. Due to mentation. Does well on pressure support  - Daily pressure support mode for conditioning  - Awaiting mental status to improve for extubation  - intubated since 9/29/23     Tracheostomy /  PEG Plans: surgeons are uncomfortable proceeding without a legal decision maker.  Gen surg has reasonably pointed out that we can just use a olga-feed long term rather than a peg.  Eventually she would need a trach but it is not an emergency.  Will continue efforts to wake her up and extubate vs complete the guardianship  process.    Renal/Fluids/Electrolytes    Significant positive fluid balance.  Received 1 dose diuretic with good response and now is auto diuresing.  -Address as necessary.  -Reduce Free water    Misc Plans  - monitor function and electrolytes, replacement per ICU protocols.   - generally avoid nephrotoxic agents such as NSAID, IV contrast unless specifically required  - adjust medications as needed for renal clearance  - follow I/O's as appropriate.    **I/O last 24hrs:   In: 846.47 [NG/GT:200; I.V.:406.47]  Out: 550 [Urine:550]    Infectious Diseases    Sirs and possible UTI--UC with mixed christina but >100k colonies on admission.   Finished Ceftx course     Gastrointestinal/Genitourinary    No acute issues  - Tube feeds and PPI    **Nutrition: Diet: Adult Formula Drip Feeding: Continuous Osmolite 1.5; Nasogastric tube; Goal Rate: 30; mL/hr; Increase TF to 30 mL/hr; Do not advance tube feeding rate unless K+ is = or > 3.0, Mg++ is = or > 1.5, and Phos is = or > 1.9  NPO for Medical/Clinical Reasons Except for: No Exceptions        Endocrine/Metabolic    Stress induced hyperglycemia.  Plan  - ICU insulin protocol, goal sugar <180    Hematology/Oncology    Anemia of chronic illness: s/p prbc 9/29, no apparent blood loss    Rheumatology    no acute issues    Skin/Musculoskeletal    no acute issues    ------------------------------------------------------------------------------------------------------------------  Clinically Significant Risk Factors              # Hypoalbuminemia: Lowest albumin = 3.4 g/dL at 9/30/2023  5:15 AM, will monitor as appropriate       # Hypertension: Noted on problem list                        ------------------------------------------------------------------------------------------------------------------  Prophylaxis/ICU Checklist    -DVT: Subcutaneous Heparin    -VAP: HOB 30 degrees, chlorhexidine rinse    -Stress Ulcer: PPI    -Restraints: Nonviolent soft two point restraints required  and necessary for patient safety and continued cares and good effect as patient continues to pull at necessary lines, tubes despite education and distraction. Will readdress daily.     -IV Access: Central access required and necessary for continued patient cares     -Feeding - Diet: Adult Formula Drip Feeding: Continuous Osmolite 1.5; Nasogastric tube; Goal Rate: 30; mL/hr; Increase TF to 30 mL/hr; Do not advance tube feeding rate unless K+ is = or > 3.0, Mg++ is = or > 1.5, and Phos is = or > 1.9  NPO for Medical/Clinical Reasons Except for: No Exceptions        Consults: ethics, MH, neuro  Family: none  Code Status: Full Code      Disposition: guarded    I have personally reviewed the daily labs, imaging studies, cultures and discussed the case with referring physician and consulting physicians.   I personally managed the respiratory support devices, hemodynamics, sedation, analgesia, metabolic abnormalities and nutritional status.      This patient is critically ill and I have provided 35 minutes of critical care time (excluding procedures) on 10/16/2023      Interval History   No acute events overnight      Medications   Current Facility-Administered Medications   Medication Dose Route Frequency    chlorhexidine  15 mL Mouth/Throat Q12H    clobazam  20 mg Oral or Feeding Tube BID    fiber modular  1 packet Per Feeding Tube Daily    fosphenytoin (CEREBYX) 100 mg PE in sodium chloride 0.9 % 100 mL intermittent infusion  100 mg PE Intravenous TID    [Held by provider] heparin ANTICOAGULANT  5,000 Units Subcutaneous Q8H    insulin aspart  1-6 Units Subcutaneous Q4H    lacosamide (VIMPAT) 200 mg in sodium chloride 0.9 % 110 mL intermittent infusion  200 mg Intravenous BID    [Held by provider] LORazepam  0.25 mg Oral At Bedtime    [Held by provider] LORazepam  0.5 mg Oral BID    multivitamins w/minerals  15 mL Oral or Feeding Tube Daily    pantoprazole  40 mg Oral or Feeding Tube QAM AC    Or    pantoprazole  40 mg  Intravenous QAM AC    [Held by provider] perphenazine  4 mg Oral QAM    [Held by provider] perphenazine  8 mg Oral At Bedtime    polyethylene glycol  17 g Oral or Feeding Tube BID    protein modular  1 packet Per Feeding Tube Daily    sodium chloride (PF)  10-40 mL Intracatheter Q7 Days    sodium chloride (PF)  3 mL Intracatheter Q8H     Current Facility-Administered Medications   Medication Last Rate    dextrose      midazolam 15 mg/hr (10/16/23 1013)     Physical Exam   Vital Signs with Ranges  Temp:  [98  F (36.7  C)-99.5  F (37.5  C)] 99.5  F (37.5  C)  Pulse:  [62-77] 69  Resp:  [6-17] 11  BP: (105-167)/(60-87) 138/71  FiO2 (%):  [30 %] 30 %  SpO2:  [97 %-100 %] 100 %    Wt Readings from Last 1 Encounters:   10/16/23 48.4 kg (106 lb 11.2 oz)    Body mass index is 20.84 kg/m .     BP - Mean:  [] 100  Vent Mode: CMV/AC  (Continuous Mandatory Ventilation/ Assist Control)  FiO2 (%): 30 %  Resp Rate (Set): 12 breaths/min  Tidal Volume (Set, mL): 400 mL  PEEP (cm H2O): 5 cmH2O  Pressure Support (cm H2O): 10 cmH2O  Resp: 11    General: Stated age intubated sedated  HEENT: EEG leads ET tube feeding tube  Lungs: Synchronous with ventilator  CVS: Regular rate rhythm  Abdomen: Grossly normal  Extremities/musculoskeletal: No edema  Neurology: Not expansive to voice today.  Skin: Grossly normal  Psychiatry: Unable  Exam of Line sites:  PICC     I/O last 3 completed shifts:  In: 846.47 [I.V.:406.47; NG/GT:200]  Out: 550 [Urine:550]  Data   Labs & Studies of Note: I personally reviewed the following studies:    Imaging: All new imaging reviewed by me  Recent Results (from the past 24 hour(s))   XR Abdomen Port 1 View    Narrative    ABDOMEN ONE VIEW PORTABLE  10/16/2023 1:33 PM     HISTORY: NG/NJ tube swap/placement.    COMPARISON: Abdominal radiograph 9/29/2023.       Impression    IMPRESSION: Limited AP view of the abdomen demonstrates a feeding tube  curled (with kink) within the stomach with the tip pointing  towards  the gastroesophageal junction.     Partially visualized right central venous catheter. Aortic  calcification.     No dilated loops of bowel within the visualized abdomen. Calcified  fibroid uterus.   XR Abdomen Port 1 View    Narrative    ABDOMEN ONE VIEW PORTABLE  10/16/2023 1:35 PM     HISTORY: NG placement.    COMPARISON: Abdominal radiograph 10/16/2023.       Impression    IMPRESSION: Interval advancement of the feeding tube, with the tip now  projecting over the mid stomach with kink at the distal portion of the  internal stylette.     Otherwise, no significant interval change.       ROUTINE ICU LABS (Last four results)  ROUTINE ICU LABS (Last four results)  CMP  Recent Labs   Lab 10/16/23  1107 10/16/23  0623 10/16/23  0413 10/16/23  0025 10/15/23  0819 10/15/23  0650 10/14/23  0418 10/14/23  0414 10/13/23  0803 10/13/23  0522   NA  --  137  --   --   --  140  --  137  --  139   POTASSIUM  --  4.2  --   --   --  4.2  --  4.2  --  4.2   CHLORIDE  --  104  --   --   --  105  --  100  --  104   CO2  --  23  --   --   --  25  --  25  --  25   ANIONGAP  --  10  --   --   --  10  --  12  --  10   * 95 103* 128*   < > 109*   < > 104*   < > 109*   BUN  --  16.6  --   --   --  16.6  --  16.5  --  17.2   CR  --  0.42*  --   --   --  0.41*  --  0.40*  --  0.44*   GFRESTIMATED  --  >90  --   --   --  >90  --  >90  --  >90   LULU  --  8.1*  --   --   --  8.3*  --  8.1*  --  7.9*   MAG  --  2.1  --   --   --  2.2  --  2.2  --  2.1   PHOS  --  3.9  --   --   --  4.5  --  3.8  --  3.9    < > = values in this interval not displayed.     CBC  Recent Labs   Lab 10/16/23  0623 10/15/23  0650 10/14/23  0414 10/13/23  0522   WBC 11.0 7.8 9.8 10.1   RBC 3.47* 3.46* 3.31* 3.21*   HGB 8.5* 8.5* 8.2* 8.1*   HCT 28.5* 28.4* 27.4* 26.6*   MCV 82 82 83 83   MCH 24.5* 24.6* 24.8* 25.2*   MCHC 29.8* 29.9* 29.9* 30.5*   RDW 20.9* 20.8* 20.7* 20.7*   * 534* 489* 454*     INRNo lab results found in last 7 days.  Arterial  "Blood Gas  Recent Labs   Lab 10/14/23  0801   O2PER 30     Inflammatory Markers  No lab results found.  Immune Globulin Studies  No lab results found.  Microbiology:  No results found for: \"CULT\"  No lab results found.  Urine Studies:    Recent Labs   Lab Test 09/28/23  1606 03/10/23  0932 03/09/23  0224   LEUKEST Moderate* Large* Large*   NITRITE Positive* Positive* Positive*   WBCU 18* >182* >182*   RBCU <1 2 4*         "

## 2023-10-16 NOTE — PROGRESS NOTES
[facility] ICU RESPIRATORY NOTE        Date of Admission: 9/28/2023     Date of Intubation (most recent): 9/29/2023    Reason for Mechanical Ventilation: Airway protection     Number of Days on Mechanical Ventilation: 18    Met Criteria for Spontaneous Breathing Trial: No    Reason for No Spontaneous Breathing Trial: Per MD     Significant Events Today: None     ABG Results:   Recent Labs   Lab 10/14/23  0801   O2PER 30         Current Vent Settings: Vent Mode: CMV/AC  (Continuous Mandatory Ventilation/ Assist Control)  FiO2 (%): 30 %  Resp Rate (Set): 12 breaths/min  Tidal Volume (Set, mL): 400 mL  PEEP (cm H2O): 5 cmH2O  Pressure Support (cm H2O): 10 cmH2O  Resp: 11      Skin Assessment: Skin intact     Plan: Continue full vent support and wean as tolerated     RT Shan on 10/16/2023 at 5:40 PM

## 2023-10-16 NOTE — PLAN OF CARE
Patient remains sedated on Versed 15mg (see order details; Do not titrate unless instructed by neuro critical care team). She is intubated on full support overnight. She does not follow or track. She does not move her extremities. Tele SR. EEG in place. NPO and Tube feeding held since midnight for planned PEG and Trach placement today. Pure wick in use with good urine output.  Addendum: 0600 Heparin Held per MD orders for trach/PEG placement

## 2023-10-16 NOTE — PROGRESS NOTES
Psychiatry progress note  Consult date: September 29, 2023         Reason for Consult, requesting source:      Tardive dyskinesia    Requesting source: Bridgett Gama    Labs and imaging reviewed. Patient seen and evaluated by Milagros Parker MD          HPI:     This is a 68-year-old female with a history of schizophrenia and tardive dyskinesia.  She lives in a group home.  I saw her back in March related to concerns for dyskinesia symptoms.  Now presents with abdominal pain as well as back pain.  She has been found to have a UTI, and altered mentation.  Patient also may have been overusing Tylenol.  She presents with hyponatremia, and constipation as well.  Overnight, the patient had ongoing high fevers and rigors.  She had a change in her antibiotic.  She is anemic with hemoglobin of 6.9 when she presented, now 7.5.  GI is following because the patient had bright red rectal bleeding after an enema yesterday.  Patient's liver transaminases and bilirubin are normal.  CK this morning is 105.  TSH was normal in March, as well as vitamin B12 level.  CT abdomen and pelvis without contrast shows multiple uterine fibroids, and rectal distention related to stool and bladder distention.  Current working diagnosis is sepsis related to UTI.  Temperature this morning is 102.7 and has been trending up overnight.    Patient was seen today just after she was intubated.  She was on a gurney and getting ready to move to the ICU.  Discussed with Dr. Polanco and Shakila Vargas NP.  Patient apparently did have some rigidity and was posturing with both feet and hands.  She was also continuing to shake.  Unclear if these were rigors, or shaking from another etiology.    We discussed the fact that this could be catatonia.  The patient's CK is normal.    Patient is hypertensive, so has autonomic instability, she is febrile, was rigid this morning and posturing.  We are awaiting head imaging results as well as LP.  Neurology  is on board.  Constellation of symptoms highly concerning for malignant catatonia.      10/2/2023: Patient seen today for follow-up.  Discussed with ICU nursing.  Patient is sedated and intubated.  She apparently has been having status seizures.  Patient had been on Vimpat, and after she was administered the paralytic to eliminate muscle artifact, was still having seizure activity.  Fosphenytoin was added.    10/3/2023: Patient remains heavily sedated.  Neurology notes indicate that EEG is improved on sedative drips.  There is a pending ID consult because the patient has grown GPC in broth of LP.  There is a recommendation for a paraneoplastic panel.  We will continue to follow along.    10/4/2023: Patient seen today for follow-up.  She is still heavily sedated.  He continues to have epileptiform discharges despite deep sedation.  Discussed with RN.  Is unclear the patient has anyone who could act as a substitute decision-maker.  The patient does have a history of commitment, and likely has a .  Have asked LMHP from our team to see if she can find out who this  may be, to get further information.    10/16/2023: Patient seen today for follow-up.  Discussed with RN and care coordinator.  Patient remains in status epilepticus.  Efforts have taken place to try to find any family members, with no success.  Patient is intubated, sedated, and unable to interact.        Past Psychiatric History:   Patient's home meds include Ativan 0.25 mg p.o. nightly and 0.5 mg p.o. twice daily per outpatient med reconciliation.  PDMP indicates the patient only gets #75 Ativan 0.5 mg tablets per 30 days.  This would not be enough for that dose.  Their medications include escitalopram 20 mg p.o. daily, diphenhydramine 50 mg p.o. 3 times daily, perphenazine 4 mg p.o. every morning and 8 mg p.o. nightly.    From my last visit with the patient in March, she first developed mental health symptoms after a traumatic head  injury at age 29.  She fell on the ice, and hit her head.  She has had psychiatric issues since then.  She has carried a diagnosis of schizoaffective disorder historically, and first began seeing psychiatry in the 80s.  It may have been prior to that, but that is as far back as epic charting goes.        Substance Use and History:   none        Past Medical History:   PAST MEDICAL HISTORY:   Past Medical History:   Diagnosis Date    Allergic state     Depressive disorder     Dyskinesia     Hypertension     Schizo affective schizophrenia (H)        PAST SURGICAL HISTORY: No past surgical history on file.          Family History:   FAMILY HISTORY: No family history on file.    Family Psychiatric History:         Social History:   SOCIAL HISTORY:   Social History     Tobacco Use    Smoking status: Never    Smokeless tobacco: Never   Substance Use Topics    Alcohol use: No                Physical ROS:   Patient cannot comply with 10 point review of systems due to sedation and intubation.           Medications:      chlorhexidine  15 mL Mouth/Throat Q12H    clobazam  20 mg Oral or Feeding Tube BID    fiber modular  1 packet Per Feeding Tube Daily    fosphenytoin (CEREBYX) 100 mg PE in sodium chloride 0.9 % 100 mL intermittent infusion  100 mg PE Intravenous TID    [Held by provider] heparin ANTICOAGULANT  5,000 Units Subcutaneous Q8H    insulin aspart  1-6 Units Subcutaneous Q4H    lacosamide (VIMPAT) 200 mg in sodium chloride 0.9 % 110 mL intermittent infusion  200 mg Intravenous BID    [Held by provider] LORazepam  0.25 mg Oral At Bedtime    [Held by provider] LORazepam  0.5 mg Oral BID    multivitamins w/minerals  15 mL Oral or Feeding Tube Daily    pantoprazole  40 mg Oral or Feeding Tube QAM AC    Or    pantoprazole  40 mg Intravenous QAM AC    [Held by provider] perphenazine  4 mg Oral QAM    [Held by provider] perphenazine  8 mg Oral At Bedtime    polyethylene glycol  17 g Oral or Feeding Tube BID    protein  modular  1 packet Per Feeding Tube Daily    sodium chloride (PF)  10-40 mL Intracatheter Q7 Days    sodium chloride (PF)  3 mL Intracatheter Q8H              Allergies:     Allergies   Allergen Reactions    Amlodipine     Clozapine     Egg [Chicken-Derived Products (Egg)]     Penicillins     Potassium     Poultry Meal           Labs:     Recent Results (from the past 48 hour(s))   Glucose by meter    Collection Time: 10/14/23  4:45 PM   Result Value Ref Range    GLUCOSE BY METER POCT 107 (H) 70 - 99 mg/dL   Glucose by meter    Collection Time: 10/14/23  8:25 PM   Result Value Ref Range    GLUCOSE BY METER POCT 117 (H) 70 - 99 mg/dL   Glucose by meter    Collection Time: 10/15/23 12:14 AM   Result Value Ref Range    GLUCOSE BY METER POCT 97 70 - 99 mg/dL   Glucose by meter    Collection Time: 10/15/23  4:18 AM   Result Value Ref Range    GLUCOSE BY METER POCT 121 (H) 70 - 99 mg/dL   CBC with platelets    Collection Time: 10/15/23  6:50 AM   Result Value Ref Range    WBC Count 7.8 4.0 - 11.0 10e3/uL    RBC Count 3.46 (L) 3.80 - 5.20 10e6/uL    Hemoglobin 8.5 (L) 11.7 - 15.7 g/dL    Hematocrit 28.4 (L) 35.0 - 47.0 %    MCV 82 78 - 100 fL    MCH 24.6 (L) 26.5 - 33.0 pg    MCHC 29.9 (L) 31.5 - 36.5 g/dL    RDW 20.8 (H) 10.0 - 15.0 %    Platelet Count 534 (H) 150 - 450 10e3/uL   Phosphorus    Collection Time: 10/15/23  6:50 AM   Result Value Ref Range    Phosphorus 4.5 2.5 - 4.5 mg/dL   Magnesium    Collection Time: 10/15/23  6:50 AM   Result Value Ref Range    Magnesium 2.2 1.7 - 2.3 mg/dL   Basic metabolic panel    Collection Time: 10/15/23  6:50 AM   Result Value Ref Range    Sodium 140 135 - 145 mmol/L    Potassium 4.2 3.4 - 5.3 mmol/L    Chloride 105 98 - 107 mmol/L    Carbon Dioxide (CO2) 25 22 - 29 mmol/L    Anion Gap 10 7 - 15 mmol/L    Urea Nitrogen 16.6 8.0 - 23.0 mg/dL    Creatinine 0.41 (L) 0.51 - 0.95 mg/dL    GFR Estimate >90 >60 mL/min/1.73m2    Calcium 8.3 (L) 8.8 - 10.2 mg/dL    Glucose 109 (H) 70 - 99  mg/dL   Ionized Calcium    Collection Time: 10/15/23  6:50 AM   Result Value Ref Range    Calcium Ionized Whole Blood 4.7 4.4 - 5.2 mg/dL   Triglycerides    Collection Time: 10/15/23  6:50 AM   Result Value Ref Range    Triglycerides 136 <150 mg/dL   Glucose by meter    Collection Time: 10/15/23  8:19 AM   Result Value Ref Range    GLUCOSE BY METER POCT 116 (H) 70 - 99 mg/dL   Glucose by meter    Collection Time: 10/15/23 12:14 PM   Result Value Ref Range    GLUCOSE BY METER POCT 116 (H) 70 - 99 mg/dL   Glucose by meter    Collection Time: 10/15/23  4:07 PM   Result Value Ref Range    GLUCOSE BY METER POCT 121 (H) 70 - 99 mg/dL   Glucose by meter    Collection Time: 10/15/23  8:15 PM   Result Value Ref Range    GLUCOSE BY METER POCT 122 (H) 70 - 99 mg/dL   Phenytoin level    Collection Time: 10/15/23  9:48 PM   Result Value Ref Range    Phenytoin 8.9   ug/mL   Glucose by meter    Collection Time: 10/16/23 12:25 AM   Result Value Ref Range    GLUCOSE BY METER POCT 128 (H) 70 - 99 mg/dL   Glucose by meter    Collection Time: 10/16/23  4:13 AM   Result Value Ref Range    GLUCOSE BY METER POCT 103 (H) 70 - 99 mg/dL   CBC with platelets    Collection Time: 10/16/23  6:23 AM   Result Value Ref Range    WBC Count 11.0 4.0 - 11.0 10e3/uL    RBC Count 3.47 (L) 3.80 - 5.20 10e6/uL    Hemoglobin 8.5 (L) 11.7 - 15.7 g/dL    Hematocrit 28.5 (L) 35.0 - 47.0 %    MCV 82 78 - 100 fL    MCH 24.5 (L) 26.5 - 33.0 pg    MCHC 29.8 (L) 31.5 - 36.5 g/dL    RDW 20.9 (H) 10.0 - 15.0 %    Platelet Count 551 (H) 150 - 450 10e3/uL   Basic metabolic panel    Collection Time: 10/16/23  6:23 AM   Result Value Ref Range    Sodium 137 135 - 145 mmol/L    Potassium 4.2 3.4 - 5.3 mmol/L    Chloride 104 98 - 107 mmol/L    Carbon Dioxide (CO2) 23 22 - 29 mmol/L    Anion Gap 10 7 - 15 mmol/L    Urea Nitrogen 16.6 8.0 - 23.0 mg/dL    Creatinine 0.42 (L) 0.51 - 0.95 mg/dL    GFR Estimate >90 >60 mL/min/1.73m2    Calcium 8.1 (L) 8.8 - 10.2 mg/dL    Glucose  95 70 - 99 mg/dL   Phosphorus    Collection Time: 10/16/23  6:23 AM   Result Value Ref Range    Phosphorus 3.9 2.5 - 4.5 mg/dL   Magnesium    Collection Time: 10/16/23  6:23 AM   Result Value Ref Range    Magnesium 2.1 1.7 - 2.3 mg/dL   Glucose by meter    Collection Time: 10/16/23 11:07 AM   Result Value Ref Range    GLUCOSE BY METER POCT 101 (H) 70 - 99 mg/dL   Glucose by meter    Collection Time: 10/16/23  3:16 PM   Result Value Ref Range    GLUCOSE BY METER POCT 107 (H) 70 - 99 mg/dL          Physical and Psychiatric Examination:     /71   Pulse 69   Temp 99.5  F (37.5  C) (Axillary)   Resp 11   Wt 48.4 kg (106 lb 11.2 oz)   SpO2 100%   BMI 20.84 kg/m    Weight is 106 lbs 11.24 oz  Body mass index is 20.84 kg/m .    Physical Exam:  I have reviewed the physical exam as documented by by the medical team and agree with findings and assessment and have no additional findings to add at this time.    Mental Status Exam:    Appearance:  Sedated and severe distress  Attitude:   Unable to cooperate  Eye Contact:   Sedated  Mood:   Unable to assess  Affect:   Unable to assess  Speech:  mute  Language: Fluent in english   Psychomotor Behavior:  tremor observed  and posturing  Thought Process:   Patient sedated  Associations:   Patient sedated  Thought Content:   Unable to assess  Insight:   Unable to assess  Judgement:   Unable to assess  Oriented to:   Patient sedated  Attention Span and Concentration:   Patient sedated  Recent and Remote Memory:   Unable to assess  Fund of Knowledge: Appropriate   Gait and Station:                DSM-5 Diagnosis:   Schizoaffective disorder versus disorganized schizophrenia    Delirium, rule out catatonia versus malignant catatonia    UTI          Assessment:     This is a 68-year-old female with a history of schizoaffective disorder versus schizophrenia.  I have met her before, and she told me that this all happened after she sustained a head injury at age 29 when she fell on  ice, and hit her head, losing consciousness.  I am not able to find any neurology notes from when this happened, as it was essentially 40 years ago.    She has had multiple hospitalizations including at the [facility] for psychiatric reasons.  She has tardive dyskinesia which she told me is better when she is on her Trilafon.  She also states Ativan helps her with this.    Patient has had altered mentation since presenting.  This morning, she was posturing, had some rigidity, and has noted to have a climbing fever overnight.  There is concern for sepsis related to UTI.  She was intubated by the time I saw her, but I am concerned that she is catatonic.  With her fever, and elevated blood pressure, another concern is malignant catatonia.  Patient CK is normal this morning at 105, so I am less concerned about neuroleptic malignant syndrome. Recommend following serial CKs.    Patient is getting Versed, clearly a benzodiazepine, as sedation.  Typical treatment for catatonia is Ativan.  We would usually attempt Ativan at a dose of 1 mg IV as a lorazepam challenge to see if patient had clinical improvement with catatonia symptoms.  She is already intubated, so I am not sure how to manage this.  Will discuss with intensivist.      The other option is ECT.  We cannot do that here, so the patient will need to be transferred to the [facility].  I talked to my colleague at the [facility], and there are many logistical challenges with getting the patient over there.  Hopefully we can ascertain whether or not this is actually malignant catatonia, and aggressively treat with Ativan.  I do see some case reports of other benzodiazepines having benefit, though lorazepam is the standard of practice in terms of treatment.    There is a work-up underway to see if there is another etiology medically of what is going on with her.  She had an LP, and CT head.    Patient continues with status seizures despite deep  sedation.  She is intubated, and not able to communicate.  Attempts to find family members, or interested parties have reportedly been unsuccessful.  Would be happy to take part in a care conference if providers meet to discuss next steps.          Summary of Recommendations:     1.  If colleagues plan to meet to discuss next steps, I would be very happy to join that discussion.    2.  We will follow along.  No other recommendations today as patient is very ill and deeply sedated.      Milagros Parker MD  Consult/Liaison Psychiatry and Addiction Medicine  [facility]

## 2023-10-17 NOTE — PROGRESS NOTES
The patient is deeply sedated with versed. She moves her mouth some with suctioning and oral cares. PERRLA. Continuous EEG in place.   Lung sounds diminished. 30% PEEP 5 on ventilator. Oral cares and ET-tube repositioning done every 2 hours.   Sinus rhythm on monitor. 1 dose 5mg IV hydralazine given for SBP>160. Dilaudid 0.5mg was also given at this time in case the BP elevation was pain response. BP was much better on recheck.   Large bore NG tube was swapped out for smaller bore NG tube per MD's instruction.   External catheter in place. Good output. Cares done multiple times today.   1x moderate loose, liquid stool.   Jesus Payan RN 7:49 PM 10/16/23

## 2023-10-17 NOTE — PROGRESS NOTES
Intensivist:  Patient has been intubated >14 days, does not seem to be nearing extubation due to continued underlying seizures, and does not have a legally-authorized decision maker or family member that we have been able to identify.  Guardianship process has been initiated, but best practices would indicate that this patient requires a decision on tracheostomy sooner than would be allowed by the timeframe of the guardianship process.  I agree with the ethics note of 10/10 outlining that the benefits of tracheostomy outweigh the risks, and I have signed a consent form for surgical tracheostomy as a second physician.    Will continue usual cares and the guardianship process in the meantime.

## 2023-10-17 NOTE — PROGRESS NOTES
[facility] Progress Note    Interval Events   EEG: GPEDs, no clear seizures    Current AEDs: lacosamide 200 mg BID, fosphenytoin 100 mg TID, clobazam 20 mg BID, Versed gtt at 15 mg/hr.     Temp max 99.6  WBC 11>12.2    Noted hippus on exam, otherwise exam is unchanged.      HPI Summary  Kortney Germain is a 68 year old female with PMHx significant for anxiety/depression, schizoaffective disorder (living in group home), HTN, odynophagia, tardive dyskinesia, HTN. She presented 9/28 with lower back pain and abdominal pain. She was found to have UTI, anemia (received 1 unit PRBC), and constipation. She had decreased LOC and generalized shaking overnight on 9/28. RRT called and pt was intubated on 9/29/23 for encephalopathy and concern that should would be unable to protect her airway. Initial EEG was concerning for status epilepticus for which she was started on AEDs. She has remained intubated and encephalopathic since admission.     Attempted to decrease AED doses 10/9 to facilitate recovery of consciousness but had recurrent seizures and increasingly active EEG. Brain MRI was repeated 10/11 which again showed no acute findings. Propofol restarted 10/14 due to increasing EEG activity.  Propofol stopped 10/15 and started on Versed by primary team due to concern for propofol toxicity. EEG 10/15 morning again showing NCSE, Versed titrated up with improvement.    Evaluation Summarized  EEG 09/29/23: Abnormal due to the presences of generalized periodic pattern consisting of 2-3.5 hz discharges, maximum negativity in bifrontal region, periodic pattern occupy 90% of the record and the remaining portion consist of delta slowing. These VEEG findings are consistent with a severe encephalopathy and status epilepticus.  However it is important to note patient has persistent head tremoring which may also produce a rhythmic artifact on the EEG and there is persistent electro myogenic  artifact making this 1 hour segment difficult to interpret.  It would be helpful to record more video EEG data and optimize current seizure medications to evaluate if there is changes in the EEG.  Based on the limited data that is interpretable on this 1 hour file EEG is concerning for status epilepticus.      CT Head 9/29: no acute intracranial pathology, brain atrophy, CSVID    MRI 9/30/23: no acute intracranial process, generalized atrophy, CSVID  MRI 10/11/23: no acute intracranial process, no hemorrhage, moderate-advanced brain atrophy and leukoaraiosis, CSVID     BC: NG  CSF analysis   -glucose 98  -protein 22.8  -2 nucleated cells, RBC 0  -aerobic culture GPC in broth only   -anaerobic culture NGTD  -HSV negative  -Vega encephalopathy autoimmune panel negative      Impression   Status epilepticus, EEG improved on 3 AEDs and sedative drips - uncertain etiology of NCSE. MRI brain x2 with no structural etiology for new seizures. Decreased doses of all three AEDs on 10/9 to maximize her chances of regaining consciousness. Unfortunately, she had increasing EEG activity on lower doses of AEDs. Now improved with increased AED doses and Versed gtt.     Shaking/tremors, unclear etiology. May represent baseline tardive dyskinesia vs manifestation of status epilepticus vs toxic encephalopathy vs other       Plan  - continue lacosamide to 200 mg BID  - continue fosphenytoin back to 100 mg TID   - continue clobazam 20 mg BID  - Start to gradually wean Versed by 1 mg/hr every 2 hours until 1800 this evening then keep at that rate overnight. Depending on clinical course and EEG would plan to continue to wean Versed further tomorrow. Have discussed with epileptologist who will continue to monitor the tracing while we are weaning.  - limit dopaminergic/serotonergic agents   - Ethics involved as patient does not have a guardian and potential need for Trach/PEG      Patient Follow-up    - final recommendation pending  "work-up    We will continue to follow.     Radha Adan PA-C  Vascular Neurology    To page me or covering stroke neurology team member, click here: [facility]COM   Choose \"On Call\" tab at top, then search dropdown box for \"Neurology Adult\", select location, press Enter, then look for stroke/neuro ICU/telestroke.     _____________________________________________________    Clinically Significant Risk Factors              # Hypoalbuminemia: Lowest albumin = 3.4 g/dL at 9/30/2023  5:15 AM, will monitor as appropriate       # Hypertension: Noted on problem list                     Medications   Scheduled Meds   chlorhexidine  15 mL Mouth/Throat Q12H    clobazam  20 mg Oral or Feeding Tube BID    fiber modular  1 packet Per Feeding Tube Daily    fosphenytoin (CEREBYX) 100 mg PE in sodium chloride 0.9 % 100 mL intermittent infusion  100 mg PE Intravenous TID    heparin ANTICOAGULANT  5,000 Units Subcutaneous Q8H    insulin aspart  1-6 Units Subcutaneous Q4H    lacosamide (VIMPAT) 200 mg in sodium chloride 0.9 % 110 mL intermittent infusion  200 mg Intravenous BID    multivitamins w/minerals  15 mL Oral or Feeding Tube Daily    pantoprazole  40 mg Oral or Feeding Tube QAM AC    Or    pantoprazole  40 mg Intravenous QAM AC    polyethylene glycol  17 g Oral or Feeding Tube BID    protein modular  1 packet Per Feeding Tube Daily    sodium chloride (PF)  10-40 mL Intracatheter Q7 Days    sodium chloride (PF)  3 mL Intracatheter Q8H       Infusion Meds   dextrose      midazolam 15 mg/hr (10/17/23 0738)       PRN Meds  acetaminophen **OR** acetaminophen, albuterol, [Held by provider] cyclobenzaprine, dextrose, glucose **OR** dextrose **OR** glucagon, hydrALAZINE, HYDROmorphone, lidocaine 4%, lidocaine (buffered or not buffered), LORazepam, melatonin, naloxone **OR** naloxone **OR** naloxone **OR** naloxone, nitroGLYcerin, sodium chloride (PF), sodium chloride (PF), sodium chloride (PF), sodium chloride (PF)       Exam obtained while on " "sedation with versed at 15 ml/hr given NCSE    PHYSICAL EXAMINATION  Temp:  [99.2  F (37.3  C)-99.6  F (37.6  C)] 99.2  F (37.3  C)  Pulse:  [68-81] 71  Resp:  [0-16] 12  BP: (108-186)/(57-95) 109/57  FiO2 (%):  [30 %] 30 %  SpO2:  [97 %-100 %] 97 %      Neurologic  Mental Status:  does not open eyes to voice or noxious, not following commands  Cranial Nerves:  slight dysconjugate gaze, hippus  Motor:  no abnormal movements, no spontaneous movements noted, withdrawal vs triple flexion BLEs to noxious  Sensory:   see motor    Imaging  I personally reviewed all imaging; relevant findings per HPI.     Lab Results Data   CBC  Recent Labs   Lab 10/17/23  0433 10/16/23  0623 10/15/23  0650   WBC 12.2* 11.0 7.8   RBC 3.36* 3.47* 3.46*   HGB 8.3* 8.5* 8.5*   HCT 27.6* 28.5* 28.4*   * 551* 534*     Basic Metabolic Panel    Recent Labs   Lab 10/17/23  0440 10/17/23  0433 10/16/23  2034 10/16/23  1107 10/16/23  0623 10/15/23  0819 10/15/23  0650   NA  --  136  --   --  137  --  140   POTASSIUM  --  4.1  --   --  4.2  --  4.2   CHLORIDE  --  103  --   --  104  --  105   CO2  --  22  --   --  23  --  25   BUN  --  19.0  --   --  16.6  --  16.6   CR  --  0.40*  --   --  0.42*  --  0.41*   * 124* 119*   < > 95   < > 109*   LULU  --  7.9*  --   --  8.1*  --  8.3*    < > = values in this interval not displayed.     Liver Panel  No results for input(s): \"PROTTOTAL\", \"ALBUMIN\", \"BILITOTAL\", \"ALKPHOS\", \"AST\", \"ALT\", \"BILIDIRECT\" in the last 168 hours.  INR    Recent Labs   Lab Test 09/28/23  1244 03/07/23  1629   INR 1.04 1.15      Lipid Profile    Recent Labs   Lab Test 10/15/23  0650 10/03/23  0956 03/08/23  0830   CHOL  --   --  143   HDL  --   --  50   LDL  --   --  75   TRIG 136 1,372* 88     A1C  No lab results found.  Troponin  No results for input(s): \"CTROPT\", \"TROPONINIS\", \"TROPONINI\", \"GHTROP\" in the last 168 hours.       Data   Billing:  [id number]: I personally examined and evaluated the patient today. " At the time of my evaluation and management the patient was critical condition today due to NCSE. I personally managed chart review, exam, discussion with care team. Key decisions made today included continue current plan of care. I spent a total of 35 minutes providing critical care services, evaluating the patient, directing care and reviewing laboratory values and radiologic reports. Greater than 50% was spent in counseling and coordination of care

## 2023-10-17 NOTE — PROGRESS NOTES
Critical Care Progress Note  [facility]  Kortney Germain MRN# [id number]   Age: 68 year old YOB: 1955     Date of Admission: 9/28/2023  Date of Service: 10/17/2023    Assessment & Plan    68F pmh of schizo-affective schizophrenia, HTN, tardive dyskinesia now presented 9/28 from group home with back pain and fever, Ct abd showed rectum distended with stool and urinary bladder distended, now s/p quinonez placement.  Arrival UA c/w UTI. Since arrival from the ED she was alert but not generally interactive (?part of this may be due to underlying psychiatric illness?). Morning of 9/29 she was noted to be febrile and shaking prompting intubation to facilitate head CT and LP.  Placed on heavy sedation including benzodiazepine and propofol after EEG showed evidence of potential new seizure foci.  Evening of 10/3 triglycerides returned critically high. Eventually able to shut off the propofol after adding additional doses of as needed benzodiazepines and increasing the goal range of the midazolam. Following this though she had more seizure-like activity which resolved with increasing benzodiazepines.    Unfortunately she has not regained consciousness since getting control of her seizures.  AED doses were decreased to increase her wakefulness but she then started having breakthrough seizures.  AED dosing has been re-increased.    Neurological/Psychological    **Sedation: on midazolam again for seizures    Acute Encephalopathy: likely due to seizure activity vs post-ictal state vs side effects of seizure meds  Underlying schizoaffective schizophrenia and h/o tardive dyskinesia.  Status epilepticus. Considered serotonin syndrome but this is less likely.  Psychiatrist also wondering about malignant catatonia, but this is unlikely now that she's been on signficant doses of benzos and there is evidence of seizures on eeg.  Is on 3 antiseizure agents.    ** MRI (10/11/23): no acute pathology to explain  persistent encephalopathy              - AEDs  clobazam 20 mg BID, fosphenytoin 100 tid, lacosamide 200 mg BID.               - [place] paraneoplastic panel pending              - ammonia level 14 10/9/2023   - MRI 10/11/2023 no acute abnormalities    Decision making: no formal guardian or decision maker. If mental status does not improve, it may not be safe to extubate and patient would need tracheostomy. Without decision maker or ability to ask patient, ethics consulted to assist with next steps. Only mental status is hindering extubation.               - ethics consulted--they are agreeable with tracheostomy  -- Any major decisions are required and unable to find a surrogate decision-maker will ensure that at least 2 physicians agree with the plan of care.     From  note  Additional Information:  Attempting to search for NOK.  Sent referral to [employer] to inquire as to whether they had any additional information than we have been able to find.  The information that has been given is that patient has been committed 7 times since 1983 with the last 3 being done by human services or a hospital.  Her father passed away in 2022 and two other family members from the 1990's have also passed away.    Cardiovascular    Hypertension:  cannot be on Nifedipine (pta med).  Blood pressures are higher as were weaning sedation.    - monitor    Pulmonary    Loss of protective airway reflexes:  intubated/vented.  Inappropriate for extubation today. Due to mentation. Does well on pressure support  - Daily pressure support mode for conditioning  - Awaiting mental status to improve for extubation  - intubated since 9/29/23     Tracheostomy /  PEG Plans: Gen surg has reasonably pointed out that we can just use a olga-feed long term rather than a peg.  Have discussed trach with Dr. Benites--he will proceed with trach this week under 2 physician consent.  Will continue efforts to complete the guardianship process in  the meantime.  At this point the patient is comatose and unable to make her own medical decisions.    Renal/Fluids/Electrolytes    Significant positive fluid balance.  Received 1 dose diuretic with good response and now is auto diuresing.  -Address as necessary.  -Reduce Free water    Misc Plans  - monitor function and electrolytes, replacement per ICU protocols.   - generally avoid nephrotoxic agents such as NSAID, IV contrast unless specifically required  - adjust medications as needed for renal clearance  - follow I/O's as appropriate.    **I/O last 24hrs:   In: 1030 [NG/GT:150; I.V.:400]  Out: 750 [Urine:750]    Infectious Diseases    Sirs and possible UTI--UC with mixed christina but >100k colonies on admission.   Finished Ceftx course     Gastrointestinal/Genitourinary    No acute issues  - Tube feeds and PPI    **Nutrition: Diet: Adult Formula Drip Feeding: Continuous Osmolite 1.5; Nasogastric tube; Goal Rate: 30; mL/hr; Increase TF to 30 mL/hr; Do not advance tube feeding rate unless K+ is = or > 3.0, Mg++ is = or > 1.5, and Phos is = or > 1.9  NPO for Medical/Clinical Reasons Except for: No Exceptions        Endocrine/Metabolic    Stress induced hyperglycemia.  Plan  - ICU insulin protocol, goal sugar <180    Hematology/Oncology    Anemia of chronic illness: s/p prbc 9/29, no apparent blood loss    Rheumatology    no acute issues    Skin/Musculoskeletal    no acute issues    ------------------------------------------------------------------------------------------------------------------  Clinically Significant Risk Factors              # Hypoalbuminemia: Lowest albumin = 3.4 g/dL at 9/30/2023  5:15 AM, will monitor as appropriate       # Hypertension: Noted on problem list                        ------------------------------------------------------------------------------------------------------------------  Prophylaxis/ICU Checklist    -DVT: Subcutaneous Heparin    -VAP: HOB 30 degrees, chlorhexidine rinse     -Stress Ulcer: PPI    -Restraints: Nonviolent soft two point restraints required and necessary for patient safety and continued cares and good effect as patient continues to pull at necessary lines, tubes despite education and distraction. Will readdress daily.     -IV Access: Central access required and necessary for continued patient cares     -Feeding - Diet: Adult Formula Drip Feeding: Continuous Osmolite 1.5; Nasogastric tube; Goal Rate: 30; mL/hr; Increase TF to 30 mL/hr; Do not advance tube feeding rate unless K+ is = or > 3.0, Mg++ is = or > 1.5, and Phos is = or > 1.9  NPO for Medical/Clinical Reasons Except for: No Exceptions        Consults: ethics, MH, neuro  Family: none  Code Status: Full Code      Disposition: guarded    I have personally reviewed the daily labs, imaging studies, cultures and discussed the case with referring physician and consulting physicians.   I personally managed the respiratory support devices, hemodynamics, sedation, analgesia, metabolic abnormalities and nutritional status.      This patient is critically ill and I have provided 35 minutes of critical care time (excluding procedures) on 10/17/2023    D/w Dr. Benites of thoracic surgery    Interval History   No events overnight.  Back on 15/hr of midazolam for seizure activity.  Appreciate neuro support.  Proceeding with trach this week.      Medications   Current Facility-Administered Medications   Medication Dose Route Frequency    chlorhexidine  15 mL Mouth/Throat Q12H    clobazam  20 mg Oral or Feeding Tube BID    fiber modular  1 packet Per Feeding Tube Daily    fosphenytoin (CEREBYX) 100 mg PE in sodium chloride 0.9 % 100 mL intermittent infusion  100 mg PE Intravenous TID    heparin ANTICOAGULANT  5,000 Units Subcutaneous Q8H    insulin aspart  1-6 Units Subcutaneous Q4H    lacosamide (VIMPAT) 200 mg in sodium chloride 0.9 % 110 mL intermittent infusion  200 mg Intravenous BID    multivitamins w/minerals  15 mL Oral or  Feeding Tube Daily    pantoprazole  40 mg Oral or Feeding Tube QAM AC    Or    pantoprazole  40 mg Intravenous QAM AC    polyethylene glycol  17 g Oral or Feeding Tube BID    protein modular  1 packet Per Feeding Tube Daily    sodium chloride (PF)  10-40 mL Intracatheter Q7 Days    sodium chloride (PF)  3 mL Intracatheter Q8H     Current Facility-Administered Medications   Medication Last Rate    dextrose      midazolam 15 mg/hr (10/17/23 0738)     Physical Exam   Vital Signs with Ranges  Temp:  [99.2  F (37.3  C)-99.6  F (37.6  C)] 99.2  F (37.3  C)  Pulse:  [69-81] 75  Resp:  [0-16] 13  BP: (108-186)/(57-95) 137/66  FiO2 (%):  [30 %] 30 %  SpO2:  [97 %-100 %] 97 %    Wt Readings from Last 1 Encounters:   10/17/23 48.3 kg (106 lb 7.7 oz)    Body mass index is 20.8 kg/m .     BP - Mean:  [] 96  Vent Mode: CMV/AC  (Continuous Mandatory Ventilation/ Assist Control)  FiO2 (%): 30 %  Resp Rate (Set): 12 breaths/min  Tidal Volume (Set, mL): 400 mL  PEEP (cm H2O): 5 cmH2O  Resp: 13    General: Stated age intubated sedated  HEENT: EEG leads ET tube feeding tube  Lungs: Synchronous with ventilator  CVS: Regular rate rhythm  Abdomen: Grossly normal  Extremities/musculoskeletal: No edema  Neurology: Not expansive to voice today.  Skin: Grossly normal  Psychiatry: Unable  Exam of Line sites:  PICC     I/O last 3 completed shifts:  In: 1030 [I.V.:400; NG/GT:150]  Out: 750 [Urine:750]  Data   Labs & Studies of Note: I personally reviewed the following studies:    Imaging: All new imaging reviewed by me  Recent Results (from the past 24 hour(s))   XR Abdomen Port 1 View    Narrative    ABDOMEN ONE VIEW PORTABLE  10/16/2023 1:33 PM     HISTORY: NG/NJ tube swap/placement.    COMPARISON: Abdominal radiograph 9/29/2023.       Impression    IMPRESSION: Limited AP view of the abdomen demonstrates a feeding tube  curled (with kink) within the stomach with the tip pointing towards  the gastroesophageal junction.     Partially  visualized right central venous catheter. Aortic  calcification.     No dilated loops of bowel within the visualized abdomen. Calcified  fibroid uterus.    KARTHIKEYAN TRUJILLO MD         SYSTEM ID:  [id number]   XR Abdomen Port 1 View    Narrative    ABDOMEN ONE VIEW PORTABLE  10/16/2023 1:35 PM     HISTORY: NG placement.    COMPARISON: Abdominal radiograph 10/16/2023.       Impression    IMPRESSION: Interval advancement of the feeding tube, with the tip now  projecting over the mid stomach with kink at the distal portion of the  internal stylette.     Otherwise, no significant interval change.    KARTHIKEYAN TRUJILLO MD         SYSTEM ID:  [id number]   XR Abdomen Port 1 View    Narrative    EXAM: XR ABDOMEN PORT 1 VIEW  LOCATION: [facility]  DATE: 10/16/2023    INDICATION: Evaluate NG tube position. Unable to flush. Kinked on previous imaging. Attempted to pull back with no success  COMPARISON: 10/16/2023 at 1321 hours      Impression    IMPRESSION: The NG to remains kinked approximately 6 cm from the distal and. Location NG tube is in good position within the mid to distal stomach. Normal bowel gas pattern. Scattered uterine fibroids.       ROUTINE ICU LABS (Last four results)  ROUTINE ICU LABS (Last four results)  CMP  Recent Labs   Lab 10/17/23  0831 10/17/23  0440 10/17/23  0433 10/16/23  2034 10/16/23  1107 10/16/23  0623 10/15/23  0819 10/15/23  0650 10/14/23  0418 10/14/23  0414   NA  --   --  136  --   --  137  --  140  --  137   POTASSIUM  --   --  4.1  --   --  4.2  --  4.2  --  4.2   CHLORIDE  --   --  103  --   --  104  --  105  --  100   CO2  --   --  22  --   --  23  --  25  --  25   ANIONGAP  --   --  11  --   --  10  --  10  --  12   * 123* 124* 119*   < > 95   < > 109*   < > 104*   BUN  --   --  19.0  --   --  16.6  --  16.6  --  16.5   CR  --   --  0.40*  --   --  0.42*  --  0.41*  --  0.40*   GFRESTIMATED  --   --  >90  --   --  >90  --  >90  --  >90   LULU  --    "--  7.9*  --   --  8.1*  --  8.3*  --  8.1*   MAG  --   --  2.0  --   --  2.1  --  2.2  --  2.2   PHOS  --   --  3.1  --   --  3.9  --  4.5  --  3.8    < > = values in this interval not displayed.     CBC  Recent Labs   Lab 10/17/23  0433 10/16/23  0623 10/15/23  0650 10/14/23  0414   WBC 12.2* 11.0 7.8 9.8   RBC 3.36* 3.47* 3.46* 3.31*   HGB 8.3* 8.5* 8.5* 8.2*   HCT 27.6* 28.5* 28.4* 27.4*   MCV 82 82 82 83   MCH 24.7* 24.5* 24.6* 24.8*   MCHC 30.1* 29.8* 29.9* 29.9*   RDW 21.0* 20.9* 20.8* 20.7*   * 551* 534* 489*     INRNo lab results found in last 7 days.  Arterial Blood Gas  Recent Labs   Lab 10/14/23  0801   O2PER 30     Inflammatory Markers  No lab results found.  Immune Globulin Studies  No lab results found.  Microbiology:  No results found for: \"CULT\"  No lab results found.  Urine Studies:    Recent Labs   Lab Test 09/28/23  1606 03/10/23  0932 03/09/23  0224   LEUKEST Moderate* Large* Large*   NITRITE Positive* Positive* Positive*   WBCU 18* >182* >182*   RBCU <1 2 4*         "

## 2023-10-17 NOTE — PLAN OF CARE
Goal Outcome Evaluation:    Neuro/RASS: RASS -4 to -5. Seems to withdraw from pain but unsure if this is a flexion to pain or decorticate posturing. Dysconjugate eyes. No responsive otherwise. EEG continues.   Tele/CV: SR/ST at times    Resp/Pulm: LS diminished. ETT remains in place. Vent- , PEEP5, 30%FiO2, rate 12  GI/: Voiding via purwick, UOP adeqaute. smear of BM this shift. TF rate at 30.   Integumentary: Scattered Bruising to upper FA's. Wound to sacrum.    Infusion/IV access: Triple lumen PICC to upper Lt arm. X2 ports infusing. Infusing versed, and x2 TKO's.

## 2023-10-17 NOTE — PROGRESS NOTES
SPIRITUAL HEALTH SERVICES Progress Note  [facility]  ICU    Pt/family not available for visit at this time. Please contact  if our assistance is needed.    Rocky Busch  Associate Chaplain [facility] Spiritual Health Phone Line [phone]  Spiritual Health Pager [phone]

## 2023-10-17 NOTE — PLAN OF CARE
[id number] shift Rass -4, not moving extremities. Occasionally opens her eyes. No seizure activity noted. Decreased versed to 11 mg/hr

## 2023-10-17 NOTE — CONSULTS
Care Management Follow Up    Length of Stay (days): 19    Expected Discharge Date: 10/23/2023     Concerns to be Addressed: ongoing plan of care discharge planning     Patient plan of care discussed at interdisciplinary rounds: Yes    Anticipated Discharge Disposition: Group Home     Anticipated Discharge Services: Transportation Services  Anticipated Discharge DME:  to be determined    Patient/family educated on Medicare website which has current facility and service quality ratings:  no  Education Provided on the Discharge Plan:  yes GH Manager update 10/17  Patient/Family in Agreement with the Plan:  unable to assess    Referrals Placed by CM/SW: Community Residential Settings (Group Homes)  Private pay costs discussed: Not applicable    Additional Information:  Writer never received calls back from County , or Clinical . Per rounds plan if for trach 10/18.  Writer will submit for LTACH referral for possible discharge pending continued recommendations and ongoing plan of care.  Writer received a call from the  [facility] requesting an update message left 10/17 15:40.    Milagros Mendenhall RN, BSN, ACM   Care Transitions Specialist  [facility]  Care Transitions Specialist    [id number] [email or web address]  [email or web address]  Office: [phone] Fax: [phone]  [facility] [place]               [facility]  Genetic Counseling Consult    Patient:  Rosibel Saeed YOB: 1996   Date of Service:  23   MRN: [id number]    Rosibel was seen at the [facility] for genetic consultation. The indication for genetic counseling is abnormal fetal ultrasound at outside clinic. The patient was accompanied to this visit by her partner.         IMPRESSION/ PLAN   I met with Rosibel and her partner today to discuss the soft markers, choroid plexus cyst and echogenic intracardiac focus, identified on her outside ultrasound.These soft markers can be normal variation or associated with an increased risk for aneuploidy.     During today's [facility] visit, Rosibel had a blood draw for Expanded NIPS through [employer]. The core NIPS screens for trisomy 21, 18, and 13 and the patient opted to screen for sex chromosome aneuploidies, including reported fetal sex. In addition, expanded NIPS also includes microdeletion syndromes and rare autosomal trisomies. Results are expected in 7-14 days. The patient will be called with results and if they do not answer they requested a detailed message with results on their voicemail, including the predicted fetal sex information.  Patient was informed that results, including fetal sex, will be available in [employer].The patient is aware that the predicted fetal sex is female by ultrasound.    Rosibel had a level II comprehensive anatomy ultrasound today. Please see the ultrasound report for further details. She declined amniocentesis.      PREGNANCY HISTORY   /Parity:       Rosibel's pregnancy history is significant for:     : term, , male    CURRENT PREGNANCY   Current Age: 27 year old   Age at Delivery: 27 year old  HANY: 9/3/2023, by Ultrasound                                   Gestational Age: 22w2d  This pregnancy is a single gestation.       MEDICAL HISTORY   Rosibel s reported  "medical history is not expected to impact pregnancy management or risks to fetal development.       FAMILY HISTORY   A three-generation pedigree was obtained today and is scanned under the \"Media\" tab in Epic. The family history was reported by Rosibel and her partner.    The following significant findings were reported today:   Rosibel shared that her mother was diagnosed with spina bifida which did not require surgery. Neural tube defects, like spina bifida, are usually a complex genetic condition caused by the combination of genetic factors and environmental factors. Since there is a genetic component to isolated neural tube defects the recurrence risk is highest for first degree relatives and decreases with distance in relationship.     Rosibel's partner had a maternal aunt with intellectual disabilities. He is not aware of  the specific underlying etiology for this family member's diagnosis. We discussed the multiple etiologies of intellectual disabilities, including possible genetic and environmental causes.  Given the information provided we cannot rule out a possible genetic association and the risk for other family members may be increased.  However, a precise risk estimate for this pregnancy cannot be provided.    Otherwise, the reported family history is unremarkable for multiple miscarriages, stillbirths, birth defects, intellectual disabilities, known genetic conditions, and consanguinity.       CARRIER SCREENING   Expanded carrier screening is available to screen for autosomal recessive conditions and X-linked conditions in a large list of genes. Autosomal recessive conditions happen when a mutation has been inherited from the egg and sperm and include conditions like cystic fibrosis, thalassemia, hearing loss, spinal muscular atrophy, and more. X-linked conditions happen when a mutation has been inherited from the egg and include conditions like fragile X syndrome. [place] screening was also reviewed. " About [place] Screening    The patient has declined the carrier screening options today. They are aware the option will remain, and they can contact us if they would like to pursue screening.       RISK ASSESSMENT FOR CHROMOSOME CONDITIONS   We explained that the risk for fetal chromosome abnormalities increases with maternal age. We discussed specific features of common chromosome abnormalities, including Down syndrome, trisomy 13, trisomy 18, and sex chromosome trisomies.      At age 27 at midtrimester, the risk to have a baby with Down syndrome is 1 in 990.    At age 27 at midtrimester, the risk to have a baby with any chromosome abnormality is 1 in 495.     A choroid plexus cyst was identified on her outside ultrasound. We discussed that this is a soft marker that can be isolated but can also be associated with an increased risk for trisomy 18. An echogenic intracardiac focus is   Rosibel has not had genetic screening in this pregnancy but elected to have screening today.      GENETIC TESTING OPTIONS   Genetic testing during a pregnancy includes screening and diagnostic procedures.      Screening tests are non-invasive which means no risk to the pregnancy and includes ultrasounds and blood work. The benefits and limitations of screening were reviewed. Screening tests provide a risk assessment (chance) specific to the pregnancy for certain fetal chromosome abnormalities but cannot definitively diagnose or exclude a fetal chromosome abnormality. Follow-up genetic counseling and consideration of diagnostic testing is recommended with any abnormal screening result. Diagnostic testing during a pregnancy is more certain and can test for more conditions. However, the tests do have a risk of miscarriage that requires careful consideration. These tests can detect fetal chromosome abnormalities with greater than 99% certainty. Results can be compromised by maternal cell contamination or mosaicism and are limited by the  resolution of current genetic testing technology.     There is no screening or diagnostic test that detects all forms of birth defects or intellectual disability.     We discussed the following screening options:   Non-invasive prenatal testing (NIPT)    Also called cell-free DNA screening because it detects chromosomes from the placenta in the pregnant person's blood    Can be done any time after 10 weeks gestation    Screens for trisomy 21, trisomy 18, trisomy 13, and sex chromosome aneuploidies    Cannot screen for open neural tube defects, maternal serum AFP after 15 weeks is recommended      We discussed the following ultrasound options:  Comprehensive level II ultrasound (Fetal Anatomy Ultrasound)    Ultrasound done between 18-20 weeks gestation    Screens for major birth defects and markers for aneuploidy (like trisomy 21 and trisomy 18)    Includes looking at the fetus/baby's growth, heart, organs (stomach, kidneys), placenta, and amniotic fluid      We discussed the following diagnostic options:   Amniocentesis    Invasive diagnostic procedure done after 15 weeks gestation    The procedure collects a small sample of amniotic fluid for the purpose of chromosomal testing and/or other genetic testing    Diagnostic result; more than 99% sensitivity for fetal chromosome abnormalities    Testing for AFP in the amniotic fluid can test for open neural tube defects        It was a pleasure to be involved with [facility]. Face-to-face time of the meeting was 30 minutes.    Mimi Julio, GC, MS, LGC  Certified and [place] Licensed Genetic Counselor  [facility]  Maternal Fetal Medicine  Office: [phone]  [facility]: [phone]   Fax: [phone]  [facility]

## 2023-10-17 NOTE — PROGRESS NOTES
INTERIM REPORT of Video-EEG Monitoring              DATE OF RECORDING:  10/17/2023         I reviewed the ongoing video-EEG monitoring of Kortney Germain.          The EEG during sedated coma remains abnormal due to generalized delta-theta slowing, with prolonged periods of slowly repetitive, non-evolving, generalized periodic epileptiform discharges, and with periods of exclusively sinusoidal (non-epileptiform) generalized slowing.    No electrographic seizures were observed.         These abnormalities indicate severe electrographic encephalopathy, and an elevated risk of epileptic seizures.  This recording excludes non-convulsive status epilepticus as a possible cause of this encephalopathy.    Melecio Lopes M.D.

## 2023-10-17 NOTE — PROGRESS NOTES
Psychiatry progress note  Consult date: September 29, 2023         Reason for Consult, requesting source:      Tardive dyskinesia    Requesting source: Bridgett Gama    Labs and imaging reviewed. Patient seen and evaluated by Milagros Parker MD          HPI:     This is a 68-year-old female with a history of schizophrenia and tardive dyskinesia.  She lives in a group home.  I saw her back in March related to concerns for dyskinesia symptoms.  Now presents with abdominal pain as well as back pain.  She has been found to have a UTI, and altered mentation.  Patient also may have been overusing Tylenol.  She presents with hyponatremia, and constipation as well.  Overnight, the patient had ongoing high fevers and rigors.  She had a change in her antibiotic.  She is anemic with hemoglobin of 6.9 when she presented, now 7.5.  GI is following because the patient had bright red rectal bleeding after an enema yesterday.  Patient's liver transaminases and bilirubin are normal.  CK this morning is 105.  TSH was normal in March, as well as vitamin B12 level.  CT abdomen and pelvis without contrast shows multiple uterine fibroids, and rectal distention related to stool and bladder distention.  Current working diagnosis is sepsis related to UTI.  Temperature this morning is 102.7 and has been trending up overnight.    Patient was seen today just after she was intubated.  She was on a gurney and getting ready to move to the ICU.  Discussed with Dr. Polanco and Shakila Vargas NP.  Patient apparently did have some rigidity and was posturing with both feet and hands.  She was also continuing to shake.  Unclear if these were rigors, or shaking from another etiology.    We discussed the fact that this could be catatonia.  The patient's CK is normal.    Patient is hypertensive, so has autonomic instability, she is febrile, was rigid this morning and posturing.  We are awaiting head imaging results as well as LP.  Neurology  is on board.  Constellation of symptoms highly concerning for malignant catatonia.      10/2/2023: Patient seen today for follow-up.  Discussed with ICU nursing.  Patient is sedated and intubated.  She apparently has been having status seizures.  Patient had been on Vimpat, and after she was administered the paralytic to eliminate muscle artifact, was still having seizure activity.  Fosphenytoin was added.    10/3/2023: Patient remains heavily sedated.  Neurology notes indicate that EEG is improved on sedative drips.  There is a pending ID consult because the patient has grown GPC in broth of LP.  There is a recommendation for a paraneoplastic panel.  We will continue to follow along.    10/4/2023: Patient seen today for follow-up.  She is still heavily sedated.  He continues to have epileptiform discharges despite deep sedation.  Discussed with RN.  Is unclear the patient has anyone who could act as a substitute decision-maker.  The patient does have a history of commitment, and likely has a .  Have asked LMHP from our team to see if she can find out who this  may be, to get further information.    10/16/2023: Patient seen today for follow-up.  Discussed with RN and care coordinator.  Patient remains in status epilepticus.  Efforts have taken place to try to find any family members, with no success.  Patient is intubated, sedated, and unable to interact.    10/17/2023: Patient seen today for follow-up.  Discussed with RN and care coordinator.  Chart notes indicate patient is going to have a tracheostomy.  She is also going to undergo a wean off of Versed with EEG monitoring.        Past Psychiatric History:   Patient's home meds include Ativan 0.25 mg p.o. nightly and 0.5 mg p.o. twice daily per outpatient med reconciliation.  PDMP indicates the patient only gets #75 Ativan 0.5 mg tablets per 30 days.  This would not be enough for that dose.  Their medications include escitalopram 20 mg p.o.  daily, diphenhydramine 50 mg p.o. 3 times daily, perphenazine 4 mg p.o. every morning and 8 mg p.o. nightly.    From my last visit with the patient in March, she first developed mental health symptoms after a traumatic head injury at age 29.  She fell on the ice, and hit her head.  She has had psychiatric issues since then.  She has carried a diagnosis of schizoaffective disorder historically, and first began seeing psychiatry in the 80s.  It may have been prior to that, but that is as far back as epic charting goes.        Substance Use and History:   none        Past Medical History:   PAST MEDICAL HISTORY:   Past Medical History:   Diagnosis Date    Allergic state     Depressive disorder     Dyskinesia     Hypertension     Schizo affective schizophrenia (H)        PAST SURGICAL HISTORY: No past surgical history on file.          Family History:   FAMILY HISTORY: No family history on file.    Family Psychiatric History:         Social History:   SOCIAL HISTORY:   Social History     Tobacco Use    Smoking status: Never    Smokeless tobacco: Never   Substance Use Topics    Alcohol use: No                Physical ROS:   Patient cannot comply with 10 point review of systems due to sedation and intubation.           Medications:      chlorhexidine  15 mL Mouth/Throat Q12H    clobazam  20 mg Oral or Feeding Tube BID    fiber modular  1 packet Per Feeding Tube Daily    fosphenytoin (CEREBYX) 100 mg PE in sodium chloride 0.9 % 100 mL intermittent infusion  100 mg PE Intravenous TID    heparin ANTICOAGULANT  5,000 Units Subcutaneous Q8H    insulin aspart  1-6 Units Subcutaneous Q4H    lacosamide (VIMPAT) 200 mg in sodium chloride 0.9 % 110 mL intermittent infusion  200 mg Intravenous BID    multivitamins w/minerals  15 mL Oral or Feeding Tube Daily    pantoprazole  40 mg Oral or Feeding Tube QAM AC    Or    pantoprazole  40 mg Intravenous QAM AC    polyethylene glycol  17 g Oral or Feeding Tube BID    protein modular  1  packet Per Feeding Tube Daily    sodium chloride (PF)  10-40 mL Intracatheter Q7 Days    sodium chloride (PF)  3 mL Intracatheter Q8H              Allergies:     Allergies   Allergen Reactions    Amlodipine     Clozapine     Egg [Chicken-Derived Products (Egg)]     Penicillins     Potassium     Poultry Meal           Labs:     Recent Results (from the past 48 hour(s))   Glucose by meter    Collection Time: 10/15/23  4:07 PM   Result Value Ref Range    GLUCOSE BY METER POCT 121 (H) 70 - 99 mg/dL   Glucose by meter    Collection Time: 10/15/23  8:15 PM   Result Value Ref Range    GLUCOSE BY METER POCT 122 (H) 70 - 99 mg/dL   Phenytoin level    Collection Time: 10/15/23  9:48 PM   Result Value Ref Range    Phenytoin 8.9   ug/mL   Glucose by meter    Collection Time: 10/16/23 12:25 AM   Result Value Ref Range    GLUCOSE BY METER POCT 128 (H) 70 - 99 mg/dL   Glucose by meter    Collection Time: 10/16/23  4:13 AM   Result Value Ref Range    GLUCOSE BY METER POCT 103 (H) 70 - 99 mg/dL   CBC with platelets    Collection Time: 10/16/23  6:23 AM   Result Value Ref Range    WBC Count 11.0 4.0 - 11.0 10e3/uL    RBC Count 3.47 (L) 3.80 - 5.20 10e6/uL    Hemoglobin 8.5 (L) 11.7 - 15.7 g/dL    Hematocrit 28.5 (L) 35.0 - 47.0 %    MCV 82 78 - 100 fL    MCH 24.5 (L) 26.5 - 33.0 pg    MCHC 29.8 (L) 31.5 - 36.5 g/dL    RDW 20.9 (H) 10.0 - 15.0 %    Platelet Count 551 (H) 150 - 450 10e3/uL   Basic metabolic panel    Collection Time: 10/16/23  6:23 AM   Result Value Ref Range    Sodium 137 135 - 145 mmol/L    Potassium 4.2 3.4 - 5.3 mmol/L    Chloride 104 98 - 107 mmol/L    Carbon Dioxide (CO2) 23 22 - 29 mmol/L    Anion Gap 10 7 - 15 mmol/L    Urea Nitrogen 16.6 8.0 - 23.0 mg/dL    Creatinine 0.42 (L) 0.51 - 0.95 mg/dL    GFR Estimate >90 >60 mL/min/1.73m2    Calcium 8.1 (L) 8.8 - 10.2 mg/dL    Glucose 95 70 - 99 mg/dL   Phosphorus    Collection Time: 10/16/23  6:23 AM   Result Value Ref Range    Phosphorus 3.9 2.5 - 4.5 mg/dL    Magnesium    Collection Time: 10/16/23  6:23 AM   Result Value Ref Range    Magnesium 2.1 1.7 - 2.3 mg/dL   Glucose by meter    Collection Time: 10/16/23 11:07 AM   Result Value Ref Range    GLUCOSE BY METER POCT 101 (H) 70 - 99 mg/dL   Glucose by meter    Collection Time: 10/16/23  3:16 PM   Result Value Ref Range    GLUCOSE BY METER POCT 107 (H) 70 - 99 mg/dL   Glucose by meter    Collection Time: 10/16/23  8:34 PM   Result Value Ref Range    GLUCOSE BY METER POCT 119 (H) 70 - 99 mg/dL   CBC with platelets    Collection Time: 10/17/23  4:33 AM   Result Value Ref Range    WBC Count 12.2 (H) 4.0 - 11.0 10e3/uL    RBC Count 3.36 (L) 3.80 - 5.20 10e6/uL    Hemoglobin 8.3 (L) 11.7 - 15.7 g/dL    Hematocrit 27.6 (L) 35.0 - 47.0 %    MCV 82 78 - 100 fL    MCH 24.7 (L) 26.5 - 33.0 pg    MCHC 30.1 (L) 31.5 - 36.5 g/dL    RDW 21.0 (H) 10.0 - 15.0 %    Platelet Count 539 (H) 150 - 450 10e3/uL   Phosphorus    Collection Time: 10/17/23  4:33 AM   Result Value Ref Range    Phosphorus 3.1 2.5 - 4.5 mg/dL   Magnesium    Collection Time: 10/17/23  4:33 AM   Result Value Ref Range    Magnesium 2.0 1.7 - 2.3 mg/dL   Basic metabolic panel    Collection Time: 10/17/23  4:33 AM   Result Value Ref Range    Sodium 136 135 - 145 mmol/L    Potassium 4.1 3.4 - 5.3 mmol/L    Chloride 103 98 - 107 mmol/L    Carbon Dioxide (CO2) 22 22 - 29 mmol/L    Anion Gap 11 7 - 15 mmol/L    Urea Nitrogen 19.0 8.0 - 23.0 mg/dL    Creatinine 0.40 (L) 0.51 - 0.95 mg/dL    GFR Estimate >90 >60 mL/min/1.73m2    Calcium 7.9 (L) 8.8 - 10.2 mg/dL    Glucose 124 (H) 70 - 99 mg/dL   Glucose by meter    Collection Time: 10/17/23  4:40 AM   Result Value Ref Range    GLUCOSE BY METER POCT 123 (H) 70 - 99 mg/dL   Glucose by meter    Collection Time: 10/17/23  8:31 AM   Result Value Ref Range    GLUCOSE BY METER POCT 125 (H) 70 - 99 mg/dL   Glucose by meter    Collection Time: 10/17/23 12:14 PM   Result Value Ref Range    GLUCOSE BY METER POCT 111 (H) 70 - 99 mg/dL           Physical and Psychiatric Examination:     BP (!) 148/74   Pulse 78   Temp 99.7  F (37.6  C) (Axillary)   Resp 21   Wt 48.3 kg (106 lb 7.7 oz)   SpO2 94%   BMI 20.80 kg/m    Weight is 106 lbs 7.71 oz  Body mass index is 20.8 kg/m .    Physical Exam:  I have reviewed the physical exam as documented by by the medical team and agree with findings and assessment and have no additional findings to add at this time.    Mental Status Exam:    Appearance:  Sedated and severe distress  Attitude:   Unable to cooperate  Eye Contact:   Sedated  Mood:   Unable to assess  Affect:   Unable to assess  Speech:  mute  Language: Fluent in english   Psychomotor Behavior:  no evidence of tardive dyskinesia, dystonia, or tics  Thought Process:   Patient sedated  Associations:   Patient sedated  Thought Content:   Unable to assess  Insight:   Unable to assess  Judgement:   Unable to assess  Oriented to:   Patient sedated  Attention Span and Concentration:   Patient sedated  Recent and Remote Memory:   Unable to assess  Fund of Knowledge: Appropriate   Gait and Station:                DSM-5 Diagnosis:   Schizoaffective disorder versus disorganized schizophrenia    Delirium, rule out catatonia versus malignant catatonia    UTI          Assessment:     This is a 68-year-old female with a history of schizoaffective disorder versus schizophrenia.  I have met her before, and she told me that this all happened after she sustained a head injury at age 29 when she fell on ice, and hit her head, losing consciousness.  I am not able to find any neurology notes from when this happened, as it was essentially 40 years ago.    She has had multiple hospitalizations including at the [facility] for psychiatric reasons.  She has tardive dyskinesia which she told me is better when she is on her Trilafon.  She also states Ativan helps her with this.    Patient has had altered mentation since presenting.  This morning, she was  posturing, had some rigidity, and has noted to have a climbing fever overnight.  There is concern for sepsis related to UTI.  She was intubated by the time I saw her, but I am concerned that she is catatonic.  With her fever, and elevated blood pressure, another concern is malignant catatonia.  Patient CK is normal this morning at 105, so I am less concerned about neuroleptic malignant syndrome. Recommend following serial CKs.    Patient is getting Versed, clearly a benzodiazepine, as sedation.  Typical treatment for catatonia is Ativan.  We would usually attempt Ativan at a dose of 1 mg IV as a lorazepam challenge to see if patient had clinical improvement with catatonia symptoms.  She is already intubated, so I am not sure how to manage this.  Will discuss with intensivist.      The other option is ECT.  We cannot do that here, so the patient will need to be transferred to the [facility].  I talked to my colleague at the [facility], and there are many logistical challenges with getting the patient over there.  Hopefully we can ascertain whether or not this is actually malignant catatonia, and aggressively treat with Ativan.  I do see some case reports of other benzodiazepines having benefit, though lorazepam is the standard of practice in terms of treatment.    There is a work-up underway to see if there is another etiology medically of what is going on with her.  She had an LP, and CT head.    Patient continues with deep sedation.  She apparently is no longer having seizures, so Versed is going to be weaned by 1 mg/h every 2 hours until 1800.  EEG will be monitored.    For now, psychiatry will sign off until patient awakens.  If there is further need for services, please do not hesitate to reconsult.          Summary of Recommendations:     1.  Psychiatry will sign off for now, as the patient remains sedated and noncommunicative.    2.  Please reconsult if further assistance would be helpful.    Milagros  Keith MCMANUS  Consult/Liaison Psychiatry and Addiction Medicine  [facility]

## 2023-10-18 NOTE — PROGRESS NOTES
[facility] Progress Note    Interval Events   EEG: severe electrographic encephalopathy, and an elevated risk of epileptic seizures.  This recording excludes non-convulsive status epilepticus as a possible cause of this encephalopathy.     Current AEDs: lacosamide 200 mg BID, fosphenytoin 100 mg TID, clobazam 20 mg BID, Versed gtt at 10 mg/hr. Started to again wean Versed on 10/17.    Temp max 99.7  WBC 11>12.2>10.2    Exam unchanged, see below.      HPI Summary  Kortney Germain is a 68 year old female with PMHx significant for anxiety/depression, schizoaffective disorder (living in group home), HTN, odynophagia, tardive dyskinesia, HTN. She presented 9/28 with lower back pain and abdominal pain. She was found to have UTI, anemia (received 1 unit PRBC), and constipation. She had decreased LOC and generalized shaking overnight on 9/28. RRT called and pt was intubated on 9/29/23 for encephalopathy and concern that should would be unable to protect her airway. Initial EEG was concerning for status epilepticus for which she was started on AEDs. She has remained intubated and encephalopathic since admission.     Attempted to decrease AED doses 10/9 to facilitate recovery of consciousness but had recurrent seizures and increasingly active EEG. Brain MRI was repeated 10/11 which again showed no acute findings. Propofol restarted 10/14 due to increasing EEG activity.  Propofol stopped 10/15 and started on Versed by primary team due to concern for propofol toxicity. EEG 10/15 morning again showing NCSE, Versed titrated up with improvement.    Evaluation Summarized  EEG 09/29/23: Abnormal due to the presences of generalized periodic pattern consisting of 2-3.5 hz discharges, maximum negativity in bifrontal region, periodic pattern occupy 90% of the record and the remaining portion consist of delta slowing. These VEEG findings are consistent with a severe encephalopathy and status  epilepticus.  However it is important to note patient has persistent head tremoring which may also produce a rhythmic artifact on the EEG and there is persistent electro myogenic artifact making this 1 hour segment difficult to interpret.  It would be helpful to record more video EEG data and optimize current seizure medications to evaluate if there is changes in the EEG.  Based on the limited data that is interpretable on this 1 hour file EEG is concerning for status epilepticus.      CT Head 9/29: no acute intracranial pathology, brain atrophy, CSVID    MRI 9/30/23: no acute intracranial process, generalized atrophy, CSVID  MRI 10/11/23: no acute intracranial process, no hemorrhage, moderate-advanced brain atrophy and leukoaraiosis, CSVID     BC: NG  CSF analysis   -glucose 98  -protein 22.8  -2 nucleated cells, RBC 0  -aerobic culture GPC in broth only   -anaerobic culture NGTD  -HSV negative  -Veag encephalopathy autoimmune panel negative      Impression   Status epilepticus, EEG improved on 3 AEDs and sedative drips - uncertain etiology of NCSE. MRI brain x2 with no structural etiology for new seizures. Decreased doses of AEDs on 10/9 to maximize her chances of regaining consciousness. Unfortunately, she had increasing EEG activity on lower doses of AEDs. Again improved with increased AED doses and Versed gtt.     Shaking/tremors, unclear etiology. May represent baseline tardive dyskinesia vs manifestation of status epilepticus vs toxic encephalopathy vs other       Plan  - continue lacosamide to 200 mg BID  - continue fosphenytoin 100 mg TID. Trough level ordered for today.  - continue clobazam 20 mg BID  - Continue to gradually wean Versed by 1 mg/hr every 2 hours. Have discussed with epileptologist who will continue to monitor the tracing while we are weaning.  - limit dopaminergic/serotonergic agents   - Ethics involved as patient does not have a guardian and potential need for Trach/PEG      Patient  "Follow-up    - final recommendation pending work-up    We will continue to follow.     Radha Adan PA-C  Vascular Neurology    To page me or covering stroke neurology team member, click here: [facility]COM   Choose \"On Call\" tab at top, then search dropdown box for \"Neurology Adult\", select location, press Enter, then look for stroke/neuro ICU/telestroke.     _____________________________________________________    Clinically Significant Risk Factors              # Hypoalbuminemia: Lowest albumin = 3.4 g/dL at 9/30/2023  5:15 AM, will monitor as appropriate       # Hypertension: Noted on problem list                     Medications   Scheduled Meds   chlorhexidine  15 mL Mouth/Throat Q12H    clobazam  20 mg Oral or Feeding Tube BID    fiber modular  1 packet Per Feeding Tube Daily    fosphenytoin (CEREBYX) 100 mg PE in sodium chloride 0.9 % 100 mL intermittent infusion  100 mg PE Intravenous TID    heparin ANTICOAGULANT  5,000 Units Subcutaneous Q8H    insulin aspart  1-6 Units Subcutaneous Q4H    lacosamide (VIMPAT) 200 mg in sodium chloride 0.9 % 110 mL intermittent infusion  200 mg Intravenous BID    multivitamins w/minerals  15 mL Oral or Feeding Tube Daily    pantoprazole  40 mg Oral or Feeding Tube QAM AC    Or    pantoprazole  40 mg Intravenous QAM AC    polyethylene glycol  17 g Oral or Feeding Tube BID    protein modular  1 packet Per Feeding Tube Daily    sodium chloride (PF)  10-40 mL Intracatheter Q7 Days    sodium chloride (PF)  3 mL Intracatheter Q8H       Infusion Meds   dextrose      midazolam 10 mg/hr (10/18/23 0030)       PRN Meds  acetaminophen **OR** acetaminophen, albuterol, [Held by provider] cyclobenzaprine, dextrose, glucose **OR** dextrose **OR** glucagon, hydrALAZINE, HYDROmorphone, lidocaine 4%, lidocaine (buffered or not buffered), LORazepam, melatonin, naloxone **OR** naloxone **OR** naloxone **OR** naloxone, nitroGLYcerin, sodium chloride (PF), sodium chloride (PF), sodium chloride (PF), sodium " "chloride (PF)       Exam obtained while on sedation with versed at 15 ml/hr given NCSE    PHYSICAL EXAMINATION  Temp:  [98.4  F (36.9  C)-99.7  F (37.6  C)] 98.4  F (36.9  C)  Pulse:  [61-78] 72  Resp:  [11-28] 12  BP: ()/(50-77) 139/74  FiO2 (%):  [30 %] 30 %  SpO2:  [94 %-100 %] 100 %      Neurologic  Mental Status:  does not open eyes to voice or noxious, not following commands  Cranial Nerves:  slight dysconjugate gaze, PERRL, hippus, intact corneals  Motor:  no abnormal movements, no spontaneous movements noted, withdraws BLEs to noxious  Sensory:   see motor    Imaging  I personally reviewed all imaging; relevant findings per HPI.     Lab Results Data   CBC  Recent Labs   Lab 10/18/23  0438 10/17/23  0433 10/16/23  0623   WBC 10.2 12.2* 11.0   RBC 3.12* 3.36* 3.47*   HGB 7.8* 8.3* 8.5*   HCT 25.6* 27.6* 28.5*   * 539* 551*     Basic Metabolic Panel    Recent Labs   Lab 10/18/23  0438 10/18/23  0405 10/17/23  2355 10/17/23  0440 10/17/23  0433 10/16/23  1107 10/16/23  0623     --   --   --  136  --  137   POTASSIUM 4.0  --   --   --  4.1  --  4.2   CHLORIDE 105  --   --   --  103  --  104   CO2 22  --   --   --  22  --  23   BUN 14.0  --   --   --  19.0  --  16.6   CR 0.34*  --   --   --  0.40*  --  0.42*   * 117* 103*   < > 124*   < > 95   LULU 7.4*  --   --   --  7.9*  --  8.1*    < > = values in this interval not displayed.     Liver Panel  No results for input(s): \"PROTTOTAL\", \"ALBUMIN\", \"BILITOTAL\", \"ALKPHOS\", \"AST\", \"ALT\", \"BILIDIRECT\" in the last 168 hours.  INR    Recent Labs   Lab Test 09/28/23  1244 03/07/23  1629   INR 1.04 1.15      Lipid Profile    Recent Labs   Lab Test 10/15/23  0650 10/03/23  0956 03/08/23  0830   CHOL  --   --  143   HDL  --   --  50   LDL  --   --  75   TRIG 136 1,372* 88     A1C  No lab results found.  Troponin  No results for input(s): \"CTROPT\", \"TROPONINIS\", \"TROPONINI\", \"GHTROP\" in the last 168 hours.       Data   Billing:  [id number]: I " personally examined and evaluated the patient today. At the time of my evaluation and management the patient was critical condition today due to status epilepticus. I personally managed chart review, exam, discussion with care team. Key decisions made today included continue to wean versed. I spent a total of 30 minutes providing critical care services, evaluating the patient, directing care and reviewing laboratory values and radiologic reports. Greater than 50% was spent in counseling and coordination of care

## 2023-10-18 NOTE — CONSULTS
"[facility] Nurse Inpatient Assessment     Consulted for: Wound buttocks     Summary: patient was initially seen by [facility] with a wound that was present on admission to [facility] skin damage to buttock related to moisture related to sweat and friction, improved 10/6. 10/18 Wound is now reopened.     Patient History (according to provider note(s):      \"68 year old female admitted on 9/28/2023. With history of schizophrenia, tardive dyskinesia, HTN, depression, ADD, and UTI who resides in a group home who presented to the ED with abdominal and back pain\"       Assessment:      Areas visualized during today's visit: Focused: and Sacrum/coccyx    Wound location: Left buttock    Last photo: 10/18  Wound due to: Friction and Moisture Associated Skin Damage (MASD), reopened  Wound history/plan of care: found with criticaid in use per plan of care. Suspect area was a previous pressure injury due to raised scar tissue. Now reopened. Frequent loose stools  Wound base:  epidermis and dermis     Palpation of the wound bed: normal      Drainage: none     Description of drainage: none     Measurements (length x width x depth, in cm): 2.2cm x 2.2cm x 0.1cm with skin irregularity. Wound appears deeper but flush with irregular contours. Also small 0.5cm x0.5cm x 0.1cm removed with cleansing     Tunneling: N/A     Undermining: N/A  Periwound skin: Denuded and Dry/scaly      Color: pink      Temperature: normal   Odor: none  Pain: no grimacing or signs of discomfort, none  Pain interventions prior to dressing change: patient tolerated well  Treatment goal: Protection  STATUS: initial assessment  Supplies ordered: gathered, at bedside, and supplies stored on unit       Treatment Plan:     Left buttock wound care: BID and PRN with incontinence  1. Cleanse with each incontinence episode with wei cleanser and soft dry wipes  2. Do not apply mepilex for this patients buttock  3. Apply Triad paste ([id number]) to perianal " after each incontinence episode  4. Use covidien absorbent pad for incontinence, change every 2 hours with routine turns and repositioning, to keep dry from sweat and incontinence  5. Do not apply diapers  Pressure Injury prevention (please order supplies if not in room)        1.T urn/reposition every 1-2 hrs  2.   Float heels off bed with use of pillows or if needed Heel Lift boots (Prevalon #[id number])  3.   Prevent sliding and shear by limiting HOB to 30 degrees or less unless contraindicated, use knee gatch first if not contraindicated  4.   Chair cushion pressure redistribution as needed (#[id number]): please limit sitting to an hour at a time  5.   Optimize nutrition   6.   Isolibrium Mattress(ICU) or add TRACY pump to bed.    Orders: Written and Updated    RECOMMEND PRIMARY TEAM ORDER: None, at this time  Education provided: plan of care, Moisture management, and Hygiene  Discussed plan of care with: Patient and Nurse  WOC nurse follow-up plan: weekly  Notify WOC if wound(s) deteriorate.  Nursing to notify the Provider(s) and re-consult the WOC Nurse if new skin concern.    DATA:     Current support surface: Standard  Low air loss (TRACY pump, Isolibrium, Pulsate, skin guard, etc)  Containment of urine/stool: Incontinence Protocol, Incontinent pad in bed, and Purewick external catheter   BMI: Body mass index is 20.8 kg/m .   Active diet order: Orders Placed This Encounter      NPO for Medical/Clinical Reasons Except for: No Exceptions     Output: I/O last 3 completed shifts:  In: 1700.93 [I.V.:490.93; NG/GT:490]  Out: 725 [Urine:725]     Labs:   Recent Labs   Lab 10/18/23  0438   HGB 7.8*   WBC 10.2     Pressure injury risk assessment:   Sensory Perception: 2-->very limited  Moisture: 3-->occasionally moist  Activity: 1-->bedfast  Mobility: 1-->completely immobile  Nutrition: 3-->adequate  Friction and Shear: 2-->potential problem  Luis Score: 12    Ellen Sandoval CWOCN   Dept. Vocera- Contact WOC Nurse Joseph)  via  Vocera   Dept. Office Number: [phone]

## 2023-10-18 NOTE — PROGRESS NOTES
The patient is sedated with versed. We are titrating down per Stroke-Neurology's instruction. Currently at 6mg/hr. Patient still does not respond much to stimuli and is deeply sedated. Eyelids flutter occasionally with oral cares and repositioning.   Lung sounds clear/diminished. 30% FiO2, PEEP 5 on ventilator.   Sinus rhythm on monitor. BP stable.   External catheter in use. Patient has had multiple small BMs today. External catheter has been changed 5 times due to soiling with stool.   Patient's area of discolored skin has become opened. See WOC RN note. Covering with barrier paste/triad paste.   Tube feeding infusing at goal. Blood sugars checked every 4 hours. No sliding scale insulin coverage has been needed.   Jesus Payan RN 6:37 PM 10/18/23

## 2023-10-18 NOTE — PROGRESS NOTES
Critical Care Progress Note  [facility]  Kortney Germain MRN# [id number]   Age: 68 year old YOB: 1955     Date of Admission: 9/28/2023  Date of Service: 10/18/2023    Assessment & Plan    68F pmh of schizo-affective schizophrenia, HTN, tardive dyskinesia now presented 9/28 from group home with back pain and fever, Ct abd showed rectum distended with stool and urinary bladder distended, now s/p quinonez placement.  Arrival UA c/w UTI. Since arrival from the ED she was alert but not generally interactive (?part of this may be due to underlying psychiatric illness?). Morning of 9/29 she was noted to be febrile and shaking prompting intubation to facilitate head CT and LP.  Placed on heavy sedation including benzodiazepine and propofol after EEG showed evidence of potential new seizure foci.  Evening of 10/3 triglycerides returned critically high. Eventually able to shut off the propofol after adding additional doses of as needed benzodiazepines and increasing the goal range of the midazolam. Following this though she had more seizure-like activity which resolved with increasing benzodiazepines.    Unfortunately she has not regained consciousness since getting control of her seizures.  AED doses were decreased to increase her wakefulness but she then started having breakthrough seizures.  AED dosing has been re-increased.    Neurological/Psychological    **Sedation: on midazolam again for seizures    Acute Encephalopathy: likely due to seizure activity vs post-ictal state vs side effects of seizure meds. Considered serotonin syndrome but this is less likely.  Psychiatrist also wondering about malignant catatonia, but this is unlikely now that she's been on signficant doses of benzos and there is evidence of seizures on eeg.   Underlying schizoaffective schizophrenia and h/o tardive dyskinesia.  Status epilepticus.  Is on 3 antiseizure agents.  Seizures have again broken on midazolam drip.  Drip  continues today at 10 mg/hr--slowly weaning down  ** MRI (10/11/23): no acute pathology to explain persistent encephalopathy              - AEDs  clobazam 20 mg BID, fosphenytoin 100 tid, lacosamide 200 mg BID.               - [place] paraneoplastic panel pending              - ammonia level 14 10/9/2023   - MRI 10/11/2023 no acute abnormalities    Decision making: no formal guardian or decision maker. If mental status does not improve, it may not be safe to extubate and patient would need tracheostomy. Without decision maker or ability to ask patient, ethics consulted to assist with next steps. Only mental status is hindering extubation.               - ethics consulted--they are agreeable with tracheostomy  -- Any major decisions are required and unable to find a surrogate decision-maker will ensure that at least 2 physicians agree with the plan of care.     From SW note  Additional Information:  Attempting to search for NOK.  Sent referral to [employer] to inquire as to whether they had any additional information than we have been able to find.  The information that has been given is that patient has been committed 7 times since 1983 with the last 3 being done by human services or a hospital.  Her father passed away in 2022 and two other family members from the 1990's have also passed away.    Cardiovascular    Hypertension:  cannot be on Nifedipine (pta med).  Blood pressures are higher as were weaning sedation.    - monitor    Pulmonary    Loss of protective airway reflexes:  intubated/vented.  Inappropriate for extubation today. Due to mentation. Does well on pressure support  - Daily pressure support mode for conditioning  - Awaiting mental status to improve for extubation  - intubated since 9/29/23     Tracheostomy /  PEG Plans: Gen surg has reasonably pointed out that we can just use a olga-feed long term rather than a peg.  Have discussed trach with Dr. Benites--he will proceed with trach  this week under 2 physician consent.  Will continue efforts to complete the guardianship process in the meantime.  At this point the patient is comatose and unable to make her own medical decisions.    Renal/Fluids/Electrolytes    Significant positive fluid balance.  Received 1 dose diuretic with good response and now is auto diuresing.  -Address as necessary.  -Reduce Free water    Misc Plans  - monitor function and electrolytes, replacement per ICU protocols.   - generally avoid nephrotoxic agents such as NSAID, IV contrast unless specifically required  - adjust medications as needed for renal clearance  - follow I/O's as appropriate.    **I/O last 24hrs:   In: 1700.93 [NG/GT:490; I.V.:490.93]  Out: 725 [Urine:725]    Infectious Diseases    Sirs and possible UTI--UC with mixed christina but >100k colonies on admission.   Finished Ceftx course     Gastrointestinal/Genitourinary    No acute issues  - Tube feeds and PPI    **Nutrition: Diet: Adult Formula Drip Feeding: Continuous Osmolite 1.5; Nasogastric tube; Goal Rate: 30; mL/hr; Increase TF to 30 mL/hr; Do not advance tube feeding rate unless K+ is = or > 3.0, Mg++ is = or > 1.5, and Phos is = or > 1.9  NPO for Medical/Clinical Reasons Except for: No Exceptions        Endocrine/Metabolic    Stress induced hyperglycemia.  Plan  - ICU insulin protocol, goal sugar <180    Hematology/Oncology    Anemia of chronic illness: s/p prbc 9/29, no apparent blood loss    Rheumatology    no acute issues    Skin/Musculoskeletal    no acute issues    ------------------------------------------------------------------------------------------------------------------  Clinically Significant Risk Factors              # Hypoalbuminemia: Lowest albumin = 3.4 g/dL at 9/30/2023  5:15 AM, will monitor as appropriate       # Hypertension: Noted on problem list                         ------------------------------------------------------------------------------------------------------------------  Prophylaxis/ICU Checklist    -DVT: Subcutaneous Heparin    -VAP: HOB 30 degrees, chlorhexidine rinse    -Stress Ulcer: PPI    -Restraints: Nonviolent soft two point restraints required and necessary for patient safety and continued cares and good effect as patient continues to pull at necessary lines, tubes despite education and distraction. Will readdress daily.     -IV Access: Central access required and necessary for continued patient cares     -Feeding - Diet: Adult Formula Drip Feeding: Continuous Osmolite 1.5; Nasogastric tube; Goal Rate: 30; mL/hr; Increase TF to 30 mL/hr; Do not advance tube feeding rate unless K+ is = or > 3.0, Mg++ is = or > 1.5, and Phos is = or > 1.9  NPO for Medical/Clinical Reasons Except for: No Exceptions        Consults: ethics, , neuro  Family: none  Code Status: Full Code      Disposition: guarded    I have personally reviewed the daily labs, imaging studies, cultures and discussed the case with referring physician and consulting physicians.   I personally managed the respiratory support devices, hemodynamics, sedation, analgesia, metabolic abnormalities and nutritional status.      This patient is critically ill and I have provided 35 minutes of critical care time (excluding procedures) on 10/18/2023    Interval History   No events overnight.  Seizures now broken; down to 15/hr of midazolam for seizure activity.  Appreciate neuro support.  Proceeding with trach this week.      Medications   Current Facility-Administered Medications   Medication Dose Route Frequency    chlorhexidine  15 mL Mouth/Throat Q12H    clobazam  20 mg Oral or Feeding Tube BID    fiber modular  1 packet Per Feeding Tube Daily    fosphenytoin (CEREBYX) 100 mg PE in sodium chloride 0.9 % 100 mL intermittent infusion  100 mg PE Intravenous TID    heparin ANTICOAGULANT  5,000 Units  Subcutaneous Q8H    insulin aspart  1-6 Units Subcutaneous Q4H    lacosamide (VIMPAT) 200 mg in sodium chloride 0.9 % 110 mL intermittent infusion  200 mg Intravenous BID    multivitamins w/minerals  15 mL Oral or Feeding Tube Daily    pantoprazole  40 mg Oral or Feeding Tube QAM AC    Or    pantoprazole  40 mg Intravenous QAM AC    polyethylene glycol  17 g Oral or Feeding Tube BID    protein modular  1 packet Per Feeding Tube Daily    sodium chloride (PF)  10-40 mL Intracatheter Q7 Days    sodium chloride (PF)  3 mL Intracatheter Q8H     Current Facility-Administered Medications   Medication Last Rate    dextrose      midazolam 10 mg/hr (10/18/23 0030)     Physical Exam   Vital Signs with Ranges  Temp:  [98.4  F (36.9  C)-99.7  F (37.6  C)] 98.4  F (36.9  C)  Pulse:  [61-78] 72  Resp:  [11-28] 12  BP: ()/(50-77) 139/74  FiO2 (%):  [30 %] 30 %  SpO2:  [94 %-100 %] 100 %    Wt Readings from Last 1 Encounters:   10/17/23 48.3 kg (106 lb 7.7 oz)    Body mass index is 20.8 kg/m .     BP - Mean:  [] 99  Vent Mode: CMV/AC  (Continuous Mandatory Ventilation/ Assist Control)  FiO2 (%): 30 %  Resp Rate (Set): 12 breaths/min  Tidal Volume (Set, mL): 400 mL  PEEP (cm H2O): 5 cmH2O  Resp: 12    General: Stated age intubated sedated  HEENT: EEG leads ET tube feeding tube  Lungs: Synchronous with ventilator  CVS: Regular rate rhythm  Abdomen: Grossly normal  Extremities/musculoskeletal: No edema  Neurology: Not expansive to voice today.  Skin: Grossly normal  Psychiatry: Unable  Exam of Line sites:  PICC     I/O last 3 completed shifts:  In: 1700.93 [I.V.:490.93; NG/GT:490]  Out: 725 [Urine:725]  Data   Labs & Studies of Note: I personally reviewed the following studies:    Imaging: All new imaging reviewed by me  Recent Results (from the past 24 hour(s))   XR Abdomen Port 1 View    Narrative    XR ABDOMEN PORT 1 VIEW   10/17/2023 9:02 AM     HISTORY: NG tube repositioning    COMPARISON: 10/16/2023      Impression     "IMPRESSION: Feeding tube tip is in the distal stomach.    NAVEED WALDRON MD         SYSTEM ID:  [id number]       ROUTINE ICU LABS (Last four results)  ROUTINE ICU LABS (Last four results)  CMP  Recent Labs   Lab 10/18/23  0438 10/18/23  0405 10/17/23  2355 10/17/23  2110 10/17/23  0440 10/17/23  0433 10/16/23  1107 10/16/23  0623 10/15/23  0819 10/15/23  0650     --   --   --   --  136  --  137  --  140   POTASSIUM 4.0  --   --   --   --  4.1  --  4.2  --  4.2   CHLORIDE 105  --   --   --   --  103  --  104  --  105   CO2 22  --   --   --   --  22  --  23  --  25   ANIONGAP 12  --   --   --   --  11  --  10  --  10   * 117* 103* 99   < > 124*   < > 95   < > 109*   BUN 14.0  --   --   --   --  19.0  --  16.6  --  16.6   CR 0.34*  --   --   --   --  0.40*  --  0.42*  --  0.41*   GFRESTIMATED >90  --   --   --   --  >90  --  >90  --  >90   LULU 7.4*  --   --   --   --  7.9*  --  8.1*  --  8.3*   MAG 2.1  --   --   --   --  2.0  --  2.1  --  2.2   PHOS 2.6  --   --   --   --  3.1  --  3.9  --  4.5    < > = values in this interval not displayed.     CBC  Recent Labs   Lab 10/18/23  0438 10/17/23  0433 10/16/23  0623 10/15/23  0650   WBC 10.2 12.2* 11.0 7.8   RBC 3.12* 3.36* 3.47* 3.46*   HGB 7.8* 8.3* 8.5* 8.5*   HCT 25.6* 27.6* 28.5* 28.4*   MCV 82 82 82 82   MCH 25.0* 24.7* 24.5* 24.6*   MCHC 30.5* 30.1* 29.8* 29.9*   RDW 20.7* 21.0* 20.9* 20.8*   * 539* 551* 534*     INRNo lab results found in last 7 days.  Arterial Blood Gas  Recent Labs   Lab 10/14/23  0801   O2PER 30     Inflammatory Markers  No lab results found.  Immune Globulin Studies  No lab results found.  Microbiology:  No results found for: \"CULT\"  No lab results found.  Urine Studies:    Recent Labs   Lab Test 09/28/23  1606 03/10/23  0932 03/09/23  0224   LEUKEST Moderate* Large* Large*   NITRITE Positive* Positive* Positive*   WBCU 18* >182* >182*   RBCU <1 2 4*         "

## 2023-10-18 NOTE — PLAN OF CARE
Goal Outcome Evaluation:    Neuro/RASS: RASS -4 to -5. Seems to withdraw from pain but unsure if this is a flexion to pain or decorticate posturing. Dysconjugate eyes. No responsive otherwise. EEG continues.     Tele/CV: SR        Resp/Pulm: LS diminished. ETT remains in place. Vent- , PEEP5, 30%FiO2, rate 12    GI/: Voiding via purwick, UOP adeqaute. 1 lrg BM and a couple smears of BM this shift, bowel meds held.TF rate at 30.     Integumentary: Scattered Bruising to upper FA's. Wound to sacrum, barrier paste applied.     Infusion/IV access: Triple lumen PICC to upper Lt arm. X2 ports infusing. Infusing versed, and x2 TKO's.

## 2023-10-19 NOTE — PROGRESS NOTES
[facility] Progress Note    Interval Events   EEG: per verbal report, no evidence of seizure     Current AEDs: lacosamide 200 mg BID, fosphenytoin 100 mg TID, clobazam 20 mg BID, Versed gtt at 6 mg/hr. Started to again wean Versed on 10/17.    Temp max 99.7  WBC 11>12.2>10.2>8.7  Phenytoin total level: 11.6    Opens eyes to voice and noxious, does not attend to examiner.       HPI Summary  Kortney Germain is a 68 year old female with PMHx significant for anxiety/depression, schizoaffective disorder (living in group home), HTN, odynophagia, tardive dyskinesia, HTN. She presented 9/28 with lower back pain and abdominal pain. She was found to have UTI, anemia (received 1 unit PRBC), and constipation. She had decreased LOC and generalized shaking overnight on 9/28. RRT called and pt was intubated on 9/29/23 for encephalopathy and concern that should would be unable to protect her airway. Initial EEG was concerning for status epilepticus for which she was started on AEDs. She has remained intubated and encephalopathic since admission.     Attempted to decrease AED doses 10/9 to facilitate recovery of consciousness but had recurrent seizures and increasingly active EEG. Brain MRI was repeated 10/11 which again showed no acute findings. Propofol restarted 10/14 due to increasing EEG activity.  Propofol stopped 10/15 and started on Versed by primary team due to concern for propofol toxicity. EEG 10/15 morning again showing NCSE, Versed titrated up with improvement.    Evaluation Summarized  EEG 09/29/23: Abnormal due to the presences of generalized periodic pattern consisting of 2-3.5 hz discharges, maximum negativity in bifrontal region, periodic pattern occupy 90% of the record and the remaining portion consist of delta slowing. These VEEG findings are consistent with a severe encephalopathy and status epilepticus.  However it is important to note patient has persistent head  tremoring which may also produce a rhythmic artifact on the EEG and there is persistent electro myogenic artifact making this 1 hour segment difficult to interpret.  It would be helpful to record more video EEG data and optimize current seizure medications to evaluate if there is changes in the EEG.  Based on the limited data that is interpretable on this 1 hour file EEG is concerning for status epilepticus.      CT Head 9/29: no acute intracranial pathology, brain atrophy, CSVID    MRI 9/30/23: no acute intracranial process, generalized atrophy, CSVID  MRI 10/11/23: no acute intracranial process, no hemorrhage, moderate-advanced brain atrophy and leukoaraiosis, CSVID     BC: NG  CSF analysis   -glucose 98  -protein 22.8  -2 nucleated cells, RBC 0  -aerobic culture GPC in broth only   -anaerobic culture NGTD  -HSV negative  -Vega encephalopathy autoimmune panel negative      Impression   Status epilepticus, EEG improved on 3 AEDs and sedative drips - uncertain etiology of NCSE. MRI brain x2 with no structural etiology for new seizures. Decreased doses of AEDs on 10/9 to maximize her chances of regaining consciousness. Unfortunately, she had increasing EEG activity on lower doses of AEDs. Again improved with increased AED doses and Versed gtt.     Shaking/tremors, unclear etiology. May represent baseline tardive dyskinesia vs manifestation of status epilepticus vs toxic encephalopathy vs other       Plan  - continue lacosamide to 200 mg BID  - continue fosphenytoin 100 mg TID.   - continue clobazam 20 mg BID  - Continue to gradually wean Versed by 1 mg/hr every 2 hours. Have discussed with epileptologist who will continue to monitor the tracing while we are weaning.  - limit dopaminergic/serotonergic agents   - Ethics involved as patient does not have a guardian and potential need for Trach/PEG  - Repeat MRI brain w/wo      Patient Follow-up    - final recommendation pending work-up    We will continue to follow.  "    Gricelda Cody, TAMI, CNP  Neurology  10/19/2023 7:35 AM  To page stroke neurology after hours or on a subsequent day, click here: [facility]  Choose \"On Call\" tab at top, then search dropdown box for \"Neurology Adult\" & press Enter, look for Neuro ICU/Stroke       _____________________________________________________    Clinically Significant Risk Factors              # Hypoalbuminemia: Lowest albumin = 3.4 g/dL at 9/30/2023  5:15 AM, will monitor as appropriate       # Hypertension: Noted on problem list                     Medications   Scheduled Meds   chlorhexidine  15 mL Mouth/Throat Q12H    clobazam  20 mg Oral or Feeding Tube BID    fiber modular  1 packet Per Feeding Tube Daily    fosphenytoin (CEREBYX) 100 mg PE in sodium chloride 0.9 % 100 mL intermittent infusion  100 mg PE Intravenous TID    heparin ANTICOAGULANT  5,000 Units Subcutaneous Q8H    insulin aspart  1-6 Units Subcutaneous Q4H    lacosamide (VIMPAT) 200 mg in sodium chloride 0.9 % 110 mL intermittent infusion  200 mg Intravenous BID    multivitamins w/minerals  15 mL Oral or Feeding Tube Daily    pantoprazole  40 mg Oral or Feeding Tube QAM AC    Or    pantoprazole  40 mg Intravenous QAM AC    polyethylene glycol  17 g Oral or Feeding Tube BID    protein modular  1 packet Per Feeding Tube Daily    sodium chloride (PF)  10-40 mL Intracatheter Q7 Days       Infusion Meds   dextrose      midazolam 6 mg/hr (10/18/23 2341)       PRN Meds  acetaminophen **OR** acetaminophen, albuterol, [Held by provider] cyclobenzaprine, dextrose, glucose **OR** dextrose **OR** glucagon, hydrALAZINE, HYDROmorphone, lidocaine 4%, lidocaine (buffered or not buffered), LORazepam, melatonin, naloxone **OR** naloxone **OR** naloxone **OR** naloxone, nitroGLYcerin, sodium chloride (PF), sodium chloride (PF), sodium chloride (PF), sodium chloride (PF)       Exam obtained while on sedation with versed at 15 ml/hr given NCSE    PHYSICAL EXAMINATION  Temp:  [97.6  F (36.4 " " C)-100  F (37.8  C)] 97.6  F (36.4  C)  Pulse:  [57-79] 68  Resp:  [11-25] 11  BP: (110-178)/(58-80) 137/68  FiO2 (%):  [30 %] 30 %  SpO2:  [98 %-100 %] 100 %      Neurologic  Mental Status:  opens eyes to voice and noxious, does not attend to examiner, does not follow commands.   Cranial Nerves:  slight dysconjugate gaze, PERRL, coughs to deep suction per RN  Motor:  no abnormal movements, no spontaneous movements noted, withdraws vs reflexive movement in BLEs to noxious, no movement to noxious in BLEs  Sensory:   see motor    Imaging  I personally reviewed all imaging; relevant findings per HPI.     Lab Results Data   CBC  Recent Labs   Lab 10/19/23  0425 10/18/23  0438 10/17/23  0433   WBC 8.7 10.2 12.2*   RBC 2.99* 3.12* 3.36*   HGB 7.4* 7.8* 8.3*   HCT 24.8* 25.6* 27.6*    460* 539*     Basic Metabolic Panel    Recent Labs   Lab 10/19/23  0425 10/19/23  0424 10/19/23  0009 10/18/23  0758 10/18/23  0438 10/17/23  0440 10/17/23  0433     --   --   --  139  --  136   POTASSIUM 4.1  --   --   --  4.0  --  4.1   CHLORIDE 107  --   --   --  105  --  103   CO2 24  --   --   --  22  --  22   BUN 13.1  --   --   --  14.0  --  19.0   CR 0.34*  --   --   --  0.34*  --  0.40*   * 108* 104*   < > 121*   < > 124*   LULU 8.0*  --   --   --  7.4*  --  7.9*    < > = values in this interval not displayed.     Liver Panel  No results for input(s): \"PROTTOTAL\", \"ALBUMIN\", \"BILITOTAL\", \"ALKPHOS\", \"AST\", \"ALT\", \"BILIDIRECT\" in the last 168 hours.  INR    Recent Labs   Lab Test 09/28/23  1244 03/07/23  1629   INR 1.04 1.15      Lipid Profile    Recent Labs   Lab Test 10/15/23  0650 10/03/23  0956 03/08/23  0830   CHOL  --   --  143   HDL  --   --  50   LDL  --   --  75   TRIG 136 1,372* 88     A1C  No lab results found.  Troponin  No results for input(s): \"CTROPT\", \"TROPONINIS\", \"TROPONINI\", \"GHTROP\" in the last 168 hours.       Data   Billing:  [id number]: I personally examined and evaluated the patient " today. At the time of my evaluation and management the patient was critical condition today due to status epilepticus. I personally managed chart review, exam, discussion with care team. Key decisions made today included continue to wean versed. I spent a total of 30 minutes providing critical care services, evaluating the patient, directing care and reviewing laboratory values and radiologic reports. Greater than 50% was spent in counseling and coordination of care

## 2023-10-19 NOTE — PROGRESS NOTES
CLINICAL NUTRITION SERVICES - REASSESSMENT NOTE      RECOMMENDATIONS FOR MD/PROVIDER TO ORDER:   Adjustments to FWF per MD      Recommendations Ordered by Registered Dietitian (RD):   Continue w/ TF + modulars as ordered  Ordered daily Expedite bottle via FT for wound healing     Type of Feeding Tube: Nasogastric   Enteral Frequency: Continuous   Enteral Regimen: Osmolite 1.5 at 30 mL/hr = 1080 kcal, 45 g protein, 147 g CHO, 0 g fiber, 549 mL free water   Other Nutrient Provisions:   + ProSource TF20 x1 pkt = 80 kcal and 20 g protein (9/30)  + Banatrol TF x2 pkt = 90 kcal, 10 g fiber, 4 g protein (10/4)  + Expedite bottle x1 = 100 kcal, 10 g protein (10/19)  Total Enteral Provisions: 1350 kcal (30 kcal/kg), 79 g protein (1.8 g/kg)  Free Water Flush: 100 mL q 4 hours (10/6-- Dr. Edmond)  Total Free Water Provisions: 1149 mL      Malnutrition:   % Weight Loss:  None noted  % Intake:  No decreased intake noted (TF)  Subcutaneous Fat Loss:  Orbital region mild-moderate depletion (10/12)  Muscle Loss:  Temporal region moderate depletion, Patellar region moderate-severe depletion, and Anterior thigh region moderate depletion (10/12)  Fluid Retention:  Trace to mild generalized edema     Malnutrition Diagnosis: Moderate malnutrition in the context of --  Acute illness or injury         EVALUATION OF PROGRESS TOWARD GOALS   Diet: NPO for Medical/Clinical Reasons Except for: No Exceptions      Nutrition Support: Enteral  Type of Feeding Tube: Nasogastric   Enteral Frequency: Continuous   Enteral Regimen: Osmolite 1.5 at 30 mL/hr = 1080 kcal, 45 g protein, 147 g CHO, 0 g fiber, 549 mL free water   Other Nutrient Provisions:   + ProSource TF20 x1 pkt = 80 kcal and 20 g protein (9/30)  + Banatrol TF x2 pkt = 90 kcal, 10 g fiber, 4 g protein (10/4)  Total Enteral Provisions: 1250 kcal (28 kcal/kg), 69 g protein (1.5 g/kg)  Free Water Flush: 100 mL q 4 hours (10/6-- Dr. Edmond)  Total Free Water Provisions: 1149 mL     Pt has  been tolerating TF well per chart review  10/16: TF held at [facility] for Trach/PEG placement --> later cancelled & TF resumed at goal      ASSESSED NUTRITION NEEDS:  Dosing Weight: 45 kg (9/30)  Estimated Energy Needs: 8299-7347+ kcals (25-30+ Kcal/Kg)  Justification: maintenance   Estimated Protein Needs: 54-68+ grams protein (1.2-1.5+ g pro/Kg)  Justification: preservation of lean body mass  Estimated Fluid Needs: 1 mL/kcal or per MD   Justification: maintenance      NEW FINDINGS:   General:  continues to work on placement for LTACH    Weight: generally stable wt  Date/Time Weight Weight Method   10/19/23 0426 48.9 kg (107 lb 12.9 oz) --   10/17/23 0400 48.3 kg (106 lb 7.7 oz) Bed scale   10/16/23 0500 48.4 kg (106 lb 11.2 oz) Bed scale   10/15/23 0430 48.9 kg (107 lb 12.9 oz) Bed scale   10/14/23 0000 50.2 kg (110 lb 10.7 oz) Bed scale   10/13/23 0400 49.9 kg (110 lb 0.2 oz) Bed scale   10/12/23 0500 50.6 kg (111 lb 8.8 oz) Bed scale   10/11/23 0440 51.4 kg (113 lb 5.1 oz) Bed scale   10/10/23 0000 51.2 kg (112 lb 14 oz) Bed scale   10/09/23 0200 52.6 kg (115 lb 15.4 oz) Bed scale   10/08/23 0000 52.3 kg (115 lb 4.8 oz) Bed scale   10/07/23 0600 53.3 kg (117 lb 8.1 oz) Bed scale   10/06/23 0700 52 kg (114 lb 10.2 oz) --   10/05/23 0600 51.1 kg (112 lb 10.5 oz) Bed scale   10/03/23 0400 48.7 kg (107 lb 5.8 oz) Bed scale   10/02/23 0500 47.4 kg (104 lb 8 oz) Bed scale   10/01/23 0545 46.9 kg (103 lb 6.3 oz) Bed scale   09/30/23 0500 44.6 kg (98 lb 5.2 oz) Bed scale   09/29/23 0950 42.3 kg (93 lb 4.1 oz) Bed scale     I/O: Net IO Since Admission: +13,097.68 mL [10/19/23 0950].  7x BM yesterday, 3x 10/17, 3x 10/15, 1x 10/14, 2x 10/13    Labs: reviewed    CR 0.34 (L) 10/19/2023     Lab 10/19/23  0749 10/19/23  0425 10/19/23  0424 10/19/23  0009 10/18/23  1933 10/18/23  1515   * 114* 108* 104* 114* 105*     Medications: reviewed    insulin aspart (MSSI)  1-6 Units Subcutaneous Q4H    multivitamins w/minerals  15 mL  Oral or Feeding Tube Daily    pantoprazole  40 mg Oral or Feeding Tube QAM AC    polyethylene glycol  17 g Oral or Feeding Tube BID      midazolam 6 mg/hr (10/19/23 0737)     Skin: WOC assessed yesterday   Wound location: Left buttock   Wound due to: Friction and Moisture Associated Skin Damage (MASD), reopened  Wound history/plan of care: found with criticaid in use per plan of care. Suspect area was a previous pressure injury due to raised scar tissue. Now reopened. Frequent loose stools  STATUS: initial assessment     Resp.: remains intubated, day 20. Tracheostomy scheduled for 10/20        Previous Goals:   Goal TF regimen will continue to meet % needs    Evaluation: Met    Previous Nutrition Diagnosis:   No nutrition diagnosis identified at this time as TF regimen meeting needs    Evaluation: No change        MALNUTRITION  % Weight Loss:  None noted  % Intake:  No decreased intake noted (TF)  Subcutaneous Fat Loss:  Orbital region mild-moderate depletion (10/12)  Muscle Loss:  Temporal region moderate depletion, Patellar region moderate-severe depletion, and Anterior thigh region moderate depletion (10/12)  Fluid Retention:  Trace to mild generalized edema     Malnutrition Diagnosis: Moderate malnutrition in the context of --  Acute illness or injury        CURRENT NUTRITION DIAGNOSIS  No nutrition diagnosis identified at this time         INTERVENTIONS  Recommendations / Nutrition Prescription  Continue w/ TF + modulars as ordered. Adjustments to FWF per MD  Ordered daily Expedite bottle via FT for wound healing     Implementation  Collaboration with other providers  Medical food supplement    Goals  TF + modulars to provide % of estimated nutrition needs        MONITORING AND EVALUATION:  Progress towards goals will be monitored and evaluated per protocol and Practice Guidelines      Lula Arellano RD, LD  Pager: [phone]

## 2023-10-19 NOTE — ANESTHESIA PREPROCEDURE EVALUATION
"Anesthesia Pre-Procedure Evaluation    Patient: Kortney Germain   MRN: [id number] : 1955        Procedure : Procedure(s):  Tracheostomy          Past Medical History:   Diagnosis Date    Allergic state     Depressive disorder     Dyskinesia     Hypertension     Schizo affective schizophrenia (H)       No past surgical history on file.   Allergies   Allergen Reactions    Amlodipine     Clozapine     Egg [Chicken-Derived Products (Egg)]     Penicillins     Potassium     Poultry Meal       Social History     Tobacco Use    Smoking status: Never    Smokeless tobacco: Never   Substance Use Topics    Alcohol use: No      Wt Readings from Last 1 Encounters:   10/19/23 48.9 kg (107 lb 12.9 oz)        Anesthesia Evaluation            ROS/MED HX  ENT/Pulmonary: Comment: \"Morning of  she was noted to be febrile and shaking prompting intubation to facilitate head CT and LP\"    Loss of protective airway reflexes   (-) sleep apnea   Neurologic: Comment: Toxicmetabolic Encephalopathy    H/o tardive dyskinesia    \"EEG is not showing seizures since then but has shown evidence of potential new seizure foci.\"    \"Status epilepticus. Considered serotonin syndrome but less likely now.  Psychiatrist also wondering about malignant catatonia.  Is on 3 antiseizure agents\"    23 MRI  IMPRESSION:  1.  No acute intracranial process.  2.  Generalized brain atrophy and presumed microvascular ischemic changes as detailed above.          (+)       seizures,                         Cardiovascular: Comment: 3/2023 cardiac echo  Interpretation Summary     Left ventricular size, global systolic function, and wall motion are normal,  estimated LVEF 60-65%.  Right ventricular global function is normal.  No significant valvular abnormalities.  The inferior vena cava was normal in size with preserved respiratory  variability.     There are no prior studies available for " comparison.  ______________________________________________________________________________  Left Ventricle  The left ventricle is normal in size. A sigmoid septum is present. Left  ventricular systolic function is normal. The visual ejection fraction is 60-  65%. Left ventricular diastolic function is indeterminate. No regional wall  motion abnormalities noted.     Right Ventricle  The right ventricle is normal in structure, function and size.     Atria  The left atrium is mildly dilated. Right atrial size is normal. There is no  color Doppler evidence of an atrial shunt.     Mitral Valve  There is trace mitral regurgitation.     Tricuspid Valve  The tricuspid valve is not well visualized, but is grossly normal. There is  trace tricuspid regurgitation.     Aortic Valve  The aortic valve is normal in structure and function.     Pulmonic Valve  The pulmonic valve is not well seen, but is grossly normal.     Vessels  The aortic root is normal size. Normal size ascending aorta. The inferior vena  cava was normal in size with preserved respiratory variability.     Pericardium  There is no pericardial effusion.     Rhythm  Sinus rhythm was noted.    (+)  hypertension- -   -  - -                                      METS/Exercise Tolerance:     Hematologic:       Musculoskeletal:       GI/Hepatic:    (-) GERD   Renal/Genitourinary:       Endo:       Psychiatric/Substance Use: Comment: Schizo-affective schizophrenia    (+) psychiatric history        Infectious Disease:       Malignancy:       Other: Comment: Decision making: no formal guardian or decision maker           Physical Exam    Airway   unable to assess          Respiratory Devices and Support         Dental    unable to assess        Cardiovascular   cardiovascular exam normal          Pulmonary   pulmonary exam normal            Other findings: Patient is intubated and sedated;    OUTSIDE LABS:  CBC:   Lab Results   Component Value Date    WBC 8.7 10/19/2023     "WBC 10.2 10/18/2023    HGB 7.4 (L) 10/19/2023    HGB 7.8 (L) 10/18/2023    HCT 24.8 (L) 10/19/2023    HCT 25.6 (L) 10/18/2023     10/19/2023     (H) 10/18/2023     BMP:   Lab Results   Component Value Date     10/19/2023     10/18/2023    POTASSIUM 4.1 10/19/2023    POTASSIUM 4.0 10/18/2023    CHLORIDE 107 10/19/2023    CHLORIDE 105 10/18/2023    CO2 24 10/19/2023    CO2 22 10/18/2023    BUN 13.1 10/19/2023    BUN 14.0 10/18/2023    CR 0.34 (L) 10/19/2023    CR 0.34 (L) 10/18/2023     (H) 10/19/2023     (H) 10/19/2023     COAGS:   Lab Results   Component Value Date    PTT 25 09/28/2023    INR 1.04 09/28/2023     POC: No results found for: \"BGM\", \"HCG\", \"HCGS\"  HEPATIC:   Lab Results   Component Value Date    ALBUMIN 3.4 (L) 09/30/2023    PROTTOTAL 5.6 (L) 09/30/2023    ALT 20 09/30/2023    AST 27 09/30/2023    ALKPHOS 66 09/30/2023    BILITOTAL 0.2 09/30/2023    DAPHNE 14 10/09/2023     OTHER:   Lab Results   Component Value Date    PH 7.39 09/30/2023    LACT 1.4 09/29/2023    LULU 8.0 (L) 10/19/2023    PHOS 3.1 10/19/2023    MAG 2.2 10/19/2023    LIPASE 22 03/06/2023    TSH 1.13 03/08/2023       Anesthesia Plan    ASA Status:  4    NPO Status:  NPO Appropriate    Anesthesia Type: General.   Induction: Intravenous.   Maintenance: Balanced.        Consents    Anesthesia Plan(s) and associated risks, benefits, and realistic alternatives discussed. Questions answered and patient/representative(s) expressed understanding.     - Discussed:     - Discussed with:  Implied consent/emergency            Postoperative Care            Comments:                Kirill Yap MD  "

## 2023-10-19 NOTE — PROGRESS NOTES
[facility] ICU RESPIRATORY NOTE           Date of Admission: 09/28/2023     Date of Intubation (most recent): 09/29/2023    Reason for Mechanical Ventilation: AW protection     Number of Days on Mechanical Ventilation: 20     Met Criteria for Spontaneous Breathing Trial: No     Significant event today :None     ABG Results: Last Arterial Blood Gas:  pH Arterial   Date Value Ref Range Status   09/30/2023 7.39 7.35 - 7.45 Final     pCO2 Arterial   Date Value Ref Range Status   09/30/2023 36 35 - 45 mm Hg Final     pO2 Arterial   Date Value Ref Range Status   09/30/2023 192 (H) 80 - 105 mm Hg Final     Bicarbonate Arterial   Date Value Ref Range Status   09/30/2023 22 21 - 28 mmol/L Final     Base Excess/Deficit   Date Value Ref Range Status   09/30/2023 -2.9 -9.0 - 1.8 mmol/L Final     Current Vent Settings:  Vent Mode: CMV/AC  (Continuous Mandatory Ventilation/ Assist Control)  FiO2 (%): 30 %  Resp Rate (Set): 12 breaths/min  Tidal Volume (Set, mL): 400 mL  PEEP (cm H2O): 5 cmH2O  Resp: 11     Skin Assessment : intact      Plan: Continue full ventilatory support and wean as tolerated.     Archie Soto, RT

## 2023-10-19 NOTE — PROGRESS NOTES
Critical Care Progress Note  [facility]  Kortney Germain MRN# [id number]   Age: 68 year old YOB: 1955     Date of Admission: 9/28/2023  Date of Service: 10/19/2023    Assessment & Plan    68F pmh of schizo-affective schizophrenia, HTN, tardive dyskinesia now presented 9/28 from group home with back pain and fever, Ct abd showed rectum distended with stool and urinary bladder distended, now s/p quinonez placement.  Arrival UA c/w UTI. Since arrival from the ED she was alert but not generally interactive (?part of this may be due to underlying psychiatric illness?). Morning of 9/29 she was noted to be febrile and shaking prompting intubation to facilitate head CT and LP.  Placed on heavy sedation including benzodiazepine and propofol after EEG showed evidence of potential new seizure foci.  Evening of 10/3 triglycerides returned critically high. Eventually able to shut off the propofol after adding additional doses of as needed benzodiazepines and increasing the goal range of the midazolam. Following this though she had more seizure-like activity which resolved with increasing benzodiazepines.    Unfortunately she has not regained consciousness since getting control of her seizures.  AED doses were decreased to increase her wakefulness but she then started having breakthrough seizures on 10/15.  AED dosing has been re-increased.    Neurological/Psychological    **Sedation: on midazolam again for seizures--6 mg/hr today    Acute Encephalopathy: likely due to seizure activity vs post-ictal state vs side effects of seizure meds. Considered serotonin syndrome but this is less likely.  Psychiatrist also wondering about malignant catatonia, but this is unlikely now that she's been on signficant doses of benzos and there is evidence of seizures on eeg.   Underlying schizoaffective schizophrenia and h/o tardive dyskinesia. Unlikely to be contributing at this point  Status epilepticus.  Is on 3 antiseizure  agents.  Seizures have again broken on midazolam drip.  Drip continues today at 6 mg/hr, no recurrence of seizures--slowly weaning down  ** MRI (10/11/23): no acute pathology to explain persistent encephalopathy              - AEDs  clobazam 20 mg BID, fosphenytoin 100 tid, lacosamide 200 mg BID.               - [place] paraneoplastic panel pending              - ammonia level 14 10/9/2023   - MRI 10/11/2023 no acute abnormalities    Decision making: no formal guardian or decision maker. If mental status does not improve, it may not be safe to extubate and patient would need tracheostomy. Without decision maker or ability to ask patient, ethics consulted to assist with next steps. Only mental status is hindering extubation.               - ethics consulted--they are agreeable with tracheostomy  -- Any major decisions are required and unable to find a surrogate decision-maker will ensure that at least 2 physicians agree with the plan of care.     From  note  Additional Information:  Attempting to search for NOK.  Sent referral to [employer] to inquire as to whether they had any additional information than we have been able to find.  The information that has been given is that patient has been committed 7 times since 1983 with the last 3 being done by human services or a hospital.  Her father passed away in 2022 and two other family members from the 1990's have also passed away.    Cardiovascular    Hypertension:  cannot be on Nifedipine ER(pta med), but bp's have been acceptable off of this med.   - monitor    Pulmonary    Loss of protective airway reflexes:  intubated/vented.  Inappropriate for extubation due to mentation. Does well on pressure support  - Daily pressure support mode for conditioning  - Awaiting mental status to improve for extubation  - intubated since 9/29/23 -- now preparing for tracheostomy on 10/20    Tracheostomy /  PEG Plans: Gen surg has reasonably pointed out that we can  just use a olga-feed long term rather than a peg.  Have discussed trach with Dr. Benites--he will proceed with trach tomorrow under 2 physician consent.  Will continue efforts to complete the guardianship process in the meantime.  At this point the patient is comatose and unable to make her own medical decisions.    Renal/Fluids/Electrolytes    Significant positive fluid balance.  Has been slowly re-accumulating fluid to +6 L over the last few days   -1 dose lasix today    Misc Plans  - monitor function and electrolytes, replacement per ICU protocols.   - generally avoid nephrotoxic agents such as NSAID, IV contrast unless specifically required  - adjust medications as needed for renal clearance  - follow I/O's as appropriate.    **I/O last 24hrs:   In: 1465.92 [NG/GT:570; I.V.:175.92]  Out: 1750 [Urine:1750]    Infectious Diseases    Sirs and possible UTI--UC with mixed christina but >100k colonies on admission.   Finished Ceftx course     Gastrointestinal/Genitourinary    No acute issues  - Tube feeds and PPI    **Nutrition: Diet: Adult Formula Drip Feeding: Continuous Osmolite 1.5; Nasogastric tube; Goal Rate: 30; mL/hr; Increase TF to 30 mL/hr; Do not advance tube feeding rate unless K+ is = or > 3.0, Mg++ is = or > 1.5, and Phos is = or > 1.9  NPO for Medical/Clinical Reasons Except for: No Exceptions  NPO for Medical/Clinical Reasons Except for: No Exceptions        Endocrine/Metabolic    Stress induced hyperglycemia.  Plan  - ICU insulin protocol, goal sugar <180    Hematology/Oncology    Anemia of chronic illness: s/p prbc 9/29, no apparent blood loss; hgb 7.4 today    Rheumatology    no acute issues    Skin/Musculoskeletal    no acute issues    ------------------------------------------------------------------------------------------------------------------  Clinically Significant Risk Factors              # Hypoalbuminemia: Lowest albumin = 3.4 g/dL at 9/30/2023  5:15 AM, will monitor as appropriate       #  Hypertension: Noted on problem list                        ------------------------------------------------------------------------------------------------------------------  Prophylaxis/ICU Checklist    -DVT: Subcutaneous Heparin    -VAP: HOB 30 degrees, chlorhexidine rinse    -Stress Ulcer: PPI    -Restraints: Nonviolent soft two point restraints required and necessary for patient safety and continued cares and good effect as patient continues to pull at necessary lines, tubes despite education and distraction. Will readdress daily.     -IV Access: Central access required and necessary for continued patient cares     -Feeding - Diet: Adult Formula Drip Feeding: Continuous Osmolite 1.5; Nasogastric tube; Goal Rate: 30; mL/hr; Increase TF to 30 mL/hr; Do not advance tube feeding rate unless K+ is = or > 3.0, Mg++ is = or > 1.5, and Phos is = or > 1.9  NPO for Medical/Clinical Reasons Except for: No Exceptions  NPO for Medical/Clinical Reasons Except for: No Exceptions        Consults: ethics, , neuro  Family: none  Code Status: Full Code      Disposition: guarded    I have personally reviewed the daily labs, imaging studies, cultures and discussed the case with referring physician and consulting physicians.   I personally managed the respiratory support devices, hemodynamics, sedation, analgesia, metabolic abnormalities and nutritional status.      This patient is critically ill and I have provided 35 minutes of critical care time (excluding procedures) on 10/19/2023    Interval History   No events overnight.  Continue to slowly wean midazolam drip, now down to 6/hr; no recurrence of seizures.  Plan for trach tomorrow.      Medications   Current Facility-Administered Medications   Medication Dose Route Frequency    chlorhexidine  15 mL Mouth/Throat Q12H    clobazam  20 mg Oral or Feeding Tube BID    fiber modular  1 packet Per Feeding Tube Daily    fosphenytoin (CEREBYX) 100 mg PE in sodium chloride 0.9 % 100 mL  intermittent infusion  100 mg PE Intravenous TID    heparin ANTICOAGULANT  5,000 Units Subcutaneous Q8H    insulin aspart  1-6 Units Subcutaneous Q4H    lacosamide (VIMPAT) 200 mg in sodium chloride 0.9 % 110 mL intermittent infusion  200 mg Intravenous BID    multivitamins w/minerals  15 mL Oral or Feeding Tube Daily    pantoprazole  40 mg Oral or Feeding Tube QAM AC    Or    pantoprazole  40 mg Intravenous QAM AC    polyethylene glycol  17 g Oral or Feeding Tube BID    protein modular  1 packet Per Feeding Tube Daily    sodium chloride (PF)  10-40 mL Intracatheter Q7 Days     Current Facility-Administered Medications   Medication Last Rate    dextrose      midazolam 6 mg/hr (10/19/23 0737)     Physical Exam   Vital Signs with Ranges  Temp:  [97.4  F (36.3  C)-100  F (37.8  C)] 97.4  F (36.3  C)  Pulse:  [57-76] 71  Resp:  [11-18] 13  BP: (110-178)/(58-80) 135/62  FiO2 (%):  [30 %] 30 %  SpO2:  [98 %-100 %] 98 %    Wt Readings from Last 1 Encounters:   10/19/23 48.9 kg (107 lb 12.9 oz)    Body mass index is 21.05 kg/m .     BP - Mean:  [] 95  Vent Mode: CMV/AC  (Continuous Mandatory Ventilation/ Assist Control)  FiO2 (%): 30 %  Resp Rate (Set): 12 breaths/min  Tidal Volume (Set, mL): 400 mL  PEEP (cm H2O): 5 cmH2O  Resp: 13    General: Stated age intubated sedated  HEENT: EEG leads ET tube feeding tube  Lungs: Synchronous with ventilator  CVS: Regular rate rhythm  Abdomen: Grossly normal  Extremities/musculoskeletal: No edema  Neurology: Not expansive to voice today.  Skin: Grossly normal  Psychiatry: Unable  Exam of Line sites:  PICC     I/O last 3 completed shifts:  In: 1465.92 [I.V.:175.92; NG/GT:570]  Out: 1750 [Urine:1750]  Data   Labs & Studies of Note: I personally reviewed the following studies:    Imaging: All new imaging reviewed by me  No results found for this or any previous visit (from the past 24 hour(s)).      ROUTINE ICU LABS (Last four results)  ROUTINE ICU LABS (Last four  "results)  CMP  Recent Labs   Lab 10/19/23  0749 10/19/23  0425 10/19/23  0424 10/19/23  0009 10/18/23  0758 10/18/23  0438 10/17/23  0440 10/17/23  0433 10/16/23  1107 10/16/23  0623   NA  --  140  --   --   --  139  --  136  --  137   POTASSIUM  --  4.1  --   --   --  4.0  --  4.1  --  4.2   CHLORIDE  --  107  --   --   --  105  --  103  --  104   CO2  --  24  --   --   --  22  --  22  --  23   ANIONGAP  --  9  --   --   --  12  --  11  --  10   * 114* 108* 104*   < > 121*   < > 124*   < > 95   BUN  --  13.1  --   --   --  14.0  --  19.0  --  16.6   CR  --  0.34*  --   --   --  0.34*  --  0.40*  --  0.42*   GFRESTIMATED  --  >90  --   --   --  >90  --  >90  --  >90   LULU  --  8.0*  --   --   --  7.4*  --  7.9*  --  8.1*   MAG  --  2.2  --   --   --  2.1  --  2.0  --  2.1   PHOS  --  3.1  --   --   --  2.6  --  3.1  --  3.9    < > = values in this interval not displayed.     CBC  Recent Labs   Lab 10/19/23  0425 10/18/23  0438 10/17/23  0433 10/16/23  0623   WBC 8.7 10.2 12.2* 11.0   RBC 2.99* 3.12* 3.36* 3.47*   HGB 7.4* 7.8* 8.3* 8.5*   HCT 24.8* 25.6* 27.6* 28.5*   MCV 83 82 82 82   MCH 24.7* 25.0* 24.7* 24.5*   MCHC 29.8* 30.5* 30.1* 29.8*   RDW 20.6* 20.7* 21.0* 20.9*    460* 539* 551*     INRNo lab results found in last 7 days.  Arterial Blood Gas  Recent Labs   Lab 10/14/23  0801   O2PER 30     Inflammatory Markers  No lab results found.  Immune Globulin Studies  No lab results found.  Microbiology:  No results found for: \"CULT\"  No lab results found.  Urine Studies:    Recent Labs   Lab Test 09/28/23  1606 03/10/23  0932 03/09/23  0224   LEUKEST Moderate* Large* Large*   NITRITE Positive* Positive* Positive*   WBCU 18* >182* >182*   RBCU <1 2 4*         "

## 2023-10-19 NOTE — PLAN OF CARE
Sedated through the day. Versed has been slowly weaned today 1 mg down every 2 hrs, currently at 2 mg/hr next toe be decreased at 1920. Not moving extremities, has started to get a gag reflex and yawning as the evening has progressed. Adequate urine output. Occasional stools. Tolerating tube feeds at goal rate. Plan for trach tomorrow.

## 2023-10-19 NOTE — PLAN OF CARE
Shift Summary  [id number]    No significant changes this shift. Assessments unchanged. Minimally responsive to stimulation (flexion vs posturing?) PERRL though dysconjugate. vent settings 30%/5. Loose stools slowing this morning. Versed infused at 6mg/hr overnight.

## 2023-10-19 NOTE — PLAN OF CARE
Goal Outcome Evaluation:           Overall Patient Progress: improvingOverall Patient Progress: improving    Outcome Evaluation: Pt tolerating TF at goal. Added daily wound supplement for re-opened wound.    Lula Arellano RD, LD

## 2023-10-20 NOTE — PLAN OF CARE
Goal Outcome Evaluation:       Neuro: PERRL,does not withdrawal, Versed turned off @0000. Neg for Seizure activity     CV: tele NSR, SYB>160 goal    Resp: LS coarse, Vent setting unchanged     : Pure-wick with AUO    GI:  BS audible, NPO at @0000    Skin: Scattered bruising, Coccyx acquired community     Lines: L PICC    Plan:   - Trach placement

## 2023-10-20 NOTE — PLAN OF CARE
Goal Outcome Evaluation:      Plan of Care Reviewed With: patient    Overall Patient Progress: improvingOverall Patient Progress: improving    Neuro: Will w/d to pain and open eyes at times. At 1200 presented w/ nystagmus and up gaze- neurocrit notified at bedside. RASS -4.  CV: NSR. SBP goal maintained<160.  Resp: Vent CMV/AC- RR 12, Vol 400, FiO2 30%, PEEP 5. Diminished LS. Thin yellow secretions. Trach placed- Stefan #6 w/ small amount of drainage on dressing & sutures in place.  GI: Large liquid incontinence. NG 63cm @nares- TF continuous Osmolite 1.5 30ml/hr w/ 100ml FWF q4h.  : Incontinent. Purwick in place.  Skin: Buttocks wound- Completed WOC orders. Scattered bruises.   Access: L PICC.   Gtts: TKO.  Other: Continuous EEG running. Trach placed in AM.

## 2023-10-20 NOTE — PROGRESS NOTES
INTERIM REPORT of Video-EEG Monitoring              DATE OF RECORDING:  10/20/2023         I reviewed the continuing video-EEG monitoring of Kortney Germain.          The EEG during sedated coma has been persistently abnormal due to generalized delta-theta slowing, with prolonged periods of slowly repetitive, non-evolving, generalized periodic epileptiform discharges, and with periods of exclusively sinusoidal (non-epileptiform) generalized slowing.    No electrographic seizures have been observed this week.         These abnormalities indicate severe electrographic encephalopathy, and an elevated risk of epileptic seizures.  This recording excludes non-convulsive status epilepticus as a possible cause of this encephalopathy.    Melecio Lopes M.D.

## 2023-10-20 NOTE — PROGRESS NOTES
[facility] ICU RESPIRATORY NOTE        Date of Admission: 9/28/2023     Date of Intubation (most recent): 9/29/2023    Reason for Mechanical Ventilation: Airway protection     Number of Days on Mechanical Ventilation: 21    Met Criteria for Spontaneous Breathing Trial: No    Reason for No Spontaneous Breathing Trial: Per MD     Significant Events Today: Trached today     ABG Results:   Recent Labs   Lab 10/20/23  1314 10/14/23  0801   O2PER 30 30         Current Vent Settings: Vent Mode: CMV/AC  (Continuous Mandatory Ventilation/ Assist Control)  FiO2 (%): 30 %  Resp Rate (Set): 12 breaths/min  Tidal Volume (Set, mL): 400 mL  PEEP (cm H2O): 5 cmH2O  Resp: 14      Skin Assessment: Skin intact, stoma site bloody     Plan: Continue full vent support and wean as tolerated     RT Shan on 10/20/2023 at 5:28 PM

## 2023-10-20 NOTE — OP NOTE
DATE OF PROCEDURE: October 20, 2023      SURGEON:  Michael Benites MD   FIRST ASSIST: Audrey Silvestre PA-C      PREOPERATIVE DIAGNOSIS:  Respiratory failure.       POSTOPERATIVE DIAGNOSIS:  Respiratory failure.       PROCEDURE:  Tracheostomy with Shiley #6 cuffed nonfenestrated tube.       ANESTHESIA:  General.       INDICATIONS:  Patient has respiratory failure and will require prolonged mechanical ventilation.       DESCRIPTION OF PROCEDURE:  The patient was brought to the operating room on the ICU bed.  Under general anesthesia, the patient's neck was extended.  Neck and upper chest were prepared and draped in the usual fashion using ChloraPrep.  A transverse incision was made approximately 2 cm above the sternal notch.  Dissection was carried down to the midline of the strap muscle.  The inferior aspect of the isthmus of the thyroid was divided.  Hemostasis was excellent.  The second ring of the trachea was clearly identified, and some sutures of 2-0 Prolene were placed around the second ring laterally on each side.  A longitudinal tracheotomy was made and dilated.  The endotracheal tube was pulled just above the tracheotomy, and a Shiley #6 cuffed nonfenestrated tube was advanced without any difficulty.  The cuff was inflated.  Ventilation was excellent.  Hemostasis was again verified and was excellent.  Incision was closed with interrupted 3-0 nylon suture on the skin along the tracheostomy.  A dressing was applied, and the tracheostomy was secured around the patient's neck.       The patient was returned to ICU in satisfactory condition.           MICHAEL BENITES MD

## 2023-10-20 NOTE — ANESTHESIA POSTPROCEDURE EVALUATION
Patient: Kortney Germain    Procedure: Procedure(s):  Tracheostomy       Anesthesia Type:  General    Note:  Disposition: ICU            ICU Sign Out: Unable to perform physician to physician sign out   Postop Pain Control: Uneventful            Sign Out: Well controlled pain   PONV: No   Neuro/Psych: Uneventful            Sign Out: Acceptable/Baseline neuro status   Airway/Respiratory: Uneventful            Sign Out: AIRWAY IN SITU/Resp. Support   CV/Hemodynamics: Uneventful            Sign Out: Acceptable CV status; No obvious hypovolemia; No obvious fluid overload   Other NRE: NONE   DID A NON-ROUTINE EVENT OCCUR? No           Last vitals:  Vitals:    10/20/23 0700 10/20/23 0716 10/20/23 0820   BP:  139/65 133/62   Pulse: 63 65 60   Resp: 11 12 12   Temp:  36.8  C (98.3  F)    SpO2: 99% 99% 99%       Electronically Signed By: OLESYA MIKE MD  October 20, 2023  9:22 AM

## 2023-10-20 NOTE — PROGRESS NOTES
[facility] Progress Note    Interval Events  EEG: per verbal report, more GPEDS no evidence of seizure.      Current AEDs: lacosamide 200 mg BID, fosphenytoin 100 mg TID, clobazam 20 mg BID. Started to again wean Versed on 10/17, now stopped.     Temp max 100.0  WBC 11>12.2>10.2>8.7>8.3  Phenytoin total level 10/19: 11.6     Repeat brain MRI without interval change. Tracheostomy placed this AM. Now off of versed, no change in clinical status or evidence of seizures on EEG.     HPI Summary  Kortney Germain is a 68 year old female with PMHx significant for anxiety/depression, schizoaffective disorder (living in group home), HTN, odynophagia, tardive dyskinesia, HTN. She presented 9/28 with lower back pain and abdominal pain. She was found to have UTI, anemia (received 1 unit PRBC), and constipation. She had decreased LOC and generalized shaking overnight on 9/28. RRT called and pt was intubated on 9/29/23 for encephalopathy and concern that should would be unable to protect her airway. Initial EEG was concerning for status epilepticus for which she was started on AEDs. She has remained intubated and encephalopathic since admission.      Attempted to decrease AED doses 10/9 to facilitate recovery of consciousness but had recurrent seizures and increasingly active EEG. Brain MRI was repeated 10/11 which again showed no acute findings. Propofol restarted 10/14 due to increasing EEG activity.  Propofol stopped 10/15 and started on Versed by primary team due to concern for propofol toxicity. EEG 10/15 morning again showing NCSE, Versed titrated up with improvement. Versed weaned again 10/19 with no identified breakthrough seizures     Evaluation Summarized  EEG 09/29/23: Abnormal due to the presences of generalized periodic pattern consisting of 2-3.5 hz discharges, maximum negativity in bifrontal region, periodic pattern occupy 90% of the record and the remaining portion consist of  delta slowing. These VEEG findings are consistent with a severe encephalopathy and status epilepticus.  However it is important to note patient has persistent head tremoring which may also produce a rhythmic artifact on the EEG and there is persistent electro myogenic artifact making this 1 hour segment difficult to interpret.  It would be helpful to record more video EEG data and optimize current seizure medications to evaluate if there is changes in the EEG.  Based on the limited data that is interpretable on this 1 hour file EEG is concerning for status epilepticus.       CT Head 9/29: no acute intracranial pathology, brain atrophy, CSVID     MRI 9/30/23: no acute intracranial process, generalized atrophy, CSVID  MRI 10/11/23: no acute intracranial process, no hemorrhage, moderate-advanced brain atrophy and leukoaraiosis, CSVID   MRI 10/20/23: no interval change      BC: NG  CSF analysis   -glucose 98  -protein 22.8  -2 nucleated cells, RBC 0  -aerobic culture GPC in broth only   -anaerobic culture NGTD  -HSV negative  -Vega encephalopathy autoimmune panel negative    Impression   Status epilepticus/NORSE, EEG improved on 3 AEDs and sedative drips - uncertain etiology of NCSE. MRI brain x2 with no structural etiology for new seizures. Decreased doses of AEDs on 10/9 to maximize her chances of regaining consciousness. Unfortunately, she had increasing EEG activity on lower doses of AEDs. Again improved with increased AED doses and Versed gtt.     Shaking/tremors, unclear etiology. May represent baseline tardive dyskinesia vs manifestation of status epilepticus vs toxic encephalopathy vs other     Plan  - initiate valproic acid solution 250mg TID (sprinkles not NG compatible), ordered  - check valproic acid level + phenytoin tomorrow AM, ordered   - continue fosphenytoin 100 mg TID for now; anticipate with begin wean once depakote is therapeutic   - continue lacosamide to 200 mg BID  - continue clobazam 20 mg  "BID  - limit dopaminergic/serotonergic agents   - Ethics involved as patient does not have a guardian    Patient Follow-up    - final recommendation pending work-up    We will continue to follow.     TAMI Begum CNP  Vascular Neurology    To page me or covering stroke neurology team member, click here: AMCOM  Choose \"On Call\" tab at top, then select \"NEUROLOGY/ALL SITES\" from middle drop-down box, press Enter, then look for \"stroke\" or \"telestroke\" for your site.  ______________________________________________________    Clinically Significant Risk Factors              # Hypoalbuminemia: Lowest albumin = 3.4 g/dL at 9/30/2023  5:15 AM, will monitor as appropriate     # Hypertension: Noted on problem list         # Moderate Malnutrition: based on nutrition assessment             Medications   Scheduled Meds   [Auto Hold] chlorhexidine  15 mL Mouth/Throat Q12H    [Auto Hold] clobazam  20 mg Oral or Feeding Tube BID    [Auto Hold] fiber modular  1 packet Per Feeding Tube Daily    [Auto Hold] fosphenytoin (CEREBYX) 100 mg PE in sodium chloride 0.9 % 100 mL intermittent infusion  100 mg PE Intravenous TID    [Auto Hold] heparin ANTICOAGULANT  5,000 Units Subcutaneous Q8H    [Auto Hold] insulin aspart  1-6 Units Subcutaneous Q4H    [Auto Hold] lacosamide (VIMPAT) 200 mg in sodium chloride 0.9 % 110 mL intermittent infusion  200 mg Intravenous BID    [Auto Hold] multivitamins w/minerals  15 mL Oral or Feeding Tube Daily    [Auto Hold] pantoprazole  40 mg Oral or Feeding Tube QAM AC    Or    [Auto Hold] pantoprazole  40 mg Intravenous QAM AC    [Auto Hold] polyethylene glycol  17 g Oral or Feeding Tube BID    [Auto Hold] protein modular  1 packet Per Feeding Tube Daily    [Auto Hold] sodium chloride (PF)  10-40 mL Intracatheter Q7 Days    [Auto Hold] wound support modular  60 mL Oral Daily       Infusion Meds   dextrose      midazolam Stopped (10/19/23 8488)       PRN Meds  [Auto Hold] acetaminophen **OR** [Auto " Hold] acetaminophen, [Auto Hold] albuterol, [Held by provider] cyclobenzaprine, dextrose, [Auto Hold] glucose **OR** [Auto Hold] dextrose **OR** [Auto Hold] glucagon, [Auto Hold] hydrALAZINE, [Auto Hold] HYDROmorphone, [Auto Hold] lidocaine 4%, [Auto Hold] lidocaine (buffered or not buffered), [Auto Hold] LORazepam, [Auto Hold] melatonin, [Auto Hold] naloxone **OR** [Auto Hold] naloxone **OR** [Auto Hold] naloxone **OR** [Auto Hold] naloxone, [Auto Hold] nitroGLYcerin, [Auto Hold] sodium chloride (PF), [Auto Hold] sodium chloride (PF), [Auto Hold] sodium chloride (PF), [Auto Hold] sodium chloride (PF), sodium chloride 0.9% (bottle)       PHYSICAL EXAMINATION  Temp:  [97.6  F (36.4  C)-100  F (37.8  C)] 98.3  F (36.8  C)  Pulse:  [53-77] 65  Resp:  [10-16] 12  BP: (106-172)/(62-78) 139/65  FiO2 (%):  [30 %] 30 %  SpO2:  [97 %-100 %] 99 %       Neuro Exam  Mental Status: comatose, does not open eyes to verbal stimuli or sternal rub, does not follow commands (received general anesthesia this AM for trach placement)   Cranial Nerves:  midline gaze, PERRL, cough intact with suction  Motor:  does not withdraw any extremity to noxious   Sensory:  does not withdraw any extremity to noxious     Imaging  I personally reviewed all imaging; relevant findings per HPI.     Lab Results Data   CBC  Recent Labs   Lab 10/20/23  0437 10/19/23  0425 10/18/23  0438   WBC 8.3 8.7 10.2   RBC 3.32* 2.99* 3.12*   HGB 8.1* 7.4* 7.8*   HCT 27.2* 24.8* 25.6*    444 460*     Basic Metabolic Panel    Recent Labs   Lab 10/20/23  0724 10/20/23  0437 10/19/23  2344 10/19/23  0749 10/19/23  0425 10/18/23  0758 10/18/23  0438   NA  --  142  --   --  140  --  139   POTASSIUM  --  3.8  --   --  4.1  --  4.0   CHLORIDE  --  107  --   --  107  --  105   CO2  --  24  --   --  24  --  22   BUN  --  15.3  --   --  13.1  --  14.0   CR  --  0.33*  --   --  0.34*  --  0.34*   GLC 71 94 121*   < > 114*   < > 121*   LULU  --  8.3*  --   --  8.0*  --  7.4*  "   < > = values in this interval not displayed.     Liver Panel  No results for input(s): \"PROTTOTAL\", \"ALBUMIN\", \"BILITOTAL\", \"ALKPHOS\", \"AST\", \"ALT\", \"BILIDIRECT\" in the last 168 hours.  INR    Recent Labs   Lab Test 09/28/23  1244 03/07/23  1629   INR 1.04 1.15      Lipid Profile    Recent Labs   Lab Test 10/15/23  0650 10/03/23  0956 03/08/23  0830   CHOL  --   --  143   HDL  --   --  50   LDL  --   --  75   TRIG 136 1,372* 88     A1C  No lab results found.  Troponin  No results for input(s): \"CTROPT\", \"TROPONINIS\", \"TROPONINI\", \"GHTROP\" in the last 168 hours.       Data   I personally examined and evaluated the patient today. At the time of my evaluation and management the patient was in critical condition today due to status epilepticus. I personally managed management of status epilepticus. Key decisions made today included AEDs for management, transition to AED amenable to outpatient management. I spent a total of 40 minutes providing critical care services, evaluating the patient, directing care and reviewing laboratory values and radiologic reports.   "

## 2023-10-20 NOTE — PROGRESS NOTES
Critical Care Progress Note  [facility]  Kortney Germain MRN# [id number]   Age: 68 year old YOB: 1955     Date of Admission: 9/28/2023  Date of Service: 10/20/2023    Assessment & Plan    68F pmh of schizo-affective schizophrenia, HTN, tardive dyskinesia now presented 9/28 from group home with back pain and fever, Ct abd showed rectum distended with stool and urinary bladder distended, now s/p quinonez placement.  Arrival UA c/w UTI. Since arrival from the ED she was alert but not generally interactive (?part of this may be due to underlying psychiatric illness?). Morning of 9/29 she was noted to be febrile and shaking prompting intubation to facilitate head CT and LP.  Placed on heavy sedation including benzodiazepine and propofol after EEG showed evidence of potential new seizure foci.  Evening of 10/3 triglycerides returned critically high. Eventually able to shut off the propofol after adding additional doses of as needed benzodiazepines and increasing the goal range of the midazolam. Following this though she had more seizure-like activity which resolved with increasing benzodiazepines.    Unfortunately she has not regained consciousness since getting control of her seizures.  AED doses were decreased to increase her wakefulness but she then started having breakthrough seizures on 10/15.  AED dosing has been re-increased.    Neurological/Psychological    **Sedation: on midazolam again, weaned off overnight.    Acute Encephalopathy: likely due to seizure activity vs post-ictal state vs side effects of seizure meds. Considered serotonin syndrome but this is less likely.  Psychiatrist also wondering about malignant catatonia, but this is unlikely now that she's been on signficant doses of benzos and there is evidence of seizures on eeg.   Underlying schizoaffective schizophrenia and h/o tardive dyskinesia. Unlikely to be contributing at this point  Status epilepticus.  Is on 3 antiseizure  agents.  Seizures have again broken on midazolam drip.  Drip weaned off this morning, no recurrence of seizures yet.  ** MRI (10/11/23): no acute pathology to explain persistent encephalopathy              - AEDs  clobazam 20 mg BID, fosphenytoin 100 tid, lacosamide 200 mg BID.               - Sondheimer paraneoplastic panel pending              - ammonia level 14 10/9/2023   - MRI 10/11/2023 no acute abnormalities    Decision making: no formal guardian or decision maker. If mental status does not improve, it may not be safe to extubate and patient would need tracheostomy. Without decision maker or ability to ask patient, ethics consulted to assist with next steps. Only mental status is hindering extubation.               - ethics consulted--they are agreeable with tracheostomy  -- Any major decisions are required and unable to find a surrogate decision-maker will ensure that at least 2 physicians agree with the plan of care.     From SW note  Additional Information:  Attempting to search for NOK.  Sent referral to [employer] to inquire as to whether they had any additional information than we have been able to find.  The information that has been given is that patient has been committed 7 times since 1983 with the last 3 being done by human services or a hospital.  Her father passed away in 2022 and two other family members from the 1990's have also passed away.    Cardiovascular    Hypertension:  cannot be on Nifedipine ER(pta med), but bp's have been acceptable off of this med.   - monitor    Pulmonary    Loss of protective airway reflexes:  intubated/vented.  Inappropriate for extubation due to mentation. Does well on pressure support  - Daily pressure support mode for conditioning  - Awaiting mental status to improve for extubation  - intubated since 9/29/23 -- anticipating trach this morning  - apneic on ps trial this morning.  May be due to the amount of midazolam still in her system vs  alkaolosis.  Checking vbg    Tracheostomy /  PEG Plans: Gen surg has reasonably pointed out that we can just use a olga-feed long term rather than a peg.  Trached on 10/20.    Renal/Fluids/Electrolytes    Significant positive fluid balance.  Has been slowly re-accumulating fluid to +7 L over the last few days   -start bid lasix today    Misc Plans  - monitor function and electrolytes, replacement per ICU protocols.   - generally avoid nephrotoxic agents such as NSAID, IV contrast unless specifically required  - adjust medications as needed for renal clearance  - follow I/O's as appropriate.    **I/O last 24hrs:   In: 1645.63 [NG/GT:640; I.V.:465.63]  Out: 1590 [Urine:1590]    Infectious Diseases    Sirs and possible UTI--UC with mixed christina but >100k colonies on admission.   Finished Ceftx course     Gastrointestinal/Genitourinary    No acute issues  - Tube feeds and PPI    **Nutrition: Diet: Adult Formula Drip Feeding: Continuous Osmolite 1.5; Nasogastric tube; Goal Rate: 30; mL/hr; Increase TF to 30 mL/hr; Do not advance tube feeding rate unless K+ is = or > 3.0, Mg++ is = or > 1.5, and Phos is = or > 1.9  NPO for Medical/Clinical Reasons Except for: No Exceptions        Endocrine/Metabolic    Stress induced hyperglycemia.  Plan  - ICU insulin protocol, goal sugar <180    Hematology/Oncology    Anemia of chronic illness: s/p prbc 9/29, no apparent blood loss; hgb 8.1 today    Rheumatology    no acute issues    Skin/Musculoskeletal    no acute issues    ------------------------------------------------------------------------------------------------------------------  Clinically Significant Risk Factors              # Hypoalbuminemia: Lowest albumin = 3.4 g/dL at 9/30/2023  5:15 AM, will monitor as appropriate       # Hypertension: Noted on problem list         # Moderate Malnutrition: based on nutrition assessment                 ------------------------------------------------------------------------------------------------------------------  Prophylaxis/ICU Checklist    -DVT: Subcutaneous Heparin    -VAP: HOB 30 degrees, chlorhexidine rinse    -Stress Ulcer: PPI    -Restraints: Nonviolent soft two point restraints required and necessary for patient safety and continued cares and good effect as patient continues to pull at necessary lines, tubes despite education and distraction. Will readdress daily.     -IV Access: Central access required and necessary for continued patient cares     -Feeding - Diet: Adult Formula Drip Feeding: Continuous Osmolite 1.5; Nasogastric tube; Goal Rate: 30; mL/hr; Increase TF to 30 mL/hr; Do not advance tube feeding rate unless K+ is = or > 3.0, Mg++ is = or > 1.5, and Phos is = or > 1.9  NPO for Medical/Clinical Reasons Except for: No Exceptions        Consults: ethics, MH, neuro  Family: none  Code Status: Full Code      Disposition: guarded    I have personally reviewed the daily labs, imaging studies, cultures and discussed the case with referring physician and consulting physicians.   I personally managed the respiratory support devices, hemodynamics, sedation, analgesia, metabolic abnormalities and nutritional status.      This patient is critically ill and I have provided 30 minutes of critical care time (excluding procedures) on 10/20/2023    Interval History   No events overnight.  Continue to slowly wean midazolam drip, now down to 6/hr; no recurrence of seizures.  Plan for trach today.      Medications   Current Facility-Administered Medications   Medication Dose Route Frequency    chlorhexidine  15 mL Mouth/Throat Q12H    clobazam  20 mg Oral or Feeding Tube BID    fiber modular  1 packet Per Feeding Tube Daily    fosphenytoin (CEREBYX) 100 mg PE in sodium chloride 0.9 % 100 mL intermittent infusion  100 mg PE Intravenous TID    heparin ANTICOAGULANT  5,000 Units Subcutaneous Q8H    insulin  aspart  1-6 Units Subcutaneous Q4H    lacosamide (VIMPAT) 200 mg in sodium chloride 0.9 % 110 mL intermittent infusion  200 mg Intravenous BID    multivitamins w/minerals  15 mL Oral or Feeding Tube Daily    pantoprazole  40 mg Oral or Feeding Tube QAM AC    Or    pantoprazole  40 mg Intravenous QAM AC    polyethylene glycol  17 g Oral or Feeding Tube BID    protein modular  1 packet Per Feeding Tube Daily    sodium chloride (PF)  10-40 mL Intracatheter Q7 Days    wound support modular  60 mL Oral Daily     Current Facility-Administered Medications   Medication Last Rate    dextrose      midazolam Stopped (10/19/23 6125)     Physical Exam   Vital Signs with Ranges  Temp:  [97.4  F (36.3  C)-100  F (37.8  C)] 98.3  F (36.8  C)  Pulse:  [53-77] 65  Resp:  [10-16] 12  BP: (106-172)/(62-78) 139/65  FiO2 (%):  [30 %] 30 %  SpO2:  [97 %-100 %] 99 %    Wt Readings from Last 1 Encounters:   10/20/23 49 kg (108 lb 0.4 oz)    Body mass index is 21.1 kg/m .     BP - Mean:  [] 92  Vent Mode: CMV/AC  (Continuous Mandatory Ventilation/ Assist Control)  FiO2 (%): 30 %  Resp Rate (Set): 12 breaths/min  Tidal Volume (Set, mL): 400 mL  PEEP (cm H2O): 5 cmH2O  Resp: 12    General: Stated age intubated sedated  HEENT: EEG leads ET tube feeding tube  Lungs: Synchronous with ventilator  CVS: Regular rate rhythm  Abdomen: Grossly normal  Extremities/musculoskeletal: No edema  Neurology: Not expansive to voice today.  Skin: Grossly normal  Psychiatry: Unable  Exam of Line sites:  PICC     I/O last 3 completed shifts:  In: 1645.63 [I.V.:465.63; NG/GT:640]  Out: 1590 [Urine:1590]  Data   Labs & Studies of Note: I personally reviewed the following studies:    Imaging: All new imaging reviewed by me  Recent Results (from the past 24 hour(s))   MR Brain w/o & w Contrast    Narrative    EXAM: MR BRAIN W/O and W CONTRAST  LOCATION: [facility]  DATE: 10/20/2023    INDICATION: status epilepticus, etiology  unclear  COMPARISON: 10/11/2023.  CONTRAST: 5 mL Gadavist  TECHNIQUE: MRI brain without and with contrast.    FINDINGS: On the diffusion-weighted images there is no evidence of acute ischemia or restricted diffusion. There is no discrete mass lesion or midline shift. There is no acute extra-axial fluid collection or acute intraparenchymal hemorrhage. There are   prominent perivascular spaces involving the basal ganglia and the subcortical white matter. On the FLAIR and T2-weighted images there are multiple foci of high signal within the periventricular and subcortical white matter consistent with diffuse small   vessel ischemic disease. The ventricular system, basal cisterns and the cortical sulci are consistent with diffuse volume loss. Following the administration of contrast no abnormal enhancement is visualized.    There is no evidence of cerebellar tonsillar ectopia. The corpus callosum and the sella region have appropriate configuration and signal intensity for the patient's age. There are regions are unremarkable. There is no significant paranasal sinus disease.   There is fluid noted within the mastoid air cells bilaterally.      Impression    IMPRESSION:  1. No discrete mass lesion, hemorrhage or focal area suggestive of acute infarct.  2. Stable diffuse age related changes.         ROUTINE ICU LABS (Last four results)  ROUTINE ICU LABS (Last four results)  CMP  Recent Labs   Lab 10/20/23  0724 10/20/23  0437 10/19/23  2344 10/19/23  1959 10/19/23  0749 10/19/23  0425 10/18/23  0758 10/18/23  0438 10/17/23  0440 10/17/23  0433   NA  --  142  --   --   --  140  --  139  --  136   POTASSIUM  --  3.8  --   --   --  4.1  --  4.0  --  4.1   CHLORIDE  --  107  --   --   --  107  --  105  --  103   CO2  --  24  --   --   --  24  --  22  --  22   ANIONGAP  --  11  --   --   --  9  --  12  --  11   GLC 71 94 121* 125*   < > 114*   < > 121*   < > 124*   BUN  --  15.3  --   --   --  13.1  --  14.0  --  19.0   CR  --   "0.33*  --   --   --  0.34*  --  0.34*  --  0.40*   GFRESTIMATED  --  >90  --   --   --  >90  --  >90  --  >90   LULU  --  8.3*  --   --   --  8.0*  --  7.4*  --  7.9*   MAG  --  2.1  --   --   --  2.2  --  2.1  --  2.0   PHOS  --  3.2  --   --   --  3.1  --  2.6  --  3.1    < > = values in this interval not displayed.     CBC  Recent Labs   Lab 10/20/23  0437 10/19/23  0425 10/18/23  0438 10/17/23  0433   WBC 8.3 8.7 10.2 12.2*   RBC 3.32* 2.99* 3.12* 3.36*   HGB 8.1* 7.4* 7.8* 8.3*   HCT 27.2* 24.8* 25.6* 27.6*   MCV 82 83 82 82   MCH 24.4* 24.7* 25.0* 24.7*   MCHC 29.8* 29.8* 30.5* 30.1*   RDW 20.6* 20.6* 20.7* 21.0*    444 460* 539*     INRNo lab results found in last 7 days.  Arterial Blood Gas  Recent Labs   Lab 10/14/23  0801   O2PER 30     Inflammatory Markers  No lab results found.  Immune Globulin Studies  No lab results found.  Microbiology:  No results found for: \"CULT\"  No lab results found.  Urine Studies:    Recent Labs   Lab Test 09/28/23  1606 03/10/23  0932 03/09/23  0224   LEUKEST Moderate* Large* Large*   NITRITE Positive* Positive* Positive*   WBCU 18* >182* >182*   RBCU <1 2 4*         "

## 2023-10-20 NOTE — CONSULTS
Care Management Follow Up    Length of Stay (days): 22    Expected Discharge Date: 10/27/2023     Concerns to be Addressed: discharge planning next level of care    Patient plan of care discussed at interdisciplinary rounds: Yes    Anticipated Discharge Disposition: LTACH     Anticipated Discharge Services: Transportation Services  Anticipated Discharge DME:  to be determined    Patient/family educated on Medicare website which has current facility and service quality ratings: no   Education Provided on the Discharge Plan:  yes Emely   Patient/Family in Agreement with the Plan: unable to assess     Referrals Placed by CM/SW: CM  Community Residential Settings (Group Homes)  Private pay costs discussed: Not applicable    Additional Information:  Writer talked to patient's group home Manger Emely, she was updated at bedside and is aware of continued conversations and discharge planning to next level of care.  She is aware that we would be working on getting the patient to LTACH and a referral was submitted to [facility].   Emely stated that their Group Home would most likely not be able to accommodate the patient due to trach and tube feeds, she will check with her Supervisor to see if they have any Group Homes that can accommodate medical needs she does not think this is available and mentioned possible long term care.  Writer gave Emely CCRN phone number for further questions.  Care transitions will continue to follow for further discharge planning needs as identified.      Milagros Mendenhall RN, BSN, ACM   Care Transitions Specialist  [facility]  Care Transitions Specialist  [id number] [email or web address]  [email or web address]  Office: [phone] Fax: [phone]  [facility]

## 2023-10-20 NOTE — PROGRESS NOTES
[facility] ICU RESPIRATORY NOTE        Date of Admission: 9/28/2023    Date of Intubation (most recent): 09/29/2023    Reason for Mechanical Ventilation: AW protection    Number of Days on Mechanical Ventilation: 21    Met Criteria for Spontaneous Breathing Trial: No    Significant Events Today: None    ABG Results:   Recent Labs   Lab 10/14/23  0801   O2PER 30         Current Vent Settings: Vent Mode: CMV/AC  (Continuous Mandatory Ventilation/ Assist Control)  FiO2 (%): 30 %  Resp Rate (Set): 12 breaths/min  Tidal Volume (Set, mL): 400 mL  PEEP (cm H2O): 5 cmH2O  Resp: 11      Skin Assessment: Intact    Plan: will be trached today    RT Nuris on 10/20/2023 at 4:48 AM

## 2023-10-20 NOTE — ANESTHESIA CARE TRANSFER NOTE
Patient: Kortney Germain    Procedure: Procedure(s):  Tracheostomy       Diagnosis: Respiratory failure (H) [J96.90]  Diagnosis Additional Information: No value filed.    Anesthesia Type:   General     Note:    Oropharynx: nasal gatric tube in place and ventilatory support (trach in place)  Level of Consciousness: iatrogenic sedation  Patient oxygen source: ICU vent.    Independent Airway: airway patency not satisfactory and stable  Dentition: dentition unchanged  Vital Signs Stable: post-procedure vital signs reviewed and stable  Report to RN Given: handoff report given  Patient transferred to: ICU  Comments: Patient connected to SpO2, EKG, and NIBP transport monitors and accompanied by CRNA and circulating RNs x2 to ICU room. Patient ventilated by CRNA with ambu via trach with O2 at 8 liters per minute during transport.     On arrival to ICU, trach tube position unchanged, equal, bilateral breath sounds auscultated in ICU room, patient placed on ICU ventilator, and teeth and oral mucosa intact and unchanged at handoff of care. At anesthesia handoff of care, clinical monitors and alarms on and functioning, report on patient's clinical status given to ICU RN and all ICU staff questions answered.   ICU Handoff: Call for PAUSE to initiate/utilize ICU HANDOFF, Identified Patient, Identified Responsible Provider, Reviewed the Pertinent Medical History, Discussed Surgical Course, Reviewed Intra-OP Anesthesia Management and Issues during Anesthesia, Set Expectations for Post Procedure Period and Allowed Opportunity for Questions and Acknowledgement of Understanding      Vitals:  Vitals Value Taken Time   /62 10/20/23 0821   Temp     Pulse 60 10/20/23 0824   Resp 14 10/20/23 0824   SpO2 97 % 10/20/23 0824   Vitals shown include unfiled device data.    Electronically Signed By: TAMI Sierra CRNA  October 20, 2023  8:25 AM

## 2023-10-21 NOTE — PROGRESS NOTES
[facility] Progress Note    Interval Events  EEG 10/20: no seizure activity captured      Current AEDs: lacosamide 200 mg BID, fosphenytoin 100 mg TID, clobazam 20 mg BID, depakote 250mg TID     Phenytoin total level 10/21: 11.5   Valproic acid 10/21: pending     No interval changes/new concerns.     HPI Summary  Kortney Germain is a 68 year old female with PMHx significant for anxiety/depression, schizoaffective disorder (living in group home), HTN, odynophagia, tardive dyskinesia, HTN. She presented 9/28 with lower back pain and abdominal pain. She was found to have UTI, anemia (received 1 unit PRBC), and constipation. She had decreased LOC and generalized shaking overnight on 9/28. RRT called and pt was intubated on 9/29/23 for encephalopathy and concern that should would be unable to protect her airway. Initial EEG was concerning for status epilepticus for which she was started on AEDs. She has remained intubated and encephalopathic since admission.      Attempted to decrease AED doses 10/9 to facilitate recovery of consciousness but had recurrent seizures and increasingly active EEG. Brain MRI was repeated 10/11 which again showed no acute findings. Propofol restarted 10/14 due to increasing EEG activity.  Propofol stopped 10/15 and started on Versed by primary team due to concern for propofol toxicity. EEG 10/15 morning again showing NCSE, Versed titrated up with improvement. Versed weaned again 10/19 with no identified breakthrough seizures. Valproic acid started 10/20 to facilitate transition off of phenytoin.     Evaluation Summarized  EEG 09/29/23: Abnormal due to the presences of generalized periodic pattern consisting of 2-3.5 hz discharges, maximum negativity in bifrontal region, periodic pattern occupy 90% of the record and the remaining portion consist of delta slowing. These VEEG findings are consistent with a severe encephalopathy and status epilepticus.   However it is important to note patient has persistent head tremoring which may also produce a rhythmic artifact on the EEG and there is persistent electro myogenic artifact making this 1 hour segment difficult to interpret.  It would be helpful to record more video EEG data and optimize current seizure medications to evaluate if there is changes in the EEG.  Based on the limited data that is interpretable on this 1 hour file EEG is concerning for status epilepticus.       CT Head 9/29: no acute intracranial pathology, brain atrophy, CSVID     MRI 9/30/23: no acute intracranial process, generalized atrophy, CSVID  MRI 10/11/23: no acute intracranial process, no hemorrhage, moderate-advanced brain atrophy and leukoaraiosis, CSVID   MRI 10/20/23: no interval change      BC: NG  CSF analysis   -glucose 98  -protein 22.8  -2 nucleated cells, RBC 0  -aerobic culture GPC in broth only   -anaerobic culture NGTD  -HSV negative  -Vega encephalopathy autoimmune panel negative    Impression   tatus epilepticus/NORSE, EEG improved on 3 AEDs and sedative drips - uncertain etiology of NCSE. MRI brain x2 with no structural etiology for new seizures. Decreased doses of AEDs on 10/9 to maximize her chances of regaining consciousness. Unfortunately, she had increasing EEG activity on lower doses of AEDs. Again improved with increased AED doses and Versed gtt.     Shaking/tremors, unclear etiology. May represent baseline tardive dyskinesia vs manifestation of status epilepticus vs toxic encephalopathy vs other     Plan  - continue valproic acid solution 250mg TID (sprinkles not NG compatible)  - continue fosphenytoin 100 mg TID for now; anticipate with begin wean once depakote at steady state   - continue lacosamide to 200 mg BID  - continue clobazam 20 mg BID  - limit dopaminergic/serotonergic agents   - Ethics involved as patient does not have a guardian    Patient Follow-up    - final recommendation pending work-up    We will  "continue to follow.     TAMI Begum CNP  Vascular Neurology    To page me or covering stroke neurology team member, click here: AMCOM  Choose \"On Call\" tab at top, then select \"NEUROLOGY/ALL SITES\" from middle drop-down box, press Enter, then look for \"stroke\" or \"telestroke\" for your site.  ______________________________________________________    Clinically Significant Risk Factors              # Hypoalbuminemia: Lowest albumin = 3.4 g/dL at 9/30/2023  5:15 AM, will monitor as appropriate     # Hypertension: Noted on problem list         # Moderate Malnutrition: based on nutrition assessment             Medications   Scheduled Meds   chlorhexidine  15 mL Mouth/Throat Q12H    clobazam  20 mg Oral or Feeding Tube BID    fiber modular  1 packet Per Feeding Tube Daily    fosphenytoin (CEREBYX) 100 mg PE in sodium chloride 0.9 % 100 mL intermittent infusion  100 mg PE Intravenous TID    furosemide  40 mg Intravenous Q12H    heparin ANTICOAGULANT  5,000 Units Subcutaneous Q8H    insulin aspart  1-6 Units Subcutaneous Q4H    lacosamide (VIMPAT) 200 mg in sodium chloride 0.9 % 110 mL intermittent infusion  200 mg Intravenous BID    multivitamins w/minerals  15 mL Oral or Feeding Tube Daily    pantoprazole  40 mg Oral or Feeding Tube QAM AC    Or    pantoprazole  40 mg Intravenous QAM AC    polyethylene glycol  17 g Oral or Feeding Tube BID    protein modular  1 packet Per Feeding Tube Daily    sodium chloride (PF)  10-40 mL Intracatheter Q7 Days    valproic acid  250 mg Oral Q8H    wound support modular  60 mL Oral Daily       Infusion Meds   dextrose      midazolam Stopped (10/19/23 2643)    sodium chloride 10 mL/hr at 10/20/23 0908       PRN Meds  acetaminophen **OR** acetaminophen, albuterol, [Held by provider] cyclobenzaprine, dextrose, glucose **OR** dextrose **OR** glucagon, hydrALAZINE, HYDROmorphone, lidocaine 4%, lidocaine (buffered or not buffered), LORazepam, melatonin, naloxone **OR** naloxone " "**OR** naloxone **OR** naloxone, nitroGLYcerin, sodium chloride (PF), sodium chloride (PF), sodium chloride (PF), sodium chloride (PF)       PHYSICAL EXAMINATION  Temp:  [98.4  F (36.9  C)-99.4  F (37.4  C)] 98.8  F (37.1  C)  Pulse:  [65-82] 65  Resp:  [11-36] 12  BP: (110-153)/(56-74) 110/60  FiO2 (%):  [30 %] 30 %  SpO2:  [96 %-100 %] 97 %      Neuro Exam  Mental Status: comatose, does not open eyes to verbal stimuli or sternal rub, does not follow commands   Cranial Nerves:  midline gaze, intermittent horizontal nystagmus, PERRL, cough intact with suction  Motor:  does not withdraw any extremity to noxious   Sensory:  does not withdraw any extremity to noxious     Imaging  I personally reviewed all imaging; relevant findings per HPI.     Lab Results Data   CBC  Recent Labs   Lab 10/21/23  0346 10/20/23  0437 10/19/23  0425   WBC 10.8 8.3 8.7   RBC 3.38* 3.32* 2.99*   HGB 8.2* 8.1* 7.4*   HCT 27.5* 27.2* 24.8*   * 441 444     Basic Metabolic Panel    Recent Labs   Lab 10/21/23  0759 10/21/23  0400 10/21/23  0346 10/20/23  1951 10/20/23  1805 10/20/23  0724 10/20/23  0437   NA  --   --  137  --  137  --  142   POTASSIUM  --   --  3.7  --  3.6  --  3.8   CHLORIDE  --   --  101  --  103  --  107   CO2  --   --  26  --  24  --  24   BUN  --   --  18.9  --  21.8  --  15.3   CR  --   --  0.42*  --  0.38*  --  0.33*   * 139* 137*   < > 147*   < > 94   LULU  --   --  8.3*  --  8.1*  --  8.3*    < > = values in this interval not displayed.     Liver Panel  No results for input(s): \"PROTTOTAL\", \"ALBUMIN\", \"BILITOTAL\", \"ALKPHOS\", \"AST\", \"ALT\", \"BILIDIRECT\" in the last 168 hours.  INR    Recent Labs   Lab Test 09/28/23  1244 03/07/23  1629   INR 1.04 1.15      Lipid Profile    Recent Labs   Lab Test 10/15/23  0650 10/03/23  0956 03/08/23  0830   CHOL  --   --  143   HDL  --   --  50   LDL  --   --  75   TRIG 136 1,372* 88     A1C  No lab results found.  Troponin  No results for input(s): \"CTROPT\", \"TROPONINIS\", " "\"TROPONINI\", \"GHTROP\" in the last 168 hours.       Data   I personally examined and evaluated the patient today. At the time of my evaluation and management the patient was in critical condition today due to seizures, coma. I personally managed new refractory status epilepticus. Key decisions made today included ongoing antiepileptic treatments. I spent a total of 35 minutes providing critical care services, evaluating the patient, directing care and reviewing laboratory values and radiologic reports.   "

## 2023-10-21 NOTE — PROGRESS NOTES
THORACIC SURGERY    Tracheostomy OK  No bleeding    CHAVA EARL MD [facility] ONCOLOGY THORACIC SURGERY  CELL:  [phone]  OFFICE: [phone]

## 2023-10-21 NOTE — PROGRESS NOTES
[facility] ICU RESPIRATORY NOTE        Date of Admission: 9/28/2023    Date of Intubation (most recent): Trached 10/20/2023    Reason for Mechanical Ventilation: Airway protection    Number of Days on Mechanical Ventilation: 2, initially intubated 9/29/2023    Significant Events Today: Pressure support 10/5 30% since 1130.    ABG Results:   Recent Labs   Lab 10/21/23  1232 10/20/23  1314   O2PER 30 30         Current Vent Settings: Vent Mode: CPAP/PS  (Continuous positive airway pressure with Pressure Support)  FiO2 (%): 30 %  Resp Rate (Set): 12 breaths/min  Tidal Volume (Set, mL): 400 mL  PEEP (cm H2O): 5 cmH2O  Pressure Support (cm H2O): 10 cmH2O  Resp: 19      Skin Assessment: Clean, dry, intact.    Plan: Continue on PS as tolerated.    KAREEN KEYS RT on 10/21/2023 at 3:55 PM

## 2023-10-21 NOTE — PLAN OF CARE
Goal Outcome Evaluation:    Pt continues to remain RASS -2, attempts to open eyes to voie but doesn't follow or track, withdraws to pain, + cough and gag reflex. SR, BP wnl. Tolerating vent settings, PST 10/5, minimal secretions inline. TF at goal, BM X2. Lasix given X1 with good response, continuous EEG going. Care ongoing

## 2023-10-21 NOTE — PROGRESS NOTES
Pt VSS overnight on ventilator via new trach. Trach still has some mild bleeding around site as well as moderate amount of thick yellow secretions. Pt has productive cough; tolerates suction well. Pt remains on TF. Neuro checks have remained consistent as pt remains obtunded off sedation. Pt voiding spontaneously with purewick. One large, loose stool overnight.

## 2023-10-21 NOTE — PROGRESS NOTES
Critical Care Progress Note  [facility]  Kortney Germain MRN# [id number]   Age: 68 year old YOB: 1955     Date of Admission: 9/28/2023  Date of Service: 10/21/2023    Assessment & Plan    68F pmh of schizo-affective schizophrenia, HTN, tardive dyskinesia now presented 9/28 from group home with back pain and fever, Ct abd showed rectum distended with stool and urinary bladder distended, now s/p quinonez placement.  Arrival UA c/w UTI. Since arrival from the ED she was alert but not generally interactive (?part of this may be due to underlying psychiatric illness?). Morning of 9/29 she was noted to be febrile and shaking prompting intubation to facilitate head CT and LP.  Placed on heavy sedation including benzodiazepine and propofol after EEG showed evidence of potential new seizure foci.  Evening of 10/3 triglycerides returned critically high. Eventually able to shut off the propofol after adding additional doses of as needed benzodiazepines and increasing the goal range of the midazolam. Following this though she had more seizure-like activity which resolved with increasing benzodiazepines.    Unfortunately she has not regained consciousness since getting control of her seizures.  AED doses were decreased to increase her wakefulness but she then started having breakthrough seizures on 10/15.  AED dosing has been re-increased.    Neurological/Psychological    **Sedation: on midazolam again, weaned off overnight.    Acute Encephalopathy: likely due to seizure activity vs post-ictal state vs side effects of seizure meds. Considered serotonin syndrome but this is less likely.  Psychiatrist also wondering about malignant catatonia, but this is unlikely now that she's been on signficant doses of benzos and there is evidence of seizures on eeg.   Underlying schizoaffective schizophrenia and h/o tardive dyskinesia. Unlikely to be contributing at this point  Status epilepticus.  Is on 3 antiseizure  agents.  Seizures have again broken on midazolam drip.  Drip weaned off, no recurrence of seizures yet.  ** MRI (10/11/23): no acute pathology to explain persistent encephalopathy              - AEDs  clobazam 20 mg BID, fosphenytoin 100 tid, lacosamide 200 mg BID, valproic acid 250 Q8h              - [place] paraneoplastic panel pending              - ammonia level 14 10/9/2023   - MRI 10/11/2023 no acute abnormalities    Decision making: no formal guardian or decision maker. Without decision maker or ability to ask patient, ethics consulted to assist with next steps. Only mental status is hindering extubation.               - ethics consulted--they are agreeable with tracheostomy  -- Any major decisions are required and unable to find a surrogate decision-maker will ensure that at least 2 physicians agree with the plan of care.     From SW note  Additional Information:  Attempting to search for NOK.  Sent referral to [employer] to inquire as to whether they had any additional information than we have been able to find.  The information that has been given is that patient has been committed 7 times since 1983 with the last 3 being done by human services or a hospital.  Her father passed away in 2022 and two other family members from the 1990's have also passed away.      Cardiovascular    Hypertension:  cannot be on Nifedipine ER(pta med), but bp's have been acceptable off of this med.   - monitor    Pulmonary    Loss of protective airway reflexes:  intubated/vented.  Inappropriate for extubation due to mentation so tracheostomy was preformed 10/20.  -Minimal ventilator settings  - Daily pressure support mode for conditioning  - intubated 9/29/23, trach 10/20    Tracheostomy /  PEG Plans: Gen surg has reasonably pointed out that we can just use a olga-feed long term rather than a peg.     Renal/Fluids/Electrolytes    Positive fluid balance.  + 4.5 L today, increased from 3.9 L yesterday despite  previously receiving Lasix 40 mg daily.   -Lasix increased to twice daily 10/21    Misc Plans  - monitor function and electrolytes, replacement per ICU protocols.   - generally avoid nephrotoxic agents such as NSAID, IV contrast unless specifically required  - adjust medications as needed for renal clearance  - follow I/O's as appropriate.    **I/O last 24hrs:   In: 2123.67 [NG/GT:685; I.V.:838.67]  Out: 1695 [Urine:1695]    Infectious Diseases    Sirs and possible UTI--UC with mixed christina but >100k colonies on admission.   Finished Ceftx course 10/4    Gastrointestinal/Genitourinary    No acute issues  - Tube feeds and PPI    **Nutrition: Diet: Adult Formula Drip Feeding: Continuous Osmolite 1.5; Nasogastric tube; Goal Rate: 30; mL/hr; Increase TF to 30 mL/hr; Do not advance tube feeding rate unless K+ is = or > 3.0, Mg++ is = or > 1.5, and Phos is = or > 1.9  NPO for Medical/Clinical Reasons Except for: No Exceptions        Endocrine/Metabolic    Stress induced hyperglycemia.  Plan  - ICU insulin protocol, goal sugar <180    Hematology/Oncology    Anemia of chronic illness: s/p prbc 9/29, no apparent blood loss    Rheumatology    no acute issues    Skin/Musculoskeletal    no acute issues    ------------------------------------------------------------------------------------------------------------------  Clinically Significant Risk Factors              # Hypoalbuminemia: Lowest albumin = 3.4 g/dL at 9/30/2023  5:15 AM, will monitor as appropriate       # Hypertension: Noted on problem list         # Moderate Malnutrition: based on nutrition assessment                ------------------------------------------------------------------------------------------------------------------  Prophylaxis/ICU Checklist    -DVT: Subcutaneous Heparin    -VAP: HOB 30 degrees, chlorhexidine rinse    -Stress Ulcer: PPI    -Restraints: Nonviolent soft two point restraints required and necessary for patient safety and continued cares  and good effect as patient continues to pull at necessary lines, tubes despite education and distraction. Will readdress daily.     -IV Access: Central access required and necessary for continued patient cares     -Feeding - Diet: Adult Formula Drip Feeding: Continuous Osmolite 1.5; Nasogastric tube; Goal Rate: 30; mL/hr; Increase TF to 30 mL/hr; Do not advance tube feeding rate unless K+ is = or > 3.0, Mg++ is = or > 1.5, and Phos is = or > 1.9  NPO for Medical/Clinical Reasons Except for: No Exceptions        Consults: ethics, MH, neuro  Family: none  Code Status: Full Code      Disposition: guarded    I have personally reviewed the daily labs, imaging studies, cultures and discussed the case with referring physician and consulting physicians.   I personally managed the respiratory support devices, hemodynamics, sedation, analgesia, metabolic abnormalities and nutritional status.          This patient is critically ill and I have provided 45 minutes of critical care time (excluding procedures) on 10/21/2023    Rian Simpson M.D.  Pulmonary & Critical Care  Pager: Click Here to page          Interval History   Tracheostomy yesterday without issue.  No events overnight.      Medications   Current Facility-Administered Medications   Medication Dose Route Frequency    chlorhexidine  15 mL Mouth/Throat Q12H    clobazam  20 mg Oral or Feeding Tube BID    fiber modular  1 packet Per Feeding Tube Daily    fosphenytoin (CEREBYX) 100 mg PE in sodium chloride 0.9 % 100 mL intermittent infusion  100 mg PE Intravenous TID    furosemide  40 mg Intravenous Q12H    heparin ANTICOAGULANT  5,000 Units Subcutaneous Q8H    insulin aspart  1-6 Units Subcutaneous Q4H    lacosamide (VIMPAT) 200 mg in sodium chloride 0.9 % 110 mL intermittent infusion  200 mg Intravenous BID    multivitamins w/minerals  15 mL Oral or Feeding Tube Daily    pantoprazole  40 mg Oral or Feeding Tube QAM AC    Or    pantoprazole  40 mg Intravenous QAM AC     polyethylene glycol  17 g Oral or Feeding Tube BID    protein modular  1 packet Per Feeding Tube Daily    sodium chloride (PF)  10-40 mL Intracatheter Q7 Days    valproic acid  250 mg Oral Q8H    wound support modular  60 mL Oral Daily at 4 pm     Current Facility-Administered Medications   Medication Last Rate    dextrose      midazolam Stopped (10/19/23 2345)    sodium chloride Stopped (10/21/23 0800)     Physical Exam   Vital Signs with Ranges  Temp:  [98.4  F (36.9  C)-99.4  F (37.4  C)] 98.8  F (37.1  C)  Pulse:  [65-82] 67  Resp:  [11-36] 12  BP: (101-153)/(56-74) 101/70  FiO2 (%):  [30 %] 30 %  SpO2:  [95 %-100 %] 97 %    Wt Readings from Last 1 Encounters:   10/21/23 46.2 kg (101 lb 13.6 oz)    Body mass index is 19.89 kg/m .     BP - Mean:  [] 81  Vent Mode: CMV/AC  (Continuous Mandatory Ventilation/ Assist Control)  FiO2 (%): 30 %  Resp Rate (Set): 12 breaths/min  Tidal Volume (Set, mL): 400 mL  PEEP (cm H2O): 5 cmH2O  Resp: 12      General: Stated age, trach in place, on the vent  HEENT: EEG leads ET tube feeding tube  Lungs: Synchronous with ventilator, rhonchi throughout, left greater than right though this may be due to being in the left down position  CVS: Regular rate rhythm, grade 1 systolic murmur  Abdomen: Grossly normal, hypoactive bowel sounds  Extremities/musculoskeletal: No edema  Neurology: Not responsive to voice today.  Skin: Grossly normal  Psychiatry: Unable  Exam of Line sites:  PICC     I/O last 3 completed shifts:  In: 2123.67 [I.V.:838.67; NG/GT:685]  Out: 1695 [Urine:1695]  Data   Labs & Studies of Note: I personally reviewed the following studies:    Imaging: All new imaging reviewed by me  No results found for this or any previous visit (from the past 24 hour(s)).        ROUTINE ICU LABS (Last four results)  ROUTINE ICU LABS (Last four results)  CMP  Recent Labs   Lab 10/21/23  0759 10/21/23  0400 10/21/23  0346 10/21/23  0008 10/20/23  1951 10/20/23  1805 10/20/23  0724  "10/20/23  0437 10/19/23  0749 10/19/23  0425 10/18/23  0758 10/18/23  0438   NA  --   --  137  --   --  137  --  142  --  140  --  139   POTASSIUM  --   --  3.7  --   --  3.6  --  3.8  --  4.1  --  4.0   CHLORIDE  --   --  101  --   --  103  --  107  --  107  --  105   CO2  --   --  26  --   --  24  --  24  --  24  --  22   ANIONGAP  --   --  10  --   --  10  --  11  --  9  --  12   * 139* 137* 111*   < > 147*   < > 94   < > 114*   < > 121*   BUN  --   --  18.9  --   --  21.8  --  15.3  --  13.1  --  14.0   CR  --   --  0.42*  --   --  0.38*  --  0.33*  --  0.34*  --  0.34*   GFRESTIMATED  --   --  >90  --   --  >90  --  >90  --  >90  --  >90   LULU  --   --  8.3*  --   --  8.1*  --  8.3*  --  8.0*  --  7.4*   MAG  --   --  2.0  --   --   --   --  2.1  --  2.2  --  2.1   PHOS  --   --  3.7  --   --   --   --  3.2  --  3.1  --  2.6    < > = values in this interval not displayed.     CBC  Recent Labs   Lab 10/21/23  0346 10/20/23  0437 10/19/23  0425 10/18/23  0438   WBC 10.8 8.3 8.7 10.2   RBC 3.38* 3.32* 2.99* 3.12*   HGB 8.2* 8.1* 7.4* 7.8*   HCT 27.5* 27.2* 24.8* 25.6*   MCV 81 82 83 82   MCH 24.3* 24.4* 24.7* 25.0*   MCHC 29.8* 29.8* 29.8* 30.5*   RDW 20.4* 20.6* 20.6* 20.7*   * 441 444 460*     INRNo lab results found in last 7 days.  Arterial Blood Gas  Recent Labs   Lab 10/20/23  1314   O2PER 30     Inflammatory Markers  No lab results found.  Immune Globulin Studies  No lab results found.  Microbiology:  No results found for: \"CULT\"  No lab results found.  Urine Studies:    Recent Labs   Lab Test 09/28/23  1606 03/10/23  0932 03/09/23  0224   LEUKEST Moderate* Large* Large*   NITRITE Positive* Positive* Positive*   WBCU 18* >182* >182*   RBCU <1 2 4*         "

## 2023-10-22 NOTE — PROGRESS NOTES
[facility] ICU RESPIRATORY NOTE        Date of Admission: 9/28/2023    Date of Intubation (most recent): Trached 10/20/2023    Reason for Mechanical Ventilation: AW protection    Number of Days on Mechanical Ventilation: 3    Met Criteria for Spontaneous Breathing Trial: Yes    Significant Events Today: PS 10/5  30% since 1130. Remains on PS overnight with the same setting.    ABG Results:   Recent Labs   Lab 10/21/23  1232 10/20/23  1314   O2PER 30 30         Current Vent Settings: Vent Mode: CPAP/PS  (Continuous positive airway pressure with Pressure Support)  FiO2 (%): 30 %  Resp Rate (Set): 12 breaths/min  Tidal Volume (Set, mL): 400 mL  PEEP (cm H2O): 5 cmH2O  Pressure Support (cm H2O): 10 cmH2O  Resp: 19      Skin Assessment: Clean, dry, Intact.    Plan: continue PS as tolerated.    RT Nuris on 10/22/2023 at 3:11 AM

## 2023-10-22 NOTE — PROGRESS NOTES
[facility] Progress Note    Interval Events  EEG: per verbal report continues to improve, no seizure activity      Current AEDs: lacosamide 200 mg BID, fosphenytoin 100 mg TID, clobazam 20 mg BID, depakote 250mg TID     Phenytoin total level 10/21: 11.5   Valproic acid 10/21: pending      No interval changes/new concerns.     HPI Summary  Kortney Germain is a 68 year old female with PMHx significant for anxiety/depression, schizoaffective disorder (living in group home), HTN, odynophagia, tardive dyskinesia, HTN. She presented 9/28 with lower back pain and abdominal pain. She was found to have UTI, anemia (received 1 unit PRBC), and constipation. She had decreased LOC and generalized shaking overnight on 9/28. RRT called and pt was intubated on 9/29/23 for encephalopathy and concern that should would be unable to protect her airway. Initial EEG was concerning for status epilepticus for which she was started on AEDs. She has remained intubated and encephalopathic since admission.      Attempted to decrease AED doses 10/9 to facilitate recovery of consciousness but had recurrent seizures and increasingly active EEG. Brain MRI was repeated 10/11 which again showed no acute findings. Propofol restarted 10/14 due to increasing EEG activity.  Propofol stopped 10/15 and started on Versed by primary team due to concern for propofol toxicity. EEG 10/15 morning again showing NCSE, Versed titrated up with improvement. Versed weaned again 10/19 with no identified breakthrough seizures. Valproic acid started 10/20. Fosphenytoin discontinued 10/22.     Evaluation Summarized  EEG 09/29/23: Abnormal due to the presences of generalized periodic pattern consisting of 2-3.5 hz discharges, maximum negativity in bifrontal region, periodic pattern occupy 90% of the record and the remaining portion consist of delta slowing. These VEEG findings are consistent with a severe encephalopathy and status  epilepticus.  However it is important to note patient has persistent head tremoring which may also produce a rhythmic artifact on the EEG and there is persistent electro myogenic artifact making this 1 hour segment difficult to interpret.  It would be helpful to record more video EEG data and optimize current seizure medications to evaluate if there is changes in the EEG.  Based on the limited data that is interpretable on this 1 hour file EEG is concerning for status epilepticus.       CT Head 9/29: no acute intracranial pathology, brain atrophy, CSVID     MRI 9/30/23: no acute intracranial process, generalized atrophy, CSVID  MRI 10/11/23: no acute intracranial process, no hemorrhage, moderate-advanced brain atrophy and leukoaraiosis, CSVID   MRI 10/20/23: no interval change      BC: NG  CSF analysis   -glucose 98  -protein 22.8  -2 nucleated cells, RBC 0  -aerobic culture GPC in broth only   -anaerobic culture NGTD  -HSV negative  -Vega encephalopathy autoimmune panel negative       Impression   Status epilepticus/NORSE, EEG improved on 3 AEDs and sedative drips - uncertain etiology of NCSE. MRI brain x2 with no structural etiology for new seizures. Decreased doses of AEDs on 10/9 to maximize her chances of regaining consciousness. Unfortunately, she had increasing EEG activity on lower doses of AEDs. Again improved with increased AED doses and Versed gtt.     Shaking/tremors, unclear etiology. May represent baseline tardive dyskinesia vs manifestation of status epilepticus vs toxic encephalopathy vs other     Plan  - discontinue fosphenytoin, ordered   - continue valproic acid solution 250mg TID (sprinkles not NG compatible)  - continue lacosamide to 200 mg BID  - continue clobazam 20 mg BID  - limit dopaminergic/serotonergic agents   - Ethics involved as patient does not have a guardian    Patient Follow-up    - final recommendation pending work-up    We will continue to follow.     TAMI Begum  "CNP  Vascular Neurology    To page me or covering stroke neurology team member, click here: AMCOM  Choose \"On Call\" tab at top, then select \"NEUROLOGY/ALL SITES\" from middle drop-down box, press Enter, then look for \"stroke\" or \"telestroke\" for your site.  ______________________________________________________    Clinically Significant Risk Factors              # Hypoalbuminemia: Lowest albumin = 3.4 g/dL at 9/30/2023  5:15 AM, will monitor as appropriate     # Hypertension: Noted on problem list         # Moderate Malnutrition: based on nutrition assessment             Medications   Scheduled Meds   chlorhexidine  15 mL Mouth/Throat Q12H    clobazam  20 mg Oral or Feeding Tube BID    fiber modular  1 packet Per Feeding Tube Daily    fosphenytoin (CEREBYX) 100 mg PE in sodium chloride 0.9 % 100 mL intermittent infusion  100 mg PE Intravenous TID    furosemide  40 mg Intravenous Q12H    heparin ANTICOAGULANT  5,000 Units Subcutaneous Q8H    insulin aspart  1-6 Units Subcutaneous Q4H    lacosamide (VIMPAT) 200 mg in sodium chloride 0.9 % 110 mL intermittent infusion  200 mg Intravenous BID    multivitamins w/minerals  15 mL Oral or Feeding Tube Daily    pantoprazole  40 mg Oral or Feeding Tube QAM AC    Or    pantoprazole  40 mg Intravenous QAM AC    polyethylene glycol  17 g Oral or Feeding Tube BID    protein modular  1 packet Per Feeding Tube Daily    sodium chloride (PF)  10-40 mL Intracatheter Q7 Days    valproic acid  250 mg Oral Q8H    wound support modular  60 mL Oral Daily at 4 pm       Infusion Meds   dextrose      midazolam Stopped (10/19/23 2345)    sodium chloride 10 mL/hr (10/21/23 1640)       PRN Meds  acetaminophen **OR** acetaminophen, albuterol, [Held by provider] cyclobenzaprine, dextrose, glucose **OR** dextrose **OR** glucagon, hydrALAZINE, HYDROmorphone, lidocaine 4%, lidocaine (buffered or not buffered), LORazepam, melatonin, naloxone **OR** naloxone **OR** naloxone **OR** naloxone, " "nitroGLYcerin, sodium chloride (PF), sodium chloride (PF), sodium chloride (PF), sodium chloride (PF)       PHYSICAL EXAMINATION  Temp:  [98.3  F (36.8  C)-99  F (37.2  C)] 99  F (37.2  C)  Pulse:  [67-80] 70  Resp:  [11-30] 30  BP: (108-167)/(50-83) 153/77  FiO2 (%):  [30 %-50 %] 50 %  SpO2:  [94 %-100 %] 98 %      Neurologic  Mental Status: comatose, does not open eyes to verbal stimuli or sternal rub, does not follow commands   Cranial Nerves:  midline gaze,no nystagmus, PERRL, cough intact with suction  Motor:  does not withdraw any extremity to noxious   Sensory:  does not withdraw any extremity to noxious       Imaging  I personally reviewed all imaging; relevant findings per HPI.     Lab Results Data   CBC  Recent Labs   Lab 10/22/23  0411 10/21/23  0346 10/20/23  0437   WBC 11.5* 10.8 8.3   RBC 3.25* 3.38* 3.32*   HGB 8.1* 8.2* 8.1*   HCT 26.7* 27.5* 27.2*   * 508* 441     Basic Metabolic Panel    Recent Labs   Lab 10/22/23  0756 10/22/23  0411 10/22/23  0009 10/21/23  1952 10/21/23  1728 10/21/23  0400 10/21/23  0346   NA  --  140  --   --  138  --  137   POTASSIUM  --  3.7  --   --  3.6  --  3.7   CHLORIDE  --  101  --   --  99  --  101   CO2  --  28  --   --  27  --  26   BUN  --  20.5  --   --  22.1  --  18.9   CR  --  0.40*  --   --  0.43*  --  0.42*   * 124* 122*   < > 168*   < > 137*   LULU  --  8.2*  --   --  8.1*  --  8.3*    < > = values in this interval not displayed.     Liver Panel  No results for input(s): \"PROTTOTAL\", \"ALBUMIN\", \"BILITOTAL\", \"ALKPHOS\", \"AST\", \"ALT\", \"BILIDIRECT\" in the last 168 hours.  INR    Recent Labs   Lab Test 09/28/23  1244 03/07/23  1629   INR 1.04 1.15      Lipid Profile    Recent Labs   Lab Test 10/15/23  0650 10/03/23  0956 03/08/23  0830   CHOL  --   --  143   HDL  --   --  50   LDL  --   --  75   TRIG 136 1,372* 88     A1C  No lab results found.  Troponin  No results for input(s): \"CTROPT\", \"TROPONINIS\", \"TROPONINI\", \"GHTROP\" in the last 168 hours.   "     Data   I personally examined and evaluated the patient today. At the time of my evaluation and management the patient was in critical condition today due to status epilepticus. I personally managed antiepileptic therapy. Key decisions made today included discontinuation of AED therapy. I spent a total of 25 minutes providing critical care services, evaluating the patient, directing care and reviewing laboratory values and radiologic reports.

## 2023-10-22 NOTE — PROGRESS NOTES
Critical Care Progress Note  [facility]  Kortney Germain MRN# [id number]   Age: 68 year old YOB: 1955     Date of Admission: 9/28/2023  Date of Service: 10/22/2023    Assessment & Plan    68F pmh of schizo-affective schizophrenia, HTN, tardive dyskinesia now presented 9/28 from group home with back pain and fever, Ct abd showed rectum distended with stool and urinary bladder distended, now s/p quinonez placement.  Arrival UA c/w UTI. Since arrival from the ED she was alert but not generally interactive (?part of this may be due to underlying psychiatric illness?). Morning of 9/29 she was noted to be febrile and shaking prompting intubation to facilitate head CT and LP.  Placed on heavy sedation including benzodiazepine and propofol after EEG showed evidence of potential new seizure foci.  Evening of 10/3 triglycerides returned critically high. Eventually able to shut off the propofol after adding additional doses of as needed benzodiazepines and increasing the goal range of the midazolam. Following this though she had more seizure-like activity which resolved with increasing benzodiazepines.    Unfortunately she has not regained consciousness since getting control of her seizures.  AED doses were decreased to increase her wakefulness but she then started having breakthrough seizures on 10/15.  AED dosing has been re-increased.    Neurological/Psychological        Acute Encephalopathy: likely due to seizure activity vs post-ictal state vs side effects of seizure meds. Considered serotonin syndrome but this is less likely.  Psychiatrist also wondering about malignant catatonia, but this is unlikely now that she's been on signficant doses of benzos and there is evidence of seizures on eeg.   Underlying schizoaffective schizophrenia and h/o tardive dyskinesia. Unlikely to be contributing at this point  Status epilepticus.  Is on 3 antiseizure agents.  Seizures have again broken on midazolam drip.   Drip weaned off, no recurrence of seizures yet.  ** MRI (10/11/23): no acute pathology to explain persistent encephalopathy              - AEDs  clobazam 20 mg BID, fosphenytoin 100 tid, lacosamide 200 mg BID, valproic acid 250 Q8h              - [place] paraneoplastic panel pending              - ammonia level 14 10/9/2023   - MRI 10/11/2023 no acute abnormalities    Decision making: no formal guardian or decision maker. Without decision maker or ability to ask patient, ethics consulted to assist with next steps. Only mental status is hindering extubation.               - ethics consulted--they are agreeable with tracheostomy  -- Any major decisions are required and unable to find a surrogate decision-maker will ensure that at least 2 physicians agree with the plan of care.     From SW note  Additional Information:  Attempting to search for NOK.  Sent referral to [employer] to inquire as to whether they had any additional information than we have been able to find.  The information that has been given is that patient has been committed 7 times since 1983 with the last 3 being done by human services or a hospital.  Her father passed away in 2022 and two other family members from the 1990's have also passed away.      Cardiovascular    Hypertension:  cannot be on Nifedipine ER(pta med), but bp's have been acceptable off of this med.   - monitor    Pulmonary    Loss of protective airway reflexes:  intubated/vented.  Inappropriate for extubation due to mentation so tracheostomy was preformed 10/20.  -Minimal ventilator settings  - Daily pressure support mode for conditioning  - intubated 9/29/23, trach 10/20    Tracheostomy /  PEG Plans: Gen surg has reasonably pointed out that we can just use a olga-feed long term rather than a peg.     Renal/Fluids/Electrolytes    Positive fluid balance.  Diuresing well   -Lasix increased to twice daily 10/21    Misc Plans  - monitor function and electrolytes,  replacement per ICU protocols.   - generally avoid nephrotoxic agents such as NSAID, IV contrast unless specifically required  - adjust medications as needed for renal clearance  - follow I/O's as appropriate.    **I/O last 24hrs:   In: 1940 [NG/GT:970; I.V.:250]  Out: 2075 [Urine:2075]    Infectious Diseases    Sirs and possible UTI--resolved  UC with mixed christina but >100k colonies on admission.   Finished Ceftx course 10/4    Gastrointestinal/Genitourinary    No acute issues  - Tube feeds and PPI    **Nutrition: Diet: Adult Formula Drip Feeding: Continuous Osmolite 1.5; Nasogastric tube; Goal Rate: 30; mL/hr; Increase TF to 30 mL/hr; Do not advance tube feeding rate unless K+ is = or > 3.0, Mg++ is = or > 1.5, and Phos is = or > 1.9  NPO for Medical/Clinical Reasons Except for: No Exceptions        Endocrine/Metabolic    Stress induced hyperglycemia.  Plan  - ICU insulin protocol, goal sugar <180    Hematology/Oncology    Anemia of chronic illness: s/p prbc 9/29, no apparent blood loss    Rheumatology    no acute issues    Skin/Musculoskeletal    no acute issues    ------------------------------------------------------------------------------------------------------------------  Clinically Significant Risk Factors              # Hypoalbuminemia: Lowest albumin = 3.4 g/dL at 9/30/2023  5:15 AM, will monitor as appropriate       # Hypertension: Noted on problem list         # Moderate Malnutrition: based on nutrition assessment                ------------------------------------------------------------------------------------------------------------------  Prophylaxis/ICU Checklist    -DVT: Subcutaneous Heparin    -VAP: HOB 30 degrees, chlorhexidine rinse    -Stress Ulcer: PPI    -Restraints: Nonviolent soft two point restraints required and necessary for patient safety and continued cares and good effect as patient continues to pull at necessary lines, tubes despite education and distraction. Will readdress daily.      -IV Access: Central access required and necessary for continued patient cares     -Feeding - Diet: Adult Formula Drip Feeding: Continuous Osmolite 1.5; Nasogastric tube; Goal Rate: 30; mL/hr; Increase TF to 30 mL/hr; Do not advance tube feeding rate unless K+ is = or > 3.0, Mg++ is = or > 1.5, and Phos is = or > 1.9  NPO for Medical/Clinical Reasons Except for: No Exceptions        Consults: ethics, MH, neuro  Family: none  Code Status: Full Code      Disposition: guarded    I have personally reviewed the daily labs, imaging studies, cultures and discussed the case with referring physician and consulting physicians.   I personally managed the respiratory support devices, hemodynamics, sedation, analgesia, metabolic abnormalities and nutritional status.          This patient is critically ill and I have provided 45 minutes of critical care time (excluding procedures) on 10/22/2023    Rian Simpson M.D.  Pulmonary & Critical Care  Pager: Click Here to page          Interval History   No events ON. Wbc with slight increase, Tmax 99, I/O negative 135    Medications   Current Facility-Administered Medications   Medication Dose Route Frequency    chlorhexidine  15 mL Mouth/Throat Q12H    clobazam  20 mg Oral or Feeding Tube BID    fiber modular  1 packet Per Feeding Tube Daily    fosphenytoin (CEREBYX) 100 mg PE in sodium chloride 0.9 % 100 mL intermittent infusion  100 mg PE Intravenous TID    furosemide  40 mg Intravenous Q12H    heparin ANTICOAGULANT  5,000 Units Subcutaneous Q8H    insulin aspart  1-6 Units Subcutaneous Q4H    lacosamide (VIMPAT) 200 mg in sodium chloride 0.9 % 110 mL intermittent infusion  200 mg Intravenous BID    multivitamins w/minerals  15 mL Oral or Feeding Tube Daily    pantoprazole  40 mg Oral or Feeding Tube QAM AC    Or    pantoprazole  40 mg Intravenous QAM AC    polyethylene glycol  17 g Oral or Feeding Tube BID    protein modular  1 packet Per Feeding Tube Daily    sodium  chloride (PF)  10-40 mL Intracatheter Q7 Days    valproic acid  250 mg Oral Q8H    wound support modular  60 mL Oral Daily at 4 pm     Current Facility-Administered Medications   Medication Last Rate    dextrose      midazolam Stopped (10/19/23 3465)    sodium chloride 10 mL/hr (10/21/23 1640)     Physical Exam   Vital Signs with Ranges  Temp:  [98.3  F (36.8  C)-99  F (37.2  C)] 99  F (37.2  C)  Pulse:  [67-80] 67  Resp:  [11-30] 15  BP: (101-167)/(50-83) 120/61  FiO2 (%):  [30 %] 30 %  SpO2:  [94 %-100 %] 98 %    Wt Readings from Last 1 Encounters:   10/22/23 46 kg (101 lb 6.6 oz)    Body mass index is 19.81 kg/m .     BP - Mean:  [] 85  Vent Mode: CPAP/PS  (Continuous positive airway pressure with Pressure Support)  FiO2 (%): 30 %  Resp Rate (Set): 12 breaths/min  Tidal Volume (Set, mL): 400 mL  PEEP (cm H2O): 5 cmH2O  Pressure Support (cm H2O): 7 cmH2O  Resp: 15      General: Stated age, trach in place, on the vent  HEENT: EEG leads ET tube feeding tube  Lungs: Synchronous with ventilator, rhonchi throughout, left greater than right though this may be due to being in the left down position  CVS: Regular rate rhythm, grade 1 systolic murmur  Abdomen: Grossly normal, hypoactive bowel sounds  Extremities/musculoskeletal: No edema  Neurology: Not responsive to voice today.  Skin: Grossly normal  Psychiatry: Unable  Exam of Line sites:  PICC     I/O last 3 completed shifts:  In: 1940 [I.V.:250; NG/GT:970]  Out: 2075 [Urine:2075]  Data   Labs & Studies of Note: I personally reviewed the following studies:    Imaging: All new imaging reviewed by me  No results found for this or any previous visit (from the past 24 hour(s)).        ROUTINE ICU LABS (Last four results)  ROUTINE ICU LABS (Last four results)  CMP  Recent Labs   Lab 10/22/23  0756 10/22/23  0411 10/22/23  0009 10/21/23  1952 10/21/23  1728 10/21/23  0400 10/21/23  0346 10/20/23  1951 10/20/23  1805 10/20/23  0724 10/20/23  0437 10/19/23  0749  "10/19/23  0425   NA  --  140  --   --  138  --  137  --  137  --  142  --  140   POTASSIUM  --  3.7  --   --  3.6  --  3.7  --  3.6  --  3.8  --  4.1   CHLORIDE  --  101  --   --  99  --  101  --  103  --  107  --  107   CO2  --  28  --   --  27  --  26  --  24  --  24  --  24   ANIONGAP  --  11  --   --  12  --  10  --  10  --  11  --  9   * 124* 122* 128* 168*   < > 137*   < > 147*   < > 94   < > 114*   BUN  --  20.5  --   --  22.1  --  18.9  --  21.8  --  15.3  --  13.1   CR  --  0.40*  --   --  0.43*  --  0.42*  --  0.38*  --  0.33*  --  0.34*   GFRESTIMATED  --  >90  --   --  >90  --  >90  --  >90  --  >90  --  >90   LULU  --  8.2*  --   --  8.1*  --  8.3*  --  8.1*  --  8.3*  --  8.0*   MAG  --  2.0  --   --   --   --  2.0  --   --   --  2.1  --  2.2   PHOS  --  3.8  --   --   --   --  3.7  --   --   --  3.2  --  3.1    < > = values in this interval not displayed.     CBC  Recent Labs   Lab 10/22/23  0411 10/21/23  0346 10/20/23  0437 10/19/23  0425   WBC 11.5* 10.8 8.3 8.7   RBC 3.25* 3.38* 3.32* 2.99*   HGB 8.1* 8.2* 8.1* 7.4*   HCT 26.7* 27.5* 27.2* 24.8*   MCV 82 81 82 83   MCH 24.9* 24.3* 24.4* 24.7*   MCHC 30.3* 29.8* 29.8* 29.8*   RDW 20.4* 20.4* 20.6* 20.6*   * 508* 441 444     INRNo lab results found in last 7 days.  Arterial Blood Gas  Recent Labs   Lab 10/21/23  1232 10/20/23  1314   O2PER 30 30     Inflammatory Markers  No lab results found.  Immune Globulin Studies  No lab results found.  Microbiology:  No results found for: \"CULT\"  No lab results found.  Urine Studies:    Recent Labs   Lab Test 09/28/23  1606 03/10/23  0932 03/09/23  0224   LEUKEST Moderate* Large* Large*   NITRITE Positive* Positive* Positive*   WBCU 18* >182* >182*   RBCU <1 2 4*         "

## 2023-10-23 NOTE — PROGRESS NOTES
[facility]    Stroke Progress Note    Interval Events  EEG: per verbal report ongoing GPEDs but no notable worsening since discontinuation of fosphenytoin      Current AEDs: lacosamide 200 mg BID, clobazam 20 mg BID, depakote 250mg TID     Phenytoin stopped yesterday, no noted change in clinical status. Remains on cEEG.    HPI Summary  Kortney Germain is a 68 year old female with PMHx significant for anxiety/depression, schizoaffective disorder (living in group home), HTN, odynophagia, tardive dyskinesia, HTN. She presented 9/28 with lower back pain and abdominal pain. She was found to have UTI, anemia (received 1 unit PRBC), and constipation. She had decreased LOC and generalized shaking overnight on 9/28. RRT called and pt was intubated on 9/29/23 for encephalopathy and concern that should would be unable to protect her airway. Initial EEG was concerning for status epilepticus for which she was started on AEDs. She has remained intubated and encephalopathic since admission.      Attempted to decrease AED doses 10/9 to facilitate recovery of consciousness but had recurrent seizures and increasingly active EEG. Brain MRI was repeated 10/11 which again showed no acute findings. Propofol restarted 10/14 due to increasing EEG activity.  Propofol stopped 10/15 and started on Versed by primary team due to concern for propofol toxicity. EEG 10/15 morning again showing NCSE, Versed titrated up with improvement. Versed weaned again 10/19 with no identified breakthrough seizures. Valproic acid started 10/20. Fosphenytoin discontinued 10/22.     Evaluation Summarized  EEG 09/29/23: Abnormal due to the presences of generalized periodic pattern consisting of 2-3.5 hz discharges, maximum negativity in bifrontal region, periodic pattern occupy 90% of the record and the remaining portion consist of delta slowing. These VEEG findings are consistent with a severe encephalopathy and status  epilepticus.  However it is important to note patient has persistent head tremoring which may also produce a rhythmic artifact on the EEG and there is persistent electro myogenic artifact making this 1 hour segment difficult to interpret.  It would be helpful to record more video EEG data and optimize current seizure medications to evaluate if there is changes in the EEG.  Based on the limited data that is interpretable on this 1 hour file EEG is concerning for status epilepticus.       CT Head 9/29: no acute intracranial pathology, brain atrophy, CSVID     MRI 9/30/23: no acute intracranial process, generalized atrophy, CSVID  MRI 10/11/23: no acute intracranial process, no hemorrhage, moderate-advanced brain atrophy and leukoaraiosis, CSVID   MRI 10/20/23: no interval change      BC: NG  CSF analysis   -glucose 98  -protein 22.8  -2 nucleated cells, RBC 0  -aerobic culture GPC in broth only   -anaerobic culture NGTD  -HSV negative  -Vega encephalopathy autoimmune panel negative       Impression   Status epilepticus/NORSE, EEG improved on 3 AEDs and sedative drips - uncertain etiology of NCSE. MRI brain x2 with no structural etiology for new seizures. Decreased doses of AEDs on 10/9 to maximize her chances of regaining consciousness. Unfortunately, she had increasing EEG activity on lower doses of AEDs. Again improved with increased AED doses and Versed gtt.     Shaking/tremors, unclear etiology. May represent baseline tardive dyskinesia vs manifestation of status epilepticus vs toxic encephalopathy vs other     Plan  - continue valproic acid solution 250mg TID (sprinkles not NG compatible)  - continue lacosamide to 200 mg BID  - continue clobazam 20 mg BID  - limit dopaminergic/serotonergic agents   - Ethics involved as patient does not have a guardian    Patient Follow-up    - final recommendation pending work-up    We will continue to follow.     TAMI Begum CNP  Vascular Neurology    To page me or  "covering stroke neurology team member, click here: AMCOM  Choose \"On Call\" tab at top, then select \"NEUROLOGY/ALL SITES\" from middle drop-down box, press Enter, then look for \"stroke\" or \"telestroke\" for your site.  ______________________________________________________    Clinically Significant Risk Factors              # Hypoalbuminemia: Lowest albumin = 3.4 g/dL at 9/30/2023  5:15 AM, will monitor as appropriate     # Hypertension: Noted on problem list         # Moderate Malnutrition: based on nutrition assessment             Medications   Scheduled Meds   chlorhexidine  15 mL Mouth/Throat Q12H    clobazam  20 mg Oral or Feeding Tube BID    fiber modular  1 packet Per Feeding Tube Daily    furosemide  40 mg Intravenous Q12H    heparin ANTICOAGULANT  5,000 Units Subcutaneous Q8H    insulin aspart  1-6 Units Subcutaneous Q4H    lacosamide (VIMPAT) 200 mg in sodium chloride 0.9 % 110 mL intermittent infusion  200 mg Intravenous BID    multivitamins w/minerals  15 mL Oral or Feeding Tube Daily    pantoprazole  40 mg Oral or Feeding Tube QAM AC    Or    pantoprazole  40 mg Intravenous QAM AC    polyethylene glycol  17 g Oral or Feeding Tube BID    protein modular  1 packet Per Feeding Tube Daily    sodium chloride (PF)  10-40 mL Intracatheter Q7 Days    valproic acid  250 mg Oral Q8H    wound support modular  60 mL Oral Daily at 4 pm       Infusion Meds   dextrose      midazolam Stopped (10/19/23 2345)    sodium chloride 10 mL/hr (10/21/23 1640)       PRN Meds  acetaminophen **OR** acetaminophen, albuterol, [Held by provider] cyclobenzaprine, dextrose, glucose **OR** dextrose **OR** glucagon, hydrALAZINE, HYDROmorphone, lidocaine 4%, lidocaine (buffered or not buffered), LORazepam, melatonin, naloxone **OR** naloxone **OR** naloxone **OR** naloxone, nitroGLYcerin, sodium chloride (PF), sodium chloride (PF), sodium chloride (PF), sodium chloride (PF)       PHYSICAL EXAMINATION  Temp:  [97.6  F (36.4  C)-98.5  F (36.9 " " C)] 97.6  F (36.4  C)  Pulse:  [67-80] 68  Resp:  [15-60] 21  BP: (104-145)/(53-75) 131/72  FiO2 (%):  [30 %-50 %] 30 %  SpO2:  [91 %-99 %] 95 %      Neurologic  Mental Status: comatose, briefly opens eyes to persistent stimulation but does maintain eye opening or spontaneously open eyes, does not follow commands   Cranial Nerves:  midline gaze,no nystagmus, PERRL, cough intact with suction  Motor:  does not withdraw any extremity to noxious   Sensory:  does not withdraw any extremity to noxious       Imaging  I personally reviewed all imaging; relevant findings per HPI.     Lab Results Data   CBC  Recent Labs   Lab 10/23/23  0440 10/22/23  0411 10/21/23  0346   WBC 11.3* 11.5* 10.8   RBC 3.11* 3.25* 3.38*   HGB 7.7* 8.1* 8.2*   HCT 25.7* 26.7* 27.5*    470* 508*     Basic Metabolic Panel    Recent Labs   Lab 10/23/23  0440 10/23/23  0429 10/22/23  2004 10/22/23  1802 10/22/23  0756 10/22/23  0411     --   --  142  --  140   POTASSIUM 3.6  --   --  3.9  --  3.7   CHLORIDE 103  --   --  101  --  101   CO2 27  --   --  29  --  28   BUN 17.9  --   --  21.4  --  20.5   CR 0.36*  --   --  0.39*  --  0.40*   * 132* 122* 120*   < > 124*   LULU 7.8*  --   --  8.2*  --  8.2*    < > = values in this interval not displayed.     Liver Panel  No results for input(s): \"PROTTOTAL\", \"ALBUMIN\", \"BILITOTAL\", \"ALKPHOS\", \"AST\", \"ALT\", \"BILIDIRECT\" in the last 168 hours.  INR    Recent Labs   Lab Test 09/28/23  1244 03/07/23  1629   INR 1.04 1.15      Lipid Profile    Recent Labs   Lab Test 10/15/23  0650 10/03/23  0956 03/08/23  0830   CHOL  --   --  143   HDL  --   --  50   LDL  --   --  75   TRIG 136 1,372* 88     A1C  No lab results found.  Troponin  No results for input(s): \"CTROPT\", \"TROPONINIS\", \"TROPONINI\", \"GHTROP\" in the last 168 hours.       Data   I personally examined and evaluated the patient today. At the time of my evaluation and management the patient was in critical condition today due to status " epilepticus. I personally managed status epilepticus. Key decisions made today included ongoing antiepileptic therapy. I spent a total of 35 minutes providing critical care services, evaluating the patient, directing care and reviewing laboratory values and radiologic reports.

## 2023-10-23 NOTE — PROGRESS NOTES
INTERIM REPORT of Video-EEG Monitoring              DATE OF RECORDING:  10/22/2023         I reviewed the ongoing video-EEG monitoring of Kortney Germain.          The EEG during sedated coma has been persistently abnormal due to generalized delta-theta slowing, with periods of slowly repetitive, non-evolving, generalized periodic epileptiform discharges (GPEDs), and with periods of exclusively sinusoidal (non-epileptiform) generalized slowing.    No electrographic seizures have been observed this week.         These abnormalities indicate severe electrographic encephalopathy, and an elevated risk of epileptic seizures.  This recording excludes non-convulsive status epilepticus as a possible cause of this encephalopathy.    The occurrence of GPEDs has decreased after the introduction of valproate.    Melecio Lopes M.D.

## 2023-10-23 NOTE — PLAN OF CARE
Goal Outcome Evaluation:    Plan of Care Reviewed With: patient, caregiver    Overall Patient Progress: improvingOverall Patient Progress: improving    Pt here with acute encephalopathy. A&O unable to assess due to patient not verbalizing. Neuros include, no withdraw to pain on all 4 extremities, perrl, and obtunded. VSS on trach dome 30%/30L. NPO - Tube feed diet at goal of 30 ml/hr with q4h 100 ml FWF. Takes pills crushed through ng tube. Up with A2 + lift. No nonverbal indicators of pain. Patient's care providers came to visit today and patient tried to open her eyes and flinched slightly. Pt scoring yellow on the Aggression Stop Light Tool for unable to assess orientation. Discharge plan pending medical readiness - patient will need a g-tube placed.

## 2023-10-23 NOTE — PLAN OF CARE
Care provided from [id number]    Patient remains unresponsive, no sedation. PERRL. Coughs when stimulated. SR with stable blood pressures. Trach dome 30% and 35L with coarse lung sounds and creamy thick secretions. Diuresed today with good urine output. Tube feeding running at goal. Plan to continue pressure supporting.

## 2023-10-23 NOTE — PLAN OF CARE
Patient does not withdraw to pain, intermittently seems to open eyes. Does not track. Trach dome this entire shift. Excessive thick secretions through trach. Lasix given, very good urine output.

## 2023-10-24 NOTE — PLAN OF CARE
Goal Outcome Evaluation:    Neuro: Pt off sedation, not following commands. Opens eyes spontaneously at times. Pupils PERRL. Some movement in lower extremities.   Cardiac: Tele SR. SBP < 160.   Respiratory: Trach dome 30% 30 L overnight. Large amounts of thick secretions in trach. LS coarse.    GI/: TF running @ 30 mL/hr, 100 mL H2O q4h. Multiple loose BM overnight. Purewick in place, bypassed @ times. On IV lasix, good UOP.   Lines/Drips: Left PICC.    Pain: CPOT 0.   Skin: Sacral pressure injury, treated per plan of care. Scattered bruising.   Activity: Assist of 2 w/ lift. Reposition q2h.   Additional/Plan: Ethics following. Possible PEG placement prior to transfer.

## 2023-10-24 NOTE — PROGRESS NOTES
Critical Care Progress Note      10/23/2023    Name: Kortney Germain MRN#: [id number]   Age: 68 year old YOB: 1955     [facility]    [facility] DAY #             Problem List:   Principal Problem:    Status epilepticus (H)  Active Problems:    Urinary retention    Acute cystitis without hematuria    Constipation, unspecified constipation type    Anemia, unspecified type    HTN (hypertension)    Status epilepticus - continue anti-epileptics as she is on.   Acute respiratory failure - she remains on 30% on trach dome for now more than 24 hours  Metabolic encephalopathy - she continues with this and will titrate off meds that may be contributing   Underlying schizoaffective disorder/tardive dyskinesia   HTN             Summary/Hospital Course:           Assessment and plan :     Kortney Germain IS a 68 year old female admitted on 9/28/2023 for acute respiratory failure and status epilepticus    I have personally reviewed the daily labs, imaging studies, cultures and discussed the case with referring physician and consulting physicians.     My assessment and plan by system for this patient is as follows:    Neurology/Psychiatry:   1. Metabolic encephalopathy and status slowly improving. Case discussed with Neuro critical care today .    Cardiovascular:   1.Hemodynamics - well compensated     Pulmonary/Ventilator Management:   1. Airway on 30% trach dome now    GI and Nutrition :   1. Enteral nutrition     Renal/Fluids/Electrolytes:   1. Normal renal function with creatinine 0.38    Infectious Disease:   1. Off antibiotics     Endocrine:   1. Glucoses ok     Hematology/Oncology:   1. Hg 7.7     IV/Access:   1. Venous access -   2. Arterial access -   3.  Plan  - central access required and necessary      ICU Prophylaxis:   1. DVT: Hep Subq/ LMWH/mechanical  2. VAP: HOB 30 degrees, chlorhexidine rinse  3. Stress Ulcer: PPI/H2 blocker  4. Restraints: Nonviolent soft two point restraints required and  necessary for patient safety and continued cares and good effect as patient continues to pull at necessary lines, tubes despite education and distraction. Will readdress daily.   5. IV Access - central access required and necessary for continued patient cares  6. Feeding - enteral   7. Family Update: no family available   8. Disposition - ICU care         Key goals for next 24 hours:   1.  2.  3.               Interim History:              Key Medications:      chlorhexidine  15 mL Mouth/Throat Q12H    clobazam  20 mg Oral or Feeding Tube BID    fiber modular  1 packet Per Feeding Tube Daily    furosemide  40 mg Intravenous Q12H    heparin ANTICOAGULANT  5,000 Units Subcutaneous Q8H    insulin aspart  1-6 Units Subcutaneous Q4H    lacosamide (VIMPAT) 200 mg in sodium chloride 0.9 % 110 mL intermittent infusion  200 mg Intravenous BID    multivitamins w/minerals  15 mL Oral or Feeding Tube Daily    pantoprazole  40 mg Oral or Feeding Tube QAM AC    Or    pantoprazole  40 mg Intravenous QAM AC    polyethylene glycol  17 g Oral or Feeding Tube BID    protein modular  1 packet Per Feeding Tube Daily    sodium chloride (PF)  10-40 mL Intracatheter Q7 Days    valproic acid  250 mg Oral Q8H    wound support modular  60 mL Oral Daily at 4 pm      dextrose      midazolam Stopped (10/19/23 2345)    sodium chloride 10 mL/hr (10/21/23 1640)               Physical Examination:   Temp:  [97.6  F (36.4  C)-99.6  F (37.6  C)] 99.6  F (37.6  C)  Pulse:  [67-83] 81  Resp:  [17-60] 24  BP: (105-157)/(58-86) 126/71  FiO2 (%):  [30 %] 30 %  SpO2:  [89 %-98 %] 90 %    Intake/Output Summary (Last 24 hours) at 10/23/2023 2147  Last data filed at 10/23/2023 2000  Gross per 24 hour   Intake 1580 ml   Output 3000 ml   Net -1420 ml     Wt Readings from Last 4 Encounters:   10/22/23 46 kg (101 lb 6.6 oz)   06/08/23 40.8 kg (90 lb)   03/15/23 41 kg (90 lb 6.2 oz)   02/02/22 52.2 kg (115 lb)     BP - Mean:  [] 94  Vent Mode: CPAP/PS   (Continuous positive airway pressure with Pressure Support)  FiO2 (%): 30 %  PEEP (cm H2O): 5 cmH2O  Pressure Support (cm H2O): 7 cmH2O  Resp: 24    Recent Labs   Lab 10/21/23  1232 10/20/23  1314   O2PER 30 30       GEN: no acute distress; only minimal response to stimulation   HEENT: head ncat, sclera anicteric, OP patent, trachea midline   PULM: unlabored synchronous with vent, clear anteriorly    CV/COR: RRR S1S2 no gallop,  No rub, no murmur  ABD: soft nontender, hypoactive bowel sounds, no mass  EXT:  mild edema   warm  NEURO: encephalopathic   SKIN: no obvious rash; no  cyanosis or mottling   LINES: clean, dry intact         Data:   All data and imaging reviewed     ROUTINE ICU LABS (Last four results)  CMP  Recent Labs   Lab 10/23/23  1950 10/23/23  1906 10/23/23  1544 10/23/23  1214 10/23/23  0824 10/23/23  0440 10/22/23  2004 10/22/23  1802 10/22/23  0756 10/22/23  0411 10/21/23  0400 10/21/23  0346 10/20/23  0724 10/20/23  0437   NA  --  142  --   --   --  141  --  142  --  140   < > 137   < > 142   POTASSIUM  --  4.1  --   --   --  3.6  --  3.9  --  3.7   < > 3.7   < > 3.8   CHLORIDE  --  102  --   --   --  103  --  101  --  101   < > 101   < > 107   CO2  --  30*  --   --   --  27  --  29  --  28   < > 26   < > 24   ANIONGAP  --  10  --   --   --  11  --  12  --  11   < > 10   < > 11   * 123* 131* 115*   < > 120*   < > 120*   < > 124*   < > 137*   < > 94   BUN  --  23.6*  --   --   --  17.9  --  21.4  --  20.5   < > 18.9   < > 15.3   CR  --  0.38*  --   --   --  0.36*  --  0.39*  --  0.40*   < > 0.42*   < > 0.33*   GFRESTIMATED  --  >90  --   --   --  >90  --  >90  --  >90   < > >90   < > >90   LULU  --  8.6*  --   --   --  7.8*  --  8.2*  --  8.2*   < > 8.3*   < > 8.3*   MAG  --   --   --   --   --  2.0  --   --   --  2.0  --  2.0  --  2.1   PHOS  --   --   --   --   --  3.3  --   --   --  3.8  --  3.7  --  3.2    < > = values in this interval not displayed.     CBC  Recent Labs   Lab  "10/23/23  0440 10/22/23  0411 10/21/23  0346 10/20/23  0437   WBC 11.3* 11.5* 10.8 8.3   RBC 3.11* 3.25* 3.38* 3.32*   HGB 7.7* 8.1* 8.2* 8.1*   HCT 25.7* 26.7* 27.5* 27.2*   MCV 83 82 81 82   MCH 24.8* 24.9* 24.3* 24.4*   MCHC 30.0* 30.3* 29.8* 29.8*   RDW 20.5* 20.4* 20.4* 20.6*    470* 508* 441     INRNo lab results found in last 7 days.  Arterial Blood Gas  Recent Labs   Lab 10/21/23  1232 10/20/23  1314   O2PER 30 30       All cultures:  No results for input(s): \"CULT\" in the last 168 hours.  No results found for this or any previous visit (from the past 24 hour(s)).    MD Cristina    Billing: This patient is critically ill: Yes. Total critical care time today 40 min.            "

## 2023-10-24 NOTE — PROGRESS NOTES
[facility]    Stroke Progress Note    Interval Events  EEG: per verbal report, no seizures. Increased lacosamide to 300 mg BID. Planning to transfer to the floor.      Current AEDs: lacosamide 200 mg BID, clobazam 20 mg BID, depakote 250mg TID       HPI Summary  Kortney Germain is a 68 year old female with PMHx significant for anxiety/depression, schizoaffective disorder (living in group home), HTN, odynophagia, tardive dyskinesia, HTN. She presented 9/28 with lower back pain and abdominal pain. She was found to have UTI, anemia (received 1 unit PRBC), and constipation. She had decreased LOC and generalized shaking overnight on 9/28. RRT called and pt was intubated on 9/29/23 for encephalopathy and concern that should would be unable to protect her airway. Initial EEG was concerning for status epilepticus for which she was started on AEDs. She has remained intubated and encephalopathic since admission.      Attempted to decrease AED doses 10/9 to facilitate recovery of consciousness but had recurrent seizures and increasingly active EEG. Brain MRI was repeated 10/11 which again showed no acute findings. Propofol restarted 10/14 due to increasing EEG activity.  Propofol stopped 10/15 and started on Versed by primary team due to concern for propofol toxicity. EEG 10/15 morning again showing NCSE, Versed titrated up with improvement. Versed weaned again 10/19 with no identified breakthrough seizures. Valproic acid started 10/20. Fosphenytoin discontinued 10/22.     Evaluation Summarized  EEG 09/29/23: Abnormal due to the presences of generalized periodic pattern consisting of 2-3.5 hz discharges, maximum negativity in bifrontal region, periodic pattern occupy 90% of the record and the remaining portion consist of delta slowing. These VEEG findings are consistent with a severe encephalopathy and status epilepticus.  However it is important to note patient has persistent head tremoring  "which may also produce a rhythmic artifact on the EEG and there is persistent electro myogenic artifact making this 1 hour segment difficult to interpret.  It would be helpful to record more video EEG data and optimize current seizure medications to evaluate if there is changes in the EEG.  Based on the limited data that is interpretable on this 1 hour file EEG is concerning for status epilepticus.       CT Head 9/29: no acute intracranial pathology, brain atrophy, CSVID     MRI 9/30/23: no acute intracranial process, generalized atrophy, CSVID  MRI 10/11/23: no acute intracranial process, no hemorrhage, moderate-advanced brain atrophy and leukoaraiosis, CSVID   MRI 10/20/23: no interval change      BC: NG  CSF analysis   -glucose 98  -protein 22.8  -2 nucleated cells, RBC 0  -aerobic culture GPC in broth only   -anaerobic culture NGTD  -HSV negative  -Vega encephalopathy autoimmune panel negative       Impression   Status epilepticus/NORSE, EEG improved on 3 AEDs and sedative drips - uncertain etiology of NCSE. MRI brain x2 with no structural etiology for new seizures. Decreased doses of AEDs on 10/9 to maximize her chances of regaining consciousness. Unfortunately, she had increasing EEG activity on lower doses of AEDs. Again improved with increased AED doses and Versed gtt.     Shaking/tremors, unclear etiology. May represent baseline tardive dyskinesia vs manifestation of status epilepticus vs toxic encephalopathy vs other     Plan  - continue valproic acid solution 250mg TID (sprinkles not NG compatible)  - increase lacosamide to 300 mg BID  - continue clobazam 20 mg BID  - limit dopaminergic/serotonergic agents   - Ethics involved as patient does not have a guardian      We will continue to follow while she is in the ICU.    Gricelda Cody, TAMI, [facility]  Neurology  10/24/2023 3:52 PM  To page stroke neurology after hours or on a subsequent day, click here: [facility]  Choose \"On Call\" tab at top, then search dropdown " "box for \"Neurology Adult\" & press Enter, look for Neuro ICU/Stroke       ______________________________________________________    Clinically Significant Risk Factors              # Hypoalbuminemia: Lowest albumin = 3.4 g/dL at 9/30/2023  5:15 AM, will monitor as appropriate     # Hypertension: Noted on problem list         # Moderate Malnutrition: based on nutrition assessment             Medications   Scheduled Meds   clobazam  20 mg Oral or Feeding Tube BID    fiber modular  1 packet Per Feeding Tube Daily    furosemide  40 mg Intravenous Q12H    heparin ANTICOAGULANT  5,000 Units Subcutaneous Q8H    lacosamide (VIMPAT) 300 mg in sodium chloride 0.9 % 110 mL intermittent infusion  300 mg Intravenous BID    multivitamins w/minerals  15 mL Oral or Feeding Tube Daily    polyethylene glycol  17 g Oral or Feeding Tube BID    protein modular  1 packet Per Feeding Tube Daily    sodium chloride (PF)  10-40 mL Intracatheter Q7 Days    valproic acid  250 mg Oral Q8H    wound support modular  60 mL Oral Daily at 4 pm       Infusion Meds   dextrose         PRN Meds  acetaminophen **OR** acetaminophen, albuterol, [Held by provider] cyclobenzaprine, dextrose, glucose **OR** dextrose **OR** glucagon, hydrALAZINE, HYDROmorphone, lidocaine 4%, lidocaine (buffered or not buffered), LORazepam, melatonin, naloxone **OR** naloxone **OR** naloxone **OR** naloxone, nitroGLYcerin, sodium chloride (PF), sodium chloride (PF), sodium chloride (PF), sodium chloride (PF)       PHYSICAL EXAMINATION  Temp:  [97  F (36.1  C)-99.6  F (37.6  C)] 99  F (37.2  C)  Pulse:  [68-83] 73  Resp:  [0-80] 22  BP: (104-148)/(61-84) 140/81  FiO2 (%):  [30 %] 30 %  SpO2:  [90 %-98 %] 98 %      Neurologic  Mental Status: comatose, briefly opens eyes to persistent stimulation but does maintain eye opening or spontaneously open eyes, does not follow commands   Cranial Nerves:  midline gaze, no nystagmus, PERRL, cough intact with suction  Motor:  ?withdrawal in " "LUE, no movement to noxious in other extremities  Sensory:  see motor    Imaging  I personally reviewed all imaging; relevant findings per HPI.     Lab Results Data   CBC  Recent Labs   Lab 10/24/23  0411 10/23/23  0440 10/22/23  0411   WBC 12.6* 11.3* 11.5*   RBC 3.65* 3.11* 3.25*   HGB 9.0* 7.7* 8.1*   HCT 30.1* 25.7* 26.7*   * 432 470*     Basic Metabolic Panel    Recent Labs   Lab 10/24/23  1113 10/24/23  0853 10/24/23  0412 10/24/23  0411 10/23/23  1950 10/23/23  1906 10/23/23  0824 10/23/23  0440   NA  --   --   --  142  --  142  --  141   POTASSIUM  --   --   --  3.7  --  4.1  --  3.6   CHLORIDE  --   --   --  100  --  102  --  103   CO2  --   --   --  30*  --  30*  --  27   BUN  --   --   --  22.6  --  23.6*  --  17.9   CR  --   --   --  0.39*  --  0.38*  --  0.36*   * 112* 130* 139*   < > 123*   < > 120*   LULU  --   --   --  8.8  --  8.6*  --  7.8*    < > = values in this interval not displayed.     Liver Panel  No results for input(s): \"PROTTOTAL\", \"ALBUMIN\", \"BILITOTAL\", \"ALKPHOS\", \"AST\", \"ALT\", \"BILIDIRECT\" in the last 168 hours.  INR    Recent Labs   Lab Test 09/28/23  1244 03/07/23  1629   INR 1.04 1.15      Lipid Profile    Recent Labs   Lab Test 10/15/23  0650 10/03/23  0956 03/08/23  0830   CHOL  --   --  143   HDL  --   --  50   LDL  --   --  75   TRIG 136 1,372* 88     A1C  No lab results found.  Troponin  No results for input(s): \"CTROPT\", \"TROPONINIS\", \"TROPONINI\", \"GHTROP\" in the last 168 hours.       Data   I personally examined and evaluated the patient today. At the time of my evaluation and management the patient was in critical condition today due to status epilepticus. I personally managed status epilepticus. Key decisions made today included ongoing antiepileptic therapy. I spent a total of 35 minutes providing critical care services, evaluating the patient, directing care and reviewing laboratory values and radiologic reports.   "

## 2023-10-24 NOTE — PROGRESS NOTES
[facility]  ICU Transfer note - Hospitalist Service       Date of Admission:  9/28/2023  PRIMARY CARE PROVIDER:    Cl[facility], [facility]    Assessment & Plan   Kortney Germain is a 68 year old female with a past medical history significant for schizoaffective disorder, tardive dyskinesia, hypertension, anxiety, depression, odynophagia who was admitted from her group home (no next of kin) on 9/28/2023 with abdominal pain and back pain.  She was diagnosed with cystitis and stool impaction.  Her course was complicated by acute encephalopathy with loss of CNS failure/loss of protective airway reflexes requiring intubation on 9/29/2023.  She has been in ICU since that time, hospitalist service was asked to transfer patient out of ICU on 10/24/2023    New onset refractory status epilepticus requiring initiation of 3 antiepileptics with improvement in her EEG, unclear etiology for the seizures  Comatose neuro exam  CT Head 9/29: no acute intracranial pathology, brain atrophy, CSVID  MRI 9/30/23: no acute intracranial process, generalized atrophy, CSVID  MRI 10/11/23: no acute intracranial process, no hemorrhage, moderate-advanced brain atrophy and leukoaraiosis, CSVID   MRI 10/20/23: no interval change   BC: NG  CSF analysis :glucose 98 protein 22.8, 2 nucleated cells, RBC 0, aerobic culture GPC in broth only anaerobic culture NGTD, HSV negative  *[place] encephalopathy autoimmune panel negative  -Continue EEG monitoring on transfer out of ICU per my discussion with the neuro critical care team  - Consult general neurology on transfer out of ICU  -Neuro IMC status  -continue valproic acid solution 250mg TID (sprinkles not NG compatible)  - increase lacosamide to 300 mg BID 10/24/23   - continue clobazam 20 mg BID  - limit dopaminergic/serotonergic agents     Acute hypoxic respiratory failure secondary to CNS failure/loss of protective airway reflexes on prolonged mechanical ventilatory  support status post trach tube placement on 10/20/2023 by Dr. Benites  -Keep trach in place while comatose    Worsening leukocytosis over the last 3 days, low-grade temp of 99 in the preceding 24 hours.  - Obtain UA--> positive with greater than than 182 WBCs, large leuk esterase  -Await urine culture  -Start empiric cefepime  - MRSA swab  - Chest x-ray--> possible retrocardiac infiltrate  - Cefepime should cover any gram-negative organism for which she is at risk, MRSA as above  - We will check procalcitonin  - If these are unrevealing can consider evaluation for DVT, cholecystitis as well as sinusitis given patient's limited ability to provide history    Moderate malnutrition  Artificial nutrition and hydration via smallbore feeding tube, suspect this has been in place since she was initially intubated.  *General surgery saw the patient on 10/13/2023 there were plans for surgical PEG tube the following Monday.  This was later canceled on 10/16/2023 by a different surgeon over concerns about consent.  *Tentative plan was long-term smallbore feeding tube and lieu of PEG although this certainly poses risk of tissue damage to the nasal cavity and surrounding tissues  - Reactivate ethics consult on 10/25/2023 to assist with decision-making regarding PEG tube placement, differing opinions/willingness to proceed between surgeons  -Continue tube feeds in the interim along with free water flushes    Hypertension well-controlled on 40 mg IV Lasix twice daily  *Was not on any PTA antihypertensives  - Can consider changing to enteral administration on 10/25/2023    Euglycemia without requiring insulin for at least the last 10 days, consistently less than 180 mg/dL      Latest Ref Rng & Units 10/21/2023     3:46 AM 10/21/2023     5:28 PM 10/22/2023     4:11 AM 10/22/2023     6:02 PM 10/23/2023     4:40 AM 10/23/2023     7:06 PM 10/24/2023     4:11 AM   Glucose Values   Glucose 70 - 99 mg/dL 137  168  124  120  120  123  139     -Twice daily glucose checks while on tube feeds  - Continue hypoglycemia protocol  -discontinue insulin    Anemia normocytic likely secondary to prolonged critical illness and frequent phlebotomy  Thrombocytosis  - Stop scheduled draws  - Recheck CBC in the a.m.    schizoaffective disorder   tardive dyskinesia  anxiety, depression,  -Not currently on medications for these    In hospital disposition  - Patient can transfer to [facility], [facility] status    Clinically Significant Risk Factors              # Hypoalbuminemia: Lowest albumin = 3.4 g/dL at 2023  5:15 AM, will monitor as appropriate     # Hypertension: Noted on problem list         # Moderate Malnutrition: based on nutrition assessment         -All are addressed above       Diet: Adult Formula Drip Feeding: Continuous Osmolite 1.5; Nasogastric tube; Goal Rate: 30; mL/hr; Increase TF to 30 mL/hr; Do not advance tube feeding rate unless K+ is = or > 3.0, Mg++ is = or > 1.5, and Phos is = or > 1.9  NPO for Medical/Clinical Reasons Except for: No Exceptions    DVT Prophylaxis: Heparin SQ and Pneumatic Compression Devices  Liu Catheter: Not present  Lines: PRESENT      PICC 23 Triple Lumen Left Basilic ok to use-Site Assessment: WDL      Cardiac Monitoring: ACTIVE order. Indication: imc  Code Status: Full Code/ disposition    Very complicated and unfortunate social situation in a patient who as per social work notes has been committed multiple times previously for psychiatric purposes (per psychiatry earlier in her ICU stay no indication for that at present).  Her family is  and she has no living next of kin.  Ethics has already been involved and their ongoing assistance is anticipated  -Appreciate ongoing social work/care coordinator involvement for identification of a surrogate decision maker who can hopefully ultimately assist with discharge planning and her goals of therapy       Disposition Plan     Expected Discharge Date: 10/30/2023         [facility]     Entered: TAMI Alan CNP 10/24/2023, 6:12 PM     The patient's care was discussed with the Attending Physician, Dr. Morales and Bedside Nurse.      TAMI Alan CNP  [facility]  Securely message with [employer] (more info)  Text page via [facility] Paging/Directory     ______________________________________________________________________    Chief Complaint   Assume primary management of patient on transfer out of ICU.      History is obtained from the EMR and RN staff as well as ICU attending physician.    History of Present Illness   Kortney Germain is a 68 year old female with a past medical history significant for schizoaffective disorder, tardive dyskinesia, hypertension, anxiety, depression, odynophagia who was admitted from her group home (no next of kin found) on 9/28/2023 with abdominal pain and back pain.  She was diagnosed with cystitis and , Ct abd showed rectum distended with stool and urinary bladder distended, for which she had quinonez placement .  She was on antimicrobials but developed acute encephalopathy ultimately with loss of CNS failure/loss of protective airway reflexes requiring intubation on 9/29/2023.  She has been in ICU since that time, hospital service was asked to transfer patient out of ICU on 10/24/2023    ICU course includes the following: nonconvulsive status epilepticus for which she was seen by neuro critical care started on multiple antiepileptics.  She underwent LP with cultures was growing GPC's in the broth HSV was negative.  Brain MRI has been unrevealing.  She has largely remained comatose but remains on continuous EEG monitoring which is improving with ongoing titration of her AEDs.  There was concern for propofol toxicity with hypertriglyceridemia, that agent was subsequently discontinued.  As she was on mechanical ventilation for prolonged period of time ultimately had trach tube placed  on 10/20/2023 by thoracic surgery.  She remains on artificial nutrition and hydration PEG tube not yet placed.  She completed a course of antimicrobials for the cystitis.  PICC line has been in place.  She has been seen in consultation by the ethics team who assisted in decision making so that trach tube placement could be completed.  Care coordinators/social work team has been deeply involved.  It seems that the group home may be working on guardianship.  [facility] did not want guardianship secondary to vulnerable adult filed on 10/6/2023.  However her group home does not believe they will be able to care for her given her current needs    Per RN patient opens eyes to verbal and tactile stimuli moves toes spontaneously there is no following of commands.  Patient also moves her mouth intermittently.  She has been stooling, no nasal drainage, tolerating tube feeds, no vomiting.    Past Medical History    I have reviewed this patient's medical history and updated it with pertinent information if needed.   Past Medical History:   Diagnosis Date    Allergic state     Depressive disorder     Dyskinesia     Hypertension     Schizo affective schizophrenia (H)        Prior to Admission Medications   Prior to Admission Medications   Prescriptions Last Dose Informant Patient Reported? Taking?   LORazepam (ATIVAN) 0.5 MG tablet  Nursing Home Yes Yes   Sig: Take 0.5 mg by mouth 2 times daily 0800/1400   LORazepam (ATIVAN) 0.5 MG tablet  Nursing Home Yes Yes   Sig: Take 0.25 mg by mouth At Bedtime 2000   NIFEdipine ER (ADALAT CC) 60 MG 24 hr tablet  Nursing Home Yes Yes   Sig: Take 60 mg by mouth daily   acetaminophen (TYLENOL) 500 MG tablet  Nursing Home Yes Yes   Sig: Take 1,000 mg by mouth 3 times daily 0800 / 1400 / 2000   albuterol (PROVENTIL) (2.5 MG/3ML) 0.083% neb solution  retirement Yes Yes   Sig: Take 2.5 mg by nebulization every 4 hours as needed for shortness of breath, wheezing or cough   alendronate  (FOSAMAX) 70 MG tablet  Nursing Home Yes Yes   Sig: Take 70 mg by mouth every 7 days Thursdays at 0700   cholecalciferol 50 MCG (2000 UT) tablet  Nursing Home Yes Yes   Sig: Take 1 tablet by mouth daily 0800   cyclobenzaprine (FLEXERIL) 5 MG tablet  Nursing Home Yes Yes   Sig: Take 5 mg by mouth 3 times daily as needed for muscle spasms   diphenhydrAMINE (BENADRYL) 50 MG capsule  Nursing Home Yes Yes   Sig: Take 50 mg by mouth 3 times daily 0800 / 1500 / 2000   escitalopram (LEXAPRO) 20 MG tablet  Nursing Home Yes Yes   Sig: Take 20 mg by mouth daily 0800   multivitamin w/minerals (THERA-VIT-M) tablet  Nursing Home Yes Yes   Sig: Take 1 tablet by mouth daily 0800   muscle rub (ARTHRITIS HOT) 10-15 % CREA  Nursing Home Yes Yes   Sig: Apply topically 4 times daily Back pain - 0700/1100/1500/2000   perphenazine 4 MG tablet  Nursing Home Yes Yes   Sig: Take 4 mg by mouth every morning   perphenazine 8 MG tablet  Nursing Home Yes Yes   Sig: Take 8 mg by mouth At Bedtime      Facility-Administered Medications: None     Allergies   Allergies   Allergen Reactions    Amlodipine     Clozapine     Egg [Chicken-Derived Products (Egg)]     Penicillins      Tolerated ceftriaxone (9/28/23)    Potassium     Poultry Meal        Physical Exam   Vital Signs: Temp: 99  F (37.2  C) Temp src: Rectal BP: (!) 140/81 Pulse: 73   Resp: 22 SpO2: 98 % O2 Device: Trach dome Oxygen Delivery: 30 LPM  Weight: 101 lbs 1.6 oz    Constitutional: vs as per EMR  General: Chronically critically ill adult pt lying in bed without acute distress  Neuro: Patient does not follows commands of any sort.  She moves her mouth spontaneously and weakly attempts to open eyes to verbal stimuli.  She is nonverbal  Eyes pupils equal round 3mm briskly reactive bilat, somewhat rightward gaze preference, sclera nonicteric, noninjected, conjunctiva pink,  Head, ENT & mouth: NC/AT,  mouth moist oral mucosa, small bore feeding tube in her nare  Neck: supple, trach tube in  place  CV S1S2  resp: Rhonchi bilateral upper lobes  gi:normoactive bowel sounds, soft, nontender, nondisteded  Ext: no edema or mottling, mild foot drop bilaterally  Skin: no rashes on exposed skin  Lymph: defer  Musculoskeletal no bony joint deformities      Medical Decision Making       Please see A&P for additional details of medical decision making.  Medical complexity over the past 24 hours:  - High complexity  70 MINUTES SPENT BY ME on the date of service doing chart review, history, exam, documentation & further activities per the note.         Data   Data reviewed today: I reviewed all medications, new labs and imaging results over the last 24 hours. I personally reviewed no images or EKG's today.      I have personally reviewed the following data over the past 24 hrs:    12.6 (H)  \   9.0 (L)   / 485 (H)     142 100 22.6 /  139 (H)   3.7 30 (H) 0.39 (L) \       Imaging results reviewed over the past 24 hrs:   Recent Results (from the past 24 hour(s))   XR Chest Port 1 View    Narrative    CHEST ONE VIEW  10/24/2023 2:45 PM     HISTORY: Evaluate for pneumonia, patient has leukocytosis.    COMPARISON: October 6, 2023      Impression    IMPRESSION: Possible retrocardiac infiltrate, consider confirmation  with lateral view. Question groundglass infiltrates at the right base.    KVNG MARC MD         SYSTEM ID:  [id number]

## 2023-10-25 NOTE — CONSULTS
SW team currently following and working on discharge plans.    BABATUNDE Mcbride    [facility]

## 2023-10-25 NOTE — PROGRESS NOTES
[facility]    Stroke Progress Note    Interval Events  EEG: per verbal report, no seizures.      Current AEDs: lacosamide 300 mg BID, clobazam 20 mg BID, depakote 250mg TID       HPI Summary  Kortney Germain is a 68 year old female with PMHx significant for anxiety/depression, schizoaffective disorder (living in group home), HTN, odynophagia, tardive dyskinesia, HTN. She presented 9/28 with lower back pain and abdominal pain. She was found to have UTI, anemia (received 1 unit PRBC), and constipation. She had decreased LOC and generalized shaking overnight on 9/28. RRT called and pt was intubated on 9/29/23 for encephalopathy and concern that should would be unable to protect her airway. Initial EEG was concerning for status epilepticus for which she was started on AEDs. She has remained intubated and encephalopathic since admission.      Attempted to decrease AED doses 10/9 to facilitate recovery of consciousness but had recurrent seizures and increasingly active EEG. Brain MRI was repeated 10/11 which again showed no acute findings. Propofol restarted 10/14 due to increasing EEG activity.  Propofol stopped 10/15 and started on Versed by primary team due to concern for propofol toxicity. EEG 10/15 morning again showing NCSE, Versed titrated up with improvement. Versed weaned again 10/19 with no identified breakthrough seizures. Valproic acid started 10/20. Fosphenytoin discontinued 10/22.     Evaluation Summarized  EEG 09/29/23: Abnormal due to the presences of generalized periodic pattern consisting of 2-3.5 hz discharges, maximum negativity in bifrontal region, periodic pattern occupy 90% of the record and the remaining portion consist of delta slowing. These VEEG findings are consistent with a severe encephalopathy and status epilepticus.  However it is important to note patient has persistent head tremoring which may also produce a rhythmic artifact on the EEG and there is  "persistent electro myogenic artifact making this 1 hour segment difficult to interpret.  It would be helpful to record more video EEG data and optimize current seizure medications to evaluate if there is changes in the EEG.  Based on the limited data that is interpretable on this 1 hour file EEG is concerning for status epilepticus.       CT Head 9/29: no acute intracranial pathology, brain atrophy, CSVID     MRI 9/30/23: no acute intracranial process, generalized atrophy, CSVID  MRI 10/11/23: no acute intracranial process, no hemorrhage, moderate-advanced brain atrophy and leukoaraiosis, CSVID   MRI 10/20/23: no interval change      BC: NG  CSF analysis   -glucose 98  -protein 22.8  -2 nucleated cells, RBC 0  -aerobic culture GPC in broth only   -anaerobic culture NGTD  -HSV negative  -[place] encephalopathy autoimmune panel negative       Impression   Status epilepticus/NORSE, EEG improved on 3 AEDs and sedative drips - uncertain etiology of NCSE. MRI brain x3 with no structural etiology for new seizures.      Shaking/tremors, unclear etiology. May represent baseline tardive dyskinesia vs manifestation of status epilepticus vs toxic encephalopathy vs other     Plan  - continue valproic acid solution 250mg TID (sprinkles not NG compatible)  - continue lacosamide to 300 mg BID  - continue clobazam 20 mg BID  - limit dopaminergic/serotonergic agents   - Ethics involved as patient does not have a guardian  - Please consult general neurology for continued management when she transfers to the floor.    Thank you for this consult, we will sign off when she transfers to the floor.     Gricelda Cdoy, TAMI, CNP  Neurology  10/25/2023 2:54 PM  To page stroke neurology after hours or on a subsequent day, click here: [facility]  Choose \"On Call\" tab at top, then search dropdown box for \"Neurology Adult\" & press Enter, look for Neuro ICU/Stroke       ______________________________________________________    Clinically Significant " Risk Factors              # Hypoalbuminemia: Lowest albumin = 3.4 g/dL at 9/30/2023  5:15 AM, will monitor as appropriate     # Hypertension: Noted on problem list         # Moderate Malnutrition: based on nutrition assessment             Medications   Scheduled Meds   cefTRIAXone  2 g Intravenous Q24H    clobazam  20 mg Oral or Feeding Tube BID    fiber modular  1 packet Per Feeding Tube Daily    furosemide  40 mg Intravenous Q12H    heparin ANTICOAGULANT  5,000 Units Subcutaneous Q8H    lacosamide (VIMPAT) 300 mg in sodium chloride 0.9 % 110 mL intermittent infusion  300 mg Intravenous BID    multivitamins w/minerals  15 mL Oral or Feeding Tube Daily    polyethylene glycol  17 g Oral or Feeding Tube BID    protein modular  1 packet Per Feeding Tube Daily    sodium chloride (PF)  10-40 mL Intracatheter Q7 Days    sodium chloride (PF)  3 mL Intracatheter Q8H    valproic acid  250 mg Oral Q8H    wound support modular  60 mL Oral Daily at 4 pm       Infusion Meds   dextrose         PRN Meds  acetaminophen **OR** acetaminophen, albuterol, dextrose, glucose **OR** dextrose **OR** glucagon, hydrALAZINE, HYDROmorphone, lidocaine 4%, lidocaine 4%, lidocaine (buffered or not buffered), lidocaine (buffered or not buffered), LORazepam, melatonin, naloxone **OR** naloxone **OR** naloxone **OR** naloxone, nitroGLYcerin, sodium chloride (PF), sodium chloride (PF), sodium chloride (PF), sodium chloride (PF), sodium chloride (PF)       PHYSICAL EXAMINATION  Temp:  [97.9  F (36.6  C)-99.1  F (37.3  C)] 97.9  F (36.6  C)  Pulse:  [64-81] 76  Resp:  [18-49] 24  BP: (104-157)/(54-87) 128/77  FiO2 (%):  [30 %] 30 %  SpO2:  [92 %-98 %] 93 %      Neurologic  Mental Status: comatose, briefly opens eyes to persistent stimulation but does maintain eye opening or spontaneously open eyes, does not follow commands   Cranial Nerves:  midline gaze, no nystagmus, PERRL, cough intact with suction  Motor:  no movement to noxious in any  "extremity  Sensory:  see motor    Imaging  I personally reviewed all imaging; relevant findings per HPI.     Lab Results Data   CBC  Recent Labs   Lab 10/25/23  0422 10/24/23  0411 10/23/23  0440   WBC 11.0 12.6* 11.3*   RBC 3.54* 3.65* 3.11*   HGB 8.7* 9.0* 7.7*   HCT 29.2* 30.1* 25.7*   * 485* 432     Basic Metabolic Panel    Recent Labs   Lab 10/25/23  0814 10/25/23  0422 10/24/23  1113 10/24/23  0853 10/24/23  0412 10/24/23  0411 10/23/23  1950 10/23/23  1906 10/23/23  0824 10/23/23  0440   NA  --   --   --   --   --  142  --  142  --  141   POTASSIUM  --  4.1  --   --   --  3.7  --  4.1  --  3.6   CHLORIDE  --   --   --   --   --  100  --  102  --  103   CO2  --   --   --   --   --  30*  --  30*  --  27   BUN  --   --   --   --   --  22.6  --  23.6*  --  17.9   CR  --   --   --   --   --  0.39*  --  0.38*  --  0.36*   *  --  139* 112*   < > 139*   < > 123*   < > 120*   LULU  --   --   --   --   --  8.8  --  8.6*  --  7.8*    < > = values in this interval not displayed.     Liver Panel  No results for input(s): \"PROTTOTAL\", \"ALBUMIN\", \"BILITOTAL\", \"ALKPHOS\", \"AST\", \"ALT\", \"BILIDIRECT\" in the last 168 hours.  INR    Recent Labs   Lab Test 09/28/23  1244 03/07/23  1629   INR 1.04 1.15      Lipid Profile    Recent Labs   Lab Test 10/15/23  0650 10/03/23  0956 03/08/23  0830   CHOL  --   --  143   HDL  --   --  50   LDL  --   --  75   TRIG 136 1,372* 88     A1C  No lab results found.  Troponin  No results for input(s): \"CTROPT\", \"TROPONINIS\", \"TROPONINI\", \"GHTROP\" in the last 168 hours.       Data   I personally examined and evaluated the patient today. At the time of my evaluation and management the patient was in critical condition today due to status epilepticus. I personally managed status epilepticus. Key decisions made today included ongoing antiepileptic therapy. I spent a total of 30 minutes providing critical care services, evaluating the patient, directing care and reviewing laboratory values " and radiologic reports.

## 2023-10-25 NOTE — PLAN OF CARE
Pt here with prolonged stay, seizures, UTI, trach. Unresponsive, nonverbal. Neuros does not follow any commands, does not withdraw to pain on any extremities, pupils 2 mm and brisk bilaterally. VSS. Trach in place, oxygen saturation stable, required suction x4. Tele NSR. NPO, NG TF running at goal, Q4 water flushes. Up with lift. Non nonverbal signs of pain. Pressure injury to sacrum, dressing changed by WOC today. Pt scoring green on the Aggression Stop Light Tool. Plan continue EEG. Discharge pending, needs guardianship.

## 2023-10-25 NOTE — PROGRESS NOTES
[facility]    Hospitalist Progress Note    Interval History   Patient is nonresponsive to verbal stimuli.  Tracks from on noxious stimuli.  Nonverbal.  Currently has trach in place on 30 L at 30%.    -Data reviewed today: I reviewed all new labs and imaging results over the last 24 hours. I personally reviewed the chest x-ray image(s) showing retrocardiac infiltrate .    Physical Exam   Temp: 99.1  F (37.3  C) Temp src: Oral BP: 119/67 Pulse: 72   Resp: (!) 46 SpO2: 96 % O2 Device: Trach dome Oxygen Delivery: 30 LPM  Vitals:    10/22/23 0600 10/24/23 0400 10/25/23 0400   Weight: 46 kg (101 lb 6.6 oz) 45.9 kg (101 lb 1.6 oz) 44.6 kg (98 lb 5.2 oz)     Vital Signs with Ranges  Temp:  [98.2  F (36.8  C)-99.1  F (37.3  C)] 99.1  F (37.3  C)  Pulse:  [64-81] 72  Resp:  [0-80] 46  BP: (104-157)/(54-87) 119/67  FiO2 (%):  [30 %] 30 %  SpO2:  [92 %-98 %] 96 %  I/O last 3 completed shifts:  In: 2078 [I.V.:420; NG/GT:938]  Out: 1100 [Urine:1100]    Physical Exam  Constitutional:       Appearance: She is ill-appearing.   Eyes:      Comments: Pupils small but equal and reactive   Cardiovascular:      Rate and Rhythm: Tachycardia present.   Pulmonary:      Comments: Has a trach in place.  Currently on 30 L trach dome.  Abdominal:      General: Abdomen is flat.      Comments: NG tube in place.   Neurological:      Comments: Has been minimally responsive since extubation.  Neuro exam could not be completed due to her nonresponsive state.  Currently on continuous EEG monitoring.           Medications    dextrose        cefTRIAXone  2 g Intravenous Q24H    clobazam  20 mg Oral or Feeding Tube BID    fiber modular  1 packet Per Feeding Tube Daily    furosemide  40 mg Intravenous Q12H    heparin ANTICOAGULANT  5,000 Units Subcutaneous Q8H    lacosamide (VIMPAT) 300 mg in sodium chloride 0.9 % 110 mL intermittent infusion  300 mg Intravenous BID    multivitamins w/minerals  15 mL Oral or Feeding Tube Daily     polyethylene glycol  17 g Oral or Feeding Tube BID    protein modular  1 packet Per Feeding Tube Daily    sodium chloride (PF)  10-40 mL Intracatheter Q7 Days    valproic acid  250 mg Oral Q8H    wound support modular  60 mL Oral Daily at 4 pm       Data   Recent Labs   Lab 10/25/23  0814 10/25/23  0422 10/24/23  1113 10/24/23  0853 10/24/23  0412 10/24/23  0411 10/23/23  1950 10/23/23  1906 10/23/23  0824 10/23/23  0440   WBC  --  11.0  --   --   --  12.6*  --   --   --  11.3*   HGB  --  8.7*  --   --   --  9.0*  --   --   --  7.7*   MCV  --  83  --   --   --  83  --   --   --  83   PLT  --  497*  --   --   --  485*  --   --   --  432   NA  --   --   --   --   --  142  --  142  --  141   POTASSIUM  --  4.1  --   --   --  3.7  --  4.1  --  3.6   CHLORIDE  --   --   --   --   --  100  --  102  --  103   CO2  --   --   --   --   --  30*  --  30*  --  27   BUN  --   --   --   --   --  22.6  --  23.6*  --  17.9   CR  --   --   --   --   --  0.39*  --  0.38*  --  0.36*   ANIONGAP  --   --   --   --   --  12  --  10  --  11   LULU  --   --   --   --   --  8.8  --  8.6*  --  7.8*   *  --  139* 112*   < > 139*   < > 123*   < > 120*    < > = values in this interval not displayed.       Recent Results (from the past 24 hour(s))   XR Chest Port 1 View    Narrative    CHEST ONE VIEW  10/24/2023 2:45 PM     HISTORY: Evaluate for pneumonia, patient has leukocytosis.    COMPARISON: October 6, 2023      Impression    IMPRESSION: Possible retrocardiac infiltrate, consider confirmation  with lateral view. Question groundglass infiltrates at the right base.    KVNG MARC MD         SYSTEM ID:  [id number]         Assessment & Plan   Kortney Germain is a 68 year old female with a past medical history significant for schizoaffective disorder, tardive dyskinesia, hypertension, anxiety, depression, odynophagia who was admitted from her group home (no next of kin) on 9/28/2023 with abdominal pain and back pain.  She was diagnosed  with cystitis and stool impaction.  Her course was complicated by acute encephalopathy with loss of CNS failure/loss of protective airway reflexes requiring intubation on 9/29/2023.  She has been in ICU since that time, hospitalist service was asked to transfer patient out of ICU on 10/24/2023    New onset refractory status epilepticus requiring initiation of 3 antiepileptics with improvement in her EEG, unclear etiology for the seizures  Comatose neuro exam  CT Head 9/29: no acute intracranial pathology, brain atrophy, CSVID  MRI 9/30/23: no acute intracranial process, generalized atrophy, CSVID  MRI 10/11/23: no acute intracranial process, no hemorrhage, moderate-advanced brain atrophy and leukoaraiosis, CSVID   MRI 10/20/23: no interval change   BC: NG  CSF analysis :glucose 98 protein 22.8, 2 nucleated cells, RBC 0, aerobic culture GPC in broth only anaerobic culture NGTD, HSV negative  *Himrod encephalopathy autoimmune panel negative  -Continue EEG monitoring on transfer out of ICU per my discussion with the neuro critical care team.  General neurology team will take over after the transfer.  - Neurochecks changed to every 4 status.  Can transfer under acute care.  No need for IMC.  -continue valproic acid solution 250mg TID (sprinkles not NG compatible)  - increase lacosamide to 300 mg BID 10/24/23   - continue clobazam 20 mg BID  - limit dopaminergic/serotonergic agents     Acute hypoxic respiratory failure secondary to CNS failure/loss of protective airway reflexes on prolonged mechanical ventilatory support status post trach tube placement on 10/20/2023 by Dr. Benites  -Keep trach in place while comatose.  Currently on 30 L 30% oxygen.    Possible hospital-acquired pneumonia   Urinary tract infection   -, low-grade temp of 99 on 10/23/2023 with low-grade leukocytosis  - Obtain UA--> positive with greater than than 182 WBCs, large leuk esterase  -Await urine culture  -Was started on empiric cefepime on  10/24/2023.  However this lowers seizure threshold.  Discontinued and transition to IV ceftriaxone.  Patient is allergic to penicillin so cannot use Zosyn.  Meropenem also reduce the seizure threshold.  - MRSA swab negative.  -Sputum culture shows mixed christina  - Chest x-ray--> possible retrocardiac infiltrate  - Ceftriaxone would not provide pseudomonal coverage or anaerobic coverage.  Will consult ID regarding alternative possibilities for possible hospital-acquired pneumonia.  - Procalcitonin 0.06  - Leukocytosis improved to 11 and fever curve improving.    Moderate malnutrition  Artificial nutrition and hydration via smallbore feeding tube, suspect this has been in place since she was initially intubated.  *General surgery saw the patient on 10/13/2023 there were plans for surgical PEG tube the following Monday.  This was later canceled on 10/16/2023 by a different surgeon over concerns about consent.  *Tentative plan was long-term smallbore feeding tube and lieu of PEG although this certainly poses risk of tissue damage to the nasal cavity and surrounding tissues  - Reactivate ethics consult on 10/25/2023 to assist with decision-making regarding PEG tube placement, differing opinions/willingness to proceed between surgeons  -Continue tube feeds in the interim along with free water flushes    Hypertension well-controlled on 40 mg IV Lasix twice daily  *Was not on any PTA antihypertensives  - Can consider changing to enteral administration on 10/25/2023    Euglycemia without requiring insulin for at least the last 10 days, consistently less than 180 mg/dL  -Twice daily glucose checks while on tube feeds  - Continue hypoglycemia protocol  -discontinue insulin    Anemia normocytic likely secondary to prolonged critical illness and frequent phlebotomy    Thrombocytosis  - Stop scheduled draws  - Recheck CBC in the a.m.    schizoaffective disorder   tardive dyskinesia  anxiety, depression,  -Not currently on  medications for these    In hospital disposition  - Patient can transfer to neuroscience,        Clinically Significant Risk Factors              # Hypoalbuminemia: Lowest albumin = 3.4 g/dL at 9/30/2023  5:15 AM, will monitor as appropriate     # Hypertension: Noted on problem list         # Moderate Malnutrition: based on nutrition assessment            DVT Prophylaxis: Pneumatic Compression Devices  Code Status: Full Code  Disposition: Expected discharge to be determined.  Patient is a very complicated discharge.      Rama Henderson MD, MD  [phone](p)

## 2023-10-25 NOTE — PROGRESS NOTES
"[facility] Nurse Inpatient Assessment     Consulted for: Wound - buttocks     Summary: patient was initially seen by [facility] for a wound that was present on admission to [facility]; this appeared to be skin damage to buttock related to moisture/friction, improved 10/6. 10/18 Wound reopened. 10/25 wound deteriorating, and a new wound to sacrum, Stage 2 PI.  Plan of care updated.     Patient History (according to provider note(s):      \"68 year old female admitted on 9/28/2023. With history of schizophrenia, tardive dyskinesia, HTN, depression, ADD, and UTI who resides in a group home who presented to the ED with abdominal and back pain\"    Assessment:      Areas visualized during today's visit: Focused: and Sacrum/coccyx    Wound location: Left buttock      Last photo: 10-25-23        10-18-23      10-6-23      9-29-23      Wound due to: Friction and Moisture Associated Skin Damage (MASD), reopened  Wound history/plan of care: found with Triad in use per plan of care. Suspect area was a previous pressure injury due to raised scar tissue. Now reopened. Frequent loose stools.  Wound deteriorating slightly 10/25  Wound base: pink moist tissue     Palpation of the wound bed: normal      Drainage: none     Description of drainage: none     Measurements (length x width x depth, in cm): approx 2.2cm x 2.2cm x 0.2cm       Tunneling: N/A     Undermining: N/A  Periwound skin: Denuded, Dry/scaly, and Erythema- blanchable      Color: pink      Temperature: normal   Odor: none  Pain: no grimacing or signs of discomfort, none  Pain interventions prior to dressing change: patient tolerated well  Treatment goal: Protection  STATUS: deteriorating  Supplies ordered: gathered, at bedside, and supplies stored on unit      Wound location: Sacrum (midline)    Last photo: 10-25-23      Wound due to: Pressure Injury and Friction  Wound history/plan of care: pt in ICU, non-responsive, total care  Wound base: pink moist " tissue     Palpation of the wound bed: normal and firm      Drainage: small     Description of drainage: serosanguinous     Measurements (length x width x depth, in cm): approx 2.2 x 1.8 x 0.2cm      Tunneling: N/A     Undermining: N/A  Periwound skin: Erythema- blanchable      Color: pink      Temperature: normal   Odor: none  Pain: unable to assess due to  sedation    Pain interventions prior to dressing change: slow and gentle cares   Treatment goal: Heal  and Protection  STATUS: initial assessment  Supplies ordered: supplies stored on unit         Treatment Plan:     Left buttock and sacrum wound care: every other day and prn:  Cleanse with wound cleanser or Vashe.  Swab periwound with no-sting barrier, let dry.  Apply small amount Triad paste to wound beds only.  Cover with Mepilex Sacral.  Place dressing high above perianal area to minimize soilage.   PIP measures.  Avoid briefs in bed.  Isolibrium mattress (ICU), or add TRACY pump to Isoflex mattress.     Pressure Injury Prevention (PIP) Plan:  -If patient is declining pressure injury prevention interventions: Explore reason why and address patient's concerns  -Mattress: Follow bed algorithm, reassess daily and order specialty mattress, if indicated.  -HOB: Maintain at or below 30 degrees, unless contraindicated  -Repositioning in bed: Every 1-2 hours , Left/right positioning; avoid supine, and Raise foot of bed prior to raising head of bed, to reduce patient sliding down (shear)  -Heels: Keep elevated off mattress and Pillows under calves  -Protective Dressing: Sacral Mepilex for prevention (#[id number]),  especially for the agitated patient   -Chair positioning: Chair cushion (#[id number])  and Assist patient to reposition hourly   -Moisture Management: Perineal cleansing /protection: Follow Incontinence Protocol, Avoid brief in bed, and Clean and dry skin folds with bathing   -Under Devices: Inspect skin under all medical devices during skin inspection , Ensure  tubes are stabilized without tension, and Ensure patient is not lying on medical devices or equipment when repositioned        Orders: Reviewed and Updated    RECOMMEND PRIMARY TEAM ORDER: None, at this time  Education provided: plan of care, Moisture management, and Hygiene  Discussed plan of care with: Patient and Nurse  WO nurse follow-up plan: twice weekly and prn  Notify WOC if wound(s) deteriorate.  Nursing to notify the Provider(s) and re-consult the WO Nurse if new skin concern.    DATA:     Current support surface: Standard  Low air loss (TRACY pump, Isolibrium, Pulsate, skin guard, etc)  Containment of urine/stool: Incontinence Protocol, Incontinent pad in bed, and Purewick external catheter   BMI: Body mass index is 19.2 kg/m .   Active diet order: Orders Placed This Encounter      NPO for Medical/Clinical Reasons Except for: No Exceptions     Output: I/O last 3 completed shifts:  In: 4663 [I.V.:410; NG/GT:3653]  Out: 1200 [Urine:1200]     Labs:   Recent Labs   Lab 10/25/23  0422   HGB 8.7*   WBC 11.0     Pressure injury risk assessment:   Sensory Perception: 1-->completely limited  Moisture: 3-->occasionally moist  Activity: 1-->bedfast  Mobility: 1-->completely immobile  Nutrition: 3-->adequate  Friction and Shear: 2-->potential problem  Luis Score: 11    Irene Muñoz RN CWOCN  -Securely message with [employer] ([facility] [employer])   -[facility] Office Phone: [phone] (messages checked periodically Mon-Fri 8a-4p)

## 2023-10-25 NOTE — PLAN OF CARE
Goal Outcome Evaluation:    Neuro: Does not follow commands or w/d to pain. Opens eyes spontaneous- Up gaze. Will cough w/ stimulation at times. Will have rhythmic movements with jaw at times- neurocrit at bedside and notified.  CV: SR. SBP goal<160 maintained.   Resp: Trach dome 30% on 30L. Course LS. Thick yellow secretions from oral and trach.   GI: Loose stools. Incontinent. NPO- NG 63@nare running continuous TF 30ml/hr w/ 100ml FWF q4h.   : Purwick in place. Incontinent.   Skin: Stage 2 pressure sore on coccyx- cleaned and applied Mepilex per WOC plan of care.   Access: L PICC.   Gtts: TKO.  Other: Continuous EEG running. WOC orders changed- mepilex applied per plan of care. Transferring to Station 73- RN to RN report via phone.

## 2023-10-25 NOTE — PROGRESS NOTES
CLINICAL NUTRITION SERVICES - REASSESSMENT NOTE      RECOMMENDATIONS FOR MD/PROVIDER TO ORDER: Recommend discontinue the Miralax  Consider Imodium (if C.diff negative) if loose stools persist    Malnutrition: % Weight Loss:  None noted  % Intake:  No decreased intake noted (TF)  Subcutaneous Fat Loss:  Orbital region mild-moderate depletion  Muscle Loss:  Temporal region moderate depletion, Patellar region moderate-severe depletion, and Anterior thigh region moderate depletion  Fluid Retention:  Mild 1+ generalized     Malnutrition Diagnosis: Moderate malnutrition  In Context of:  Acute illness or injury       EVALUATION OF PROGRESS TOWARD GOALS   Diet:  NPO w/Trach     Nutrition Support:  Patient continues on goal TF regimen as follows ~    Nutrition Support Enteral:  Type of Feeding Tube: NG  Enteral Frequency:  Continuous  Enteral Regimen: Osmolite 1.5 at 30 mL/hr  Total Enteral Provisions: 1080 kcal, 45 g protein, 147 g CHO, 0 g fiber, 549 mL H2O  Free Water Flush: 100 mL every 4 hours per MD   Also receiving ProSource TF20 1 pkt daily = 80 kcal and 20 g protein   Banatrol TF 2 pkt per day = 90 kcal, 10 g fiber, 4 g protein (added 10/4 d/t diarrhea)  Expedite bottle once daily for pressure injury = 100 kcal and 10 g protein   Total = 1350 kcal (30 kcal/kg), 79 g protein (1.8 g/kg)    Intake/Tolerance:    TF has been at goal - was only off on 10/20 for Trach placement   K/Mg/Phos normal   BGM < 150   I/O 2078/1100, wt 44.6 kg (stable)  Stooling patterns -->  BM x 3 today   BM x 9 yest  BM x 2 10/23  BM x 0 10/22  BM x 3 10/21  Noted that Miralax is active on the MAR and was actually given on 10/22   Has been receiving Banatrol since 10/4         ASSESSED NUTRITION NEEDS:  Dosing Weight: 45 kg (9/30)  Estimated Energy Needs: 4091-2181+ kcals (25-30+ Kcal/Kg)  Justification: maintenance   Estimated Protein Needs: 54-68+ grams protein (1.2-1.5+ g pro/Kg)  Justification: preservation of lean body mass  Estimated  Fluid Needs: 1 mL/kcal or per MD   Justification: maintenance      NEW FINDINGS:   10/20: Tracheostomy with Shiley #6 cuffed nonfenestrated tube    Ethics to see for potential PEG     Previous Goals (10/19):   TF + modulars to provide % of estimated nutrition needs   Evaluation: Met    Previous Nutrition Diagnosis (10/19):   No nutrition diagnosis identified at this time   Evaluation: No change      MALNUTRITION  % Weight Loss:  None noted  % Intake:  No decreased intake noted (TF)  Subcutaneous Fat Loss:  Orbital region mild-moderate depletion  Muscle Loss:  Temporal region moderate depletion, Patellar region moderate-severe depletion, and Anterior thigh region moderate depletion  Fluid Retention:  Mild 1+ generalized     Malnutrition Diagnosis: Moderate malnutrition  In Context of:  Acute illness or injury    CURRENT NUTRITION DIAGNOSIS  Altered GI function related to alternates between constipation and diarrhea as evidenced by variable stooling, need for fiber modulars    INTERVENTIONS  Recommendations / Nutrition Prescription  Continue goal TF regimen as above   Recommend discontinue the Miralax  Consider Imodium (if C.diff negative) if loose stools persist     Implementation  Collaboration and Referral of Nutrition care:  Patient discussed today during interdisciplinary bedside rounds     Goals  Patient will continue to meet needs from goal TF regimen   Patient will have 1-2 stools per day     MONITORING AND EVALUATION:  Progress towards goals will be monitored and evaluated per protocol and Practice Guidelines    Libertad Romano, RD, LD, CNSC   Clinical Dietitian - [facility]

## 2023-10-25 NOTE — CONSULTS
[facility]    Neurology Consultation    Kortney Germain MRN# [id number]   YOB: 1955 Age: 68 year old    Code Status:Full Code   Date of Admission: 9/28/2023  Date of Consult:10/25/2023                                                                                       Assessment and Plan:                                         #.  Prolonged encephalopathy following presentation of new onset refractory status epilepticus.  Associated with continued EEG abnormalities including frequent generalized periodic epileptic discharges.  --Continue anticonvulsants:   Lacosamide 300 mg twice daily (most recent level 10/9/2023 = 4.1)  Clobazam 20 mg twice daily  Depakote 250 mg 3 times daily (received through feeding tube (most recent levels 10/21/2023: Total 20, free 10  Continue video EEG for now-  If similar pattern continues, may consider eventual discontinuance  Continue supportive cares  Note ethics involvement with decision making.   #. DVT Prophylaxis  --Per hospitalist service  #. PT/OT/Speech  --Bedside PT/OT  #. Nutrition / GI Prophylaxis  --Feeding tube-nasogastric            ----------------------------------------------------------------------------------  ----------------------------------------------------------------------------------  Reason for consult: as above       Chief Complaint:   Patient nonverbal and noncommunicative           History of Present Illness:   This patient is a 68 year old female with chronic history of schizophrenia/psychiatric condition and movement disorder who was admitted to the hospital on 9/28/2023 with abdominal pain.  She developed alteration of consciousness and shaking on the evening of her admission.  She required intubation.  Work-up revealed evidence of status epilepticus.  Summary of care is nicely summarized in the neurology note of 10/25.  She remains encephalopathic.  Work-up including spinal fluid analysis and MRI reveal no  identifiable cause.  Autoimmune encephalopathy panel is negative.  Patient has been on video EEG for the last 27+ days-most recent pattern: Frequent generalized periodic discharges occurring continuously.      MEDICAL DOCUMENTATION REVIEWED:  Neuro critical care consult in progress notes  Hospitalist and ICU notes         Past Medical History:     Past Medical History:   Diagnosis Date    Allergic state     Depressive disorder     Dyskinesia     Hypertension     Schizo affective schizophrenia (H)          Past Surgical History:     Past Surgical History:   Procedure Laterality Date    TRACHEOSTOMY N/A 10/20/2023    Procedure: Tracheostomy;  Surgeon: Michael Benites MD;  Location:  OR          Social History:     Social History     Socioeconomic History    Marital status: Single   Tobacco Use    Smoking status: Never    Smokeless tobacco: Never   Substance and Sexual Activity    Alcohol use: No    Drug use: No    Sexual activity: Never           Family History:   No family history on file.  Patient unable to communicate       Home Medications:     Prior to Admission Medications   Prescriptions Last Dose Informant Patient Reported? Taking?   LORazepam (ATIVAN) 0.5 MG tablet  Nursing Home Yes Yes   Sig: Take 0.5 mg by mouth 2 times daily 0800/1400   LORazepam (ATIVAN) 0.5 MG tablet  Nursing Home Yes Yes   Sig: Take 0.25 mg by mouth At Bedtime 2000   NIFEdipine ER (ADALAT CC) 60 MG 24 hr tablet  Nursing Home Yes Yes   Sig: Take 60 mg by mouth daily   acetaminophen (TYLENOL) 500 MG tablet  Nursing Home Yes Yes   Sig: Take 1,000 mg by mouth 3 times daily 0800 / 1400 / 2000   albuterol (PROVENTIL) (2.5 MG/3ML) 0.083% neb solution  long term Yes Yes   Sig: Take 2.5 mg by nebulization every 4 hours as needed for shortness of breath, wheezing or cough   alendronate (FOSAMAX) 70 MG tablet  Nursing Home Yes Yes   Sig: Take 70 mg by mouth every 7 days Thursdays at 0700   cholecalciferol 50 MCG (2000 UT) tablet   Nursing Home Yes Yes   Sig: Take 1 tablet by mouth daily 0800   cyclobenzaprine (FLEXERIL) 5 MG tablet  Nursing Home Yes Yes   Sig: Take 5 mg by mouth 3 times daily as needed for muscle spasms   diphenhydrAMINE (BENADRYL) 50 MG capsule  Nursing Home Yes Yes   Sig: Take 50 mg by mouth 3 times daily 0800 / 1500 / 2000   escitalopram (LEXAPRO) 20 MG tablet  Nursing Home Yes Yes   Sig: Take 20 mg by mouth daily 0800   multivitamin w/minerals (THERA-VIT-M) tablet  Nursing Home Yes Yes   Sig: Take 1 tablet by mouth daily 0800   muscle rub (ARTHRITIS HOT) 10-15 % CREA  Nursing Home Yes Yes   Sig: Apply topically 4 times daily Back pain - 0700/1100/1500/2000   perphenazine 4 MG tablet  Nursing Home Yes Yes   Sig: Take 4 mg by mouth every morning   perphenazine 8 MG tablet  Nursing Home Yes Yes   Sig: Take 8 mg by mouth At Bedtime      Facility-Administered Medications: None          Allergy:     Allergies   Allergen Reactions    Amlodipine     Clozapine     Egg [Chicken-Derived Products (Egg)]     Penicillins      Tolerated ceftriaxone (9/28/23)    Potassium     Poultry Meal           Inpatient Medications:   Scheduled Meds:   cefTRIAXone  2 g Intravenous Q24H    clobazam  20 mg Oral or Feeding Tube BID    fiber modular  1 packet Per Feeding Tube Daily    furosemide  40 mg Intravenous Q12H    heparin ANTICOAGULANT  5,000 Units Subcutaneous Q8H    lacosamide (VIMPAT) 300 mg in sodium chloride 0.9 % 110 mL intermittent infusion  300 mg Intravenous BID    multivitamins w/minerals  15 mL Oral or Feeding Tube Daily    polyethylene glycol  17 g Oral or Feeding Tube BID    protein modular  1 packet Per Feeding Tube Daily    sodium chloride (PF)  10-40 mL Intracatheter Q7 Days    sodium chloride (PF)  3 mL Intracatheter Q8H    valproic acid  250 mg Oral Q8H    wound support modular  60 mL Oral Daily at 4 pm     PRN Meds: acetaminophen **OR** acetaminophen, albuterol, dextrose, glucose **OR** dextrose **OR** glucagon, hydrALAZINE,  HYDROmorphone, lidocaine 4%, lidocaine 4%, lidocaine (buffered or not buffered), lidocaine (buffered or not buffered), LORazepam, melatonin, naloxone **OR** naloxone **OR** naloxone **OR** naloxone, nitroGLYcerin, sodium chloride (PF), sodium chloride (PF), sodium chloride (PF), sodium chloride (PF), sodium chloride (PF)          Physical Exam:   Physical Exam   Vitals:  Height:Data Unavailable  Weight:98 lbs 5.2 oz   Temp: 97.9  F (36.6  C) Temp src: Axillary BP: 128/77 Pulse: 76   Resp: 24 SpO2: 93 % O2 Device: Trach dome Oxygen Delivery: 30 LPM  General Appearance: Lying in bed, responding only to deep pain with eye opening.  Not responding to voice or verbal commands.  Tracheostomy    Neuro Exam:  Mental Status Exam: Eyes open to deep pain only in extremities.  Occasional eye fluttering while at rest.  Cranial Nerves: Pupils are equal and reactive to light.  No facial grimace motor: Flaccid tone-no withdrawal of the upper extremity to deep pain, minimal withdrawal in the lower extremities.  Reflexes: Areflexic plantar signs neutral.  Sensory: Unable to assess  Coordination: Unable  Gait: Unable  Neck: No nuchal rigidity  Extremities: No clubbing, no cyanosis, no edema          Data:   ROUTINE IP LABS   CBC RESULTS:     Recent Labs   Lab 10/25/23  0422 10/24/23  0411 10/23/23  0440   WBC 11.0 12.6* 11.3*   RBC 3.54* 3.65* 3.11*   HGB 8.7* 9.0* 7.7*   HCT 29.2* 30.1* 25.7*   * 485* 432     Basic Metabolic Panel:   Recent Labs   Lab Test 10/25/23  0814 10/25/23  0422 10/24/23  1113 10/24/23  0853 10/24/23  0412 10/24/23  0411 10/23/23  1950 10/23/23  1906 10/23/23  0824 10/23/23  0440   NA  --   --   --   --   --  142  --  142  --  141   POTASSIUM  --  4.1  --   --   --  3.7  --  4.1  --  3.6   CHLORIDE  --   --   --   --   --  100  --  102  --  103   CO2  --   --   --   --   --  30*  --  30*  --  27   BUN  --   --   --   --   --  22.6  --  23.6*  --  17.9   CR  --   --   --   --   --  0.39*  --  0.38*  --   0.36*   *  --  139* 112*   < > 139*   < > 123*   < > 120*   LULU  --   --   --   --   --  8.8  --  8.6*  --  7.8*    < > = values in this interval not displayed.     Liver panel:  Recent Labs   Lab Test 09/30/23  0515 09/29/23  0518 09/28/23  1244 03/06/23  1413 03/05/23  1323   PROTTOTAL 5.6* 6.7 6.9 6.9 6.5   ALBUMIN 3.4* 4.2 4.2 4.1 3.9   BILITOTAL 0.2 0.2 <0.2 <0.2 <0.2   ALKPHOS 66 77 63 83 80   AST 27 22 14 39* 19   ALT 20 16 15 21 15     Lipid Profile:  Recent Labs   Lab Test 10/15/23  0650 10/03/23  0956 03/08/23  0830   CHOL  --   --  143   HDL  --   --  50   LDL  --   --  75   TRIG 136 1,372* 88     Thyroid Panel:  Recent Labs   Lab Test 03/08/23  0830   TSH 1.13      Vitamin B12:   Recent Labs   Lab Test 03/09/23  1524   B12 1,325*       Latest Reference Range & Units 10/09/23 09:59   Lacosamide 1.0 - 10.0 ug/mL 4.1      Latest Reference Range & Units 09/29/23 13:03   RBC CSF 0 - 2 /uL 0   Total Nucleated Cells 0 - 5 /uL 2   Appearance CSF Clear  Clear   Color CSF Colorless  Colorless   Tube Number  4   Glucose CSF 40 - 70 mg/dL 98 (H)   (H): Data is abnormally high   Latest Reference Range & Units 09/29/23 13:03   Protein total CSF 15.0 - 45.0 mg/dL 22.8   Autoimmune panels encephalopathy 10/3 neg.      IMAGING:   Independent interpretation of the following studies by myself as part of today's encounter.   MR BRAIN W/O and W CONTRAST  LOCATION: [facility]  DATE: 10/20/2023 (compared to prior study in 3/2023, 9/30/2023, 10/11/23)     INDICATION: status epilepticus, etiology unclear  COMPARISON: 10/11/2023.  CONTRAST: 5 mL Gadavist  TECHNIQUE: MRI brain without and with contrast.     FINDINGS: On the diffusion-weighted images there is no evidence of acute ischemia or restricted diffusion. There is no discrete mass lesion or midline shift. There is no acute extra-axial fluid collection or acute intraparenchymal hemorrhage. There are   prominent perivascular spaces involving  the basal ganglia and the subcortical white matter. On the FLAIR and T2-weighted images there are multiple foci of high signal within the periventricular and subcortical white matter consistent with diffuse small   vessel ischemic disease. The ventricular system, basal cisterns and the cortical sulci are consistent with diffuse volume loss. Following the administration of contrast no abnormal enhancement is visualized.     There is no evidence of cerebellar tonsillar ectopia. The corpus callosum and the sella region have appropriate configuration and signal intensity for the patient's age. There are regions are unremarkable. There is no significant paranasal sinus disease.   There is fluid noted within the mastoid air cells bilaterally.                                                                      IMPRESSION:  1. No discrete mass lesion, hemorrhage or focal area suggestive of acute infarct.  2. Stable diffuse age related changes    Time:  75minutes evaluation and management.     Joana Strickland M.D.  [facility]  Office [phone]

## 2023-10-25 NOTE — PLAN OF CARE
Goal Outcome Evaluation:    Neuro: Opens eyes spontaneously at times. Pupils PERRL. Some movement in lower extremities. Unable to follow commands.   Cardiac: Tele SR. SBP < 160.   Respiratory: Trach dome 30% 30. Large amounts of thick yellow secretions in trach. LS coarse.    GI/: TF running @ 30 mL/hr, 100 mL H2O q4h. Multiple loose BM overnight. Purewick in place, bypassed @ times. On IV lasix, good UOP.   Lines/Drips: Left PICC.    Pain: CPOT 0.   Skin: Sacral pressure injury, treated per plan of care. Scattered bruising.   Activity: Assist of 2 w/ lift. Reposition q2h.   Additional/Plan: Ethics following for PEG placement. Plan to transfer when bed available.

## 2023-10-25 NOTE — PLAN OF CARE
Pt not following commands or tracking, opens eyes spontaneously, not withdrawing to stimulation. Tele SR. Trach  30 % and 30 LPM, thick secretions oral and trach- suctioning provided. Pt NPO. NGT w/ TF running at 30 mL/hr and  mL Q4, tubing changed out this shift. Up with A2 lift. No signs of pain. Pt scoring green on the Aggression Stop Light Tool. Plan continuous EEG monitoring. Discharge plan pending.

## 2023-10-26 NOTE — PROVIDER NOTIFICATION
MD Notification    Notified Person: MD    Notified Person Name: Dr. Strickland     Notification Date/Time: 10/26/23 6270    Notification Interaction: Page     Purpose of Notification: Patient with rhythmic jaw movements and L arm shaking again, lasted 2.5 minutes. Movements resumed after a few minutes with  legs taina, eyes repetitively blinking and jaw rhythmically moving. Administered 2 mg Ativan and movements stopped. Awaiting call back.     Orders Received:    Comments:

## 2023-10-26 NOTE — PLAN OF CARE
Pt here with prolonged stay, seizures, UTI, trach. Unresponsive, nonverbal. Neuros does not follow any commands, does not withdraw to pain on any extremities, pupils 2 mm and brisk bilaterally, at times would open eyes spontaneously but not to command. VSS. Trach in place, oxygen saturation stable, required suction frequently. Tele NSR. NPO, NG TF running at goal, Q4 water flushes. Up with lift. Non nonverbal signs of pain. Pressure injury to sacrum, dressing in place. Turn and reposition Q2 hours. Purewick in place. Loose stools x3. Stop TF at 0000 for PEG tube placement tomorrow. Pt scoring green on the Aggression Stop Light Tool. Plan continue EEG. Discharge pending, needs guardianship.

## 2023-10-26 NOTE — PROGRESS NOTES
[facility]  Neurology Daily Note      Admission Date:9/28/2023   Date of service: 10/26/2023   Hospital Day: 29                                                   Assessment and Plan:   #.  Prolonged encephalopathy following presentation of new onset refractory status epilepticus.  Associated with continued EEG abnormalities including frequent generalized periodic epileptic discharges. This am breakthrough clinical motor seizure lasting several min.  (It is noted that the seizure and the EEG background improved shortly after the episode-this was timed with administration of 2 of her anticonvulsants at the time of her seizure  --Continue anticonvulsants:   Lacosamide 300 mg twice daily (most recent level 10/9/2023 = 4.1)  Clobazam 20 mg twice daily  Depakote 250 mg 3 times daily (received through feeding tube (most recent levels 10/21/2023: Total 20, free 10  Continue video EEG for now-  If similar pattern continues, may consider eventual discontinuance  Continue supportive cares  Note ethics involvement with decision making.   Some literature sources suggest use of high-dose corticosteroids for refractory status epilepticus-this may be a consideration-we will need to review with other services to determine if this was already considered and if there would be any medical contraindications.    Continue video EEG monitoring  Use lorazepam for any breakthrough motor seizure activity lasting more than 2 minutes.  #. DVT Prophylaxis  --Per hospitalist service  #. PT/OT/Speech  --Bedside PT/OT  #. Nutrition / GI Prophylaxis  --Feeding tube-nasogastric         Interval History:   Contacted by nursing this morning after a seizure-like episode involving repetitive jaw movement and left arm movement.  This lasted a couple of minutes.  The EEG correlate was that of increased bitemporal sharp wave activity which would be difficult to distinguish from muscle artifact.  The background did improve with less generalized  periodic discharges for several minutes after the seizure.  This event was correlated with administration of 2 of her anticonvulsants-clobazam and valproate.         Medications:   Scheduled Meds:   cefTRIAXone  2 g Intravenous Q24H    clobazam  20 mg Oral or Feeding Tube BID    fiber modular  1 packet Per Feeding Tube Daily    furosemide  40 mg Intravenous Q12H    heparin ANTICOAGULANT  5,000 Units Subcutaneous Q8H    lacosamide (VIMPAT) 300 mg in sodium chloride 0.9 % 110 mL intermittent infusion  300 mg Intravenous BID    multivitamins w/minerals  15 mL Oral or Feeding Tube Daily    polyethylene glycol  17 g Oral or Feeding Tube BID    protein modular  1 packet Per Feeding Tube Daily    sodium chloride (PF)  10-40 mL Intracatheter Q7 Days    sodium chloride (PF)  3 mL Intracatheter Q8H    valproic acid  250 mg Oral Q8H    wound support modular  60 mL Oral Daily at 4 pm     PRN Meds: acetaminophen **OR** acetaminophen, albuterol, dextrose, glucose **OR** dextrose **OR** glucagon, hydrALAZINE, HYDROmorphone, lidocaine 4%, lidocaine 4%, lidocaine (buffered or not buffered), lidocaine (buffered or not buffered), LORazepam, melatonin, naloxone **OR** naloxone **OR** naloxone **OR** naloxone, nitroGLYcerin, sodium chloride (PF), sodium chloride (PF), sodium chloride (PF), sodium chloride (PF), sodium chloride (PF)        Physical Exam:   Vitals: Temp: 97.7  F (36.5  C) Temp src: Axillary BP: 121/74 Pulse: 77   Resp: 27 SpO2: 93 % O2 Device: Trach dome Oxygen Delivery: 30 LPM  Vital Signs with Ranges: Temp:  [97.7  F (36.5  C)-100.7  F (38.2  C)] 97.7  F (36.5  C)  Pulse:  [72-83] 77  Resp:  [23-46] 27  BP: (119-141)/(67-82) 121/74  FiO2 (%):  [30 %] 30 %  SpO2:  [93 %-96 %] 93 %    General Appearance: Lying in bed, responding eyes opening randomly.  Not responding to voice or verbal commands.  Tracheostomy     Neuro Exam:  Mental Status Exam: Eyes open t randomly and do seem to blink sometimes to threat   cranial  Nerves: Pupils are equal and reactive to light.  Mild facial grimace motor: Flaccid tone-no withdrawal of the upper extremity to deep pain, minimal withdrawal in the lower extremities.  Reflexes: Areflexic plantar signs neutral.  Sensory: Unable to assess  Coordination: Unable  Gait: Unable  Neck: No nuchal rigidity  Extremities: No clubbing, no cyanosis, no edema        Data:   ROUTINE IP LABS (Last 3results)  CBC RESULTS:     Recent Labs   Lab Test 10/26/23  0632 10/25/23  0422 10/24/23  0411   WBC 12.0* 11.0 12.6*   RBC 3.76* 3.54* 3.65*   HGB 9.2* 8.7* 9.0*   HCT 31.2* 29.2* 30.1*   * 497* 485*     Basic Metabolic Panel:  Recent Labs   Lab Test 10/26/23  0632 10/26/23  0517 10/25/23  0814 10/25/23  0422 10/24/23  1113 10/24/23  0412 10/24/23  0411 10/23/23  1950 10/23/23  1906 10/23/23  0824 10/23/23  0440   NA  --   --   --   --   --   --  142  --  142  --  141   POTASSIUM 3.6  --   --  4.1  --   --  3.7  --  4.1  --  3.6   CHLORIDE  --   --   --   --   --   --  100  --  102  --  103   CO2  --   --   --   --   --   --  30*  --  30*  --  27   BUN  --   --   --   --   --   --  22.6  --  23.6*  --  17.9   CR  --   --   --   --   --   --  0.39*  --  0.38*  --  0.36*   GLC  --  128* 108*  --  139*   < > 139*   < > 123*   < > 120*   LULU  --   --   --   --   --   --  8.8  --  8.6*  --  7.8*    < > = values in this interval not displayed.     Liver panel:  Recent Labs   Lab Test 09/30/23  0515 09/29/23  0518 09/28/23  1244 03/06/23  1413 03/05/23  1323   PROTTOTAL 5.6* 6.7 6.9 6.9 6.5   ALBUMIN 3.4* 4.2 4.2 4.1 3.9   BILITOTAL 0.2 0.2 <0.2 <0.2 <0.2   ALKPHOS 66 77 63 83 80   AST 27 22 14 39* 19   ALT 20 16 15 21 15     Thyroid Panel:  Recent Labs   Lab Test 03/08/23  0830   TSH 1.13      Vitamin B12:   Recent Labs   Lab Test 03/09/23  1524   B12 1,325*      Ammonia:   Recent Labs   Lab Test 10/09/23  0959   DAPHNE 14        IMAGING:     Last MRI brain 10/23 no acute abnormality      55 minutes spent with  coordination of care    Joana Strickland M.D.  Neurologist  [facility]  Office [phone]

## 2023-10-26 NOTE — ANESTHESIA PREPROCEDURE EVALUATION
"Anesthesia Pre-Procedure Evaluation    Patient: Kortney Germain   MRN: [id number] : 1955        Procedure : Procedure(s):  INSERTION, PEG TUBE          Past Medical History:   Diagnosis Date    Allergic state     Depressive disorder     Dyskinesia     Hypertension     Schizo affective schizophrenia (H)       Past Surgical History:   Procedure Laterality Date    TRACHEOSTOMY N/A 10/20/2023    Procedure: Tracheostomy;  Surgeon: Michael Benites MD;  Location: [facility]      Allergies   Allergen Reactions    Amlodipine     Clozapine     Egg [Chicken-Derived Products (Egg)]     Penicillins      Tolerated ceftriaxone (23)    Potassium     Poultry Meal       Social History     Tobacco Use    Smoking status: Never    Smokeless tobacco: Never   Substance Use Topics    Alcohol use: No      Wt Readings from Last 1 Encounters:   10/25/23 44.6 kg (98 lb 5.2 oz)        Anesthesia Evaluation            ROS/MED HX  ENT/Pulmonary: Comment: \"Morning of  she was noted to be febrile and shaking prompting intubation to facilitate head CT and LP\"    Loss of protective airway reflexes   (-) sleep apnea   Neurologic: Comment: Toxicmetabolic Encephalopathy    H/o tardive dyskinesia    \"EEG is not showing seizures since then but has shown evidence of potential new seizure foci.\"    \"Status epilepticus. Considered serotonin syndrome but less likely now.  Psychiatrist also wondering about malignant catatonia.  Is on 3 antiseizure agents\"    23 MRI  IMPRESSION:  1.  No acute intracranial process.  2.  Generalized brain atrophy and presumed microvascular ischemic changes as detailed above.          (+)       seizures,                         Cardiovascular: Comment: 3/2023 cardiac echo  Interpretation Summary     Left ventricular size, global systolic function, and wall motion are normal,  estimated LVEF 60-65%.  Right ventricular global function is normal.  No significant valvular abnormalities.  The inferior vena " cava was normal in size with preserved respiratory  variability.     There are no prior studies available for comparison.  ______________________________________________________________________________  Left Ventricle  The left ventricle is normal in size. A sigmoid septum is present. Left  ventricular systolic function is normal. The visual ejection fraction is 60-  65%. Left ventricular diastolic function is indeterminate. No regional wall  motion abnormalities noted.     Right Ventricle  The right ventricle is normal in structure, function and size.     Atria  The left atrium is mildly dilated. Right atrial size is normal. There is no  color Doppler evidence of an atrial shunt.     Mitral Valve  There is trace mitral regurgitation.     Tricuspid Valve  The tricuspid valve is not well visualized, but is grossly normal. There is  trace tricuspid regurgitation.     Aortic Valve  The aortic valve is normal in structure and function.     Pulmonic Valve  The pulmonic valve is not well seen, but is grossly normal.     Vessels  The aortic root is normal size. Normal size ascending aorta. The inferior vena  cava was normal in size with preserved respiratory variability.     Pericardium  There is no pericardial effusion.     Rhythm  Sinus rhythm was noted.    (+)  hypertension- -   -  - -                                      METS/Exercise Tolerance:     Hematologic:       Musculoskeletal:       GI/Hepatic:    (-) GERD   Renal/Genitourinary:       Endo:       Psychiatric/Substance Use: Comment: Schizo-affective schizophrenia    (+) psychiatric history        Infectious Disease:       Malignancy:       Other: Comment: Decision making: no formal guardian or decision maker           Physical Exam    Airway             Respiratory Devices and Support         Dental           Cardiovascular   cardiovascular exam normal          Pulmonary       Comment: Trach dome 2L O2. Non labored            OUTSIDE LABS:  CBC:   Lab Results  "  Component Value Date    WBC 12.0 (H) 10/26/2023    WBC 11.0 10/25/2023    HGB 9.2 (L) 10/26/2023    HGB 8.7 (L) 10/25/2023    HCT 31.2 (L) 10/26/2023    HCT 29.2 (L) 10/25/2023     (H) 10/26/2023     (H) 10/25/2023     BMP:   Lab Results   Component Value Date     10/24/2023     10/23/2023    POTASSIUM 3.6 10/26/2023    POTASSIUM 4.1 10/25/2023    CHLORIDE 100 10/24/2023    CHLORIDE 102 10/23/2023    CO2 30 (H) 10/24/2023    CO2 30 (H) 10/23/2023    BUN 22.6 10/24/2023    BUN 23.6 (H) 10/23/2023    CR 0.39 (L) 10/24/2023    CR 0.38 (L) 10/23/2023     (H) 10/26/2023     (H) 10/25/2023     COAGS:   Lab Results   Component Value Date    PTT 25 09/28/2023    INR 1.04 09/28/2023     POC: No results found for: \"BGM\", \"HCG\", \"HCGS\"  HEPATIC:   Lab Results   Component Value Date    ALBUMIN 3.4 (L) 09/30/2023    PROTTOTAL 5.6 (L) 09/30/2023    ALT 20 09/30/2023    AST 27 09/30/2023    ALKPHOS 66 09/30/2023    BILITOTAL 0.2 09/30/2023    DAPHNE 14 10/09/2023     OTHER:   Lab Results   Component Value Date    PH 7.39 09/30/2023    LACT 1.4 09/29/2023    LULU 8.8 10/24/2023    PHOS 3.9 10/26/2023    MAG 2.3 10/25/2023    LIPASE 22 03/06/2023    TSH 1.13 03/08/2023       Anesthesia Plan    ASA Status:  4    NPO Status:  NPO Appropriate    Anesthesia Type: General.     - Airway: Tracheostomy   Induction: Intravenous.   Maintenance: Balanced.        Consents    Anesthesia Plan(s) and associated risks, benefits, and realistic alternatives discussed. Questions answered and patient/representative(s) expressed understanding.     - Discussed:     - Discussed with:  Implied consent/emergency            Postoperative Care    Pain management: IV analgesics.   PONV prophylaxis: Ondansetron (or other 5HT-3)     Comments:                Chicho Koenig, DO, DO  "

## 2023-10-26 NOTE — CONSULTS
General Surgery Consultation    Kortney Germain MRN# [id number]   Age: 68 year old YOB: 1955     Date of Admission:  9/28/2023    Reason for consult:            PEG tube placement       Requesting physician:            Santiago                Assessment and Plan:   Assessment:   Kortney Germain is a 68 year old female with history of schizophrenia who presented 9/28 with backpain and fever. Subsequently developed encephalopathy and subsequent seizures. Patient has no locatable next of kin or appointed guardian. Patient has been reviewed by ethics committee twice and recommending proceeding with PEG tube.  Patient has had nasal feeding tube in place for prolonged period. PEG is reversible and provides minimal risk and yet improves care.     Comorbidities:   has a past medical history of Allergic state, Depressive disorder, Dyskinesia, Hypertension, and Schizo affective schizophrenia (H).      Plan:   -As per recommendations of ethics committee, and based on best continued care for the patient will plan for PEG placement tomorrow  -hold TF at midnight for planned procedure                 Chief Complaint:   Prolonged encephalopathy    History is obtained from the chart         History of Present Illness:   Per chart review. Patient with prolonged encephalopathy and as part of continued care and to improve care PEG tube requested to be placed.           Past Medical History:     Past Medical History:   Diagnosis Date    Allergic state     Depressive disorder     Dyskinesia     Hypertension     Schizo affective schizophrenia (H)              Past Surgical History:     Past Surgical History:   Procedure Laterality Date    TRACHEOSTOMY N/A 10/20/2023    Procedure: Tracheostomy;  Surgeon: Michael Benites MD;  Location:  OR             Social History:     Social History     Tobacco Use    Smoking status: Never    Smokeless tobacco: Never   Substance Use Topics    Alcohol use: No             Family  History:   No family history on file.         Allergies:     Allergies   Allergen Reactions    Amlodipine     Clozapine     Egg [Chicken-Derived Products (Egg)]     Penicillins      Tolerated ceftriaxone (9/28/23)    Potassium     Poultry Meal              Medications:   No current facility-administered medications on file prior to encounter.  acetaminophen (TYLENOL) 500 MG tablet, Take 1,000 mg by mouth 3 times daily 0800 / 1400 / 2000  albuterol (PROVENTIL) (2.5 MG/3ML) 0.083% neb solution, Take 2.5 mg by nebulization every 4 hours as needed for shortness of breath, wheezing or cough  alendronate (FOSAMAX) 70 MG tablet, Take 70 mg by mouth every 7 days Thursdays at 0700  cholecalciferol 50 MCG (2000 UT) tablet, Take 1 tablet by mouth daily 0800  cyclobenzaprine (FLEXERIL) 5 MG tablet, Take 5 mg by mouth 3 times daily as needed for muscle spasms  diphenhydrAMINE (BENADRYL) 50 MG capsule, Take 50 mg by mouth 3 times daily 0800 / 1500 / 2000  escitalopram (LEXAPRO) 20 MG tablet, Take 20 mg by mouth daily 0800  LORazepam (ATIVAN) 0.5 MG tablet, Take 0.5 mg by mouth 2 times daily 0800/1400  LORazepam (ATIVAN) 0.5 MG tablet, Take 0.25 mg by mouth At Bedtime 2000  multivitamin w/minerals (THERA-VIT-M) tablet, Take 1 tablet by mouth daily 0800  muscle rub (ARTHRITIS HOT) 10-15 % CREA, Apply topically 4 times daily Back pain - 0700/1100/1500/2000  NIFEdipine ER (ADALAT CC) 60 MG 24 hr tablet, Take 60 mg by mouth daily  perphenazine 4 MG tablet, Take 4 mg by mouth every morning  perphenazine 8 MG tablet, Take 8 mg by mouth At Bedtime       cefTRIAXone  2 g Intravenous Q24H    clobazam  20 mg Oral or Feeding Tube BID    fiber modular  1 packet Per Feeding Tube Daily    furosemide  40 mg Oral BID    heparin ANTICOAGULANT  5,000 Units Subcutaneous Q8H    lacosamide (VIMPAT) 300 mg in sodium chloride 0.9 % 110 mL intermittent infusion  300 mg Intravenous BID    multivitamins w/minerals  15 mL Oral or Feeding Tube Daily     polyethylene glycol  17 g Oral or Feeding Tube BID    protein modular  1 packet Per Feeding Tube Daily    sodium chloride (PF)  10-40 mL Intracatheter Q7 Days    sodium chloride (PF)  3 mL Intracatheter Q8H    valproic acid  250 mg Oral Q8H    wound support modular  60 mL Oral Daily at 4 pm            Review of Systems:   The 10 point review of systems is negative other than noted in the HPI.          Physical Exam:   /74 (BP Location: Right arm)   Pulse 77   Temp 98.4  F (36.9  C) (Axillary)   Resp 20   Wt 44.6 kg (98 lb 5.2 oz)   SpO2 94%   BMI 19.20 kg/m    General - No acute distress  Lungs: Clear to auscultation bilaterally  Heart: regular rate and rhythm, no murmurs  Abdomen:soft, flat, non-distended with no tenderness noted diffusely.  No rebound or guarding.  no masses palpated.  MSK: Extremities warm without edema  Neurologic: non-responsive         Data:   Labs reviewed      Imaging:  Independently reviewed most recent Ct imaging      Garret Helms MD  10/26/2023 1:28 PM     Time spent with the patient, reviewing the EMR, reviewing laboratory and imaging studies, counseling and coordinating care:  65 minutes.

## 2023-10-26 NOTE — CONSULTS
[facility]    Infectious Disease Consultation     Date of Admission:  9/28/2023  Date of Consult (When I saw the patient): 10/26/23    Assessment & Plan   Kortney Germain is a 68 year old who was admitted on 9/28/2023.     Impression:  67 yo female with acute encephalopathy with loss of CNS failure/loss of protective airway reflexes requiring intubation ad ultimately tracheostomy. She was noted to have new onset of refractory status epilepticus requiring initiation of 3 antiepileptics with improvement in her EEG but unclear etiology for the seizures. She remains in a comatose state. She had temp of 99.6 on 10/23 followed by fever 100.7 on 10/25 with mild leukocytosis. Urine culture with pseudomonas. CXR with possible retrocardiac infiltrate concerning for healthcare associated pneumonia.     -WBC 12.0. Fever to 100.7 last night.   -Urine with pseudomonas, pan-sensitive. Sputum culture with mixed christina.   -Unable to be on Cefepime or Meropenem due to lowering seizure threshold. Allergy to PCN.     Recommendations:   -Stop Ceftriaxone and start Levaquin. Treat for a total of 7 days.   -Monitor fevers/WBC.       Katia Lim PA-C      Reason for Consult   Reason for consult:Asked to evaluate this patient for antibiotic recommendations for possible hospital acquired pneumonia and UTI.    Primary Care Physician   [facility]      History is obtained from the patient's medical records    History of Present Illness   Kortney Germain is a 68 year old female with schizoaffective disorder, tardive dyskinesia, hypertension, anxiety, depression, and odynophagia who was admitted from her group home on 9/28/2023 with abdominal pain and back pain.  She was diagnosed with cystitis and stool impaction.  Her course was complicated by acute encephalopathy with loss of CNS failure/loss of protective airway reflexes requiring intubation on 9/29/2023. She was noted to have new onset of  refractory status epilepticus requiring initiation of 3 antiepileptics with improvement in her EEG but unclear etiology for the seizures. She remains in a comatose state. She ultimately had a tracheostomy placed on 10/20/23. She was in ICU until 10/24/2023 when she was transferred to the floor.     On 10/23/23 she spiked a low-grade temperature. UA showed > 182 WBC and urine culture grew pseudomonas. CXR showed possible retrocardiac infiltrate. She was started on empiric Cefepime on 10/24/23 for presumed UTI and possible hospital acquired pneumonia. However, cefepime lowers seizure threshold so this was changed to Ceftriaxone, but this does no cover pseudomonas that grew in her urine.         Past Medical History   I have reviewed this patient's medical history and updated it with pertinent information if needed.   Past Medical History:   Diagnosis Date    Allergic state     Depressive disorder     Dyskinesia     Hypertension     Schizo affective schizophrenia (H)        Past Surgical History   I have reviewed this patient's surgical history and updated it with pertinent information if needed.  Past Surgical History:   Procedure Laterality Date    TRACHEOSTOMY N/A 10/20/2023    Procedure: Tracheostomy;  Surgeon: Michael Benites MD;  Location:  OR       Prior to Admission Medications   Prior to Admission Medications   Prescriptions Last Dose Informant Patient Reported? Taking?   LORazepam (ATIVAN) 0.5 MG tablet  Nursing Home Yes Yes   Sig: Take 0.5 mg by mouth 2 times daily 0800/1400   LORazepam (ATIVAN) 0.5 MG tablet  Nursing Home Yes Yes   Sig: Take 0.25 mg by mouth At Bedtime 2000   NIFEdipine ER (ADALAT CC) 60 MG 24 hr tablet  Nursing Home Yes Yes   Sig: Take 60 mg by mouth daily   acetaminophen (TYLENOL) 500 MG tablet  Nursing Home Yes Yes   Sig: Take 1,000 mg by mouth 3 times daily 0800 / 1400 / 2000   albuterol (PROVENTIL) (2.5 MG/3ML) 0.083% neb solution  USP Yes Yes   Sig: Take 2.5 mg by  nebulization every 4 hours as needed for shortness of breath, wheezing or cough   alendronate (FOSAMAX) 70 MG tablet  Nursing Home Yes Yes   Sig: Take 70 mg by mouth every 7 days Thursdays at 0700   cholecalciferol 50 MCG (2000 UT) tablet  Nursing Home Yes Yes   Sig: Take 1 tablet by mouth daily 0800   cyclobenzaprine (FLEXERIL) 5 MG tablet  Nursing Home Yes Yes   Sig: Take 5 mg by mouth 3 times daily as needed for muscle spasms   diphenhydrAMINE (BENADRYL) 50 MG capsule  Nursing Home Yes Yes   Sig: Take 50 mg by mouth 3 times daily 0800 / 1500 / 2000   escitalopram (LEXAPRO) 20 MG tablet  Nursing Home Yes Yes   Sig: Take 20 mg by mouth daily 0800   multivitamin w/minerals (THERA-VIT-M) tablet  Nursing Home Yes Yes   Sig: Take 1 tablet by mouth daily 0800   muscle rub (ARTHRITIS HOT) 10-15 % CREA  Nursing Home Yes Yes   Sig: Apply topically 4 times daily Back pain - 0700/1100/1500/2000   perphenazine 4 MG tablet  Nursing Home Yes Yes   Sig: Take 4 mg by mouth every morning   perphenazine 8 MG tablet  Nursing Home Yes Yes   Sig: Take 8 mg by mouth At Bedtime      Facility-Administered Medications: None     Allergies   Allergies   Allergen Reactions    Amlodipine     Clozapine     Egg [Chicken-Derived Products (Egg)]     Penicillins      Tolerated ceftriaxone (9/28/23)    Potassium     Poultry Meal        Immunization History   Immunization History   Administered Date(s) Administered    COVID-19 Bivalent 12+ (Pfizer) 09/23/2022, 06/19/2023    COVID-19 Monovalent 18+ (Moderna) 02/05/2021, 03/05/2021, 03/08/2022       Social History   I have reviewed this patient's social history and updated it with pertinent information if needed. Kortney Germain  reports that she has never smoked. She has never used smokeless tobacco. She reports that she does not drink alcohol and does not use drugs.    Family History   I have reviewed this patient's family history and updated it with pertinent information if needed.   No family  history on file.    Review of Systems   The 10 point Review of Systems is negative    Physical Exam   Temp: 97.8  F (36.6  C) Temp src: Axillary BP: 121/74 Pulse: 77   Resp: 20 SpO2: 94 % O2 Device: None (Room air) Oxygen Delivery: 30 LPM  Vital Signs with Ranges  Temp:  [97.7  F (36.5  C)-100.7  F (38.2  C)] 97.8  F (36.6  C)  Pulse:  [76-83] 77  Resp:  [20-27] 20  BP: (121-141)/(69-82) 121/74  FiO2 (%):  [28 %-30 %] 28 %  SpO2:  [93 %-96 %] 94 %  98 lbs 5.2 oz  Body mass index is 19.2 kg/m .    GENERAL APPEARANCE:  comatose  EYES: Eyes closed  NECK: no adenopathy  RESP: lungs coarse, trach in place  CV: regular rates and rhythm  ABDOMEN: soft, nontender. NG tube.   MS: extremities normal. No edema.  SKIN: no suspicious lesions or rashes  : Purewick catheter in place.        Data   All laboratory data reviewed    Component      Latest Ref Rng 10/24/2023  4:11 AM 10/25/2023  4:22 AM 10/26/2023  6:32 AM   WBC      4.0 - 11.0 10e3/uL 12.6 (H)  11.0  12.0 (H)    RBC Count      3.80 - 5.20 10e6/uL 3.65 (L)  3.54 (L)  3.76 (L)    Hemoglobin      11.7 - 15.7 g/dL 9.0 (L)  8.7 (L)  9.2 (L)    Hematocrit      35.0 - 47.0 % 30.1 (L)  29.2 (L)  31.2 (L)    MCV      78 - 100 fL 83  83  83    MCH      26.5 - 33.0 pg 24.7 (L)  24.6 (L)  24.5 (L)    MCHC      31.5 - 36.5 g/dL 29.9 (L)  29.8 (L)  29.5 (L)    RDW      10.0 - 15.0 % 20.4 (H)  20.3 (H)  20.5 (H)    Platelet Count      150 - 450 10e3/uL 485 (H)  497 (H)  479 (H)         MRSA nares negative  Respiratory culture 10/24/23: Mixed christina    10/24/2023 1503 10/26/2023 0610 Urine Culture [[id number]]    (Abnormal)   Urine, Clean Catch    Final result Component Value   Culture 50,000-100,000 CFU/mL Pseudomonas aeruginosa Abnormal        Susceptibility     Pseudomonas aeruginosa     TRIP     Amikacin <=2 ug/mL Susceptible     Cefepime 2 ug/mL Susceptible     Ceftazidime 4 ug/mL Susceptible     Ciprofloxacin <=0.25 ug/mL Susceptible     Gentamicin 2 ug/mL Susceptible      Levofloxacin <=0.12 ug/mL Susceptible     Meropenem <=0.25 ug/mL Susceptible     Piperacillin/Tazobactam 8 ug/mL Susceptible     Tobramycin <=1 ug/mL Susceptible                      CHEST ONE VIEW  10/24/2023 2:45 PM      HISTORY: Evaluate for pneumonia, patient has leukocytosis.     COMPARISON: October 6, 2023                                                                      IMPRESSION: Possible retrocardiac infiltrate, consider confirmation  with lateral view. Question groundglass infiltrates at the right base.

## 2023-10-26 NOTE — PROGRESS NOTES
[facility]    Hospitalist Progress Note    Interval History   Patient has her eyes open this morning but they were rolled upwards.  Not following any commands.  Not responding to verbal stimuli.  Upgoing toes to plantar stimulation.  Had an episode of shaking with her right hand and rhythmic movement of her jaw this morning that was concerning for seizure activity.    -Data reviewed today: I reviewed all new labs and imaging results over the last 24 hours. I personally reviewed the chest x-ray image(s) showing retrocardiac infiltrate .    Physical Exam   Temp: 98.4  F (36.9  C) Temp src: Axillary BP: 121/74 Pulse: 77   Resp: 20 SpO2: 94 % O2 Device: Trach dome Oxygen Delivery: 30 LPM  Vitals:    10/22/23 0600 10/24/23 0400 10/25/23 0400   Weight: 46 kg (101 lb 6.6 oz) 45.9 kg (101 lb 1.6 oz) 44.6 kg (98 lb 5.2 oz)     Vital Signs with Ranges  Temp:  [97.7  F (36.5  C)-100.7  F (38.2  C)] 98.4  F (36.9  C)  Pulse:  [76-83] 77  Resp:  [20-27] 20  BP: (121-141)/(69-82) 121/74  FiO2 (%):  [28 %-30 %] 28 %  SpO2:  [93 %-96 %] 94 %  I/O last 3 completed shifts:  In: 3885 [I.V.:100; NG/GT:3665]  Out: 900 [Urine:900]    Physical Exam  Constitutional:       Appearance: She is ill-appearing.   Eyes:      Comments: Pupils small but equal and reactive   Cardiovascular:      Rate and Rhythm: Tachycardia present.   Pulmonary:      Comments: Has a trach in place.  Currently on 30 L trach dome.  Abdominal:      General: Abdomen is flat.      Comments: NG tube in place.   Neurological:      Comments: Has been minimally responsive since extubation.  Neuro exam could not be completed due to her nonresponsive state.  Currently on continuous EEG monitoring.           Medications    dextrose        cefTRIAXone  2 g Intravenous Q24H    clobazam  20 mg Oral or Feeding Tube BID    fiber modular  1 packet Per Feeding Tube Daily    furosemide  40 mg Intravenous Q12H    heparin ANTICOAGULANT  5,000 Units Subcutaneous  Q8H    lacosamide (VIMPAT) 300 mg in sodium chloride 0.9 % 110 mL intermittent infusion  300 mg Intravenous BID    multivitamins w/minerals  15 mL Oral or Feeding Tube Daily    polyethylene glycol  17 g Oral or Feeding Tube BID    protein modular  1 packet Per Feeding Tube Daily    sodium chloride (PF)  10-40 mL Intracatheter Q7 Days    sodium chloride (PF)  3 mL Intracatheter Q8H    valproic acid  250 mg Oral Q8H    wound support modular  60 mL Oral Daily at 4 pm       Data   Recent Labs   Lab 10/26/23  0632 10/26/23  0517 10/25/23  0814 10/25/23  0422 10/24/23  1113 10/24/23  0412 10/24/23  0411 10/23/23  1950 10/23/23  1906 10/23/23  0824 10/23/23  0440   WBC 12.0*  --   --  11.0  --   --  12.6*  --   --   --  11.3*   HGB 9.2*  --   --  8.7*  --   --  9.0*  --   --   --  7.7*   MCV 83  --   --  83  --   --  83  --   --   --  83   *  --   --  497*  --   --  485*  --   --   --  432   NA  --   --   --   --   --   --  142  --  142  --  141   POTASSIUM 3.6  --   --  4.1  --   --  3.7  --  4.1  --  3.6   CHLORIDE  --   --   --   --   --   --  100  --  102  --  103   CO2  --   --   --   --   --   --  30*  --  30*  --  27   BUN  --   --   --   --   --   --  22.6  --  23.6*  --  17.9   CR  --   --   --   --   --   --  0.39*  --  0.38*  --  0.36*   ANIONGAP  --   --   --   --   --   --  12  --  10  --  11   LULU  --   --   --   --   --   --  8.8  --  8.6*  --  7.8*   GLC  --  128* 108*  --  139*   < > 139*   < > 123*   < > 120*    < > = values in this interval not displayed.       No results found for this or any previous visit (from the past 24 hour(s)).        Assessment & Plan   Kortney Germain is a 68 year old female with a past medical history significant for schizoaffective disorder, tardive dyskinesia, hypertension, anxiety, depression, odynophagia who was admitted from her group home (no next of kin) on 9/28/2023 with abdominal pain and back pain.  She was diagnosed with cystitis and stool impaction.  Her  course was complicated by acute encephalopathy with loss of CNS failure/loss of protective airway reflexes requiring intubation on 9/29/2023.  She has been in ICU since that time, hospitalist service was asked to transfer patient out of ICU on 10/24/2023    New onset refractory status epilepticus requiring initiation of 3 antiepileptics with improvement in her EEG, unclear etiology for the seizures  Comatose neuro exam  CT Head 9/29: no acute intracranial pathology, brain atrophy, CSVID  MRI 9/30/23: no acute intracranial process, generalized atrophy, CSVID  MRI 10/11/23: no acute intracranial process, no hemorrhage, moderate-advanced brain atrophy and leukoaraiosis, CSVID   MRI 10/20/23: no interval change   BC: NG  CSF analysis :glucose 98 protein 22.8, 2 nucleated cells, RBC 0, aerobic culture GPC in broth only anaerobic culture NGTD, HSV negative  *[facility] encephalopathy autoimmune panel negative  -Continue EEG monitoring on transfer out of ICU per my discussion with the neuro critical care team.  General neurology team will take over after the transfer.  - Neurochecks changed to every 4 status.  Can transfer under acute care.  No need for IMC.  -continue valproic acid solution 250mg TID (sprinkles not NG compatible)-level checked on 10/21/2023 that showed total - 20 with 3-10  - increase lacosamide to 300 mg BID 10/24/23 -level 10/9/2023-4.1  - continue clobazam 20 mg BID  - limit dopaminergic/serotonergic agents   -Had an episode of right upper extremity shakes with rhythmic jaw movement on 10/26/2023 for which she received as needed Ativan after which symptoms abated.    Acute hypoxic respiratory failure secondary to CNS failure/loss of protective airway reflexes on prolonged mechanical ventilatory support status post trach tube placement on 10/20/2023 by Dr. Benites  -Keep trach in place while comatose.  Currently on 30 L 30% oxygen.    Possible hospital-acquired pneumonia   Urinary tract infection   -,  low-grade temp of 99 on 10/23/2023 with low-grade leukocytosis  - Obtain UA--> positive with greater than than 182 WBCs, large leuk esterase  -Await urine culture  -Was started on empiric cefepime on 10/24/2023.  However this lowers seizure threshold.  Discontinued and transition to IV ceftriaxone.  Patient is allergic to penicillin so cannot use Zosyn.  Meropenem also reduce the seizure threshold.  - MRSA swab negative.  -Sputum culture shows mixed christina  - Chest x-ray--> possible retrocardiac infiltrate  - Ceftriaxone would not provide pseudomonal coverage or anaerobic coverage.  Will consult ID regarding alternative possibilities for possible hospital-acquired pneumonia.  - Procalcitonin 0.06  - Leukocytosis at 12.  Afebrile.    Moderate malnutrition  Artificial nutrition and hydration via smallbore feeding tube, suspect this has been in place since she was initially intubated.  *General surgery saw the patient on 10/13/2023 there were plans for surgical PEG tube the following Monday.  This was later canceled on 10/16/2023 by a different surgeon over concerns about consent.  *Tentative plan was long-term smallbore feeding tube and lieu of PEG although this certainly poses risk of tissue damage to the nasal cavity and surrounding tissues  - Reactivate ethics consult on 10/25/2023 to assist with decision-making regarding PEG tube placement, differing opinions/willingness to proceed between surgeons.  I discussed the case with ethics team and they agree that it is appropriate to proceed with PEG tube placement since she is almost been 4 weeks since her NG tube has been placed and she will need ongoing tube feeds for the foreseeable future and cannot have her NG remain in place for much longer.  Please review notes for further details.  -Surgery team has been reconsulted to reevaluate for this.  During the previous consultation they had canceled the procedure and were planning to wait on guardianship.  Unclear to me  how far the guardianship has come along.  Awaiting callback from social work to discuss this.  -Continue tube feeds in the interim along with free water flushes    Hypertension well-controlled on 40 mg IV Lasix twice daily  *Was not on any PTA antihypertensives  - Changed to oral Lasix 40 twice daily on 10/26/2023.  Patient was having some contraction alkalosis and appears to be on the drier side.    Euglycemia without requiring insulin for at least the last 10 days, consistently less than 180 mg/dL  -Twice daily glucose checks while on tube feeds  - Continue hypoglycemia protocol  -discontinue insulin    Anemia normocytic likely secondary to prolonged critical illness and frequent phlebotomy  -Hemoglobin stable at 9.2.    Thrombocytosis  - Stop scheduled draws  - Recheck CBC in the a.m.    schizoaffective disorder   tardive dyskinesia  anxiety, depression,  -Not currently on medications for these    In hospital disposition  - Patient can transfer to neuroscience,        Clinically Significant Risk Factors              # Hypoalbuminemia: Lowest albumin = 3.4 g/dL at 9/30/2023  5:15 AM, will monitor as appropriate     # Hypertension: Noted on problem list         # Moderate Malnutrition: based on nutrition assessment            DVT Prophylaxis: Pneumatic Compression Devices  Code Status: Full Code  Disposition: Expected discharge to be determined.  Patient is a very complicated discharge.      Rama Henderson MD, MD  [phone](p)

## 2023-10-26 NOTE — CONSULTS
The purpose of this note, including any accompanying recommendation, is advisory only concerning ethical principles and considerations related to this case. Determination of appropriate patient care decisions is the responsibility of the clinical team in consultation with patients and their decision makers and relevant institutional policy. Legal and policy questions should be addressed with Risk Management.    This note is a follow-up ethics consult to the prior ethics consult completed by Barber Villalpando dated 10/10/23.    I have discussed the patient's condition and prognosis with Dr. Henderson.    The ethics question concerns placement of a PEG tube, while the patient continues to lack decision making capacity and without an effective surrogate decision maker or guardian.     The patient has had an NG tube for weeks, and is at increasing risk for ulcers/erosions. It's my understanding that removal of the NG tube to prevent injury, and placement of a PEG tube would be most consistent with standards of care.     The ethical issue concerns a kind of escalation of treatment, here PEG tube placement, in the absence of a surrogate decision maker or patient consent/assent.     In following the prior ethics consult, a restorative plan of care that is consistent with best interests remains ethically appropriate. As with the prior tracheostomy, placement of the PEG would be ethically appropriate because it is low risk, poses clear benefits while avoiding clear risks for injury or harm, is reversible if necessary, and also contribute to further opportunity for recovery from other ongoing issues including her neurologic issues.     The alternatives to the PEG tube would either be cessation of artificial hydration and nutrition, other less effective or appropriate methods of CORA, or continuing use of the NG which is expected to create avoidable injuries. The plan of care with the lowest risk for harm and the greatest  opportunity for benefit is the PEG tube.     It's worth emphasizing that CORA can be refused or withheld, either if refusal is most consistent with patient goals/values/choices, or the burdens and risks of the PEG tube exceed any meaningful opportunity for recovery/survival or other specific benefit.     In this case, there is no information about the patient's goals or values specifically, and the PEG presently does contribute to survival and opportunity for recovery.     It's also worth emphasizing, as in the prior ethics consult, that if a suitable decision maker can demonstrate that the PEG tube or other treatments are inconsistent with patient values, or the treatment becomes non-beneficial or no longer medically indicated, it may be ethically appropriate to remove the PEG tube. It would be valuable to document explicit consideration and description of conditions where the PEG tube can be removed, e.g. if there is no meaningful expectation of recovery (particularly if her neurologic condition worsens with no expectation of improvement.)     Presently, it would be ethically appropriate to place the PEG tube because the benefits outweigh the burdens, and are consistent with patient best interests (as best interests remain the standard for decision making in the absence of known values or an effective surrogate.)     Regan Delgado JD, MPH, MA, HEMA-C                                   Clinical Ethics Lead, [facility]     Please contact the service if we can be of any further assistance, service pager [phone].

## 2023-10-26 NOTE — PLAN OF CARE
Pt here with seizures, trach, UTI. Obtunded. Not following commands. Nonverbal. Opens eyes at times but does not blink to threat. Not withdrawing to stimuli on extremities. Pupils are brisk and equal. VSS. LS coarse. Trach in place, Coughing with stimulation. Frequent suctioning provided. NPO, NG tube feed running at goal of 30 mL/hr. FWF 100ml Q4. Takes pills crushed in TF. Tele NSR. L PICC. Up with 2 lift. T/R Q2. Incontinent, purewick in place. Small BM overnight x2.  No indication of pain. Skin is sweaty from humidifier. Pressure injury to sacrum- WOC following. On continuous EEG. Needs guardianship- discharge pending.

## 2023-10-26 NOTE — PROVIDER NOTIFICATION
MD Notification    Notified Person: MD    Notified Person Name: Dr. Strickland    Notification Date/Time: 10/26/23 7282    Notification Interaction: phone call    Purpose of Notification: notified that patient had onset of rhythmic jaw movements, and LUE jerking. Lasted on and off for 5 minutes. Onfi had just been administered. Ordered to continue to monitor, if repeat episode and lasting longer than 2 minutes, give PRN Ativan.

## 2023-10-27 NOTE — BRIEF OP NOTE
[facility]    Brief Operative Note    Pre-operative diagnosis: Malnutrition. Need for enteral nutrition.  Status epilepticus (H) [G40.901]  Weakness [R53.1]  Post-operative diagnosis Same as pre-operative diagnosis    Procedure: LAPAROSCPIC ASSISTED INSERTION, PEG TUBE,, N/A - Abdomen    Surgeon: Surgeon(s) and Role:     * Garret Helms MD - Primary     * Marc Fabian PA-C - Assisting  Anesthesia: General   Estimated Blood Loss: 5 mL from 10/27/2023 12:56 PM to 10/27/2023  2:17 PM      Drains: None  Specimens: * No specimens in log *  Findings:   Peg tube placed in standard fashion. Placement verified with laparoscopic visualization.  Complications: None.  Implants: * No implants in log *      Marc Fabian PA-C

## 2023-10-27 NOTE — ANESTHESIA POSTPROCEDURE EVALUATION
Patient: Kortney Germain    Procedure: Procedure(s):  LAPAROSCPIC ASSISTED INSERTION, PEG TUBE,       Anesthesia Type:  General    Note:     Postop Pain Control: Uneventful            Sign Out: Pain at Preoperative BASELINE   PONV: No   Neuro/Psych: Uneventful            Sign Out: Acceptable/Baseline neuro status (back to pre-op baseline neuro status)   Airway/Respiratory: Uneventful            Sign Out: AIRWAY IN SITU/Resp. Support; Acceptable/Baseline resp. status (back to pre-op baseline resp status)               Airway in situ/Resp. Support: Tracheostomy                 Reason: Planned Pre-op   CV/Hemodynamics: Uneventful            Sign Out: Acceptable CV status; No obvious hypovolemia; No obvious fluid overload   Other NRE: NONE   DID A NON-ROUTINE EVENT OCCUR?            Last vitals:  Vitals Value Taken Time   /68 10/27/23 1500   Temp 36.1  C (97  F) 10/27/23 1445   Pulse 71 10/27/23 1507   Resp 16 10/27/23 1507   SpO2 97 % 10/27/23 1507   Vitals shown include unfiled device data.    Electronically Signed By: Chicho Koenig DO, DO  October 27, 2023  3:08 PM

## 2023-10-27 NOTE — PROGRESS NOTES
Chart reviewed  Note plan for PEG placement today    Levaquin started yesterday (need to hold TF for 1 hr before and 1 hr after dose) - scheduled for 4pm    BM x5 yesterday    PLAN:  Once PEG placed, will trial new semi-elemental formula in hopes to improve stooling-  Type of Feeding Tube: PEG to be placed  Enteral Frequency:  Continuous (will hold TF for 1 hr before and 1 hr after levaquin dose)  Enteral Regimen: Vital 1.5 @ 40 mL/hr   Total Enteral Provisions: 1320 cals, 60 gm pro, 5 gm fiber, 672 mL free water  1 bottle Expedite (for wound healing): 100 cals, 10 gm pro  TOTAL: 1420 cals (32 cals/kg), 70 gm pro (1.6 gm/kg)    When able to resume TF, begin at 20 mL/hr.  After 12 hrs, increase to goal rate of 40 mL/hr    Will discontinue the Banatrol and [id number]    Anna Walker RD, LD  Clinical Dietitian - [facility]   Pager - [phone]

## 2023-10-27 NOTE — PROGRESS NOTES
[facility]    PROGRESS NOTE - Hospitalist Service    ASSESSMENT AND PLAN     Principal Problem:    Status epilepticus (H)  Active Problems:    Urinary retention    Acute cystitis without hematuria    Constipation, unspecified constipation type    Anemia, unspecified type    HTN (hypertension)    Kortney Germain is a 68 year old female with a past medical history significant for schizoaffective disorder, tardive dyskinesia, hypertension, anxiety, depression, odynophagia who was admitted from her group home (no next of kin) on 9/28/2023 with abdominal pain and back pain.  She was diagnosed with cystitis and stool impaction.  Her course was complicated by acute encephalopathy with loss of CNS failure/loss of protective airway reflexes requiring intubation on 9/29/2023.  She has been in ICU since that time, hospitalist service was asked to transfer patient out of ICU on 10/24/2023     New onset refractory status epilepticus requiring initiation of 3 antiepileptics with improvement in her EEG, unclear etiology for the seizures  Comatose neuro exam  CT Head 9/29: no acute intracranial pathology, brain atrophy, CSVID  MRI 9/30/23: no acute intracranial process, generalized atrophy, CSVID  MRI 10/11/23: no acute intracranial process, no hemorrhage, moderate-advanced brain atrophy and leukoaraiosis, CSVID   MRI 10/20/23: no interval change   BC: NG  CSF analysis :glucose 98 protein 22.8, 2 nucleated cells, RBC 0, aerobic culture GPC in broth only anaerobic culture NGTD, HSV negative  *Vega encephalopathy autoimmune panel negative  -Continue EEG monitoring on transfer out of ICU per my discussion with the neuro critical care team.  General neurology team will take over after the transfer.  - Neurochecks changed to every 4 status.  Can transfer under acute care.  No need for IMC.  -continue valproic acid solution 250mg TID (sprinkles not NG compatible)-level checked on 10/21/2023 that showed total -  20 with 3-10  - increase lacosamide to 300 mg BID 10/24/23 -level 10/9/2023-4.1  - continue clobazam 20 mg BID  - limit dopaminergic/serotonergic agents   -Had an episode of right upper extremity shakes with rhythmic jaw movement on 10/26/2023 for which she received as needed Ativan after which symptoms abated.  Discussed case with Neurology- plan for high dose steroids.      Acute hypoxic respiratory failure secondary to CNS failure/loss of protective airway reflexes on prolonged mechanical ventilatory support status post trach tube placement on 10/20/2023 by Dr. Benites  -Keep trach in place while comatose.  Currently on 30 L 30% oxygen.     Possible hospital-acquired pneumonia   Urinary tract infection   -, low-grade temp of 99 on 10/23/2023 with low-grade leukocytosis  - Obtain UA--> positive with greater than than 182 WBCs, large leuk esterase  -Await urine culture  -Was started on empiric cefepime on 10/24/2023.  However this lowers seizure threshold.  Discontinued and transition to IV ceftriaxone.  Patient is allergic to penicillin so cannot use Zosyn.  Meropenem also reduce the seizure threshold.  - MRSA swab negative.  -Sputum culture shows mixed christina  - Chest x-ray--> possible retrocardiac infiltrate  - Ceftriaxone stopped per ID. Levaquin initiated for total of 7 days per ID.   - Procalcitonin 0.06     Moderate malnutrition  Artificial nutrition and hydration via smallbore feeding tube, suspect this has been in place since she was initially intubated.  *General surgery saw the patient on 10/13/2023 there were plans for surgical PEG tube the following Monday.  This was later canceled on 10/16/2023 by a different surgeon over concerns about consent.  *Tentative plan was long-term smallbore feeding tube and lieu of PEG although this certainly poses risk of tissue damage to the nasal cavity and surrounding tissues  - Reactivate ethics consult on 10/25/2023 to assist with decision-making regarding PEG tube  placement, differing opinions/willingness to proceed between surgeons.  Ethics in agreement with PEG tube placement. Please see noted from 10/26/23. General surgery to place PEG 10/27.   Guardianship pending. Social work following      Hypertension well-controlled on 40 mg IV Lasix twice daily  *Was not on any PTA antihypertensives  - Changed to oral Lasix 40 twice daily on 10/26/2023.  Patient was having some contraction alkalosis and appears to be on the drier side.     Euglycemia without requiring insulin for at least the last 10 days, consistently less than 180 mg/dL  -Twice daily glucose checks while on tube feeds  - Continue hypoglycemia protocol  -discontinue insulin     Anemia normocytic likely secondary to prolonged critical illness and frequent phlebotomy  -Hemoglobin stable at 9.2.     Thrombocytosis  - Stop scheduled draws  - Recheck CBC in the a.m.     schizoaffective disorder   tardive dyskinesia  anxiety, depression,  -Not currently on medications for these     Barriers to discharge: Guardianship    Anticipated length of stay: several days       Present on Admission:   Urinary retention   Acute cystitis without hematuria   Constipation, unspecified constipation type   Anemia, unspecified type      Subjective:  Patient resting in bed. Not responding to verbal or physical stimuli. Lips twitching.     PHYSICAL EXAM  Temp:  [97.7  F (36.5  C)-98  F (36.7  C)] 97.9  F (36.6  C)  Pulse:  [77-82] 77  Resp:  [12-24] 23  BP: (122-138)/(69-90) 137/90  FiO2 (%):  [30 %] 30 %  SpO2:  [94 %-97 %] 95 %  Wt Readings from Last 1 Encounters:   10/25/23 44.6 kg (98 lb 5.2 oz)       Intake/Output Summary (Last 24 hours) at 10/27/2023 1336  Last data filed at 10/27/2023 0530  Gross per 24 hour   Intake 520 ml   Output 800 ml   Net -280 ml      Body mass index is 19.2 kg/m .    GENRL: Lying in bed. Lips twitching   CHEST: Clear to auscultation bilaterally. Trach noted   HEART: Regular rate   ABDMN: Soft.   EXTRM: Boots in  place.   NEURO: Twitching and not following commands.   PSYCH: Unable to assess   INTGM: No skin rash    Medical Decision Making       50 MINUTES SPENT BY ME on the date of service doing chart review, history, exam, documentation & further activities per the note.      PERTINENT LABS/IMAGING:  Results for orders placed or performed during the hospital encounter of 09/28/23   Lumbar spine CT w/o contrast    Impression    IMPRESSION:      No acute fracture in the lumbar spine.     OLIVA GOLDSMITH MD         SYSTEM ID:  [id number]   CT Abdomen Pelvis w Contrast    Impression    IMPRESSION:   1.  Enlarged uterus with multiple fibroids.    2.  Rectum is distended with stool.    3.  Urinary bladder is distended.   CT Head w/o Contrast    Impression    IMPRESSION:  1. No CT findings of acute intracranial process.  2. Brain atrophy and presumed chronic small vessel ischemic changes,  as described.    KVNG DICK MD         SYSTEM ID:  [id number]   XR Lumbar Puncture Spinal Tap Diag    Impression    IMPRESSION: Uncomplicated fluoroscopic-guided lumbar puncture.    KVNG DICK MD         SYSTEM ID:  [id number]   XR Abdomen Port 1 View    Impression    IMPRESSION: Enteric tube tip in the left upper quadrant in the  expected location of the stomach. Minimal amount of stool.   Nonobstructed bowel gas pattern.    KVNG MARC MD         SYSTEM ID:  [id number]   MR Brain w/o & w Contrast    Impression    IMPRESSION:  1.  No acute intracranial process.  2.  Generalized brain atrophy and presumed microvascular ischemic changes as detailed above.   XR Abdomen Port 1 View    Impression    IMPRESSION: No metallic implanted device in the abdomen. NG tube tip and sidehole in the stomach. Endotracheal tube with tip approximately 4 cm above the tin. Pelvic calcifications.   XR Chest Port 1 View    Impression    IMPRESSION: ET tube is 2.8 cm proximal to the tin. Nasogastric tube identified. No acute airspace disease. Normal cardiac  silhouette. The patient is rotated. No abnormal metallic foreign body identified.   XR Skull Port 1/3 Views    Impression    IMPRESSION: No metallic foreign bodies identified in the face.   XR Chest Port 1 View    Impression    IMPRESSION: Endotracheal tube in good position above the tin. NG  tube in the stomach. Left PICC line tip in the low SVC. There is new  infiltrate or atelectasis in the left lower lobe. The right lung  remains clear.    KALE SEGOVIA MD         SYSTEM ID:  [id number]   XR Chest Port 1 View    Impression    IMPRESSION: Endotracheal tube tip 3 cm from the tin. Similar to  perhaps slightly worse retrocardiac atelectasis and/or infiltrate,  possibly with some pleural fluid. Right lung clear.    KVNG MARC MD         SYSTEM ID:  [id number]   MR Brain w/o & w Contrast    Impression    IMPRESSION:  1. No acute intracranial infarct or intracranial hemorrhage.  2. Stable moderate to advanced brain atrophy and leukoaraiosis,  presumed sequela of chronic microvascular ischemic disease, as  detailed.    BANG KWON DO         SYSTEM ID:  [id number]   XR Abdomen Port 1 View    Impression    IMPRESSION: Limited AP view of the abdomen demonstrates a feeding tube  curled (with kink) within the stomach with the tip pointing towards  the gastroesophageal junction.     Partially visualized right central venous catheter. Aortic  calcification.     No dilated loops of bowel within the visualized abdomen. Calcified  fibroid uterus.    KARTHIKEYAN TRUJILLO MD         SYSTEM ID:  [id number]   XR Abdomen Port 1 View    Impression    IMPRESSION: Interval advancement of the feeding tube, with the tip now  projecting over the mid stomach with kink at the distal portion of the  internal stylette.     Otherwise, no significant interval change.    KARTHIKEYAN TRUJILLO MD         SYSTEM ID:  [id number]   XR Abdomen Port 1 View    Impression    IMPRESSION: The NG to remains kinked approximately 6 cm from the distal  and. Location NG tube is in good position within the mid to distal stomach. Normal bowel gas pattern. Scattered uterine fibroids.   XR Abdomen Port 1 View    Impression    IMPRESSION: Feeding tube tip is in the distal stomach.    NAVEED WALDRON MD         SYSTEM ID:  [id number]   MR Brain w/o & w Contrast    Impression    IMPRESSION:  1. No discrete mass lesion, hemorrhage or focal area suggestive of acute infarct.  2. Stable diffuse age related changes.   XR Chest Port 1 View    Impression    IMPRESSION: Possible retrocardiac infiltrate, consider confirmation  with lateral view. Question groundglass infiltrates at the right base.    KVNG MARC MD         SYSTEM ID:  [id number]     Most Recent 3 CBC's:  Recent Labs   Lab Test 10/27/23  0606 10/26/23  0632 10/25/23  0422   WBC 9.6 12.0* 11.0   HGB 9.0* 9.2* 8.7*   MCV 83 83 83   * 479* 497*     Most Recent 3 BMP's:  Recent Labs   Lab Test 10/27/23  0406 10/26/23  2216 10/26/23  0632 10/26/23  0517 10/25/23  0814 10/25/23  0422 10/24/23  0412 10/24/23  0411 10/23/23  1950 10/23/23  1906 10/23/23  0824 10/23/23  0440   NA  --   --   --   --   --   --   --  142  --  142  --  141   POTASSIUM  --   --  3.6  --   --  4.1  --  3.7  --  4.1  --  3.6   CHLORIDE  --   --   --   --   --   --   --  100  --  102  --  103   CO2  --   --   --   --   --   --   --  30*  --  30*  --  27   BUN  --   --   --   --   --   --   --  22.6  --  23.6*  --  17.9   CR  --   --   --   --   --   --   --  0.39*  --  0.38*  --  0.36*   ANIONGAP  --   --   --   --   --   --   --  12  --  10  --  11   LULU  --   --   --   --   --   --   --  8.8  --  8.6*  --  7.8*   * 164*  --  128*   < >  --    < > 139*   < > 123*   < > 120*    < > = values in this interval not displayed.     Most Recent 2 LFT's:  Recent Labs   Lab Test 09/30/23  0515 09/29/23  0518   AST 27 22   ALT 20 16   ALKPHOS 66 77   BILITOTAL 0.2 0.2       Recent Labs   Lab Test 10/15/23  0650 10/03/23  0956 03/08/23  0830   CHOL   "--   --  143   HDL  --   --  50   LDL  --   --  75   TRIG 136   < > 88    < > = values in this interval not displayed.     Recent Labs   Lab Test 03/08/23  0830   LDL 75     Recent Labs   Lab Test 10/27/23  0406 10/26/23  2216 10/26/23  0632 10/24/23  0412 10/24/23  0411   NA  --   --   --   --  142   POTASSIUM  --   --  3.6   < > 3.7   CHLORIDE  --   --   --   --  100   CO2  --   --   --   --  30*   *   < >  --    < > 139*   BUN  --   --   --   --  22.6   CR  --   --   --   --  0.39*   GFRESTIMATED  --   --   --   --  >90   LULU  --   --   --   --  8.8    < > = values in this interval not displayed.     No results for input(s): \"A1C\" in the last 55791 hours.  Recent Labs   Lab Test 10/27/23  0606 10/26/23  0632 10/25/23  0422   HGB 9.0* 9.2* 8.7*     No results for input(s): \"TROPONINI\" in the last 55027 hours.  No results for input(s): \"BNP\", \"NTBNPI\", \"NTBNP\" in the last 97920 hours.  Recent Labs   Lab Test 03/08/23  0830   TSH 1.13     Recent Labs   Lab Test 09/28/23  1244 03/07/23  1629   INR 1.04 1.15       Stephany Menjivar DO  Hospitalist [facility]  Text page via [facility] Paging/Directory      "

## 2023-10-27 NOTE — ANESTHESIA CARE TRANSFER NOTE
Patient: Kortney Germain    Procedure: Procedure(s):  LAPAROSCPIC ASSISTED INSERTION, PEG TUBE,       Diagnosis: Status epilepticus (H) [G40.901]  Weakness [R53.1]  Diagnosis Additional Information: No value filed.    Anesthesia Type:   General     Note:    Oropharynx: oropharynx clear of all foreign objects and spontaneously breathing  Level of consciousness: seems to have same loc as prior to surgery.    Level of Supplemental Oxygen (L/min / FiO2): 3  Independent Airway: airway patency satisfactory and stable  Dentition: dentition unchanged  Vital Signs Stable: post-procedure vital signs reviewed and stable  Report to RN Given: handoff report given  Patient transferred to: PACU    Handoff Report: Identifed the Patient, Identified the Reponsible Provider, Reviewed the pertinent medical history, Discussed the surgical course, Reviewed Intra-OP anesthesia mangement and issues during anesthesia, Set expectations for post-procedure period and Allowed opportunity for questions and acknowledgement of understanding      Vitals:  Vitals Value Taken Time   BP 99/56 10/27/23 1421   Temp     Pulse 67 10/27/23 1425   Resp 14 10/27/23 1425   SpO2 98 % 10/27/23 1425   Vitals shown include unfiled device data.    Electronically Signed By: TAMI Gaitan CRNA  October 27, 2023  2:27 PM

## 2023-10-27 NOTE — PROGRESS NOTES
Pt here with seizures, UTI. A&O difficult to assess due to being unresponsive and nonverbal. Neuros: not following commands, does not withdraw to pain. VSS. Trach in place, oxygen saturation stable, required suction frequently maintained 94-97 %. Tele NSR. NPO diet. Takes pills crushed in tube. NG tube pulled and Peg tube placed down in OR this afternoon, patient currently in recovery. Up with Ax2 lift. No nonverbal signs of pain. Pressure injury to sacrum, dressing in place. Turn and reposition Q2 hours. Purewick in place. Pt scoring green on the Aggression Stop Light Tool. Discharge plan pending guardianship.

## 2023-10-27 NOTE — PROGRESS NOTES
[facility]  Neurology Daily Note      Admission Date:9/28/2023   Date of service: 10/27/2023   Hospital Day: 30                                                   Assessment and Plan:   #.  Prolonged encephalopathy following presentation of new onset refractory status epilepticus.  Associated with continued EEG abnormalities including frequent generalized periodic epileptic discharges.  Several convulsive seizures involving jaw and eye movement have been observed over the last 24 hours by nursing staff, correlated with increased epileptiform activity of the EEG background.  These have responded to the dose of lorazepam last night with improvement of the background.  Management plan discussed with hospitalist service, Dr. Menjivar   high-dose steroids are recommended as a treatment option for refractory status epilepticus ordered methylprednisolone 1 g daily for 5 days.--  Continue anticonvulsants:   Lacosamide 300 mg twice daily (most recent level 10/9/2023 = 4.1)  Clobazam 20 mg twice daily  Depakote 250 mg 3 times daily (received through feeding tube (most recent levels 10/21/2023: Total 20, free 10  Continue video EEG for now-  Use lorazepam for any breakthrough motor seizure activity lasting more than 2 minutes.  Recheck lacosamide and valproate levels    Continue supportive cares  Note ethics involvement with decision making.       #. DVT Prophylaxis  --Per hospitalist service  #. PT/OT/Speech  --Bedside PT/OT  #. Nutrition / GI Prophylaxis  --Feeding tube-nasogastric         Interval History:   Several events noted by nursing staff over the last 24 hours involving eye movement deviation and involuntary jaw movement.  These events do correlate with increased sharp waves/polyspike wave activity of the background.  Event improved after administration of lorazepam in the background overall improved for several hours and overnight with less appearance of the gped activity.  However, background appears the  same today    Notes of ethics team reviewed         Medications:   Scheduled Meds:   clobazam  20 mg Oral or Feeding Tube BID    furosemide  40 mg Oral BID    heparin ANTICOAGULANT  5,000 Units Subcutaneous Q8H    lacosamide  300 mg Oral or Feeding Tube BID    levofloxacin  500 mg Oral or Feeding Tube Daily    methylPREDNISolone  1,000 mg Intravenous Q24H    multivitamins w/minerals  15 mL Oral or Feeding Tube Daily    polyethylene glycol  17 g Oral or Feeding Tube BID    sodium chloride (PF)  10-40 mL Intracatheter Q7 Days    sodium chloride (PF)  3 mL Intracatheter Q8H    valproic acid  250 mg Oral Q8H    wound support modular  60 mL Oral Daily at 4 pm     PRN Meds: acetaminophen **OR** acetaminophen, albuterol, dextrose, glucose **OR** dextrose **OR** glucagon, hydrALAZINE, HYDROmorphone, lidocaine 4%, lidocaine (buffered or not buffered), LORazepam, melatonin, naloxone **OR** naloxone **OR** naloxone **OR** naloxone, nitroGLYcerin, sodium chloride (PF), sodium chloride (PF), sodium chloride (PF)        Physical Exam:   Vitals: Temp: 98  F (36.7  C) Temp src: Axillary BP: 122/74 Pulse: 77   Resp: 23 SpO2: 95 % O2 Device: Trach dome Oxygen Delivery: 30 LPM  Vital Signs with Ranges: Temp:  [97.7  F (36.5  C)-99  F (37.2  C)] 98  F (36.7  C)  Pulse:  [73-82] 77  Resp:  [12-24] 23  BP: (122-145)/(69-77) 122/74  FiO2 (%):  [28 %-30 %] 30 %  SpO2:  [94 %-97 %] 95 %    General Appearance: Lying in bed, eyes open not responding to voice or verbal commands.  Tracheostomy     Neuro Exam:  Mental Status Exam: Eyes open   but does not make eye contact.  Does blink to threat.  cranial Nerves: Pupils are equal and reactive to light.  Mild facial grimace motor: Flaccid tone-no withdrawal of the upper extremity to deep pain, minimal withdrawal in the lower extremities.  Reflexes: Areflexic plantar signs neutral.  Sensory: Unable to assess  Coordination: Unable  Gait: Unable  Neck: No nuchal rigidity  Extremities: No clubbing, no  cyanosis, no edema        Data:   ROUTINE IP LABS (Last 3results)  CBC RESULTS:     Recent Labs   Lab Test 10/27/23  0606 10/26/23  0632 10/25/23  0422   WBC 9.6 12.0* 11.0   RBC 3.73* 3.76* 3.54*   HGB 9.0* 9.2* 8.7*   HCT 31.0* 31.2* 29.2*   * 479* 497*     Basic Metabolic Panel:  Recent Labs   Lab Test 10/27/23  0406 10/26/23  2216 10/26/23  0632 10/26/23  0517 10/25/23  0814 10/25/23  0422 10/24/23  0412 10/24/23  0411 10/23/23  1950 10/23/23  1906 10/23/23  0824 10/23/23  0440   NA  --   --   --   --   --   --   --  142  --  142  --  141   POTASSIUM  --   --  3.6  --   --  4.1  --  3.7  --  4.1  --  3.6   CHLORIDE  --   --   --   --   --   --   --  100  --  102  --  103   CO2  --   --   --   --   --   --   --  30*  --  30*  --  27   BUN  --   --   --   --   --   --   --  22.6  --  23.6*  --  17.9   CR  --   --   --   --   --   --   --  0.39*  --  0.38*  --  0.36*   * 164*  --  128*   < >  --    < > 139*   < > 123*   < > 120*   LULU  --   --   --   --   --   --   --  8.8  --  8.6*  --  7.8*    < > = values in this interval not displayed.     Liver panel:  Recent Labs   Lab Test 09/30/23  0515 09/29/23  0518 09/28/23  1244 03/06/23  1413 03/05/23  1323   PROTTOTAL 5.6* 6.7 6.9 6.9 6.5   ALBUMIN 3.4* 4.2 4.2 4.1 3.9   BILITOTAL 0.2 0.2 <0.2 <0.2 <0.2   ALKPHOS 66 77 63 83 80   AST 27 22 14 39* 19   ALT 20 16 15 21 15     Thyroid Panel:  Recent Labs   Lab Test 03/08/23  0830   TSH 1.13      Vitamin B12:   Recent Labs   Lab Test 03/09/23  1524   B12 1,325*      Ammonia:   Recent Labs   Lab Test 10/09/23  0959   DAPHNE 14        IMAGING:     Last MRI brain 10/23 no acute abnormality        Joana Strickland M.D.  Neurologist  [facility]  Office [phone]

## 2023-10-27 NOTE — PLAN OF CARE
Goal Outcome Evaluation:     Patient here with seizures, UTI. VSS Unresponsive, nonverbal. Neuros does not follow any commands, does not withdraw to pain on any extremities, pupils 2 mm and brisk bilaterally, patient open eyes spontaneously but not to command. VSS. Trach in place, oxygen saturation stable, required suction frequently maintained 94-97 % overnight . Tele NSR . NPO, NG TF running at goal, Q4 water flushes on hold from mid night plan for G tube placement today around noon. Up with lift. Non nonverbal signs of pain. Pressure injury to sacrum, dressing in place. Turn and reposition Q2 hours. Purewick in place. Loose stools x3. Pt scoring green on the Aggression Stop Light Tool. Plan continue EEG. Discharge pending, needs guardianship.

## 2023-10-27 NOTE — PROGRESS NOTES
Spoke to supervisor at patients group home named Emely.  Per Emely patient had no next of kin. Patient is her own decision maker.  Emely is not an authorized signer/decision maker.  Pt unable to provide consent d/t LOC.  Will notify surgeon, ethics committee has weighed in on feeding tube  placement as the plan for next step of care.

## 2023-10-28 NOTE — PROGRESS NOTES
[facility]    PROGRESS NOTE - Hospitalist Service    ASSESSMENT AND PLAN     Principal Problem:    Status epilepticus (H)  Active Problems:    Urinary retention    Acute cystitis without hematuria    Constipation, unspecified constipation type    Anemia, unspecified type    HTN (hypertension)    Kortney Germain is a 68 year old female with a past medical history significant for schizoaffective disorder, tardive dyskinesia, hypertension, anxiety, depression, odynophagia who was admitted from her group home (no next of kin) on 9/28/2023 with abdominal pain and back pain.  She was diagnosed with cystitis and stool impaction.  Her course was complicated by acute encephalopathy with loss of CNS failure/loss of protective airway reflexes requiring intubation on 9/29/2023.  She has been in ICU since that time, hospitalist service was asked to transfer patient out of ICU on 10/24/2023       Prolonged encephalopathy following presentation of new onset refractory status epilepticus.  EEG abnormalities included frequent generalized periodic epileptic discharges.  Neurology is consulted and following.  Note reviewed and recommendations to continue lorazepam 1 to 2 mg IV up to twice daily as needed for repetitive chin or extremity movement concerning for breakthrough seizure activity lasting more than 2 minutes continuous.  Recommendation to recheck lacosamide and valproate levels noted.  Patient did not improve to high-dose steroids as treatment option for refractory status epilepticus with methylprednisone 1 g daily for 5 days.  Patient currently on lacosamide 200 mg p.o. daily, clobazam 20 mg twice daily and Depakote 250 mg p.o. 3 times daily.  New onset refractory status epilepticus requiring initiation of 3 antiepileptics with improvement in her EEG, unclear etiology for the seizures  Comatose neuro exam  CT Head 9/29: no acute intracranial pathology, brain atrophy, CSVID  MRI 9/30/23: no acute  intracranial process, generalized atrophy, CSVID  MRI 10/11/23: no acute intracranial process, no hemorrhage, moderate-advanced brain atrophy and leukoaraiosis, CSVID   MRI 10/20/23: no interval change   BC: NG  CSF analysis :glucose 98 protein 22.8, 2 nucleated cells, RBC 0, aerobic culture GPC in broth only anaerobic culture NGTD, HSV negative  *[place] encephalopathy autoimmune panel negative  -Continue EEG monitoring on transfer out of ICU per my discussion with the neuro critical care team.  General neurology team will take over after the transfer.  - Neurochecks changed to every 4 status.  Can transfer under acute care.  No need for IMC.  -continue valproic acid solution 250mg TID (sprinkles not NG compatible)-level checked on 10/21/2023 that showed total - 20 with 3-10  - increase lacosamide to 300 mg BID 10/24/23 -level 10/9/2023-4.1  - continue clobazam 20 mg BID  - limit dopaminergic/serotonergic agents   -Had an episode of right upper extremity shakes with rhythmic jaw movement on 10/26/2023 for which she received as needed Ativan after which symptoms abated.  Discussed case with Neurology- plan for high dose steroids.      Acute hypoxic respiratory failure secondary to CNS failure/loss of protective airway reflexes on prolonged mechanical ventilatory support status post trach tube placement on 10/20/2023 by Dr. Benites  -Keep trach in place while comatose.  Currently on 30 L 30% oxygen.     Possible hospital-acquired pneumonia   Urinary tract infection   -, low-grade temp of 99 on 10/23/2023 with low-grade leukocytosis  - Obtain UA--> positive with greater than than 182 WBCs, large leuk esterase  -Await urine culture  -Was started on empiric cefepime on 10/24/2023.  However this lowers seizure threshold.  Discontinued and transition to IV ceftriaxone.  Patient is allergic to penicillin so cannot use Zosyn.  Meropenem also reduce the seizure threshold.  - MRSA swab negative.  -Sputum culture shows mixed  christina  - Chest x-ray--> possible retrocardiac infiltrate  - Ceftriaxone stopped per ID. Levaquin initiated for total of 7 days per ID.   - Procalcitonin 0.06     Moderate malnutrition, status post PEG tube placement 10/27 by general surgery.  Patient started on tube feeding at 20 cc/h and tolerated and tube feeding to be increased to the goal of 40 cc/h.  Surgery signed off    Artificial nutrition and hydration via smallbore feeding tube, suspect this has been in place since she was initially intubated.  *General surgery saw the patient on 10/13/2023 there were plans for surgical PEG tube the following Monday.  This was later canceled on 10/16/2023 by a different surgeon over concerns about consent.  *Tentative plan was long-term smallbore feeding tube and lieu of PEG although this certainly poses risk of tissue damage to the nasal cavity and surrounding tissues  - Reactivate ethics consult on 10/25/2023 to assist with decision-making regarding PEG tube placement, differing opinions/willingness to proceed between surgeons.  Ethics in agreement with PEG tube placement. Please see noted from 10/26/23. General surgery to place PEG 10/27.   Guardianship pending. Social work following      Hypertension well-controlled on 40 mg IV Lasix twice daily  *Was not on any PTA antihypertensives  - Changed to oral Lasix 40 twice daily on 10/26/2023.  Patient was having some contraction alkalosis and appears to be on the drier side.     Euglycemia without requiring insulin for at least the last 10 days, consistently less than 180 mg/dL  -Twice daily glucose checks while on tube feeds  - Continue hypoglycemia protocol  -discontinue insulin     Anemia normocytic likely secondary to prolonged critical illness and frequent phlebotomy  -Hemoglobin stable at 9.2.     Thrombocytosis  - Stop scheduled draws  - Recheck CBC in the a.m.     schizoaffective disorder   tardive dyskinesia  anxiety, depression,  -Not currently on medications for  these     Barriers to discharge: Guardianship, ethics consult obtained and patient evaluated, interdisciplinary team meeting advised  Anticipated length of stay: several days       Present on Admission:   Urinary retention   Acute cystitis without hematuria   Constipation, unspecified constipation type   Anemia, unspecified type      Subjective:  Patient resting in bed. Not responding to verbal or physical stimuli. Lips twitching.   Status post PEG tube placement by general surgery on 10/27.  On tube feeding at 20 cc/h which is to be increased to the goal of 40 cc/h later today noted.  Ethics consult obtained, note  reviewed  Patient is full code.  Ethics consult recommends interdisciplinary meeting involving ethics and risk  PHYSICAL EXAM  Temp:  [97  F (36.1  C)-99  F (37.2  C)] 98  F (36.7  C)  Pulse:  [66-82] 78  Resp:  [12-16] 16  BP: ()/(56-99) 151/86  FiO2 (%):  [30 %-31 %] 31 %  SpO2:  [94 %-99 %] 97 %  Wt Readings from Last 1 Encounters:   10/25/23 44.6 kg (98 lb 5.2 oz)       Intake/Output Summary (Last 24 hours) at 10/27/2023 1336  Last data filed at 10/27/2023 0530  Gross per 24 hour   Intake 520 ml   Output 800 ml   Net -280 ml      Body mass index is 19.2 kg/m .    GENRL: Lying in bed.  Nonverbal, in no apparent distress.  CHEST: Clear to auscultation bilaterally. Trach noted   HEART: Regular rate   ABDMN: Soft.  PEG tube in place  EXTRM: Boots in place.   NEURO: Nonverbal, no twitching noted during exam   PSYCH: Unable to assess   INTGM: No skin rash    Medical Decision Making       50 MINUTES SPENT BY ME on the date of service doing chart review, history, exam, documentation & further activities per the note.      PERTINENT LABS/IMAGING:  Results for orders placed or performed during the hospital encounter of 09/28/23   Lumbar spine CT w/o contrast    Impression    IMPRESSION:      No acute fracture in the lumbar spine.     OLIVA GOLDSMITH MD         SYSTEM ID:  [id number]   CT Abdomen Pelvis w  Contrast    Impression    IMPRESSION:   1.  Enlarged uterus with multiple fibroids.    2.  Rectum is distended with stool.    3.  Urinary bladder is distended.   CT Head w/o Contrast    Impression    IMPRESSION:  1. No CT findings of acute intracranial process.  2. Brain atrophy and presumed chronic small vessel ischemic changes,  as described.    KVNG DICK MD         SYSTEM ID:  [id number]   XR Lumbar Puncture Spinal Tap Diag    Impression    IMPRESSION: Uncomplicated fluoroscopic-guided lumbar puncture.    KVNG DICK MD         SYSTEM ID:  [id number]   XR Abdomen Port 1 View    Impression    IMPRESSION: Enteric tube tip in the left upper quadrant in the  expected location of the stomach. Minimal amount of stool.   Nonobstructed bowel gas pattern.    KVNG MARC MD         SYSTEM ID:  [id number]   MR Brain w/o & w Contrast    Impression    IMPRESSION:  1.  No acute intracranial process.  2.  Generalized brain atrophy and presumed microvascular ischemic changes as detailed above.   XR Abdomen Port 1 View    Impression    IMPRESSION: No metallic implanted device in the abdomen. NG tube tip and sidehole in the stomach. Endotracheal tube with tip approximately 4 cm above the tin. Pelvic calcifications.   XR Chest Port 1 View    Impression    IMPRESSION: ET tube is 2.8 cm proximal to the tin. Nasogastric tube identified. No acute airspace disease. Normal cardiac silhouette. The patient is rotated. No abnormal metallic foreign body identified.   XR Skull Port 1/3 Views    Impression    IMPRESSION: No metallic foreign bodies identified in the face.   XR Chest Port 1 View    Impression    IMPRESSION: Endotracheal tube in good position above the tin. NG  tube in the stomach. Left PICC line tip in the low SVC. There is new  infiltrate or atelectasis in the left lower lobe. The right lung  remains clear.    KALE SEGOVIA MD         SYSTEM ID:  [id number]   XR Chest Port 1 View    Impression    IMPRESSION:  Endotracheal tube tip 3 cm from the tin. Similar to  perhaps slightly worse retrocardiac atelectasis and/or infiltrate,  possibly with some pleural fluid. Right lung clear.    KVNG MARC MD         SYSTEM ID:  [id number]   MR Brain w/o & w Contrast    Impression    IMPRESSION:  1. No acute intracranial infarct or intracranial hemorrhage.  2. Stable moderate to advanced brain atrophy and leukoaraiosis,  presumed sequela of chronic microvascular ischemic disease, as  detailed.    BANG KWON DO         SYSTEM ID:  [id number]   XR Abdomen Port 1 View    Impression    IMPRESSION: Limited AP view of the abdomen demonstrates a feeding tube  curled (with kink) within the stomach with the tip pointing towards  the gastroesophageal junction.     Partially visualized right central venous catheter. Aortic  calcification.     No dilated loops of bowel within the visualized abdomen. Calcified  fibroid uterus.    KARTHIKEYAN TRUJILLO MD         SYSTEM ID:  [id number]   XR Abdomen Port 1 View    Impression    IMPRESSION: Interval advancement of the feeding tube, with the tip now  projecting over the mid stomach with kink at the distal portion of the  internal stylette.     Otherwise, no significant interval change.    KARTHIKEYAN TRUJILLO MD         SYSTEM ID:  [id number]   XR Abdomen Port 1 View    Impression    IMPRESSION: The NG to remains kinked approximately 6 cm from the distal and. Location NG tube is in good position within the mid to distal stomach. Normal bowel gas pattern. Scattered uterine fibroids.   XR Abdomen Port 1 View    Impression    IMPRESSION: Feeding tube tip is in the distal stomach.    NAVEED WALDRON MD         SYSTEM ID:  [id number]   MR Brain w/o & w Contrast    Impression    IMPRESSION:  1. No discrete mass lesion, hemorrhage or focal area suggestive of acute infarct.  2. Stable diffuse age related changes.   XR Chest Port 1 View    Impression    IMPRESSION: Possible retrocardiac infiltrate, consider  confirmation  with lateral view. Question groundglass infiltrates at the right base.    KVNG MARC MD         SYSTEM ID:  [id number]     Most Recent 3 CBC's:  Recent Labs   Lab Test 10/28/23  0544 10/27/23  0606 10/26/23  0632   WBC 11.1* 9.6 12.0*   HGB 9.6* 9.0* 9.2*   MCV 83 83 83    464* 479*     Most Recent 3 BMP's:  Recent Labs   Lab Test 10/27/23  1604 10/27/23  0406 10/26/23  2216 10/26/23  0632 10/25/23  0814 10/25/23  0422 10/24/23  0412 10/24/23  0411 10/23/23  1950 10/23/23  1906 10/23/23  0824 10/23/23  0440   NA  --   --   --   --   --   --   --  142  --  142  --  141   POTASSIUM  --   --   --  3.6  --  4.1  --  3.7  --  4.1  --  3.6   CHLORIDE  --   --   --   --   --   --   --  100  --  102  --  103   CO2  --   --   --   --   --   --   --  30*  --  30*  --  27   BUN  --   --   --   --   --   --   --  22.6  --  23.6*  --  17.9   CR  --   --   --   --   --   --   --  0.39*  --  0.38*  --  0.36*   ANIONGAP  --   --   --   --   --   --   --  12  --  10  --  11   LULU  --   --   --   --   --   --   --  8.8  --  8.6*  --  7.8*   * 105* 164*  --    < >  --    < > 139*   < > 123*   < > 120*    < > = values in this interval not displayed.     Most Recent 2 LFT's:  Recent Labs   Lab Test 09/30/23  0515 09/29/23  0518   AST 27 22   ALT 20 16   ALKPHOS 66 77   BILITOTAL 0.2 0.2       Recent Labs   Lab Test 10/15/23  0650 10/03/23  0956 03/08/23  0830   CHOL  --   --  143   HDL  --   --  50   LDL  --   --  75   TRIG 136   < > 88    < > = values in this interval not displayed.     Recent Labs   Lab Test 03/08/23 0830   LDL 75     Recent Labs   Lab Test 10/27/23  0406 10/26/23  2216 10/26/23  0632 10/24/23  0412 10/24/23  0411   NA  --   --   --   --  142   POTASSIUM  --   --  3.6   < > 3.7   CHLORIDE  --   --   --   --  100   CO2  --   --   --   --  30*   *   < >  --    < > 139*   BUN  --   --   --   --  22.6   CR  --   --   --   --  0.39*   GFRESTIMATED  --   --   --   --  >90   LULU  --   --   " --   --  8.8    < > = values in this interval not displayed.     No results for input(s): \"A1C\" in the last 02207 hours.  Recent Labs   Lab Test 10/27/23  0606 10/26/23  0632 10/25/23  0422   HGB 9.0* 9.2* 8.7*     No results for input(s): \"TROPONINI\" in the last 42449 hours.  No results for input(s): \"BNP\", \"NTBNPI\", \"NTBNP\" in the last 58185 hours.  Recent Labs   Lab Test 03/08/23  0830   TSH 1.13     Recent Labs   Lab Test 09/28/23  1244 03/07/23  1629   INR 1.04 1.15       Taylor Linn MD  Hospitalist Service  Text page via [facility] Paging/Directory      "

## 2023-10-28 NOTE — PROGRESS NOTES
Surgery  Patient seen and examined.  Nonverbal, trach in place.  Abdomen was examined and is soft nondistended and nontender.  Incisions look fine and patient is tolerating advancement of tube feeds.  Okay to advance to goal, no further recommendations.  General surgery will sign off, please call with questions or concerns.  Khurram Cortez MD  General Surgery, Office [phone]

## 2023-10-28 NOTE — PLAN OF CARE
Pt is here with seizures. Neuros unchanged. Pt does not respond or follow any commands. Nonverbal. Open eyes spontaneously. Does not withdraw to pain in all extremities. VSS, trach dome in place. NPO. TF started at 0300 @ 20ml/hr via peg tube. Up with Lift. Repo q2h. Incontinent of bowel and bladder. Small BM x2. Purewick in place. No signs or symptoms of pain. Sacrum meplix in place for treatment. No seizures witness. Seizure precaution maintained. Pt scoring green on the aggression stoplight tool. Discharge pending.

## 2023-10-28 NOTE — PLAN OF CARE
Goal Outcome Evaluation:    Patient remains obtuned, no response to stimuli, spotaneously opening eyes. Patient had seizure activity x1 this shift, stopped with prn ativian x1. Continues on EEG. No additional episodes. Voiding adequately, tolerating turn and repositioned. PEG site CDI, tolerating flushes/meds, plans to resume TF at 3am. Trach continues to have intermittent needs for suctioning, thick yellow secretions. Tolerating oral cares.

## 2023-10-28 NOTE — PROGRESS NOTES
Pt here with seizures, UTI. A&O difficult to assess due to being unresponsive and nonverbal. Neuros: not following commands, does not withdraw to pain. VSS. Trach in place, oxygen saturation stable, required suction frequently maintained 98%. NPO diet. Takes pills crushed in tube. Peg tube placed and TF now running at goal 40ml with 100 flush Q4 programed into pump. Up with Ax2 lift. No nonverbal signs of pain. Pressure injury to sacrum, dressing in place. Turn and reposition Q2 hours. Purewick in place. EEG was removed this evening. Pt scoring green on the Aggression Stop Light Tool. Discharge plan pending guardianship.

## 2023-10-28 NOTE — PROGRESS NOTES
Patient having suspected seizure like activity. Patient having bilateral movement in legs as well as increased movement in jaw. Upon assessment, patient having rolling eye movement. Activity continued for over 2 minutes, prn ativian given and activity stopped. Paged Dr. Strickland for follow up after episode, no additional orders at this time, continue to monitor for any additional activity.

## 2023-10-28 NOTE — PROGRESS NOTES
"Ethics Progress Note     Note: The purpose of this note, including any accompanying recommendation, is advisory only concerning ethical principles and considerations related to this case. Determination of appropriate patient care decisions is the responsibility of the clinical team in consultation with patients and their decision makers and relevant institutional policy. Legal and policy questions should be addressed with Risk Management.    Date: 10/28/2023  Time:  1130       This note is a follow-up to the prior ethics consult completed by Barber Villalpando dated 10/10/23 and Regan Delgado dated 10/26/23.     History/Background    In discussion with the primary Neurologist, Joana Strickland M.D., the ethics question focuses on continuing interventions despite decline in status over the last 48h. The clinical team is now finding the patient in a persistent state of epileptic activity as sedation has weaned. The patient's clinical status is deteriorating and the team is now beginning to consider if the most ethical pathway is non escalation/non intervention.     Importantly, this is all in the context of a patient that continues to lack decision making capacity and does not have an effective surrogate decision maker or guardian.     Recently, the patient underwent procedural interventions following a standard of care treatment focused pathway including tracheostomy and PEG tube. This was done to achieve the greatest amount of benefit, and when the risks of non intervention were deemed high, earlier in the recovery period.     Now however, the clinical team has determined the patient to have new inset refractory status epilepticus and notes: \"Given the duration of the patient's encephalopathy without evidence of clinical improvement offers poor prognosis for meaningful recovery.\"    Analysis    Interventions should generally be considered inappropriate when there is no reasonable expectation that the patient will " improve sufficiently to survive outside the acute care setting, or when there is no reasonable expectation that the patient s neurologic function will improve sufficiently to allow the patient to perceive the benefits of treatment.    There are two key questions that require consideration: First, is the continued use of aggressive treatments in the patients best interest or is the medical team causing suffering without the potential for meaningful benefit and 2) If the team decides that the treatment is non-beneficial, what is the obligation to the patient and her surrogate decision-maker with regard to their continued request for treatment?     Before we can answer question one, it is critical to differentiate between treatments that are considered  futile  and treatments that are considered  potentially inappropriate.   In this case, the provision of continued IV antibiotics and aggressive life-sustaining treatment does not represent a  futile  intervention since by using them, the patient  achieves the intended physiologic goal  which is survival toward her stated goal of recovery.   However, in this case, the use of continued aggressive treatments (including but not limited to IV antibiotics, mechanical ventilation) could be considered potentially inappropriate.     This question is addressed by the  Official ATS/AACN/ACCP/ESICM/SCCM Policy Statement: Responding to Requests for Potentially Inappropriate Treatments in Intensive Care Units       https://www.thoracic.org/statements/resources/cc/rwuntchiv-luta-lf.pdf    A treatment is considered potentially inappropriate if it meets either of the following two criteria:  1)        NO reasonable expectation that  the  patient  will  improve sufficiently to survive outside the acute care setting OR  2)        NO reasonable expectation that the patient s neurologic function will improve sufficiently to allow the patient to perceive the benefits of  "treatment.    Recommendations    In this case, with the recent decline in neuro status, both above criteria are likely applicable. Thus, if the medical team believes that there is no expectation that they will survive outside of the acute care setting, then there is ethical justification for the pursuit of discontinuing therapies that are inappropriate.    - Continue excellent documentation of medical decision making and prognosis  - Given the poor prognosis of this unrepresented patient, please have all teams involved in her care document their own evaluations of risks and benefits of continuing life sustaining treatment.   - Please ensure risk management is aware of this case   - An interdisciplinary team meeting involving ethics and risk may need to be convened to discuss the risks and benefits of treatment in the coming days, due to the patient's unrepresented status.      Please contact the service if we can be of any further assistance: [place]: \"Clinical Ethics Consultation Service\" or [facility] \"Ethics\" consult pager [phone].    Jeet Romero DO, MA, HEMA-C  Pronouns: he/him/his  Clinical Ethics  - [facility]     -  Department of Family Medicine and Community Health     "

## 2023-10-28 NOTE — PROGRESS NOTES
[facility]  Neurology Daily Note      Admission Date:9/28/2023   Date of service: 10/28/2023   Hospital Day: 31                                                   Assessment and Plan:   #.  Prolonged encephalopathy following presentation of new onset refractory status epilepticus.  Associated with continued EEG abnormalities including frequent generalized periodic epileptic discharges.  Several episodes involving jaw and eye movement have been observed over the last 48 hours by nursing staff, correlated with increased e activity on the EEG-activity is difficult to distinguish from movement artifact as the patient does have a known history of dyskinesias.  However on prior occasions when the patient was in ICU, when patient received a paralytic agent, electrocerebral epileptiform activity was observed.  It is difficult to distinguish even with the EEG and clinically whether all of these movements are epileptic..  These have responded to the dose of lorazepam although this carries some risk with the amount of sedating medication she receives to manage her epileptic condition.  Given the duration of the patient's encephalopathy without evidence of clinical improvement offers poor prognosis for meaningful recovery.    high-dose steroids have been started as a treatment option for refractory status epilepticus ordered methylprednisolone 1 g daily for 5 days.-No significant improvement thus far--  Continue anticonvulsants:   Lacosamide 300 mg twice daily (most recent level 10/9/2023 = 4.1)  Clobazam 20 mg twice daily  Depakote 250 mg 3 times daily (received through feeding tube (most recent levels 10/21/2023: Total 20, free 10  Plan discussed with ethics team regarding decision making regarding level of monitoring/overall treatment plan.  Discontinue video EEG-at this point I do not think that monitoring will change the overall plan for medication management.  We will continue the same lorazepam 1-2 mg IV up to  twice daily as needed for repetitive chin or extremity movement concerning for breakthrough seizure activity lasting more than 2 minutes continuous.  Recheck lacosamide and valproate levels    Continue supportive cares        #. DVT Prophylaxis  --Per hospitalist service  #. PT/OT/Speech  --Bedside PT/OT  #. Nutrition / GI Prophylaxis  --Feeding tube-nasogastric         Interval History:   Several events noted by nursing staff over last evening  involving eye movement deviation and involuntary jaw movement.  Subsided with lorazepam  These events do correlate with increased sharp waves/polyspike wave activity of the background, however this is very difficult to distinguish from patient's known history of movement disorder/tardive dyskinesia..  Event improved after administration of lorazepam in the background overall improved for several hours and overnight with less appearance of the gped activity.      Case discussed with ethics team    Reviewed hospital course/notes pertaining to neurologic/epileptic condition.  Admitted 9/28/2023 developed shaking movements.  Video EEG at that time confirmed convulsive status epilepticus.  Lacosamide started 9/30 and continues to date  Midazolam infusion was started 9/29 and continued to 10/6 and resumed again 10/14-17  Clobazam started 10/4 and gradually increased and continued presently.  Valproate was initially started discontinued and then resumed on 10/20  Phenytoin was initiated 10/1 and discontinued on 10/22 when valproate was started.  Early on in her evaluation when movement artifact was interfering with interpretation of EEG, paralytic agent was attemptedadministered a paralytic agent to eliminate the muscle artifact from the EEG. Per Dr Gusman, EEG reader for this week, the underlying cortical activity was consistent with ongoing seizures).  Interpretation of the EEG remain challenging because of the artifact.    Allowing for this, interpretation of the EEG remains  challenging.  The patient does appear to have breakthrough clinical seizure activity jaw/facial repetitive movement and right leg movement.  When this activity last prolonged period of time it typically responds to low-dose lorazepam.         Medications:   Scheduled Meds:   clobazam  20 mg Oral or Feeding Tube BID    furosemide  40 mg Oral BID    heparin ANTICOAGULANT  5,000 Units Subcutaneous Q8H    lacosamide  300 mg Oral or Feeding Tube BID    levofloxacin  500 mg Oral or Feeding Tube Daily    methylPREDNISolone  1,000 mg Intravenous Q24H    multivitamins w/minerals  15 mL Oral or Feeding Tube Daily    polyethylene glycol  17 g Oral or Feeding Tube BID    sodium chloride (PF)  10-40 mL Intracatheter Q7 Days    sodium chloride (PF)  3 mL Intracatheter Q8H    valproic acid  250 mg Oral Q8H    wound support modular  60 mL Oral Daily at 4 pm     PRN Meds: acetaminophen **OR** acetaminophen, albuterol, dextrose, glucose **OR** dextrose **OR** glucagon, hydrALAZINE, HYDROmorphone, lidocaine 4%, lidocaine (buffered or not buffered), LORazepam, melatonin, naloxone **OR** naloxone **OR** naloxone **OR** naloxone, nitroGLYcerin, sodium chloride (PF), sodium chloride (PF), sodium chloride (PF)        Physical Exam:   Vitals: Temp: 98.3  F (36.8  C) Temp src: Axillary BP: (!) 143/69 Pulse: 78   Resp: 16 SpO2: 94 % O2 Device: Trach dome Oxygen Delivery: 30 LPM  Vital Signs with Ranges: Temp:  [97  F (36.1  C)-99  F (37.2  C)] 98.3  F (36.8  C)  Pulse:  [66-82] 78  Resp:  [12-16] 16  BP: ()/(56-99) 143/69  FiO2 (%):  [30 %] 30 %  SpO2:  [94 %-99 %] 94 %    General Appearance: Lying in bed, eyes partially open at times.  Not responding to voice.  Mild grimace with noxious stimuli tracheostomy     Neuro Exam:  Mental Status Exam: Eyes open   but does not make eye contact.  Does blink to threat randomly.  cranial Nerves: Pupils are equal and reactive to light.  Mild facial grimace motor: Flaccid tone-no withdrawal of the  upper extremity to deep pain, minimal withdrawal in the lower extremities.  Reflexes: Areflexic plantar signs neutral.  Sensory: Unable to assess  Coordination: Unable  Gait: Unable  Neck: No nuchal rigidity  Extremities: No clubbing, no cyanosis, no edema        Data:   ROUTINE IP LABS (Last 3results)  CBC RESULTS:     Recent Labs   Lab Test 10/28/23  0544 10/27/23  0606 10/26/23  0632   WBC 11.1* 9.6 12.0*   RBC 3.92 3.73* 3.76*   HGB 9.6* 9.0* 9.2*   HCT 32.6* 31.0* 31.2*    464* 479*     Basic Metabolic Panel:  Recent Labs   Lab Test 10/27/23  1604 10/27/23  0406 10/26/23  2216 10/26/23  0632 10/25/23  0814 10/25/23  0422 10/24/23  0412 10/24/23  0411 10/23/23  1950 10/23/23  1906 10/23/23  0824 10/23/23  0440   NA  --   --   --   --   --   --   --  142  --  142  --  141   POTASSIUM  --   --   --  3.6  --  4.1  --  3.7  --  4.1  --  3.6   CHLORIDE  --   --   --   --   --   --   --  100  --  102  --  103   CO2  --   --   --   --   --   --   --  30*  --  30*  --  27   BUN  --   --   --   --   --   --   --  22.6  --  23.6*  --  17.9   CR  --   --   --   --   --   --   --  0.39*  --  0.38*  --  0.36*   * 105* 164*  --    < >  --    < > 139*   < > 123*   < > 120*   LULU  --   --   --   --   --   --   --  8.8  --  8.6*  --  7.8*    < > = values in this interval not displayed.     Liver panel:  Recent Labs   Lab Test 09/30/23  0515 09/29/23  0518 09/28/23  1244 03/06/23  1413 03/05/23  1323   PROTTOTAL 5.6* 6.7 6.9 6.9 6.5   ALBUMIN 3.4* 4.2 4.2 4.1 3.9   BILITOTAL 0.2 0.2 <0.2 <0.2 <0.2   ALKPHOS 66 77 63 83 80   AST 27 22 14 39* 19   ALT 20 16 15 21 15     Thyroid Panel:  Recent Labs   Lab Test 03/08/23  0830   TSH 1.13      Vitamin B12:   Recent Labs   Lab Test 03/09/23  1524   B12 1,325*      Ammonia:   Recent Labs   Lab Test 10/09/23  0959   DAPHNE 14        IMAGING:     Last MRI brain 10/23 no acute abnormality    125minutes prolonged visit with todays evaluation spent with review of record-extensive  review of ICU notes, prior EEG and interpretation and discussion with staff/ethics team.    Joana Strickland M.D.  Neurologist  [facility]  Office [phone]

## 2023-10-29 NOTE — PROGRESS NOTES
[facility]    PROGRESS NOTE - Hospitalist Service    ASSESSMENT AND PLAN     Principal Problem:    Status epilepticus (H)  Active Problems:    Urinary retention    Acute cystitis without hematuria    Constipation, unspecified constipation type    Anemia, unspecified type    HTN (hypertension)    Kortney Germain is a 68 year old female with a past medical history significant for schizoaffective disorder, tardive dyskinesia, hypertension, anxiety, depression, odynophagia who was admitted from her group home (no next of kin) on 9/28/2023 with abdominal pain and back pain.  She was diagnosed with cystitis and stool impaction.  Her course was complicated by acute encephalopathy with loss of CNS failure/loss of protective airway reflexes requiring intubation on 9/29/2023.  She has been in ICU since that time, hospitalist service was asked to transfer patient out of ICU on 10/24/2023       Prolonged encephalopathy following presentation of new onset refractory status epilepticus.  EEG abnormalities included frequent generalized periodic epileptic discharges.  Neurology is consulted and following.  Note reviewed and recommendations to continue lorazepam 1 to 2 mg IV up to twice daily as needed for repetitive chin or extremity movement concerning for breakthrough seizure activity lasting more than 2 minutes continuous.  Recommendation to recheck lacosamide and valproate levels noted.  Patient did not improve to high-dose steroids as treatment option for refractory status epilepticus with methylprednisone 1 g daily for 5 days.  Patient currently on lacosamide 200 mg p.o. daily, clobazam 20 mg twice daily and Depakote 250 mg p.o. 3 times daily.  New onset refractory status epilepticus requiring initiation of 3 antiepileptics with improvement in her EEG, unclear etiology for the seizures  Comatose neuro exam  CT Head 9/29: no acute intracranial pathology, brain atrophy, CSVID  MRI 9/30/23: no acute  intracranial process, generalized atrophy, CSVID  MRI 10/11/23: no acute intracranial process, no hemorrhage, moderate-advanced brain atrophy and leukoaraiosis, CSVID   MRI 10/20/23: no interval change   BC: NG  CSF analysis :glucose 98 protein 22.8, 2 nucleated cells, RBC 0, aerobic culture GPC in broth only anaerobic culture NGTD, HSV negative  *[place] encephalopathy autoimmune panel negative  -Continue EEG monitoring on transfer out of ICU per my discussion with the neuro critical care team.  General neurology team will take over after the transfer.  - Neurochecks changed to every 4 status.  Can transfer under acute care.  No need for IMC.  -continue valproic acid solution 250mg TID (sprinkles not NG compatible)-level checked on 10/21/2023 that showed total - 20 with 3-10  - increase lacosamide to 300 mg BID 10/24/23 -level 10/9/2023-4.1  - continue clobazam 20 mg BID  - limit dopaminergic/serotonergic agents   -Had an episode of right upper extremity shakes with rhythmic jaw movement on 10/26/2023 for which she received as needed Ativan after which symptoms abated.  Discussed case with Neurology- plan for high dose steroids.      Acute hypoxic respiratory failure secondary to CNS failure/loss of protective airway reflexes on prolonged mechanical ventilatory support status post trach tube placement on 10/20/2023 by Dr. Benites  -Keep trach in place while comatose.  Currently on 30 L 30% oxygen.     Possible hospital-acquired pneumonia   Urinary tract infection   -, low-grade temp of 99 on 10/23/2023 with low-grade leukocytosis  - Obtain UA--> positive with greater than than 182 WBCs, large leuk esterase  -Await urine culture  -Was started on empiric cefepime on 10/24/2023.  However this lowers seizure threshold.  Discontinued and transition to IV ceftriaxone.  Patient is allergic to penicillin so cannot use Zosyn.  Meropenem also reduce the seizure threshold.  - MRSA swab negative.  -Sputum culture shows mixed  christina  - Chest x-ray--> possible retrocardiac infiltrate  - Ceftriaxone stopped per ID. Levaquin initiated for total of 7 days per ID.   - Procalcitonin 0.06     Moderate malnutrition, status post PEG tube placement 10/27 by general surgery.  Patient started on tube feeding at 20 cc/h and tolerated and tube feeding to be increased to the goal of 40 cc/h.  Surgery signed off    Artificial nutrition and hydration via smallbore feeding tube, suspect this has been in place since she was initially intubated.  *General surgery saw the patient on 10/13/2023 there were plans for surgical PEG tube the following Monday.  This was later canceled on 10/16/2023 by a different surgeon over concerns about consent.  *Tentative plan was long-term smallbore feeding tube and lieu of PEG although this certainly poses risk of tissue damage to the nasal cavity and surrounding tissues  - Reactivate ethics consult on 10/25/2023 to assist with decision-making regarding PEG tube placement, differing opinions/willingness to proceed between surgeons.  Ethics in agreement with PEG tube placement. Please see noted from 10/26/23. General surgery to place PEG 10/27.   Guardianship pending. Social work following      Hypertension well-controlled on 40 mg IV Lasix twice daily  *Was not on any PTA antihypertensives  - Changed to oral Lasix 40 twice daily on 10/26/2023.  Patient was having some contraction alkalosis and appears to be on the drier side.     Euglycemia without requiring insulin for at least the last 10 days, consistently less than 180 mg/dL  -Twice daily glucose checks while on tube feeds  - Continue hypoglycemia protocol  -discontinue insulin     Anemia normocytic likely secondary to prolonged critical illness and frequent phlebotomy  -Hemoglobin stable at 9.2.     Thrombocytosis  - Stop scheduled draws  - Recheck CBC in the a.m.     schizoaffective disorder   tardive dyskinesia  anxiety, depression,  -Not currently on medications for  these  Hypokalemia, due to diuretic therapy. K dropped to 3.1. she is on lasix 40 mg po daily. K allergies reviewed, past GI upset to Kcl. Ordered kcl 30 meq to be given today and recheck bmp in am. Monitor for allergic reactions after IV kcl administration. D/w RN     Barriers to discharge: Guardianship, ethics consult obtained and patient evaluated, interdisciplinary team meeting advised  Anticipated length of stay: several days       Present on Admission:   Urinary retention   Acute cystitis without hematuria   Constipation, unspecified constipation type   Anemia, unspecified type      Subjective:  Patient resting in bed. Not responding to verbal or physical stimuli. Lips twitching.   Status post PEG tube placement by general surgery on 10/27.  Was On tube feeding at 20 cc/h which is increased to the goal of 40 cc/h yesterday.  Ethics consult obtained, note  reviewed  Patient is full code.  Ethics consult recommends interdisciplinary meeting involving ethics and risk  K to be replaced and monitored for allergic reactions.     PHYSICAL EXAM  Temp:  [97.3  F (36.3  C)-98.7  F (37.1  C)] 97.7  F (36.5  C)  Pulse:  [71-86] 71  Resp:  [14-16] 16  BP: (130-170)/(77-91) 170/91  FiO2 (%):  [30 %-31 %] 30 %  SpO2:  [94 %-99 %] 98 %  Wt Readings from Last 1 Encounters:   10/25/23 44.6 kg (98 lb 5.2 oz)       Intake/Output Summary (Last 24 hours) at 10/27/2023 1336  Last data filed at 10/27/2023 0530  Gross per 24 hour   Intake 520 ml   Output 800 ml   Net -280 ml      Body mass index is 19.2 kg/m .    GENRL: Lying in bed.  Nonverbal, in no apparent distress.  CHEST: Clear to auscultation bilaterally. Trach noted   HEART: Regular rate   ABDMN: Soft.  PEG tube in place  EXTRM: Boots in place.   NEURO: Nonverbal, no twitching noted during exam   PSYCH: Unable to assess   INTGM: No skin rash    Medical Decision Making       50 MINUTES SPENT BY ME on the date of service doing chart review, history, exam, documentation & further  activities per the note.      PERTINENT LABS/IMAGING:  Results for orders placed or performed during the hospital encounter of 09/28/23   Lumbar spine CT w/o contrast    Impression    IMPRESSION:      No acute fracture in the lumbar spine.     OLIVA GOLDSMITH MD         SYSTEM ID:  [id number]   CT Abdomen Pelvis w Contrast    Impression    IMPRESSION:   1.  Enlarged uterus with multiple fibroids.    2.  Rectum is distended with stool.    3.  Urinary bladder is distended.   CT Head w/o Contrast    Impression    IMPRESSION:  1. No CT findings of acute intracranial process.  2. Brain atrophy and presumed chronic small vessel ischemic changes,  as described.    KVNG DICK MD         SYSTEM ID:  [id number]   XR Lumbar Puncture Spinal Tap Diag    Impression    IMPRESSION: Uncomplicated fluoroscopic-guided lumbar puncture.    KVNG DICK MD         SYSTEM ID:  [id number]   XR Abdomen Port 1 View    Impression    IMPRESSION: Enteric tube tip in the left upper quadrant in the  expected location of the stomach. Minimal amount of stool.   Nonobstructed bowel gas pattern.    KVNG MARC MD         SYSTEM ID:  [id number]   MR Brain w/o & w Contrast    Impression    IMPRESSION:  1.  No acute intracranial process.  2.  Generalized brain atrophy and presumed microvascular ischemic changes as detailed above.   XR Abdomen Port 1 View    Impression    IMPRESSION: No metallic implanted device in the abdomen. NG tube tip and sidehole in the stomach. Endotracheal tube with tip approximately 4 cm above the tin. Pelvic calcifications.   XR Chest Port 1 View    Impression    IMPRESSION: ET tube is 2.8 cm proximal to the tin. Nasogastric tube identified. No acute airspace disease. Normal cardiac silhouette. The patient is rotated. No abnormal metallic foreign body identified.   XR Skull Port 1/3 Views    Impression    IMPRESSION: No metallic foreign bodies identified in the face.   XR Chest Port 1 View    Impression    IMPRESSION:  Endotracheal tube in good position above the tin. NG  tube in the stomach. Left PICC line tip in the low SVC. There is new  infiltrate or atelectasis in the left lower lobe. The right lung  remains clear.    KALE SEGOVIA MD         SYSTEM ID:  [id number]   XR Chest Port 1 View    Impression    IMPRESSION: Endotracheal tube tip 3 cm from the tin. Similar to  perhaps slightly worse retrocardiac atelectasis and/or infiltrate,  possibly with some pleural fluid. Right lung clear.    KVNG MARC MD         SYSTEM ID:  [id number]   MR Brain w/o & w Contrast    Impression    IMPRESSION:  1. No acute intracranial infarct or intracranial hemorrhage.  2. Stable moderate to advanced brain atrophy and leukoaraiosis,  presumed sequela of chronic microvascular ischemic disease, as  detailed.    BANG KWON DO         SYSTEM ID:  [id number]   XR Abdomen Port 1 View    Impression    IMPRESSION: Limited AP view of the abdomen demonstrates a feeding tube  curled (with kink) within the stomach with the tip pointing towards  the gastroesophageal junction.     Partially visualized right central venous catheter. Aortic  calcification.     No dilated loops of bowel within the visualized abdomen. Calcified  fibroid uterus.    KARTHIKEYAN TRUJILLO MD         SYSTEM ID:  [id number]   XR Abdomen Port 1 View    Impression    IMPRESSION: Interval advancement of the feeding tube, with the tip now  projecting over the mid stomach with kink at the distal portion of the  internal stylette.     Otherwise, no significant interval change.    KARTHIKEYAN TRUJILLO MD         SYSTEM ID:  [id number]   XR Abdomen Port 1 View    Impression    IMPRESSION: The NG to remains kinked approximately 6 cm from the distal and. Location NG tube is in good position within the mid to distal stomach. Normal bowel gas pattern. Scattered uterine fibroids.   XR Abdomen Port 1 View    Impression    IMPRESSION: Feeding tube tip is in the distal stomach.    NAVEED MERCER  MD CHIVO         SYSTEM ID:  [id number]   MR Brain w/o & w Contrast    Impression    IMPRESSION:  1. No discrete mass lesion, hemorrhage or focal area suggestive of acute infarct.  2. Stable diffuse age related changes.   XR Chest Port 1 View    Impression    IMPRESSION: Possible retrocardiac infiltrate, consider confirmation  with lateral view. Question groundglass infiltrates at the right base.    KVNG MARC MD         SYSTEM ID:  [id number]     Most Recent 3 CBC's:  Recent Labs   Lab Test 10/29/23  0446 10/28/23  0544 10/27/23  0606   WBC 7.6 11.1* 9.6   HGB 9.3* 9.6* 9.0*   MCV 84 83 83   * 428 464*     Most Recent 3 BMP's:  Recent Labs   Lab Test 10/29/23  0533 10/27/23  1604 10/27/23  0406 10/26/23  2216 10/26/23  0632 10/25/23  0814 10/25/23  0422 10/24/23  0412 10/24/23  0411 10/23/23  1950 10/23/23  1906 10/23/23  0824 10/23/23  0440   NA  --   --   --   --   --   --   --   --  142  --  142  --  141   POTASSIUM 3.1*  --   --   --  3.6  --  4.1  --  3.7  --  4.1  --  3.6   CHLORIDE  --   --   --   --   --   --   --   --  100  --  102  --  103   CO2  --   --   --   --   --   --   --   --  30*  --  30*  --  27   BUN  --   --   --   --   --   --   --   --  22.6  --  23.6*  --  17.9   CR 0.41*  --   --   --   --   --   --   --  0.39*  --  0.38*  --  0.36*   ANIONGAP  --   --   --   --   --   --   --   --  12  --  10  --  11   LULU  --   --   --   --   --   --   --   --  8.8  --  8.6*  --  7.8*   GLC  --  109* 105* 164*  --    < >  --    < > 139*   < > 123*   < > 120*    < > = values in this interval not displayed.     Most Recent 2 LFT's:  Recent Labs   Lab Test 09/30/23  0515 09/29/23  0518   AST 27 22   ALT 20 16   ALKPHOS 66 77   BILITOTAL 0.2 0.2       Recent Labs   Lab Test 10/15/23  0650 10/03/23  0956 03/08/23  0830   CHOL  --   --  143   HDL  --   --  50   LDL  --   --  75   TRIG 136   < > 88    < > = values in this interval not displayed.     Recent Labs   Lab Test 03/08/23  0830   LDL 75  "    Recent Labs   Lab Test 10/27/23  0406 10/26/23  2216 10/26/23  0632 10/24/23  0412 10/24/23  0411   NA  --   --   --   --  142   POTASSIUM  --   --  3.6   < > 3.7   CHLORIDE  --   --   --   --  100   CO2  --   --   --   --  30*   *   < >  --    < > 139*   BUN  --   --   --   --  22.6   CR  --   --   --   --  0.39*   GFRESTIMATED  --   --   --   --  >90   LULU  --   --   --   --  8.8    < > = values in this interval not displayed.     No results for input(s): \"A1C\" in the last 00229 hours.  Recent Labs   Lab Test 10/27/23  0606 10/26/23  0632 10/25/23  0422   HGB 9.0* 9.2* 8.7*     No results for input(s): \"TROPONINI\" in the last 29594 hours.  No results for input(s): \"BNP\", \"NTBNPI\", \"NTBNP\" in the last 20953 hours.  Recent Labs   Lab Test 03/08/23  0830   TSH 1.13     Recent Labs   Lab Test 09/28/23  1244 03/07/23  1629   INR 1.04 1.15       Taylor Linn MD  Hospitalist Service  Text page via [facility] Paging/Directory      "

## 2023-10-29 NOTE — PLAN OF CARE
Pt extends left lower and withdraws right upper and lowers. Trach in place, required minimal suctioning. Tube feeds infusing at goal rate, tolerating well. Turned and repositioned, dressing to coccyx wound per orders. Ethics following.

## 2023-10-29 NOTE — PROGRESS NOTES
[facility]  Neurology Daily Note      Admission Date:9/28/2023   Date of service: 10/29/2023   Hospital Day: 32                                                   Assessment and Plan:   #.  Prolonged encephalopathy following presentation of new onset refractory status epilepticus.  Seizure disorder continues to be refractory despite extensive trials of medications over the last 31 days including several days of treatment with IV medication including midazolam and propofol  in the ICU. extensive treatment with multiple anticonvulsants including valproate, clobazam, lacosamide which continue.  Prior trials of phenytoin.  Continued EEG abnormalities including frequent generalized periodic epileptic discharges.  Intermittent jaw and eye movement which have correlated with increased epileptiform activity on the EEG-however, activity is difficult to distinguish from movement artifact as the patient does have a known history of dyskinesias.  When the patient was in ICU, when patient received a paralytic agent, electrocerebral epileptiform activity was observed.  It is difficult to distinguish even with continuous EEG and clinically whether all of these movements are epileptic. These have responded to lorazepam although this carries  risk with the amount of sedating medication she receives to manage her epileptic condition.  Given the duration of the patient's encephalopathy despite extensive treatment and without evidence of clinical improvement offers poor prognosis for meaningful recovery.    high-dose steroids have been started as a treatment option for refractory status epilepticus ordered methylprednisolone 1 g daily for 5 days.-No significant improvement thus far--  Continue anticonvulsants:   Lacosamide 300 mg twice daily (most recent level 10/9/2023 = 4.1)  Clobazam 20 mg twice daily  Depakote 250 mg 3 times daily (received through feeding tube (most recent levels 10/21/2023: Total 20, free 10  Plan  discussed with ethics team regarding decision making regarding level of monitoring/overall treatment plan.  Discontinue video EEG-at this point I do not think that monitoring will change the overall plan for medication management.  We will continue the same lorazepam 1-2 mg IV up to twice daily as needed for repetitive chin or extremity movement concerning for breakthrough seizure activity lasting more than 2 minutes continuous.  Recheck lacosamide and , clobazam,valproate levelspending    Ordered lorazepam for observed rhythmic activity, although it is difficult to be sure, it is likely convulsive activity.    Continue supportive cares  As severe encephalopathy continues, more aggressive management with medications requiring ventilator/icu monitoring would be futile in my opinion.   Case discussed with ethics team on 10/28/23      #. DVT Prophylaxis  --Per hospitalist service  #. PT/OT/Speech  --Bedside PT/OT  #. Nutrition / GI Prophylaxis  --Feeding tube-nasogastric    Dr Johnny Lees to follow for General neurology service tomorrow.          Interval History:   This a.m., patient is noted to have low amplitude rhythmic repetitive movement of jaw and upper/lower extremity.        Hospital course/notes pertaining to neurologic/epileptic condition.  Admitted 9/28/2023 developed shaking movements.  Video EEG at that time confirmed convulsive status epilepticus.  Lacosamide started 9/30 and continues to date  Midazolam infusion was started 9/29 and continued to 10/6 and resumed again 10/14-17  Clobazam started 10/4 and gradually increased and continued presently.  Valproate was initially started discontinued and then resumed on 10/20  Phenytoin was initiated 10/1 and discontinued on 10/22 when valproate was started.  10/1/23Early on in her evaluation when movement artifact was interfering with interpretation of EEG, paralytic agent was attemptedadministered a paralytic agent to eliminate the muscle artifact from the  EEG. Per Dr Gusman, EEG reader for this week, the underlying cortical activity was consistent with ongoing seizures).  Interpretation of the EEG remain challenging because of the artifact.             Medications:   Scheduled Meds:   clobazam  20 mg Oral or Feeding Tube BID    furosemide  40 mg Oral or Feeding Tube BID    heparin ANTICOAGULANT  5,000 Units Subcutaneous Q8H    lacosamide  300 mg Oral or Feeding Tube BID    levofloxacin  500 mg Oral or Feeding Tube Daily    LORazepam  1 mg Intravenous Once    methylPREDNISolone  1,000 mg Intravenous Q24H    multivitamins w/minerals  15 mL Oral or Feeding Tube Daily    polyethylene glycol  17 g Oral or Feeding Tube BID    potassium chloride  10 mEq Intravenous Once    potassium chloride  20 mEq Intravenous Once    sodium chloride (PF)  10-40 mL Intracatheter Q7 Days    sodium chloride (PF)  3 mL Intracatheter Q8H    valproic acid  250 mg Oral Q8H    wound support modular  60 mL Oral Daily at 4 pm     PRN Meds: acetaminophen **OR** acetaminophen, albuterol, dextrose, glucose **OR** dextrose **OR** glucagon, hydrALAZINE, HYDROmorphone, lidocaine 4%, lidocaine (buffered or not buffered), LORazepam, melatonin, naloxone **OR** naloxone **OR** naloxone **OR** naloxone, nitroGLYcerin, sodium chloride (PF), sodium chloride (PF), sodium chloride (PF)        Physical Exam:   Vitals: Temp: 97.9  F (36.6  C) Temp src: Axillary BP: (!) 163/84 Pulse: 76   Resp: 16 SpO2: 97 % O2 Device: Trach dome Oxygen Delivery: 30 LPM  Vital Signs with Ranges: Temp:  [97.3  F (36.3  C)-98.7  F (37.1  C)] 97.9  F (36.6  C)  Pulse:  [71-86] 76  Resp:  [14-16] 16  BP: (130-170)/(77-91) 163/84  FiO2 (%):  [30 %-31 %] 30 %  SpO2:  [94 %-99 %] 97 %    General Appearance: Lying in bed, eyes partially open at times.  Not responding to voice.  Mild grimace with noxious stimuli. tracheostomy in place     Neuro Exam:  Mental Status Exam: Eyes open   but does not make eye contact.  Does blink to threat  randomly.  cranial Nerves: Pupils are equal and reactive to light.  Mild facial grimace   motor: Subtle rhythmic movement of face and extremities as described above  Withdraws to noxious stimuli on the right arm and leg, reacts with grimace on the left side with noxious stimuli but does not localize  Reflexes: Areflexic plantar signs neutral.  Sensory: Unable to assess  Coordination: Unable  Gait: Unable  Neck: No nuchal rigidity  Extremities: No clubbing, no cyanosis, no edema        Data:   ROUTINE IP LABS (Last 3results)  CBC RESULTS:     Recent Labs   Lab Test 10/29/23  0446 10/28/23  0544 10/27/23  0606   WBC 7.6 11.1* 9.6   RBC 3.76* 3.92 3.73*   HGB 9.3* 9.6* 9.0*   HCT 31.4* 32.6* 31.0*   * 428 464*     Basic Metabolic Panel:  Recent Labs   Lab Test 10/29/23  0533 10/27/23  1604 10/27/23  0406 10/26/23  2216 10/26/23  0632 10/25/23  0814 10/25/23  0422 10/24/23  0412 10/24/23  0411 10/23/23  1950 10/23/23  1906 10/23/23  0824 10/23/23  0440   NA  --   --   --   --   --   --   --   --  142  --  142  --  141   POTASSIUM 3.1*  --   --   --  3.6  --  4.1  --  3.7  --  4.1  --  3.6   CHLORIDE  --   --   --   --   --   --   --   --  100  --  102  --  103   CO2  --   --   --   --   --   --   --   --  30*  --  30*  --  27   BUN  --   --   --   --   --   --   --   --  22.6  --  23.6*  --  17.9   CR 0.41*  --   --   --   --   --   --   --  0.39*  --  0.38*  --  0.36*   * 109* 105*   < >  --    < >  --    < > 139*   < > 123*   < > 120*   LULU  --   --   --   --   --   --   --   --  8.8  --  8.6*  --  7.8*    < > = values in this interval not displayed.     Liver panel:  Recent Labs   Lab Test 09/30/23  0515 09/29/23  0518 09/28/23  1244 03/06/23  1413 03/05/23  1323   PROTTOTAL 5.6* 6.7 6.9 6.9 6.5   ALBUMIN 3.4* 4.2 4.2 4.1 3.9   BILITOTAL 0.2 0.2 <0.2 <0.2 <0.2   ALKPHOS 66 77 63 83 80   AST 27 22 14 39* 19   ALT 20 16 15 21 15     Thyroid Panel:  Recent Labs   Lab Test 03/08/23  0830   TSH 1.13       Vitamin B12:   Recent Labs   Lab Test 03/09/23  1524   B12 1,325*      Ammonia:   Recent Labs   Lab Test 10/09/23  0959   DAPHNE 14        IMAGING:     Last MRI brain 10/23 no acute abnormality    55minutes spent with evaluation and management today.    Joana Strickland M.D.  Neurologist  [facility]  Office [phone]

## 2023-10-29 NOTE — OP NOTE
General Surgery Operative Note    PREOPERATIVE DIAGNOSIS: encephalopathy, status epilepticus    POSTOPERATIVE DIAGNOSIS: same    PROCEDURE:   1. Esophagogastroduodenoscopy  2. Laparoscopic assisted Percutaneous endoscopic gastrostomy tube placement    SURGEON:  Garret Helms MD    ASSISTANT:  Marc Fabian PA-C  The Physician Assistant was medically necessary for their expertise in retraction/exposure, prepping the abdominal wall, and actual placement of the gastrostomy tube.    ANESTHESIA:  General.    BLOOD LOSS: 5ml    FINDINGS: PEG tube placed into stomach without complication    INDICATIONS:   Ms. Germain had been encephalopathic in the and in status epilepticus. It was determined per the ethics committee that the next best course of action to enhance the patient care would be for PEG placement. General Surgery has been consulted to place such tube.      DETAILS OF PROCEDURE: The patient was brought to the operating room per Anesthesia, placed in supine position, and intubated without difficulty. A Surgical timeout was then performed, verifying the correct surgeon, site, procedure, and patient and all in the room were in agreement.     The standard EGD scope was used to evaluate the esophagus, stomach and first portion of the duodenum. All areas were meticulously evaluated and did not show any significant abnormalities. I palpated the anterior abdominal wall and easily saw an indentation. The area was then transilluminated and the light was easily seen. Once the area was selected it was prepped and draped in the usual fashion. The area was injected with 1% lidocaine with epinephrine. A small incision was then made with a scalpel. Using the 14-gauge catheter it was slowly brought through the anterior abdominal wall while aspirating. No air bubbles were seen until the needle was visualized into the stomach. The guidewire was then inserted through the catheter. It was grasped with a snare and brought out through  the mouth under direct visualization. The gastrostomy tube was then placed around the tube. The gastrostomy tube was then slowly brought through the mouth and anterior abdominal wall under direct visualization. This was done without difficulty. The gastrostomy was then spun around with no tension. There was no bleeding whatsoever. It was anchored at 4 CM. The stomach was then evaluated meticulously which showed no injuries or bleeding from gastrostomy tube placement. The scope was slowly brought out evaluating the esophagus which also showed no injuries.The gastrostomy was then anchored in the usual fashion. There was no external bleeding. A laparoscope was then inserted into the abdomen to confirm adequate placement of the PEG tube. Which appeared satisfactoraly into the stomach and up to the abominal wall. All sponge, instrument, and needle counts were correct at the conclusion of the case. The patient tolerated the procedure well.       Garret Helms MD

## 2023-10-30 NOTE — PROGRESS NOTES
CLINICAL NUTRITION SERVICES - REASSESSMENT NOTE      Malnutrition: (10/25)  % Weight Loss:  None noted  % Intake:  No decreased intake noted (TF)  Subcutaneous Fat Loss:  Orbital region mild-moderate depletion  Muscle Loss:  Temporal region moderate depletion, Patellar region moderate-severe depletion, and Anterior thigh region moderate depletion  Fluid Retention:  Mild 1+ generalized      Malnutrition Diagnosis: Moderate malnutrition  In Context of:  Acute illness or injury       EVALUATION OF PROGRESS TOWARD GOALS   Diet:    NPO    Nutrition Support:    Type of Feeding Tube: PEG (placed 10/27)  Enteral Frequency:  Continuous (will hold TF for 1 hr before and 1 hr after levaquin dose)  Enteral Regimen: Vital 1.5 @ 40 mL/hr   Total Enteral Provisions: 880 mL, 1320 cals, 60 gm pro, 5 gm fiber, 672 mL free water  1 bottle Expedite (for wound healing): 100 cals, 10 gm pro  TOTAL: 1420 cals (32 cals/kg), 70 gm pro (1.6 gm/kg)  FWF: 100 mL every 4 hrs    Daily multivitamin w/minerals    Intake/Tolerance:    Chart reviewed  Pt tolerating TF at goal rate    10/29: BM x1 (miralax given 10/28, 10/29)  10/28: BM x1    10/30: Na 142             K 3.5             Phos 2.2 (L - replaced, neutra-phos)      ASSESSED NUTRITION NEEDS:  Dosing Weight: 45 kg (9/30)  Estimated Energy Needs: 6726-7194+ kcals (25-30+ Kcal/Kg)  Justification: maintenance   Estimated Protein Needs: 54-68+ grams protein (1.2-1.5+ g pro/Kg)  Justification: preservation of lean body mass      NEW FINDINGS:     Levaquin once daily  Daily lasix    10/25/23 0400 44.6 kg (98 lb 5.2 oz) Bed scale   10/24/23 0400 45.9 kg (101 lb 1.6 oz) Bed scale   10/22/23 0600 46 kg (101 lb 6.6 oz) --   10/21/23 0400 46.2 kg (101 lb 13.6 oz) Bed scale   10/20/23 0400 49 kg (108 lb 0.4 oz) Bed scale   10/19/23 0426 48.9 kg (107 lb 12.9 oz) --   10/17/23 0400 48.3 kg (106 lb 7.7 oz) Bed scale   10/16/23 0500 48.4 kg (106 lb 11.2 oz) Bed scale   10/15/23 0430 48.9 kg (107 lb 12.9  oz) Bed scale   10/14/23 0000 50.2 kg (110 lb 10.7 oz) Bed scale   10/13/23 0400 49.9 kg (110 lb 0.2 oz) Bed scale   10/12/23 0500 50.6 kg (111 lb 8.8 oz) Bed scale   10/11/23 0440 51.4 kg (113 lb 5.1 oz) Bed scale   10/10/23 0000 51.2 kg (112 lb 14 oz) Bed scale   10/09/23 0200 52.6 kg (115 lb 15.4 oz) Bed scale   10/08/23 0000 52.3 kg (115 lb 4.8 oz) Bed scale   10/07/23 0600 53.3 kg (117 lb 8.1 oz) Bed scale   10/06/23 0700 52 kg (114 lb 10.2 oz) --   10/05/23 0600 51.1 kg (112 lb 10.5 oz) Bed scale   10/03/23 0400 48.7 kg (107 lb 5.8 oz) Bed scale   10/02/23 0500 47.4 kg (104 lb 8 oz) Bed scale   10/01/23 0545 46.9 kg (103 lb 6.3 oz) Bed scale   09/30/23 0500 44.6 kg (98 lb 5.2 oz) Bed scale   09/29/23 0950 42.3 kg (93 lb 4.1 oz) Bed scale     10/25: WOCN  Location: left buttock   Wound history/plan of care: found with Triad in use per plan of care. Suspect area was a previous pressure injury due to raised scar tissue. Now reopened. Frequent loose stools.    Wound deteriorating slightly 10/25     Location: saccrum   Wound due to: Pressure Injury and Friction   Initial assessment       Previous Goals (10/25):   Patient will continue to meet needs from goal TF regimen   Evaluation: Met    Patient will have 1-2 stools per day   Evaluation: Met    Previous Nutrition Diagnosis (10/25):   Altered GI function related to alternates between constipation and diarrhea as evidenced by variable stooling, need for fiber modulars   Evaluation: Improving        CURRENT NUTRITION DIAGNOSIS  No nutrition diagnosis identified at this time as EN meeting nutrition needs    INTERVENTIONS  Recommendations / Nutrition Prescription  NPO    Recommend continue with current EN regimen      Goals  EN to meet % estimated needs      MONITORING AND EVALUATION:  Progress towards goals will be monitored and evaluated per protocol and Practice Guidelines

## 2023-10-30 NOTE — PROGRESS NOTES
[facility]    Medicine Progress Note - Hospitalist Service    Date of Admission:  9/28/2023    Assessment & Plan   Kortney Germain is a 68 year old female with a past medical history significant for schizoaffective disorder, tardive dyskinesia, hypertension, anxiety, depression, odynophagia who was admitted from her group home (no next of kin) on 9/28/2023 with abdominal pain and back pain.  She was diagnosed with cystitis and stool impaction.  Her course was complicated by acute encephalopathy with loss of CNS failure/loss of protective airway reflexes requiring intubation on 9/29/2023.  She has been in ICU since that time, hospitalist service was asked to transfer patient out of ICU on 10/24/2023        Prolonged encephalopathy following presentation of new onset refractory status epilepticus  Seizure disorder  *EEG abnormalities included frequent generalized periodic epileptic discharges.    *CT Head 9/29: no acute intracranial pathology, brain atrophy, CSVID  *MRI 9/30/23: no acute intracranial process, generalized atrophy, CSVID  *MRI 10/11/23: no acute intracranial process, no hemorrhage, moderate-advanced brain atrophy and leukoaraiosis, CSVID   *MRI 10/20/23: no interval change   *BC: NG  *CSF analysis :glucose 98 protein 22.8, 2 nucleated cells, RBC 0, aerobic culture GPC in broth only anaerobic culture NGTD, HSV negative  *[facility] encephalopathy autoimmune panel negative  *Neurology is consulted and following (reviewe neuro note from 10/29 for details.)  Note reviewed and recommendations to continue lorazepam 1 to 2 mg IV up to twice daily as needed for repetitive chin or extremity movement concerning for breakthrough seizure activity lasting more than 2 minutes continuous.  Patient did not improve to high-dose steroids as treatment option for refractory status epilepticus with methylprednisone 1 g daily for 5 days.  Patient currently on lacosamide 200 mg p.o. daily, clobazam 20 mg  "twice daily and Depakote 250 mg p.o. 3 times daily.  *per neurology note from 10/29, \" Given the duration of the patient's encephalopathy despite extensive treatment and without evidence of clinical improvement offers poor prognosis for meaningful recovery. \"  - continue above regimen and continue high dose solumedrol to complete 5 days  - ethics team is consulted, following  - please refer to neurology note for prognosis  - patient continues to be encephlopathic at time on 10/30     Acute hypoxic respiratory failure secondary to CNS failure/loss of protective airway reflexes on prolonged mechanical ventilatory support status post trach tube placement on 10/20/2023 by Dr. Benites  -Keep trach in place while comatose.  Currently on 30 L 30% oxygen.     Possible hospital-acquired pneumonia   Urinary tract infection   -low-grade temp of 99 on 10/23/2023 with low-grade leukocytosis  - urine culture grew Pseudomonas  -Was started on empiric cefepime on 10/24/2023.  However this lowers seizure threshold.  Discontinued and transition to IV ceftriaxone.  Patient is allergic to penicillin so cannot use Zosyn.  Meropenem also reduce the seizure threshold.  - MRSA swab negative.  - Sputum culture shows mixed christina  - Chest x-ray--> possible retrocardiac infiltrate  - Ceftriaxone stopped per ID. Levaquin initiated  per ID, will complete on 10/30  - Procalcitonin 0.06     Moderate malnutrition, status post PEG tube placement 10/27 by general surgery.    *General surgery saw the patient on 10/13/2023 there were plans for surgical PEG tube the following Monday.  This was later canceled on 10/16/2023 by a different surgeon over concerns about consent.  *Tentative plan was long-term smallbore feeding tube and lieu of PEG although this certainly poses risk of tissue damage to the nasal cavity and surrounding tissues  *Reactivate ethics consult on 10/25/2023 to assist with decision-making regarding PEG tube placement, differing " opinions/willingness to proceed between surgeons.  Ethics in agreement with PEG tube placement. Please see noted from 10/26/23. General surgery to place PEG 10/27.   - started on tube feeding at 20 cc/h and tolerated and tube feeding to be increased to the goal of 40 cc/h.      Hypertension  *Was not on any PTA antihypertensives. PTA on Nifedipine on MAR  - being treated with Lasix IV then chanted to 40mg PO BID on 10/26.    - will stop lasix and start on Atenolol 25mg daily and adjust dose accordingly    Anemia normocytic likely secondary to prolonged critical illness and frequent phlebotomy  -Hemoglobin stable at 9.2.     Thrombocytosis-resolved  - Stop scheduled draws  - Recheck CBC in the a.m.     schizoaffective disorder  Tardive dyskinesia  anxiety, depression,  -Not currently on medications for these      Hypokalemia, due to diuretic therapy. K dropped to 3.1. she is on lasix 40 mg po daily. K allergies reviewed, past GI upset to Kcl. Ordered kcl 30 meq to be given on 10/29.  Monitor for allergic reactions after IV kcl administration. No issues reported.     Hypophosphatemia  Replace per protocol     Barriers to discharge: Guardianship, ethics consult obtained and patient evaluated, interdisciplinary team meeting advised                  Diet: Adult Formula Drip Feeding: Continuous Vital 1.5; Gastrostomy; Goal Rate: 40; mL/hr; Once able to resume TF, start new formula at 20 mL/hr.  After 12 hrs, increase to goal rate of 40 mL/hr (hold TF for 1 hr before and 1 hr after levaquin dose); Do...  NPO for Medical/Clinical Reasons Except for: Other; Specify: NPO For oral intake and gastric feedings for 6 hours post insertion Gastrostomy G/GJ tube. May feed through Jejunal or Distal PORT immediately for GJ tubes ONLY.    DVT Prophylaxis: Heparin SQ  Liu Catheter: Not present  Lines: PRESENT      PICC 09/30/23 Triple Lumen Left Basilic ok to use-Site Assessment: WDL      Cardiac Monitoring: None  Code Status: Full  Code      Clinically Significant Risk Factors        # Hypokalemia: Lowest K = 3.1 mmol/L in last 2 days, will replace as needed       # Hypoalbuminemia: Lowest albumin = 3.4 g/dL at 9/30/2023  5:15 AM, will monitor as appropriate     # Hypertension: Noted on problem list         # Moderate Malnutrition: based on nutrition assessment           Disposition Plan  TBD           Marily Corado MD  Hospitalist Service  [facility]  Securely message with [employer] (more info)  Text page via [employer] Paging/Directory   ______________________________________________________________________    Interval History   Unresponsive and remains encephalopathic today.       Physical Exam   Vital Signs: Temp: (!) 96.7  F (35.9  C) Temp src: Axillary BP: (!) 149/101 Pulse: 78   Resp: 20 SpO2: 96 % O2 Device: Trach dome Oxygen Delivery: 30 LPM  Weight: 98 lbs 5.2 oz    General Appearance: unresponsive  Respiratory: clear, trach in place  Cardiovascular: regular rate and rhythm  GI: soft  Skin: warm and dry      Medical Decision Making             Data

## 2023-10-30 NOTE — PROGRESS NOTES
Pt stable on HFTD overnight, 30L 30%. Suctioning moderate amount yellow/thick secretions. Will continue to follow.     NIKA Martinez, RRT

## 2023-10-30 NOTE — PROGRESS NOTES
Pt here with seizures, UTI. A&O difficult to assess due to being unresponsive and nonverbal. Neuros: not following commands, withdraws to pain. VSS. Trach in place, oxygen saturation stable, required suction maintained 98%. Tele NSR. NPO diet. Takes pills crushed in tube. Peg tube placed and TF running at goal of 40ml with 100ml flushes Q4. Up with Ax2 lift. Some facial grimaces of pain this morning and afternoon- Tylenol given X2. Pressure injury to sacrum assessed by RN and WOC- new dressing in place. Turn and reposition Q2 hours. Purewick in place. Pt scoring green on the Aggression Stop Light Tool. Discharge plan pending guardianship.

## 2023-10-30 NOTE — PLAN OF CARE
Neuro: Withdraws on all extremities. Spontaneously moves RUE. Eyes conjugate. Pt was wiggling legs and moving her tongue rythmically. PRN Ativan administered. Movenents subsided  Cardio: BP WDL  Resp: LS Course Trach 30% 30L Suctioned moderate amount thick yellow secretions  Gi: TF at goal  : Purewick in place  Skin: pale diaphoretic, Peg site open to air CDI  Pain: CPOT 0  Activity A2 lift   Turn and repo Q2 mouthcares Q2

## 2023-10-30 NOTE — PROGRESS NOTES
Pt continues to withdraw with all extremities. Trach in place and requires minimal suctioning. Turned an repositioned. Oral hygiene and coccyx wound care done per order. Ethics committee meeting today.

## 2023-10-30 NOTE — PROGRESS NOTES
"[facility] Nurse Inpatient Assessment     Consulted for: 10/18 Wound Buttocks. 10/30 Pressure Injury Forehead.     Summary: patient was initially seen by [facility] for a wound that was POA to [facility]; this appeared to be skin damage to buttock related to moisture/friction, improved 10/6. 10/18 Wound reopened. 10/25 wound was deteriorating, and a new wound to sacrum, Stage 2 HAPI. 10/30 all wounds noted improved. Forehead noted intact blanchable 10/30.     Patient History (according to provider note(s):      \"68 year old female admitted on 9/28/2023. With history of schizophrenia, tardive dyskinesia, HTN, depression, ADD, and UTI who resides in a group home who presented to the ED with abdominal and back pain\"    Assessment:      Areas visualized during today's visit: Focused: and Sacrum/coccyx    Wound location: Left buttock left sacrum   Last photo: 10-30    Wound due to: Friction and Moisture Associated Skin Damage (MASD), reopened  Wound history/plan of care: found with Triad and mepilex in use per plan of care  Wound base: pink moist tissue, dermis      Palpation of the wound bed: normal      Drainage: scant     Description of drainage: serous     Measurements (length x width x depth, in cm): 2.5cm x 2.5cm x 0.1cm       Tunneling: N/A     Undermining: N/A  Periwound skin: Denuded, Dry/scaly, and Erythema- blanchable      Color: pale and purple      Temperature: normal   Odor: none  Pain: no grimacing or signs of discomfort, none  Pain interventions prior to dressing change: patient tolerated well  Treatment goal: Heal  and Protection  STATUS: improving  Supplies ordered: gathered, at bedside, and supplies stored on unit      Wound location: Sacrum (midline)  Last photo: 10-30    Wound due to: Pressure Injury and Friction HAPI stage 2  Wound history/plan of care: pt in ICU, non-responsive, total care with incontinence   Wound base: pink moist tissue, dermis      Palpation of the wound bed: " normal, textured       Drainage: scant     Description of drainage: serous     Measurements (length x width x depth, in cm): 2.5 x 2 x 0.1cm      Tunneling: N/A     Undermining: N/A  Periwound skin: Intact      Color: pink      Temperature: normal   Odor: none  Pain: unable to assess due to  sedation    Pain interventions prior to dressing change: slow and gentle cares   Treatment goal: Heal  and Protection  STATUS: improving  Supplies ordered: supplies stored on unit and discussed with RN    Wound location: forehead     Last photo: 10/30  Wound due to: Unknown Etiology minimal abrasion vs healing stage 1 pressure injury   Wound history/plan of care: found open to air, was previously using medical device to the area EEG leads for continuous monitoring   Wound base:  blanchable  and epidermis     Palpation of the wound bed: normal      Drainage: none     Description of drainage: none     Measurements (length x width x depth, in cm): none     Tunneling: N/A     Undermining: N/A  Periwound skin: Intact      Color: normal and consistent with surrounding tissue      Temperature: normal   Odor: none  Pain: no grimacing or signs of discomfort, none  Pain interventions prior to dressing change: N/A  Treatment goal: Protection  STATUS: initial assessment  Supplies ordered: discussed with RN      Treatment Plan:       Wound care  Start:  10/31/23 0600,   DAILY,   Routine        Comments: Left buttock and sacrum wound care: daily  1. Cleanse with wound cleanser or Vashe or wei cleanser and soft dry wipes  2. Swab periwound with no-sting barrier, let dry.  3. Apply small amount Triad paste to wound beds only.  4. Cover with Mepilex Sacral.  Place dressing high above perianal area to minimize soilage.  5. Pressure injury prevention measures.  Avoid briefs in bed.  Isolibrium mattress (ICU), or add TRACY pump to Isoflex mattress.      Modified from: Wound care - EVERY OTHER DAY Start: 10/25/23 1614, EVERY OTHER DAY, Routine     "      Skin care precautions  EFFECTIVE NOW        Comments: Pressure Injury Prevention (PIP) Plan:  If patient is declining pressure injury prevention interventions: Explore reason why and address patient's concerns, Educate on pressure injury risk and prevention intervention(s), If patient is still declining, document \"informed refusal\" , and Ensure Care team is aware ( provider, charge nurse, etc)  Mattress: Follow bed algorithm, reassess daily and order specialty mattress, if indicated.  HOB: Maintain at or below 30 degrees, unless contraindicated  Repositioning in bed: Every 1-2 hours , Left/right positioning; avoid supine, and Raise foot of bed prior to raising head of bed, to reduce patient sliding down (shear)  Heels: Keep elevated off mattress and Pillows under calves  Protective Dressing: see wound care orders please  Chair positioning: Chair cushion (#[id number]) , Assist patient to reposition hourly, and Do NOT use a donut for sitting (this increases pressure to smaller area and creates a higher potential for injury)    If patient has a buttock pressure injury, or high risk for PI use chair cushion or SPS.  Moisture Management: Perineal cleansing /protection: Follow Incontinence Protocol, Avoid brief in bed, Clean and dry skin folds with bathing , Consider InterDry (#[id number]) between folds, and Moisturize dry skin  Under Devices: Inspect skin under all medical devices during skin inspection , Ensure tubes are stabilized without tension, and Ensure patient is not lying on medical devices or equipment when repositioned  Ask provider to discontinue device when no longer needed.          Orders: Reviewed and Updated. No orders needed for forehead, medical device not longer in use and skin intact     RECOMMEND PRIMARY TEAM ORDER: None, at this time  Education provided: importance of repositioning, plan of care, wound progress, Moisture management, Hygiene, and Off-loading pressure  Discussed plan of care with: " Patient and Nurse  WO nurse follow-up plan: twice weekly  Notify WO if wound(s) deteriorate.  Nursing to notify the Provider(s) and re-consult the WO Nurse if new skin concern.    DATA:     Current support surface: Standard  Low air loss (TRACY pump, Isolibrium, Pulsate, skin guard, etc)  Containment of urine/stool: Incontinence Protocol, Incontinent pad in bed, and Purewick external catheter   BMI: Body mass index is 19.2 kg/m .   Active diet order: Orders Placed This Encounter      NPO for Medical/Clinical Reasons Except for: Other; Specify: NPO For oral intake and gastric feedings for 6 hours post insertion Gastrostomy G/GJ tube. May feed through Jejunal or Distal PORT immediately for GJ tubes ONLY.     Output: I/O last 3 completed shifts:  In: 1310 [I.V.:100; NG/GT:570]  Out: 1450 [Urine:1450]     Labs:   Recent Labs   Lab 10/30/23  0514   HGB 9.3*   WBC 8.6     Pressure injury risk assessment:   Sensory Perception: 1-->completely limited  Moisture: 1-->constantly moist  Activity: 1-->bedfast  Mobility: 1-->completely immobile  Nutrition: 3-->adequate  Friction and Shear: 1-->problem  Luis Score: 8    Anisa CWOCN   1st choice: Securely message with [employer] ([facility] [employer])   (2nd option: [facility] Office Phone [phone], messages checked periodically Mon-Fri 8a-4p)

## 2023-10-31 NOTE — PLAN OF CARE
Neuro: Opens eyes spontaneously. Moves RUE spontaneously frequently. Moves LUE spontaneously occasionally. Spontaneously moves BLE. Grimaces to pain at times. Had an episode of jerking movements of arms and legs for 2 over 2 mon continuously. Arms more prominent then legs. Neurology at bedside. Was directed to give 1 mg of ativan. Rythmic movements subsided.   Cardio: PRN hydralazine X1  Resp: LS course/dim 30% 30 L frequent coughing. Scant amt of secretions  GI: Multiple loose BM  : Low UOP was bladder scanned at 2030 for 268. Pure wick in place  Skin: Trach dressing CDI. PEG site Open to air CDI, Coccyx wound cleaned and dressed  Pain: PRN tylenol administered. Brow was furrowed.   Activity: A2 lift turn and reposition Q2 oral cares Q2

## 2023-10-31 NOTE — PLAN OF CARE
Goal Outcome Evaluation:    [id number]    Orientations: A&O difficult to access due to pt being nonverbal and unresponsive, opens eyes spontaneously, does not follow commands, PRN tylenol given x1 for nonverbal pain indicators  Vitals/Pain: VSS, trach 30% 30L, in line suction with thick secretions  Lines/Drains: L PICC SL  Skin/Wounds: Pressure injury to sacrum/coccyx, dressing change completed per WOC orders  GI/: Incont. B&B, purewick in place UOA, BMx1  Labs: Abnormal/Trends, Electrolyte Replacement- Recheck in AM  Ambulation/Assist: A2 turn & repo  Plan: Pending guardianship

## 2023-10-31 NOTE — PLAN OF CARE
Goal Outcome Evaluation:    Pt here with seizures, UTI. A&O difficult to assess due to being unresponsive and nonverbal. Neuros: not following commands, does not withdraw to pain. VSS. Trach dom in place, oxygen saturation stable, required suction frequently maintained 98%. NPO diet. . No seizure activity witnessed this shift.Peg tube  in place and TF now running at goal 40ml with 100 flush Q4 . Up with Ax2 lift. No nonverbal signs of pain. Pressure injury to sacrum, dressing CDI. Turn and reposition Q2 hours incontinent of urine Loose BM X 1. Purewick in place. Pt scoring green on the Aggression Stop Light Tool. Ethics team following .Discharge plan pending guardianship.

## 2023-10-31 NOTE — PROGRESS NOTES
[facility]    Medicine Progress Note - Hospitalist Service    Date of Admission:  9/28/2023    Assessment & Plan   Kortney Germain is a 68 year old female with a past medical history significant for schizoaffective disorder, tardive dyskinesia, hypertension, anxiety, depression, odynophagia who was admitted from her group home (no next of kin) on 9/28/2023 with abdominal pain and back pain.  She was diagnosed with cystitis and stool impaction.  Her course was complicated by acute encephalopathy with loss of CNS failure/loss of protective airway reflexes requiring intubation on 9/29/2023.  She has been in ICU since that time, hospitalist service was asked to transfer patient out of ICU on 10/24/2023        Prolonged encephalopathy following presentation of new onset refractory status epilepticus  Seizure disorder  *EEG abnormalities included frequent generalized periodic epileptic discharges.    *CT Head 9/29: no acute intracranial pathology, brain atrophy, CSVID  *MRI 9/30/23: no acute intracranial process, generalized atrophy, CSVID  *MRI 10/11/23: no acute intracranial process, no hemorrhage, moderate-advanced brain atrophy and leukoaraiosis, CSVID   *MRI 10/20/23: no interval change   *BC: NG  *CSF analysis :glucose 98 protein 22.8, 2 nucleated cells, RBC 0, aerobic culture GPC in broth only anaerobic culture NGTD, HSV negative  *[place] encephalopathy autoimmune panel negative  *Neurology is consulted and following (reviewe neuro note from 10/29 for details.)  Note reviewed and recommendations to continue lorazepam 1 to 2 mg IV up to twice daily as needed for repetitive chin or extremity movement concerning for breakthrough seizure activity lasting more than 2 minutes continuous.  Patient did not improve to high-dose steroids as treatment option for refractory status epilepticus with methylprednisone 1 g daily for 5 days.  Patient currently on lacosamide 200 mg p.o. daily, clobazam 20 mg  "twice daily and Depakote 250 mg p.o. 3 times daily.  *per neurology note from 10/29, \" Given the duration of the patient's encephalopathy despite extensive treatment and without evidence of clinical improvement offers poor prognosis for meaningful recovery. \"  *completed high dose solumedrol on 10/31  - continue above regimen for anti-epileptics  - ethics team is consulted, noted from 10/28 reviewed, recommending interdisciplinary team meeting involving ethics and risk. Care transition team following. Neurologist input may further be needed  - please refer to neurology note for prognosis. Discussed with charge RN, to notify rounding neurologist today to see patient for further recommendations  - patient continues to be encephalopathic and unresponsive on 10/31     Acute hypoxic respiratory failure secondary to CNS failure/loss of protective airway reflexes on prolonged mechanical ventilatory support status post trach tube placement on 10/20/2023 by Dr. Benites  -Keep trach in place while comatose.  Currently on 30 L 30% oxygen.     Possible hospital-acquired pneumonia   Urinary tract infection   -low-grade temp of 99 on 10/23/2023 with low-grade leukocytosis  - urine culture grew Pseudomonas  -Was started on empiric cefepime on 10/24/2023.  However this lowers seizure threshold.  Discontinued and transition to IV ceftriaxone.  Patient is allergic to penicillin so cannot use Zosyn.  Meropenem also reduce the seizure threshold.  - MRSA swab negative.  - Sputum culture shows mixed christina  - Chest x-ray--> possible retrocardiac infiltrate  - Ceftriaxone stopped per ID. Levaquin initiated  per ID, will complete on 10/30  - Procalcitonin 0.06     Moderate malnutrition, status post PEG tube placement 10/27 by general surgery.    *General surgery saw the patient on 10/13/2023 there were plans for surgical PEG tube the following Monday.  This was later canceled on 10/16/2023 by a different surgeon over concerns about " consent.  *Tentative plan was long-term smallbore feeding tube and lieu of PEG although this certainly poses risk of tissue damage to the nasal cavity and surrounding tissues  *Reactivate ethics consult on 10/25/2023 to assist with decision-making regarding PEG tube placement, differing opinions/willingness to proceed between surgeons.  Ethics in agreement with PEG tube placement. Please see noted from 10/26/23. General surgery to place PEG 10/27.   - started on tube feeding at 20 cc/h and tolerated and tube feeding to be increased to the goal of 40 cc/h.      Hypertension  *Was not on any PTA antihypertensives. PTA on Nifedipine on MAR  - being treated with Lasix IV then changed to 40mg PO BID on 10/26.    - stopped lasix on 10/30 as no clear need for diuretics and start on Atenolol 25mg daily on 10/30.   - monitor bp and adjust as needed      Anemia normocytic likely secondary to prolonged critical illness and frequent phlebotomy  -Hemoglobin stable at 9.2.     Thrombocytosis-resolved  - Stop scheduled draws  - Recheck CBC in the a.m.     schizoaffective disorder  Tardive dyskinesia  anxiety, depression,  -Not currently on medications for these      Hypokalemia, due to diuretic therapy. K dropped to 3.1. she is on lasix 40 mg po daily. K allergies reviewed, past GI upset to Kcl. Ordered kcl 30 meq to be given on 10/29.  Monitor for allergic reactions after IV kcl administration. No issues reported.     Hypophosphatemia  Replace per protocol     Barriers to discharge: Guardianship, ethics consult obtained and patient evaluated, interdisciplinary team meeting advised                  Diet: Adult Formula Drip Feeding: Continuous Vital 1.5; Gastrostomy; Goal Rate: 40; mL/hr; Once able to resume TF, start new formula at 20 mL/hr.  After 12 hrs, increase to goal rate of 40 mL/hr (hold TF for 1 hr before and 1 hr after levaquin dose); Do...  NPO for Medical/Clinical Reasons Except for: Other; Specify: NPO For oral intake  and gastric feedings for 6 hours post insertion Gastrostomy G/GJ tube. May feed through Jejunal or Distal PORT immediately for GJ tubes ONLY.    DVT Prophylaxis: Heparin SQ  Liu Catheter: Not present  Lines: PRESENT      PICC 09/30/23 Triple Lumen Left Basilic ok to use-Site Assessment: WDL      Cardiac Monitoring: None  Code Status: Full Code      Clinically Significant Risk Factors              # Hypoalbuminemia: Lowest albumin = 3.4 g/dL at 9/30/2023  5:15 AM, will monitor as appropriate     # Hypertension: Noted on problem list         # Moderate Malnutrition: based on nutrition assessment           Disposition Plan  TBD           Marily Corado MD  Hospitalist Service  [facility]  Securely message with [employer] (more info)  Text page via [employer] Paging/Directory   ______________________________________________________________________    Interval History   Patient remains unresponsive.       Physical Exam   Vital Signs: Temp: 98  F (36.7  C) Temp src: Axillary BP: 139/68 Pulse: 72   Resp: 18 SpO2: 100 % O2 Device: Trach dome Oxygen Delivery: 30 LPM  Weight: 98 lbs 5.2 oz    General Appearance: unresponsive  Respiratory: clear, trach in place  Cardiovascular: regular rate and rhythm  GI: soft  Skin: warm and dry      Medical Decision Making             Data

## 2023-10-31 NOTE — PROGRESS NOTES
[facility]    Neurology Progress Note     Assessment & Plan   Kortney Germain is a 68 year old female who was admitted on 9/28/2023.  The patient had a prolonged encephalopathy, with presentation with new onset refractory status epilepticus, necessitating intubation and extensive ICU stay.  Presently she has a tracheostomy.  Multiple medications and antiepileptic drugs were tried with no improvement in clinical status.  There is really poor prognosis for meaningful recovery at this point.    We will follow-up with you.    Tiffanie Reynoso MD    Interval History   Patient clinical situation did not improve.  She continues to lay down in bed with spontaneous movements of the right upper extremity and right lower extremity.  The patient seems to be posturing in decerebrate position on the left.  She keeps the right upper extremity flexed.  She has occasionally movements of her mouth.  She does not focus and does not respond to voice.    Of note the patient has a lot of secretions which necessitated multiple aspirations from her tracheostomy.    No meaningful interaction.  No clear-cut seizure.  The patient has also history of known of tardive dyskinesia.    The patient continues to be on lacosamide 300 mg twice a day  Depakote to 50 mg 3 times a day  Clobazam 20 mg twice a day.      Physical Exam   Temp: 98.1  F (36.7  C) Temp src: Axillary BP: (!) 142/78 Pulse: 75   Resp: 18 SpO2: 100 % O2 Device: Trach dome Oxygen Delivery: 30 LPM  Vitals:    10/22/23 0600 10/24/23 0400 10/25/23 0400   Weight: 46 kg (101 lb 6.6 oz) 45.9 kg (101 lb 1.6 oz) 44.6 kg (98 lb 5.2 oz)     Vital Signs with Ranges  Temp:  [97.8  F (36.6  C)-99.5  F (37.5  C)] 98.1  F (36.7  C)  Pulse:  [68-75] 75  Resp:  [18-20] 18  BP: (109-176)/(58-88) 142/78  FiO2 (%):  [30 %] 30 %  SpO2:  [96 %-100 %] 100 %  I/O last 3 completed shifts:  In: 1430 [NG/GT:1110]  Out: 590 [Urine:590]        Medications    dextrose         atenolol  25 mg Oral or Feeding Tube Daily    clobazam  20 mg Oral or Feeding Tube BID    heparin ANTICOAGULANT  5,000 Units Subcutaneous Q8H    lacosamide  300 mg Oral or Feeding Tube BID    multivitamins w/minerals  15 mL Oral or Feeding Tube Daily    polyethylene glycol  17 g Oral or Feeding Tube BID    sodium chloride (PF)  10-40 mL Intracatheter Q7 Days    sodium chloride (PF)  3 mL Intracatheter Q8H    valproic acid  250 mg Oral Q8H    wound support modular  60 mL Oral Daily at 4 pm       Data   Results for orders placed or performed during the hospital encounter of 09/28/23 (from the past 24 hour(s))   Glucose by meter   Result Value Ref Range    GLUCOSE BY METER POCT 127 (H) 70 - 99 mg/dL   Glucose by meter   Result Value Ref Range    GLUCOSE BY METER POCT 109 (H) 70 - 99 mg/dL   CBC with platelets   Result Value Ref Range    WBC Count 9.2 4.0 - 11.0 10e3/uL    RBC Count 3.73 (L) 3.80 - 5.20 10e6/uL    Hemoglobin 9.1 (L) 11.7 - 15.7 g/dL    Hematocrit 31.0 (L) 35.0 - 47.0 %    MCV 83 78 - 100 fL    MCH 24.4 (L) 26.5 - 33.0 pg    MCHC 29.4 (L) 31.5 - 36.5 g/dL    RDW 20.4 (H) 10.0 - 15.0 %    Platelet Count 457 (H) 150 - 450 10e3/uL

## 2023-10-31 NOTE — PROGRESS NOTES
[facility]    Neurology Progress Note     Assessment & Plan   Kortney Germain is a 68 year old female who was admitted on 9/28/2023.  The patient continues to be very obtunded, no meaningful recovery so far.  Continues to have abnormal movements on 1 side or the other uncertain if this is epileptic or not.   -We will continue with anticonvulsants lacosamide, Depakote, clobazam.  -The patient will finish the 5 days course of Solu-Medrol  -We will continue supportive care for now.  -Ethics committee consultation was requested.    Tiffanie Reynoso MD    Interval History   I saw the patient in the afternoon, the nurse was just preparing to give a dose of lorazepam for rhythmic movements of the left upper extremity.  The arm was extended.  I did touch the arm and the movements subsided.  Lorazepam was not given.  Later on however rhythmic movements of the right lower extremity were noticed and the patient received 1 mg of lorazepam with improvement in her movements.    Reviewing the chart the patient had a very prolonged hospitalization after she presented with new onset refractory status epilepticus.  Despite multiple antiepileptic tried with IV medication including midazolam and propofol in the ICU, the patient was intubated.  Subsequently she was started on valproate clobazam lacosamide.  Dilantin was also tried.  The patient has had multiple days of video EEG monitoring, initially showing status epilepticus, later on shows periodic epileptiform discharges.  Overall despite multiple treatments the patient is not improved.  As a last resort the patient was started on methylprednisolone 1 g daily for 5 days which was started on 10/29.  So far this has not provided any improvement.    The patient has history of schizoaffective disorder, secondary tardive dyskinesia, hypertension anxiety and depression.  During the hospitalization she had acute hypoxic respiratory failure and had a  prolonged mechanical ventilation status post tracheostomy.  The patient continues to be on 30 L 30% oxygen.  Hospitalization was also complicated by pneumonia and UTIs.      Physical Exam   Temp: 98.1  F (36.7  C) Temp src: Axillary BP: (!) 142/78 Pulse: 75   Resp: 18 SpO2: 100 % O2 Device: Trach dome Oxygen Delivery: 30 LPM  Vitals:    10/22/23 0600 10/24/23 0400 10/25/23 0400   Weight: 46 kg (101 lb 6.6 oz) 45.9 kg (101 lb 1.6 oz) 44.6 kg (98 lb 5.2 oz)     Vital Signs with Ranges  Temp:  [97.8  F (36.6  C)-99.5  F (37.5  C)] 98.1  F (36.7  C)  Pulse:  [68-75] 75  Resp:  [18-20] 18  BP: (109-176)/(58-88) 142/78  FiO2 (%):  [30 %] 30 %  SpO2:  [96 %-100 %] 100 %  I/O last 3 completed shifts:  In: 1430 [NG/GT:1110]  Out: 590 [Urine:590]    The patient is laying down in bed with spontaneous movements of right lower extremity and right upper extremity I did not see any purposeful movements in the left side.  She is not responsive to voice.  To painful stimuli she does open the eyes however does not fixate or look to the source of voice with her eyes.  She does blink to threat.  She does have on and off movements of her face as well as right lower extremity which is extended.  From time to time seems to be posturing on the left side.  Reflexes are decreased throughout.  Unable to do sensory exam.  The patient has a tracheostomy.    Medications    dextrose        atenolol  25 mg Oral or Feeding Tube Daily    clobazam  20 mg Oral or Feeding Tube BID    heparin ANTICOAGULANT  5,000 Units Subcutaneous Q8H    lacosamide  300 mg Oral or Feeding Tube BID    multivitamins w/minerals  15 mL Oral or Feeding Tube Daily    polyethylene glycol  17 g Oral or Feeding Tube BID    sodium chloride (PF)  10-40 mL Intracatheter Q7 Days    sodium chloride (PF)  3 mL Intracatheter Q8H    valproic acid  250 mg Oral Q8H    wound support modular  60 mL Oral Daily at 4 pm       Data   Results for orders placed or performed during the hospital  encounter of 09/28/23 (from the past 24 hour(s))   Glucose by meter   Result Value Ref Range    GLUCOSE BY METER POCT 127 (H) 70 - 99 mg/dL   Glucose by meter   Result Value Ref Range    GLUCOSE BY METER POCT 109 (H) 70 - 99 mg/dL   CBC with platelets   Result Value Ref Range    WBC Count 9.2 4.0 - 11.0 10e3/uL    RBC Count 3.73 (L) 3.80 - 5.20 10e6/uL    Hemoglobin 9.1 (L) 11.7 - 15.7 g/dL    Hematocrit 31.0 (L) 35.0 - 47.0 %    MCV 83 78 - 100 fL    MCH 24.4 (L) 26.5 - 33.0 pg    MCHC 29.4 (L) 31.5 - 36.5 g/dL    RDW 20.4 (H) 10.0 - 15.0 %    Platelet Count 457 (H) 150 - 450 10e3/uL

## 2023-11-01 NOTE — PROGRESS NOTES
"[facility]    Medicine Progress Note - Hospitalist Service    Date of Admission:  9/28/2023    Assessment & Plan   Kortney Germain is a 68 year old female with a past medical history significant for schizoaffective disorder, tardive dyskinesia, hypertension, anxiety, depression, odynophagia who was admitted from her group home (no next of kin) on 9/28/2023 with abdominal pain and back pain.  She was diagnosed with cystitis and stool impaction.  Her course was complicated by acute encephalopathy with loss of CNS failure/loss of protective airway reflexes requiring intubation on 9/29/2023.  She has been in ICU since that time, hospitalist service was asked to transfer patient out of ICU on 10/24/2023        Prolonged encephalopathy following presentation of new onset refractory status epilepticus  Seizure disorder  *EEG abnormalities included frequent generalized periodic epileptic discharges.    *CT Head 9/29: no acute intracranial pathology, brain atrophy, CSVID  *MRI 9/30/23: no acute intracranial process, generalized atrophy, CSVID  *MRI 10/11/23: no acute intracranial process, no hemorrhage, moderate-advanced brain atrophy and leukoaraiosis, CSVID   *MRI 10/20/23: no interval change   *BC: NG  *CSF analysis :glucose 98 protein 22.8, 2 nucleated cells, RBC 0, aerobic culture GPC in broth only anaerobic culture NGTD, HSV negative  *[place] encephalopathy autoimmune panel negative  *Neurology is consulted and following (reviewe neuro note from 10/29 for details.)  Note reviewed and recommendations to continue lorazepam 1 to 2 mg IV up to twice daily as needed for repetitive chin or extremity movement concerning for breakthrough seizure activity lasting more than 2 minutes continuous.  Patient did not improve to high-dose steroids as treatment option for refractory status epilepticus with methylprednisone 1 g daily for 5 days.    *per neurology note from 10/29, \" Given the duration of the " "patient's encephalopathy despite extensive treatment and without evidence of clinical improvement offers poor prognosis for meaningful recovery. \"  *completed high dose solumedrol on 10/31  - continue above regimen for anti-epileptics: lacosamide 200 mg p.o. daily, clobazam 20 mg twice daily and Depakote 250 mg p.o. 3 times daily.  - ethics team is consulted, noted from 10/28 reviewed, recommending interdisciplinary team meeting involving ethics and risk. Care transition team following, likely plan for interdisciplinary meeting in the coming day  - neurology following, last see on 10/31   - patient continues to be encephalopathic and unresponsive     Acute hypoxic respiratory failure secondary to CNS failure/loss of protective airway reflexes on prolonged mechanical ventilatory support status post trach tube placement on 10/20/2023 by Dr. Benites  -Keep trach in place while comatose.  Currently on 30 L 30% oxygen.     Possible hospital-acquired pneumonia-treated  Urinary tract infection-treated  *low-grade temp of 99 on 10/23/2023 with low-grade leukocytosis  *urine culture grew Pseudomonas  *Was started on empiric cefepime on 10/24/2023.  However this lowers seizure threshold.  Discontinued and transition to IV ceftriaxone.  Patient is allergic to penicillin so cannot use Zosyn.  Meropenem also reduce the seizure threshold.  * MRSA swab negative. Sputum culture shows mixed christina  *Chest x-ray--> possible retrocardiac infiltrate  - Ceftriaxone stopped and Levaquin initiated per ID. Course completed on 10/30     Moderate malnutrition, status post PEG tube placement 10/27 by general surgery.    *General surgery saw the patient on 10/13/2023 there were plans for surgical PEG tube the following Monday.  This was later canceled on 10/16/2023 by a different surgeon over concerns about consent.  *Tentative plan was long-term smallbore feeding tube and lieu of PEG although this certainly poses risk of tissue damage to the " nasal cavity and surrounding tissues  *Reactivate ethics consult on 10/25/2023 to assist with decision-making regarding PEG tube placement, differing opinions/willingness to proceed between surgeons.  Ethics in agreement with PEG tube placement. Please see noted from 10/26/23. General surgery to place PEG 10/27.   - TF currently at goal of 40 cc/h.      Hypertension  *Was not on any PTA antihypertensives. PTA on Nifedipine on MAR  - being treated with Lasix IV then changed to 40mg PO BID on 10/26.    - stopped lasix on 10/30 as no clear need for diuretics and start on Atenolol 25mg daily on 10/30.   - monitor bp and adjust as needed      Anemia normocytic likely secondary to prolonged critical illness and frequent phlebotomy  -Hemoglobin stable at 9.2.     Thrombocytosis-resolved  - Stop scheduled draws  - Recheck CBC in the a.m.     schizoaffective disorder  Tardive dyskinesia  anxiety, depression,  -Not currently on medications for these      Hypokalemia, due to diuretic therapy. K dropped to 3.1. she is on lasix 40 mg po daily. K allergies reviewed, past GI upset to Kcl. Ordered kcl 30 meq to be given on 10/29.  Monitor for allergic reactions after IV kcl administration. No issues reported.     Hypophosphatemia  Replace per protocol           Diet: Adult Formula Drip Feeding: Continuous Vital 1.5; Gastrostomy; Goal Rate: 40; mL/hr; Once able to resume TF, start new formula at 20 mL/hr.  After 12 hrs, increase to goal rate of 40 mL/hr (hold TF for 1 hr before and 1 hr after levaquin dose); Do...  NPO for Medical/Clinical Reasons Except for: Other; Specify: NPO For oral intake and gastric feedings for 6 hours post insertion Gastrostomy G/GJ tube. May feed through Jejunal or Distal PORT immediately for GJ tubes ONLY.    DVT Prophylaxis: Heparin SQ  Liu Catheter: Not present  Lines: PRESENT      PICC 09/30/23 Triple Lumen Left Basilic ok to use-Site Assessment: WDL      Cardiac Monitoring: None  Code Status: Full  Code      Clinically Significant Risk Factors              # Hypoalbuminemia: Lowest albumin = 3.4 g/dL at 9/30/2023  5:15 AM, will monitor as appropriate     # Hypertension: Noted on problem list         # Moderate Malnutrition: based on nutrition assessment           Disposition Plan  TBD, interdispillinary meeting in the coming days to determine further care. Patient has no next of kin or lizzy Corado MD  Hospitalist Service  [facility]  Securely message with [employer] (more info)  Text page via [employer] Paging/Directory   ______________________________________________________________________    Interval History   Patient remains encephalopathy and unresponsive.   Per nursing report, has some arm movements, but not purposeful.        Physical Exam   Vital Signs: Temp: 98.1  F (36.7  C) Temp src: Axillary BP: (!) 166/89 Pulse: 68   Resp: 18 SpO2: 96 % O2 Device: Trach dome Oxygen Delivery: 30 LPM  Weight: 98 lbs 5.2 oz    General Appearance: unresponsive  Respiratory: clear, trach in place  Cardiovascular: regular rate and rhythm  GI: soft  Skin: warm and dry      Medical Decision Making             Data

## 2023-11-01 NOTE — PLAN OF CARE
Pt here with new seizures. Lethargic, ANN orientation, patient mute. Lethargic majority of shift. Patient does not follow commands, pupils brisk and equal, withdraws to pain on all extremities. VSS. NPO, PEG tube in place running at goal of 40 mL/hr. Up with assist x2 and lift. No nonverbal signs of pain. Trach in place, minimal suctioning needed. Incontinent of bowel and bladder. Dressing changed to sacrum. Turn and Repo Q2 hours.  Patient had 1 episode of repetitive jaw movement, LUE repetitive taina and shaking and upward gaze. Improved after administration of Ativan. Pt scoring green on the Aggression Stop Light Tool. Plan to await guardianship. Discharge pending.

## 2023-11-01 NOTE — PROGRESS NOTES
Orientation: ANN    Vitals/Tele: VSS on trach dome, 30L 30%    Neuros: pt doesn't follow any commands, moves BLE spontaneously small movements, pupils equal and reactive    IV Access/drains: triple lumen PICC SL    Diet: NPO, TF at 40 ml/hr    Mobility: A2/lift, T/R    GI/: Incontinent, frequent BM overnight    Wound/Skin: pressure injury on coccyx    Consults: neurology    Discharge Plan: needs guardianship      See Flow sheets for assessment

## 2023-11-01 NOTE — PROGRESS NOTES
[facility]    Neurology Progress Note     Assessment & Plan   Kortney Germain is a 68 year old female who was admitted on 9/28/2023.  The patient has a prolonged encephalopathy-unresponsiveness following very prolonged hospitalization with respiratory failure and intubation starting with refractory status epilepticus.  The patient continues to be on Vimpat clobazam and Depakote.  There have been no improvement in the last few weeks.  Overall the patient clinical presentation with no improvement are consistent with poor prognosis for meaningful recovery.    Tiffanie Reynoso MD    Interval History   Patient continues to be unresponsive.  Opens the eyes but does not follow any commands.  Today she moves more both left side and right side especially lower extremities.  Continues to have a lot of secretions through tracheostomy.  The patient completed 5 doses of IV corticosteroids on 10/31/2023.  Not much improvement.  The patient continues to have on and off movements of the jaw which seems to be rhythmic as well as movements of the left upper extremity or lower extremities.  For this she received occasionally lorazepam.  There is improvement in the movements.  However the patient has also history of tardive dyskinesia not certain if the movements represent seizures or tardive dyskinesia movements.    The patient is malnourished s/p peg tube placement on 10/27.    Physical Exam   Temp: 98.4  F (36.9  C) Temp src: Axillary BP: 117/62 Pulse: 59   Resp: 18 SpO2: 98 % O2 Device: Trach dome Oxygen Delivery: 30 LPM  Vitals:    10/22/23 0600 10/24/23 0400 10/25/23 0400   Weight: 46 kg (101 lb 6.6 oz) 45.9 kg (101 lb 1.6 oz) 44.6 kg (98 lb 5.2 oz)     Vital Signs with Ranges  Temp:  [97.7  F (36.5  C)-98.8  F (37.1  C)] 98.4  F (36.9  C)  Pulse:  [59-75] 59  Resp:  [18] 18  BP: (117-166)/(62-89) 117/62  FiO2 (%):  [30 %] 30 %  SpO2:  [95 %-100 %] 98 %  I/O last 3 completed shifts:  In: 220  [NG/GT:220]  Out: 300 [Urine:300]    The patient is lying down in bed with tracheostomy and oxygen on.  She is opening the eyes and blinks.  Does not follow commands does not talk.  She continues to have repetitive twitching of the jaw.  She moves readily both lower extremities.  Pupils are equal and reactive to light.  She blinks to threat however not consistently.    Medications    dextrose        atenolol  25 mg Oral or Feeding Tube Daily    clobazam  20 mg Oral or Feeding Tube BID    heparin ANTICOAGULANT  5,000 Units Subcutaneous Q8H    lacosamide  300 mg Oral or Feeding Tube BID    multivitamins w/minerals  15 mL Oral or Feeding Tube Daily    [Held by provider] polyethylene glycol  17 g Oral or Feeding Tube BID    sodium chloride (PF)  10-40 mL Intracatheter Q7 Days    sodium chloride (PF)  3 mL Intracatheter Q8H    valproic acid  250 mg Oral Q8H    wound support modular  60 mL Oral Daily at 4 pm       Data   Results for orders placed or performed during the hospital encounter of 09/28/23 (from the past 24 hour(s))   Glucose by meter   Result Value Ref Range    GLUCOSE BY METER POCT 189 (H) 70 - 99 mg/dL   Phosphorus   Result Value Ref Range    Phosphorus 3.5 2.5 - 4.5 mg/dL   CBC with platelets   Result Value Ref Range    WBC Count 8.5 4.0 - 11.0 10e3/uL    RBC Count 3.71 (L) 3.80 - 5.20 10e6/uL    Hemoglobin 9.1 (L) 11.7 - 15.7 g/dL    Hematocrit 31.3 (L) 35.0 - 47.0 %    MCV 84 78 - 100 fL    MCH 24.5 (L) 26.5 - 33.0 pg    MCHC 29.1 (L) 31.5 - 36.5 g/dL    RDW 20.5 (H) 10.0 - 15.0 %    Platelet Count 457 (H) 150 - 450 10e3/uL   MRI of the brain done on 10/20/2023 shows no discrete mass lesion or acute infarct or bleed.  There is significant white matter disease thought to be due to small vessel ischemic disease.  The films were reviewed.

## 2023-11-01 NOTE — PROVIDER NOTIFICATION
MD Notification    Notified Person: MD    Notified Person Name:Key HOYT    Notification Date/Time:10/31/23@0765    Notification Interaction:[employer] messaging    Purpose of Notification:need orders to use the RN managed protocol for phosphorus, her phosphorus level was 2.2    Orders Received:ordering    Comments:

## 2023-11-01 NOTE — PLAN OF CARE
Goal Outcome Evaluation:    PRIMARY Concern: Prolonged encephalopathy following presentation of new onset refractory status epilepticus, Seizure disorder  SUMMARY NOTE:  Orientation/Cognitive: lethargic, withdraws to pain, non verbal and does not follow commands  Mobility Level/Assist Equipment: AX2, lift, turn and repo Q 2 hrs, suction PRN  Pain Management: scheduled meds given  Tele/VS/O2: VSS on TRACH DOME, Goal B.P is sys <160  ABNL Lab/BG: phosphorus 2.2, recheck back ok at 3.5  Diet: NPO, continuous tube feeing/hr with 100mls water flushes Q 4 hrs  Bowel/Bladder: incontinent B&B  Skin Concerns: Wound coccyx area, WOC following, daily dressing change  Drains/Devices: Tripple lumen PICC on rt upper arm s/l  Test and procedure pending:None  Anticipated discharge and active delay:Awaits court guardianship, ethic consult obtained, interdisciplinary team meeting advised.

## 2023-11-02 NOTE — PROGRESS NOTES
3 - 7 p.m.  I suctioned patient several times; consulted IV team regarding changing the PICC line dressing.   Patient had two episodes of urinary incontinence.

## 2023-11-02 NOTE — PROGRESS NOTES
Interdisciplinary care conference scheduled for 11/3/2023 at 11:30 a.m.    Rosamaria Jenkins RN, BSN, PHN  Inpatient Care Coordination  [facility]  Phone: [phone]

## 2023-11-02 NOTE — PROGRESS NOTES
"[facility] Nurse Inpatient Assessment     Consulted for: 10/18 Wound Buttocks. 10/30 Pressure Injury Forehead.     Summary: patient was initially seen by [facility] for a wound that was POA to [facility]; this appeared to be skin damage to buttock related to moisture/friction, improved 10/6. 10/18 Wound reopened. 10/25 wound was deteriorating, and a new wound to sacrum, Stage 2 HAPI.  Forehead noted intact blanchable 10/30.  All wounds noted improving today 11/2, nearly healed.    Patient History (according to provider note(s):      \"68 year old female admitted on 9/28/2023. With history of schizophrenia, tardive dyskinesia, HTN, depression, ADD, and UTI who resides in a group home who presented to the ED with abdominal and back pain\"    Assessment:      Areas visualized during today's visit: Focused: and Sacrum/coccyx    Wound location: Left buttock left sacrum     Last photo: 11-2-23      Wound due to: Friction and Moisture Associated Skin Damage (MASD), reopened  Wound history/plan of care: improving over past week, pt remains total care/ non-responsive, less stooling per RN  Wound base: new resurfacing pink epidermis and peeling epidermis     Palpation of the wound bed: normal      Drainage: none     Measurements (length x width x depth, in cm): healed over     Tunneling: N/A     Undermining: N/A  Periwound skin: Denuded, Dry/scaly, and Erythema- blanchable      Color: pink      Temperature: normal   Odor: none  Pain: no grimacing or signs of discomfort, none  Pain interventions prior to dressing change: patient tolerated well  Treatment goal: Heal  and Protection  STATUS: healing  Supplies ordered: gathered, at bedside, and supplies stored on unit      Wound location: Sacrum (midline)    Last photo: 11-2-23      Wound due to: Pressure Injury and Friction HAPI stage 2  Wound history/plan of care: pt non-responsive, total care, with incontinence   Wound base: resurfacing epidermis and peeling " epidermis, scant flaking serous scabbing     Palpation of the wound bed: normal, textured       Drainage: none     Measurements (length x width x depth, in cm): no open areas to measure, entire pink/peeling area across sacrum and buttocks is approx 5 x 8cm      Tunneling: N/A     Undermining: N/A  Periwound skin: Intact, peely      Color: pink      Temperature: normal   Odor: none  Pain: unable to assess due to  sedation    Pain interventions prior to dressing change: slow and gentle cares   Treatment goal: Heal  and Protection  STATUS: healing  Supplies ordered: supplies stored on unit and discussed with RN    Wound location: forehead (not fully assessed 11/2)    Last photo: 10/30      Wound due to: Unknown Etiology minimal abrasion vs healing stage 1 pressure injury   Wound history/plan of care: found open to air, was previously using medical device to the area EEG leads for continuous monitoring   Wound base:  blanchable  and epidermis     Palpation of the wound bed: normal      Drainage: none     Description of drainage: none     Measurements (length x width x depth, in cm): none     Tunneling: N/A     Undermining: N/A  Periwound skin: Intact      Color: normal and consistent with surrounding tissue      Temperature: normal   Odor: none  Pain: no grimacing or signs of discomfort, none  Pain interventions prior to dressing change: N/A  Treatment goal: Protection  STATUS: improving  Supplies ordered: discussed with RN      Treatment Plan:     Sacrum wound care: every other day and prn:  Cleanse as needed with mild soap and water or wound cleanser, dry well.  Swab area with no-sting skin barrier film, let dry.  Cover with Mepilex Sacral.  PIP measures.         Skin care precautions  EFFECTIVE NOW        Comments: Pressure Injury Prevention (PIP) Plan:  If patient is declining pressure injury prevention interventions: Explore reason why and address patient's concerns, Educate on pressure injury risk and prevention  "intervention(s), If patient is still declining, document \"informed refusal\" , and Ensure Care team is aware ( provider, charge nurse, etc)  Mattress: Follow bed algorithm, reassess daily and order specialty mattress, if indicated.  HOB: Maintain at or below 30 degrees, unless contraindicated  Repositioning in bed: Every 1-2 hours , Left/right positioning; avoid supine, and Raise foot of bed prior to raising head of bed, to reduce patient sliding down (shear)  Heels: Keep elevated off mattress and Pillows under calves  Protective Dressing: see wound care orders please  Chair positioning: Chair cushion (#[id number]) , Assist patient to reposition hourly, and Do NOT use a donut for sitting (this increases pressure to smaller area and creates a higher potential for injury)    If patient has a buttock pressure injury, or high risk for PI use chair cushion or SPS.  Moisture Management: Perineal cleansing /protection: Follow Incontinence Protocol, Avoid brief in bed, Clean and dry skin folds with bathing , Consider InterDry (#[id number]) between folds, and Moisturize dry skin  Under Devices: Inspect skin under all medical devices during skin inspection , Ensure tubes are stabilized without tension, and Ensure patient is not lying on medical devices or equipment when repositioned  Ask provider to discontinue device when no longer needed.          Orders: Reviewed and Updated. No orders needed for forehead, medical device not longer in use and skin intact     RECOMMEND PRIMARY TEAM ORDER: None, at this time  Education provided: importance of repositioning, plan of care, wound progress, Moisture management, Hygiene, and Off-loading pressure  Discussed plan of care with: Patient and Nurse  WOC nurse follow-up plan: weekly  Notify WOC if wound(s) deteriorate.  Nursing to notify the Provider(s) and re-consult the WOC Nurse if new skin concern.    DATA:     Current support surface: Standard  Low air loss (TRACY pump, Isolibrium, Pulsate, " skin guard, etc)  Containment of urine/stool: Incontinence Protocol, Incontinent pad in bed, and Purewick external catheter   BMI: Body mass index is 19.2 kg/m .   Active diet order: Orders Placed This Encounter      NPO for Medical/Clinical Reasons Except for: Other; Specify: NPO For oral intake and gastric feedings for 6 hours post insertion Gastrostomy G/GJ tube. May feed through Jejunal or Distal PORT immediately for GJ tubes ONLY.     Output: I/O last 3 completed shifts:  In: 1130 [NG/GT:1130]  Out: -      Labs:   Recent Labs   Lab 11/01/23  0625   HGB 9.1*   WBC 8.5     Pressure injury risk assessment:   Sensory Perception: 1-->completely limited  Moisture: 2-->very moist  Activity: 1-->bedfast  Mobility: 1-->completely immobile  Nutrition: 3-->adequate  Friction and Shear: 1-->problem  Luis Score: 9    Irene Muñoz RN CWOCN  -Securely message with [employer] ([facility] [employer])   -[facility] Office Phone: [phone] (messages checked periodically Mon-Fri 8a-4p)

## 2023-11-02 NOTE — PLAN OF CARE
Goal Outcome Evaluation:       Patient no responsive. Opens eyes occasionally, does not follow, not following commands. VSS. Trache in place, suctioned x 3, tolerating well. NPO, on TF at 35 ml/hr with 100 ml flushes q 4 hr. External catheter in place. Mepilex on sacrum, changed earlier today. Moves legs spontaneously. Turned and repositioned q 2 hrs. Patient appears diaphoretic, pillows changed.

## 2023-11-02 NOTE — PROGRESS NOTES
Respiratory care note - SpO2 96% on trach dome @ 30% O2. Suctioned for small amount thin, cloudy secretions X 3 this shift. Trach care done and inner cannula changed. Sutures appear slightly red. RN aware. RT to continue to follow.

## 2023-11-02 NOTE — PLAN OF CARE
Reason for Admission: L ICA stenosis/ Amaurosis Fugax    Cognitive/Mentation: A/Ox 4  Neuros/CMS: Intact   VS: stable.   Tele: NSR.  GI: Last BM 11/2. Continent.  : Continent.  Pulmonary: LS clear.  Pain: denies.     Drains/Lines: PIV  Skin: intact  Activity:  Independent.  Diet: regular thin liquids. Takes pills whole.     Discharge: pending surgery    Aggression Stoplight Tool: green    End of shift summary: Patient NPO after midnight for L CEA tomorrow afternoon/ time not confirmed yet

## 2023-11-02 NOTE — PROGRESS NOTES
Chart reviewed  Note pt has completed Levaquin (no longer need to hold TF)    No BM noted yesterday  (10/31 - received miralax)    Currently running Vital 1.5 @ 40 mL/hr    PLAN:  Will adjust TF rate now that levaquin is completed-    Nutrition Support Enteral:  Type of Feeding Tube: PEG  Enteral Frequency:  Continuous  Enteral Regimen: Vital 1.5 @ 35 mL/hr + 1 bottle Expedite  Total Enteral Provisions: 1360 cals (30 cals/kg), 67 gm pro (1.5 gm/kg), 5 gm fiber, 642 mL free water  Free Water Flush: 100 mL every 4 hrs    Anna Walker RD, LD  Clinical Dietitian - [facility]   Pager - [phone]

## 2023-11-02 NOTE — PROGRESS NOTES
[facility]    Neurology Progress Note     Assessment & Plan   Kortney Germain is a 68 year old female who was admitted on 9/28/2023.  The patient is status post prolonged encephalopathy with respiratory failure and intubation following new onset refractory status epilepticus.  Despite aggressive treatment including multiple antiepileptic drugs, course of corticosteroids IV, tracheostomy, treatment of possible pneumonia, clinical improvement was not observed.  She has been in the hospital since 9/28/2023.  I think the patient has poor prognosis for a meaningful recovery.    Tiffanie Reynoso MD    Interval History   Clinical situation did not improve.  The patient does not follow commands.  She opens the eyes but does not follow with the eyes.  Continues to have on and off movements of the jaw as well as tremors and shaking of legs or hands.  While asleep there is no movement.  The patient continues to be on clobazam, Depakote, lacosamide.  She is on as needed dosage of Ativan as needed for movements.  She has a tracheostomy.    Physical Exam   Temp: (P) 99  F (37.2  C) Temp src: (P) Axillary BP: (!) (P) 180/84 Pulse: (P) 68   Resp: (P) 18 SpO2: (P) 98 % O2 Device: (P) Trach dome Oxygen Delivery: (P) 30 LPM  Vitals:    10/22/23 0600 10/24/23 0400 10/25/23 0400   Weight: 46 kg (101 lb 6.6 oz) 45.9 kg (101 lb 1.6 oz) 44.6 kg (98 lb 5.2 oz)     Vital Signs with Ranges  Temp:  [97  F (36.1  C)-99  F (37.2  C)] (P) 99  F (37.2  C)  Pulse:  [59-71] (P) 68  Resp:  [18] (P) 18  BP: (107-134)/(59-88) (P) 180/84  FiO2 (%):  [30 %] 30 %  SpO2:  [93 %-98 %] (P) 98 %  I/O last 3 completed shifts:  In: 1230 [NG/GT:1230]  Out: 800 [Urine:800]      Medications    dextrose        atenolol  25 mg Oral or Feeding Tube Daily    clobazam  20 mg Oral or Feeding Tube BID    heparin ANTICOAGULANT  5,000 Units Subcutaneous Q8H    lacosamide  300 mg Oral or Feeding Tube BID    multivitamins w/minerals  15 mL  Oral or Feeding Tube Daily    [Held by provider] polyethylene glycol  17 g Oral or Feeding Tube BID    sodium chloride (PF)  10-40 mL Intracatheter Q7 Days    sodium chloride (PF)  3 mL Intracatheter Q8H    valproic acid  250 mg Oral Q8H    wound support modular  60 mL Oral Daily at 4 pm       Data   Results for orders placed or performed during the hospital encounter of 09/28/23 (from the past 24 hour(s))   Glucose by meter   Result Value Ref Range    GLUCOSE BY METER POCT 116 (H) 70 - 99 mg/dL   Platelet count   Result Value Ref Range    Platelet Count 495 (H) 150 - 450 10e3/uL   Glucose by meter   Result Value Ref Range    GLUCOSE BY METER POCT 117 (H) 70 - 99 mg/dL

## 2023-11-03 NOTE — PROGRESS NOTES
Pt remains on 30% Trach dome through out the night. Suctioned for small thin/thick cloudy secretions. Trach dressing changed. Slight redness around stoma. Will continue to monitor.    Archie Soto, RT

## 2023-11-03 NOTE — PLAN OF CARE
Pt not responsive, opens eyes occasionally, does not follow commands    R facial droop, able to withdraw all extremities, some jerking muscle movement in the RLE, extremities contracted    VSS on RA, trach in place, suctioned PRN (Multiple times this shift), tolerating suctioning, temp has been running high     NPO, TF running at 35 mL/hr w/ 100 mL flushes Q4H     Purewick in place    Mepilex on sacrum; sacrum red blanchable, wound on sacrum    Q2Ts; A2     Pt has been warm and diaphoretic throughout the shift, interdisciplinary care conference scheduled for 11/3 @ 11:30 AM, discharge pending

## 2023-11-03 NOTE — PLAN OF CARE
BP (!) 157/79 (BP Location: Right arm)   Pulse 67   Temp 98.7  F (37.1  C) (Axillary)   Resp 20   Wt 44.6 kg (98 lb 5.2 oz)   SpO2 97%   BMI 19.20 kg/m      Patient name: Kortney Germain    Nursing shift note  Summary: Patient in with seizures, TD, FTT  Alertness/orientation: Somnolent  Neuro: Non command following ,contracted, moves lower extremities spontganeously  Cardiac: WDL  Resp: Coarse lung sounds, suctioned 3x  GI: No BM  : Purewick  Mobility: Lift  CMS: Intact  Pain:   Takes meds: Via NG tube  Skin: Wound dressing left in place, clean dry and intact  Plan: More goals of care discussions next week  Hygiene: Gown changed and cleaned up in bed with nursing assistant  Other Important Info:   Charting:  Hourly rounding Yes  Electrolyte Protocols Phosphorus  DVT prophylaxis PCDs    James Ewing, RN  [facility] [address] [facility]  [phone]

## 2023-11-03 NOTE — PROGRESS NOTES
"[facility]    Medicine Progress Note - Hospitalist Service    Date of Admission:  9/28/2023    Assessment & Plan   Kortney Germain is a 68 year old female with a past medical history significant for schizoaffective disorder, tardive dyskinesia, hypertension, anxiety, depression, odynophagia who was admitted from her group home (no next of kin) on 9/28/2023 with abdominal pain and back pain.  She was diagnosed with cystitis and stool impaction.  Her course was complicated by acute encephalopathy with loss of CNS failure/loss of protective airway reflexes requiring intubation on 9/29/2023.  She has been in ICU since that time, hospitalist service was asked to transfer patient out of ICU on 10/24/2023        Prolonged encephalopathy following presentation of new onset refractory status epilepticus  Seizure disorder  *EEG abnormalities included frequent generalized periodic epileptic discharges.    *CT Head 9/29: no acute intracranial pathology, brain atrophy, CSVID  *MRI 9/30/23: no acute intracranial process, generalized atrophy, CSVID  *MRI 10/11/23: no acute intracranial process, no hemorrhage, moderate-advanced brain atrophy and leukoaraiosis, CSVID   *MRI 10/20/23: no interval change   *BC: NG  *CSF analysis :glucose 98 protein 22.8, 2 nucleated cells, RBC 0, aerobic culture GPC in broth only anaerobic culture NGTD, HSV negative  *[place] encephalopathy autoimmune panel negative  *Neurology is consulted and following (reviewe neuro note from 10/29 for details.)  Note reviewed and recommendations to continue lorazepam 1 to 2 mg IV up to twice daily as needed for repetitive chin or extremity movement concerning for breakthrough seizure activity lasting more than 2 minutes continuous.  Patient did not improve to high-dose steroids as treatment option for refractory status epilepticus with methylprednisone 1 g daily for 5 days.    *per neurology note from 10/29, \" Given the duration of the " "patient's encephalopathy despite extensive treatment and without evidence of clinical improvement offers poor prognosis for meaningful recovery. \"  *completed high dose solumedrol on 10/31  - continue above regimen for anti-epileptics: lacosamide 200 mg p.o. daily, clobazam 20 mg twice daily and Depakote 250 mg p.o. 3 times daily.  - ethics team is consulted, noted from 10/28 reviewed, recommending interdisciplinary team meeting involving ethics and risk. Care transition team following, likely plan for interdisciplinary meeting in the coming day  - neurology following - planning for ethics and interdisciplinary meeting 11/3 regarding complex case and poor prognosis in setting of no family/NOK   ---> will request further input from neurocritical care  - patient continues to be encephalopathic and unresponsive     Acute hypoxic respiratory failure secondary to CNS failure/loss of protective airway reflexes on prolonged mechanical ventilatory support status post trach tube placement on 10/20/2023 by Dr. Benites  - Keep trach in place while comatose.  Remains on 30 L 30% oxygen for 2 weeks+ as of 11/2     Possible hospital-acquired pneumonia-treated  Urinary tract infection-treated  *low-grade temp of 99 on 10/23/2023 with low-grade leukocytosis  *urine culture grew Pseudomonas  *Was started on empiric cefepime on 10/24/2023.  However this lowers seizure threshold.  Discontinued and transition to IV ceftriaxone.  Patient is allergic to penicillin so cannot use Zosyn.  Meropenem also reduce the seizure threshold.  * MRSA swab negative. Sputum culture shows mixed christina  *Chest x-ray--> possible retrocardiac infiltrate  - Ceftriaxone stopped and Levaquin initiated per ID. Course completed on 10/30     Moderate malnutrition, status post PEG tube placement 10/27 by general surgery.    *General surgery saw the patient on 10/13/2023 there were plans for surgical PEG tube the following Monday.  This was later canceled on " 10/16/2023 by a different surgeon over concerns about consent.  *Tentative plan was long-term smallbore feeding tube and lieu of PEG although this certainly poses risk of tissue damage to the nasal cavity and surrounding tissues  *Reactivate ethics consult on 10/25/2023 to assist with decision-making regarding PEG tube placement, differing opinions/willingness to proceed between surgeons.  Ethics in agreement with PEG tube placement. Please see noted from 10/26/23. General surgery to place PEG 10/27.   - TF managed per nutrition, appears at goal as of 11/2     Hypertension  *Was not on any PTA antihypertensives. PTA on Nifedipine on MAR  - being treated with Lasix IV then changed to 40mg PO BID on 10/26 - lasix stopped 10/30  - started on Atenolol 25mg daily on 10/30; add holding parameters 11/3  - monitor bp and adjust as needed      Anemia normocytic likely secondary to prolonged critical illness and frequent phlebotomy  - Hemoglobin stable around 9 from 10/24 - 11/1     Thrombocytosis - variable  - monitor periodically     Schizoaffective disorder  Tardive dyskinesia  Anxiety, depression,  - Not currently on medications for these      Hypokalemia, due to diuretic therapy. K dropped to 3.1. she is on lasix 40 mg po daily. K allergies reviewed, past GI upset to Kcl. Ordered kcl 30 meq to be given on 10/29.  Monitor for allergic reactions after IV kcl administration. No issues reported.      Hypophosphatemia  Replace per protocol        Diet: NPO for Medical/Clinical Reasons Except for: Other; Specify: NPO For oral intake and gastric feedings for 6 hours post insertion Gastrostomy G/GJ tube. May feed through Jejunal or Distal PORT immediately for GJ tubes ONLY.  Adult Formula Drip Feeding: Continuous Vital 1.5; Gastrostomy; Goal Rate: 35; mL/hr; Decrease TF rate to 35 mL/hr (new goal); Do not advance tube feeding rate unless K+ is = or > 3.0, Mg++ is = or > 1.5, and Phos is = or > 1.9    DVT Prophylaxis: Pneumatic  Compression Devices  Liu Catheter: Not present  Lines: PRESENT      PICC 09/30/23 Triple Lumen Left Basilic ok to use-Site Assessment: WDL      Cardiac Monitoring: None  Code Status: Full Code      Clinically Significant Risk Factors              # Hypoalbuminemia: Lowest albumin = 3.4 g/dL at 9/30/2023  5:15 AM, will monitor as appropriate     # Hypertension: Noted on problem list         # Moderate Malnutrition: based on nutrition assessment           Disposition Plan    unclear         Toni Cantu MD  Hospitalist Service  [facility]  Securely message with [employer] (more info)  Text page via [employer] Paging/Directory   ______________________________________________________________________    Interval History   Pt seen and examined. No new changes. Remains unresponsive. Not laboring and does not appear agitated. No shaking movements seen.    Interdisciplinary meeting earlier this morning - will ask neurocrit care for consultation regarding concern of futility of care. Further meetings early next week.    Physical Exam   Vital Signs: Temp: 98.7  F (37.1  C) Temp src: Axillary BP: (!) 157/79 Pulse: 67   Resp: 20 SpO2: 97 % O2 Device: Trach dome Oxygen Delivery: 30 LPM  Weight: 98 lbs 5.2 oz    Gen: NAD, not responding, appears asleep currently  Resp: no focal crackles,  no wheezes, not laboring  CV: S1S2 heard, reg rhythm, reg rate  Abdo: soft, nontender, nondistended, bowel sounds present  Skin: no lesions visualized    Medical Decision Making       52 MINUTES SPENT BY ME on the date of service doing chart review, history, exam, documentation & further activities per the note.      Data

## 2023-11-04 NOTE — PROGRESS NOTES
Pt remains on 30% trach dome . Redness around stoma. Suctioned for small to moderate amounts of cloudy secretions. Will continue to monitor.    Archie Soto, RT

## 2023-11-04 NOTE — PROGRESS NOTES
Pt remains on High flow trach dome 30L FiO2 30% with SpO2 in the mid to upper 90's. There is some redness around stoma. Trach cares done. Suctioning moderate amount of creamy thick secretions from trach.  Will cont to monitor.  11/4/2023  Bronwyn Ledesma, RT

## 2023-11-04 NOTE — PLAN OF CARE
Pt in with status epilepticus, FTT. Unable to assess orientation, pt somnolent. Opens eyes to voice, does not follow commands. Able to withdraw all extremities, extremities contracted. VSS, trach in place and suctioned PRN, tolerated suctioning. Course lung sounds. No nonverbal indicators of pain. NPO, TF running at 35 mL/hr w/ 100 mL flushed Q4H. Meds through PEG tube. Purewick in place. Small BM/smear this shift. Mepilex on sacrum CDI, wound on sacrum. Up with lift. Plan is to discuss goals of again care next week.

## 2023-11-04 NOTE — PLAN OF CARE
Goal Outcome Evaluation:      Plan of Care Reviewed With: patient    Overall Patient Progress: improvingOverall Patient Progress: improving         Pt here with status epilepticus. No seizure activity noted. Neuros stable. Opens eyes spontaneously but not to command. Right eye blinks to threat. LEs moving this am back & forth but did not withdraw to pain. Non-verbal. Trach dome in place, suctioned prn, dressing changed. NPO. Peg tube feeding at goal, abdominal site CDI. Purewick in place for incontinence. Discharge plans pending.

## 2023-11-04 NOTE — PROGRESS NOTES
[facility]    Medicine Progress Note - Hospitalist Service    Date of Admission:  9/28/2023    Assessment & Plan   Kortney Germain is a 68 year old female with a past medical history significant for schizoaffective disorder, tardive dyskinesia, hypertension, anxiety, depression, odynophagia who was admitted from her group home (no next of kin) on 9/28/2023 with abdominal pain and back pain.  She was diagnosed with cystitis and stool impaction.  Her course was complicated by acute encephalopathy with loss of CNS failure/loss of protective airway reflexes requiring intubation on 9/29/2023.  She has been in ICU since that time, hospitalist service was asked to transfer patient out of ICU on 10/24/2023. As of 11/2 - 11/4 she remains non-responsive - neuro is following and meeting took place 11/3 to consider futility of cares. NCC consulted for further evaluation.         Prolonged encephalopathy following presentation of new onset refractory status epilepticus  Seizure disorder  *EEG abnormalities included frequent generalized periodic epileptic discharges.    *CT Head 9/29: no acute intracranial pathology, brain atrophy, CSVID  *MRI 9/30/23: no acute intracranial process, generalized atrophy, CSVID  *MRI 10/11/23: no acute intracranial process, no hemorrhage, moderate-advanced brain atrophy and leukoaraiosis, CSVID   *MRI 10/20/23: no interval change   *BC: NG  *CSF analysis :glucose 98 protein 22.8, 2 nucleated cells, RBC 0, aerobic culture GPC in broth only anaerobic culture NGTD, HSV negative  *[place] encephalopathy autoimmune panel negative  *Neurology is consulted and following (reviewe neuro note from 10/29 for details.)  Note reviewed and recommendations to continue lorazepam 1 to 2 mg IV up to twice daily as needed for repetitive chin or extremity movement concerning for breakthrough seizure activity lasting more than 2 minutes continuous.  Patient did not improve to high-dose steroids  "as treatment option for refractory status epilepticus with methylprednisone 1 g daily for 5 days.    *per neurology note from 10/29, \" Given the duration of the patient's encephalopathy despite extensive treatment and without evidence of clinical improvement offers poor prognosis for meaningful recovery. \"  *completed high dose solumedrol on 10/31  - continue above regimen for anti-epileptics: lacosamide 200 mg p.o. daily, clobazam 20 mg twice daily and Depakote 250 mg p.o. 3 times daily.  - ethics team is consulted, noted from 10/28 reviewed, recommending interdisciplinary team meeting involving ethics and risk. Care transition team following, likely plan for interdisciplinary meeting in the coming day  - neurology following - planning for ethics and interdisciplinary meeting 11/3 regarding complex case and poor prognosis in setting of no family/NOK              ---> will request further input from neurocritical care  - patient continues to be encephalopathic and unresponsive  - 11/4 no new changes     Acute hypoxic respiratory failure secondary to CNS failure/loss of protective airway reflexes on prolonged mechanical ventilatory support status post trach tube placement on 10/20/2023 by Dr. Benites  - Keep trach in place while comatose.  Remains on 30 L 30% oxygen for 2 weeks+ as of 11/2     Possible hospital-acquired pneumonia-treated  Urinary tract infection-treated  *low-grade temp of 99 on 10/23/2023 with low-grade leukocytosis  *urine culture grew Pseudomonas  *Was started on empiric cefepime on 10/24/2023.  However this lowers seizure threshold.  Discontinued and transition to IV ceftriaxone.  Patient is allergic to penicillin so cannot use Zosyn.  Meropenem also reduce the seizure threshold.  * MRSA swab negative. Sputum culture shows mixed christina  *Chest x-ray--> possible retrocardiac infiltrate  - Ceftriaxone stopped and Levaquin initiated per ID. Course completed on 10/30     Moderate malnutrition, status " post PEG tube placement 10/27 by general surgery.    *General surgery saw the patient on 10/13/2023 there were plans for surgical PEG tube the following Monday.  This was later canceled on 10/16/2023 by a different surgeon over concerns about consent.  *Tentative plan was long-term smallbore feeding tube and lieu of PEG although this certainly poses risk of tissue damage to the nasal cavity and surrounding tissues  *Reactivate ethics consult on 10/25/2023 to assist with decision-making regarding PEG tube placement, differing opinions/willingness to proceed between surgeons.  Ethics in agreement with PEG tube placement. Please see noted from 10/26/23. General surgery to place PEG 10/27.   - TF managed per nutrition, appears at goal as of 11/4     Hypertension  *Was not on any PTA antihypertensives. PTA on Nifedipine on MAR  - being treated with Lasix IV then changed to 40mg PO BID on 10/26 - lasix stopped 10/30  - started on Atenolol 25mg daily on 10/30; add holding parameters 11/3  - monitor bp and adjust as needed - variable but acceptable 11/4     Anemia normocytic likely secondary to prolonged critical illness and frequent phlebotomy  - Hemoglobin stable around 9 from 10/24 - 11/1     Thrombocytosis - variable  - monitor periodically     Schizoaffective disorder  Tardive dyskinesia  Anxiety, depression,  - Not currently on medications for these      Hypokalemia, due to diuretic therapy. K dropped to 3.1. she is on lasix 40 mg po daily. K allergies reviewed, past GI upset to Kcl. Ordered kcl 30 meq to be given on 10/29.  Monitor for allergic reactions after IV kcl administration. No issues reported.      Hypophosphatemia  Replace per protocol                 Diet: NPO for Medical/Clinical Reasons Except for: Other; Specify: NPO For oral intake and gastric feedings for 6 hours post insertion Gastrostomy G/GJ tube. May feed through Jejunal or Distal PORT immediately for GJ tubes ONLY.  Adult Formula Drip Feeding:  Continuous Vital 1.5; Gastrostomy; Goal Rate: 35; mL/hr; Decrease TF rate to 35 mL/hr (new goal); Do not advance tube feeding rate unless K+ is = or > 3.0, Mg++ is = or > 1.5, and Phos is = or > 1.9    DVT Prophylaxis: Heparin SQ  Liu Catheter: Not present  Lines: PRESENT             Cardiac Monitoring: None  Code Status: Full Code      Clinically Significant Risk Factors              # Hypoalbuminemia: Lowest albumin = 3.4 g/dL at 9/30/2023  5:15 AM, will monitor as appropriate     # Hypertension: Noted on problem list         # Moderate Malnutrition: based on nutrition assessment           Disposition Plan    unclear         Toni Cantu MD  Hospitalist Service  [facility]  Securely message with [employer]omsquare (more info)  Text page via [employer] Paging/Directory   ______________________________________________________________________    Interval History   Seen and examined. Eyes closed, not responding, no new findings or concerns per RN staff.    Physical Exam   Vital Signs: Temp: 99  F (37.2  C) Temp src: Axillary BP: (!) 159/84 Pulse: 67   Resp: 18 SpO2: 96 % O2 Device: Trach dome Oxygen Delivery: 30 LPM  Weight: 98 lbs 5.2 oz    Gen: NAD, not responding, appears asleep currently, no distress appreciated  Resp: no focal crackles,  no wheezes, not laboring  CV: S1S2 heard, reg rhythm, reg rate  Abdo: soft, nontender, nondistended, bowel sounds present  Skin: no lesions visualized    Medical Decision Making       40 MINUTES SPENT BY ME on the date of service doing chart review, history, exam, documentation & further activities per the note.      Data     I have personally reviewed the following data over the past 24 hrs:    16.1 (H)  \   9.1 (L)   / 397     145 105 24.0 (H) /  125 (H)   4.0 30 (H) 0.42 (L) \     Procal: N/A CRP: 9.23 (H) Lactic Acid: N/A

## 2023-11-05 NOTE — PROGRESS NOTES
Respiratory Care Note      Pt remains on High flow trach dome 30L FiO2 30% with SpO2 in the mid to upper 90's. There is some redness around stoma. Trach cares done. Suctioning moderate amount of creamy thick secretions from trach.    Will continue to monitor.

## 2023-11-05 NOTE — PROGRESS NOTES
[facility]   MICU Progress Note  Critical Care Progress Note      11/05/2023    Name: Kortney Germain MRN#: [id number]   Age: 68 year old YOB: 1955     [facility] Day# 38  ICU DAY # 1    MV DAY # 1             Problem List:   Principal Problem:    Status epilepticus (H)  Active Problems:    Urinary retention    Acute cystitis without hematuria    Constipation, unspecified constipation type    Anemia, unspecified type    HTN (hypertension)           Summary/Hospital Course:     68-year-old female patient with past medical history significant for schizoaffective disorder, tardive dyskinesia, she was initially admitted on 9/28/2023 for UTI and altered mental status, she was intubated and supported on a ventilator, and ultimately she was diagnosed with refractory status epilepticus with requirement for multiple AEDs, and she has not been able to tolerate down titration of her antiepileptic medications.  She had neuro work-up with unremarkable MRIs with and without gadolinium, she did have CSF analysis with bland results, and she did have a negative serum encephalopathy panel.  She had trach and PEG placed.  She did not improve even with 5-day course of IV Solu-Medrol.  This has been a challenging case ethically as there is no proxy decision-maker, ethics panel has been involved in the past to discuss fertility.  The patient continued to have evidence for status epilepticus on EEG continuous monitoring despite multiple AEDs.  Neurology recommended to transfer the patient to the ICU for IV Versed for status epilepticus control.      Assessment and plan :     Kortney Germain IS a 68 year old female admitted on 9/28/2023 for status epilepticus.   I have personally reviewed the daily labs, imaging studies, cultures and discussed the case with referring physician and consulting physicians.     My assessment and plan by system for this patient is as follows:    Neurology/Psychiatry:   1. Status  epilepticus despite being on multiple AEDs including clobazam, Keppra, Valproic acid and Vimpat.  2. Hx of schizoaffective disorder   3. Hx of tardive dyskinesia   Plan  -Neurology is on the case   -Per their recommendation, will start Versed gtt, will be titrated by neurology based on the EEG   -AEDs managed by neuro, she is on clobazam, Keppra, Valproic acid and Vimpat.     Cardiovascular:   1.Hemodynamically stable, no active issues, her last TTE on 3/9/2023 showed EF 60-65%, normal RV function and size   2. Hx of HTN on atenolol     Plan   -Monitor especially with versed drip  -Continue atenolol     Pulmonary/Ventilator Management:   1. Chronic hypoxemic respiratory failure due to seizure and inability to protect her airway, CT chest was done on 11/4, showed some atelectasis, she does have some mosaic attenuation so questioning some air trapping   Plan  - MV given the deep sedation with the versed gtt, , PEEP 5 and RR 16  - ABG  - Schedule DuoNeb TID     GI and Nutrition :   1. TF via G tube     Plan  -Continue TF    Renal/Fluids/Electrolytes:   1. No acute kidney injury    Plan  - monitor function and electrolytes as needed with replacement per ICU protocols.  - generally avoid nephrotoxic agents such as NSAID, IV contrast unless specifically required  - adjust medications as needed for renal clearance  - follow I/O's as appropriate.    Infectious Disease:   1. No signs of active infection while off antibiotics     Plan  -She does have increased secretions from the trachea, I will order CXR, and procal, if focal infiltrate, will start Abx and do infectious workup  - Monitor while off Abx    Endocrine:   No hypoglycemia   Plan  - ICU insulin protocol, goal sugar <180      Hematology/Oncology:   1. Leukocytosis   2. Anemia, no signs, symptoms of active blood loss    Plan  - Monitor, she does have increased secretions from the trachea, I will order CXR, and procal, if focal infiltrate, will start Abx and do  infectious workup      IV/Access:   1. Venous access - Left PICC   2. Arterial access - None   3.  Plan  - central access required and necessary      ICU Prophylaxis:   1. DVT: Hep Subq  2. Stress Ulcer: PPI  3. Restraints: Nonviolent soft two point restraints required and necessary for patient safety and continued cares and good effect as patient continues to pull at necessary lines, tubes despite education and distraction. Will readdress daily.   4. Feeding - TF  5. Family Update:No next of kin  6. Disposition - stay in the ICU    Chart documentation was completed, in part, with [employer] voice-recognition software. Even though reviewed, some grammatical, spelling, and word errors may remain.    Critical Care Time: 40 min.  I spent this time (excluding procedures) personally providing and directing critical care services at the bedside and on the critical care unit.     Praveena Bales MD   Pulmonary and Critical Care            Subjective       She is at her baseline, she is not responsive and does not follow commands.         Key Medications:      atenolol  25 mg Oral or Feeding Tube Daily    clobazam  20 mg Oral or Feeding Tube BID    heparin ANTICOAGULANT  5,000 Units Subcutaneous Q8H    lacosamide  250 mg Oral or Feeding Tube BID    levETIRAcetam  40 mg/kg Intravenous Once    levETIRAcetam  1,000 mg Intravenous Q12H    multivitamins w/minerals  15 mL Oral or Feeding Tube Daily    [Held by provider] polyethylene glycol  17 g Oral or Feeding Tube BID    sodium chloride (PF)  10-40 mL Intracatheter Q7 Days    sodium chloride (PF)  3 mL Intracatheter Q8H    valproic acid  250 mg Oral Q8H    wound support modular  60 mL Oral Daily at 4 pm      dextrose      - MEDICATION INSTRUCTIONS -                 Physical Examination:   Temp:  [98.1  F (36.7  C)-99.4  F (37.4  C)] 98.6  F (37  C)  Pulse:  [60-72] 64  Resp:  [14-16] 16  BP: (102-202)/() 117/59  FiO2 (%):  [30 %] 30 %  SpO2:  [95 %-98 %] 97 %    Intake/Output  Summary (Last 24 hours) at 11/5/2023 1444  Last data filed at 11/5/2023 1200  Gross per 24 hour   Intake 480 ml   Output 850 ml   Net -370 ml     Wt Readings from Last 4 Encounters:   10/25/23 44.6 kg (98 lb 5.2 oz)   06/08/23 40.8 kg (90 lb)   03/15/23 41 kg (90 lb 6.2 oz)   02/02/22 52.2 kg (115 lb)        FiO2 (%): 30 %  Resp: 16    No lab results found in last 7 days.          GEN: Chronically ill  HEENT: Trach noted  PULM: unlabored, clear anteriorly    CV/COR: RRR S1S2 no gallop,  No rub, no murmur  ABD: soft nontender, hypoactive bowel sounds  EXT:  No edema   NEURO: She opens her eyes spontaneously, she does not follow commands   SKIN: no obvious rash  LINES: clean, dry intact         Data:   All data and imaging reviewed     ROUTINE ICU LABS (Last four results)  CMP  Recent Labs   Lab 11/05/23  0657 11/04/23  1920 11/04/23 0623 11/04/23  0602 11/01/23  2212 10/31/23  1943 10/30/23  1800 10/30/23  0514   NA  --   --   --  145  --   --   --  142   POTASSIUM  --   --   --  4.0  --   --   --  3.5   CHLORIDE  --   --   --  105  --   --   --  100   CO2  --   --   --  30*  --   --   --  32*   ANIONGAP  --   --   --  10  --   --   --  10   * 86 125* 122*   < >  --    < > 120*   BUN  --   --   --  24.0*  --   --   --  26.0*   CR  --   --   --  0.42*  --   --   --  0.34*   GFRESTIMATED  --   --   --  >90  --   --   --  >90   LULU  --   --   --  8.7*  --   --   --  8.9   PHOS  --   --   --   --   --  3.5  --  2.2*    < > = values in this interval not displayed.     CBC  Recent Labs   Lab 11/05/23  0550 11/04/23  0602 11/02/23  0559 11/01/23  0625 10/31/23  0610 10/30/23  0514   WBC  --  16.1*  --  8.5 9.2 8.6   RBC  --  3.68*  --  3.71* 3.73* 3.77*   HGB  --  9.1*  --  9.1* 9.1* 9.3*   HCT  --  31.3*  --  31.3* 31.0* 31.6*   MCV  --  85  --  84 83 84   MCH  --  24.7*  --  24.5* 24.4* 24.7*   MCHC  --  29.1*  --  29.1* 29.4* 29.4*   RDW  --  20.9*  --  20.5* 20.4* 20.1*    397 495* 457* 457* 415     INRNo  "lab results found in last 7 days.  Arterial Blood GasNo lab results found in last 7 days.    All cultures:  No results for input(s): \"CULT\" in the last 168 hours.  Recent Results (from the past 24 hour(s))   CT Chest/Abdomen/Pelvis w Contrast    Narrative    EXAM: CT CHEST/ABDOMEN/PELVIS W CONTRAST  LOCATION: [facility]  DATE: 11/4/2023    INDICATION: Prolonged encephalopathy and hypoxic injury failure following new onset of refractory status epilepticus.  COMPARISON: Portable chest 10/24/2023, CT abdomen and pelvis 09/28/2023  TECHNIQUE: CT scan of the chest, abdomen, and pelvis was performed following injection of IV contrast. Multiplanar reformats were obtained. Dose reduction techniques were used.   CONTRAST: 48mL Isovue 370    FINDINGS:   LUNGS AND PLEURA: Trace bilateral pleural effusions with bibasilar dependent subsegmental atelectasis. Mild bronchial wall thickening. Mild ground glass opacity in upper lobes with faint mosaic pattern could represent small airway disease/air trapping.   Tracheostomy tip in mid trachea. Central airways otherwise patent.    MEDIASTINUM/AXILLAE: Left-sided PICC tip at cavoatrial junction. No mass/adenopathy nor pericardial effusion the thoracic aorta and heart are normal in size. No central pulmonary emboli..    CORONARY ARTERY CALCIFICATION: Mild.    HEPATOBILIARY: Normal.    PANCREAS: Normal.    SPLEEN: Normal.    ADRENAL GLANDS: Normal.    KIDNEYS/BLADDER: Simple left renal cortical cyst does not require imaging follow-up. No upper urinary tract calculus nor hydronephrosis. Layering density within the dependent portion of the bladder could represent tiny stones or debris. Left periureteral   diverticulum unchanged. No evidence of cystitis.    BOWEL: Diverticulosis of the colon. No acute inflammatory change. No obstruction. Gastrostomy tube in good position.    LYMPH NODES: No lymphadenopathy.    VASCULATURE: No aortoiliac aneurysm.    PELVIC ORGANS: " Enlarged myomatous uterus redemonstrated. No adnexal mass nor abnormal fluid collection.    MUSCULOSKELETAL: Stable benign bone islands within L5 and the sacrum. No suspicious osseous lesions.      Impression    IMPRESSION:  1.  Bibasilar dependent atelectasis and trace effusions. No focal pneumonia.  2.  Layering density within the bladder lumen could represent debris or calculi. No evidence of cystitis nor hydronephrosis.  3.  Otherwise stable exam.       Clinically Significant Risk Factors              # Hypoalbuminemia: Lowest albumin = 3.4 g/dL at 9/30/2023  5:15 AM, will monitor as appropriate     # Hypertension: Noted on problem list         # Moderate Malnutrition: based on nutrition assessment

## 2023-11-05 NOTE — PROGRESS NOTES
[facility]    Medicine Progress Note - Hospitalist Service    Date of Admission:  9/28/2023    Assessment & Plan   Kortney Germain is a 68 year old female with a past medical history significant for schizoaffective disorder, tardive dyskinesia, hypertension, anxiety, depression, odynophagia who was admitted from her group home (no next of kin) on 9/28/2023 with abdominal pain and back pain.  She was diagnosed with cystitis and stool impaction.  Her course was complicated by acute encephalopathy with loss of CNS failure/loss of protective airway reflexes requiring intubation on 9/29/2023.  She has been in ICU since that time, hospitalist service was asked to transfer patient out of ICU on 10/24/2023. As of 11/2 - 11/4 she remains non-responsive - neuro is following and meeting took place 11/3 to consider futility of cares. NCC consulted for further evaluation.      ?  Recurrence of status epilepticus versus persistent  Prolonged encephalopathy following presentation of new onset refractory status epilepticus  Seizure disorder  *EEG abnormalities included frequent generalized periodic epileptic discharges.    *CT Head 9/29: no acute intracranial pathology, brain atrophy, CSVID  *MRI 9/30/23: no acute intracranial process, generalized atrophy, CSVID  *MRI 10/11/23: no acute intracranial process, no hemorrhage, moderate-advanced brain atrophy and leukoaraiosis, CSVID   *MRI 10/20/23: no interval change   *BC: NG  *CSF analysis :glucose 98 protein 22.8, 2 nucleated cells, RBC 0, aerobic culture GPC in broth only anaerobic culture NGTD, HSV negative  *Adel encephalopathy autoimmune panel negative  *Neurology is consulted and following (reviewe neuro note from 10/29 for details.)  Note reviewed and recommendations to continue lorazepam 1 to 2 mg IV up to twice daily as needed for repetitive chin or extremity movement concerning for breakthrough seizure activity lasting more than 2 minutes  "continuous.  Patient did not improve to high-dose steroids as treatment option for refractory status epilepticus with methylprednisone 1 g daily for 5 days.    *per neurology note from 10/29, \" Given the duration of the patient's encephalopathy despite extensive treatment and without evidence of clinical improvement offers poor prognosis for meaningful recovery. \"  *completed high dose solumedrol on 10/31  - continue above regimen for anti-epileptics: lacosamide 200 mg p.o. daily, clobazam 20 mg twice daily and Depakote 250 mg p.o. 3 times daily.  - ethics team is consulted, noted from 10/28 reviewed, recommending interdisciplinary team meeting involving ethics and risk. Care transition team following, likely plan for interdisciplinary meeting in the coming day  - neurology following - planning for ethics and interdisciplinary meeting 11/3 regarding complex case and poor prognosis in setting of no family/NOK  - 11/4 -Neuro critical care consulted and exhaustive work-up initiated-greatly appreciate their assistance      --> EEG reportedly again showing status epilepticus.  Discussed with general neurology and neuro critical care/stroke-per epileptologist, patient was transferred back to ICU with plans for mechanical ventilation, sedation, and trial of another agent epileptic agent.  Discussed with intensivist staff and consult placed-greatly appreciate their assistance.  - patient has continued to be unresponsive, 11/2-11/5-no obvious shaking movements visualized during my encounters     Acute hypoxic respiratory failure secondary to CNS failure/loss of protective airway reflexes on prolonged mechanical ventilatory support status post trach tube placement on 10/20/2023 by Dr. Benites  - Keep trach in place while comatose.  Remains on 30 L 30% oxygen for 2 weeks+ as of 11/2     Possible hospital-acquired pneumonia-treated  Urinary tract infection-treated  *low-grade temp of 99 on 10/23/2023 with low-grade " leukocytosis  *urine culture grew Pseudomonas  *Was started on empiric cefepime on 10/24/2023.  However this lowers seizure threshold.  Discontinued and transition to IV ceftriaxone.  Patient is allergic to penicillin so cannot use Zosyn.  Meropenem also reduce the seizure threshold.  * MRSA swab negative. Sputum culture shows mixed christina  *Chest x-ray--> possible retrocardiac infiltrate  - Ceftriaxone stopped and Levaquin initiated per ID. Course completed on 10/30     Moderate malnutrition, status post PEG tube placement 10/27 by general surgery.    *General surgery saw the patient on 10/13/2023 there were plans for surgical PEG tube the following Monday.  This was later canceled on 10/16/2023 by a different surgeon over concerns about consent.  *Tentative plan was long-term smallbore feeding tube and lieu of PEG although this certainly poses risk of tissue damage to the nasal cavity and surrounding tissues  *Reactivate ethics consult on 10/25/2023 to assist with decision-making regarding PEG tube placement, differing opinions/willingness to proceed between surgeons.  Ethics in agreement with PEG tube placement. Please see noted from 10/26/23. General surgery to place PEG 10/27.   - TF managed per nutrition, appears at goal as of 11/4     Hypertension  *Was not on any PTA antihypertensives. PTA on Nifedipine on MAR  - being treated with Lasix IV then changed to 40mg PO BID on 10/26 - lasix stopped 10/30  - started on Atenolol 25mg daily on 10/30; add holding parameters 11/3  - monitor bp and adjust as needed - variable but acceptable 11/4     Anemia normocytic likely secondary to prolonged critical illness and frequent phlebotomy  - Hemoglobin stable around 9 from 10/24 - 11/1     Thrombocytosis - variable  - monitor periodically     Schizoaffective disorder  Tardive dyskinesia  Anxiety, depression,  - Not currently on medications for these      Hypokalemia, due to diuretic therapy. K dropped to 3.1. she is on  lasix 40 mg po daily. K allergies reviewed, past GI upset to Kcl. Ordered kcl 30 meq to be given on 10/29.  Monitor for allergic reactions after IV kcl administration. No issues reported.      Hypophosphatemia  Replace per protocol                    Diet: NPO for Medical/Clinical Reasons Except for: Other; Specify: NPO For oral intake and gastric feedings for 6 hours post insertion Gastrostomy G/GJ tube. May feed through Jejunal or Distal PORT immediately for GJ tubes ONLY.  Adult Formula Drip Feeding: Continuous Vital 1.5; Gastrostomy; Goal Rate: 35; mL/hr; Decrease TF rate to 35 mL/hr (new goal); Do not advance tube feeding rate unless K+ is = or > 3.0, Mg++ is = or > 1.5, and Phos is = or > 1.9    DVT Prophylaxis: Heparin SQ  Liu Catheter: Not present  Lines: PRESENT      PICC 09/30/23 Triple Lumen Left Basilic ok to use-Site Assessment: WDL      Cardiac Monitoring: None  Code Status: Full Code      Clinically Significant Risk Factors              # Hypoalbuminemia: Lowest albumin = 3.4 g/dL at 9/30/2023  5:15 AM, will monitor as appropriate     # Hypertension: Noted on problem list         # Moderate Malnutrition: based on nutrition assessment           Disposition Plan    3+ days         Toni Cantu MD  Hospitalist Service  [facility]  Securely message with [employer] (more info)  Text page via [employer] Paging/Directory   ______________________________________________________________________    Interval History   Patient seen and examined.  Eyes open but continues to be not responsive and not interactive.  No shaking movements noted.  No nystagmus noted.  Patient does not appearing in discomfort and is not laboring to breathe.    Contacted by neuro critical care/neuro stroke-EEG again shows status and patient is to be transferred back to the ICU as well.    Physical Exam   Vital Signs: Temp: 99  F (37.2  C) Temp src: Axillary BP: 133/74 Pulse: 70   Resp: 16 SpO2: 95 % O2 Device:  Trach dome Oxygen Delivery: 30 LPM  Weight: 98 lbs 5.2 oz    Gen: NAD, not responding, appears asleep currently, no distress appreciated, eyes open today  Resp: no focal crackles,  no wheezes, not laboring  CV: S1S2 heard, reg rhythm, reg rate  Abdo: soft, nontender, nondistended, bowel sounds present  Skin: no lesions visualized    Medical Decision Making       53 MINUTES SPENT BY ME on the date of service doing chart review, history, exam, documentation & further activities per the note.      Data     I have personally reviewed the following data over the past 24 hrs:    N/A  \   N/A   / 341     N/A N/A N/A /  100 (H)   N/A N/A N/A \

## 2023-11-05 NOTE — PLAN OF CARE
Pt in with status epilepticus, FTT.  No seizure activity noted. Unable to assess orientation, pt somnolent. Opens eyes spontaneously, does not follow commands. Contractures in all extremities. No response to stimuli in upper extremities, lower extremities withdraw to pain.  VSS except HTN @1956, /100, hydralazine given and BP at recheck was 150/88. And prn tylenol given for nonverbal indicators of pain. Trach in place with dome and trach suctioned PRN, tolerated suctioning. Course lung sounds. NPO, TF running at goal rate, 35 mL/hr w/ 100 mL flushed Q4H. Abdominal site CDI. Meds through PEG tube. Purewick in place, voiding well. Small BM/smear this shift that was brown/john colored. Mepilex replaced on sacrum, wound on sacrum. Up with lift. Discharge pending.

## 2023-11-05 NOTE — PROGRESS NOTES
[facility]    Stroke Progress Note    Interval Events  Trial of 10 mg Ambien today: has had some paradoxical effect on minimally wakeful patients. UPDATE: she did not have a positive trail with the same and remained status quo.    Decreased dose of Lacosamide due to prolonged MA interval; will get on cEEG to monitor for breakthrough seizures    Yesterday's 2 hour EEG was abnormal due to the presence of severe generalized slowing of the background activities and intermittent prolonged runs of generalized periodic discharges (GPDs) at 1-2 hz, waxing and waning during the recording. Discussed with Epileptologist who opined this is likely status epilepticus and therefore she is being transferred to the ICU to be intubated and started on Versed gtt. Will consider 4th agent of AED as well.    HPI Summary  Kortney Germain is a 68 year old female with PMHx significant for anxiety/depression, schizoaffective disorder (living in group home), HTN, odynophagia, tardive dyskinesia, HTN. She presented 9/28 with lower back pain and abdominal pain. She was found to have UTI, anemia (received 1 unit PRBC), and constipation. She had decreased LOC and generalized shaking overnight on 9/28. RRT called and pt was intubated on 9/29/23 for encephalopathy and concern that should would be unable to protect her airway. Initial EEG was concerning for status epilepticus for which she was started on ASDs. She has remained intubated and encephalopathic since admission. Attempted to decrease ASD doses 10/9 to facilitate recovery of consciousness but had recurrent seizures and increasingly active EEG. Brain MRI was repeated 10/11 which again showed no acute findings.     EEG 10/23/2024 showed no seizures. Neurocritical care signed off after patient was transferred to the floor on 10/24 and Neurology took over as the consultant group.    WORK UP THUS FAR:  - EEG 09/29/23: Abnormal due to the presences of generalized  periodic pattern consisting of 2-3.5 hz discharges, maximum negativity in bifrontal region, periodic pattern occupy 90% of the record and the remaining portion consist of delta slowing. T    - CT Head : no acute intracranial pathology, brain atrophy,   - MRI 23: no acute intracranial process, generalized atrophy,   - MRI 10/11/23: no acute intracranial process, no hemorrhage, moderate-advanced brain atrophy and leukoaraiosis,    - MRI 10/20/23: no interval change      BCx: NGTD ()      CSF analysis ()  -glucose 98  -protein 22.8  -2 nucleated cells, RBC 0  -aerobic culture GPC in broth only   -anaerobic culture NGTD  -HSV negative    Serum  Uneeda encephalopathy autoimmune panel negative  ------------------------------------------    New work up since re-consult on 11/3:    CT C/A/P :  No e/o or concern for malignancy    Ambien trial :  Negative      -----------------------------------------------------------------------    Impression   New onset refractory status epilepticus (on 3 AEDs currently)  Encephalopathy     Consult re: futility of care  Patient has no LNOK/decision makers    Plan  - Transfer back to ICU for intubation and starting Versed gtt  - cEEG  - continue Clobazam 20 mg twice daily and Depakote 250 mg 3 times daily (most recent levels 10/21/2023: Total 20, free 10).   - Decrease Lacosamide 300 mg twice daily (most recent level 10/9/2023 = 4.1) to 250 mg twice daily due to prolonged PA interval. Repeat EKG ordered for tomorrow.  - Plan to repeat LP : repeat basic studies (cell count/glucose/protein, basic cultures), Uneeda CSF AI Encephalopathy panel, anti-LG1 (this is associated with faciobrachial seizures, which pt has), anti-VGKC, anti-striational, anti-Hu, anti-Ma, anti-VGCC, enterovirus, EBV, VZV PCR, CMV, bartonella, mycoplasma, WNV, Twhipplei; collect extra CSF for additional PRN studies   - Ordering serum anti-LG1 Ab    Patient Follow-up    - final recommendation  "pending work-up     We will continue to follow.      The Stroke Staff is Dr. Hassan.     Saad Alfred MD  Vascular Neurology Fellow     To page me or covering stroke neurology team member, click here: AMCOM  Choose \"On Call\" tab at top, then select \"NEUROLOGY/ALL SITES\" from middle drop-down box, press Enter, then look for \"stroke\" or \"telestroke\" for your site.  ______________________________________________________    Clinically Significant Risk Factors              # Hypoalbuminemia: Lowest albumin = 3.4 g/dL at 9/30/2023  5:15 AM, will monitor as appropriate     # Hypertension: Noted on problem list         # Moderate Malnutrition: based on nutrition assessment             Medications   Scheduled Meds   atenolol  25 mg Oral or Feeding Tube Daily    clobazam  20 mg Oral or Feeding Tube BID    heparin ANTICOAGULANT  5,000 Units Subcutaneous Q8H    lacosamide  250 mg Oral or Feeding Tube BID    multivitamins w/minerals  15 mL Oral or Feeding Tube Daily    [Held by provider] polyethylene glycol  17 g Oral or Feeding Tube BID    sodium chloride (PF)  10-40 mL Intracatheter Q7 Days    sodium chloride (PF)  3 mL Intracatheter Q8H    valproic acid  250 mg Oral Q8H    wound support modular  60 mL Oral Daily at 4 pm    zolpidem  10 mg Oral Once       Infusion Meds   dextrose         PRN Meds  acetaminophen **OR** acetaminophen, albuterol, dextrose, glucose **OR** dextrose **OR** glucagon, hydrALAZINE, HYDROmorphone, lidocaine 4%, lidocaine (buffered or not buffered), LORazepam, melatonin, naloxone **OR** naloxone **OR** naloxone **OR** naloxone, nitroGLYcerin, sodium chloride (PF), sodium chloride (PF), sodium chloride (PF)       PHYSICAL EXAMINATION  Temp:  [98  F (36.7  C)-99.4  F (37.4  C)] 98.6  F (37  C)  Pulse:  [60-72] 64  Resp:  [14-18] 16  BP: (102-202)/() 117/59  FiO2 (%):  [30 %] 30 %  SpO2:  [95 %-99 %] 97 %      Neuro: Awakens to noxious stimulus, does not follow commands or fix/track examiner, " "spontaneously moves all four limbs symmetrically but minimally, no gaze deviation or nystagmus    Imaging  I personally reviewed all imaging; relevant findings per HPI.     Lab Results Data   CBC  Recent Labs   Lab 11/05/23  0550 11/04/23  0602 11/02/23  0559 11/01/23  0625 10/31/23  0610   WBC  --  16.1*  --  8.5 9.2   RBC  --  3.68*  --  3.71* 3.73*   HGB  --  9.1*  --  9.1* 9.1*   HCT  --  31.3*  --  31.3* 31.0*    397 495* 457* 457*     Basic Metabolic Panel    Recent Labs   Lab 11/05/23  0657 11/04/23  1920 11/04/23  0623 11/04/23  0602 10/30/23  1800 10/30/23  0514   NA  --   --   --  145  --  142   POTASSIUM  --   --   --  4.0  --  3.5   CHLORIDE  --   --   --  105  --  100   CO2  --   --   --  30*  --  32*   BUN  --   --   --  24.0*  --  26.0*   CR  --   --   --  0.42*  --  0.34*   * 86 125* 122*   < > 120*   LULU  --   --   --  8.7*  --  8.9    < > = values in this interval not displayed.     Liver Panel  No results for input(s): \"PROTTOTAL\", \"ALBUMIN\", \"BILITOTAL\", \"ALKPHOS\", \"AST\", \"ALT\", \"BILIDIRECT\" in the last 168 hours.  INR    Recent Labs   Lab Test 09/28/23  1244 03/07/23  1629   INR 1.04 1.15      Lipid Profile    Recent Labs   Lab Test 10/15/23  0650 10/03/23  0956 03/08/23  0830   CHOL  --   --  143   HDL  --   --  50   LDL  --   --  75   TRIG 136 1,372* 88     A1C  No lab results found.  Troponin  No results for input(s): \"CTROPT\", \"TROPONINIS\", \"TROPONINI\", \"GHTROP\" in the last 168 hours.       Data     "

## 2023-11-05 NOTE — PROVIDER NOTIFICATION
"Called to room for seizure activity, \"moving and making noises\". Witnessed tongue moving, left arm and both legs moving. Text to neuro critical care, 1 mg ativan given. Movement decreased, left arm still twitching at times, mouth slightly quivering. MD updated. Patient waiting to transfer to ICU.   "

## 2023-11-05 NOTE — PROGRESS NOTES
RECONSULT for second opinion          [facility]    Stroke Progress Note    Interval Events  She remains non-responsive - neuro is following and meeting took place 11/3 to consider futility of cares.   We were consulted for further evaluation for futility of care.     HPI Summary  Kortney Germain is a 68 year old female with PMHx significant for anxiety/depression, schizoaffective disorder (living in group home), HTN, odynophagia, tardive dyskinesia, HTN. She presented  with lower back pain and abdominal pain. She was found to have UTI, anemia (received 1 unit PRBC), and constipation. She had decreased LOC and generalized shaking overnight on . RRT called and pt was intubated on 23 for encephalopathy and concern that should would be unable to protect her airway. Initial EEG was concerning for status epilepticus for which she was started on ASDs. She has remained intubated and encephalopathic since admission. Attempted to decrease ASD doses 10/9 to facilitate recovery of consciousness but had recurrent seizures and increasingly active EEG. Brain MRI was repeated 10/11 which again showed no acute findings.     EEG 10/23/2024 showed no seizures. Neurocritical care signed off after patient was transferred to the floor on 10/24 and Neurology took over as the consultant group.    WORK UP THUS FAR:  - EEG 23: Abnormal due to the presences of generalized periodic pattern consisting of 2-3.5 hz discharges, maximum negativity in bifrontal region, periodic pattern occupy 90% of the record and the remaining portion consist of delta slowing. T    - CT Head : no acute intracranial pathology, brain atrophy,   - MRI 23: no acute intracranial process, generalized atrophy,   - MRI 10/11/23: no acute intracranial process, no hemorrhage, moderate-advanced brain atrophy and leukoaraiosis,    - MRI 10/20/23: no interval change      BCx: NGTD    CSF analysis   -glucose  "98  -protein 22.8  -2 nucleated cells, RBC 0  -aerobic culture GPC in broth only   -anaerobic culture NGTD  -HSV negative  -Vega encephalopathy autoimmune panel negative    Impression   New onset refractory status epilepticus  Encephalopathy    Consult re: futility of care  Patient has no LNOK/decision makers    Plan  - 2 hour EEG  - CT C/A/P    Patient Follow-up    - final recommendation pending work-up    We will continue to follow.     The Stroke Staff is Dr. Hassan.    Saad Alfred MD  Vascular Neurology Fellow    To page me or covering stroke neurology team member, click here: AMCOM  Choose \"On Call\" tab at top, then select \"NEUROLOGY/ALL SITES\" from middle drop-down box, press Enter, then look for \"stroke\" or \"telestroke\" for your site.  ______________________________________________________    Clinically Significant Risk Factors              # Hypoalbuminemia: Lowest albumin = 3.4 g/dL at 9/30/2023  5:15 AM, will monitor as appropriate     # Hypertension: Noted on problem list         # Moderate Malnutrition: based on nutrition assessment             Medications   Scheduled Meds   atenolol  25 mg Oral or Feeding Tube Daily    clobazam  20 mg Oral or Feeding Tube BID    heparin ANTICOAGULANT  5,000 Units Subcutaneous Q8H    lacosamide  300 mg Oral or Feeding Tube BID    multivitamins w/minerals  15 mL Oral or Feeding Tube Daily    [Held by provider] polyethylene glycol  17 g Oral or Feeding Tube BID    sodium chloride (PF)  10-40 mL Intracatheter Q7 Days    sodium chloride (PF)  3 mL Intracatheter Q8H    valproic acid  250 mg Oral Q8H    wound support modular  60 mL Oral Daily at 4 pm       Infusion Meds   dextrose         PRN Meds  acetaminophen **OR** acetaminophen, albuterol, dextrose, glucose **OR** dextrose **OR** glucagon, hydrALAZINE, HYDROmorphone, lidocaine 4%, lidocaine (buffered or not buffered), LORazepam, melatonin, naloxone **OR** naloxone **OR** naloxone **OR** naloxone, nitroGLYcerin, " "sodium chloride (PF), sodium chloride (PF), sodium chloride (PF)       PHYSICAL EXAMINATION  Temp:  [97.7  F (36.5  C)-99  F (37.2  C)] 98.2  F (36.8  C)  Pulse:  [64-77] 64  Resp:  [16-18] 16  BP: (108-159)/(50-85) 114/63  FiO2 (%):  [30 %] 30 %  SpO2:  [95 %-99 %] 98 %      Neuro: patient with eyes closed, did not open to command, but eventually opened eyes to noxious stimulation, triple flexes b/l LE, withdraws to pain b/l UE, no gaze deviation but does not fix or track, no verbal output    Imaging  I personally reviewed all imaging; relevant findings per HPI.     Lab Results Data   CBC  Recent Labs   Lab 11/04/23  0602 11/02/23  0559 11/01/23  0625 10/31/23  0610   WBC 16.1*  --  8.5 9.2   RBC 3.68*  --  3.71* 3.73*   HGB 9.1*  --  9.1* 9.1*   HCT 31.3*  --  31.3* 31.0*    495* 457* 457*     Basic Metabolic Panel    Recent Labs   Lab 11/04/23  0623 11/04/23  0602 11/03/23  1157 10/30/23  1800 10/30/23  0514 10/29/23  1610 10/29/23  0533   NA  --  145  --   --  142  --   --    POTASSIUM  --  4.0  --   --  3.5  --  3.1*   CHLORIDE  --  105  --   --  100  --   --    CO2  --  30*  --   --  32*  --   --    BUN  --  24.0*  --   --  26.0*  --   --    CR  --  0.42*  --   --  0.34*  --  0.41*   * 122* 109*   < > 120*   < > 141*   LULU  --  8.7*  --   --  8.9  --   --     < > = values in this interval not displayed.     Liver Panel  No results for input(s): \"PROTTOTAL\", \"ALBUMIN\", \"BILITOTAL\", \"ALKPHOS\", \"AST\", \"ALT\", \"BILIDIRECT\" in the last 168 hours.  INR    Recent Labs   Lab Test 09/28/23  1244 03/07/23  1629   INR 1.04 1.15      Lipid Profile    Recent Labs   Lab Test 10/15/23  0650 10/03/23  0956 03/08/23  0830   CHOL  --   --  143   HDL  --   --  50   LDL  --   --  75   TRIG 136 1,372* 88     A1C  No lab results found.  Troponin  No results for input(s): \"CTROPT\", \"TROPONINIS\", \"TROPONINI\", \"GHTROP\" in the last 168 hours.       Data     "

## 2023-11-05 NOTE — PROGRESS NOTES
[facility] ICU RESPIRATORY NOTE        Date of Admission: 9/28/2023    Date of Intubation (most recent): Trached 10/20/2023     Reason for Mechanical Ventilation: AW protection     Number of Days on Mechanical Ventilation: 1    Met Criteria for Spontaneous Breathing Trial: No    Reason for No Spontaneous Breathing Trial: Per MD.    Significant Events Today: Back on the VENT due to pt starting versed.     ABG Results: No lab results found in last 7 days.      Current Vent Settings: Vent Mode: CMV/AC  (Continuous Mandatory Ventilation/ Assist Control)  FiO2 (%): 30 %  Resp Rate (Set): 16 breaths/min  Tidal Volume (Set, mL): 400 mL  PEEP (cm H2O): 5 cmH2O  Resp: 22      Skin Assessment: There is some redness around stoma.     Plan: Continue to wean from mechanical ventilation and oxygen as tolerated per protocol.  Assess skin integrity at least once per shift.

## 2023-11-05 NOTE — PLAN OF CARE
Goal Outcome Evaluation:      Plan of Care Reviewed With: patient    Overall Patient Progress: improvingOverall Patient Progress: improving       Patient here with status ellipticus. Obtunded. Opens eyes at times. Bilateral LEs move at times. Both withdraw to pain at last assessment also. Non verbal. Seizure activity noted x 1, see note. Peg tube feeding at goal. Purewick for urinary incontinence. Incontinent of bowel x 1, soft, small amount. Trach suctioned x 1. Report to ICU, ready for transfer.

## 2023-11-06 NOTE — PROGRESS NOTES
[facility]    Stroke Progress Note    Interval Events  cEEG - : runs of GPEDs, 1-2 Hz alternating with generalized slowing; versed increased to 4 ml/hr ---> with improvement of GPEDS    LP done today    HPI Summary  Kortney Germain is a 68 year old female with PMHx significant for anxiety/depression, schizoaffective disorder (living in group home), HTN, odynophagia, tardive dyskinesia, HTN. She presented  with lower back pain and abdominal pain. She was found to have UTI, anemia (received 1 unit PRBC), and constipation. She had decreased LOC and generalized shaking overnight on . RRT called and pt was intubated on 23 for encephalopathy and concern that should would be unable to protect her airway. Initial EEG was concerning for status epilepticus for which she was started on ASDs. She has remained intubated and encephalopathic since admission. Attempted to decrease ASD doses 10/9 to facilitate recovery of consciousness but had recurrent seizures and increasingly active EEG. Brain MRI was repeated 10/11 which again showed no acute findings.     EEG 10/23/2024 showed no seizures. Neurocritical care signed off after patient was transferred to the floor on 10/24 and Neurology took over as the consultant group.     WORK UP THUS FAR:  - EEG 23: Abnormal due to the presences of generalized periodic pattern consisting of 2-3.5 hz discharges, maximum negativity in bifrontal region, periodic pattern occupy 90% of the record and the remaining portion consist of delta slowing. T    - CT Head : no acute intracranial pathology, brain atrophy,   - MRI 23: no acute intracranial process, generalized atrophy,   - MRI 10/11/23: no acute intracranial process, no hemorrhage, moderate-advanced brain atrophy and leukoaraiosis,    - MRI 10/20/23: no interval change      BCx: NGTD ()      CSF analysis ()  -glucose 98  -protein 22.8  -2 nucleated cells, RBC  0  -aerobic culture GPC in broth only   -anaerobic culture NGTD  -HSV negative     Serum  [place] encephalopathy autoimmune panel negative  ------------------------------------------     New work up since re-consult on 11/3:     CT C/A/P 11/4:  No e/o or concern for malignancy     Ambien trial 11/5:  Negative     sEEG 11/4: 1-2Hz GPDs, per Dr Castaneda on ictal-interictal continuum ~ status epilepticus    cEEG 11/5- : runs of GPEDs, 1-2 Hz alternating with generalized slowing; versed increased to 4 ml/hr ---> with improvement of GPEDS    CSF 11/6  Basic Profile: 2 nucleated cells, Protein 20, Glucose 88  Gram stain: no organism, 1+ WBC  Meningitis/Encephalitis panel: negative  Pending: [place] CSF AI Encephalopathy panel, anti-LG1 (this is associated with faciobrachial seizures, which pt has), anti-VGKC, anti-striational, anti-Hu, anti-Ma, anti-VGCC, enterovirus, EBV, VZV PCR, CMV, bartonella, mycoplasma, WNV    -----------------------------------------------------------------------     Impression   New onset refractory status epilepticus (on 4 AEDs and Midazolam gtt currently)  Encephalopathy        Plan  - cEEG  - continue Clobazam 20 mg twice daily and Depakote 250 mg 3 times daily (most recent levels 10/21/2023: Total 20, free 10).   - Decrease dLacosamide 300 mg twice daily on 11/5 (most recent level 10/9/2023 = 4.1) to 250 mg twice daily due to prolonged OK interval. Repeat EKG today shows OK interval has normalized (206 ---> 186)  - Repeat LP done today : repeating basic studies (cell count/glucose/protein, basic cultures), [place] CSF AI Encephalopathy panel, anti-LG1 (this is associated with faciobrachial seizures, which pt has), anti-VGKC, anti-striational, anti-Hu, anti-Ma, anti-VGCC, enterovirus, EBV, VZV PCR, CMV, bartonella, mycoplasma, WNV, treponema whippleii  - Ordering serum anti-LG1 Ab     Patient Follow-up    - final recommendation pending work-up     We will continue to follow.      The Stroke Staff is   "[facility].     Saad Alfred MD  Vascular Neurology Fellow     To page me or covering stroke neurology team member, click here: [facility]  Choose \"On Call\" tab at top, then select \"NEUROLOGY/ALL SITES\" from middle drop-down box, press Enter, then look for \"stroke\" or \"telestroke\" for your site.  ______________________________________________________    Clinically Significant Risk Factors              # Hypoalbuminemia: Lowest albumin = 3.4 g/dL at 9/30/2023  5:15 AM, will monitor as appropriate     # Hypertension: Noted on problem list         # Moderate Malnutrition: based on nutrition assessment             Medications   Scheduled Meds   chlorhexidine  15 mL Mouth/Throat Q12H    clobazam  20 mg Oral or Feeding Tube BID    heparin ANTICOAGULANT  5,000 Units Subcutaneous Q8H    ipratropium - albuterol 0.5 mg/2.5 mg/3 mL  3 mL Nebulization TID    lacosamide  250 mg Oral or Feeding Tube BID    levETIRAcetam  1,000 mg Intravenous Q12H    multivitamins w/minerals  15 mL Oral or Feeding Tube Daily    pantoprazole  40 mg Per Feeding Tube QAM AC    Or    pantoprazole  40 mg Intravenous QAM AC    [Held by provider] polyethylene glycol  17 g Oral or Feeding Tube BID    sodium chloride (PF)  10-40 mL Intracatheter Q7 Days    sodium chloride (PF)  3 mL Intracatheter Q8H    valproic acid  250 mg Oral Q8H    wound support modular  60 mL Oral Daily at 4 pm       Infusion Meds   dextrose      midazolam 4 mg/hr (11/06/23 0820)    norepinephrine 0.03 mcg/kg/min (11/06/23 0819)    - MEDICATION INSTRUCTIONS -      sodium chloride 10 mL/hr at 11/06/23 0700       PRN Meds  acetaminophen **OR** acetaminophen, albuterol, dextrose, glucose **OR** dextrose **OR** glucagon, hydrALAZINE, HYDROmorphone, lidocaine 4%, lidocaine (buffered or not buffered), LORazepam, melatonin, naloxone **OR** naloxone **OR** naloxone **OR** naloxone, nitroGLYcerin, - MEDICATION INSTRUCTIONS -, sodium chloride (PF), sodium chloride (PF), sodium chloride (PF)   " "    PHYSICAL EXAMINATION  Temp:  [97.9  F (36.6  C)-100.1  F (37.8  C)] 100  F (37.8  C)  Pulse:  [61-80] 61  Resp:  [4-40] 16  BP: ()/(40-85) 98/55  FiO2 (%):  [30 %] 30 %  SpO2:  [94 %-99 %] 98 %      Neuro: Spontaneously moving b/l LE symmetrically, mild posturing w b/l UE, no rhythmic movements, but grimacing noted with some frothing/pooling of saliva at the corner of the mouth, keeps eyes close shut, eyes are mildy dysconjugate, PERRL. Does not follow commands. Withdraws all four limbs to noxious stimulation.     Imaging  I personally reviewed all imaging; relevant findings per HPI.     Lab Results Data   CBC  Recent Labs   Lab 11/05/23  0550 11/04/23  0602 11/02/23  0559 11/01/23  0625 10/31/23  0610   WBC  --  16.1*  --  8.5 9.2   RBC  --  3.68*  --  3.71* 3.73*   HGB  --  9.1*  --  9.1* 9.1*   HCT  --  31.3*  --  31.3* 31.0*    397 495* 457* 457*     Basic Metabolic Panel    Recent Labs   Lab 11/06/23  1150 11/06/23  0800 11/06/23  0624 11/04/23  0623 11/04/23  0602   NA  --   --   --   --  145   POTASSIUM  --   --   --   --  4.0   CHLORIDE  --   --   --   --  105   CO2  --   --   --   --  30*   BUN  --   --   --   --  24.0*   CR  --   --   --   --  0.42*   * 127* 125*   < > 122*   LULU  --   --   --   --  8.7*    < > = values in this interval not displayed.     Liver Panel  No results for input(s): \"PROTTOTAL\", \"ALBUMIN\", \"BILITOTAL\", \"ALKPHOS\", \"AST\", \"ALT\", \"BILIDIRECT\" in the last 168 hours.  INR    Recent Labs   Lab Test 09/28/23  1244 03/07/23  1629   INR 1.04 1.15      Lipid Profile    Recent Labs   Lab Test 10/15/23  0650 10/03/23  0956 03/08/23  0830   CHOL  --   --  143   HDL  --   --  50   LDL  --   --  75   TRIG 136 1,372* 88     A1C  No lab results found.  Troponin  No results for input(s): \"CTROPT\", \"TROPONINIS\", \"TROPONINI\", \"GHTROP\" in the last 168 hours.       Data      "

## 2023-11-06 NOTE — PROGRESS NOTES
MEDICAL NECESSITY NOTE    Kortney Germain is a 68 year old female with PMHx significant for anxiety/depression, schizoaffective disorder (living in group home), HTN, odynophagia, tardive dyskinesia, HTN admitted with UTI on 9/28. She had a change in mental status/generalized shaking, intubated, sent to ICU. Ultimately diagnosed with new onset refractory status epilepticus (NORSE), started on multiple anti seizure meds     She has no known proxy decision makers; Ethics panel called 11/3 to discuss futility of care and stroke/NCC team was re-consulted to provide second opinion on futility. As part of our work up for the same, we have ordered lumbar puncture/ CSF studies. This note is to acknowledge the medical necessity of the lumbar puncture in making medical decisions on furthering this patient's care.      Saad Amaral MD  Vascular Neurology Fellow

## 2023-11-06 NOTE — PLAN OF CARE
Neurology Update:    Patient continued on EEG. Discussed with Dr Castaneda (epileptologist on call),runs of GPEDs, 1-2 Hz alternating with generalized slowing.Keppra 40 mg/kg @ 1549, versed started at 1542 @ 2.    Plan:  -Increase versed drip  -Continues on Onfi 20 mg BID, Vimpat 250 BID (decreased from 300 todoay for prolonged VA interval),  q8h, Keppra 1g BID  -Continue v EEG monitoring     Discussed with Dr Jelani WATSON  Vascular Neurology Fellow

## 2023-11-06 NOTE — PROVIDER NOTIFICATION
"Txt-pg neuro crit Dr. Alfred on behalf of x-ray: \"[facility] , Car, Isabelle: Pt has lumbar puncture. Nyasia, from x-ray has questions for you, can call [phone]\"  "

## 2023-11-06 NOTE — PROGRESS NOTES
Preliminary Video EEG impression on November 6, 2023 until 10 am     This video EEG is abnormal due to the presences of continuous generalized polymorphic delta/theta slowing with maximum slowing in the bifrontal region. There is superimposed generalized periodic pattern in nearly 25-50% of record. This generalized periodic pattern is 0.5-1 hz, rhythmic at times, maximum bifrontal, waxes and wanes.     IMPRESSION: The findings are consistent with a severe encephalopathy with superimposed generalized periodic pattern up to 1 Hz in frequency.  After 01: 30 EEG is more encephalopathic with brief periods of EEG attenuation and rare burst of generalized periodic discharges.  EEG findings are consistent with a severe diffuse encephalopathy with diffuse cortical irritability. Clinical correlation is advised.     Koki Gusman MD  Staff Epilepsy Neurologist   [phone]

## 2023-11-06 NOTE — PROGRESS NOTES
[facility] ICU RESPIRATORY NOTE        Date of Admission: 9/28/2023    Date of Intubation (most recent): 10/20/2023    Reason for Mechanical Ventilation: Airway Protection    Number of Days on Mechanical Ventilation: 2    Met Criteria for Spontaneous Breathing Trial: No    Reason for No Spontaneous Breathing Trial: Per MD    Significant Events Today: None    ABG Results:   Recent Labs   Lab 11/05/23 2120   PH 7.50*   PCO2 31*   PO2 89   HCO3 24   O2PER 30         Current Vent Settings: Vent Mode: CMV/AC  (Continuous Mandatory Ventilation/ Assist Control)  FiO2 (%): 30 %  Resp Rate (Set): 16 breaths/min  Tidal Volume (Set, mL): 330 mL  PEEP (cm H2O): 5 cmH2O  Resp: 16      Skin Assessment: Intact    Plan: Assess for weaning trial in the AM    RT Wendy on 11/6/2023 at 6:10 AM

## 2023-11-06 NOTE — PLAN OF CARE
Goal Outcome Evaluation:      Plan of Care Reviewed With: patient    Overall Patient Progress: decliningOverall Patient Progress: declining

## 2023-11-06 NOTE — PLAN OF CARE
Problem: Plan of Care - These are the overarching goals to be used throughout the patient stay.    Goal: Plan of Care Review  Description: The Plan of Care Review/Shift note should be completed every shift.  The Outcome Evaluation is a brief statement about your assessment that the patient is improving, declining, or no change.  This information will be displayed automatically on your shift note.  Recent Flowsheet Documentation  Taken 11/6/2023 1713 by Florencio Mariscal RN  Plan of Care Reviewed With: patient  Overall Patient Progress: declining  Taken 11/6/2023 1253 by Florencio Mariscal RN  Plan of Care Reviewed With: patient  Overall Patient Progress: declining     Problem: Enteral Nutrition  Goal: Absence of Aspiration Signs and Symptoms  Intervention: Minimize Aspiration Risk  Recent Flowsheet Documentation  Taken 11/6/2023 1713 by Florencio Mariscal RN  Oral Care:   oral rinse provided   suction provided   lip/mouth moisturizer applied   teeth brushed  Taken 11/6/2023 1600 by Florencio Mariscal RN  Oral Care:   lip/mouth moisturizer applied   mouth wash rinse   oral rinse provided   suction provided  Head of Bed (HOB) Positioning: HOB at 30 degrees  Taken 11/6/2023 1400 by Florencio Mariscal RN  Oral Care:   lip/mouth moisturizer applied   mouth wash rinse   oral rinse provided   suction provided  Head of Bed (HOB) Positioning: HOB at 30 degrees  Taken 11/6/2023 1253 by Florencio Mariscal RN  Oral Care: lip/mouth moisturizer applied  Taken 11/6/2023 1200 by Florencio Mariscal RN  Oral Care:   lip/mouth moisturizer applied   mouth wash rinse   oral rinse provided   suction provided  Head of Bed (HOB) Positioning: HOB at 30 degrees  Taken 11/6/2023 1000 by Florencio Mariscal RN  Oral Care:   lip/mouth moisturizer applied   mouth wash rinse   oral rinse provided   suction provided  Head of Bed (HOB) Positioning: HOB at 30 degrees  Taken 11/6/2023 0800 by Florencio Mariscal RN  Oral Care:   lip/mouth  moisturizer applied   mouth wash rinse   oral rinse provided   suction provided  Head of Bed (HOB) Positioning: HOB at 30 degrees   Goal Outcome Evaluation:      Plan of Care Reviewed With: patient    Overall Patient Progress: decliningOverall Patient Progress: declining

## 2023-11-06 NOTE — PROGRESS NOTES
Care Management Follow Up    Length of Stay (days): 39    Expected Discharge Date: 11/14/2023     Concerns to be Addressed:       Patient plan of care discussed at interdisciplinary rounds: Yes    Anticipated Discharge Disposition: LTACH     Anticipated Discharge Services: Transportation Services  Anticipated Discharge DME:      Patient/family educated on Medicare website which has current facility and service quality ratings:    Education Provided on the Discharge Plan:    Patient/Family in Agreement with the Plan:      Referrals Placed by CM/SW: [facility] ([facility])  Private pay costs discussed: Not applicable    Additional Information:  Per chart review, pt is back on the vent.  Called and spoke to Ebonie Triplettison for [facility] and informed her of pt being back on the vent.  Per Ioana, LTACH will begin following again for [facility] placement.     Addendum:  Received call back from Ioana at [facility] and was informed that pt doesn't meet criteria for LTACH at this time.      Inpatient Care Coordinator will continue to follow for discharge planning.   CECILIA Jordan RN, BSN, OCN   Inpatient Care Coordination [facility]  Office: [phone]

## 2023-11-06 NOTE — PROGRESS NOTES
[facility]   MICU Progress Note  Critical Care Progress Note      11/06/2023    Name: Kortney Germain MRN#: [id number]   Age: 68 year old YOB: 1955     [facility]  [facility] # 1    MV DAY # 1             Problem List:   Principal Problem:    Status epilepticus (H)  Active Problems:    Urinary retention    Acute cystitis without hematuria    Constipation, unspecified constipation type    Anemia, unspecified type    HTN (hypertension)           Summary/Hospital Course:     68-year-old female patient with past medical history significant for schizoaffective disorder, tardive dyskinesia, she was initially admitted on 9/28/2023 for UTI and altered mental status, she was intubated and supported on a ventilator, and ultimately she was diagnosed with refractory status epilepticus with requirement for multiple AEDs, and she has not been able to tolerate down titration of her antiepileptic medications.  She had neuro work-up with unremarkable MRIs with and without gadolinium, she did have CSF analysis with bland results, and she did have a negative serum encephalopathy panel.  She had trach and PEG placed.  She did not improve even with 5-day course of IV Solu-Medrol.  This has been a challenging case ethically as there is no proxy decision-maker, ethics panel has been involved in the past to discuss goals of care.  The patient continued to have evidence for status epilepticus on EEG continuous monitoring despite multiple AEDs.  Neurology recommended to transfer the patient to the ICU for IV Versed for status epilepticus control.      Assessment and plan :     Kortney Germain IS a 68 year old female admitted on 9/28/2023 for status epilepticus.   I have personally reviewed the daily labs, imaging studies, cultures and discussed the case with referring physician and consulting physicians.     My assessment and plan by system for this patient is as follows:    Neurology/Psychiatry:   1. Status  epilepticus despite being on multiple AEDs including clobazam, Keppra, Valproic acid and Vimpat.  2. Hx of schizoaffective disorder   3. Hx of tardive dyskinesia   Plan  -Neurology is on the case   -Continuing midazolam infusion. Plan is for LP today.  -AEDs managed by neuro, she is on clonazepam, Keppra, Valproic acid and Vimpat.   - Will discuss case again with ethics neurologists and other team members pending these results.    Cardiovascular:   1.Hemodynamically stable, no active issues, her last TTE on 3/9/2023 showed EF 60-65%, normal RV function and size   2. Hx of HTN on atenolol     Plan   -Monitor especially with versed drip  -Continue atenolol     Pulmonary/Ventilator Management:   1. Chronic hypoxemic respiratory failure due to seizure and inability to protect her airway, CT chest was done on 11/4, showed some atelectasis, she does have some mosaic attenuation so questioning some air trapping   Plan  - Continue mechanical ventilation with lung protection  - Schedule DuoNeb TID     GI and Nutrition :   1. TF via G tube     Plan  -Continue TF    Renal/Fluids/Electrolytes:   1. No acute kidney injury    Plan  - monitor function and electrolytes as needed with replacement per ICU protocols.  - generally avoid nephrotoxic agents such as NSAID, IV contrast unless specifically required  - adjust medications as needed for renal clearance  - follow I/O's as appropriate.    Infectious Disease:   1. No signs of active infection while off antibiotics     Plan  -Monitor and re initiate antibiotics if necessary    Endocrine:   No hypoglycemia   Plan  - ICU insulin protocol, goal sugar <180      Hematology/Oncology:   1. Leukocytosis   2. Anemia, no signs, symptoms of active blood loss    Plan  - Monitor. Repeat labs tomorrow.      IV/Access:   1. Venous access - Left PICC   2. Arterial access - None   3.  Plan  - central access required and necessary      ICU Prophylaxis:   1. DVT: Hep Subq  2. Stress Ulcer: PPI  3.  Restraints: Nonviolent soft two point restraints required and necessary for patient safety and continued cares and good effect as patient continues to pull at necessary lines, tubes despite education and distraction. Will readdress daily.   4. Feeding - TF  5. Family Update:No next of kin  6. Disposition - stay in the ICU        Critical Care Time: 35 min.  I spent this time (excluding procedures) personally providing and directing critical care services at the bedside and on the critical care unit.     Ana Edmond MD        Subjective       She is at her baseline, she is not responsive and does not follow commands.         Key Medications:      chlorhexidine  15 mL Mouth/Throat Q12H    clobazam  20 mg Oral or Feeding Tube BID    [Held by provider] heparin ANTICOAGULANT  5,000 Units Subcutaneous Q8H    ipratropium - albuterol 0.5 mg/2.5 mg/3 mL  3 mL Nebulization TID    lacosamide  250 mg Oral or Feeding Tube BID    levETIRAcetam  1,000 mg Intravenous Q12H    multivitamins w/minerals  15 mL Oral or Feeding Tube Daily    pantoprazole  40 mg Per Feeding Tube QAM AC    Or    pantoprazole  40 mg Intravenous QAM AC    [Held by provider] polyethylene glycol  17 g Oral or Feeding Tube BID    sodium chloride (PF)  10-40 mL Intracatheter Q7 Days    sodium chloride (PF)  3 mL Intracatheter Q8H    valproic acid  250 mg Oral Q8H    wound support modular  60 mL Oral Daily at 4 pm      dextrose      midazolam 4 mg/hr (11/06/23 0820)    norepinephrine 0.03 mcg/kg/min (11/06/23 0819)    - MEDICATION INSTRUCTIONS -      sodium chloride 10 mL/hr at 11/06/23 0700               Physical Examination:   Temp:  [97.9  F (36.6  C)-100.1  F (37.8  C)] 98.8  F (37.1  C)  Pulse:  [56-80] 73  Resp:  [4-40] 16  BP: ()/(40-85) 85/44  FiO2 (%):  [30 %] 30 %  SpO2:  [94 %-99 %] 98 %    Intake/Output Summary (Last 24 hours) at 11/5/2023 1444  Last data filed at 11/5/2023 1200  Gross per 24 hour   Intake 480 ml   Output 850 ml   Net -370 ml      Wt Readings from Last 4 Encounters:   11/06/23 44.5 kg (98 lb 1.7 oz)   06/08/23 40.8 kg (90 lb)   03/15/23 41 kg (90 lb 6.2 oz)   02/02/22 52.2 kg (115 lb)     BP - Mean:  [] 59  Vent Mode: CMV/AC  (Continuous Mandatory Ventilation/ Assist Control)  FiO2 (%): 30 %  Resp Rate (Set): 16 breaths/min  Tidal Volume (Set, mL): 330 mL  PEEP (cm H2O): 5 cmH2O  Resp: 16    Recent Labs   Lab 11/05/23 2120   PH 7.50*   PCO2 31*   PO2 89   HCO3 24   O2PER 30       GEN: Chronically ill  HEENT: Trach noted  PULM: synchronous, clear    CV/COR: RRR S1S2 no gallop,  No rub, no murmur  ABD: soft nontender, hypoactive bowel sounds  EXT:  No edema   NEURO: no interaction, does not open eyes  SKIN: no obvious rash  LINES: clean, dry intact         Data:   All data and imaging reviewed     ROUTINE ICU LABS (Last four results)  CMP  Recent Labs   Lab 11/06/23  1150 11/06/23  0800 11/06/23  0624 11/06/23  0622 11/05/23  2157 11/04/23  0623 11/04/23  0602 11/01/23 2212 10/31/23  1943   NA  --   --   --   --   --   --  145  --   --    POTASSIUM  --   --   --   --   --   --  4.0  --   --    CHLORIDE  --   --   --   --   --   --  105  --   --    CO2  --   --   --   --   --   --  30*  --   --    ANIONGAP  --   --   --   --   --   --  10  --   --    * 127* 125*  --  159*   < > 122*   < >  --    BUN  --   --   --   --   --   --  24.0*  --   --    CR  --   --   --   --   --   --  0.42*  --   --    GFRESTIMATED  --   --   --   --   --   --  >90  --   --    LULU  --   --   --   --   --   --  8.7*  --   --    PHOS  --   --   --  3.7  --   --   --   --  3.5    < > = values in this interval not displayed.     CBC  Recent Labs   Lab 11/05/23  0550 11/04/23  0602 11/02/23  0559 11/01/23  0625 10/31/23  0610   WBC  --  16.1*  --  8.5 9.2   RBC  --  3.68*  --  3.71* 3.73*   HGB  --  9.1*  --  9.1* 9.1*   HCT  --  31.3*  --  31.3* 31.0*   MCV  --  85  --  84 83   MCH  --  24.7*  --  24.5* 24.4*   MCHC  --  29.1*  --  29.1* 29.4*   RDW  --  " 20.9*  --  20.5* 20.4*    397 495* 457* 457*     INRNo lab results found in last 7 days.  Arterial Blood Gas  Recent Labs   Lab 11/05/23 2120   PH 7.50*   PCO2 31*   PO2 89   HCO3 24   O2PER 30       All cultures:  No results for input(s): \"CULT\" in the last 168 hours.  Recent Results (from the past 24 hour(s))   XR Chest Port 1 View    Narrative    EXAM: XR CHEST PORT 1 VIEW  LOCATION: [facility]  DATE: 11/5/2023    INDICATION: Increased secretions from the tracheostomy. Cough.  COMPARISON: 10/24/2023. CT yesterday.      Impression    IMPRESSION: Linear opacities in the left lower lung likely represent atelectasis, similar to the CT yesterday. Lungs are otherwise clear. Normal heart size and pulmonary vascularity. Left PICC line tip in the mid SVC. Tracheostomy. Bones are   demineralized.       Clinically Significant Risk Factors              # Hypoalbuminemia: Lowest albumin = 3.4 g/dL at 9/30/2023  5:15 AM, will monitor as appropriate     # Hypertension: Noted on problem list         # Moderate Malnutrition: based on nutrition assessment                      "

## 2023-11-06 NOTE — PROGRESS NOTES
CLINICAL NUTRITION SERVICES - REASSESSMENT NOTE      Malnutrition: (10/25)  % Weight Loss:  None noted  % Intake:  No decreased intake noted (TF)  Subcutaneous Fat Loss:  Orbital region mild-moderate depletion  Muscle Loss:  Temporal region moderate depletion, Patellar region moderate-severe depletion, and Anterior thigh region moderate depletion  Fluid Retention:  Mild 1+ generalized      Malnutrition Diagnosis: Moderate malnutrition  In Context of:  Acute illness or injury       EVALUATION OF PROGRESS TOWARD GOALS   Diet:  NPO on vent     Nutrition Support:  TF regimen was changed to a semi-elemental formula on 10/27 d/t ongoing loose stools/diarrhea.  Goal TF regimen as follows ~    Nutrition Support Enteral:  Type of Feeding Tube: PEG  Enteral Frequency:  Continuous  Enteral Regimen: Vital 1.5 at 35 mL/hr  Total Enteral Provisions: 1260 kcal, 57 g protein, 5 g fiber, 642 mL H2O  Free Water Flush: 100 mL every 4 hours   Also receiving Expedite once daily via FT for wound healing = 100 kcal and 10 g protein   Total = 1360 kcal (30 kcal/kg), 67 g protein (1.5 g/kg)    Intake/Tolerance:    No labs today except for Phos 3.7 (NL)  Weight = 44.5 kg (stable)  Stooling patterns over the  last several days -->  BM x 1 today  BM x 3 yesterday  BM x 1 on 11/4  BM x 0 on 11/3  Overall, appears that stooling patterns have improved       ASSESSED NUTRITION NEEDS:  Dosing Weight: 45 kg (9/30)  Estimated Energy Needs: 0208-4604+ kcals (25-30+ Kcal/Kg)  Justification: maintenance   Estimated Protein Needs: 54-68+ grams protein (1.2-1.5+ g pro/Kg)  Justification: preservation of lean body mass      NEW FINDINGS:   11/2:  Wound location: Left buttock left sacrum   STATUS: healing      Wound location: Sacrum (midline)   Pressure Injury and Friction HAPI stage 2   STATUS: healing      Wound location: forehead (not fully assessed 11/2)   Wound due to: Unknown Etiology minimal abrasion vs healing stage 1 pressure injury   STATUS:  improving     Patient receiving MVI daily for pressure injuries     11/4:  EEG = status epilepticus --> Transfer to ICU, hook up to vent     Previous Goals (10/30):   EN to meet % estimated needs   Evaluation: Met    Previous Nutrition Diagnosis (10/30):   No nutrition diagnosis identified at this time as EN meeting nutrition needs   Evaluation: No change      CURRENT NUTRITION DIAGNOSIS  No nutrition diagnosis identified at this time as TF meeting needs     INTERVENTIONS  Recommendations / Nutrition Prescription  Continue goal TF regimen as above    Implementation  Collaboration and Referral of Nutrition care:  Patient discussed today during interdisciplinary bedside rounds     Goals  Goal TF regimen will continue to meet % needs     MONITORING AND EVALUATION:  Progress towards goals will be monitored and evaluated per protocol and Practice Guidelines    Libertad Romano, RD, LD, CNSC   Clinical Dietitian - [facility]

## 2023-11-06 NOTE — PLAN OF CARE
Transferred from  around 1500. Versed started @ 2mg/hr per order with patient on vent. EEG monitoring in place. Trached, secretions suctioned PRN. Vitals stable. Patient does not follow commands. No visualized seizure activity this shift. Plan of care ongoing.    Problem: Plan of Care - These are the overarching goals to be used throughout the patient stay.    Goal: Absence of Hospital-Acquired Illness or Injury  Intervention: Identify and Manage Fall Risk  Recent Flowsheet Documentation  Taken 11/5/2023 1600 by Kayce Larson, CECILIA  Safety Promotion/Fall Prevention:   room near nurse's station   safety round/check completed  Intervention: Prevent Skin Injury  Recent Flowsheet Documentation  Taken 11/5/2023 1800 by Kayce Larson, RN  Body Position:   turned   left   supine, head elevated   weight shifting   upper extremity elevated   heels elevated  Taken 11/5/2023 1600 by Kayce Larson RN  Body Position:   turned   right   lower extremity elevated   heels elevated   upper extremity elevated   weight shifting  Intervention: Prevent and Manage VTE (Venous Thromboembolism) Risk  Recent Flowsheet Documentation  Taken 11/5/2023 1600 by Kayce Larson, CECILIA  VTE Prevention/Management: SCDs (sequential compression devices) on  Goal: Optimal Comfort and Wellbeing  Intervention: Provide Person-Centered Care  Recent Flowsheet Documentation  Taken 11/5/2023 1600 by Kayce Larson RN  Trust Relationship/Rapport:   care explained   choices provided   Goal Outcome Evaluation:

## 2023-11-06 NOTE — PROGRESS NOTES
Brief hospitalist note    Chart reviewed. Patient remainds on ventilator with versed drip. Discussed with RN.     Appreciate ICU cares and taking over as primary.    Hospitalist service will sign off for now.

## 2023-11-06 NOTE — PLAN OF CARE
Goal Outcome Evaluation:      Plan of Care Reviewed With:  (Interdisciplinary bedside rounds)    Overall Patient Progress: improvingOverall Patient Progress: improving    Outcome Evaluation: Loose stools much improved with change to semi-elemental TF formula -- continue current TF regimen as per EPIC orders.  See RD note dated 11/6/23 for details.    Libertad Romano RD, LD, CNSC   Clinical Dietitian - [facility]

## 2023-11-06 NOTE — PROCEDURES
[facility]    Procedure: Fluoroscopic Guided Lumbar Puncture    Date/Time: 11/6/2023 2:35 PM    Performed by: Ayaka Newman PA-C  Authorized by: Ayaka Newman PA-C      UNIVERSAL PROTOCOL   Site Marked: Yes  Prior Images Obtained and Reviewed:  Yes  Required items: Required blood products, implants, devices and special equipment available    Patient identity confirmed:  Anonymous protocol, patient vented/unresponsive and hospital-assigned identification number  Patient was reevaluated immediately before administering moderate or deep sedation or anesthesia (Patient arrived to unit sedated due to requiring ventilatory support)  Confirmation Checklist:  Patient's identity using two indicators, relevant allergies, procedure was appropriate and matched the consent or emergent situation and correct equipment/implants were available  Time out: Immediately prior to the procedure a time out was called    Universal Protocol: the Joint Commission Universal Protocol was followed    Preparation: Patient was prepped and draped in usual sterile fashion       ANESTHESIA    Anesthesia: Local infiltration  Local Anesthetic:  Lidocaine 1% without epinephrine      SEDATION  Patient Sedated: Yes    Sedation Type:  Deep  Vital signs: Vital signs monitored during sedation    See dictated procedure note for full details.    PROCEDURE  Describe Procedure: Patient arrived to unit sedated and on ventilator support. No next of kin to provide consent. Procedure deemed medically necessary.  Lumbar puncture at L2-L3 under fluoroscopic guidance.  36 ml clear CSF passively obtained and divided into 4 tubes.  Patient Tolerance:  Patient tolerated the procedure well with no immediate complications  Length of time physician/provider present for 1:1 monitoring during sedation: 30

## 2023-11-06 NOTE — PROGRESS NOTES
[facility] ICU RESPIRATORY NOTE        Date of Admission: 9/28/2023    Date of Intubation (most recent): 10/20/23    Reason for Mechanical Ventilation: Airway protection    Number of Days on Mechanical Ventilation: 2    Met Criteria for Spontaneous Breathing Trial: Yes    Significant Events Today: Pt was placed on PS 5/5, briefly, but was placed back on CMV settings due to apnea. Pt was transported to main x-ray for lumbar puncture.     ABG Results:   Recent Labs   Lab 11/05/23 2120   PH 7.50*   PCO2 31*   PO2 89   HCO3 24   O2PER 30         Current Vent Settings: Vent Mode: CMV/AC  (Continuous Mandatory Ventilation/ Assist Control)  FiO2 (%): 30 %  Resp Rate (Set): 16 breaths/min  Tidal Volume (Set, mL): 330 mL  PEEP (cm H2O): 5 cmH2O  Resp: 16      Skin Assessment:Trach site intact with some oozing secretions around trach site at times.    Plan:Will cont full vent support overnight and will assess for another PS trial in the morning.    Bronwyn Ledesma RT on 11/6/2023 at 5:35 PM

## 2023-11-07 NOTE — PROGRESS NOTES
Preliminary Video EEG impression on November, 2023  Until 9am        This EEG is abnormal due to the presences of continuous generalized polymorphic delta slowing.  With periods of EEG attenuation.  There are intermittent bursts of generalized periodic discharges up to 2 hz in frequency,  maximum negativity is in the bifrontal region.  The generalized periodic discharges are more persistent after 8 AM.  There is no obvious clinical correlation with these discharges.    11/6/2023 - she had shaking correlated with 2 hz GPEDS.      EEG is consistent with a severe diffuse encephalopathy with diffuse cortical irritability and increased risk for seizures.  Shaking event at 8 pm concerning electro clinical seizure. Generalized periodic discharges are up to 2 Hz in frequency. Clinical correlation is advised.      Koki Gusman MD  Staff Epilepsy Neurologist   [phone]

## 2023-11-07 NOTE — PROGRESS NOTES
[facility]   MICU Progress Note  Critical Care Progress Note      11/07/2023    Name: Kortney Germain MRN#: [id number]   Age: 68 year old YOB: 1955     [facility] Day# 40  ICU DAY # 1    MV DAY # 1             Problem List:   Principal Problem:    Status epilepticus (H)  Active Problems:    Urinary retention    Acute cystitis without hematuria    Constipation, unspecified constipation type    Anemia, unspecified type    HTN (hypertension)           Summary/Hospital Course:     68-year-old female patient with past medical history significant for schizoaffective disorder, tardive dyskinesia, she was initially admitted on 9/28/2023 for UTI and altered mental status, she was intubated and supported on a ventilator, and ultimately she was diagnosed with refractory status epilepticus with requirement for multiple AEDs, and she has not been able to tolerate down titration of her antiepileptic medications.  She had neuro work-up with unremarkable MRIs with and without gadolinium, she did have CSF analysis with bland results, and she did have a negative serum encephalopathy panel.  She had trach and PEG placed.  She did not improve even with 5-day course of IV Solu-Medrol.  This has been a challenging case ethically as there is no proxy decision-maker, ethics panel has been involved in the past to discuss goals of care.  The patient continued to have evidence for status epilepticus on EEG continuous monitoring despite multiple AEDs.  Neurology recommended to transfer the patient to the ICU for IV Versed for status epilepticus control.  Despite being on midazolam she has continued to have seizures.  Keppra was increased last night in response to continuing seizure activity.      Assessment and plan :     Kortney Germain IS a 68 year old female admitted on 9/28/2023 for status epilepticus.   I have personally reviewed the daily labs, imaging studies, cultures and discussed the case with referring  physician and consulting physicians.     My assessment and plan by system for this patient is as follows:    Neurology/Psychiatry:   1. Status epilepticus despite being on multiple AEDs including clobazam, Keppra, Valproic acid and Vimpat.  Continuing to have seizure activity.  Lumbar puncture yesterday  2. Hx of schizoaffective disorder   3. Hx of tardive dyskinesia   Plan  -Monitor now that AED medication doses have been increased yet again.  -Neurology is considering high-dose steroids  -We will reinitiate discussion regarding potential of nonbeneficial treatment if necessary    Cardiovascular:   1.Hemodynamically stable, no active issues, her last TTE on 3/9/2023 showed EF 60-65%, normal RV function and size   2. Hx of HTN on atenolol   Plan   -Monitor especially with versed drip  -Continue atenolol     Pulmonary/Ventilator Management:   1. Chronic hypoxemic respiratory failure due to seizure and inability to protect her airway, CT chest was done on 11/4, showed some atelectasis, she does have some mosaic attenuation so questioning some air trapping.  Apneic on pressure support  Plan  - Continue mechanical ventilation with lung protection  - Schedule DuoNeb TID     GI and Nutrition :   1. TF via G tube     Plan  -Continue TF    Renal/Fluids/Electrolytes:   1. No acute kidney injury    Plan  - monitor function and electrolytes as needed with replacement per ICU protocols.  - generally avoid nephrotoxic agents such as NSAID, IV contrast unless specifically required  - adjust medications as needed for renal clearance  - follow I/O's as appropriate.    Infectious Disease:   1. No signs of active infection while off antibiotics     Plan  -Monitor and re initiate antibiotics if necessary    Endocrine:   No hypoglycemia   Plan  - ICU insulin protocol, goal sugar <180      Hematology/Oncology:   1. Leukocytosis   2. Anemia, no signs, symptoms of active blood loss    Plan  - Monitor. Repeat labs tomorrow.      IV/Access:    1. Venous access - Left PICC   2. Arterial access - None   3.  Plan  - central access required and necessary      ICU Prophylaxis:   1. DVT: Hep Subq  2. Stress Ulcer: PPI  3. Restraints: Rates are  4. Feeding - TF  5. Family Update:No next of kin  6. Disposition - stay in the ICU        Critical Care Time: 35 min.  I spent this time (excluding procedures) personally providing and directing critical care services at the bedside and on the critical care unit.     Ana olivares.         [facility] Medications:      chlorhexidine  15 mL Mouth/Throat Q12H    clobazam  20 mg Oral or Feeding Tube BID    [Held by provider] heparin ANTICOAGULANT  5,000 Units Subcutaneous Q8H    ipratropium - albuterol 0.5 mg/2.5 mg/3 mL  3 mL Nebulization TID    lacosamide  250 mg Oral or Feeding Tube BID    levETIRAcetam  1,500 mg Intravenous Q12H    methylPREDNISolone  1,000 mg Intravenous Daily    multivitamins w/minerals  15 mL Oral or Feeding Tube Daily    pantoprazole  40 mg Per Feeding Tube QAM AC    Or    pantoprazole  40 mg Intravenous QAM AC    [Held by provider] polyethylene glycol  17 g Oral or Feeding Tube BID    sodium chloride (PF)  10-40 mL Intracatheter Q7 Days    sodium chloride (PF)  3 mL Intracatheter Q8H    valproic acid  250 mg Oral Q8H    wound support modular  60 mL Oral Daily at 4 pm      dextrose      midazolam 4 mg/hr (11/07/23 0747)    norepinephrine Stopped (11/06/23 1440)    - MEDICATION INSTRUCTIONS -      sodium chloride 10 mL/hr at 11/07/23 0747               Physical Examination:   Temp:  [98.7  F (37.1  C)-98.9  F (37.2  C)] 98.8  F (37.1  C)  Pulse:  [58-95] 81  Resp:  [3-28] 18  BP: ()/() 107/49  FiO2 (%):  [30 %] 30 %  SpO2:  [96 %-100 %] 97 %    Intake/Output Summary (Last 24 hours) at 11/7/2023 1310  Last data filed at 11/7/2023 1200  Gross per 24 hour   Intake 1553.33 ml   Output 575 ml   Net 978.33 ml   Net: +17 liters      Wt Readings from Last 4 Encounters:   11/07/23 42.4 kg (93 lb  7.6 oz)   06/08/23 40.8 kg (90 lb)   03/15/23 41 kg (90 lb 6.2 oz)   02/02/22 52.2 kg (115 lb)     BP - Mean:  [] 72  Vent Mode: CMV/AC  (Continuous Mandatory Ventilation/ Assist Control)  FiO2 (%): 30 %  Resp Rate (Set): 16 breaths/min  Tidal Volume (Set, mL): 330 mL  PEEP (cm H2O): 5 cmH2O  Resp: 18    Recent Labs   Lab 11/07/23  0000 11/05/23 2120   PH  --  7.50*   PCO2  --  31*   PO2  --  89   HCO3  --  24   O2PER 21 30       GEN: Chronically ill  HEENT: Trach, EEG leads  PULM: synchronous, clear    CV/COR: RRR S1S2 no gallop,  No rub, no murmur  ABD: soft nontender, hypoactive bowel sounds, clean peg tube site  EXT:  No edema   NEURO: no interaction, does not open eyes  SKIN: no obvious rash  LINES: clean, dry intact         Data:   All data and imaging reviewed     ROUTINE ICU LABS (Last four results)  CMP  Recent Labs   Lab 11/07/23  1130 11/07/23  0808 11/07/23  0423 11/07/23  0418 11/06/23  0624 11/06/23  0622 11/04/23  0623 11/04/23  0602 11/01/23 2212 10/31/23  1943   NA  --   --   --  144  --   --   --  145  --   --    POTASSIUM  --   --   --  4.1  --   --   --  4.0  --   --    CHLORIDE  --   --   --  110*  --   --   --  105  --   --    CO2  --   --   --  24  --   --   --  30*  --   --    ANIONGAP  --   --   --  10  --   --   --  10  --   --    GLC 97 127* 101* 117*   < >  --    < > 122*   < >  --    BUN  --   --   --  18.9  --   --   --  24.0*  --   --    CR  --   --   --  0.36*  --   --   --  0.42*  --   --    GFRESTIMATED  --   --   --  >90  --   --   --  >90  --   --    LULU  --   --   --  8.1*  --   --   --  8.7*  --   --    PHOS  --   --   --  3.5  --  3.7  --   --   --  3.5    < > = values in this interval not displayed.     CBC  Recent Labs   Lab 11/07/23  0418 11/05/23  0550 11/04/23  0602 11/02/23  0559 11/01/23  0625   WBC 9.6  --  16.1*  --  8.5   RBC 3.08*  --  3.68*  --  3.71*   HGB 7.7*  --  9.1*  --  9.1*   HCT 26.4*  --  31.3*  --  31.3*   MCV 86  --  85  --  84   MCH 25.0*  --   "24.7*  --  24.5*   MCHC 29.2*  --  29.1*  --  29.1*   RDW 21.9*  --  20.9*  --  20.5*    341 397 495* 457*     INRNo lab results found in last 7 days.  Arterial Blood Gas  Recent Labs   Lab 11/07/23  0000 11/05/23  2120   PH  --  7.50*   PCO2  --  31*   PO2  --  89   HCO3  --  24   O2PER 21 30       All cultures:  No results for input(s): \"CULT\" in the last 168 hours.  Recent Results (from the past 24 hour(s))   XR Lumbar Puncture Spinal Tap Diag    Narrative    EXAM: XR LUMBAR PUNCTURE SPINAL TAP DIAGNOSTIC  11/6/2023 2:30 PM       History:  New onset status epilepticus. 68-year-old female presenting  sedated and on ventilator support. Procedure deemed medically  necessary by Dr. Alfred.    PROCEDURE: Consent unable to be taking obtained from the patient,  however due to medical necessity of procedure, we proceeded without  obtaining informed consent. The patient was sterilely prepped and  draped with the patient in the supine position, over the lower back.  Under fluoroscopic guidance, the interlaminar spaces were noted. 1%  lidocaine was administered for local anesthetic over the L2-3  interlaminar space, and a 20 gauge needle was advanced into the thecal  sac under fluoroscopic guidance.  There was initial aspiration of  clear CSF. About 36 cc's total were passively obtained and divided  into 4 tubes.  The needle was removed. There were no immediate  complications associated with the procedure.   Estimated blood loss  during the procedure was less than 5 mL.    Fluoro time: 0.1 minutes  Images Obtained: 2  DAP: 13.03 uGym2  Medications used: 3 mL Local Lidocaine 1%.      Impression    IMPRESSION: Successful lumbar puncture at L2-L3 using fluoroscopic  guidance.    JEFFREY Ryan         SYSTEM ID:  [id number]   CT Head w/o Contrast    Narrative    EXAM: CT HEAD W/O CONTRAST  LOCATION: [facility]  DATE: 11/6/2023    INDICATION: rule out bleed. Status epilepticus.  COMPARISON: " 10/20/2023 brain MRI.  TECHNIQUE: Routine CT Head without IV contrast. Multiplanar reformats. Dose reduction techniques were used.    FINDINGS:  INTRACRANIAL CONTENTS: No intracranial hemorrhage, extraaxial collection, or mass effect.  No CT evidence of acute infarct. Moderate presumed chronic small vessel ischemic changes. Mild generalized volume loss. No hydrocephalus.     VISUALIZED ORBITS/SINUSES/MASTOIDS: No intraorbital abnormality. No paranasal sinus mucosal disease. No middle ear or mastoid effusion.    BONES/SOFT TISSUES: No skull fracture. No scalp hematoma.      Impression    IMPRESSION:  1.  Age-related changes as above with no acute intracranial abnormality.       Clinically Significant Risk Factors              # Hypoalbuminemia: Lowest albumin = 3.4 g/dL at 9/30/2023  5:15 AM, will monitor as appropriate     # Hypertension: Noted on problem list         # Moderate Malnutrition: based on nutrition assessment

## 2023-11-07 NOTE — PLAN OF CARE
Problem: Plan of Care - These are the overarching goals to be used throughout the patient stay.    Goal: Plan of Care Review  Description: The Plan of Care Review/Shift note should be completed every shift.  The Outcome Evaluation is a brief statement about your assessment that the patient is improving, declining, or no change.  This information will be displayed automatically on your shift note.  Recent Flowsheet Documentation  Taken 11/7/2023 1736 by Florencio Mariscal RN  Plan of Care Reviewed With: patient  Overall Patient Progress: declining     Problem: Plan of Care - These are the overarching goals to be used throughout the patient stay.    Goal: Absence of Hospital-Acquired Illness or Injury  Intervention: Prevent Skin Injury  Recent Flowsheet Documentation  Taken 11/7/2023 1736 by Florencio Mariscal RN  Device Skin Pressure Protection:   adhesive use limited   absorbent pad utilized/changed  Taken 11/7/2023 1600 by Florencio Mariscal RN  Body Position:   turned   side-lying   right   upper extremity elevated   lower extremity elevated  Taken 11/7/2023 1400 by Florencio Mariscal RN  Body Position:   turned   side-lying   left   upper extremity elevated   lower extremity elevated  Taken 11/7/2023 1200 by Florencio Mariscal RN  Body Position:   turned   side-lying   right   upper extremity elevated   lower extremity elevated  Taken 11/7/2023 1000 by Florencio Mariscal RN  Body Position:   turned   side-lying   left   upper extremity elevated   lower extremity elevated  Taken 11/7/2023 0800 by Florencio Mariscal RN  Body Position:   turned   side-lying   right   upper extremity elevated   lower extremity elevated     Problem: Adult Inpatient Plan of Care  Goal: Readiness for Transition of Care  Intervention: Mutually Develop Transition Plan  Flowsheets (Taken 11/7/2023 1736)  Discharge Facility/Level of Care Needs: (unsure, please ask pt, MD, or care coordinator) other (see comments)  Anticipated Changes  Related to Illness: (unsure, please ask pt, MD, or care coordinator) other (see comments)  Outpatient/Agency/Support Group Needs: (unsure, please ask pt, MD, or care coordinator) other (see comments)  Current Discharge Risk: (unsure, please ask pt, MD, or care coordinator) other (see comments)  Readmission Within the Last 30 Days: (unsure, please ask pt, MD, or care coordinator) other (see comments)  Transportation Anticipated: (unsure, please ask pt, MD, or care coordinator) other (see comments)  Transportation Concerns: (unsure, please ask pt, MD, or care coordinator) other (see comments)  Concerns to be Addressed: (unsure, please ask pt, MD, or care coordinator) other (see comments)  Patient/Family Anticipated Services at Transition: (unsure, please ask pt, MD, or care coordinator) other (see comments)  Patient/Family Anticipates Transition to: (unsure, please ask pt, MD, or care coordinator) other (comment)  Offered/Gave Vendor List: (this is not within my scope of practice) other (see comments)  Current Outpatient/Agency/Support Group: (unsure, please ask pt, MD, or care coordinator) other (see comments)  Equipment Needed After Discharge: (unsure, please ask pt, MD, or care coordinator) other (see comments)  Equipment Currently Used at Home: (unsure, please ask pt, MD, or care coordinator) other (see comments)   Goal Outcome Evaluation:      Plan of Care Reviewed With: patient    Overall Patient Progress: decliningOverall Patient Progress: declining

## 2023-11-07 NOTE — PROGRESS NOTES
Pt not following commands,  intermittently withdrawals from pain on all 4 extremities,  did not notice any spontaneous eye opening during this shift (7p-7a).  CT done due to potential seizure, noted no new changes. Pupils equal and sluggish.  Mechanical ventilator with trach.   Versed infusing.  TPA used on PICC due to no blood return.

## 2023-11-07 NOTE — PROGRESS NOTES
[facility] ICU RESPIRATORY NOTE        Date of Admission: 9/28/2023    Date of Intubation (most recent): 10/20/2023    Reason for Mechanical Ventilation: Airway protection.    Number of Days on Mechanical Ventilation: 2    Met Criteria for Spontaneous Breathing Trial: Yes    Significant Events Today: pt was put on PS 5/5 yesterday but was placed back on CMV due to apnea.    ABG Results:   Recent Labs   Lab 11/07/23  0000 11/05/23 2120   PH  --  7.50*   PCO2  --  31*   PO2  --  89   HCO3  --  24   O2PER 21 30         Current Vent Settings: Vent Mode: CMV/AC  (Continuous Mandatory Ventilation/ Assist Control)  FiO2 (%): 30 %  Resp Rate (Set): 16 breaths/min  Tidal Volume (Set, mL): 330 mL  PEEP (cm H2O): 5 cmH2O  Resp: 16      Skin Assessment: clean and intact.    Plan: continue on full vent and assess for weaning for readiness.    Taylor Tran, RT on 11/7/2023 at 5:06 AM

## 2023-11-07 NOTE — PROGRESS NOTES
Brief note:    Paged by RN that patient had an episode of seizure like activity lasting for 15-20 minutes. EEG reader was contracted and it correlated with EEG.     Recommendations:  - give Keppra 1500 mg IV once now and increase Keppra to 1500 mg IV BID.    Discussed with Dr. Rogers.    SHIRLEY Azar  Vascular Neurology fellow.

## 2023-11-07 NOTE — PROVIDER NOTIFICATION
"Txt-pg neuro crit MD Dr. Ingram: \"[facility] ICU Car, Isabelle: FYI potential seizure activity occurred and I made a note on EEG at 1833. Pt sudden HTN , repetitive facial and arm movement. IV Dilaudid, hydralazine, Ativan given. CECILIA Munguia. [id number]\"    - FYI I forgot to include room number above as copied and pasted above, so I resent page approximately 2 min later, but included the pt room number as copied and pasted below.    \"[facility]  Car, Isabelle: FYI potential seizure activity occurred and I made a note on EEG at 1833. Pt sudden HTN , repetitive facial and arm movement. IV Dilaudid, hydralazine, Ativan given. CECILIA Munguia. [id number]\"    --> MD call back:   - Discussed pt and reviewed events as noted above  - MD ordering CT now (see active order placed)  "

## 2023-11-07 NOTE — PROGRESS NOTES
[facility] ICU RESPIRATORY NOTE        Date of Admission: 9/28/2023    Date of Intubation (most recent): 10/20/23    Reason for Mechanical Ventilation: AW protection    Number of Days on Mechanical Ventilation: 3    Met Criteria for Spontaneous Breathing Trial: No    Reason for No Spontaneous Breathing Trial: Per MD    Significant Events Today: None    ABG Results:   Recent Labs   Lab 11/07/23  0000 11/05/23 2120   PH  --  7.50*   PCO2  --  31*   PO2  --  89   HCO3  --  24   O2PER 21 30         Current Vent Settings: Vent Mode: CMV/AC  (Continuous Mandatory Ventilation/ Assist Control)  FiO2 (%): 30 %  Resp Rate (Set): 16 breaths/min  Tidal Volume (Set, mL): 330 mL  PEEP (cm H2O): 5 cmH2O  Resp: 20      Skin Assessment: Intact    Plan: Pt to remain on full vent support overnight    Bj Richey, RT

## 2023-11-07 NOTE — PROGRESS NOTES
[facility]    Stroke Progress Note    Interval Events  11/6/2023 at 8 PM - she had shaking correlated with 2 hz GPEDS.    EEG is consistent with a severe diffuse encephalopathy with diffuse cortical irritability and increased risk for seizures.  Shaking event at 8 pm concerning electro clinical seizure. RN noted an episode of seizure-like activity lasting for 15-20 minutes.Given 1.5 g of Keppra and then increased Keprra dose to 1.5 g    HPI Summary  Kortney Germain is a 68 year old female with PMHx significant for anxiety/depression, schizoaffective disorder (living in group home), HTN, odynophagia, tardive dyskinesia, HTN. She presented 9/28 with lower back pain and abdominal pain. She was found to have UTI, anemia (received 1 unit PRBC), and constipation. She had decreased LOC and generalized shaking overnight on 9/28. RRT called and pt was intubated on 9/29/23 for encephalopathy and concern that should would be unable to protect her airway. Initial EEG was concerning for status epilepticus for which she was started on ASDs. She has remained intubated and encephalopathic since admission. Attempted to decrease ASD doses 10/9 to facilitate recovery of consciousness but had recurrent seizures and increasingly active EEG. Brain MRI was repeated 10/11 which again showed no acute findings.     EEG 10/23/2024 showed no seizures. Neurocritical care signed off after patient was transferred to the floor on 10/24 and Neurology took over as the consultant group.    Neurocrit care was reconsulted for question re: futility of care. As part of the work up, sEEG was done which picked up status epilepticus and patient was transferred back to ICU.      WORK UP THUS FAR:  - EEG 09/29/23: Abnormal due to the presences of generalized periodic pattern consisting of 2-3.5 hz discharges, maximum negativity in bifrontal region, periodic pattern occupy 90% of the record and the remaining portion consist of  delta slowing. T    - CT Head : no acute intracranial pathology, brain atrophy,   - MRI 23: no acute intracranial process, generalized atrophy,   - MRI 10/11/23: no acute intracranial process, no hemorrhage, moderate-advanced brain atrophy and leukoaraiosis,    - MRI 10/20/23: no interval change      BCx: NGTD ()      CSF analysis ()  -glucose 98  -protein 22.8  -2 nucleated cells, RBC 0  -aerobic culture GPC in broth only   -anaerobic culture NGTD  -HSV negative     Serum  [place] encephalopathy autoimmune panel negative    - Completed high dose solumedrol trial on 10/31 (4 days)  ------------------------------------------     New work up since re-consult on 11/3:     CT C/A/P :  No e/o or concern for malignancy     Ambien trial :  Negative     sEEG : 1-2Hz GPDs, per Dr Castaneda on ictal-interictal continuum ~ status epilepticus     cEEG - : runs of GPEDs, 1-2 Hz alternating with generalized slowing; versed increased to 4 ml/hr ---> with improvement of GPEDS    Clobazam metabolite:  pending     CSF   Basic Profile: 2 nucleated cells, Protein 20, Glucose 88  Gram stain: no organism, 1+ WBC  Meningitis/Encephalitis panel: negative  VZV, HSV PCR - negative    Pending CSF studies: [place] CSF AI Encephalopathy panel, anti-LG1 (this is associated with faciobrachial seizures, which pt has), anti-VGKC, anti-striational, anti-Hu, anti-Ma, anti-VGCC, enterovirus, EBV, CMV, bartonella, mycoplasma, WNV, T whippeli, 14-3-3     -----------------------------------------------------------------------     Impression   New onset refractory status epilepticus (on 4 AEDs and Midazolam gtt currently)  Encephalopathy        Plan  - cEEG  - continue Clobazam 20 mg twice daily and Depakote 250 mg 3 times daily (most recent levels 10/21/2023: Total 20, free 10).   - Decreased Lacosamide 300 mg twice daily on  (most recent level 10/9/2023 = 4.1) to 250 mg twice daily due to prolonged NH interval.  "Repeat EKG on 11/7 shows IN interval has normalized (206 ---> 186)  - Increased Keppra dose to 1.5 g BID on 11/6 (newly started on 11/5)  - Will order Clobazam metabolite level.  - Planned for pulse steroid today but Pharmacy alerted patient had a 4 day iv steroid trial earlier. Therefore not pursuing the same.  - CSF studies pending as above  - Ordering serum anti-LG1 Ab     Patient Follow-up    - final recommendation pending work-up     We will continue to follow.      The Stroke Staff is Dr. Barlow.     Saad Alfred MD  Vascular Neurology Fellow     To page me or covering stroke neurology team member, click here: AMCOM  Choose \"On Call\" tab at top, then select \"NEUROLOGY/ALL SITES\" from middle drop-down box, press Enter, then look for \"stroke\" or \"telestroke\" for your site.  ______________________________________________________    Clinically Significant Risk Factors              # Hypoalbuminemia: Lowest albumin = 3.4 g/dL at 9/30/2023  5:15 AM, will monitor as appropriate     # Hypertension: Noted on problem list         # Moderate Malnutrition: based on nutrition assessment             Medications   Scheduled Meds   chlorhexidine  15 mL Mouth/Throat Q12H    clobazam  20 mg Oral or Feeding Tube BID    [Held by provider] heparin ANTICOAGULANT  5,000 Units Subcutaneous Q8H    ipratropium - albuterol 0.5 mg/2.5 mg/3 mL  3 mL Nebulization TID    lacosamide  250 mg Oral or Feeding Tube BID    levETIRAcetam  1,500 mg Intravenous Q12H    multivitamins w/minerals  15 mL Oral or Feeding Tube Daily    pantoprazole  40 mg Per Feeding Tube QAM AC    Or    pantoprazole  40 mg Intravenous QAM AC    [Held by provider] polyethylene glycol  17 g Oral or Feeding Tube BID    sodium chloride (PF)  10-40 mL Intracatheter Q7 Days    sodium chloride (PF)  3 mL Intracatheter Q8H    valproic acid  250 mg Oral Q8H    wound support modular  60 mL Oral Daily at 4 pm       Infusion Meds   dextrose      midazolam 4 mg/hr (11/07/23 0747) " "   norepinephrine Stopped (11/06/23 1440)    - MEDICATION INSTRUCTIONS -      sodium chloride 10 mL/hr at 11/07/23 0747       PRN Meds  acetaminophen **OR** acetaminophen, albuterol, dextrose, glucose **OR** dextrose **OR** glucagon, hydrALAZINE, HYDROmorphone, lidocaine 4%, lidocaine (buffered or not buffered), LORazepam, melatonin, naloxone **OR** naloxone **OR** naloxone **OR** naloxone, nitroGLYcerin, - MEDICATION INSTRUCTIONS -, sodium chloride (PF), sodium chloride (PF), sodium chloride (PF)       PHYSICAL EXAMINATION  Temp:  [98.7  F (37.1  C)-98.9  F (37.2  C)] 98.7  F (37.1  C)  Pulse:  [56-95] 75  Resp:  [16-28] 16  BP: ()/() 100/48  FiO2 (%):  [30 %] 30 %  SpO2:  [96 %-100 %] 98 %      Neuro: Dr. Barlow to examine patient today; did not personally examine patient    Imaging  I personally reviewed all imaging; relevant findings per HPI.     Lab Results Data   CBC  Recent Labs   Lab 11/07/23  0418 11/05/23  0550 11/04/23  0602 11/02/23  0559 11/01/23  0625   WBC 9.6  --  16.1*  --  8.5   RBC 3.08*  --  3.68*  --  3.71*   HGB 7.7*  --  9.1*  --  9.1*   HCT 26.4*  --  31.3*  --  31.3*    341 397   < > 457*    < > = values in this interval not displayed.     Basic Metabolic Panel    Recent Labs   Lab 11/07/23  0808 11/07/23  0423 11/07/23  0418 11/04/23  0623 11/04/23  0602   NA  --   --  144  --  145   POTASSIUM  --   --  4.1  --  4.0   CHLORIDE  --   --  110*  --  105   CO2  --   --  24  --  30*   BUN  --   --  18.9  --  24.0*   CR  --   --  0.36*  --  0.42*   * 101* 117*   < > 122*   LULU  --   --  8.1*  --  8.7*    < > = values in this interval not displayed.     Liver Panel  No results for input(s): \"PROTTOTAL\", \"ALBUMIN\", \"BILITOTAL\", \"ALKPHOS\", \"AST\", \"ALT\", \"BILIDIRECT\" in the last 168 hours.  INR    Recent Labs   Lab Test 09/28/23  1244 03/07/23  1629   INR 1.04 1.15      Lipid Profile    Recent Labs   Lab Test 10/15/23  0650 10/03/23  0956 03/08/23  0830   CHOL  --   --  143 " "  HDL  --   --  50   LDL  --   --  75   TRIG 136 1,372* 88     A1C  No lab results found.  Troponin  No results for input(s): \"CTROPT\", \"TROPONINIS\", \"TROPONINI\", \"GHTROP\" in the last 168 hours.       Data     "

## 2023-11-08 NOTE — PROGRESS NOTES
ICU Followup Note:    Care coordinator received a call from patient's nephew, Ivan Morales. Mr. Morales is agreeable to being the family coordinator and spokesperson. Mr. Morales tells me that Ms. Germain's next of kin are brothers; both of whom suffer from persistent mental illness. He will contact these brothers; one of whom may have some ability to make decisions.    Ana Edmond MD

## 2023-11-08 NOTE — PROGRESS NOTES
Care Management Follow Up    Length of Stay (days): 41    Expected Discharge Date: 11/15/2023     Concerns to be Addressed: other (see comments) (unsure, please ask pt, MD, or care coordinator)     Patient plan of care discussed at interdisciplinary rounds: Yes    Anticipated Discharge Disposition: TBD     Anticipated Discharge Services: Transportation Services  Anticipated Discharge DME:      Patient/family educated on Medicare website which has current facility and service quality ratings: other (see comments) (this is not within my scope of practice)  Education Provided on the Discharge Plan:    Patient/Family in Agreement with the Plan:      Referrals Placed by CM/SW: Community [facility]idential [facility] (Group Homes)  Private pay costs discussed: Not applicable    Additional Information:  -IN search for family members, writer first used [email or web address] and then been verified. Writer noted father, Ryan Germain's Obituary(2019). It listed surviving family members as: son,  Marshal Germain, [place], [place], son, Earl Germain, [place], daughter, Yasmin Germain(patient) and daughter Yuliya Morales. Writer was not able to find home number for Marshal Germain but Spoke to a Earl Germain on 10/10 , which told writer he was not a family member. Writer then noted Yuliya Morales(who has passed away), patient's sister had  sons Faheem Morales and Ivan Morales who are nephews of patient. This was verified through [email or web address] through birth records.  Writer attempted to contact to Faheem Morales at [phone]. Joyr was not able to Locate Ivan Morales.Writer left 4 voicemail's with no return reply.  then found an e-mail of [email or web address]. Faheem replied and then left a message on the phone of [phone]. This was forwarded to writer.  After 22 days,  was able to speak to Faheem. Faheem verified that patient is in fact his Aunt and mother's sister.   Joyr explained that patient has been here for 41 days without  "anyone to help guide us with patient's care. Writecaro explained that patient is currently in ICU on a ventilator unresponsive. Faheem explained that he has never met the patient but knows of her through his Mother Yuliya Morales(patient's sister). He apologized  to  for not returning phone calls previously as he felt he did not know anyone here. He does not feel comfortable making decisions, however would like for writer speak to his brother Ivan Morales.  then received a phone call from Ivan Morales at [phone]. He also verified that he was the patient's nephew. He has met patient but has little contact with her. His mother did however speak of her frequently. He verified he has a Uncle Earl and an Uncle Marshal. \"Earl is mentally ill and would not be able to make any decisions and Marshal is in [place] and pretty Hands Off.\" Ivan told writer \"If you need someone to help with decision making, I can help. I will call my uncle and confer with him but he recently had a stroke.\" Joyr briefly went over what has happened to patient and how long she has been at [facility]. \"I was just there on Monday, working the ED.\" Ivan told  to pass on his number to the ICU MD so that he can help. Writer relayed to ICU MD, Dr. Randhawa as well as writer's Manager, Tonia Whitt.    Gissell Chery RN      "

## 2023-11-08 NOTE — PROGRESS NOTES
[facility] ICU RESPIRATORY NOTE        Date of Admission: 9/28/2023    Date of Intubation (most recent):10/20/23    Reason for Mechanical Ventilation:AW protection    Number of Days on Mechanical Ventilation: 4    Met Criteria for Spontaneous Breathing Trial: No     Reason for No Spontaneous Breathing Trial: Per MD     Significant Events Today: none     ABG Results:   Recent Labs   Lab 11/07/23  0000 11/05/23 2120   PH  --  7.50*   PCO2  --  31*   PO2  --  89   HCO3  --  24   O2PER 21 30         Current Vent Settings: Vent Mode: CMV/AC  (Continuous Mandatory Ventilation/ Assist Control)  FiO2 (%): 30 %  Resp Rate (Set): 16 breaths/min  Tidal Volume (Set, mL): 330 mL  PEEP (cm H2O): 5 cmH2O  Resp: 16      Skin Assessment: intact     Plan: Continue on ventilator support     RT Inez on 11/8/2023 at 6:12 AM

## 2023-11-08 NOTE — PROGRESS NOTES
"[facility]  WO Nurse Inpatient Assessment     Consulted for: 10/18 Wound Buttocks. 10/30 Pressure Injury Forehead.     Summary: patient was initially seen by [facility] for a wound that was POA to [facility]; this appeared to be skin damage to buttock related to moisture/friction, improved 10/6. 10/18 Wound reopened. 10/25 wound was deteriorating, and a new wound to sacrum, Stage 2 HAPI.  Forehead noted intact blanchable 10/30.  All wounds noted noted resurfaced 11/8. WO team will sign off.     Patient History (according to provider note(s):      \"68 year old female admitted on 9/28/2023. With history of schizophrenia, tardive dyskinesia, HTN, depression, ADD, and UTI who resides in a group home who presented to the ED with abdominal and back pain\"    Assessment:      Areas visualized during today's visit: Focused: and Sacrum/coccyx    Wound location: Left buttock left sacrum     Last photo: 11-8-23    Wound due to: Friction and Moisture Associated Skin Damage (MASD), reopened  Wound history/plan of care: improving, incontinent   Wound base: new resurfacing pink epidermis and peeling epidermis     Palpation of the wound bed: normal      Drainage: none     Measurements (length x width x depth, in cm): healed over     Tunneling: N/A     Undermining: N/A  Periwound skin: Denuded, Dry/scaly, and Erythema- blanchable      Color: pink      Temperature: normal   Odor: none  Pain: no grimacing or signs of discomfort, none  Pain interventions prior to dressing change: patient tolerated well  Treatment goal: Heal  and Protection  STATUS: healing  Supplies ordered: gathered, at bedside, and supplies stored on unit      Wound location: Sacrum (midline)    Last photo: 11-2-23    Wound due to: Stage 2 HAPI   Wound history/plan of care: improving, incontinent   Wound base: new resurfacing pink epidermis and peeling epidermis     Palpation of the wound bed: normal      Drainage: none     Measurements (length x width x " "depth, in cm): healed over     Tunneling: N/A     Undermining: N/A  Periwound skin: Denuded, Dry/scaly, and Erythema- blanchable      Color: pink      Temperature: normal   Odor: none  Pain: no grimacing or signs of discomfort, none  Pain interventions prior to dressing change: patient tolerated well  Treatment goal: Heal  and Protection  STATUS: healing  Supplies ordered: gathered, at bedside, and supplies stored on unit      Treatment Plan:       Wound care  EVERY OTHER DAY      Comments: Sacrum wound care: every other day and prn:  1. Cleanse as needed with mild soap and water or wound cleanser, dry well.  2. Swab area with no-sting skin barrier film, let dry.  3. Cover with Mepilex Sacral.  4. PIP measures.  Avoid briefs in bed.  Isolibrium mattress (ICU), or add TRACY pump to Isoflex mattress.            Skin care precautions  EFFECTIVE NOW        Comments: Pressure Injury Prevention (PIP) Plan:  If patient is declining pressure injury prevention interventions: Explore reason why and address patient's concerns, Educate on pressure injury risk and prevention intervention(s), If patient is still declining, document \"informed refusal\" , and Ensure Care team is aware ( provider, charge nurse, etc)  Mattress: Follow bed algorithm, reassess daily and order specialty mattress, if indicated.  HOB: Maintain at or below 30 degrees, unless contraindicated  Repositioning in bed: Every 1-2 hours , Left/right positioning; avoid supine, and Raise foot of bed prior to raising head of bed, to reduce patient sliding down (shear)  Heels: Keep elevated off mattress and Pillows under calves  Protective Dressing: see wound care orders please  Chair positioning: Chair cushion (#[id number]) , Assist patient to reposition hourly, and Do NOT use a donut for sitting (this increases pressure to smaller area and creates a higher potential for injury)    If patient has a buttock pressure injury, or high risk for PI use chair cushion or " SPS.  Moisture Management: Perineal cleansing /protection: Follow Incontinence Protocol, Avoid brief in bed, Clean and dry skin folds with bathing , Consider InterDry (#[id number]) between folds, and Moisturize dry skin  Under Devices: Inspect skin under all medical devices during skin inspection , Ensure tubes are stabilized without tension, and Ensure patient is not lying on medical devices or equipment when repositioned  Ask provider to discontinue device when no longer needed.          Orders: Reviewed    RECOMMEND PRIMARY TEAM ORDER: None, at this time  Education provided: importance of repositioning, plan of care, wound progress, Moisture management, Hygiene, and Off-loading pressure  Discussed plan of care with: Patient and Nurse  [facility] nurse follow-up plan: signing off  Notify WOC if wound(s) deteriorate.  Nursing to notify the Provider(s) and re-consult the [facility] Nurse if new skin concern.    DATA:     Current support surface: Standard  Low air loss (TRACY pump, Isolibrium, Pulsate, skin guard, etc)  Containment of urine/stool: Incontinence Protocol, Incontinent pad in bed, and Purewick external catheter   BMI: Body mass index is 18.26 kg/m .   Active diet order: Orders Placed This Encounter      NPO for Medical/Clinical Reasons Except for: Other; Specify: NPO For oral intake and gastric feedings for 6 hours post insertion Gastrostomy G/GJ tube. May feed through Jejunal or Distal PORT immediately for GJ tubes ONLY.     Output: I/O last 3 completed shifts:  In: 1219 [I.V.:419; NG/GT:415]  Out: 950 [Urine:950]     Labs:   Recent Labs   Lab 11/08/23  1207   HGB 7.9*   WBC 7.4     Pressure injury risk assessment:   Sensory Perception: 1-->completely limited  Moisture: 3-->occasionally moist  Activity: 1-->bedfast  Mobility: 1-->completely immobile  Nutrition: 3-->adequate  Friction and Shear: 2-->potential problem  Luis Score: 11    Anisa CWOCN   1st choice: Securely message with [employer] ([facility] [facility])    (2nd option: [facility] Office Phone [phone], messages checked periodically Mon-Fri 8a-4p)

## 2023-11-08 NOTE — PROGRESS NOTES
Preliminary Video EEG impression on November 8, 2023  Until 9am        This EEG is abnormal due to the presences of continuous generalized polymorphic delta slowing.  With periods of EEG attenuation.  There are intermittent bursts of generalized periodic discharges up to 2 hz in frequency in 25%-50% record,  maximum negativity is in the bifrontal region.  There is no obvious clinical correlation with these discharges.     EEG is consistent with a severe diffuse encephalopathy with diffuse cortical irritability and increased risk for seizures. Clinical correlation is advised.      Koki Gusman MD  Staff Epilepsy Neurologist   [phone]

## 2023-11-08 NOTE — PLAN OF CARE
"  Problem: Seizure, Active Management  Goal: Absence of Seizure/Seizure-Related Injury  Outcome: Not Progressing     Problem: Adult Inpatient Plan of Care  Goal: Plan of Care Review  Description: The Plan of Care Review/Shift note should be completed every shift.  The Outcome Evaluation is a brief statement about your assessment that the patient is improving, declining, or no change.  This information will be displayed automatically on your shift  note.  Outcome: Not Progressing  Flowsheets (Taken 11/8/2023 0554)  Outcome Evaluation: Neuros unchanged. Difficult to assess 2/2 trach, not able to follow commands. Continuous EEG. No sz activity noted. Straight cath x1 for 600 ml. Awaiting follow up ethics consult.  Plan of Care Reviewed With: patient  Overall Patient Progress: declining  Goal: Patient-Specific Goal (Individualized)  Description: You can add care plan individualizations to a care plan. Examples of Individualization might be:  \"Parent requests to be called daily at 9am for status\", \"I have a hard time hearing out of my right ear\", or \"Do not touch me to wake me up as it startles  me\".  Outcome: Not Progressing  Goal: Absence of Hospital-Acquired Illness or Injury  Outcome: Not Progressing  Intervention: Identify and Manage Fall Risk  Recent Flowsheet Documentation  Taken 11/7/2023 2000 by Pieter Torres RN  Safety Promotion/Fall Prevention:   activity supervised   clutter free environment maintained   lighting adjusted   nonskid shoes/slippers when out of bed   patient and family education   safety round/check completed   supervised activity   treat reversible contributory factors   treat underlying cause  Intervention: Prevent Skin Injury  Recent Flowsheet Documentation  Taken 11/8/2023 0400 by Pieter Torres RN  Body Position:   turned   side-lying   right  Taken 11/8/2023 0300 by Pieter Torres RN  Body Position:   turned   side-lying   left  Taken 11/8/2023 0200 by Pieter Torres, RN  Body " Position:   turned   side-lying   left  Taken 11/8/2023 0000 by Pieter Torres RN  Body Position:   turned   side-lying   right  Taken 11/7/2023 2200 by Pieter Torres RN  Body Position:   turned   side-lying   left  Taken 11/7/2023 2000 by Pieter Torres RN  Body Position:   turned   side-lying   right  Device Skin Pressure Protection:   adhesive use limited   absorbent pad utilized/changed  Intervention: Prevent and Manage VTE (Venous Thromboembolism) Risk  Recent Flowsheet Documentation  Taken 11/8/2023 0400 by Pieter Torres RN  VTE Prevention/Management: SCDs (sequential compression devices) on  Taken 11/8/2023 0000 by Pieter Torres RN  VTE Prevention/Management: SCDs (sequential compression devices) on  Taken 11/7/2023 2000 by Pieter Torres RN  VTE Prevention/Management: SCDs (sequential compression devices) on  Intervention: Prevent Infection  Recent Flowsheet Documentation  Taken 11/7/2023 2000 by Pieter Torres RN  Infection Prevention: rest/sleep promoted  Goal: Optimal Comfort and Wellbeing  Outcome: Not Progressing  Intervention: Monitor Pain and Promote Comfort  Recent Flowsheet Documentation  Taken 11/7/2023 2000 by Pieter Torres RN  Pain Management Interventions:   medication (see MAR)   repositioned  Intervention: Provide Person-Centered Care  Recent Flowsheet Documentation  Taken 11/7/2023 2000 by Pieter Torres RN  Trust Relationship/Rapport:   care explained   choices provided   questions answered   reassurance provided   questions encouraged   thoughts/feelings acknowledged  Goal: Readiness for Transition of Care  Outcome: Not Progressing     Problem: Artificial Airway  Goal: Effective Communication  Outcome: Not Progressing  Intervention: Ensure Effective Communication  Recent Flowsheet Documentation  Taken 11/8/2023 0400 by Pieter Torres RN  Communication Enhancement Strategies:   extra time allowed for response   one-step directions provided   device use  encouraged  Taken 11/8/2023 0000 by Pieter Torres RN  Communication Enhancement Strategies:   extra time allowed for response   one-step directions provided   device use encouraged  Taken 11/7/2023 2000 by Pieter Torres RN  Communication Enhancement Strategies:   extra time allowed for response   one-step directions provided   device use encouraged  Family/Support System Care: presence promoted  Trust Relationship/Rapport:   care explained   choices provided   questions answered   reassurance provided   questions encouraged   thoughts/feelings acknowledged  Intervention: Ensure Effective Communication  Recent Flowsheet Documentation  Taken 11/8/2023 0400 by Pieter Torres RN  Communication Enhancement Strategies:   extra time allowed for response   one-step directions provided   device use encouraged  Taken 11/8/2023 0000 by Pieter Torres RN  Communication Enhancement Strategies:   extra time allowed for response   one-step directions provided   device use encouraged  Taken 11/7/2023 2000 by Pieter Torres RN  Communication Enhancement Strategies:   extra time allowed for response   one-step directions provided   device use encouraged  Family/Support System Care: presence promoted  Trust Relationship/Rapport:   care explained   choices provided   questions answered   reassurance provided   questions encouraged   thoughts/feelings acknowledged  Goal: Optimal Device Function  Outcome: Not Progressing  Intervention: Optimize Device Care and Function  Recent Flowsheet Documentation  Taken 11/7/2023 2000 by Pieter Torres RN  Airway Safety Measures: all equipment/monitors on and audible  Oral Care:   mouth wash rinse   lip/mouth moisturizer applied   oral rinse provided   suction provided  Goal: Absence of Device-Related Skin or Tissue Injury  Outcome: Not Progressing  Intervention: Maintain Skin and Tissue Health  Recent Flowsheet Documentation  Taken 11/7/2023 2000 by Pieter Torres RN  Device Skin  Pressure Protection:   adhesive use limited   absorbent pad utilized/changed  Intervention: Maintain Skin and Tissue Health  Recent Flowsheet Documentation  Taken 11/7/2023 2000 by Pieter Torres RN  Device Skin Pressure Protection:   adhesive use limited   absorbent pad utilized/changed     Problem: Fall Injury Risk  Goal: Absence of Fall and Fall-Related Injury  Outcome: Not Progressing  Intervention: Identify and Manage Contributors  Recent Flowsheet Documentation  Taken 11/7/2023 2000 by iPeter Torres RN  Medication Review/Management: medications reviewed  Intervention: Promote Injury-Free Environment  Recent Flowsheet Documentation  Taken 11/7/2023 2000 by Pieter Torres RN  Safety Promotion/Fall Prevention:   activity supervised   clutter free environment maintained   lighting adjusted   nonskid shoes/slippers when out of bed   patient and family education   safety round/check completed   supervised activity   treat reversible contributory factors   treat underlying cause     Problem: Pain Acute  Goal: Optimal Pain Control and Function  Outcome: Not Progressing  Intervention: Develop Pain Management Plan  Recent Flowsheet Documentation  Taken 11/7/2023 2000 by Pieter Torres RN  Pain Management Interventions:   medication (see MAR)   repositioned  Intervention: Prevent or Manage Pain  Recent Flowsheet Documentation  Taken 11/8/2023 0400 by Pieter Torres RN  Sensory Stimulation Regulation:   auditory stimulation minimized   quiet environment promoted  Taken 11/8/2023 0000 by Pieter Torres RN  Sensory Stimulation Regulation:   auditory stimulation minimized   quiet environment promoted  Taken 11/7/2023 2000 by Pieter Torres RN  Sensory Stimulation Regulation:   auditory stimulation minimized   quiet environment promoted  Medication Review/Management: medications reviewed   Goal Outcome Evaluation:      Plan of Care Reviewed With: patient    Overall Patient Progress: decliningOverall Patient  Progress: declining    Outcome Evaluation: Neuros unchanged. Difficult to assess 2/2 trach, not able to follow commands. PERRL, Occasionally ESCOBAR. Inconsistently withdraws to pain. Continuous EEG. No sz activity noted. Straight cath x1 for 600 ml. Awaiting follow up ethics consult.

## 2023-11-08 NOTE — PROGRESS NOTES
[facility]    Stroke Progress Note    Interval Events  NAEO  A relative has been identified and ICU team is discussing with them re: withdrawal of care    HPI Summary  Kortney Germain is a 68 year old female with PMHx significant for anxiety/depression, schizoaffective disorder (living in group home), HTN, odynophagia, tardive dyskinesia, HTN. She presented  with lower back pain and abdominal pain. She was found to have UTI, anemia (received 1 unit PRBC), and constipation. She had decreased LOC and generalized shaking overnight on . RRT called and pt was intubated on 23 for encephalopathy and concern that should would be unable to protect her airway. Initial EEG was concerning for status epilepticus for which she was started on ASDs. She has remained intubated and encephalopathic since admission. Attempted to decrease ASD doses 10/9 to facilitate recovery of consciousness but had recurrent seizures and increasingly active EEG. Brain MRI was repeated 10/11 which again showed no acute findings.     EEG 10/23/2024 showed no seizures. Neurocritical care signed off after patient was transferred to the floor on 10/24 and Neurology took over as the consultant group.     Neurocrit care was reconsulted for question re: futility of care. As part of the work up, sEEG was done which picked up status epilepticus and patient was transferred back to ICU.      WORK UP THUS FAR:  - EEG 23: Abnormal due to the presences of generalized periodic pattern consisting of 2-3.5 hz discharges, maximum negativity in bifrontal region, periodic pattern occupy 90% of the record and the remaining portion consist of delta slowing. T    - CT Head : no acute intracranial pathology, brain atrophy,   - MRI 23: no acute intracranial process, generalized atrophy,   - MRI 10/11/23: no acute intracranial process, no hemorrhage, moderate-advanced brain atrophy and leukoaraiosis,    - MRI  10/20/23: no interval change      BCx: NGTD (9/28)      CSF analysis (9/29)  -glucose 98  -protein 22.8  -2 nucleated cells, RBC 0  -aerobic culture GPC in broth only   -anaerobic culture NGTD  -HSV negative     Serum  [place] encephalopathy autoimmune panel negative     - Completed high dose solumedrol trial on 10/31 (4 days)  ------------------------------------------     New work up since re-consult on 11/3:     CT C/A/P 11/4:  No e/o or concern for malignancy     Ambien trial 11/5:  Negative     sEEG 11/4: 1-2Hz GPDs, per Dr Castaneda on ictal-interictal continuum ~ status epilepticus     cEEG 11/5- : runs of GPEDs, 1-2 Hz alternating with generalized slowing; versed increased to 4 ml/hr ---> with improvement of GPEDS     Clobazam metabolite:  Pending    Serum LGI-1 Ab (11/7) pending  Serum VGCC pending     CSF 11/6  Basic Profile: 2 nucleated cells, Protein 20, Glucose 88  Gram stain: no organism, 1+ WBC  Meningitis/Encephalitis panel: negative  VZV, HSV PCR - negative  ENS2 Encephalopathy panel  CSF 14-3-3 pending  Flow Cytometry negative     Pending CSF studies: [place] CSF AI Encephalopathy panel, anti-LG1 (this is associated with faciobrachial seizures, which pt has), anti-VGKC, anti-striational, anti-Hu, anti-Ma, anti-VGCC, enterovirus, EBV, CMV, bartonella, mycoplasma, WNV, T whippeli, 14-3-3     -----------------------------------------------------------------------     Impression   New onset refractory status epilepticus (on 4 AEDs and Midazolam gtt currently)  Encephalopathy        Plan  - cEEG  - continue Clobazam 20 mg twice daily (last Clobazam level 133, metabolite 883) metabolite and Depakote 250 mg 3 times daily (most recent levels 10/21/2023: Total 20, free 10).   - Decreased Lacosamide 300 mg twice daily on 11/5 (most recent level 10/9/2023 = 4.1) to 250 mg twice daily due to prolonged KY interval. Repeat EKG on 11/7 shows KY interval has normalized (206 ---> 186)  - Increased Keppra dose to 1.5 g BID on  "11/6 (newly started on 11/5)  - Clobazam metabolite level pending   - CSF studies pending as above     Patient Follow-up    - final recommendation pending work-up     We will continue to follow.      The Stroke Staff is Dr. Barlow.     Saad Alfred MD  Vascular Neurology Fellow     To page me or covering stroke neurology team member, click here: [facility]  Choose \"On Call\" tab at top, then select \"NEUROLOGY/ALL SITES\" from middle drop-down box, press Enter, then look for \"stroke\" or \"telestroke\" for your site.  ______________________________________________________    Clinically Significant Risk Factors              # Hypoalbuminemia: Lowest albumin = 3.4 g/dL at 9/30/2023  5:15 AM, will monitor as appropriate     # Hypertension: Noted on problem list         # Moderate Malnutrition: based on nutrition assessment             Medications   Scheduled Meds   chlorhexidine  15 mL Mouth/Throat Q12H    clobazam  20 mg Oral or Feeding Tube BID    heparin ANTICOAGULANT  5,000 Units Subcutaneous Q8H    ipratropium - albuterol 0.5 mg/2.5 mg/3 mL  3 mL Nebulization TID    lacosamide  250 mg Oral or Feeding Tube BID    levETIRAcetam  1,500 mg Intravenous Q12H    multivitamins w/minerals  15 mL Oral or Feeding Tube Daily    pantoprazole  40 mg Per Feeding Tube QAM AC    Or    pantoprazole  40 mg Intravenous QAM AC    [Held by provider] polyethylene glycol  17 g Oral or Feeding Tube BID    sodium chloride (PF)  10-40 mL Intracatheter Q7 Days    sodium chloride (PF)  3 mL Intracatheter Q8H    valproic acid  250 mg Oral Q8H    wound support modular  60 mL Oral Daily at 4 pm       Infusion Meds   dextrose      midazolam 4 mg/hr (11/08/23 0752)    norepinephrine Stopped (11/06/23 1440)    - MEDICATION INSTRUCTIONS -      sodium chloride 10 mL/hr at 11/08/23 0752       PRN Meds  acetaminophen **OR** acetaminophen, albuterol, dextrose, glucose **OR** dextrose **OR** glucagon, hydrALAZINE, HYDROmorphone, lidocaine 4%, lidocaine (buffered " "or not buffered), melatonin, naloxone **OR** naloxone **OR** naloxone **OR** naloxone, nitroGLYcerin, - MEDICATION INSTRUCTIONS -, sodium chloride (PF), sodium chloride (PF), sodium chloride (PF)       PHYSICAL EXAMINATION  Temp:  [97.8  F (36.6  C)-98.8  F (37.1  C)] 97.8  F (36.6  C)  Pulse:  [57-85] 57  Resp:  [3-22] 16  BP: ()/() 102/56  FiO2 (%):  [30 %] 30 %  SpO2:  [97 %-100 %] 99 %      Neuro: sedated, does not open eyes to voice/noxious stim, b/l LE mild ly flexes on nox stim in UE, no obvious facial asymmetry, dysconjugate gaze    Imaging  I personally reviewed all imaging; relevant findings per HPI.     Lab Results Data   CBC  Recent Labs   Lab 11/07/23  0418 11/05/23  0550 11/04/23  0602   WBC 9.6  --  16.1*   RBC 3.08*  --  3.68*   HGB 7.7*  --  9.1*   HCT 26.4*  --  31.3*    341 397     Basic Metabolic Panel    Recent Labs   Lab 11/08/23  0754 11/08/23  0448 11/07/23  2036 11/07/23  0423 11/07/23  0418 11/04/23  0623 11/04/23  0602   NA  --   --   --   --  144  --  145   POTASSIUM  --   --   --   --  4.1  --  4.0   CHLORIDE  --   --   --   --  110*  --  105   CO2  --   --   --   --  24  --  30*   BUN  --   --   --   --  18.9  --  24.0*   CR  --   --   --   --  0.36*  --  0.42*   * 100* 107*   < > 117*   < > 122*   LULU  --   --   --   --  8.1*  --  8.7*    < > = values in this interval not displayed.     Liver Panel  No results for input(s): \"PROTTOTAL\", \"ALBUMIN\", \"BILITOTAL\", \"ALKPHOS\", \"AST\", \"ALT\", \"BILIDIRECT\" in the last 168 hours.  INR    Recent Labs   Lab Test 09/28/23  1244 03/07/23  1629   INR 1.04 1.15      Lipid Profile    Recent Labs   Lab Test 10/15/23  0650 10/03/23  0956 03/08/23  0830   CHOL  --   --  143   HDL  --   --  50   LDL  --   --  75   TRIG 136 1,372* 88     A1C  No lab results found.  Troponin  No results for input(s): \"CTROPT\", \"TROPONINIS\", \"TROPONINI\", \"GHTROP\" in the last 168 hours.       Data     "

## 2023-11-08 NOTE — PROGRESS NOTES
[facility]   MICU Progress Note  Critical Care Progress Note      11/08/2023    Name: Kortney Germain MRN#: [id number]   Age: 68 year old YOB: 1955     [facility]  [facility] #     MV DAY #             Problem List:   Principal Problem:    Status epilepticus (H)  Active Problems:    Urinary retention    Acute cystitis without hematuria    Constipation, unspecified constipation type    Anemia, unspecified type    HTN (hypertension)           Summary/Hospital Course:     68-year-old female patient with past medical history significant for schizoaffective disorder, tardive dyskinesia, she was initially admitted on 9/28/2023 for UTI and altered mental status, she was intubated and supported on a ventilator, and ultimately she was diagnosed with refractory status epilepticus with requirement for multiple AEDs, and she has not been able to tolerate down titration of her antiepileptic medications.  She had neuro work-up with unremarkable MRIs with and without gadolinium, she did have CSF analysis with bland results, and she did have a negative serum encephalopathy panel.  She had trach and PEG placed.  She did not improve even with 5-day course of IV Solu-Medrol.  This has been a challenging case ethically as there is no proxy decision-maker, ethics panel has been involved in the past to discuss goals of care.  The patient continued to have evidence for status epilepticus on EEG continuous monitoring despite multiple AEDs.  Neurology recommended to transfer the patient to the ICU for IV Versed for status epilepticus control.  Despite being on midazolam she has continued to have seizures.  Keppra was increased on 11/6 in response to continuing seizure activity.  No seizures since but remains on midazolam infusion and is unresponsive. Has had an increase in purulence of her secretions today.      Assessment and plan :     Kortney Germain IS a 68 year old female admitted on 9/28/2023 for status  epilepticus.   I have personally reviewed the daily labs, imaging studies, cultures and discussed the case with referring physician and consulting physicians.     My assessment and plan by system for this patient is as follows:    Neurology/Psychiatry:   1. Status epilepticus despite being on multiple AEDs including clobazam, Keppra, Valproic acid and Vimpat.  Continues on infusion of benzodiazepines.  Has received salvage steroids for refractory status epilepticus with no improvement.  2. Hx of schizoaffective disorder   3. Hx of tardive dyskinesia   Plan  -Appreciate collaboration with neurologists regarding treatment and prognosis for this patient. She has now been in the hospital mostly in the ICU since September 28th. She has never been awake enough to interact or follow any commands.    Cardiovascular:   1.Hemodynamically stable, no active issues, her last TTE on 3/9/2023 showed EF 60-65%, normal RV function and size   2. Hx of HTN on atenolol. Blood pressure wnl limits here not on therapy.   Plan   -keep bp in normal range    Pulmonary/Ventilator Management:   1. Chronic hypoxemic respiratory failure due to seizure and inability to protect her airway, CT chest was done on 11/4, showed some atelectasis, she does have some mosaic attenuation so questioning some air trapping.  Apneic on pressure support since she is continuing to require heavy sedation. Increasing secretions today.  Plan  - Continue mechanical ventilation with lung protection  - Scheduled DuoNeb TID     GI and Nutrition :   1. TF via G tube     Plan  -Continue TF    Renal/Fluids/Electrolytes:   1. No acute kidney injury    Plan  - monitor function and electrolytes as needed with replacement per ICU protocols.  - generally avoid nephrotoxic agents such as NSAID, IV contrast unless specifically required  - adjust medications as needed for renal clearance  - follow I/O's as appropriate.    Infectious Disease:   RN noting increased in purulence of  secretions.    Plan  -Monitor and re initiate antibiotics if necessary    Endocrine:   No hypoglycemia   Plan  - ICU insulin protocol, goal sugar <180      Hematology/Oncology:   1. Leukocytosis   2. Anemia, no signs, symptoms of active blood loss    Plan  - Monitor. Repeat labs tomorrow.      IV/Access:   1. Venous access - Left PICC   2. Arterial access - None   3.  Plan  - central access required and necessary      ICU Prophylaxis:   1. DVT: Hep Subq  2. Stress Ulcer: PPI  3. Restraints: Rates are  4. Feeding - TF  5. Family Update:Spoke with Emely, the manager of her group home. She is coordinating with her staff so that they can visit over the next one to two days.  6. Disposition - stay in the ICU. She has been medically stable but RN noting change in secretions raising possibility of another VAP.  Any escalation of ICU care if she were to deteriorate medically would cause her harm and would provide no benefit. We will work to create a plan for her care in conjunction with upper level leadership.      Critical Care Time: 35 min. I spent this time (excluding procedures) personally providing and directing critical care services at the bedside and on the critical care unit.     Ana Edmond MD           [facility] Medications:      chlorhexidine  15 mL Mouth/Throat Q12H    clobazam  20 mg Oral or Feeding Tube BID    heparin ANTICOAGULANT  5,000 Units Subcutaneous Q8H    ipratropium - albuterol 0.5 mg/2.5 mg/3 mL  3 mL Nebulization TID    lacosamide  250 mg Oral or Feeding Tube BID    levETIRAcetam  1,500 mg Intravenous Q12H    multivitamins w/minerals  15 mL Oral or Feeding Tube Daily    pantoprazole  40 mg Per Feeding Tube QAM AC    Or    pantoprazole  40 mg Intravenous QAM AC    [Held by provider] polyethylene glycol  17 g Oral or Feeding Tube BID    sodium chloride (PF)  10-40 mL Intracatheter Q7 Days    sodium chloride (PF)  3 mL Intracatheter Q8H    valproic acid  250 mg Oral Q8H    wound support modular  60 mL  Oral Daily at 4 pm      dextrose      midazolam 4 mg/hr (11/08/23 0752)    norepinephrine Stopped (11/06/23 1440)    - MEDICATION INSTRUCTIONS -      sodium chloride 10 mL/hr at 11/08/23 0752               Physical Examination:   Temp:  [97.8  F (36.6  C)-98.8  F (37.1  C)] 97.8  F (36.6  C)  Pulse:  [57-85] 57  Resp:  [3-22] 16  BP: ()/() 102/56  FiO2 (%):  [30 %] 30 %  SpO2:  [97 %-100 %] 99 %      Intake/Output Summary (Last 24 hours) at 11/8/2023 1053  Last data filed at 11/8/2023 1000  Gross per 24 hour   Intake 1249 ml   Output 775 ml   Net 474 ml     '  Wt Readings from Last 4 Encounters:   11/07/23 42.4 kg (93 lb 7.6 oz)   06/08/23 40.8 kg (90 lb)   03/15/23 41 kg (90 lb 6.2 oz)   02/02/22 52.2 kg (115 lb)     BP - Mean:  [] 76  Vent Mode: CMV/AC  (Continuous Mandatory Ventilation/ Assist Control)  FiO2 (%): 30 %  Resp Rate (Set): 16 breaths/min  Tidal Volume (Set, mL): 330 mL  PEEP (cm H2O): 5 cmH2O  Resp: 16    Recent Labs   Lab 11/07/23  0000 11/05/23 2120   PH  --  7.50*   PCO2  --  31*   PO2  --  89   HCO3  --  24   O2PER 21 30       GEN: Chronically ill  HEENT: Trach, EEG leads  PULM: synchronous, clear    CV/COR: RRR S1S2 no gallop,  No rub, no murmur  ABD: soft nontender, hypoactive bowel sounds, clean peg tube site  EXT:  No edema   NEURO: no interaction, does not open eyes  SKIN: no obvious rash  LINES: clean, dry intact         Data:   All data and imaging reviewed     ROUTINE ICU LABS (Last four results)  CMP  Recent Labs   Lab 11/08/23  0754 11/08/23  0449 11/08/23  0448 11/07/23  2036 11/07/23  1634 11/07/23  0423 11/07/23  0418 11/06/23 0624 11/06/23 0622 11/04/23 0623 11/04/23 0602   NA  --   --   --   --   --   --  144  --   --   --  145   POTASSIUM  --   --   --   --   --   --  4.1  --   --   --  4.0   CHLORIDE  --   --   --   --   --   --  110*  --   --   --  105   CO2  --   --   --   --   --   --  24  --   --   --  30*   ANIONGAP  --   --   --   --   --   --  10  --  "  --   --  10   *  --  100* 107* 90   < > 117*   < >  --    < > 122*   BUN  --   --   --   --   --   --  18.9  --   --   --  24.0*   CR  --   --   --   --   --   --  0.36*  --   --   --  0.42*   GFRESTIMATED  --   --   --   --   --   --  >90  --   --   --  >90   LULU  --   --   --   --   --   --  8.1*  --   --   --  8.7*   PHOS  --  3.8  --   --   --   --  3.5  --  3.7  --   --     < > = values in this interval not displayed.     CBC  Recent Labs   Lab 11/07/23  0418 11/05/23  0550 11/04/23  0602 11/02/23  0559   WBC 9.6  --  16.1*  --    RBC 3.08*  --  3.68*  --    HGB 7.7*  --  9.1*  --    HCT 26.4*  --  31.3*  --    MCV 86  --  85  --    MCH 25.0*  --  24.7*  --    MCHC 29.2*  --  29.1*  --    RDW 21.9*  --  20.9*  --     [phone]*     INRNo lab results found in last 7 days.  Arterial Blood Gas  Recent Labs   Lab 11/07/23  0000 11/05/23  2120   PH  --  7.50*   PCO2  --  31*   PO2  --  89   HCO3  --  24   O2PER 21 30       All cultures:  No results for input(s): \"CULT\" in the last 168 hours.  No results found for this or any previous visit (from the past 24 hour(s)).      Clinically Significant Risk Factors              # Hypoalbuminemia: Lowest albumin = 3.4 g/dL at 9/30/2023  5:15 AM, will monitor as appropriate     # Hypertension: Noted on problem list         # Moderate Malnutrition: based on nutrition assessment                      "

## 2023-11-09 NOTE — PROGRESS NOTES
[facility]    Stroke Progress Note    Interval Events   till 9 am EEG: generalized slowing with bursts of GPED upto 2 hz in 25-50% record. Then discontinued. Patient was continued on onfi 20 BID, Vimpat 250 BID, Keppra 1500 BID, Depakote 250 q8h.   Patient was compassionately extubate at 2225 after speaking with nephew.    HPI Summary  Kortney Germain is a 68 year old female with PMHx significant for anxiety/depression, schizoaffective disorder (living in group home), HTN, odynophagia, tardive dyskinesia, HTN. She presented  with lower back pain and abdominal pain. She was found to have UTI, anemia (received 1 unit PRBC), and constipation. She had decreased LOC and generalized shaking overnight on . RRT called and pt was intubated on 23 for encephalopathy and concern that should would be unable to protect her airway. Initial EEG was concerning for status epilepticus for which she was started on ASDs. She has remained intubated and encephalopathic since admission. Attempted to decrease ASD doses 10/9 to facilitate recovery of consciousness but had recurrent seizures and increasingly active EEG. Brain MRI was repeated 10/11 which again showed no acute findings.     EEG 10/23/2024 showed no seizures. Neurocritical care signed off after patient was transferred to the floor on 10/24 and Neurology took over as the consultant group.     Neurocrit care was reconsulted for question re: futility of care. As part of the work up, sEEG was done which picked up status epilepticus and patient was transferred back to ICU.      WORK UP THUS FAR:  - EEG 23: Abnormal due to the presences of generalized periodic pattern consisting of 2-3.5 hz discharges, maximum negativity in bifrontal region, periodic pattern occupy 90% of the record and the remaining portion consist of delta slowing. T    - CT Head : no acute intracranial pathology, brain atrophy,   - MRI 23: no  acute intracranial process, generalized atrophy,   - MRI 10/11/23: no acute intracranial process, no hemorrhage, moderate-advanced brain atrophy and leukoaraiosis,    - MRI 10/20/23: no interval change      BCx: NGTD ()      CSF analysis ()  -glucose 98  -protein 22.8  -2 nucleated cells, RBC 0  -aerobic culture GPC in broth only   -anaerobic culture NGTD  -HSV negative     Serum  [place] encephalopathy autoimmune panel negative     - Completed high dose solumedrol trial on 10/31 (4 days)  ------------------------------------------     New work up since re-consult on 11/3:     CT C/A/P :  No e/o or concern for malignancy     Ambien trial :  Negative     sEEG : 1-2Hz GPDs, per Dr Castaneda on ictal-interictal continuum ~ status epilepticus     cEEG - : runs of GPEDs, 1-2 Hz alternating with generalized slowing; versed increased to 4 ml/hr ---> with improvement of GPEDS     Clobazam metabolite:  Pending    Serum LGI-1 Ab () pending  Serum VGCC pending     CSF   Basic Profile: 2 nucleated cells, Protein 20, Glucose 88  Gram stain: no organism, 1+ WBC  Meningitis/Encephalitis panel: negative  VZV, HSV PCR - negative  ENS2 Encephalopathy panel  CSF 14-3-3 pending  Flow Cytometry negative     Pending CSF studies: [place] CSF AI Encephalopathy panel, anti-LG1 (this is associated with faciobrachial seizures, which pt has), anti-VGKC, anti-striational, anti-Hu, anti-Ma, anti-VGCC, enterovirus, EBV, CMV, bartonella, mycoplasma, WNV, T whippeli, 14-3-3     -----------------------------------------------------------------------     Impression   New onset refractory status epilepticus (on 4 AEDs and Midazolam gtt currently)  Encephalopathy        Plan  - continue Clobazam 20 mg twice daily and Depakote 250 mg 3 times daily   - Continue  Lacosamide 250 mg  - Continue Keppra 1.5 BID  - CSF studies pending as above     The Stroke Staff is Dr. Barlow.     Sonia Vides MD  Vascular Neurology Fellow     To  "page me or covering stroke neurology team member, click here: AMCOM  Choose \"On Call\" tab at top, then select \"NEUROLOGY/ALL SITES\" from middle drop-down box, press Enter, then look for \"stroke\" or \"telestroke\" for your site.  ______________________________________________________    Clinically Significant Risk Factors              # Hypoalbuminemia: Lowest albumin = 3.4 g/dL at 9/30/2023  5:15 AM, will monitor as appropriate     # Hypertension: Noted on problem list         # Moderate Malnutrition: based on nutrition assessment             Medications   Scheduled Meds   chlorhexidine  15 mL Mouth/Throat Q12H    clobazam  20 mg Oral or Feeding Tube BID    heparin ANTICOAGULANT  5,000 Units Subcutaneous Q8H    ipratropium - albuterol 0.5 mg/2.5 mg/3 mL  3 mL Nebulization TID    lacosamide  250 mg Oral or Feeding Tube BID    levETIRAcetam  1,500 mg Intravenous Q12H    multivitamins w/minerals  15 mL Oral or Feeding Tube Daily    pantoprazole  40 mg Per Feeding Tube QAM AC    Or    pantoprazole  40 mg Intravenous QAM AC    [Held by provider] polyethylene glycol  17 g Oral or Feeding Tube BID    sodium chloride (PF)  10-40 mL Intracatheter Q7 Days    sodium chloride (PF)  3 mL Intracatheter Q8H    valproic acid  250 mg Oral Q8H    wound support modular  60 mL Oral Daily at 4 pm       Infusion Meds   dextrose      midazolam 4 mg/hr (11/08/23 2000)    norepinephrine Stopped (11/06/23 1440)    - MEDICATION INSTRUCTIONS -      sodium chloride 10 mL/hr at 11/08/23 2000       PRN Meds  acetaminophen **OR** acetaminophen, albuterol, dextrose, glucose **OR** dextrose **OR** glucagon, hydrALAZINE, HYDROmorphone, lidocaine 4%, lidocaine (buffered or not buffered), melatonin, naloxone **OR** naloxone **OR** naloxone **OR** naloxone, nitroGLYcerin, - MEDICATION INSTRUCTIONS -, sodium chloride (PF), sodium chloride (PF), sodium chloride (PF)       PHYSICAL EXAMINATION  Temp:  [97.8  F (36.6  C)-98.5  F (36.9  C)] 98.5  F (36.9 " " C)  Pulse:  [59-87] 59  Resp:  [16-19] 16  BP: ()/(43-79) 108/65  FiO2 (%):  [30 %] 30 %  SpO2:  [96 %-99 %] 97 %      Neuro: sedated, does not open eyes to voice/noxious stim, b/l LE mild ly flexes on nox stim in UE, no obvious facial asymmetry, dysconjugate gaze    Imaging  I personally reviewed all imaging; relevant findings per HPI.     Lab Results Data   CBC  Recent Labs   Lab 11/08/23  1207 11/07/23  0418 11/05/23  0550 11/04/23  0602   WBC 7.4 9.6  --  16.1*   RBC 3.11* 3.08*  --  3.68*   HGB 7.9* 7.7*  --  9.1*   HCT 27.2* 26.4*  --  31.3*    289 341 397     Basic Metabolic Panel    Recent Labs   Lab 11/09/23  0755 11/09/23  0501 11/09/23  0109 11/07/23  0423 11/07/23  0418 11/04/23  0623 11/04/23  0602   NA  --   --   --   --  144  --  145   POTASSIUM  --   --   --   --  4.1  --  4.0   CHLORIDE  --   --   --   --  110*  --  105   CO2  --   --   --   --  24  --  30*   BUN  --   --   --   --  18.9  --  24.0*   CR  --   --   --   --  0.36*  --  0.42*   * 102* 110*   < > 117*   < > 122*   LULU  --   --   --   --  8.1*  --  8.7*    < > = values in this interval not displayed.     Liver Panel  No results for input(s): \"PROTTOTAL\", \"ALBUMIN\", \"BILITOTAL\", \"ALKPHOS\", \"AST\", \"ALT\", \"BILIDIRECT\" in the last 168 hours.  INR    Recent Labs   Lab Test 09/28/23  1244 03/07/23  1629   INR 1.04 1.15      Lipid Profile    Recent Labs   Lab Test 10/15/23  0650 10/03/23  0956 03/08/23  0830   CHOL  --   --  143   HDL  --   --  50   LDL  --   --  75   TRIG 136 1,372* 88     A1C  No lab results found.  Troponin  No results for input(s): \"CTROPT\", \"TROPONINIS\", \"TROPONINI\", \"GHTROP\" in the last 168 hours.       Data     "

## 2023-11-09 NOTE — PROGRESS NOTES
[facility] ICU RESPIRATORY NOTE        Date of Admission: 9/28/2023    Date of Intubation (most recent): 10/20/2023    Reason for Mechanical Ventilation: AW protection    Number of Days on Mechanical Ventilation: 5    Met Criteria for Spontaneous Breathing Trial: No    Reason for No Spontaneous Breathing Trial: Per MD    Significant Events Today: None    ABG Results:   Recent Labs   Lab 11/07/23  0000 11/05/23 2120   PH  --  7.50*   PCO2  --  31*   PO2  --  89   HCO3  --  24   O2PER 21 30         Current Vent Settings: Vent Mode: CMV/AC  (Continuous Mandatory Ventilation/ Assist Control)  FiO2 (%): 30 %  Resp Rate (Set): 16 breaths/min  Tidal Volume (Set, mL): 330 mL  PEEP (cm H2O): 5 cmH2O  Resp: 16      Skin Assessment: Intact    Plan: continue on full vent support and assess readiness for daily SBT.    Juan Perea RT on 11/9/2023 at 4:59 AM     Statement Selected

## 2023-11-09 NOTE — PROGRESS NOTES
[facility]   MICU Progress Note  Critical Care Progress Note      11/09/2023    Name: Kortney Germain MRN#: [id number]   Age: 68 year old YOB: 1955     [facility] [place] [facility]  ICU DAY #     MV DAY #             Problem List:   Principal Problem:    Status epilepticus (H)  Active Problems:    Urinary retention    Acute cystitis without hematuria    Constipation, unspecified constipation type    Anemia, unspecified type    HTN (hypertension)           Summary/Hospital Course:     68-year-old female patient with past medical history significant for schizoaffective disorder, tardive dyskinesia, she was initially admitted on 9/28/2023 for UTI and altered mental status, she was intubated and supported on a ventilator, and ultimately she was diagnosed with refractory status epilepticus with requirement for multiple AEDs, and she has not been able to tolerate down titration of her antiepileptic medications.  She had neuro work-up with unremarkable MRIs with and without gadolinium, she did have CSF analysis with bland results, and she did have a negative serum encephalopathy panel.  She had trach and PEG placed.  She did not improve even with 5-day course of IV Solu-Medrol.  This has been a challenging case ethically as there is no proxy decision-maker, ethics panel has been involved in the past to discuss goals of care.  The patient continued to have evidence for status epilepticus on EEG continuous monitoring despite multiple AEDs.  Neurology recommended to transfer the patient to the ICU for IV Versed for status epilepticus control.  Despite being on midazolam she has continued to have seizures.  Keppra was increased on 11/6 in response to continuing seizure activity.  No seizures since but remains on midazolam infusion and is unresponsive.  Purulent secretions now.  Care coordinator was able to locate a family member yesterday who is willing to coordinate and be a surrogate decision-maker.     Assessment and plan :     Kortney Germain IS a 68 year old female admitted on 9/28/2023 for status epilepticus.   I have personally reviewed the daily labs, imaging studies, cultures and discussed the case with referring physician and consulting physicians.     My assessment and plan by system for this patient is as follows:    Neurology/Psychiatry:   1. Status epilepticus despite being on multiple AEDs including clobazam, Keppra, Valproic acid and Vimpat.  Continues on infusion of benzodiazepines.  Has received salvage steroids for refractory status epilepticus with no improvement.  2. Hx of schizoaffective disorder   3. Hx of tardive dyskinesia   Plan  -Appreciate collaboration with neurologists regarding treatment and prognosis for this patient. She has now been in the hospital mostly in the ICU since September 28th. She has never been awake enough to interact or follow any commands.    Cardiovascular:   1.Hemodynamically stable, no active issues, her last TTE on 3/9/2023 showed EF 60-65%, normal RV function and size   2. Hx of HTN on atenolol. Blood pressure wnl limits here not on therapy.   Plan   -keep bp in normal range    Pulmonary/Ventilator Management:   1. Chronic hypoxemic respiratory failure due to seizure and inability to protect her airway, CT chest was done on 11/4, showed some atelectasis, she does have some mosaic attenuation so questioning some air trapping.  Apneic on pressure support since she is continuing to require heavy sedation. Increasing secretions and sputum culture with gram-positive rods and mixed christina.  No suspicion for new.  Plan  - Continue mechanical ventilation with lung protection  - Scheduled DuoNeb TID   -Bazan antibiotics    GI and Nutrition :   1.  Tolerating tube feeds    Plan  -Continue TF    Renal/Fluids/Electrolytes:   1.  No current abnormalities.  Did have a urinary tract infection history of presenting problem    Plan  - monitor function and electrolytes as needed  with replacement per ICU protocols.  - generally avoid nephrotoxic agents such as NSAID, IV contrast unless specifically required  - adjust medications as needed for renal clearance  - follow I/O's as appropriate.    Infectious Disease:   RN noting increased in purulence of secretions.  Urine culture with gram-positive rods and mixed christina  Plan  -Unknown penicillin allergy.  Has tolerated ceftriaxone so we will initiate this while we await speciation    Endocrine:   No abnormalities.       Hematology/Oncology:   Abnormalities consistent with chronic illness.  No signs of bleeding or acute processes      IV/Access:   1. Venous access - Left PICC   2. Arterial access - None   3.  Plan  - central access required and necessary      ICU Prophylaxis:   1. DVT: Hep Subq  2. Stress Ulcer: PPI  3. Restraints: Rates are  4. Feeding - TF  5. Family Update: Spoke with Emely manager of a group home again today.  Other family update pending.  6. Disposition -care plan determination ongoing.  Had been proceeding with process for decision making and absence of surrogate with tentative plan for palliative care.  Care coordinators have actually finally been able to contact a nephew who is willing to coordinate.  We have recommended palliative care to him. He contacted patient's brother Marshal with whom I spoke this afternoon. Marshal will not be able to visit but believes given patient's premorbid quality of life and prognosis that she would choose palliative care if she were able to participate in decision making. He will contact her group home and family members and will call back when he's made a final decision.     Critical Care Time: 60 minutes so far today I spent this time (excluding procedures) personally providing and directing critical care services at the bedside and on the critical care unit.     Ana Edmond MD           [facility] Medications:      chlorhexidine  15 mL Mouth/Throat Q12H    clobazam  20 mg Oral or Feeding Tube BID     heparin ANTICOAGULANT  5,000 Units Subcutaneous Q8H    ipratropium - albuterol 0.5 mg/2.5 mg/3 mL  3 mL Nebulization TID    lacosamide  250 mg Oral or Feeding Tube BID    levETIRAcetam  1,500 mg Intravenous Q12H    multivitamins w/minerals  15 mL Oral or Feeding Tube Daily    pantoprazole  40 mg Per Feeding Tube QAM AC    Or    pantoprazole  40 mg Intravenous QAM AC    [Held by provider] polyethylene glycol  17 g Oral or Feeding Tube BID    sodium chloride (PF)  10-40 mL Intracatheter Q7 Days    sodium chloride (PF)  3 mL Intracatheter Q8H    valproic acid  250 mg Oral Q8H    wound support modular  60 mL Oral Daily at 4 pm      dextrose      midazolam 4 mg/hr (11/09/23 0700)    norepinephrine Stopped (11/06/23 1440)    - MEDICATION INSTRUCTIONS -      sodium chloride 10 mL/hr at 11/08/23 2000               Physical Examination:   Temp:  [97.8  F (36.6  C)-98.5  F (36.9  C)] 98.5  F (36.9  C)  Pulse:  [59-87] 71  Resp:  [16-21] 21  BP: (102-155)/(54-79) 144/75  FiO2 (%):  [30 %] 30 %  SpO2:  [96 %-99 %] 97 %    Intake/Output Summary (Last 24 hours) at 11/9/2023 1146  Last data filed at 11/9/2023 1000  Gross per 24 hour   Intake 1704 ml   Output 900 ml   Net 804 ml   +18    Wt Readings from Last 4 Encounters:   11/09/23 46.4 kg (102 lb 4.7 oz)   06/08/23 40.8 kg (90 lb)   03/15/23 41 kg (90 lb 6.2 oz)   02/02/22 52.2 kg (115 lb)     BP - Mean:  [] 103  Vent Mode: CMV/AC  (Continuous Mandatory Ventilation/ Assist Control)  FiO2 (%): 30 %  Resp Rate (Set): 16 breaths/min  Tidal Volume (Set, mL): 330 mL  PEEP (cm H2O): 5 cmH2O  Resp: 21    Recent Labs   Lab 11/07/23  0000 11/05/23 2120   PH  --  7.50*   PCO2  --  31*   PO2  --  89   HCO3  --  24   O2PER 21 30       GEN: Chronically ill  HEENT: Trach,  PULM: synchronous, clear    CV/COR: RRR S1S2 no gallop,  No rub, no murmur  ABD: soft nontender, hypoactive bowel sounds, clean peg tube site  EXT:  No edema   NEURO: no interaction, does not open eyes  SKIN: no  "obvious rash  LINES: clean, dry intact         Data:   All data and imaging reviewed     ROUTINE ICU LABS (Last four results)  CMP  Recent Labs   Lab 11/09/23  0755 11/09/23  0503 11/09/23  0501 11/09/23  0109 11/08/23  2221 11/08/23  0754 11/08/23  0449 11/07/23  0423 11/07/23  0418 11/06/23  0624 11/06/23  0622 11/04/23  0623 11/04/23  0602   NA  --   --   --   --   --   --   --   --  144  --   --   --  145   POTASSIUM  --   --   --   --   --   --   --   --  4.1  --   --   --  4.0   CHLORIDE  --   --   --   --   --   --   --   --  110*  --   --   --  105   CO2  --   --   --   --   --   --   --   --  24  --   --   --  30*   ANIONGAP  --   --   --   --   --   --   --   --  10  --   --   --  10   *  --  102* 110* 111*   < >  --    < > 117*   < >  --    < > 122*   BUN  --   --   --   --   --   --   --   --  18.9  --   --   --  24.0*   CR  --   --   --   --   --   --   --   --  0.36*  --   --   --  0.42*   GFRESTIMATED  --   --   --   --   --   --   --   --  >90  --   --   --  >90   LULU  --   --   --   --   --   --   --   --  8.1*  --   --   --  8.7*   PHOS  --  3.6  --   --   --   --  3.8  --  3.5  --  3.7  --   --     < > = values in this interval not displayed.     CBC  Recent Labs   Lab 11/08/23  1207 11/07/23  0418 11/05/23  0550 11/04/23  0602   WBC 7.4 9.6  --  16.1*   RBC 3.11* 3.08*  --  3.68*   HGB 7.9* 7.7*  --  9.1*   HCT 27.2* 26.4*  --  31.3*   MCV 88 86  --  85   MCH 25.4* 25.0*  --  24.7*   MCHC 29.0* 29.2*  --  29.1*   RDW 22.3* 21.9*  --  20.9*    289 341 397     INRNo lab results found in last 7 days.  Arterial Blood Gas  Recent Labs   Lab 11/07/23  0000 11/05/23 2120   PH  --  7.50*   PCO2  --  31*   PO2  --  89   HCO3  --  24   O2PER 21 30       All cultures:  No results for input(s): \"CULT\" in the last 168 hours.  Recent Results (from the past 24 hour(s))   XR Chest Port 1 View    Narrative    EXAM: XR CHEST PORT 1 VIEW  LOCATION: [facility]  DATE: " 11/9/2023    INDICATION: eval for possible VAP  COMPARISON: 11/5/2023 and 11/4/2023.      Impression    IMPRESSION: Tracheostomy in satisfactory position. Left PICC tip in the mid SVC. Small left pleural effusion. Lungs otherwise appear clear. Stable cardiac silhouette. No visible pneumothorax.     Sputum culture: Gram-positive bacilli and mixed christina   Clinically Significant Risk Factors              # Hypoalbuminemia: Lowest albumin = 3.4 g/dL at 9/30/2023  5:15 AM, will monitor as appropriate     # Hypertension: Noted on problem list         # Moderate Malnutrition: based on nutrition assessment

## 2023-11-09 NOTE — PROGRESS NOTES
"Care Management Follow Up    Length of Stay (days): 42    Expected Discharge Date: 11/16/2023     Concerns to be Addressed: other (see comments) (unknown to my knowledge, please contact MD, patient, or care coordinator for the answer to this question)     Patient plan of care discussed at interdisciplinary rounds: Yes    Anticipated Discharge Disposition: comfort cares/ end of life     Anticipated Discharge Services:   Anticipated Discharge DME:      Patient/family educated on Medicare website which has current facility and service quality ratings: other (see comments) (this is not within my scope of practice)  Education Provided on the Discharge Plan:    Patient/Family in Agreement with the Plan:      Referrals Placed by CM/SW: [facility] ([facility])  Private pay costs discussed: Not applicable    Additional Information:  -writer received call from Marshal Marcellus, patient's brother. He told writer, \"We have decide on comfort cares/pull the plug. We don't want anymore suffering. My wife told me that it is in God's hands now and let her go.\" Writer went to ICU and Dr. Edmond and writer called Ivan patient's nephew for further clarification. He clarified that they do want end of life and withdrawal of care.\"We want her comfortable in her final hours to days. I am local so I can be the contact and let me know if I need to come to the hospital.\"  -writer then received another call from Marshal. He would like patient to have an autopsy as well as copy of her Death Certificate. Writer explained that the Medical examiner does the death certificate.Marshal would like to be notified as well when patient passes by our staff. Marshal lives in [place], [place] and won't be coming to the hospital prior to patient passing.    Gissell Chery RN      "

## 2023-11-09 NOTE — PROGRESS NOTES
"Care Management Follow Up    Length of Stay (days): 42    Expected Discharge Date: 11/16/2023     Concerns to be Addressed: other (see comments) (unknown to my knowledge, please contact MD, patient, or care coordinator for the answer to this question)     Patient plan of care discussed at interdisciplinary rounds: Yes    Anticipated Discharge Disposition: TBD     Anticipated Discharge Services:   Anticipated Discharge DME:      Patient/family educated on Medicare website which has current facility and service quality ratings: other (see comments) (this is not within my scope of practice)  Education Provided on the Discharge Plan:    Patient/Family in Agreement with the Plan:      Referrals Placed by CM/SW: [facility] ([facility])  Private pay costs discussed: Not applicable    Additional Information:  -writer received a call from bettina Wiseman. He was wondering if patient's brother had called. Writer checked in with ICU and they told writer he had not. Ivan gave brother's, Marshal Germain's,  number of [phone]. He relayed that they were both on the \"same page with not wanting patient to suffer anymore. We are thinking Palliative care today.\" Ivan told writer if they do not get contact with Marshal that he could provide to ok as he \"felt confident as to what the next steps should be.\"  Ivan also wanted to know how writer found family. Writer explained [email or web address] and [employer] information.   -writer called and spoke to Marshal who answered right away. He wanted to know if he should call my other number. Writer attempted to give Marshal the ICU number but Marshal appeared confused by this. Marshal would like to speak to the ICU MD for next steps. Marshal told writer, \"I haven't seen my sister, Jeimy in years, she was at a hospital but my father usually dealt with her and took care of her.\"  -writer gave number of Marshal Marcellus to ICU MD to speak to him for next steps.    Gissell Chery, " RN

## 2023-11-09 NOTE — PLAN OF CARE
Problem: Plan of Care - These are the overarching goals to be used throughout the patient stay.    Goal: Plan of Care Review  Description: The Plan of Care Review/Shift note should be completed every shift.  The Outcome Evaluation is a brief statement about your assessment that the patient is improving, declining, or no change.  This information will be displayed automatically on your shift note.  Recent Flowsheet Documentation  Taken 11/9/2023 0524 by Pieter Torres RN  Plan of Care Reviewed With: patient  Overall Patient Progress: declining  Taken 11/9/2023 0522 by Pieter Torres RN  Outcome Evaluation: No acute changes. Redness to trach area 2/2 copious secretions. Family members located by CC.  Plan of Care Reviewed With: patient  Overall Patient Progress: declining  Goal: Absence of Hospital-Acquired Illness or Injury  Intervention: Identify and Manage Fall Risk  Recent Flowsheet Documentation  Taken 11/8/2023 2000 by Pieter Torres RN  Safety Promotion/Fall Prevention:   activity supervised   clutter free environment maintained   lighting adjusted   nonskid shoes/slippers when out of bed   patient and family education   safety round/check completed   supervised activity   treat reversible contributory factors   treat underlying cause  Intervention: Prevent Skin Injury  Recent Flowsheet Documentation  Taken 11/9/2023 0400 by Pieter Torres RN  Body Position:   turned   side-lying   left  Taken 11/9/2023 0200 by Pieter Torres RN  Body Position:   turned   side-lying   right  Taken 11/9/2023 0000 by Pieter Torres RN  Body Position:   turned   side-lying   left  Taken 11/8/2023 2200 by Pieter Torres RN  Body Position:   turned   side-lying   right  Taken 11/8/2023 2000 by Pieter Torres RN  Body Position:   turned   side-lying   left  Device Skin Pressure Protection:   adhesive use limited   absorbent pad utilized/changed  Intervention: Prevent and Manage VTE (Venous Thromboembolism)  Risk  Recent Flowsheet Documentation  Taken 11/9/2023 0400 by Pieter Torres RN  VTE Prevention/Management: SCDs (sequential compression devices) on  Taken 11/9/2023 0000 by Pieter Torres RN  VTE Prevention/Management: SCDs (sequential compression devices) on  Taken 11/8/2023 2000 by Pieter Torres RN  VTE Prevention/Management: SCDs (sequential compression devices) on  Intervention: Prevent Infection  Recent Flowsheet Documentation  Taken 11/8/2023 2000 by Pieter Torres RN  Infection Prevention: rest/sleep promoted  Goal: Optimal Comfort and Wellbeing  Intervention: Provide Person-Centered Care  Recent Flowsheet Documentation  Taken 11/8/2023 2000 by Pieter Torres RN  Trust Relationship/Rapport:   care explained   choices provided   questions answered   reassurance provided   questions encouraged   thoughts/feelings acknowledged     Problem: Risk for Delirium  Goal: Optimal Coping  Intervention: Optimize Psychosocial Adjustment to Delirium  Recent Flowsheet Documentation  Taken 11/8/2023 2000 by Pieter Torres RN  Family/Support System Care: presence promoted  Goal: Improved Behavioral Control  Intervention: Minimize Safety Risk  Recent Flowsheet Documentation  Taken 11/9/2023 0400 by Pieter Torres RN  Communication Enhancement Strategies:   extra time allowed for response   one-step directions provided   device use encouraged  Taken 11/9/2023 0000 by Pieter Torres RN  Communication Enhancement Strategies:   extra time allowed for response   one-step directions provided   device use encouraged  Taken 11/8/2023 2000 by Pieter Torres RN  Communication Enhancement Strategies:   extra time allowed for response   one-step directions provided   device use encouraged  Enhanced Safety Measures: room near unit station  Trust Relationship/Rapport:   care explained   choices provided   questions answered   reassurance provided   questions encouraged   thoughts/feelings acknowledged  Goal: Improved  Attention and Thought Clarity  Intervention: Maximize Cognitive Function  Recent Flowsheet Documentation  Taken 11/9/2023 0400 by Pieter Torres RN  Sensory Stimulation Regulation:   auditory stimulation minimized   quiet environment promoted  Taken 11/9/2023 0000 by Pieter Torres RN  Sensory Stimulation Regulation:   auditory stimulation minimized   quiet environment promoted  Taken 11/8/2023 2000 by Pieter Torres RN  Sensory Stimulation Regulation:   auditory stimulation minimized   quiet environment promoted     Problem: Seizure, Active Management  Goal: Absence of Seizure/Seizure-Related Injury  Intervention: Prevent Seizure-Related Injury  Recent Flowsheet Documentation  Taken 11/8/2023 2000 by Pieter Torres RN  Aspiration Prevention During Seizure: suctioned secretions/vomitus  Seizure Precautions:   activity supervised   clutter-free environment maintained     Problem: Mechanical Ventilation Invasive  Goal: Effective Communication  Intervention: Ensure Effective Communication  Recent Flowsheet Documentation  Taken 11/9/2023 0400 by Pieter Torres RN  Communication Enhancement Strategies:   extra time allowed for response   one-step directions provided   device use encouraged  Taken 11/9/2023 0000 by Pieter Torres RN  Communication Enhancement Strategies:   extra time allowed for response   one-step directions provided   device use encouraged  Taken 11/8/2023 2000 by Pieter Torres RN  Communication Enhancement Strategies:   extra time allowed for response   one-step directions provided   device use encouraged  Family/Support System Care: presence promoted  Trust Relationship/Rapport:   care explained   choices provided   questions answered   reassurance provided   questions encouraged   thoughts/feelings acknowledged  Goal: Optimal Device Function  Intervention: Optimize Device Care and Function  Recent Flowsheet Documentation  Taken 11/9/2023 0400 by Pieter Torres RN  Oral Care:    lip/mouth moisturizer applied   suction provided   swabbed with antiseptic solution  Taken 11/9/2023 0000 by Pieter Torres RN  Oral Care:   lip/mouth moisturizer applied   suction provided   swabbed with antiseptic solution  Taken 11/8/2023 2200 by Pieter Torres RN  Oral Care:   swabbed with antiseptic solution   suction provided  Taken 11/8/2023 2000 by Pieter Torres RN  Airway Safety Measures: all equipment/monitors on and audible  Oral Care:   lip/mouth moisturizer applied   suction provided   swabbed with antiseptic solution  Goal: Mechanical Ventilation Liberation  Intervention: Promote Extubation and Mechanical Ventilation Liberation  Recent Flowsheet Documentation  Taken 11/8/2023 2000 by Pieter Torres RN  Medication Review/Management: medications reviewed  Goal: Optimal Nutrition Delivery  Intervention: Optimize Nutrition Delivery  Recent Flowsheet Documentation  Taken 11/8/2023 2000 by Pieter Torres RN  Nutrition Support Management: weight trending reviewed  Goal: Absence of Device-Related Skin and Tissue Injury  Intervention: Maintain Skin and Tissue Health  Recent Flowsheet Documentation  Taken 11/8/2023 2000 by Pieter Torres RN  Device Skin Pressure Protection:   adhesive use limited   absorbent pad utilized/changed  Goal: Absence of Ventilator-Induced Lung Injury  Intervention: Prevent Ventilator-Associated Pneumonia  Recent Flowsheet Documentation  Taken 11/9/2023 0400 by Pieter Torres RN  VAP Prevention Bundle:   HOB elevation maintained   oral care regularly provided  Oral Care:   lip/mouth moisturizer applied   suction provided   swabbed with antiseptic solution  Head of Bed (HOB) Positioning: HOB at 30 degrees  VAP Prevention Measures: completed  Taken 11/9/2023 0200 by Pieter Torres RN  Head of Bed (HOB) Positioning: HOB at 30 degrees  Taken 11/9/2023 0000 by Pieter Torres RN  VAP Prevention Bundle:   HOB elevation maintained   oral care regularly provided  Oral Care:    lip/mouth moisturizer applied   suction provided   swabbed with antiseptic solution  Head of Bed (HOB) Positioning: HOB at 30 degrees  VAP Prevention Measures: completed  Taken 11/8/2023 2200 by Pieter Torres RN  Oral Care:   swabbed with antiseptic solution   suction provided  Taken 11/8/2023 2000 by Pieter Torres RN  VAP Prevention Bundle:   HOB elevation maintained   oral care regularly provided  Oral Care:   lip/mouth moisturizer applied   suction provided   swabbed with antiseptic solution  Head of Bed (HOB) Positioning: HOB at 30 degrees  VAP Prevention Measures: completed     Problem: Asthma Comorbidity  Goal: Maintenance of Asthma Control  Intervention: Maintain Asthma Symptom Control  Recent Flowsheet Documentation  Taken 11/8/2023 2000 by Pieter Torres RN  Medication Review/Management: medications reviewed     Problem: Behavioral Health Comorbidity  Goal: Maintenance of Behavioral Health Symptom Control  Intervention: Maintain Behavioral Health Symptom Control  Recent Flowsheet Documentation  Taken 11/8/2023 2000 by Pieter Torres RN  Medication Review/Management: medications reviewed     Problem: COPD (Chronic Obstructive Pulmonary Disease) Comorbidity  Goal: Maintenance of COPD Symptom Control  Intervention: Maintain COPD-Symptom Control  Recent Flowsheet Documentation  Taken 11/8/2023 2000 by Pieter Torres RN  Medication Review/Management: medications reviewed     Problem: Heart Failure Comorbidity  Goal: Maintenance of Heart Failure Symptom Control  Intervention: Maintain Heart Failure Management  Recent Flowsheet Documentation  Taken 11/8/2023 2000 by Pieter Torres RN  Medication Review/Management: medications reviewed     Problem: Hypertension Comorbidity  Goal: Blood Pressure in Desired Range  Intervention: Maintain Blood Pressure Management  Recent Flowsheet Documentation  Taken 11/8/2023 2000 by Pieter Torres RN  Medication Review/Management: medications reviewed     Problem:  Obstructive Sleep Apnea Risk or Actual Comorbidity Management  Goal: Unobstructed Breathing During Sleep  Intervention: Monitor and Manage Obstructive Sleep Apnea  Recent Flowsheet Documentation  Taken 11/8/2023 2000 by Pieter Torres RN  Medication Review/Management: medications reviewed     Problem: Osteoarthritis Comorbidity  Goal: Maintenance of Osteoarthritis Symptom Control  Intervention: Maintain Osteoarthritis Symptom Control  Recent Flowsheet Documentation  Taken 11/8/2023 2000 by Pieter Torres RN  Assistive Device Utilized: lift device  Activity Management: activity adjusted per tolerance  Medication Review/Management: medications reviewed     Problem: Pain Chronic (Persistent) (Comorbidity Management)  Goal: Acceptable Pain Control and Functional Ability  Intervention: Manage Persistent Pain  Recent Flowsheet Documentation  Taken 11/8/2023 2000 by Pieter Torres RN  Medication Review/Management: medications reviewed  Intervention: Optimize Psychosocial Wellbeing  Recent Flowsheet Documentation  Taken 11/8/2023 2000 by Pieter Torres RN  Family/Support System Care: presence promoted     Problem: Seizure Disorder Comorbidity  Goal: Maintenance of Seizure Control  Intervention: Maintain Seizure-Symptom Control  Recent Flowsheet Documentation  Taken 11/8/2023 2000 by Pieter Torres RN  Seizure Precautions:   activity supervised   clutter-free environment maintained     Problem: Artificial Airway  Goal: Effective Communication  Intervention: Ensure Effective Communication  Recent Flowsheet Documentation  Taken 11/9/2023 0400 by Pieter Torres RN  Communication Enhancement Strategies:   extra time allowed for response   one-step directions provided   device use encouraged  Taken 11/9/2023 0000 by Pieter Torres RN  Communication Enhancement Strategies:   extra time allowed for response   one-step directions provided   device use encouraged  Taken 11/8/2023 2000 by Pieter Torres  RN  Communication Enhancement Strategies:   extra time allowed for response   one-step directions provided   device use encouraged  Family/Support System Care: presence promoted  Trust Relationship/Rapport:   care explained   choices provided   questions answered   reassurance provided   questions encouraged   thoughts/feelings acknowledged  Goal: Optimal Device Function  Intervention: Optimize Device Care and Function  Recent Flowsheet Documentation  Taken 11/9/2023 0400 by Pieter Torres RN  Oral Care:   lip/mouth moisturizer applied   suction provided   swabbed with antiseptic solution  Taken 11/9/2023 0000 by Pieter Torres RN  Oral Care:   lip/mouth moisturizer applied   suction provided   swabbed with antiseptic solution  Taken 11/8/2023 2200 by Pieter Torres RN  Oral Care:   swabbed with antiseptic solution   suction provided  Taken 11/8/2023 2000 by Pieter Torres RN  Airway Safety Measures: all equipment/monitors on and audible  Aspiration Precautions: oral hygiene care promoted  Oral Care:   lip/mouth moisturizer applied   suction provided   swabbed with antiseptic solution  Goal: Absence of Device-Related Skin or Tissue Injury  Intervention: Maintain Skin and Tissue Health  Recent Flowsheet Documentation  Taken 11/8/2023 2000 by Pieter Torres RN  Device Skin Pressure Protection:   adhesive use limited   absorbent pad utilized/changed     Problem: Enteral Nutrition  Goal: Absence of Aspiration Signs and Symptoms  Intervention: Minimize Aspiration Risk  Recent Flowsheet Documentation  Taken 11/9/2023 0400 by Pieter Torres RN  Oral Care:   lip/mouth moisturizer applied   suction provided   swabbed with antiseptic solution  Head of Bed (HOB) Positioning: HOB at 30 degrees  Taken 11/9/2023 0200 by Pieter Torres RN  Head of Bed (HOB) Positioning: HOB at 30 degrees  Taken 11/9/2023 0000 by Pieter Torres RN  Oral Care:   lip/mouth moisturizer applied   suction provided   swabbed with  antiseptic solution  Head of Bed (HOB) Positioning: HOB at 30 degrees  Taken 11/8/2023 2200 by Pieter Torres RN  Oral Care:   swabbed with antiseptic solution   suction provided  Taken 11/8/2023 2000 by Pieter Torres RN  Oral Care:   lip/mouth moisturizer applied   suction provided   swabbed with antiseptic solution  Head of Bed (HOB) Positioning: HOB at 30 degrees  Goal: Feeding Tolerance  Intervention: Prevent and Manage Feeding Intolerance  Recent Flowsheet Documentation  Taken 11/8/2023 2000 by Pieter Torres RN  Nutrition Support Management: weight trending reviewed     Problem: Seizure, Active Management  Goal: Absence of Seizure/Seizure-Related Injury  Intervention: Prevent Seizure-Related Injury  Recent Flowsheet Documentation  Taken 11/8/2023 2000 by Pieter Torres RN  Aspiration Prevention During Seizure: suctioned secretions/vomitus  Seizure Precautions:   activity supervised   clutter-free environment maintained     Problem: Fall Injury Risk  Goal: Absence of Fall and Fall-Related Injury  Intervention: Identify and Manage Contributors  Recent Flowsheet Documentation  Taken 11/8/2023 2000 by Pieter Torres RN  Medication Review/Management: medications reviewed  Intervention: Promote Injury-Free Environment  Recent Flowsheet Documentation  Taken 11/8/2023 2000 by Pieter Torres RN  Safety Promotion/Fall Prevention:   activity supervised   clutter free environment maintained   lighting adjusted   nonskid shoes/slippers when out of bed   patient and family education   safety round/check completed   supervised activity   treat reversible contributory factors   treat underlying cause     Problem: Adult Inpatient Plan of Care  Goal: Plan of Care Review  Description: The Plan of Care Review/Shift note should be completed every shift.  The Outcome Evaluation is a brief statement about your assessment that the patient is improving, declining, or no change.  This information will be displayed  automatically on your shift  note.  11/9/2023 0524 by Pieter Torres RN  Outcome: Not Progressing  Flowsheets (Taken 11/9/2023 0524)  Plan of Care Reviewed With: patient  Overall Patient Progress: declining  11/9/2023 0522 by Pieter Torres RN  Outcome: Not Progressing  Flowsheets (Taken 11/9/2023 0522)  Outcome Evaluation: No acute changes. Redness to trach area 2/2 copious secretions. Family members located by CC.  Plan of Care Reviewed With: patient  Overall Patient Progress: declining  Goal: Absence of Hospital-Acquired Illness or Injury  Intervention: Identify and Manage Fall Risk  Recent Flowsheet Documentation  Taken 11/8/2023 2000 by Pieter Torres RN  Safety Promotion/Fall Prevention:   activity supervised   clutter free environment maintained   lighting adjusted   nonskid shoes/slippers when out of bed   patient and family education   safety round/check completed   supervised activity   treat reversible contributory factors   treat underlying cause  Intervention: Prevent Skin Injury  Recent Flowsheet Documentation  Taken 11/9/2023 0400 by Pieter Torres RN  Body Position:   turned   side-lying   left  Taken 11/9/2023 0200 by Pieter Torres RN  Body Position:   turned   side-lying   right  Taken 11/9/2023 0000 by Pieter Torres RN  Body Position:   turned   side-lying   left  Taken 11/8/2023 2200 by Pieter Torres RN  Body Position:   turned   side-lying   right  Taken 11/8/2023 2000 by Pieter Torres RN  Body Position:   turned   side-lying   left  Device Skin Pressure Protection:   adhesive use limited   absorbent pad utilized/changed  Intervention: Prevent and Manage VTE (Venous Thromboembolism) Risk  Recent Flowsheet Documentation  Taken 11/9/2023 0400 by Pieter Torres RN  VTE Prevention/Management: SCDs (sequential compression devices) on  Taken 11/9/2023 0000 by Pieter Torres RN  VTE Prevention/Management: SCDs (sequential compression devices) on  Taken 11/8/2023 2000 by Brian  Pieter MECRER RN  VTE Prevention/Management: SCDs (sequential compression devices) on  Intervention: Prevent Infection  Recent Flowsheet Documentation  Taken 11/8/2023 2000 by Pieter Torres RN  Infection Prevention: rest/sleep promoted  Goal: Optimal Comfort and Wellbeing  Intervention: Provide Person-Centered Care  Recent Flowsheet Documentation  Taken 11/8/2023 2000 by Pieter Torres RN  Trust Relationship/Rapport:   care explained   choices provided   questions answered   reassurance provided   questions encouraged   thoughts/feelings acknowledged     Problem: Artificial Airway  Goal: Effective Communication  Intervention: Ensure Effective Communication  Recent Flowsheet Documentation  Taken 11/9/2023 0400 by Pieter Torres RN  Communication Enhancement Strategies:   extra time allowed for response   one-step directions provided   device use encouraged  Taken 11/9/2023 0000 by Pieter Torres RN  Communication Enhancement Strategies:   extra time allowed for response   one-step directions provided   device use encouraged  Taken 11/8/2023 2000 by Pieter Torres RN  Communication Enhancement Strategies:   extra time allowed for response   one-step directions provided   device use encouraged  Family/Support System Care: presence promoted  Trust Relationship/Rapport:   care explained   choices provided   questions answered   reassurance provided   questions encouraged   thoughts/feelings acknowledged  Intervention: Ensure Effective Communication  Recent Flowsheet Documentation  Taken 11/9/2023 0400 by Pieter Torres RN  Communication Enhancement Strategies:   extra time allowed for response   one-step directions provided   device use encouraged  Taken 11/9/2023 0000 by Pieter Torres RN  Communication Enhancement Strategies:   extra time allowed for response   one-step directions provided   device use encouraged  Taken 11/8/2023 2000 by Pieter Torres RN  Communication Enhancement Strategies:   extra  time allowed for response   one-step directions provided   device use encouraged  Family/Support System Care: presence promoted  Trust Relationship/Rapport:   care explained   choices provided   questions answered   reassurance provided   questions encouraged   thoughts/feelings acknowledged  Goal: Optimal Device Function  Intervention: Optimize Device Care and Function  Recent Flowsheet Documentation  Taken 11/9/2023 0400 by Pieter Torres RN  Oral Care:   lip/mouth moisturizer applied   suction provided   swabbed with antiseptic solution  Taken 11/9/2023 0000 by Pieter Torres RN  Oral Care:   lip/mouth moisturizer applied   suction provided   swabbed with antiseptic solution  Taken 11/8/2023 2200 by Pieter Torres RN  Oral Care:   swabbed with antiseptic solution   suction provided  Taken 11/8/2023 2000 by Pieter Torres RN  Airway Safety Measures: all equipment/monitors on and audible  Aspiration Precautions: oral hygiene care promoted  Oral Care:   lip/mouth moisturizer applied   suction provided   swabbed with antiseptic solution  Goal: Absence of Device-Related Skin or Tissue Injury  Intervention: Maintain Skin and Tissue Health  Recent Flowsheet Documentation  Taken 11/8/2023 2000 by Pieter Torres RN  Device Skin Pressure Protection:   adhesive use limited   absorbent pad utilized/changed  Intervention: Maintain Skin and Tissue Health  Recent Flowsheet Documentation  Taken 11/8/2023 2000 by Pieter Torres RN  Device Skin Pressure Protection:   adhesive use limited   absorbent pad utilized/changed     Problem: Fall Injury Risk  Goal: Absence of Fall and Fall-Related Injury  Intervention: Identify and Manage Contributors  Recent Flowsheet Documentation  Taken 11/8/2023 2000 by Pieter Torres RN  Medication Review/Management: medications reviewed  Intervention: Promote Injury-Free Environment  Recent Flowsheet Documentation  Taken 11/8/2023 2000 by Pieter Torres RN  Safety Promotion/Fall  Prevention:   activity supervised   clutter free environment maintained   lighting adjusted   nonskid shoes/slippers when out of bed   patient and family education   safety round/check completed   supervised activity   treat reversible contributory factors   treat underlying cause     Problem: Pain Acute  Goal: Optimal Pain Control and Function  Intervention: Prevent or Manage Pain  Recent Flowsheet Documentation  Taken 11/9/2023 0400 by Pieter Torres RN  Sensory Stimulation Regulation:   auditory stimulation minimized   quiet environment promoted  Taken 11/9/2023 0000 by Pieter Torres RN  Sensory Stimulation Regulation:   auditory stimulation minimized   quiet environment promoted  Taken 11/8/2023 2000 by Pieter Torres RN  Sensory Stimulation Regulation:   auditory stimulation minimized   quiet environment promoted  Medication Review/Management: medications reviewed     Problem: Hypertension Acute  Goal: Blood Pressure Within Desired Range  Intervention: Normalize Blood Pressure  Recent Flowsheet Documentation  Taken 11/9/2023 0400 by Pieter Torres RN  Sensory Stimulation Regulation:   auditory stimulation minimized   quiet environment promoted  Taken 11/9/2023 0000 by Pieter Torres RN  Sensory Stimulation Regulation:   auditory stimulation minimized   quiet environment promoted  Taken 11/8/2023 2000 by Pieter Torres RN  Sensory Stimulation Regulation:   auditory stimulation minimized   quiet environment promoted  Medication Review/Management: medications reviewed   Goal Outcome Evaluation:      Plan of Care Reviewed With: patient    Overall Patient Progress: decliningOverall Patient Progress: declining    Outcome Evaluation: No acute changes. Redness to trach area 2/2 copious secretions. Family members located by .

## 2023-11-09 NOTE — PLAN OF CARE
Problem: Plan of Care - These are the overarching goals to be used throughout the patient stay.    Goal: Plan of Care Review  Description: The Plan of Care Review/Shift note should be completed every shift.  The Outcome Evaluation is a brief statement about your assessment that the patient is improving, declining, or no change.  This information will be displayed automatically on your shift note.  Recent Flowsheet Documentation  Taken 11/8/2023 1935 by Florencio Mariscal RN  Plan of Care Reviewed With: patient     Problem: Plan of Care - These are the overarching goals to be used throughout the patient stay.    Goal: Absence of Hospital-Acquired Illness or Injury  Intervention: Prevent Skin Injury  Recent Flowsheet Documentation  Taken 11/8/2023 1600 by Florencio Mariscal RN  Body Position:   turned   side-lying   right   upper extremity elevated   lower extremity elevated  Taken 11/8/2023 1400 by Florencio Mariscal RN  Body Position:   turned   side-lying   left   upper extremity elevated   lower extremity elevated  Taken 11/8/2023 1200 by Florencio Mariscal RN  Body Position:   turned   side-lying   right   upper extremity elevated   lower extremity elevated  Taken 11/8/2023 1000 by Florencio Mariscal RN  Body Position:   turned   side-lying   left   upper extremity elevated   lower extremity elevated  Taken 11/8/2023 0800 by Florencio Mariscal RN  Body Position:   turned   side-lying   right   upper extremity elevated   lower extremity elevated     Problem: Plan of Care - These are the overarching goals to be used throughout the patient stay.    Goal: Optimal Comfort and Wellbeing  Intervention: Monitor Pain and Promote Comfort  Recent Flowsheet Documentation  Taken 11/8/2023 1935 by Florencio Mariscal RN  Pain Management Interventions: medication (see MAR)     Problem: Adult Inpatient Plan of Care  Goal: Plan of Care Review  Description: The Plan of Care Review/Shift note should be completed every shift.  The  Outcome Evaluation is a brief statement about your assessment that the patient is improving, declining, or no change.  This information will be displayed automatically on your shift  note.  Outcome: Not Progressing  Flowsheets (Taken 11/8/2023 1935)  Plan of Care Reviewed With: patient   Goal Outcome Evaluation:      Plan of Care Reviewed With: patient

## 2023-11-10 PROBLEM — E44.0 MODERATE MALNUTRITION (H): Status: ACTIVE | Noted: 2023-01-01

## 2023-11-10 PROBLEM — J95.851 VENTILATOR ASSOCIATED PNEUMONIA (H): Status: ACTIVE | Noted: 2023-01-01

## 2023-11-10 PROBLEM — A49.8 INFECTION DUE TO STENOTROPHOMONAS MALTOPHILIA: Status: ACTIVE | Noted: 2023-01-01

## 2023-11-10 NOTE — PROGRESS NOTES
[facility]  _________________________________________________________________     [facility] 24[facility]7 Contact Number: [phone]    - Providers: Please contact [facility] with changes in orders or clinical plan of care   - Nursing: Please contact [facility] with significant changes in patient condition     Hospice will notify the care team (including the hospitalist) to confirm date of inpatient hospice (GIP) admission.    New Epic encounter will not be created until hospice completes admission.     Received. Thank you for the referral. We will be reaching out and will work on sending someone to meet with pt/fam

## 2023-11-10 NOTE — PLAN OF CARE
Problem: Plan of Care - These are the overarching goals to be used throughout the patient stay.    Goal: Plan of Care Review  Description: The Plan of Care Review/Shift note should be completed every shift.  The Outcome Evaluation is a brief statement about your assessment that the patient is improving, declining, or no change.  This information will be displayed automatically on your shift note.  Recent Flowsheet Documentation  Taken 11/10/2023 0551 by Faheem Gilbert RN  Outcome Evaluation: Pt was transitioned to comfort cares and removed from ventilator support. Currently comfotable on 2L O2.  Goal: Absence of Hospital-Acquired Illness or Injury  Intervention: Identify and Manage Fall Risk  Recent Flowsheet Documentation  Taken 11/10/2023 0400 by Faheem Gilbert RN  Safety Promotion/Fall Prevention:   activity supervised   clutter free environment maintained   lighting adjusted   nonskid shoes/slippers when out of bed   patient and family education   safety round/check completed   supervised activity   treat reversible contributory factors   treat underlying cause  Taken 11/10/2023 0000 by Faheem Gilbert RN  Safety Promotion/Fall Prevention:   activity supervised   clutter free environment maintained   lighting adjusted   nonskid shoes/slippers when out of bed   patient and family education   safety round/check completed   supervised activity   treat reversible contributory factors   treat underlying cause  Taken 11/9/2023 2000 by Faheem Gilbert RN  Safety Promotion/Fall Prevention:   activity supervised   clutter free environment maintained   lighting adjusted   nonskid shoes/slippers when out of bed   patient and family education   safety round/check completed   supervised activity   treat reversible contributory factors   treat underlying cause  Intervention: Prevent Skin Injury  Recent Flowsheet Documentation  Taken 11/10/2023 0600 by Faheem Gilbert, RN  Body Position:   turned   left    heels elevated   legs elevated   upper extremity elevated  Taken 11/10/2023 0400 by Faheem Gilbert RN  Body Position:   turned   right   foot of bed elevated   heels elevated   upper extremity elevated  Taken 11/10/2023 0200 by Faheem Gilbert RN  Body Position:   turned   left   heels elevated   legs elevated   upper extremity elevated  Taken 11/10/2023 0000 by Faheem Gilbert RN  Body Position:   turned   right   foot of bed elevated   heels elevated   upper extremity elevated  Taken 11/9/2023 2200 by Faheem Gilbert RN  Body Position:   turned   left   heels elevated   legs elevated   upper extremity elevated  Taken 11/9/2023 2000 by Faheem Gilbert RN  Body Position:   turned   right   foot of bed elevated   heels elevated   upper extremity elevated  Intervention: Prevent Infection  Recent Flowsheet Documentation  Taken 11/10/2023 0400 by Faheem Gilbert RN  Infection Prevention:   rest/sleep promoted   environmental surveillance performed   personal protective equipment utilized   single patient room provided  Taken 11/10/2023 0000 by Faheem Gilbert RN  Infection Prevention:   rest/sleep promoted   environmental surveillance performed   personal protective equipment utilized   single patient room provided  Taken 11/9/2023 2000 by Faheem Gilbert RN  Infection Prevention:   rest/sleep promoted   environmental surveillance performed   personal protective equipment utilized   single patient room provided  Goal: Optimal Comfort and Wellbeing  Intervention: Provide Person-Centered Care  Recent Flowsheet Documentation  Taken 11/10/2023 0400 by Faheem Gilbert RN  Trust Relationship/Rapport:   care explained   reassurance provided  Taken 11/10/2023 0000 by Faheem Gilbert RN  Trust Relationship/Rapport:   care explained   reassurance provided  Taken 11/9/2023 2000 by Faheem Gilbert RN  Trust Relationship/Rapport:   care explained   reassurance provided   Goal Outcome Evaluation:                  Outcome Evaluation: Pt was transitioned to comfort cares and removed from ventilator support. Currently comfotable on 2L O2.

## 2023-11-10 NOTE — PROGRESS NOTES
[facility]    Medicine Progress Note - Hospitalist Service    Date of Admission:  9/28/2023    Assessment & Plan   Kortney Germain is a 68-year-old female patient with past medical history significant for schizoaffective disorder, tardive dyskinesia, hypertension, anxiety, and depression.  She was initially admitted on 9/28/2023 for UTI, constipation, and anemia attributed to rectal bleeding from mucosal injury post enema (received 1 U PRBCs). She had decreased LOC and generalized shaking overnight 9/28/23. RRT called and pt was intubated 9/29/23 for encephalopathy and inability to protect her airway, subsequently diagnosed with refractory status epilepticus requiring multiple AEDs, high dose steroids, and IV versed with ongoing breakthrough seizure. She underwent trach and PEG placement throughout her course. She was transferred out of the ICU on 10/24/23, but ultimately returned to the ICU 11/5/23 for refractory seizure with need for ventilatory support and initiation of IV versed. Case further complicated by inability to locate next of kin which was ultimately found on 11/8. After reviewing the case with the Intensivist team, family elected to transition to comfort cares. Pt was compassionately extubated 11/9/23 evening. Hospitalist service was contacted 11/10/23 for assumption of care and transition out of the ICU     Comfort cares:   - GIP hospice consulted   - Transitioned off IV Versed, given 4mg Ativan X2 with subsequent plan to schedule Ativan 2 mg q4 hrs per Intensivist   - Continue IV Keppra 1500 mg BID, Vimpat 250 mg BID, Valproic acid 250 mg q8 hrs, Clobazam 20 mg BID for seizures   - Comfort medications available  - When patient passes, group home ([facility]) and Nephew (Ivan) need to be contacted     Hospital course  ?  Recurrence of status epilepticus versus persistent  Prolonged encephalopathy following presentation of new onset refractory status epilepticus  Seizure  disorder  *EEG 23: Abnormal due to the presences of generalized periodic pattern consisting of 2-3.5 hz discharges, maximum negativity in bifrontal region, periodic pattern occupy 90% of the record and the remaining portion consist of delta slowing     *CT Head : no acute intracranial pathology, brain atrophy,   *MRI 23: no acute intracranial process, generalized atrophy,   *MRI 10/11/23: no acute intracranial process, no hemorrhage, moderate-advanced brain atrophy and leukoaraiosis,   *MRI 10/20/23: no interval change   *BC: NG  *CSF analysis: glucose 98 protein 22.8, 2 nucleated cells, RBC 0, aerobic culture GPC in broth only anaerobic culture NGTD, HSV negative  *Clare encephalopathy autoimmune panel negative  *CT CAP  no evidence or concern for malignancy   *Ambien trial  negative   * transferred back to the ICU for initiation of versed drip and reintubation  *sEEG : 1-2Hz GPDs, per Dr Castaneda on ictal-interictal continuum ~ status epilepticus  *cEEG - : runs of GPEDs, 1-2 Hz alternating with generalized slowing; versed increased to 4 ml/hr ---> with improvement of GPEDS  *CSF : Basic Profile: 2 nucleated cells, Protein 20, Glucose 88. Gram stain: no organism, 1+ WBC. Meningitis/Encephalitis panel: negative  VZV, HSV PCR - negative. ENS2 Encephalopathy panel. CSF 14-3-3 pending. Flow Cytometry negative. EBV negative. CMV negative. WNV negative. Meningitis/encephalitis panel negative.  *Pending CSF studies: Anti-LG1 (this is associated with faciobrachial seizures, which pt has), anti-VGKC, anti-striational, anti-Hu, anti-Ma, anti-VGCC, enterovirus, bartonella, mycoplasma, T whippeli, 14-3-3. Clobazam metabolite  - Review Neurology notes for complete details regarding evaluation and treatment to date     Acute hypoxic respiratory failure secondary to CNS failure/loss of protective airway reflexes on prolonged mechanical ventilatory support status post trach tube placement  on 10/20/2023 by Dr. Benites  - Supplemental oxygen PRN for comfort      Possible hospital-acquired pneumonia-treated  Urinary tract infection, pseudomonas, treated  *low-grade temp of 99 on 10/23/2023 with low-grade leukocytosis  *urine culture grew Pseudomonas  *Was started on empiric cefepime on 10/24/2023.  However this lowers seizure threshold.  Discontinued and transition to IV ceftriaxone.  Patient is allergic to penicillin so cannot use Zosyn.  Meropenem also reduce the seizure threshold.  * MRSA swab negative. Sputum culture shows mixed christina  *Chest x-ray--> possible retrocardiac infiltrate  - Ceftriaxone stopped and Levaquin initiated per ID. Course completed on 10/30     Moderate malnutrition, status post PEG tube placement (10/27)   - TF discontinued per Intensivist team with transition to comfort cares      Hypertension: PTA on Nifedipine on MAR. Treated with Lasix IV then changed to 40mg PO BID on 10/26 - lasix stopped 10/30. Started on Atenolol 25mg daily on 10/30, discontinued 11/6  - No indication for antihypertensives under comfort cares, last /63      Anemia normocytic likely secondary to prolonged critical illness and frequent phlebotomy. Did require 1 U PRBCs on admission for Hgb 6 range which was attributed to rectal bleeding from mucosal injury post enema.      Recent Labs   Lab 11/08/23  1207 11/07/23  0418 11/04/23  0602   HGB 7.9* 7.7* 9.1*      Schizoaffective disorder  Tardive dyskinesia  Anxiety, depression,  - Not currently on medications for these      Hypokalemia, resolved: Noted while receiving diuresis   Hypophosphatemia, resolved  Thrombocytosis, resolved       Diet:  NPO   DVT Prophylaxis: Comfort cares   Liu Catheter: Not present  Lines: PRESENT      PICC 09/30/23 Triple Lumen Left Basilic ok to use-Site Assessment: WDL      Cardiac Monitoring: None  Code Status: No CPR- Do NOT Intubate      Clinically Significant Risk Factors              # Hypoalbuminemia: Lowest  albumin = 3.4 g/dL at 9/30/2023  5:15 AM, will monitor as appropriate     # Hypertension: Noted on problem list         # Moderate Malnutrition: based on nutrition assessment           Disposition Plan     Expected Discharge Date: 11/16/2023      Destination: other (comment) (unsure, please ask pt, MD, or care coordinator)  Discharge Comments: 9/28 ED admit.  Care conference 11/3 11:30          The patient's care was discussed with the Attending Physician, Dr. Robles, Bedside Nurse, and Dr. Edmond, ICU Attending Consultant(s).    Audrey Leung PA-C  Hospitalist Service  [facility]  Securely message with [employer] (more info)  Text page via [facility] Paging/Directory   ___________________________________________________________________    Interval History   Transfer report received by Dr. Edmond. Seen in the ICU. Resting comfortably. Supplemental oxygen at 2 LPM with sats 95%. No seizure activity appreciated. PEG and purewick in place. Reviewed all specialty notes extensively.     Physical Exam   Vital Signs: Temp: 97.8  F (36.6  C) Temp src: Axillary BP: 125/63 Pulse: 54   Resp: 14 SpO2: 95 % O2 Device: None (Room air) Oxygen Delivery: 2 LPM  Weight: 102 lbs 4.7 oz    CONSTITUTIONAL: Pt laying in bed, dressed in hospital garb. Appears comfortable. Unresponsive.   HEENT: Normocephalic, atraumatic. Trach in place.   CARDIOVASCULAR: RRR, no murmurs, rubs, or extra heart sounds appreciated. Pulses +2/4 and regular in upper and lower extremities, bilaterally.   RESPIRATORY: No increased work of breathing.  Supplemental oxygenation via 2 LPM, trach. Course lung sounds noted.   GASTROINTESTINAL:  Abdomen soft, non-distended. BS auscultated in all four quadrants. Negative for tenderness to palpation.    MUSCULOSKELETAL: No gross deformities noted. Normal muscle tone.   HEMATOLOGIC/LYMPHATIC/IMMUNOLOGIC: Negative for lower extremity edema, bilaterally.  NEUROLOGIC: Unresponsive.   SKIN:  Warm, dry, intact.     Medical Decision Making       50 MINUTES SPENT BY ME on the date of service doing chart review, history, exam, documentation & further activities per the note.      Data         Imaging results reviewed over the past 24 hrs:   No results found for this or any previous visit (from the past 24 hour(s)).

## 2023-11-10 NOTE — PROGRESS NOTES
ICU Note:    Spoke again with nephew, group home. Decided to proceed with compassionate extubation once staff from group home visited. Updated three staff members bedside.    Ana Edmond MD  CC Time: 15 minutes

## 2023-11-10 NOTE — PLAN OF CARE
Problem: Plan of Care - These are the overarching goals to be used throughout the patient stay.    Goal: Plan of Care Review  Description: The Plan of Care Review/Shift note should be completed every shift.  The Outcome Evaluation is a brief statement about your assessment that the patient is improving, declining, or no change.  This information will be displayed automatically on your shift note.  Recent Flowsheet Documentation  Taken 11/9/2023 1812 by Ana Rendon RN  Plan of Care Reviewed With:   patient   friend  Overall Patient Progress: declining  Goal: Absence of Hospital-Acquired Illness or Injury  Intervention: Identify and Manage Fall Risk  Recent Flowsheet Documentation  Taken 11/9/2023 1600 by Ana Rendon RN  Safety Promotion/Fall Prevention:   activity supervised   clutter free environment maintained   lighting adjusted   nonskid shoes/slippers when out of bed   patient and family education   safety round/check completed   supervised activity   treat reversible contributory factors   treat underlying cause  Taken 11/9/2023 1200 by Ana Rendon RN  Safety Promotion/Fall Prevention:   activity supervised   clutter free environment maintained   lighting adjusted   nonskid shoes/slippers when out of bed   patient and family education   safety round/check completed   supervised activity   treat reversible contributory factors   treat underlying cause  Taken 11/9/2023 0800 by Ana Rendon RN  Safety Promotion/Fall Prevention:   activity supervised   clutter free environment maintained   lighting adjusted   nonskid shoes/slippers when out of bed   patient and family education   safety round/check completed   supervised activity   treat reversible contributory factors   treat underlying cause  Intervention: Prevent Skin Injury  Recent Flowsheet Documentation  Taken 11/9/2023 1600 by Ana Rendon RN  Body Position:   turned   left   legs elevated   upper extremity elevated   weight  shifting  Taken 11/9/2023 1200 by Ana Rendon RN  Body Position:   turned   left   legs elevated   upper extremity elevated   weight shifting  Taken 11/9/2023 1000 by Ana Rendon RN  Body Position:   turned   right   upper extremity elevated   weight shifting   legs elevated  Taken 11/9/2023 0800 by Ana Rendon RN  Body Position:   turned   left   legs elevated   upper extremity elevated   weight shifting  Intervention: Prevent and Manage VTE (Venous Thromboembolism) Risk  Recent Flowsheet Documentation  Taken 11/9/2023 1600 by Ana Rendon RN  VTE Prevention/Management: SCDs (sequential compression devices) on  Taken 11/9/2023 1200 by Ana Rendon RN  VTE Prevention/Management: SCDs (sequential compression devices) on  Taken 11/9/2023 0800 by Ana Rendon RN  VTE Prevention/Management: SCDs (sequential compression devices) on  Intervention: Prevent Infection  Recent Flowsheet Documentation  Taken 11/9/2023 1600 by Ana Rendon RN  Infection Prevention:   rest/sleep promoted   environmental surveillance performed   personal protective equipment utilized   single patient room provided  Taken 11/9/2023 1200 by Ana Rendon RN  Infection Prevention:   rest/sleep promoted   environmental surveillance performed   personal protective equipment utilized   single patient room provided  Taken 11/9/2023 0800 by Ana Rendon RN  Infection Prevention:   rest/sleep promoted   environmental surveillance performed   personal protective equipment utilized   single patient room provided  Goal: Optimal Comfort and Wellbeing  Intervention: Provide Person-Centered Care  Recent Flowsheet Documentation  Taken 11/9/2023 1600 by Ana Rendon RN  Trust Relationship/Rapport:   care explained   reassurance provided   thoughts/feelings acknowledged  Taken 11/9/2023 1200 by Ana Rendon RN  Trust Relationship/Rapport:   care explained   reassurance provided   thoughts/feelings acknowledged  Taken 11/9/2023  0800 by Ana Rendon RN  Trust Relationship/Rapport:   care explained   reassurance provided   thoughts/feelings acknowledged     Problem: Risk for Delirium  Goal: Improved Behavioral Control  Intervention: Minimize Safety Risk  Recent Flowsheet Documentation  Taken 11/9/2023 1600 by Ana Rendon RN  Enhanced Safety Measures: room near unit station  Trust Relationship/Rapport:   care explained   reassurance provided   thoughts/feelings acknowledged  Taken 11/9/2023 1200 by Ana Rendon RN  Enhanced Safety Measures: room near unit station  Trust Relationship/Rapport:   care explained   reassurance provided   thoughts/feelings acknowledged  Taken 11/9/2023 0800 by Ana Rendon RN  Enhanced Safety Measures: room near unit station  Trust Relationship/Rapport:   care explained   reassurance provided   thoughts/feelings acknowledged     Problem: Seizure, Active Management  Goal: Absence of Seizure/Seizure-Related Injury  Intervention: Prevent Seizure-Related Injury  Recent Flowsheet Documentation  Taken 11/9/2023 1600 by Ana Rendon RN  Aspiration Prevention During Seizure: suctioned secretions/vomitus  Taken 11/9/2023 1200 by Ana Rendon RN  Aspiration Prevention During Seizure: suctioned secretions/vomitus  Taken 11/9/2023 0800 by Ana Rendon RN  Aspiration Prevention During Seizure: suctioned secretions/vomitus     Problem: Mechanical Ventilation Invasive  Goal: Effective Communication  Intervention: Ensure Effective Communication  Recent Flowsheet Documentation  Taken 11/9/2023 1600 by Ana Rendon RN  Trust Relationship/Rapport:   care explained   reassurance provided   thoughts/feelings acknowledged  Taken 11/9/2023 1200 by Ana Rendon RN  Trust Relationship/Rapport:   care explained   reassurance provided   thoughts/feelings acknowledged  Taken 11/9/2023 0800 by Ana Rendon RN  Trust Relationship/Rapport:   care explained   reassurance provided   thoughts/feelings  acknowledged  Goal: Optimal Device Function  Intervention: Optimize Device Care and Function  Recent Flowsheet Documentation  Taken 11/9/2023 1600 by Ana Rendon RN  Airway Safety Measures:   all equipment/monitors on and audible   manual resuscitator/mask/valve at bedside   oxygen flowmeter   suction at bedside   suction equipment   suction regulator  Oral Care:   lip/mouth moisturizer applied   suction provided   swabbed with antiseptic solution  Taken 11/9/2023 1200 by Ana Rendon RN  Airway Safety Measures:   all equipment/monitors on and audible   manual resuscitator/mask/valve at bedside   oxygen flowmeter   suction at bedside   suction equipment   suction regulator  Oral Care:   lip/mouth moisturizer applied   suction provided   swabbed with antiseptic solution  Taken 11/9/2023 1000 by Ana Rendon RN  Oral Care:   lip/mouth moisturizer applied   suction provided   swabbed with antiseptic solution  Taken 11/9/2023 0800 by Ana Rendon RN  Airway Safety Measures:   all equipment/monitors on and audible   manual resuscitator/mask/valve at bedside   oxygen flowmeter   suction at bedside   suction equipment   suction regulator  Oral Care:   lip/mouth moisturizer applied   suction provided   swabbed with antiseptic solution  Goal: Mechanical Ventilation Liberation  Intervention: Promote Extubation and Mechanical Ventilation Liberation  Recent Flowsheet Documentation  Taken 11/9/2023 1600 by Ana Rendon RN  Medication Review/Management: medications reviewed  Taken 11/9/2023 1200 by Ana Rendon RN  Medication Review/Management: medications reviewed  Taken 11/9/2023 0800 by Ana Rendon RN  Medication Review/Management: medications reviewed  Goal: Absence of Ventilator-Induced Lung Injury  Intervention: Prevent Ventilator-Associated Pneumonia  Recent Flowsheet Documentation  Taken 11/9/2023 1600 by Ana Rendon RN  VAP Prevention Bundle:   HOB elevation maintained   oral care regularly  provided  Oral Care:   lip/mouth moisturizer applied   suction provided   swabbed with antiseptic solution  Head of Bed (HOB) Positioning: HOB at 30 degrees  VAP Prevention Measures: completed  Taken 11/9/2023 1200 by Ana Rendon RN  VAP Prevention Bundle:   HOB elevation maintained   oral care regularly provided  Oral Care:   lip/mouth moisturizer applied   suction provided   swabbed with antiseptic solution  Head of Bed (HOB) Positioning: HOB at 30 degrees  VAP Prevention Measures: completed  Taken 11/9/2023 1000 by Ana Rendon RN  Oral Care:   lip/mouth moisturizer applied   suction provided   swabbed with antiseptic solution  Head of Bed (HOB) Positioning: HOB at 30 degrees  Taken 11/9/2023 0800 by Ana Rendon RN  VAP Prevention Bundle:   HOB elevation maintained   oral care regularly provided  Oral Care:   lip/mouth moisturizer applied   suction provided   swabbed with antiseptic solution  Head of Bed (HOB) Positioning: HOB at 30 degrees  VAP Prevention Measures: completed     Problem: Asthma Comorbidity  Goal: Maintenance of Asthma Control  Intervention: Maintain Asthma Symptom Control  Recent Flowsheet Documentation  Taken 11/9/2023 1600 by Ana Rendon RN  Medication Review/Management: medications reviewed  Taken 11/9/2023 1200 by Ana Rendon RN  Medication Review/Management: medications reviewed  Taken 11/9/2023 0800 by Aan Rendon RN  Medication Review/Management: medications reviewed     Problem: Behavioral Health Comorbidity  Goal: Maintenance of Behavioral Health Symptom Control  Intervention: Maintain Behavioral Health Symptom Control  Recent Flowsheet Documentation  Taken 11/9/2023 1600 by Ana Rendon RN  Medication Review/Management: medications reviewed  Taken 11/9/2023 1200 by Ana Rendon RN  Medication Review/Management: medications reviewed  Taken 11/9/2023 0800 by Ana Rendon RN  Medication Review/Management: medications reviewed     Problem: COPD (Chronic  Obstructive Pulmonary Disease) Comorbidity  Goal: Maintenance of COPD Symptom Control  Intervention: Maintain COPD-Symptom Control  Recent Flowsheet Documentation  Taken 11/9/2023 1600 by Ana Rendon RN  Medication Review/Management: medications reviewed  Taken 11/9/2023 1200 by Ana Rendon RN  Medication Review/Management: medications reviewed  Taken 11/9/2023 0800 by Ana Rendon RN  Medication Review/Management: medications reviewed     Problem: Heart Failure Comorbidity  Goal: Maintenance of Heart Failure Symptom Control  Intervention: Maintain Heart Failure Management  Recent Flowsheet Documentation  Taken 11/9/2023 1600 by Ana Rendon RN  Medication Review/Management: medications reviewed  Taken 11/9/2023 1200 by Ana Rendon RN  Medication Review/Management: medications reviewed  Taken 11/9/2023 0800 by Ana Rendon RN  Medication Review/Management: medications reviewed     Problem: Hypertension Comorbidity  Goal: Blood Pressure in Desired Range  Intervention: Maintain Blood Pressure Management  Recent Flowsheet Documentation  Taken 11/9/2023 1600 by Ana Rendon RN  Medication Review/Management: medications reviewed  Taken 11/9/2023 1200 by Ana Rendon RN  Medication Review/Management: medications reviewed  Taken 11/9/2023 0800 by Ana Rendon RN  Medication Review/Management: medications reviewed     Problem: Obstructive Sleep Apnea Risk or Actual Comorbidity Management  Goal: Unobstructed Breathing During Sleep  Intervention: Monitor and Manage Obstructive Sleep Apnea  Recent Flowsheet Documentation  Taken 11/9/2023 1600 by Ana Rendon RN  Medication Review/Management: medications reviewed  Taken 11/9/2023 1200 by Ana Rendon RN  Medication Review/Management: medications reviewed  Taken 11/9/2023 0800 by Ana Rendon RN  Medication Review/Management: medications reviewed     Problem: Osteoarthritis Comorbidity  Goal: Maintenance of Osteoarthritis Symptom  Control  Intervention: Maintain Osteoarthritis Symptom Control  Recent Flowsheet Documentation  Taken 11/9/2023 1600 by Ana Rendon RN  Assistive Device Utilized: lift device  Activity Management: activity adjusted per tolerance  Medication Review/Management: medications reviewed  Taken 11/9/2023 1200 by Ana Rendon RN  Assistive Device Utilized: lift device  Activity Management: activity adjusted per tolerance  Medication Review/Management: medications reviewed  Taken 11/9/2023 0800 by Ana Rendon RN  Assistive Device Utilized: lift device  Activity Management: activity adjusted per tolerance  Medication Review/Management: medications reviewed     Problem: Pain Chronic (Persistent) (Comorbidity Management)  Goal: Acceptable Pain Control and Functional Ability  Intervention: Manage Persistent Pain  Recent Flowsheet Documentation  Taken 11/9/2023 1600 by Ana Rendon RN  Medication Review/Management: medications reviewed  Taken 11/9/2023 1200 by Ana Rendon RN  Medication Review/Management: medications reviewed  Taken 11/9/2023 0800 by Ana Rendon RN  Medication Review/Management: medications reviewed     Problem: Artificial Airway  Goal: Effective Communication  Intervention: Ensure Effective Communication  Recent Flowsheet Documentation  Taken 11/9/2023 1600 by Ana Rendon RN  Trust Relationship/Rapport:   care explained   reassurance provided   thoughts/feelings acknowledged  Taken 11/9/2023 1200 by Ana Rendon RN  Trust Relationship/Rapport:   care explained   reassurance provided   thoughts/feelings acknowledged  Taken 11/9/2023 0800 by Ana Rendon RN  Trust Relationship/Rapport:   care explained   reassurance provided   thoughts/feelings acknowledged  Goal: Optimal Device Function  Intervention: Optimize Device Care and Function  Recent Flowsheet Documentation  Taken 11/9/2023 1600 by Ana Rendon RN  Airway Safety Measures:   all equipment/monitors on and audible   manual  resuscitator/mask/valve at bedside   oxygen flowmeter   suction at bedside   suction equipment   suction regulator  Aspiration Precautions:   oral hygiene care promoted   respiratory status monitored   tube feeding placement verified  Oral Care:   lip/mouth moisturizer applied   suction provided   swabbed with antiseptic solution  Taken 11/9/2023 1200 by Ana Rendon RN  Airway Safety Measures:   all equipment/monitors on and audible   manual resuscitator/mask/valve at bedside   oxygen flowmeter   suction at bedside   suction equipment   suction regulator  Aspiration Precautions:   oral hygiene care promoted   respiratory status monitored   tube feeding placement verified  Oral Care:   lip/mouth moisturizer applied   suction provided   swabbed with antiseptic solution  Taken 11/9/2023 1000 by Ana Rendon RN  Oral Care:   lip/mouth moisturizer applied   suction provided   swabbed with antiseptic solution  Taken 11/9/2023 0800 by Ana Rendon RN  Airway Safety Measures:   all equipment/monitors on and audible   manual resuscitator/mask/valve at bedside   oxygen flowmeter   suction at bedside   suction equipment   suction regulator  Aspiration Precautions:   oral hygiene care promoted   respiratory status monitored   tube feeding placement verified  Oral Care:   lip/mouth moisturizer applied   suction provided   swabbed with antiseptic solution     Problem: Enteral Nutrition  Goal: Absence of Aspiration Signs and Symptoms  Intervention: Minimize Aspiration Risk  Recent Flowsheet Documentation  Taken 11/9/2023 1600 by Ana Rendon RN  Oral Care:   lip/mouth moisturizer applied   suction provided   swabbed with antiseptic solution  Head of Bed (HOB) Positioning: HOB at 30 degrees  Taken 11/9/2023 1200 by Ana Rendon RN  Oral Care:   lip/mouth moisturizer applied   suction provided   swabbed with antiseptic solution  Head of Bed (HOB) Positioning: HOB at 30 degrees  Taken 11/9/2023 1000 by Ana Rendon  RN  Oral Care:   lip/mouth moisturizer applied   suction provided   swabbed with antiseptic solution  Head of Bed (HOB) Positioning: HOB at 30 degrees  Taken 11/9/2023 0800 by Ana Rendon RN  Oral Care:   lip/mouth moisturizer applied   suction provided   swabbed with antiseptic solution  Head of Bed (HOB) Positioning: HOB at 30 degrees     Problem: Seizure, Active Management  Goal: Absence of Seizure/Seizure-Related Injury  Intervention: Prevent Seizure-Related Injury  Recent Flowsheet Documentation  Taken 11/9/2023 1600 by Ana Rendon RN  Aspiration Prevention During Seizure: suctioned secretions/vomitus  Taken 11/9/2023 1200 by Ana Rendon RN  Aspiration Prevention During Seizure: suctioned secretions/vomitus  Taken 11/9/2023 0800 by Ana Rendon RN  Aspiration Prevention During Seizure: suctioned secretions/vomitus     Problem: Fall Injury Risk  Goal: Absence of Fall and Fall-Related Injury  Intervention: Identify and Manage Contributors  Recent Flowsheet Documentation  Taken 11/9/2023 1600 by Ana Rendon RN  Medication Review/Management: medications reviewed  Taken 11/9/2023 1200 by Aan Rendon RN  Medication Review/Management: medications reviewed  Taken 11/9/2023 0800 by Ana Rendon RN  Medication Review/Management: medications reviewed  Intervention: Promote Injury-Free Environment  Recent Flowsheet Documentation  Taken 11/9/2023 1600 by Ana Rendon RN  Safety Promotion/Fall Prevention:   activity supervised   clutter free environment maintained   lighting adjusted   nonskid shoes/slippers when out of bed   patient and family education   safety round/check completed   supervised activity   treat reversible contributory factors   treat underlying cause  Taken 11/9/2023 1200 by Ana Rendon RN  Safety Promotion/Fall Prevention:   activity supervised   clutter free environment maintained   lighting adjusted   nonskid shoes/slippers when out of bed   patient and family  education   safety round/check completed   supervised activity   treat reversible contributory factors   treat underlying cause  Taken 11/9/2023 0800 by Ana Rendon RN  Safety Promotion/Fall Prevention:   activity supervised   clutter free environment maintained   lighting adjusted   nonskid shoes/slippers when out of bed   patient and family education   safety round/check completed   supervised activity   treat reversible contributory factors   treat underlying cause     Problem: Adult Inpatient Plan of Care  Goal: Plan of Care Review  Description: The Plan of Care Review/Shift note should be completed every shift.  The Outcome Evaluation is a brief statement about your assessment that the patient is improving, declining, or no change.  This information will be displayed automatically on your shift  note.  11/9/2023 1812 by Ana Rendon RN  Outcome: Not Progressing  Flowsheets (Taken 11/9/2023 1812)  Plan of Care Reviewed With:   patient   friend  Overall Patient Progress: declining  11/9/2023 1812 by Ana Rendon RN  Outcome: Not Progressing  Flowsheets (Taken 11/9/2023 1812)  Plan of Care Reviewed With:   patient   friend  Overall Patient Progress: declining  Goal: Absence of Hospital-Acquired Illness or Injury  Intervention: Identify and Manage Fall Risk  Recent Flowsheet Documentation  Taken 11/9/2023 1600 by Ana Rendon RN  Safety Promotion/Fall Prevention:   activity supervised   clutter free environment maintained   lighting adjusted   nonskid shoes/slippers when out of bed   patient and family education   safety round/check completed   supervised activity   treat reversible contributory factors   treat underlying cause  Taken 11/9/2023 1200 by Ana Rendon RN  Safety Promotion/Fall Prevention:   activity supervised   clutter free environment maintained   lighting adjusted   nonskid shoes/slippers when out of bed   patient and family education   safety round/check completed   supervised  activity   treat reversible contributory factors   treat underlying cause  Taken 11/9/2023 0800 by Ana Rendon RN  Safety Promotion/Fall Prevention:   activity supervised   clutter free environment maintained   lighting adjusted   nonskid shoes/slippers when out of bed   patient and family education   safety round/check completed   supervised activity   treat reversible contributory factors   treat underlying cause  Intervention: Prevent Skin Injury  Recent Flowsheet Documentation  Taken 11/9/2023 1600 by Ana Rendon RN  Body Position:   turned   left   legs elevated   upper extremity elevated   weight shifting  Taken 11/9/2023 1200 by Ana Rendon RN  Body Position:   turned   left   legs elevated   upper extremity elevated   weight shifting  Taken 11/9/2023 1000 by Ana Rendon RN  Body Position:   turned   right   upper extremity elevated   weight shifting   legs elevated  Taken 11/9/2023 0800 by Ana Rendon RN  Body Position:   turned   left   legs elevated   upper extremity elevated   weight shifting  Intervention: Prevent and Manage VTE (Venous Thromboembolism) Risk  Recent Flowsheet Documentation  Taken 11/9/2023 1600 by Ana Rendon RN  VTE Prevention/Management: SCDs (sequential compression devices) on  Taken 11/9/2023 1200 by Ana Rendon RN  VTE Prevention/Management: SCDs (sequential compression devices) on  Taken 11/9/2023 0800 by Ana Rendon RN  VTE Prevention/Management: SCDs (sequential compression devices) on  Intervention: Prevent Infection  Recent Flowsheet Documentation  Taken 11/9/2023 1600 by Ana Rendon RN  Infection Prevention:   rest/sleep promoted   environmental surveillance performed   personal protective equipment utilized   single patient room provided  Taken 11/9/2023 1200 by Ana Rendon RN  Infection Prevention:   rest/sleep promoted   environmental surveillance performed   personal protective equipment utilized   single patient room provided  Taken  11/9/2023 0800 by Ana Rendon RN  Infection Prevention:   rest/sleep promoted   environmental surveillance performed   personal protective equipment utilized   single patient room provided  Goal: Optimal Comfort and Wellbeing  Intervention: Provide Person-Centered Care  Recent Flowsheet Documentation  Taken 11/9/2023 1600 by Ana Rendon RN  Trust Relationship/Rapport:   care explained   reassurance provided   thoughts/feelings acknowledged  Taken 11/9/2023 1200 by Ana Rendon RN  Trust Relationship/Rapport:   care explained   reassurance provided   thoughts/feelings acknowledged  Taken 11/9/2023 0800 by Ana Rendon RN  Trust Relationship/Rapport:   care explained   reassurance provided   thoughts/feelings acknowledged     Problem: Artificial Airway  Goal: Effective Communication  Intervention: Ensure Effective Communication  Recent Flowsheet Documentation  Taken 11/9/2023 1600 by Ana Rendon RN  Trust Relationship/Rapport:   care explained   reassurance provided   thoughts/feelings acknowledged  Taken 11/9/2023 1200 by Ana Rendon RN  Trust Relationship/Rapport:   care explained   reassurance provided   thoughts/feelings acknowledged  Taken 11/9/2023 0800 by Ana Rendon RN  Trust Relationship/Rapport:   care explained   reassurance provided   thoughts/feelings acknowledged  Intervention: Ensure Effective Communication  Recent Flowsheet Documentation  Taken 11/9/2023 1600 by Ana Rendon RN  Trust Relationship/Rapport:   care explained   reassurance provided   thoughts/feelings acknowledged  Taken 11/9/2023 1200 by Ana Rendon RN  Trust Relationship/Rapport:   care explained   reassurance provided   thoughts/feelings acknowledged  Taken 11/9/2023 0800 by Ana Rendon RN  Trust Relationship/Rapport:   care explained   reassurance provided   thoughts/feelings acknowledged  Goal: Optimal Device Function  Intervention: Optimize Device Care and Function  Recent Flowsheet  Documentation  Taken 11/9/2023 1600 by Ana Rendon RN  Airway Safety Measures:   all equipment/monitors on and audible   manual resuscitator/mask/valve at bedside   oxygen flowmeter   suction at bedside   suction equipment   suction regulator  Aspiration Precautions:   oral hygiene care promoted   respiratory status monitored   tube feeding placement verified  Oral Care:   lip/mouth moisturizer applied   suction provided   swabbed with antiseptic solution  Taken 11/9/2023 1200 by Ana Rendon RN  Airway Safety Measures:   all equipment/monitors on and audible   manual resuscitator/mask/valve at bedside   oxygen flowmeter   suction at bedside   suction equipment   suction regulator  Aspiration Precautions:   oral hygiene care promoted   respiratory status monitored   tube feeding placement verified  Oral Care:   lip/mouth moisturizer applied   suction provided   swabbed with antiseptic solution  Taken 11/9/2023 1000 by Ana Rendon RN  Oral Care:   lip/mouth moisturizer applied   suction provided   swabbed with antiseptic solution  Taken 11/9/2023 0800 by Ana Rendon RN  Airway Safety Measures:   all equipment/monitors on and audible   manual resuscitator/mask/valve at bedside   oxygen flowmeter   suction at bedside   suction equipment   suction regulator  Aspiration Precautions:   oral hygiene care promoted   respiratory status monitored   tube feeding placement verified  Oral Care:   lip/mouth moisturizer applied   suction provided   swabbed with antiseptic solution     Problem: Fall Injury Risk  Goal: Absence of Fall and Fall-Related Injury  Intervention: Identify and Manage Contributors  Recent Flowsheet Documentation  Taken 11/9/2023 1600 by Ana Rendon RN  Medication Review/Management: medications reviewed  Taken 11/9/2023 1200 by Ana Rendon RN  Medication Review/Management: medications reviewed  Taken 11/9/2023 0800 by Ana Rendon RN  Medication Review/Management: medications  reviewed  Intervention: Promote Injury-Free Environment  Recent Flowsheet Documentation  Taken 11/9/2023 1600 by Ana Rendon RN  Safety Promotion/Fall Prevention:   activity supervised   clutter free environment maintained   lighting adjusted   nonskid shoes/slippers when out of bed   patient and family education   safety round/check completed   supervised activity   treat reversible contributory factors   treat underlying cause  Taken 11/9/2023 1200 by Ana Rendon RN  Safety Promotion/Fall Prevention:   activity supervised   clutter free environment maintained   lighting adjusted   nonskid shoes/slippers when out of bed   patient and family education   safety round/check completed   supervised activity   treat reversible contributory factors   treat underlying cause  Taken 11/9/2023 0800 by Ana Rendon RN  Safety Promotion/Fall Prevention:   activity supervised   clutter free environment maintained   lighting adjusted   nonskid shoes/slippers when out of bed   patient and family education   safety round/check completed   supervised activity   treat reversible contributory factors   treat underlying cause     Problem: Pain Acute  Goal: Optimal Pain Control and Function  Intervention: Prevent or Manage Pain  Recent Flowsheet Documentation  Taken 11/9/2023 1600 by Ana Rendon RN  Medication Review/Management: medications reviewed  Taken 11/9/2023 1200 by Ana Rendon RN  Medication Review/Management: medications reviewed  Taken 11/9/2023 0800 by Ana Rendon RN  Medication Review/Management: medications reviewed     Problem: Hypertension Acute  Goal: Blood Pressure Within Desired Range  Intervention: Normalize Blood Pressure  Recent Flowsheet Documentation  Taken 11/9/2023 1600 by Ana Rendon RN  Medication Review/Management: medications reviewed  Taken 11/9/2023 1200 by Ana Rendon RN  Medication Review/Management: medications reviewed  Taken 11/9/2023 0800 by Ana Rendon  RN  Medication Review/Management: medications reviewed   Goal Outcome Evaluation:      Plan of Care Reviewed With: patient, friend    Overall Patient Progress: decliningOverall Patient Progress: declining

## 2023-11-10 NOTE — PROGRESS NOTES
[facility]   MICU Progress Note  Critical Care Progress Note      11/10/2023    Name: Kortney Germain MRN#: [id number]   Age: 68 year old YOB: 1955     [facility]     [facility] DAY #             Problem List:   Principal Problem:    Status epilepticus (H)  Active Problems:    Urinary retention    Acute cystitis without hematuria    Constipation, unspecified constipation type    Anemia, unspecified type    HTN (hypertension)    Infection due to Stenotrophomonas maltophilia    Ventilator associated pneumonia (H)           Summary/Hospital Course:     68-year-old female patient with past medical history significant for schizoaffective disorder, tardive dyskinesia, she was initially admitted on 9/28/2023 for UTI and altered mental status, she was intubated and supported on a ventilator, and ultimately she was diagnosed with refractory status epilepticus with requirement for multiple AEDs  She had neuro work-up with unremarkable MRIs with and without gadolinium. She had CSF analysis x 2 refill with bland results, and she did have a negative serum encephalopathy panel.  She had trach and PEG placed.  She did not improve even with 5-day course of IV Solu-Medrol. The patient continued to have evidence for status epilepticus on EEG continuous monitoring despite multiple AEDs. Neurology recommended to transfer the patient to the ICU for IV Versed for status epilepticus control.  Despite being on midazolam she has continued to have seizures.  Keppra was increased on 11/6 in response to continuing seizure activity; seizures stopped but remained comatose. Care had been complicated by lack of surrogate decision maker. After multiple conversations in conjunction with leadership and ethics consulting service the plan had been to proceed with palliative care. However on the day a meeting was scheduled a nephew responded to care coordinators weeks long extensive efforts to locate family members. we  contacted brothers, one of whom was able to participate in decision making. After discussion with both nephew and brother, proceeded with palliative care. On the day prior to admission she developed signs of sepsis with purulent sputum and elevated WBC. Initiated antibiotics while awaiting visits from friends. Transitioned to palliative care evening of 11/9. Vital signs remained stable. Patient remained comatose without evidence of seizures so transferred to hospitalist service.     Procedures performed:  Lumbar puncture times 2  Imaging  PICC  TTE      Assessment and plan :     Further details on hospital course below    My assessment and plan by system for this patient is as follows:    Neurology/Psychiatry:   1. Status epilepticus in setting of schizophrenia despite being on multiple AEDs including clobazam, Keppra, Valproic acid and Vimpat.  Continues on infusion of benzodiazepines.  Has received salvage steroids for refractory status epilepticus with no improvement. We are continuing anti epileptic medications for now for patient comfort. Her seizures have manifested as motor activity so should be able to treat these if they occur. Discontinued Midazolam infusion and placed on equivalent dose of oral Lorazepam.    Cardiovascular:   1.Intermittently hypertensive. Received medication to keep BP in noral range.     Pulmonary/Ventilator Management:   1. Chronic hypoxemic respiratory failure due to seizure and inability to protect her airway, CT chest was done on 11/4, showed some atelectasis, she does have some mosaic attenuation so questioning some air trapping. Apneic due to coma, which factored into recommendation for palliative care. Received lung protective ventilation and nebs.,      GI and Nutrition :   Tube feeds for moderate malnutrition         Renal/Fluids/Electrolytes:   1.  No HENRIK or other acute isuses during hospitalization     Infectious Disease:   Initial presentation with UTI. Treated for this. On  day prior to transition of care developed purulent secretions and signs of sepsis. Also received treatment for this.       Endocrine:   No abnormalities.       Hematology/Oncology:   Abnormalities consistent with chronic illness.  No signs of bleeding or acute processes        Ana Edmond MD           [facility] Medications:      clobazam  20 mg Oral or Feeding Tube BID    lacosamide  250 mg Oral or Feeding Tube BID    levETIRAcetam  1,500 mg Intravenous Q12H    LORazepam  2 mg Intravenous Q4H    valproic acid  250 mg Oral Q8H      sodium chloride Stopped (11/10/23 0900)               Physical Examination:   Temp:  [97.8  F (36.6  C)-97.9  F (36.6  C)] 97.8  F (36.6  C)  Pulse:  [54-82] 54  Resp:  [12-19] 14  BP: ()/(55-80) 125/63  FiO2 (%):  [30 %] 30 %  SpO2:  [91 %-100 %] 91 %        Wt Readings from Last 4 Encounters:   11/09/23 46.4 kg (102 lb 4.7 oz)   06/08/23 40.8 kg (90 lb)   03/15/23 41 kg (90 lb 6.2 oz)   02/02/22 52.2 kg (115 lb)     BP - Mean:  [] 89  Vent Mode: CMV/AC  (Continuous Mandatory Ventilation/ Assist Control)  FiO2 (%): 30 %  Resp Rate (Set): 16 breaths/min  Tidal Volume (Set, mL): 330 mL  PEEP (cm H2O): 5 cmH2O  Resp: 14    Recent Labs   Lab 11/07/23  0000 11/05/23  2120   PH  --  7.50*   PCO2  --  31*   PO2  --  89   HCO3  --  24   O2PER 21 30       GEN: Chronically ill  HEENT: Trach,  PULM: synchronous, clear    CV/COR: RRR S1S2 no gallop,  No rub, no murmur  ABD: soft nontender, hypoactive bowel sounds, clean peg tube site  EXT:  No edema   NEURO: completely unresponsive.  SKIN: no obvious rash  LINES: clean, dry intact         Data:   All data and imaging reviewed     ROUTINE ICU LABS (Last four results)  CMP  Recent Labs   Lab 11/09/23  1216 11/09/23  0755 11/09/23  0503 11/09/23  0501 11/09/23  0109 11/08/23  0754 11/08/23  0449 11/07/23  0423 11/07/23  0418 11/06/23  0624 11/06/23  0622 11/04/23  0623 11/04/23  0602   NA  --   --   --   --   --   --   --   --  144  --   --   --   "145   POTASSIUM  --   --   --   --   --   --   --   --  4.1  --   --   --  4.0   CHLORIDE  --   --   --   --   --   --   --   --  110*  --   --   --  105   CO2  --   --   --   --   --   --   --   --  24  --   --   --  30*   ANIONGAP  --   --   --   --   --   --   --   --  10  --   --   --  10   * 118*  --  102* 110*   < >  --    < > 117*   < >  --    < > 122*   BUN  --   --   --   --   --   --   --   --  18.9  --   --   --  24.0*   CR  --   --   --   --   --   --   --   --  0.36*  --   --   --  0.42*   GFRESTIMATED  --   --   --   --   --   --   --   --  >90  --   --   --  >90   LULU  --   --   --   --   --   --   --   --  8.1*  --   --   --  8.7*   PHOS  --   --  3.6  --   --   --  3.8  --  3.5  --  3.7  --   --     < > = values in this interval not displayed.     CBC  Recent Labs   Lab 11/08/23  1207 11/07/23  0418 11/05/23  0550 11/04/23  0602   WBC 7.4 9.6  --  16.1*   RBC 3.11* 3.08*  --  3.68*   HGB 7.9* 7.7*  --  9.1*   HCT 27.2* 26.4*  --  31.3*   MCV 88 86  --  85   MCH 25.4* 25.0*  --  24.7*   MCHC 29.0* 29.2*  --  29.1*   RDW 22.3* 21.9*  --  20.9*    289 341 397     INRNo lab results found in last 7 days.  Arterial Blood Gas  Recent Labs   Lab 11/07/23  0000 11/05/23  2120   PH  --  7.50*   PCO2  --  31*   PO2  --  89   HCO3  --  24   O2PER 21 30       All cultures:  No results for input(s): \"CULT\" in the last 168 hours.  No results found for this or any previous visit (from the past 24 hour(s)).    Sputum culture: Gram-positive bacilli and mixed christina   Clinically Significant Risk Factors              # Hypoalbuminemia: Lowest albumin = 3.4 g/dL at 9/30/2023  5:15 AM, will monitor as appropriate     # Hypertension: Noted on problem list         # Moderate Malnutrition: based on nutrition assessment          Code Status: No CPR- Do NOT Intubate             "

## 2023-11-10 NOTE — PLAN OF CARE
End of Shift Nursing Summary    Pt on comfort cares. Versed drip turned off, ativan given. Dilaudid given x1 for signs of discomfort. Turn/reposition q2 hrs. On RA with trach. Report called to station 88, transferred on cart to 812 with CNA. All belongings sent with patient.

## 2023-11-11 NOTE — PROGRESS NOTES
[facility]    Medicine Progress Note - Hospitalist Service    Date of Admission:  9/28/2023    Assessment & Plan   Kortney Germain is a 68-year-old female patient with past medical history significant for schizoaffective disorder, tardive dyskinesia, hypertension, anxiety, and depression.  She was initially admitted on 9/28/2023 for UTI, constipation, and anemia attributed to rectal bleeding from mucosal injury post enema (received 1 U PRBCs). She had decreased LOC and generalized shaking overnight 9/28/23. RRT called and pt was intubated 9/29/23 for encephalopathy and inability to protect her airway, subsequently diagnosed with refractory status epilepticus requiring multiple AEDs, high dose steroids, and IV versed with ongoing breakthrough seizure. She underwent trach and PEG placement throughout her course. She was transferred out of the ICU on 10/24/23, but ultimately returned to the ICU 11/5/23 for refractory seizure with need for ventilatory support and initiation of IV versed. Case further complicated by inability to locate next of kin which was ultimately found on 11/8. After reviewing the case with the Intensivist team, family elected to transition to comfort cares. Pt was compassionately extubated 11/9/23 evening. Hospitalist service was contacted 11/10/23 for assumption of care and transition out of the ICU     Comfort cares:   - [facility] hospice consulted   - Transitioned off IV Versed, given 4mg Ativan X2 , now on scheduled Ativan 2 mg q4 hrs   - Continue IV Keppra 1500 mg BID, Vimpat 250 mg BID, Valproic acid 250 mg q8 hrs, Clobazam 20 mg BID for seizures   - Comfort medications available  - When patient passes, group home ([facility]) and Nephew (Ivan) need to be contacted     Hospital course  ?  Recurrence of status epilepticus versus persistent  Prolonged encephalopathy following presentation of new onset refractory status epilepticus  Seizure disorder  *EEG 09/29/23: Abnormal due  to the presences of generalized periodic pattern consisting of 2-3.5 hz discharges, maximum negativity in bifrontal region, periodic pattern occupy 90% of the record and the remaining portion consist of delta slowing     *CT Head : no acute intracranial pathology, brain atrophy,   *MRI 23: no acute intracranial process, generalized atrophy,   *MRI 10/11/23: no acute intracranial process, no hemorrhage, moderate-advanced brain atrophy and leukoaraiosis,   *MRI 10/20/23: no interval change   *BC: NG  *CSF analysis: glucose 98 protein 22.8, 2 nucleated cells, RBC 0, aerobic culture GPC in broth only anaerobic culture NGTD, HSV negative  *[place] encephalopathy autoimmune panel negative  *CT CAP  no evidence or concern for malignancy   *Ambien trial  negative   * transferred back to the ICU for initiation of versed drip and reintubation  *sEEG : 1-2Hz GPDs, per Dr Castaneda on ictal-interictal continuum ~ status epilepticus  *cEEG - : runs of GPEDs, 1-2 Hz alternating with generalized slowing; versed increased to 4 ml/hr ---> with improvement of GPEDS  *CSF : Basic Profile: 2 nucleated cells, Protein 20, Glucose 88. Gram stain: no organism, 1+ WBC. Meningitis/Encephalitis panel: negative  VZV, HSV PCR - negative. ENS2 Encephalopathy panel. CSF 14-3-3 pending. Flow Cytometry negative. EBV negative. CMV negative. WNV negative. Meningitis/encephalitis panel negative.  *Pending CSF studies: Anti-LG1 (this is associated with faciobrachial seizures, which pt has), anti-VGKC, anti-striational, anti-Hu, anti-Ma, anti-VGCC, enterovirus, bartonella, mycoplasma, T whippeli, 14-3-3. Clobazam metabolite  - Review Neurology notes for complete details regarding evaluation and treatment to date     Acute hypoxic respiratory failure secondary to CNS failure/loss of protective airway reflexes on prolonged mechanical ventilatory support status post trach tube placement on 10/20/2023 by Dr. Benites  -  Supplemental oxygen PRN for comfort      Possible hospital-acquired pneumonia-treated  Urinary tract infection, pseudomonas, treated  *low-grade temp of 99 on 10/23/2023 with low-grade leukocytosis  *urine culture grew Pseudomonas  *Was started on empiric cefepime on 10/24/2023.  However this lowers seizure threshold.  Discontinued and transition to IV ceftriaxone.  Patient is allergic to penicillin so cannot use Zosyn.  Meropenem also reduce the seizure threshold.  * MRSA swab negative. Sputum culture shows mixed christina  *Chest x-ray--> possible retrocardiac infiltrate  - Ceftriaxone stopped and Levaquin initiated per ID. Course completed on 10/30     Moderate malnutrition, status post PEG tube placement (10/27)   - TF discontinued per Intensivist team with transition to comfort cares      Hypertension: PTA on Nifedipine on MAR. Treated with Lasix IV then changed to 40mg PO BID on 10/26 - lasix stopped 10/30. Started on Atenolol 25mg daily on 10/30, discontinued 11/6  - No indication for antihypertensives under comfort cares, last /63      Anemia normocytic likely secondary to prolonged critical illness and frequent phlebotomy. Did require 1 U PRBCs on admission for Hgb 6 range which was attributed to rectal bleeding from mucosal injury post enema.      Recent Labs   Lab 11/08/23  1207 11/07/23  0418   HGB 7.9* 7.7*      Schizoaffective disorder  Tardive dyskinesia  Anxiety, depression,  - Not currently on medications for these      Hypokalemia, resolved: Noted while receiving diuresis   Hypophosphatemia, resolved  Thrombocytosis, resolved     DVT Prophylaxis: Comfort cares   Liu Catheter: Not present  Lines: PRESENT      PICC 09/30/23 Triple Lumen Left Basilic ok to use-Site Assessment: WDL      Cardiac Monitoring: None  Code Status: No CPR- Do NOT Intubate      Clinically Significant Risk Factors              # Hypoalbuminemia: Lowest albumin = 3.4 g/dL at 9/30/2023  5:15 AM, will monitor as appropriate      # Hypertension: Noted on problem list         # Moderate Malnutrition: based on nutrition assessment           Disposition Plan      Expected Discharge Date: 11/16/2023      Destination: other (comment) (unsure, please ask pt, MD, or care coordinator)  Discharge Comments: 9/28 ED admit.  Care conference 11/3 11:30            Janel Robles MD  Hospitalist Service  [facility]  Securely message with [employer] (more info)  Text page via [employer] Paging/Directory   ___________________________________________________________________    Interval History   Resting comfortably. No seizure activity appreciated. PEG and purewick in place.  Tube feeds have been stopped.     Physical Exam   Vital Signs:           Resp: 18   O2 Device: None (Room air)    Weight: 102 lbs 4.7 oz    CONSTITUTIONAL: Pt laying in bed, dressed in hospital garb. Appears comfortable. Unresponsive.   HEENT: Normocephalic, atraumatic. Trach in place.   RESPIRATORY: No increased work of breathing.    GASTROINTESTINAL:  Abdomen soft, non-distended.  NEUROLOGIC: Unresponsive.   SKIN: Warm, dry, intact.     Medical Decision Making       20 MINUTES SPENT BY ME on the date of service doing chart review, history, exam, documentation & further activities per the note.      Data         Imaging results reviewed over the past 24 hrs:   No results found for this or any previous visit (from the past 24 hour(s)).

## 2023-11-11 NOTE — PLAN OF CARE
SHIFT NOTE     [id number]    Orientation: ANN, pt obtunded and non-verbal.   Activity: Ax2 w/ lift, turn/reposition q2h  Diet/BS Checks: n/a, tube feeds discontinued. Not eating or drinking. Good oral cares preformed q2h.   Tele:  n/a  IV Access/Drains: L triple lumen PICC SL (blood return noted). G-tube clamped.   Pain Management: Absent of non-verbal indicators of pain per FLACC scale. RR 18-22  Abnormal VS/Results: deferred d/t comfort cares  Bowel/Bladder: Incontinent of B/B. Utilizing purewick w/ marginal UOP. Scant BM noted this AM.   Skin/Wounds: Excoriation and blanchable redness to coccyx (mepilex in place).   Consults: [facility] hospice  D/C Disposition: pending  Other Info: Pt has trach on room air w/ in-line suction.

## 2023-11-11 NOTE — PLAN OF CARE
Goal Outcome Evaluation:    Pt on comfort care, non responsive. Trach and G-tube in place. Turn and repo Q2hr. Will continue to monitor.

## 2023-11-11 NOTE — PLAN OF CARE
Goal Outcome Evaluation:       [id number]  Patient on comfort cares  Orientation: ANN- patient not able to answer orientation questions   Activity: A2 with lift  Diet/BS Checks: Tube feed discontinued due to comfort care status   Tele:  N/A   IV Access/Drains: R triple lumen PICC   Pain Management: PRN dilaudid given x1. Patient receives scheduled ativan   Abnormal VS/Results: assessments deferred due to comfort care status   Bowel/Bladder: incontinent- purewick in place. Little output. No BM this shift   Skin/Wounds: Blanchable redness to coccyx  Consults: [facility] hospice   Other Info: Patient has a trach and G tube. Patient still receiving seizure medications.

## 2023-11-11 NOTE — PROGRESS NOTES
Pt is on RA with trach HME device in place. Pt was suctioned once throughout the shift for a moderate amount of creamy thick secretions. Trach cares done.  11/10/2023  Bronwyn Ledesma RT

## 2023-11-11 NOTE — PLAN OF CARE
"Neurology Update:    Patient compassionately extubated. Transitioned comfort measures.     Stroke Neurology team will sign off. Please feel free to reach out with any further questions or concerns.    Sonia Vieds MD  Vascular Neurology Fellow     To page me or covering stroke neurology team member, click here: [employer]  Choose \"On Call\" tab at top, then select \"NEUROLOGY/ALL SITES\" from middle drop-down box, press Enter, then look for \"stroke\" or \"telestroke\" for your site.  "

## 2023-11-12 NOTE — PLAN OF CARE
Goal Outcome Evaluation:       [id number]  Orientation: ANN- patient obtunded   Activity: A2 with lift, t/r q2h  Diet/BS Checks: NPO  Tele:  N/A  IV Access/Drains: L triple lumen PICC  Pain Management: Dilaudid given x1  Abnormal VS/Results: assessments deferred d/t comfort care status  Bowel/Bladder: incontinent- purewick in place with little output  Skin/Wounds: mepi in place to sacrum for blanchable redness  Consults: [facility] hospice  D/C Disposition:   Other Info: G tube clamped. Trach present

## 2023-11-12 NOTE — PROGRESS NOTES
[facility]    Medicine Progress Note - Hospitalist Service    Date of Admission:  9/28/2023    Assessment & Plan   Kortney Germain is a 68-year-old female patient with past medical history significant for schizoaffective disorder, tardive dyskinesia, hypertension, anxiety, and depression.  She was initially admitted on 9/28/2023 for UTI, constipation, and anemia attributed to rectal bleeding from mucosal injury post enema (received 1 U PRBCs). She had decreased LOC and generalized shaking overnight 9/28/23. RRT called and pt was intubated 9/29/23 for encephalopathy and inability to protect her airway, subsequently diagnosed with refractory status epilepticus requiring multiple AEDs, high dose steroids, and IV versed with ongoing breakthrough seizure. She underwent trach and PEG placement throughout her course. She was transferred out of the ICU on 10/24/23, but ultimately returned to the ICU 11/5/23 for refractory seizure with need for ventilatory support and initiation of IV versed. Case further complicated by inability to locate next of kin which was ultimately found on 11/8. After reviewing the case with the Intensivist team, family elected to transition to comfort cares. Pt was compassionately extubated 11/9/23 evening. Hospitalist service was contacted 11/10/23 for assumption of care and transition out of the ICU     Comfort cares:   - [facility] hospice consulted-appears that still not evaluated by [facility] hospice.  Spoke with RN to contact them regarding consult.  - Transitioned off IV Versed, given 4mg Ativan X2 , now on scheduled Ativan 2 mg q4 hrs   - Continue IV Keppra 1500 mg BID, Vimpat 250 mg BID, Valproic acid 250 mg q8 hrs, Clobazam 20 mg BID for seizures   - Comfort medications available  - When patient passes, group home ([facility]) and Nephew (Ivan) need to be contacted     Hospital course  ?  Recurrence of status epilepticus versus persistent  Prolonged encephalopathy following  presentation of new onset refractory status epilepticus  Seizure disorder  *EEG 23: Abnormal due to the presences of generalized periodic pattern consisting of 2-3.5 hz discharges, maximum negativity in bifrontal region, periodic pattern occupy 90% of the record and the remaining portion consist of delta slowing     *CT Head : no acute intracranial pathology, brain atrophy,   *MRI 23: no acute intracranial process, generalized atrophy,   *MRI 10/11/23: no acute intracranial process, no hemorrhage, moderate-advanced brain atrophy and leukoaraiosis,   *MRI 10/20/23: no interval change   *BC: NG  *CSF analysis: glucose 98 protein 22.8, 2 nucleated cells, RBC 0, aerobic culture GPC in broth only anaerobic culture NGTD, HSV negative  *[place]armco encephalopathy autoimmune panel negative  *CT CAP  no evidence or concern for malignancy   *Ambien trial  negative   * transferred back to the ICU for initiation of versed drip and reintubation  *sEEG : 1-2Hz GPDs, per Dr Castaneda on ictal-interictal continuum ~ status epilepticus  *cEEG - : runs of GPEDs, 1-2 Hz alternating with generalized slowing; versed increased to 4 ml/hr ---> with improvement of GPEDS  *CSF : Basic Profile: 2 nucleated cells, Protein 20, Glucose 88. Gram stain: no organism, 1+ WBC. Meningitis/Encephalitis panel: negative  VZV, HSV PCR - negative. ENS2 Encephalopathy panel. CSF 14-3-3 pending. Flow Cytometry negative. EBV negative. CMV negative. WNV negative. Meningitis/encephalitis panel negative.  *Pending CSF studies: Anti-LG1 (this is associated with faciobrachial seizures, which pt has), anti-VGKC, anti-striational, anti-Hu, anti-Ma, anti-VGCC, enterovirus, bartonella, mycoplasma, T whippeli, 14-3-3. Clobazam metabolite  - Review Neurology notes for complete details regarding evaluation and treatment to date     Acute hypoxic respiratory failure secondary to CNS failure/loss of protective airway reflexes on  prolonged mechanical ventilatory support status post trach tube placement on 10/20/2023 by Dr. Benites  - Supplemental oxygen PRN for comfort      Possible hospital-acquired pneumonia-treated  Urinary tract infection, pseudomonas, treated  *low-grade temp of 99 on 10/23/2023 with low-grade leukocytosis  *urine culture grew Pseudomonas  *Was started on empiric cefepime on 10/24/2023.  However this lowers seizure threshold.  Discontinued and transition to IV ceftriaxone.  Patient is allergic to penicillin so cannot use Zosyn.  Meropenem also reduce the seizure threshold.  * MRSA swab negative. Sputum culture shows mixed christina  *Chest x-ray--> possible retrocardiac infiltrate  - Ceftriaxone stopped and Levaquin initiated per ID. Course completed on 10/30     Moderate malnutrition, status post PEG tube placement (10/27)   - TF discontinued per Intensivist team with transition to comfort cares      Hypertension: PTA on Nifedipine on MAR. Treated with Lasix IV then changed to 40mg PO BID on 10/26 - lasix stopped 10/30. Started on Atenolol 25mg daily on 10/30, discontinued 11/6  - No indication for antihypertensives under comfort cares, last /63      Anemia normocytic likely secondary to prolonged critical illness and frequent phlebotomy. Did require 1 U PRBCs on admission for Hgb 6 range which was attributed to rectal bleeding from mucosal injury post enema.      Recent Labs   Lab 11/08/23  1207 11/07/23  0418   HGB 7.9* 7.7*      Schizoaffective disorder  Tardive dyskinesia  Anxiety, depression,  - Not currently on medications for these      Hypokalemia, resolved: Noted while receiving diuresis   Hypophosphatemia, resolved  Thrombocytosis, resolved     DVT Prophylaxis: Comfort cares   Liu Catheter: Not present  Lines: PRESENT      PICC 09/30/23 Triple Lumen Left Basilic ok to use-Site Assessment: WDL      Cardiac Monitoring: None  Code Status: No CPR- Do NOT Intubate      Clinically Significant Risk Factors               # Hypoalbuminemia: Lowest albumin = 3.4 g/dL at 9/30/2023  5:15 AM, will monitor as appropriate     # Hypertension: Noted on problem list         # Moderate Malnutrition: based on nutrition assessment           Disposition Plan      Expected Discharge Date: 11/16/2023      Destination: other (comment) (unsure, please ask pt, MD, or care coordinator)  Discharge Comments: 9/28 ED admit.  Care conference 11/3 11:30            Janel Robles MD  Hospitalist Service  [facility]  Securely message with [employer] (more info)  Text page via [employer] Paging/Directory   ___________________________________________________________________    Interval History   Resting comfortably. No seizure activity appreciated.  Discussed with RN to reach out to [facility] hospice.    Physical Exam   Vital Signs:           Resp: 18   O2 Device: None (Room air)    Weight: 102 lbs 4.7 oz    CONSTITUTIONAL: Pt laying in bed, dressed in hospital garb. Appears comfortable. Unresponsive.   HEENT: Normocephalic, atraumatic. Trach in place.   RESPIRATORY: No increased work of breathing.    GASTROINTESTINAL:  Abdomen soft, non-distended.  NEUROLOGIC: Unresponsive.   SKIN: Warm, dry, intact.     Medical Decision Making       20 MINUTES SPENT BY ME on the date of service doing chart review, history, exam, documentation & further activities per the note.      Data         Imaging results reviewed over the past 24 hrs:   No results found for this or any previous visit (from the past 24 hour(s)).

## 2023-11-12 NOTE — PLAN OF CARE
SHIFT NOTE      5795-7417     Orientation: ANN, pt obtunded and non-verbal.   Activity: Ax2 w/ lift, turn/reposition q2h  Diet/BS Checks: n/a, tube feeds discontinued. Not eating or drinking. Good oral cares preformed q2h.   Tele:  n/a  IV Access/Drains: L triple lumen PICC SL (blood return noted). G-tube clamped.   Pain Management: Absent of non-verbal indicators of pain per FLACC scale.  Abnormal VS/Results: Full head-to-toe assessment and VS deferred d/t comfort cares  Bowel/Bladder: Incontinent of B/B. Utilizing purewick w/ marginal UOP. No BM noted this AM.   Skin/Wounds: Excoriation and blanchable redness to coccyx (mepilex in place).   Consults: [facility] hospice  D/C Disposition: pending  Other Info: Pt has trach on room air w/ in-line suction (suctioned x2 this shift).

## 2023-11-12 NOTE — PROGRESS NOTES
[id number]    Orientation: ANN, pt obtunded and non-verbal.   Activity: Ax2 w/ lift, turn/reposition q2h  Diet/BS Checks: NPO, perform oral cares  Tele:  n/a  IV Access/Drains: L triple lumen PICC SL (blood return noted). G-tube clamped.   Pain Management: IV dilaudid x 1 for nonverbal signs of pain  Abnormal VS/Results: deferred d/t comfort cares  Bowel/Bladder: No BM, purewick in place for incontinence  Skin/Wounds: Mepi in place to sacrum for blanchable redness  Consults: [facility]IP hospice consult pending   D/C Disposition: TBD  Other Info: Pt has trach on room air w/ in-line suction.     No acute concerns this shift. Scheduled meds given via PICC and Gtube. IV dilaudid x 1 for increased respiratory effort and nonverbal signs of pain. Continue comfort focused care.

## 2023-11-13 NOTE — PROGRESS NOTES
[facility]    Medicine Progress Note - Hospitalist Service    Date of Admission:  9/28/2023    Assessment & Plan   Kortney Germain is a 68-year-old female patient with past medical history significant for schizoaffective disorder, tardive dyskinesia, hypertension, anxiety, and depression.  She was initially admitted on 9/28/2023 for UTI, constipation, and anemia attributed to rectal bleeding from mucosal injury post enema (received 1 U PRBCs). She had decreased LOC and generalized shaking overnight 9/28/23. RRT called and pt was intubated 9/29/23 for encephalopathy and inability to protect her airway, subsequently diagnosed with refractory status epilepticus requiring multiple AEDs, high dose steroids, and IV versed with ongoing breakthrough seizure. She underwent trach and PEG placement throughout her course. She was transferred out of the ICU on 10/24/23, but ultimately returned to the ICU 11/5/23 for refractory seizure with need for ventilatory support and initiation of IV versed. Case further complicated by inability to locate next of kin which was ultimately found on 11/8. After reviewing the case with the Intensivist team, family elected to transition to comfort cares. Pt was compassionately extubated 11/9/23 evening. Hospitalist service was contacted 11/10/23 for assumption of care and transition out of the ICU       [facility] hospice consult placed     Comfort cares:   - [facility] hospice consulted-appears that still not evaluated by [facility] hospice.  Spoke with RN to contact them regarding consult.  - Transitioned off IV Versed, given 4mg Ativan X2 , now on scheduled Ativan 2 mg q4 hrs   - Continue IV Keppra 1500 mg BID, Vimpat 250 mg BID, Valproic acid 250 mg q8 hrs, Clobazam 20 mg BID for seizures   - Comfort medications available  - When patient passes, group home ([facility]) and Nephew (Ivan) need to be contacted     Hospital course  ?  Recurrence of status epilepticus versus  persistent  Prolonged encephalopathy following presentation of new onset refractory status epilepticus  Seizure disorder  *EEG 23: Abnormal due to the presences of generalized periodic pattern consisting of 2-3.5 hz discharges, maximum negativity in bifrontal region, periodic pattern occupy 90% of the record and the remaining portion consist of delta slowing     *CT Head : no acute intracranial pathology, brain atrophy,   *MRI 23: no acute intracranial process, generalized atrophy,   *MRI 10/11/23: no acute intracranial process, no hemorrhage, moderate-advanced brain atrophy and leukoaraiosis,   *MRI 10/20/23: no interval change   *BC: NG  *CSF analysis: glucose 98 protein 22.8, 2 nucleated cells, RBC 0, aerobic culture GPC in broth only anaerobic culture NGTD, HSV negative  *Craig encephalopathy autoimmune panel negative  *CT CAP  no evidence or concern for malignancy   *Ambien trial  negative   * transferred back to the ICU for initiation of versed drip and reintubation  *sEEG : 1-2Hz GPDs, per Dr Castaneda on ictal-interictal continuum ~ status epilepticus  *cEEG - : runs of GPEDs, 1-2 Hz alternating with generalized slowing; versed increased to 4 ml/hr ---> with improvement of GPEDS  *CSF : Basic Profile: 2 nucleated cells, Protein 20, Glucose 88. Gram stain: no organism, 1+ WBC. Meningitis/Encephalitis panel: negative  VZV, HSV PCR - negative. ENS2 Encephalopathy panel. CSF 14-3-3 pending. Flow Cytometry negative. EBV negative. CMV negative. WNV negative. Meningitis/encephalitis panel negative.  *Pending CSF studies: Anti-LG1 (this is associated with faciobrachial seizures, which pt has), anti-VGKC, anti-striational, anti-Hu, anti-Ma, anti-VGCC, enterovirus, bartonella, mycoplasma, T whippeli, 14-3-3. Clobazam metabolite  - Review Neurology notes for complete details regarding evaluation and treatment to date     Acute hypoxic respiratory failure secondary to CNS  failure/loss of protective airway reflexes on prolonged mechanical ventilatory support status post trach tube placement on 10/20/2023 by Dr. Benites  - Supplemental oxygen PRN for comfort      Possible hospital-acquired pneumonia-treated  Urinary tract infection, pseudomonas, treated  *low-grade temp of 99 on 10/23/2023 with low-grade leukocytosis  *urine culture grew Pseudomonas  *Was started on empiric cefepime on 10/24/2023.  However this lowers seizure threshold.  Discontinued and transition to IV ceftriaxone.  Patient is allergic to penicillin so cannot use Zosyn.  Meropenem also reduce the seizure threshold.  * MRSA swab negative. Sputum culture shows mixed christina  *Chest x-ray--> possible retrocardiac infiltrate  - Ceftriaxone stopped and Levaquin initiated per ID. Course completed on 10/30     Moderate malnutrition, status post PEG tube placement (10/27)   - TF discontinued per Intensivist team with transition to comfort cares      Hypertension: PTA on Nifedipine on MAR. Treated with Lasix IV then changed to 40mg PO BID on 10/26 - lasix stopped 10/30. Started on Atenolol 25mg daily on 10/30, discontinued 11/6  - No indication for antihypertensives under comfort cares, last /63      Anemia normocytic likely secondary to prolonged critical illness and frequent phlebotomy. Did require 1 U PRBCs on admission for Hgb 6 range which was attributed to rectal bleeding from mucosal injury post enema.      Recent Labs   Lab 11/08/23  1207 11/07/23  0418   HGB 7.9* 7.7*      Schizoaffective disorder  Tardive dyskinesia  Anxiety, depression,  - Not currently on medications for these      Hypokalemia, resolved: Noted while receiving diuresis   Hypophosphatemia, resolved  Thrombocytosis, resolved     DVT Prophylaxis: Comfort cares   Liu Catheter: Not present  Lines: PRESENT      PICC 09/30/23 Triple Lumen Left Basilic ok to use-Site Assessment: WDL      Cardiac Monitoring: None  Code Status: No CPR- Do NOT Intubate       Clinically Significant Risk Factors              # Hypoalbuminemia: Lowest albumin = 3.4 g/dL at 9/30/2023  5:15 AM, will monitor as appropriate     # Hypertension: Noted on problem list         # Moderate Malnutrition: based on nutrition assessment           Disposition Plan      Expected Discharge Date: 11/16/2023      Destination: other (comment) (unsure, please ask pt, MD, or care coordinator)  Discharge Comments: 9/28 ED admit.  Care conference 11/3 11:30            Chitra Lagos MD  Hospitalist Service  [facility]  Securely message with [employer] (more info)  Text page via [facility] Paging/Directory   ___________________________________________________________________    Interval History     Resting comfortably. No seizure activity appreciated.  GIP consult pending     Physical Exam   Vital Signs:           Resp: 24   O2 Device: None (Room air)    Weight: 102 lbs 4.7 oz    CONSTITUTIONAL: Pt laying in bed, dressed in hospital garb. Appears comfortable. Unresponsive.   HEENT: Normocephalic, atraumatic. Trach in place.   RESPIRATORY: No increased work of breathing.    GASTROINTESTINAL:  Abdomen soft, non-distended.  NEUROLOGIC: Unresponsive.   SKIN: Warm, dry, intact.     Medical Decision Making       45  MINUTES SPENT BY ME on the date of service doing chart review, history, exam, documentation & further activities per the note.      Data         Imaging results reviewed over the past 24 hrs:   No results found for this or any previous visit (from the past 24 hour(s)).

## 2023-11-13 NOTE — PLAN OF CARE
SHIFT NOTE      [id number]     Orientation: ANN, pt obtunded and non-verbal.   Activity: Ax2 w/ lift, turn/reposition q2h  Diet/BS Checks: n/a, tube feeds discontinued. Not eating or drinking. Good oral cares preformed q2h.   Tele:  n/a  IV Access/Drains: L triple lumen PICC SL (blood return noted); G-tube clamped.   Pain Management: Absent of non-verbal indicators of pain per FLACC scale.  Abnormal VS/Results: Full head-to-toe assessment and VS deferred d/t comfort cares  Bowel/Bladder: Incontinent of B/B. Utilizing purewick w/ marginal UOP. No BM noted this shift.  Skin/Wounds: Excoriation and blanchable redness to coccyx (mepilex in place).   Consults: [facility] hospice  D/C Disposition: pending  Other Info: Pt has trach on room air w/ in-line suction (suctioned x2 this shift).

## 2023-11-13 NOTE — PROGRESS NOTES
"Care Management Follow Up    Length of Stay (days): 46    Expected Discharge Date: 11/16/2023     Concerns to be Addressed: other (see comments) (unknown to my knowledge, please contact MD, patient, or care coordinator for the answer to this question)     Patient plan of care discussed at interdisciplinary rounds: Yes    Anticipated Discharge Disposition: [facility]     Anticipated Discharge Services: Transportation Services  Anticipated Discharge DME:      Patient/family educated on Medicare website which has current facility and service quality ratings: other (see comments) (this is not within my scope of practice)  Education Provided on the Discharge Plan:    Patient/Family in Agreement with the Plan:      Referrals Placed by CM/SW: Community Residential Settings (Group Homes)  Private pay costs discussed: Not applicable    Additional Information:  Writer reviewed chart. GIP Attempted to call the brother Marshal and per encounters \"AC CALLED BROTHER MARSHAL/DAVID TO F.U REFERRAL. HE LIVES IN [place] [facility] AND HAS NOT BEEN TO [place] IN YEARS. HE SAID HE HAS NOT SEEN ANGELI IN OVER 40 YEARS. HE WAS VERY CONFUSED ON WHAT WAS GOING ON AND WHY WE WERE CALLING. HE HAS BEEN TALKING WITH PEOPLE AT THE HOSPITAL AND HE SAID THE MOST HE KNEW WAS THAT SHE IS UNRESPONSIVE AND IN A COMA \"I WAS TOLD SHES GOING TO DIE THAT WAY\" AC EXPLAINED THE INPATIENT REFERRAL AND HOW HOSPICE COULD HELP AND HE SAID HES NOT SURE WHAT WED BE ABLE TO DO FOR HER SINCE SHES IN A COMA AND DOES NOT HAVE MUCH TIME LEFT. MARCIAL EXPLAINED WHAT THE NEXT STEPS WOULD BE BUT HE WAS STILL CONFUSED AND SAID \"IM NOT GOING TO MAKE ANY PROMISES RIGHT NOW\" HE HIMSELF WILL BE IN THE HOSPITAL NEXT WEEK. AC OFFERED TO DO A PHONE IH AND HE SAID HE HAS CONTACTS AT THE HOSPITAL AND WILL GET IN TOUCH WITH THEM TO FIND MORE OUT. AC OFFERED TO HAVE SOMEONE CLINICAL GIVE HIM A CALL BUT HE DECLINED FOR NOW. AC GAVE HIM DIRECT NUMBER TO CALL IF HE DECIDES TO MOVE FORWARD. \"    Writer received call from " patients nephew Ivan. Ivan just asked for an update as he was under the impression patient would not have made it through the weekend. Ivan states that he is the primary contact and he spoke with Marshal who is having surgery next week. Marshal told him that it is up to Ivan to figure out next steps for patient as he is unable to help with them. Ivan is doing a lot of coordination with the Group Home and Case Manger to figure out a plan. Writer let Ivan know we may come to a point that we can look at patient potentially being discharged with hospice. Ivan is okay with whatever is best for patient at this point. Writer let him know we would be in touch. Writer reached out to patients  [facility] to discuss further. Emely states that she will run this by her manager to see if they can make it work but also need to think about the other residents. Writer understood but wanted to look at all possible options for the patient at this time .    Addendum 1535: Writer talked with [facility] and the home that patient was currently in only has 1 staff so they do not feel as if it would be an appropriate place for patient. Writer talked with  Supervisor, in case patient is stable for discharge to the community on hospice, writer sent referrals.       BABATUNDE Rebolledo

## 2023-11-13 NOTE — PROGRESS NOTES
"GIP REFERRAL CAME IN VIA EPIC AT 1003 AM ON 11.10 FROM [facility]. PT AT [facility] IN BED I363-01.- AC CALLED BROTHER LEONA TO F.U REFERRAL. HE LIVES IN [facility] AND [place] NOT BEEN TO [place] IN YEARS. HE SAID HE HAS NOT SEEN AN[place]ELI IN OVER 40 YEARS. HE WAS VERY CONFUSED ON WHAT WAS GOING ON AND WHY WE WERE CALLING. HE HAS BEEN TALKING WITH PEOPLE AT THE HOSPITAL AND HE SAID THE MOST HE KNEW WAS THAT SHE IS UNRESPONSIVE AND IN A COMA \"I WAS TOLD SHES GOING TO DIE THAT WAY\" MARCIAL EXPLAINED THE INPATIENT REFERRAL AND HOW HOSPICE COULD HELP AND HE SAID HES NOT SURE WHAT WED BE ABLE TO DO FOR HER SINCE SHES IN A COMA AND DOES NOT HAVE MUCH TIME LEFT. MARCIAL EXPLAINED WHAT THE NEXT STEPS WOULD BE BUT HE WAS STILL CONFUSED AND SAID \"IM NOT GOING TO MAKE ANY PROMISES RIGHT NOW\" HE HIMSELF WILL BE IN THE HOSPITAL NEXT WEEK. AC OFFERED TO DO A PHONE IH AND HE SAID HE HAS CONTACTS AT THE HOSPITAL AND WILL GET IN TOUCH WITH THEM TO FIND MORE OUT. AC OFFERED TO HAVE SOMEONE CLINICAL GIVE HIM A CALL BUT HE DECLINED FOR NOW. AC GAVE HIM DIRECT NUMBER TO CALL IF HE DECIDES TO MOVE FORWARD.  "

## 2023-11-13 NOTE — PROGRESS NOTES
SHIFT NOTE    1500-1900h     Orientation: ANN, pt obtunded and non-verbal.   Activity: Ax2 w/ lift, turn/reposition q2h. Comfort cares  Diet/BS Checks: n/a, tube feeds discontinued. Not eating or drinking. Good oral cares preformed q2h.   Tele:  n/a  IV Access/Drains: L triple lumen PICC SL (blood return noted). G-tube clamped.   Pain Management: Absent of non-verbal indicators of pain per FLACC scale.  Abnormal VS/Results: Full head-to-toe assessment and VS deferred d/t comfort cares  Bowel/Bladder: Incontinent of B/B. Utilizing purewick w/ marginal UOP. No BM on 11/11  Skin/Wounds: Excoriation and blanchable redness to coccyx (mepilex in place), CDI.  Consults: [facility] hospice  D/C Disposition: pending, Hospice rec  Other Info: Pt has trach on room air w/ in-line suction.

## 2023-11-13 NOTE — PLAN OF CARE
Goal Outcome Evaluation:    [id number]    Orientation: ANN, pt obtunded   Activity: Assist x2 w/ lift. T/R q2h  Diet/BS Checks: NPO, Oral care q2h  Tele:  n/a  IV Access/Drains: Left triple lumen PICC SL and G-tube clamped.   Pain Management: ANN, used rFlaac scale to determine pain.   Abnormal VS/Results: deferred d/t comfort care  Bowel/Bladder: incontinent of b/b. Purewick in place. No BM during shift.   Skin/Wounds: Mepilex in place to sacrum.   Consults: [facility] hospice.  D/C Disposition: pending [facility] hospice.   Other Info: Trach present w/ intermittent suctioning throughout shift.

## 2023-11-13 NOTE — PROGRESS NOTES
Chart reviewed  Note pt has transitioned to comfort cares  EN has been discontinued  Not taking anything orally   Will be available if needed    Anna Walker RD, LD  Clinical Dietitian - [facility]   Pager - [phone]

## 2023-11-14 NOTE — PROGRESS NOTES
Care Management Follow Up    Length of Stay (days): 47    Expected Discharge Date: 11/15/2023     Concerns to be Addressed: other (see comments) (unknown to my knowledge, please contact MD, patient, or care coordinator for the answer to this question)     Patient plan of care discussed at interdisciplinary rounds: Yes    Anticipated Discharge Disposition: LTACH     Anticipated Discharge Services: Transportation Services  Anticipated Discharge DME:      Patient/family educated on Medicare website which has current facility and service quality ratings: other (see comments) (this is not within my scope of practice)  Education Provided on the Discharge Plan:    Patient/Family in Agreement with the Plan:      Referrals Placed by CM/SW: [facility]  Private pay costs discussed: Not applicable    Additional Information:  Writer sent additional referrals for LTC with hospice     BABATUNDE Rebolledo

## 2023-11-14 NOTE — PLAN OF CARE
Orientation/Cognitive: ANN, pt is obtunded   Mobility Level/Assist Equipment: Ax2, lift   Fall Risk (Y/N): yes  Behavior Concerns: none  Pain Management: no pain per FLACCs  Tele/VS/O2: Comfort cares  ABNL Lab/BG: N/A  Diet: NPO, pt unable to swallow anything orally  Bowel/Bladder: incontinent, purwick in place with small output  Skin Concerns: G tube site CDI,+1 edema in bilateral hands and feet  Drains/Devices: Right PICC SL, trach, G tube  Tests/Procedures for next shift: [facility] hosp[facility] following, SW following  Anticipated DC date & active delays: tbd

## 2023-11-14 NOTE — PROGRESS NOTES
[facility]    Medicine Progress Note - Hospitalist Service    Date of Admission:  9/28/2023    Assessment & Plan   Kortney Germain is a 68-year-old female patient with past medical history significant for schizoaffective disorder, tardive dyskinesia, hypertension, anxiety, and depression.  She was initially admitted on 9/28/2023 for UTI, constipation, and anemia attributed to rectal bleeding from mucosal injury post enema (received 1 U PRBCs). She had decreased LOC and generalized shaking overnight 9/28/23. RRT called and pt was intubated 9/29/23 for encephalopathy and inability to protect her airway, subsequently diagnosed with refractory status epilepticus requiring multiple AEDs, high dose steroids, and IV versed with ongoing breakthrough seizure. She underwent trach and PEG placement throughout her course. She was transferred out of the ICU on 10/24/23, but ultimately returned to the ICU 11/5/23 for refractory seizure with need for ventilatory support and initiation of IV versed. Case further complicated by inability to locate next of kin which was ultimately found on 11/8. After reviewing the case with the Intensivist team, family elected to transition to comfort cares. Pt was compassionately extubated 11/9/23 evening. Hospitalist service was contacted 11/10/23 for assumption of care and transition out of the ICU       We are looking at discharge to a care facility with hospice care on discharge.  Discussed with  and bedside RN today    No change in patient's mental status she remains unresponsive and obtunded currently    Comfort cares:   - [facility] consulted-appears that still not evaluated by [facility] hospice.  Spoke with RN to contact them regarding consult.  - Transitioned off IV Versed, given 4mg Ativan X2 , now on scheduled Ativan 2 mg q4 hrs   - Continue IV Keppra 1500 mg BID, Vimpat 250 mg BID, Valproic acid 250 mg q8 hrs, Clobazam 20 mg BID for seizures   -  Comfort medications available  - When patient passes, group home ([facility]) and Nephew (Ivan) need to be contacted     Hospital course  ?  Recurrence of status epilepticus versus persistent  Prolonged encephalopathy following presentation of new onset refractory status epilepticus  Seizure disorder  *EEG 23: Abnormal due to the presences of generalized periodic pattern consisting of 2-3.5 hz discharges, maximum negativity in bifrontal region, periodic pattern occupy 90% of the record and the remaining portion consist of delta slowing     *CT Head : no acute intracranial pathology, brain atrophy,   *MRI 23: no acute intracranial process, generalized atrophy,   *MRI 10/11/23: no acute intracranial process, no hemorrhage, moderate-advanced brain atrophy and leukoaraiosis,   *MRI 10/20/23: no interval change   *BC: NG  *CSF analysis: glucose 98 protein 22.8, 2 nucleated cells, RBC 0, aerobic culture GPC in broth only anaerobic culture NGTD, HSV negative  *[place] encephalopathy autoimmune panel negative  *CT CAP  no evidence or concern for malignancy   *Ambien trial  negative   * transferred back to the ICU for initiation of versed drip and reintubation  *sEEG : 1-2Hz GPDs, per Dr Castaneda on ictal-interictal continuum ~ status epilepticus  *cEEG - : runs of GPEDs, 1-2 Hz alternating with generalized slowing; versed increased to 4 ml/hr ---> with improvement of GPEDS  *CSF : Basic Profile: 2 nucleated cells, Protein 20, Glucose 88. Gram stain: no organism, 1+ WBC. Meningitis/Encephalitis panel: negative  VZV, HSV PCR - negative. ENS2 Encephalopathy panel. CSF 14-3-3 pending. Flow Cytometry negative. EBV negative. CMV negative. WNV negative. Meningitis/encephalitis panel negative.  *Pending CSF studies: Anti-LG1 (this is associated with faciobrachial seizures, which pt has), anti-VGKC, anti-striational, anti-Hu, anti-Ma, anti-VGCC, enterovirus, bartonella, mycoplasma, T whippeli,  14-3-3. Clobazam metabolite  - Review Neurology notes for complete details regarding evaluation and treatment to date     Acute hypoxic respiratory failure secondary to CNS failure/loss of protective airway reflexes on prolonged mechanical ventilatory support status post trach tube placement on 10/20/2023 by Dr. Benites  - Supplemental oxygen PRN for comfort      Possible hospital-acquired pneumonia-treated  Urinary tract infection, pseudomonas, treated  *low-grade temp of 99 on 10/23/2023 with low-grade leukocytosis  *urine culture grew Pseudomonas  *Was started on empiric cefepime on 10/24/2023.  However this lowers seizure threshold.  Discontinued and transition to IV ceftriaxone.  Patient is allergic to penicillin so cannot use Zosyn.  Meropenem also reduce the seizure threshold.  * MRSA swab negative. Sputum culture shows mixed christina  *Chest x-ray--> possible retrocardiac infiltrate  - Ceftriaxone stopped and Levaquin initiated per ID. Course completed on 10/30     Moderate malnutrition, status post PEG tube placement (10/27)   - TF discontinued per Intensivist team with transition to comfort cares      Hypertension: PTA on Nifedipine on MAR. Treated with Lasix IV then changed to 40mg PO BID on 10/26 - lasix stopped 10/30. Started on Atenolol 25mg daily on 10/30, discontinued 11/6  - No indication for antihypertensives under comfort cares, last /63      Anemia normocytic likely secondary to prolonged critical illness and frequent phlebotomy. Did require 1 U PRBCs on admission for Hgb 6 range which was attributed to rectal bleeding from mucosal injury post enema.      Recent Labs   Lab 11/08/23  1207   HGB 7.9*      Schizoaffective disorder  Tardive dyskinesia  Anxiety, depression,  - Not currently on medications for these      Hypokalemia, resolved: Noted while receiving diuresis   Hypophosphatemia, resolved  Thrombocytosis, resolved     DVT Prophylaxis: Comfort cares   Liu Catheter: Not present  Lines:  PRESENT      PICC 09/30/23 Triple Lumen Left Basilic ok to use-Site Assessment: WDL      Cardiac Monitoring: None  Code Status: No CPR- Do NOT Intubate      Clinically Significant Risk Factors              # Hypoalbuminemia: Lowest albumin = 3.4 g/dL at 9/30/2023  5:15 AM, will monitor as appropriate     # Hypertension: Noted on problem list         # Moderate Malnutrition: based on nutrition assessment           Disposition Plan      Expected Discharge Date: 11/15/2023      Destination: other (comment) (unsure, please ask pt, MD, or care coordinator)  Discharge Comments: 9/28 ED admit.  Care conference 11/3 11:30        Discharge to a care facility with hospice care    Chitra Lagos MD  Hospitalist Service  [facility]  Securely message with [employer] (more info)  Text page via [facility] [facility] Paging/Directory   ___________________________________________________________________    Interval History     Resting comfortably. No seizure activity appreciated    Physical Exam   Vital Signs:                    Weight: 102 lbs 4.7 oz    CONSTITUTIONAL: Pt laying in bed, dressed in hospital garb. Appears comfortable. Unresponsive.   HEENT: Normocephalic, atraumatic. Trach in place.   RESPIRATORY: No increased work of breathing.    GASTROINTESTINAL:  Abdomen soft, non-distended.  NEUROLOGIC: Unresponsive.   SKIN: Warm, dry, intact.     Medical Decision Making       45  MINUTES SPENT BY ME on the date of service doing chart review, history, exam, documentation & further activities per the note.      Data         Imaging results reviewed over the past 24 hrs:   No results found for this or any previous visit (from the past 24 hour(s)).

## 2023-11-14 NOTE — PLAN OF CARE
Goal Outcome Evaluation:    [id number]    Orientation: ANN, obtunded.   Activity: Ax2 w/ lift sheet, T/R Q2hrs  Diet/BS Checks: NPO   Tele:  n/a  IV Access/Drains: G tube in place clamped, triple lumen PICC line SL.   Pain Management: ANN, pain per rFLAAC scale   Abnormal VS/Results: deferred d/t comfort care  Bowel/Bladder: Incontinent of B/B. Purewick in place, no BM during shift.  Skin/Wounds: blanchable redness on coccyx. Mepilex in place.   Consults: [facility].   D/C Disposition: Pending possible hospice placement.   Other Info: Trach on room air with in line suction. Intermittently suctioned throughout shift.

## 2023-11-15 NOTE — PROGRESS NOTES
[facility]    Medicine Progress Note - Hospitalist Service    Date of Admission:  9/28/2023    Assessment & Plan   Kortney Germain is a 68-year-old female patient with past medical history significant for schizoaffective disorder, tardive dyskinesia, hypertension, anxiety, and depression.  She was initially admitted on 9/28/2023 for UTI, constipation, and anemia attributed to rectal bleeding from mucosal injury post enema (received 1 U PRBCs). She had decreased LOC and generalized shaking overnight 9/28/23. RRT called and pt was intubated 9/29/23 for encephalopathy and inability to protect her airway, subsequently diagnosed with refractory status epilepticus requiring multiple AEDs, high dose steroids, and IV versed with ongoing breakthrough seizure. She underwent trach and PEG placement throughout her course. She was transferred out of the ICU on 10/24/23, but ultimately returned to the ICU 11/5/23 for refractory seizure with need for ventilatory support and initiation of IV versed. Case further complicated by inability to locate next of kin which was ultimately found on 11/8. After reviewing the case with the Intensivist team, family elected to transition to comfort cares. Pt was compassionately extubated 11/9/23 evening. Hospitalist service was contacted 11/10/23 for assumption of care and transition out of the ICU       We are looking at possible GIP admission to [facility] hospice team    No change in patient's mental status she remains unresponsive and obtunded currently    Comfort cares:   - [facility] hospice consulted-appears that still not evaluated by [facility] hospice.  Spoke with RN to contact them regarding consult.  - Transitioned off IV Versed, given 4mg Ativan X2 , now on scheduled Ativan 2 mg q4 hrs   - Continue IV Keppra 1500 mg BID, Vimpat 250 mg BID, Valproic acid 250 mg q8 hrs, Clobazam 20 mg BID for seizures   - Comfort medications available  - When patient passes, group home ([facility]  and Nephew (Ivan) need to be contacted     Hospital course  ?  Recurrence of status epilepticus versus persistent  Prolonged encephalopathy following presentation of new onset refractory status epilepticus  Seizure disorder  *EEG 23: Abnormal due to the presences of generalized periodic pattern consisting of 2-3.5 hz discharges, maximum negativity in bifrontal region, periodic pattern occupy 90% of the record and the remaining portion consist of delta slowing     *CT Head : no acute intracranial pathology, brain atrophy,   *MRI 23: no acute intracranial process, generalized atrophy,   *MRI 10/11/23: no acute intracranial process, no hemorrhage, moderate-advanced brain atrophy and leukoaraiosis,   *MRI 10/20/23: no interval change   *BC: NG  *CSF analysis: glucose 98 protein 22.8, 2 nucleated cells, RBC 0, aerobic culture GPC in broth only anaerobic culture NGTD, HSV negative  *[place] encephalopathy autoimmune panel negative  *CT CAP  no evidence or concern for malignancy   *Ambien trial  negative   * transferred back to the ICU for initiation of versed drip and reintubation  *sEEG : 1-2Hz GPDs, per Dr Castaneda on ictal-interictal continuum ~ status epilepticus  *cEEG - : runs of GPEDs, 1-2 Hz alternating with generalized slowing; versed increased to 4 ml/hr ---> with improvement of GPEDS  *CSF : Basic Profile: 2 nucleated cells, Protein 20, Glucose 88. Gram stain: no organism, 1+ WBC. Meningitis/Encephalitis panel: negative  VZV, HSV PCR - negative. ENS2 Encephalopathy panel. CSF 14-3-3 pending. Flow Cytometry negative. EBV negative. CMV negative. WNV negative. Meningitis/encephalitis panel negative.  *Pending CSF studies: Anti-LG1 (this is associated with faciobrachial seizures, which pt has), anti-VGKC, anti-striational, anti-Hu, anti-Ma, anti-VGCC, enterovirus, bartonella, mycoplasma, T whippeli, 14-3-3. Clobazam metabolite  - Review Neurology notes for complete details  "regarding evaluation and treatment to date     Acute hypoxic respiratory failure secondary to CNS failure/loss of protective airway reflexes on prolonged mechanical ventilatory support status post trach tube placement on 10/20/2023 by Dr. Benites  - Supplemental oxygen PRN for comfort      Possible hospital-acquired pneumonia-treated  Urinary tract infection, pseudomonas, treated  *low-grade temp of 99 on 10/23/2023 with low-grade leukocytosis  *urine culture grew Pseudomonas  *Was started on empiric cefepime on 10/24/2023.  However this lowers seizure threshold.  Discontinued and transition to IV ceftriaxone.  Patient is allergic to penicillin so cannot use Zosyn.  Meropenem also reduce the seizure threshold.  * MRSA swab negative. Sputum culture shows mixed christina  *Chest x-ray--> possible retrocardiac infiltrate  - Ceftriaxone stopped and Levaquin initiated per ID. Course completed on 10/30     Moderate malnutrition, status post PEG tube placement (10/27)   - TF discontinued per Intensivist team with transition to comfort cares      Hypertension: PTA on Nifedipine on MAR. Treated with Lasix IV then changed to 40mg PO BID on 10/26 - lasix stopped 10/30. Started on Atenolol 25mg daily on 10/30, discontinued 11/6  - No indication for antihypertensives under comfort cares, last /63      Anemia normocytic likely secondary to prolonged critical illness and frequent phlebotomy. Did require 1 U PRBCs on admission for Hgb 6 range which was attributed to rectal bleeding from mucosal injury post enema.      No results for input(s): \"HGB\" in the last 168 hours.     Schizoaffective disorder  Tardive dyskinesia  Anxiety, depression,  - Not currently on medications for these      Hypokalemia, resolved: Noted while receiving diuresis   Hypophosphatemia, resolved  Thrombocytosis, resolved     DVT Prophylaxis: Comfort cares   Liu Catheter: Not present  Lines: PRESENT      PICC 09/30/23 Triple Lumen Left Basilic ok to " use-Site Assessment: WDL      Cardiac Monitoring: None  Code Status: No CPR- Do NOT Intubate      Clinically Significant Risk Factors              # Hypoalbuminemia: Lowest albumin = 3.4 g/dL at 9/30/2023  5:15 AM, will monitor as appropriate     # Hypertension: Noted on problem list         # Moderate Malnutrition: based on nutrition assessment           Disposition Plan      Expected Discharge Date: 11/22/2023    Discharge Delays: Comfort Care/Hospice  Destination: other (comment) (unsure, please ask pt, MD, or care coordinator)  Discharge Comments: 9/28 ED admit.  Care conference 11/3 11:30        Discharge to [facility] hospice team when family could sign her [facility] admission paperwork    Chitra Lagos MD  Hospitalist Service  [facility]  Securely message with [employer] (more info)  Text page via [employer] Paging/Directory   ___________________________________________________________________    Interval History     Resting comfortably. No seizure activity appreciated    Physical Exam   Vital Signs:           Resp: 20        Weight: 102 lbs 4.7 oz    CONSTITUTIONAL: Pt laying in bed, dressed in hospital garb. Appears comfortable. Unresponsive.   HEENT: Normocephalic, atraumatic. Trach in place.   RESPIRATORY: No increased work of breathing.    GASTROINTESTINAL:  Abdomen soft, non-distended.  NEUROLOGIC: Unresponsive.   SKIN: Warm, dry, intact.     Medical Decision Making       45  MINUTES SPENT BY ME on the date of service doing chart review, history, exam, documentation & further activities per the note.      Data         Imaging results reviewed over the past 24 hrs:   No results found for this or any previous visit (from the past 24 hour(s)).

## 2023-11-15 NOTE — PROGRESS NOTES
Shift Summary [id number]    Admitting Diagnosis: Urinary retention. Lower GI bleed.  Acute cystitis without hematuria. Constipation, unspecified constipation type.  Anemia, unspecified type.    Pt on Comfort care. Unable to assess orientation, Pt is obtunded. ANN, pain scale, but On schedule comfort medications. PICC on left upper arm with triple Lumen, no blood returned noted SL. Peg in place clamped. Trach on room air with in line suction,  Intermittently(PRN) suctioning, was changed per RRT. Small BM on this shift, Pure-wick in place with minimum output. NPO. T/R Q2hrs. Pending Hospice placement. Will continue to monitor.

## 2023-11-15 NOTE — PLAN OF CARE
SHIFT NOTE      [id number]     Orientation: ANN, pt obtunded and non-verbal.   Activity: Ax2 w/ lift, turn/reposition q2h  Diet/BS Checks: n/a, tube feeds discontinued. Not eating or drinking. Good oral cares preformed q2h.   Tele:  n/a  IV Access/Drains: L triple lumen PICC SL (no blood return noted); G-tube clamped.   Pain Management: Absent of non-verbal indicators of pain per FLACC scale.  Abnormal VS/Results: Full head-to-toe assessment and VS deferred d/t comfort cares  Bowel/Bladder: Incontinent of B/B. Utilizing purewick w/ marginal UOP. No BM noted this shift.  Skin/Wounds: Excoriation and blanchable redness to coccyx (mepilex in place).   Consults: [facility] hosp[facility]  D/C Disposition: pending  Other Info: Pt has trach on room air w/ in-line suction (suctioned x2 this shift)

## 2023-11-15 NOTE — PROGRESS NOTES
Care Management Follow Up    Length of Stay (days): 48    Expected Discharge Date: 11/22/2023     Concerns to be Addressed: other (see comments) (unknown to my knowledge, please contact MD, patient, or care coordinator for the answer to this question)     Patient plan of care discussed at interdisciplinary rounds: Yes    Anticipated Discharge Disposition: Hospice     Anticipated Discharge Services: Transportation Services  Anticipated Discharge DME:      Patient/family educated on Medicare website which has current facility and service quality ratings: other (see comments) (this is not within my scope of practice)  Education Provided on the Discharge Plan:    Patient/Family in Agreement with the Plan:      Referrals Placed by CM/SW: [facility] ([facility])  Private pay costs discussed:  NA     Additional Information:  Writer talked with Ivan about the possibility of [facility] admitting patient. Ivan is in agreement with anything for Jeimy and making her comfortable and happy and loved. Ivan is willing to sign her on if [facility] reaches out to him    BABATUNDE Rebolledo

## 2023-11-15 NOTE — PLAN OF CARE
Goal Outcome Evaluation:    Orientation: ANN, unresponsive   Activity: Bedrest, A2 lift  Diet/BS Checks: NPO  Tele: N/A  IV Access/Drains: Triple lumen PICC, no blood return noted, trach, peg tube  Pain Management: rFLACC 0, nonverbal indicators of pain negative, PRN dilaudid available   Abnormal VS/Results: Deferred  Bowel/Bladder: Incont of b/b, PW in place  Skin/Wounds:   Consults: [facility], SW  D/C Disposition: Pending placement     Other Info:   -Comfort cares

## 2023-11-16 NOTE — PLAN OF CARE
Goal Outcome Evaluation:    Orientation: ANN,pt non verbal  Activity: ASX2 reposition q2hr  Diet/BS Checks: NPO , oral cares performed  Tele:  n/a  IV Access/Drains: L triple lumen PICC SL   Pain Management: Absent of non-verbal indicators of pain per FLACC scale.  Abnormal VS/Results: n/a  Bowel/Bladder: Incontinent of B/B. Utilizing purewick w/ marginal UOP. No BM noted this shift.  Skin/Wounds: Excoriation and blanchable redness to coccyx (mepilex in place).   Consults: [facility] hosp[facility]  D/C Disposition: pending  Other Info: pt has trach and NG tube.

## 2023-11-16 NOTE — PLAN OF CARE
Goal Outcome Evaluation:    Orientation: ANN, unresponsive, non-verbal   Activity: Bedrest, A2 lift, T/R  Diet/BS Checks: NPO  Tele: N/A  IV Access/Drains: Triple lumen PICC, blood return noted on 2/3 lumens, peg tube, trach, suction as needed  Pain Management: rFLACC 0, no nonverbal indicators of pain, PRN dilaudid available   Abnormal VS/Results: Deferred  Bowel/Bladder: Incont of b/b, PW in place  Skin/Wounds: Excoriation and blanchable redness to coccyx (mepilex in place)  Consults: [facility] hosp[facility], SW  D/C Disposition: Pending placement      Other Info:   -Comfort cares

## 2023-11-16 NOTE — CONSULTS
[facility]    Consult Note - [facility]  _________________________________________________________________    [facility] 24/7 Contact Number: [phone]    - Providers: Please contact [facility] with changes in orders or clinical plan of care   - Nursing: Please contact [facility] with significant changes in patient condition    Hospice will notify the care team (including the hospitalist) to confirm date of inpatient hospice (GIP) admission.    New Epic encounter will not be created until hospice completes admission.   ______________________________________________________________________        Hospice Diagnosis: seizure    Indication for Inpatient Hospice: new onset seizure, suctioning, IV medications    Goals for Hospital Discharge: symptom management on oral medications (acknowledge this is unlikely)    Plan of Care Discussed with the Following:   - Nurse: Roxi Collazo RN  - Hospitalist/Rounding Provider: Dr. Rian Abdullahi (on call for patient placement)    - Hospice Provider: Dr. Ines Sandoval    Summary of Visit (includes assessment, medications and any new orders):   Assessed Kortney in her hospital room in [facility] room 812. Used one PRN of robinul IV 0.2mg in last 24 hours. Appears comfortable sleeping in bed. Not responsive to voice or touch.   Called Dr. Ines Sandoval, ok to admit to General In Patient hospice (GIP) per above info. No changes necessary today. Would attempt to change to oral mediations if able to tolerate and still manage her symptoms for discharge to lower level of care. Likely we will manage her care at the hospital through end of life.   Called Dr. Rian Abdullahi, on call for patient placement. Explained need to discharge and readmit under a new encounter for GIP. He called back and asked that this be done in the day, no evening when they are very busy. We agreed to this plan.   Kortney will be admitted to [facility]  tonight but the chart will not flip until tomorrow when the regular hospitalist is able to assist.   A Hospice nurse will be back tomorrow and daily.   Feel free to call us for any questions. Thank you for your care of this patient.       Nimco Romo RN

## 2023-11-16 NOTE — PLAN OF CARE
Orientation: ANN, pt obtunded and non-verbal.   Activity: Ax2 w/ lift, turn/reposition q2h  Diet/BS Checks: n/a, tube feeds discontinued. Not eating or drinking. Good oral cares preformed q2h.   Tele:  n/a  IV Access/Drains: L triple lumen PICC SL (blood return noted); G-tube clamped.   Pain Management: Absent of non-verbal indicators of pain per FLACC scale.  Abnormal VS/Results: Full head-to-toe assessment and VS deferred d/t comfort cares  Bowel/Bladder: Incontinent of B/B. Utilizing purewick w/ marginal UOP. No BM noted this shift.  Skin/Wounds: Excoriation and blanchable redness to coccyx (mepilex in place).   Consults: GIP hospice  D/C Disposition: pending  Other Info: Pt has trach on room air w/ in-line suction (suctioned x1 this shift)

## 2023-11-16 NOTE — PROGRESS NOTES
[facility]    Medicine Progress Note - Hospitalist Service    Date of Admission:  9/28/2023    Assessment & Plan   Kortney Germain is a 68-year-old female patient with past medical history significant for schizoaffective disorder, tardive dyskinesia, hypertension, anxiety, and depression.  She was initially admitted on 9/28/2023 for UTI, constipation, and anemia attributed to rectal bleeding from mucosal injury post enema (received 1 U PRBCs). She had decreased LOC and generalized shaking overnight 9/28/23. RRT called and pt was intubated 9/29/23 for encephalopathy and inability to protect her airway, subsequently diagnosed with refractory status epilepticus requiring multiple AEDs, high dose steroids, and IV versed with ongoing breakthrough seizure. She underwent trach and PEG placement throughout her course. She was transferred out of the ICU on 10/24/23, but ultimately returned to the ICU 11/5/23 for refractory seizure with need for ventilatory support and initiation of IV versed. Case further complicated by inability to locate next of kin which was ultimately found on 11/8. After reviewing the case with the Intensivist team, family elected to transition to comfort cares. Pt was compassionately extubated 11/9/23 evening. Hospitalist service was contacted 11/10/23 for assumption of care and transition out of the ICU       We are looking at possible GIP admission to [facility] hospice team    No change in patient's mental status she remains unresponsive and obtunded currently    Comfort cares:   - [facility] hospice consulted-appears that still not evaluated by [facility] hospice.  Spoke with RN to contact them regarding consult.  - Transitioned off IV Versed, given 4mg Ativan X2 , now on scheduled Ativan 2 mg q4 hrs   - Continue IV Keppra 1500 mg BID, Vimpat 250 mg BID, Valproic acid 250 mg q8 hrs, Clobazam 20 mg BID for seizures   - Comfort medications available  - When patient passes, group home ([facility]  and Nephew (Ivan) need to be contacted     Hospital course  ?  Recurrence of status epilepticus versus persistent  Prolonged encephalopathy following presentation of new onset refractory status epilepticus  Seizure disorder  *EEG 23: Abnormal due to the presences of generalized periodic pattern consisting of 2-3.5 hz discharges, maximum negativity in bifrontal region, periodic pattern occupy 90% of the record and the remaining portion consist of delta slowing     *CT Head : no acute intracranial pathology, brain atrophy,   *MRI 23: no acute intracranial process, generalized atrophy,   *MRI 10/11/23: no acute intracranial process, no hemorrhage, moderate-advanced brain atrophy and leukoaraiosis,   *MRI 10/20/23: no interval change   *BC: NG  *CSF analysis: glucose 98 protein 22.8, 2 nucleated cells, RBC 0, aerobic culture GPC in broth only anaerobic culture NGTD, HSV negative  *[place] encephalopathy autoimmune panel negative  *CT CAP  no evidence or concern for malignancy   *Ambien trial  negative   * transferred back to the ICU for initiation of versed drip and reintubation  *sEEG : 1-2Hz GPDs, per Dr Castaneda on ictal-interictal continuum ~ status epilepticus  *cEEG - : runs of GPEDs, 1-2 Hz alternating with generalized slowing; versed increased to 4 ml/hr ---> with improvement of GPEDS  *CSF : Basic Profile: 2 nucleated cells, Protein 20, Glucose 88. Gram stain: no organism, 1+ WBC. Meningitis/Encephalitis panel: negative  VZV, HSV PCR - negative. ENS2 Encephalopathy panel. CSF 14-3-3 pending. Flow Cytometry negative. EBV negative. CMV negative. WNV negative. Meningitis/encephalitis panel negative.  *Pending CSF studies: Anti-LG1 (this is associated with faciobrachial seizures, which pt has), anti-VGKC, anti-striational, anti-Hu, anti-Ma, anti-VGCC, enterovirus, bartonella, mycoplasma, T whippeli, 14-3-3. Clobazam metabolite  - Review Neurology notes for complete details  "regarding evaluation and treatment to date     Acute hypoxic respiratory failure secondary to CNS failure/loss of protective airway reflexes on prolonged mechanical ventilatory support status post trach tube placement on 10/20/2023 by Dr. Benites  - Supplemental oxygen PRN for comfort      Possible hospital-acquired pneumonia-treated  Urinary tract infection, pseudomonas, treated  *low-grade temp of 99 on 10/23/2023 with low-grade leukocytosis  *urine culture grew Pseudomonas  *Was started on empiric cefepime on 10/24/2023.  However this lowers seizure threshold.  Discontinued and transition to IV ceftriaxone.  Patient is allergic to penicillin so cannot use Zosyn.  Meropenem also reduce the seizure threshold.  * MRSA swab negative. Sputum culture shows mixed christina  *Chest x-ray--> possible retrocardiac infiltrate  - Ceftriaxone stopped and Levaquin initiated per ID. Course completed on 10/30     Moderate malnutrition, status post PEG tube placement (10/27)   - TF discontinued per Intensivist team with transition to comfort cares      Hypertension: PTA on Nifedipine on MAR. Treated with Lasix IV then changed to 40mg PO BID on 10/26 - lasix stopped 10/30. Started on Atenolol 25mg daily on 10/30, discontinued 11/6  - No indication for antihypertensives under comfort cares, last /63      Anemia normocytic likely secondary to prolonged critical illness and frequent phlebotomy. Did require 1 U PRBCs on admission for Hgb 6 range which was attributed to rectal bleeding from mucosal injury post enema.      No results for input(s): \"HGB\" in the last 168 hours.     Schizoaffective disorder  Tardive dyskinesia  Anxiety, depression,  - Not currently on medications for these      Hypokalemia, resolved: Noted while receiving diuresis   Hypophosphatemia, resolved  Thrombocytosis, resolved     DVT Prophylaxis: Comfort cares   Liu Catheter: Not present  Lines: PRESENT      PICC 09/30/23 Triple Lumen Left Basilic ok to " use-Site Assessment: WDL      Cardiac Monitoring: None  Code Status: No CPR- Do NOT Intubate      Clinically Significant Risk Factors              # Hypoalbuminemia: Lowest albumin = 3.4 g/dL at 9/30/2023  5:15 AM, will monitor as appropriate     # Hypertension: Noted on problem list         # Moderate Malnutrition: based on nutrition assessment           Disposition Plan      Expected Discharge Date: 11/22/2023    Discharge Delays: Comfort Care/Hospice  Destination: other (comment) (unsure, please ask pt, MD, or care coordinator)  Discharge Comments: 9/28 ED admit.  Care conference 11/3 11:30        Discharge to [facility] hospice team when family could sign her [facility] admission paperwork    Chitra Lagos MD  Hospitalist Service  [facility]  Securely message with [employer] (more info)  Text page via [employer] [facility] Paging/Directory   ___________________________________________________________________    Interval History     Resting comfortably. No seizure activity appreciated    Physical Exam   Vital Signs:               O2 Device: None (Room air) Oxygen Delivery: 2 LPM  Weight: 102 lbs 4.7 oz    CONSTITUTIONAL: Pt laying in bed, dressed in hospital garb. Appears comfortable. Unresponsive.   HEENT: Normocephalic, atraumatic. Trach in place.   RESPIRATORY: No increased work of breathing.    GASTROINTESTINAL:  Abdomen soft, non-distended.  NEUROLOGIC: Unresponsive.   SKIN: Warm, dry, intact.     Medical Decision Making       25 MINUTES SPENT BY ME on the date of service doing chart review, history, exam, documentation & further activities per the note.      Data         Imaging results reviewed over the past 24 hrs:   No results found for this or any previous visit (from the past 24 hour(s)).

## 2023-11-16 NOTE — PLAN OF CARE
SHIFT NOTE      [id number]     Orientation: ANN, pt obtunded and non-verbal.   Activity: Ax2 w/ lift, turn/reposition q2h  Diet/BS Checks: n/a, tube feeds discontinued. Not eating or drinking. Good oral cares preformed q2h.   Tele:  n/a  IV Access/Drains: L triple lumen PICC SL (no blood return noted); G-tube clamped.   Pain Management: Absent of non-verbal indicators of pain per FLACC scale.  Abnormal VS/Results: Full head-to-toe assessment and VS deferred d/t comfort cares  Bowel/Bladder: Incontinent of B/B. Utilizing purewick w/ marginal UOP. No BM noted this shift.  Skin/Wounds: Excoriation and blanchable redness to coccyx (mepilex in place).   Consults: GIP hospice  D/C Disposition: pending  Other Info: Pt has trach on room air w/ in-line suction (suctioned x2 this shift)

## 2023-11-17 PROBLEM — Z51.5 HOSPICE CARE: Status: ACTIVE | Noted: 2023-01-01

## 2023-11-17 NOTE — PHARMACY-ADMISSION MEDICATION HISTORY
Patient was discharged and admitted to inpatient hospice.      Admission medication history completed at [facility]. Please see Pharmacy - Admission Medication History note from 9/28/23.

## 2023-11-17 NOTE — PLAN OF CARE
Goal Outcome Evaluation:    [id number]    Orientation: ANN, unresponsive  Activity: Assist x2 w/ lift, T/R q2hrs  Diet/BS Checks: NPO  Tele:  n/a  IV Access/Drains: Triple lumen PICC SL, G tube in place.  Pain Management: [facility].   Abnormal VS/Results: deferred d/t comfort care  Bowel/Bladder: Incontinent of B/B. Purewick in place, no BM during shift  Skin/Wounds: Blanchable redness on coccyx.   Consults: [facility]  D/C Disposition: pending   Other Info: Trach care w/ room air in line suction.

## 2023-11-17 NOTE — CONSULTS
[facility]    Progress Note - [facility]    ______________________________________________________________________    [facility] 24/7 Contact Number: [phone]    - Providers: Please contact [facility] with changes in orders or clinical plan of care   - Nursing: Please contact [facility] with significant changes in patient condition  ______________________________________________________________________        Plan of Care Discussed with the Following:   - Nurse: Healther, RN Charge  - Hospitalist/Rounding Provider: Dr. Chitra Lagos MD    - Kortney's Family/Preferred Contact: Ivan Morales    Summary of Visit (includes assessment findings, discharge planning progress/status or any new orders):   Iavn Morales text me back, he understands Isabelle is on hospice and thanked us for the help. He would like to be updated with changes. Text is the easiest way to reach him.   Called SOC to start bed flip process. Paged Dr. Chitra Lagos MD to alert her to the need to flip the bed to GIP hospice. Spoke to her and she will flip the bed. She is concerned Isabelle is unable to grimace due to not being responsive, she will start morphine 5mg to help with pain from immobility.          Nimco Romo RN

## 2023-11-17 NOTE — H&P
[facility]    History and Physical - Hospitalist Service       Date of Admission:  11/17/23 GETTING ADMITTED UNDER [facility] hosPICE SERVICE     Assessment & Plan      Kortney Germain is a 68-year-old female patient with past medical history significant for schizoaffective disorder, tardive dyskinesia, hypertension, anxiety, and depression.  She was initially admitted on 9/28/2023 for UTI, constipation, and anemia attributed to rectal bleeding from mucosal injury post enema (received 1 U PRBCs). She had decreased LOC and generalized shaking overnight 9/28/23. RRT called and pt was intubated 9/29/23 for encephalopathy and inability to protect her airway, subsequently diagnosed with refractory status epilepticus requiring multiple AEDs, high dose steroids, and IV versed with ongoing breakthrough seizure. She underwent trach and PEG placement throughout her course. She was transferred out of the ICU on 10/24/23, but ultimately returned to the ICU 11/5/23 for refractory seizure with need for ventilatory support and initiation of IV versed. Case further complicated by inability to locate next of kin which was ultimately found on 11/8. After reviewing the case with the Intensivist team, family elected to transition to comfort cares. Pt was compassionately extubated 11/9/23 evening. Hospitalist service was contacted 11/10/23 for assumption of care and transition out of the ICU         We are looking at possible [facility] admission to [facility] hospice team GETTING ADMITTED uNDER [facility] hospICE  11/17/23      No change in patient's mental status she remains unresponsive and obtunded currently     Comfort cares:   - [facility] hospice consulted-appears that still not evaluated by [facility] hospice.  Spoke with RN to contact them regarding consult.  - Transitioned off IV Versed, given 4mg Ativan X2 , now on scheduled Ativan 2 mg q4 hrs   - Continue IV Keppra 1500 mg BID, Vimpat 250 mg BID, Valproic acid 250 mg q8 hrs, Clobazam 20 mg  BID for seizures   - Comfort medications available  - When patient passes, group home ([facility]) and Nephew (Ivan) need to be contacted      Hospital course  ?  Recurrence of status epilepticus versus persistent  Prolonged encephalopathy following presentation of new onset refractory status epilepticus  Seizure disorder  *EEG 23: Abnormal due to the presences of generalized periodic pattern consisting of 2-3.5 hz discharges, maximum negativity in bifrontal region, periodic pattern occupy 90% of the record and the remaining portion consist of delta slowing     *CT Head : no acute intracranial pathology, brain atrophy,   *MRI 23: no acute intracranial process, generalized atrophy,   *MRI 10/11/23: no acute intracranial process, no hemorrhage, moderate-advanced brain atrophy and leukoaraiosis,   *MRI 10/20/23: no interval change   *BC: NG  *CSF analysis: glucose 98 protein 22.8, 2 nucleated cells, RBC 0, aerobic culture GPC in broth only anaerobic culture NGTD, HSV negative  *[place] encephalopathy autoimmune panel negative  *CT CAP  no evidence or concern for malignancy   *Ambien trial  negative   * transferred back to the ICU for initiation of versed drip and reintubation  *sEEG : 1-2Hz GPDs, per Dr Castaneda on ictal-interictal continuum ~ status epilepticus  *cEEG - : runs of GPEDs, 1-2 Hz alternating with generalized slowing; versed increased to 4 ml/hr ---> with improvement of GPEDS  *CSF : Basic Profile: 2 nucleated cells, Protein 20, Glucose 88. Gram stain: no organism, 1+ WBC. Meningitis/Encephalitis panel: negative  VZV, HSV PCR - negative. ENS2 Encephalopathy panel. CSF 14-3-3 pending. Flow Cytometry negative. EBV negative. CMV negative. WNV negative. Meningitis/encephalitis panel negative.  *Pending CSF studies: Anti-LG1 (this is associated with faciobrachial seizures, which pt has), anti-VGKC, anti-striational, anti-Hu, anti-Ma, anti-VGCC, enterovirus, bartonella,  "mycoplasma, TINO michelle, 14-3-3. Clobazam metabolite  - Review Neurology notes for complete details regarding evaluation and treatment to date     Acute hypoxic respiratory failure secondary to CNS failure/loss of protective airway reflexes on prolonged mechanical ventilatory support status post trach tube placement on 10/20/2023 by Dr. Benites  - Supplemental oxygen PRN for comfort      Possible hospital-acquired pneumonia-treated  Urinary tract infection, pseudomonas, treated  *low-grade temp of 99 on 10/23/2023 with low-grade leukocytosis  *urine culture grew Pseudomonas  *Was started on empiric cefepime on 10/24/2023.  However this lowers seizure threshold.  Discontinued and transition to IV ceftriaxone.  Patient is allergic to penicillin so cannot use Zosyn.  Meropenem also reduce the seizure threshold.  * MRSA swab negative. Sputum culture shows mixed christina  *Chest x-ray--> possible retrocardiac infiltrate  - Ceftriaxone stopped and Levaquin initiated per ID. Course completed on 10/30     Moderate malnutrition, status post PEG tube placement (10/27)   - TF discontinued per Intensivist team with transition to comfort cares      Hypertension: PTA on Nifedipine on MAR. Treated with Lasix IV then changed to 40mg PO BID on 10/26 - lasix stopped 10/30. Started on Atenolol 25mg daily on 10/30, discontinued 11/6  - No indication for antihypertensives under comfort cares, last /63      Anemia normocytic likely secondary to prolonged critical illness and frequent phlebotomy. Did require 1 U PRBCs on admission for Hgb 6 range which was attributed to rectal bleeding from mucosal injury post enema.       No results for input(s): \"HGB\" in the last 168 hours.     Schizoaffective disorder  Tardive dyskinesia  Anxiety, depression,  - Not currently on medications for these      Hypokalemia, resolved: Noted while receiving diuresis   Hypophosphatemia, resolved  Thrombocytosis, resolved      DVT Prophylaxis: Comfort cares "   Liu Catheter: Not present  Lines: PRESENT      PICC 09/30/23 Triple Lumen Left Basilic ok to use-Site Assessment: WDL      Cardiac Monitoring: None  Code Status: No CPR- Do NOT Intubate             Clinically Significant Risk Factors               # Hypoalbuminemia: Lowest albumin = 3.4 g/dL at 9/30/2023  5:15 AM, will monitor as appropriate     # Hypertension: Noted on problem list         # Moderate Malnutrition: based on nutrition assessment                  Disposition Plan     Expected Discharge Date: 11/22/2023    Discharge Delays: Comfort Care/Hospice  Destination: other (comment) (unsure, please ask pt, MD, or care coordinator)  Discharge Comments: 9/28 ED admit.  Care conference 11/3 11:30                Chitra Lagos MD  Hospitalist Service  [facility]  Securely message with [employer] (more info)  Text page via [email or web address] Paging/Directory   ___________________________________________________________________        Interval History  Resting comfortably. No seizure activity appreciated           Physical Exam  Vital Signs:               O2 Device: None (Room air) Oxygen Delivery: 2 LPM  Weight: 102 lbs 4.7 oz     CONSTITUTIONAL: Pt laying in bed, dressed in hospital garb. Appears comfortable. Unresponsive.   HEENT: Normocephalic, atraumatic. Trach in place.   RESPIRATORY: No increased work of breathing.    GASTROINTESTINAL:  Abdomen soft, non-distended.  NEUROLOGIC: Unresponsive.   SKIN: Warm, dry, intact.         Diet:  npo   DVT Prophylaxis: COMFORT CARES   Liu Catheter: Not present  Lines: PRESENT             Cardiac Monitoring: None  Code Status: dnr/dni WITH coMFORT CARES ONBLY     Clinically Significant Risk Factors Present on Admission                  # Hypertension: Noted on problem list                 Disposition Plan           Chitra Lagos MD  Hospitalist Service  [facility]  Securely message with [employer] (more info)  Text page via [email or web address]  Paging/Directory     ______________________________________________________________________        Past Medical History    Past Medical History:   Diagnosis Date    Allergic state     Depressive disorder     Dyskinesia     Hypertension     Schizo affective schizophrenia (H)        Past Surgical History   Past Surgical History:   Procedure Laterality Date    ENDOSCOPIC INSERTION TUBE GASTROSTOMY N/A 10/27/2023    Procedure: LAPAROSCPIC ASSISTED INSERTION, PEG TUBE,;  Surgeon: Garret Helms MD;  Location:  OR    TRACHEOSTOMY N/A 10/20/2023    Procedure: Tracheostomy;  Surgeon: Michael Benites MD;  Location:  OR       Prior to Admission Medications   Cannot display prior to admission medications because the patient has not been admitted in this contact.        Review of Systems    Review of systems not obtained due to patient factors - mental status    Social History   I have reviewed this patient's social history and updated it with pertinent information if needed.  Social History     Tobacco Use    Smoking status: Never    Smokeless tobacco: Never   Substance Use Topics    Alcohol use: No    Drug use: No             Allergies   Allergies   Allergen Reactions    Amlodipine     Clozapine     Egg [Chicken-Derived Products (Egg)]     Penicillins      Tolerated ceftriaxone (9/28/23)    Potassium     Poultry Meal             Medical Decision Making       75 MINUTES SPENT BY ME on the date of service doing chart review, history, exam, documentation & further activities per the note.      Data         Imaging results reviewed over the past 24 hrs:   No results found for this or any previous visit (from the past 24 hour(s)).

## 2023-11-17 NOTE — DISCHARGE SUMMARY
[facility]  Hospitalist Discharge Summary      Date of Admission:  9/28/2023  Date of Discharge:  11/17/2023  Discharging Provider: Chitra Lagos MD  Discharge Service: Hospitalist Service    Discharge Diagnoses   PLEASE SEE HOSPITAL COURSE     Clinically Significant Risk Factors     # Moderate Malnutrition: based on nutrition assessment      Follow-ups Needed After Discharge       Unresulted Labs Ordered in the Past 30 Days of this Admission       Date and Time Order Name Status Description    11/7/2023 11:27 AM [id number]; [facility] (CJD), CSF  14-3-3 TAU: [facility] Miscellaneous Test In process     11/5/2023  3:55 PM Other Laboratory; [employer]; Test code 4640 (Paraneoplastic Autoantibody Evaluation with Recombx, CSF) (Laboratory Miscellaneous Order) In process     11/5/2023  3:45 PM Anaerobic CSF culture Preliminary             Discharge Disposition   Discharged to [facility]   Condition at discharge: Terminal    Hospital Course   Kortney Germain is a 68-year-old female patient with past medical history significant for schizoaffective disorder, tardive dyskinesia, hypertension, anxiety, and depression.  She was initially admitted on 9/28/2023 for UTI, constipation, and anemia attributed to rectal bleeding from mucosal injury post enema (received 1 U PRBCs). She had decreased LOC and generalized shaking overnight 9/28/23. RRT called and pt was intubated 9/29/23 for encephalopathy and inability to protect her airway, subsequently diagnosed with refractory status epilepticus requiring multiple AEDs, high dose steroids, and IV versed with ongoing breakthrough seizure. She underwent trach and PEG placement throughout her course. She was transferred out of the ICU on 10/24/23, but ultimately returned to the ICU 11/5/23 for refractory seizure with need for ventilatory support and initiation of IV versed. Case further complicated by inability to locate next of kin which was ultimately  found on . After reviewing the case with the Intensivist team, family elected to transition to comfort cares. Pt was compassionately extubated 23 evening. Hospitalist service was contacted 11/10/23 for assumption of care and transition out of the ICU         We IS GETTING ADMITTED UNDER [facility] HOS[facility]      No change in patient's mental status she remains unresponsive and obtunded currently     Comfort cares:   - [facility] hospice consulted-appears that still not evaluated by [facility] hospice.  Spoke with RN to contact them regarding consult.  - Transitioned off IV Versed, given 4mg Ativan X2 , now on scheduled Ativan 2 mg q4 hrs   - Continue IV Keppra 1500 mg BID, Vimpat 250 mg BID, Valproic acid 250 mg q8 hrs, Clobazam 20 mg BID for seizures   - Comfort medications available  - When patient passes, group home ([facility]) and Nephew (Ivan) need to be contacted      Hospital course  ?  Recurrence of status epilepticus versus persistent  Prolonged encephalopathy following presentation of new onset refractory status epilepticus  Seizure disorder  *EEG 23: Abnormal due to the presences of generalized periodic pattern consisting of 2-3.5 hz discharges, maximum negativity in bifrontal region, periodic pattern occupy 90% of the record and the remaining portion consist of delta slowing     *CT Head : no acute intracranial pathology, brain atrophy,   *MRI 23: no acute intracranial process, generalized atrophy,   *MRI 10/11/23: no acute intracranial process, no hemorrhage, moderate-advanced brain atrophy and leukoaraiosis,   *MRI 10/20/23: no interval change   *BC: NG  *CSF analysis: glucose 98 protein 22.8, 2 nucleated cells, RBC 0, aerobic culture GPC in broth only anaerobic culture NGTD, HSV negative  *[place] encephalopathy autoimmune panel negative  *CT CAP  no evidence or concern for malignancy   *Ambien trial  negative   * transferred back to the ICU for initiation of versed drip and  reintubation  *sEEG 11/4: 1-2Hz GPDs, per Dr Castaneda on ictal-interictal continuum ~ status epilepticus  *cEEG 11/5- : runs of GPEDs, 1-2 Hz alternating with generalized slowing; versed increased to 4 ml/hr ---> with improvement of GPEDS  *CSF 11/6: Basic Profile: 2 nucleated cells, Protein 20, Glucose 88. Gram stain: no organism, 1+ WBC. Meningitis/Encephalitis panel: negative  VZV, HSV PCR - negative. ENS2 Encephalopathy panel. CSF 14-3-3 pending. Flow Cytometry negative. EBV negative. CMV negative. WNV negative. Meningitis/encephalitis panel negative.  *Pending CSF studies: Anti-LG1 (this is associated with faciobrachial seizures, which pt has), anti-VGKC, anti-striational, anti-Hu, anti-Ma, anti-VGCC, enterovirus, bartonella, mycoplasma, T whippeli, 14-3-3. Clobazam metabolite  - Review Neurology notes for complete details regarding evaluation and treatment to date     Acute hypoxic respiratory failure secondary to CNS failure/loss of protective airway reflexes on prolonged mechanical ventilatory support status post trach tube placement on 10/20/2023 by Dr. Benites  - Supplemental oxygen PRN for comfort      Possible hospital-acquired pneumonia-treated  Urinary tract infection, pseudomonas, treated  *low-grade temp of 99 on 10/23/2023 with low-grade leukocytosis  *urine culture grew Pseudomonas  *Was started on empiric cefepime on 10/24/2023.  However this lowers seizure threshold.  Discontinued and transition to IV ceftriaxone.  Patient is allergic to penicillin so cannot use Zosyn.  Meropenem also reduce the seizure threshold.  * MRSA swab negative. Sputum culture shows mixed christina  *Chest x-ray--> possible retrocardiac infiltrate  - Ceftriaxone stopped and Levaquin initiated per ID. Course completed on 10/30     Moderate malnutrition, status post PEG tube placement (10/27)   - TF discontinued per Intensivist team with transition to comfort cares      Hypertension: PTA on Nifedipine on MAR. Treated with Lasix IV  "then changed to 40mg PO BID on 10/26 - lasix stopped 10/30. Started on Atenolol 25mg daily on 10/30, discontinued 11/6  - No indication for antihypertensives under comfort cares, last /63      Anemia normocytic likely secondary to prolonged critical illness and frequent phlebotomy. Did require 1 U PRBCs on admission for Hgb 6 range which was attributed to rectal bleeding from mucosal injury post enema.       No results for input(s): \"HGB\" in the last 168 hours.     Schizoaffective disorder  Tardive dyskinesia  Anxiety, depression,  - Not currently on medications for these      Hypokalemia, resolved: Noted while receiving diuresis   Hypophosphatemia, resolved  Thrombocytosis, resolved      DVT Prophylaxis: Comfort cares   Liu Catheter: Not present  Lines: PRESENT      PICC 09/30/23 Triple Lumen Left Basilic ok to use-Site Assessment: WDL      Cardiac Monitoring: None  Code Status: No CPR- Do NOT Intubate             Clinically Significant Risk Factors               # Hypoalbuminemia: Lowest albumin = 3.4 g/dL at 9/30/2023  5:15 AM, will monitor as appropriate     # Hypertension: Noted on problem list         # Moderate Malnutrition: based on nutrition assessment                  Disposition Plan     Expected Discharge Date: 11/22/2023    Discharge Delays: Comfort Care/Hospice  Destination: other (comment) (unsure, please ask pt, MD, or care coordinator)  Discharge Comments: 9/28 ED admit.  Care conference 11/3 11:30           Discharge to [facility] hospice team when family could sign her [facility] admission paperwork     Chitra Lagos MD  Hospitalist Service  [facility]  Securely message with [employer] (more info)  Text page via [employer] Paging/Directory   ___________________________________________________________________        Interval History  Resting comfortably. No seizure activity appreciated           Physical Exam  Vital Signs:               O2 Device: None (Room air) Oxygen Delivery: 2 " LPM  Weight: 102 lbs 4.7 oz     CONSTITUTIONAL: Pt laying in bed, dressed in hospital garb. Appears comfortable. Unresponsive.   HEENT: Normocephalic, atraumatic. Trach in place.   RESPIRATORY: No increased work of breathing.    GASTROINTESTINAL:  Abdomen soft, non-distended.  NEUROLOGIC: Unresponsive.   SKIN: Warm, dry, intact.        Consultations This Hospital Stay   GASTROENTEROLOGY IP CONSULT  CARE MANAGEMENT / SOCIAL WORK IP CONSULT  WOUND OSTOMY CONTINENCE NURSE  IP CONSULT  PSYCHIATRY IP CONSULT  CARE MANAGEMENT / SOCIAL WORK IP CONSULT  VASCULAR ACCESS ADULT IP CONSULT  NEUROLOGY IP CONSULT  VASCULAR ACCESS ADULT IP CONSULT  INTERVENTIONAL RADIOLOGY ADULT/PEDS IP CONSULT  PHARMACY TO DOSE VANCO  NEUROLOGY CRITICAL CARE ADULT IP CONSULT  NUTRITION SERVICES ADULT IP CONSULT  VASCULAR ACCESS ADULT IP CONSULT  VASCULAR ACCESS ADULT IP CONSULT  PHARMACY IP CONSULT  VASCULAR ACCESS ADULT IP CONSULT  CARE MANAGEMENT / SOCIAL WORK IP CONSULT  VASCULAR ACCESS ADULT IP CONSULT  ETHICS IP CONSULT  SOCIAL WORK IP CONSULT  CARE MANAGEMENT / SOCIAL WORK IP CONSULT  PSYCHIATRY IP CONSULT  THORACIC SURGERY IP CONSULT  SURGERY GENERAL IP CONSULT  WOUND OSTOMY CONTINENCE NURSE  IP CONSULT  VASCULAR ACCESS ADULT IP CONSULT  CARE MANAGEMENT / SOCIAL WORK IP CONSULT  NEUROLOGY IP CONSULT  INFECTIOUS DISEASES IP CONSULT  ETHICS IP CONSULT  SURGERY GENERAL IP CONSULT  WOUND OSTOMY CONTINENCE NURSE  IP CONSULT  VASCULAR ACCESS ADULT IP CONSULT  NEUROLOGY CRITICAL CARE ADULT IP CONSULT  INTENSIVIST IP CONSULT  VASCULAR ACCESS ADULT IP CONSULT  [facility] INPATIENT HOSPICE ADULT CONSULT  VASCULAR ACCESS ADULT IP CONSULT  VASCULAR ACCESS ADULT IP CONSULT  PHARMACY IP CONSULT    Code Status   No CPR- Do NOT Intubate    Time Spent on this Encounter   IChitra MD, personally saw the patient today and spent greater than 30 minutes discharging this patient.       Chitra Lagos MD  [address] [id number] [facility]  [address]  [place] [address]  Phone: [phone]  ______________________________________________________________________           Primary Care Physician   [facility]    Discharge Orders   No discharge procedures on file.    Significant Results and Procedures   Most Recent 3 CBC's:  Recent Labs   Lab Test 11/08/23  1207 11/07/23  0418 11/05/23  0550 11/04/23  0602   WBC 7.4 9.6  --  16.1*   HGB 7.9* 7.7*  --  9.1*   MCV 88 86  --  85    289 341 397     Most Recent 3 BMP's:  Recent Labs   Lab Test 11/09/23  1216 11/09/23  0755 11/09/23  0501 11/07/23  0423 11/07/23  0418 11/04/23  0623 11/04/23  0602 10/30/23  1800 10/30/23  0514   NA  --   --   --   --  144  --  145  --  142   POTASSIUM  --   --   --   --  4.1  --  4.0  --  3.5   CHLORIDE  --   --   --   --  110*  --  105  --  100   CO2  --   --   --   --  24  --  30*  --  32*   BUN  --   --   --   --  18.9  --  24.0*  --  26.0*   CR  --   --   --   --  0.36*  --  0.42*  --  0.34*   ANIONGAP  --   --   --   --  10  --  10  --  10   LULU  --   --   --   --  8.1*  --  8.7*  --  8.9   * 118* 102*   < > 117*   < > 122*   < > 120*    < > = values in this interval not displayed.     Most Recent 2 LFT's:  Recent Labs   Lab Test 09/30/23  0515 09/29/23  0518   AST 27 22   ALT 20 16   ALKPHOS 66 77   BILITOTAL 0.2 0.2     Most Recent 3 INR's:  Recent Labs   Lab Test 09/28/23  1244 03/07/23  1629   INR 1.04 1.15   ,   Results for orders placed or performed during the hospital encounter of 09/28/23   Lumbar spine CT w/o contrast    Narrative    CT LUMBAR SPINE WITHOUT CONTRAST  9/28/2023 1:56 PM     HISTORY: fall, low back pain      TECHNIQUE: Axial images of the lumbar spine were obtained without  intravenous contrast. Multiplanar reformations were performed.   Radiation dose for this scan was reduced using automated exposure  control, adjustment of the mA and/or kV according to patient size, or  iterative reconstruction technique.      COMPARISON: None.     FINDINGS: Vertebral body heights are maintained. Schmorl's node  involves the superior endplate of T12. No anterolisthesis or  retrolisthesis. Multilevel facet disease. Transverse processes are  intact. No evidence of an acute fracture. Paravertebral soft tissues  are unremarkable. Calcified uterine fibroids incidentally noted.  Moderate stool burden in the colon.      Impression    IMPRESSION:      No acute fracture in the lumbar spine.     OLIVA GOLDSMITH MD         SYSTEM ID:  [id number]   CT Abdomen Pelvis w Contrast    Narrative    EXAM: CT ABDOMEN PELVIS W CONTRAST  LOCATION: [facility]  DATE: 9/28/2023    INDICATION: Questionable abdominal pain.  COMPARISON: None.  TECHNIQUE: CT scan of the abdomen and pelvis was performed following injection of IV contrast. Multiplanar reformats were obtained. Dose reduction techniques were used.  CONTRAST: 45 mL Isovue 370    FINDINGS:   LOWER CHEST: Small esophageal hiatal hernia. Cardiac enlargement.    HEPATOBILIARY: Normal.    PANCREAS: Normal.    SPLEEN: Normal.    ADRENAL GLANDS: Normal.    KIDNEYS/BLADDER: Simple cyst left kidney. No follow-up is needed.    BOWEL: Rectum distended with stool.    LYMPH NODES: Normal.    VASCULATURE: Unremarkable.    PELVIC ORGANS: Enlargement of the uterus with multiple fibroids. The urinary bladder is distended.    MUSCULOSKELETAL: Normal.      Impression    IMPRESSION:   1.  Enlarged uterus with multiple fibroids.    2.  Rectum is distended with stool.    3.  Urinary bladder is distended.   CT Head w/o Contrast    Narrative    CT SCAN OF THE HEAD WITHOUT CONTRAST September 29, 2023 1:00 PM     HISTORY: Decreased level of consciousness.    TECHNIQUE: Axial images of the head and coronal reformations without  IV contrast material. Radiation dose for this scan was reduced using  automated exposure control, adjustment of the mA and/or kV according  to patient size, or iterative  reconstruction technique.    COMPARISON: MRI brain 3/8/2023.    FINDINGS: There is no evidence of intracranial hemorrhage, mass, acute  infarct or anomaly. The ventricles are normal in size, shape and  configuration. Mild diffuse parenchymal volume loss. Mild-to-moderate  scattered patchy cerebral white matter hypodensities which are  nonspecific, but likely related to chronic microvascular ischemic  disease.     The visualized portions of the sinuses and mastoids appear normal. The  bony calvarium and bones of the skull base appear intact. Presumed  cerumen in the bilateral external auditory canals.      Impression    IMPRESSION:  1. No CT findings of acute intracranial process.  2. Brain atrophy and presumed chronic small vessel ischemic changes,  as described.    KVNG DICK MD         SYSTEM ID:  [id number]   XR Lumbar Puncture Spinal Tap Diag    Narrative    LUMBAR PUNCTURE WITH FLUOROSCOPIC GUIDANCE 9/29/2023 1:25 PM     HISTORY: Confusion, altered mental status, encephalopathy.    CONSENT: Emergent consent. The procedure was performed in an emergent  situation. Dr. Faith Polanco, hospitalist, deemed the procedure as  emergent. Additionally, the patient reportedly has no next of kin able  to give consent at this time.     PROCEDURE:  The patient was placed in prone position. The L1-L2 interspace was  identified and the overlying skin was marked. The patient was then  prepped, draped and anesthetized using sterile technique. Local  anesthesia was performed using 1% lidocaine.    A 22-gauge 3.5 inch spinal needle was inserted under fluoroscopic  guidance into the CSF space with return of clear fluid. 8 mL of clear  cerebrospinal fluid was sent to the laboratory for analysis. The  stylet was reinserted and the spinal needle was removed. There were no  complications.     FLUOROSCOPY TIME: 0.2 minutes.       Impression    IMPRESSION: Uncomplicated fluoroscopic-guided lumbar puncture.    KVNG DICK MD          SYSTEM ID:  [id number]   XR Chest Port 1 View    Narrative    XR CHEST PORT 1 VIEW 9/29/2023 1:21 PM    HISTORY: check ETT placemet    COMPARISON: 3/12/2023    FINDINGS: Endotracheal tube tip is 4.2 cm above the tin. No  consolidation. No pleural effusions or pneumothorax. Normal cardiac  size. Aortic atherosclerosis. Old bilateral rib fractures.    AVEL PANTOJA MD         SYSTEM ID:  [id number]   XR Abdomen Port 1 View    Narrative    ABDOMEN ONE VIEW PORTABLE  9/29/2023 2:20 PM     HISTORY: NG tube placement.    COMPARISON: March 8, 2023      Impression    IMPRESSION: Enteric tube tip in the left upper quadrant in the  expected location of the stomach. Minimal amount of stool.   Nonobstructed bowel gas pattern.    KVNG MARC MD         SYSTEM ID:  [id number]   MR Brain w/o & w Contrast    Narrative    EXAM: MR BRAIN W/O and W CONTRAST  LOCATION: [facility]  DATE: 9/30/2023    INDICATION: decreased LOC, shaking  COMPARISON: CT head 09/29/2023 and MRI brain 03/08/2023  CONTRAST: 4mL Gadavist  TECHNIQUE: Routine multiplanar multisequence head MRI without and with intravenous contrast.    FINDINGS:  INTRACRANIAL CONTENTS: No acute or subacute infarct. No mass, acute hemorrhage, or extra-axial fluid collections. Patchy and confluent nonspecific T2/FLAIR hyperintensities within the cerebral white matter most consistent with moderate chronic   microvascular ischemic change. Mild generalized cerebral atrophy. No hydrocephalus. Normal position of the cerebellar tonsils. No pathologic contrast enhancement.    SELLA: No abnormality accounting for technique.    OSSEOUS STRUCTURES/SOFT TISSUES: Normal marrow signal. The major intracranial vascular flow voids are maintained.     ORBITS: No abnormality accounting for technique.     SINUSES/MASTOIDS: No paranasal sinus mucosal disease. No middle ear or mastoid effusion.       Impression    IMPRESSION:  1.  No acute intracranial process.  2.   Generalized brain atrophy and presumed microvascular ischemic changes as detailed above.   XR Abdomen Port 1 View    Narrative    EXAM: XR ABDOMEN PORT 1 VIEW  LOCATION: [facility]  DATE: 9/29/2023    INDICATION: MRI clearance. MRI safety rule out no metal.  COMPARISON: None.      Impression    IMPRESSION: No metallic implanted device in the abdomen. NG tube tip and sidehole in the stomach. Endotracheal tube with tip approximately 4 cm above the tin. Pelvic calcifications.   XR Chest Port 1 View    Narrative    EXAM: XR CHEST PORT 1 VIEW  LOCATION: [facility]  DATE: 9/29/2023    INDICATION: For MRI safety to rule out metal.  COMPARISON: Chest x-ray 09/29/2023.      Impression    IMPRESSION: ET tube is 2.8 cm proximal to the tin. Nasogastric tube identified. No acute airspace disease. Normal cardiac silhouette. The patient is rotated. No abnormal metallic foreign body identified.   XR Skull Port 1/3 Views    Narrative    EXAM: XR SKULL PORT 1/3 VIEWS  LOCATION: [facility]  DATE: 9/29/2023    INDICATION: Two-view skull x-ray for MRI safety.  COMPARISON: None.      Impression    IMPRESSION: No metallic foreign bodies identified in the face.   XR Chest Port 1 View    Narrative    CHEST ONE VIEW PORTABLE  10/5/2023 3:00 PM       INDICATION: Intubated.    COMPARISON: 9/29/2023.       Impression    IMPRESSION: Endotracheal tube in good position above the tin. NG  tube in the stomach. Left PICC line tip in the low SVC. There is new  infiltrate or atelectasis in the left lower lobe. The right lung  remains clear.    KALE SEGOVIA MD         SYSTEM ID:  [id number]   XR Chest Port 1 View    Narrative    CHEST ONE VIEW  10/6/2023 11:26 AM     HISTORY: Infiltrates and low grade fever.    COMPARISON: October 5, 2023      Impression    IMPRESSION: Endotracheal tube tip 3 cm from the tin. Similar to  perhaps slightly worse retrocardiac  atelectasis and/or infiltrate,  possibly with some pleural fluid. Right lung clear.    KVNG MARC MD         SYSTEM ID:  [id number]   MR Brain w/o & w Contrast    Narrative    EXAM: MR BRAIN W/O & W CONTRAST  10/11/2023 3:07 PM     HISTORY: seizures, ongoing encephalopathy.    COMPARISON: 9/30/2023    TECHNIQUE: Multiplanar, multisequence MR imaging of the head obtained  prior to, and after, intravenous contrast administration    CONTRAST: 5mL Gadavist.    FINDINGS:  No mass effect, midline shift, or intracranial hemorrhage. Stable  subcortical, periventricular areas of T2 FLAIR white matter  hyperintensities, mild to moderate symmetric generalized parenchymal  volume loss and compensatory ventricular dilatation. No MRI findings  of acute hydrocephalus. Preserved major intracranial vascular flow  voids. No pathologic postcontrast enhancement.    Normal skull marrow signal. No substantial paranasal sinus mucosal  disease. Clear mastoid air cells. Nonfocal pituitary gland, sella,  skull base and upper cervical spinal structures. The orbits are  normal.      Impression    IMPRESSION:  1. No acute intracranial infarct or intracranial hemorrhage.  2. Stable moderate to advanced brain atrophy and leukoaraiosis,  presumed sequela of chronic microvascular ischemic disease, as  detailed.    BANG KWON DO         SYSTEM ID:  [id number]   XR Abdomen Port 1 View    Narrative    ABDOMEN ONE VIEW PORTABLE  10/16/2023 1:33 PM     HISTORY: NG/NJ tube swap/placement.    COMPARISON: Abdominal radiograph 9/29/2023.       Impression    IMPRESSION: Limited AP view of the abdomen demonstrates a feeding tube  curled (with kink) within the stomach with the tip pointing towards  the gastroesophageal junction.     Partially visualized right central venous catheter. Aortic  calcification.     No dilated loops of bowel within the visualized abdomen. Calcified  fibroid uterus.    KARTHIKEYAN TRUJILLO MD         SYSTEM ID:  [id number]   XR  Abdomen Port 1 View    Narrative    ABDOMEN ONE VIEW PORTABLE  10/16/2023 1:35 PM     HISTORY: NG placement.    COMPARISON: Abdominal radiograph 10/16/2023.       Impression    IMPRESSION: Interval advancement of the feeding tube, with the tip now  projecting over the mid stomach with kink at the distal portion of the  internal stylette.     Otherwise, no significant interval change.    KARTHIKEYAN TRUJILLO MD         SYSTEM ID:  [id number]   XR Abdomen Port 1 View    Narrative    EXAM: XR ABDOMEN PORT 1 VIEW  LOCATION: [facility]  DATE: 10/16/2023    INDICATION: Evaluate NG tube position. Unable to flush. Kinked on previous imaging. Attempted to pull back with no success  COMPARISON: 10/16/2023 at 1321 hours      Impression    IMPRESSION: The NG to remains kinked approximately 6 cm from the distal and. Location NG tube is in good position within the mid to distal stomach. Normal bowel gas pattern. Scattered uterine fibroids.   XR Abdomen Port 1 View    Narrative    XR ABDOMEN PORT 1 VIEW   10/17/2023 9:02 AM     HISTORY: NG tube repositioning    COMPARISON: 10/16/2023      Impression    IMPRESSION: Feeding tube tip is in the distal stomach.    NAVEED WALDRON MD         SYSTEM ID:  [id number]   MR Brain w/o & w Contrast    Narrative    EXAM: MR BRAIN W/O and W CONTRAST  LOCATION: [facility]  DATE: 10/20/2023    INDICATION: status epilepticus, etiology unclear  COMPARISON: 10/11/2023.  CONTRAST: 5 mL Gadavist  TECHNIQUE: MRI brain without and with contrast.    FINDINGS: On the diffusion-weighted images there is no evidence of acute ischemia or restricted diffusion. There is no discrete mass lesion or midline shift. There is no acute extra-axial fluid collection or acute intraparenchymal hemorrhage. There are   prominent perivascular spaces involving the basal ganglia and the subcortical white matter. On the FLAIR and T2-weighted images there are multiple foci of high  signal within the periventricular and subcortical white matter consistent with diffuse small   vessel ischemic disease. The ventricular system, basal cisterns and the cortical sulci are consistent with diffuse volume loss. Following the administration of contrast no abnormal enhancement is visualized.    There is no evidence of cerebellar tonsillar ectopia. The corpus callosum and the sella region have appropriate configuration and signal intensity for the patient's age. There are regions are unremarkable. There is no significant paranasal sinus disease.   There is fluid noted within the mastoid air cells bilaterally.      Impression    IMPRESSION:  1. No discrete mass lesion, hemorrhage or focal area suggestive of acute infarct.  2. Stable diffuse age related changes.   XR Chest Port 1 View    Narrative    CHEST ONE VIEW  10/24/2023 2:45 PM     HISTORY: Evaluate for pneumonia, patient has leukocytosis.    COMPARISON: October 6, 2023      Impression    IMPRESSION: Possible retrocardiac infiltrate, consider confirmation  with lateral view. Question groundglass infiltrates at the right base.    KVNG MARC MD         SYSTEM ID:  [id number]   CT Chest/Abdomen/Pelvis w Contrast    Narrative    EXAM: CT CHEST/ABDOMEN/PELVIS W CONTRAST  LOCATION: [facility]  DATE: 11/4/2023    INDICATION: Prolonged encephalopathy and hypoxic injury failure following new onset of refractory status epilepticus.  COMPARISON: Portable chest 10/24/2023, CT abdomen and pelvis 09/28/2023  TECHNIQUE: CT scan of the chest, abdomen, and pelvis was performed following injection of IV contrast. Multiplanar reformats were obtained. Dose reduction techniques were used.   CONTRAST: 48mL Isovue 370    FINDINGS:   LUNGS AND PLEURA: Trace bilateral pleural effusions with bibasilar dependent subsegmental atelectasis. Mild bronchial wall thickening. Mild ground glass opacity in upper lobes with faint mosaic pattern could represent  small airway disease/air trapping.   Tracheostomy tip in mid trachea. Central airways otherwise patent.    MEDIASTINUM/AXILLAE: Left-sided PICC tip at cavoatrial junction. No mass/adenopathy nor pericardial effusion the thoracic aorta and heart are normal in size. No central pulmonary emboli..    CORONARY ARTERY CALCIFICATION: Mild.    HEPATOBILIARY: Normal.    PANCREAS: Normal.    SPLEEN: Normal.    ADRENAL GLANDS: Normal.    KIDNEYS/BLADDER: Simple left renal cortical cyst does not require imaging follow-up. No upper urinary tract calculus nor hydronephrosis. Layering density within the dependent portion of the bladder could represent tiny stones or debris. Left periureteral   diverticulum unchanged. No evidence of cystitis.    BOWEL: Diverticulosis of the colon. No acute inflammatory change. No obstruction. Gastrostomy tube in good position.    LYMPH NODES: No lymphadenopathy.    VASCULATURE: No aortoiliac aneurysm.    PELVIC ORGANS: Enlarged myomatous uterus redemonstrated. No adnexal mass nor abnormal fluid collection.    MUSCULOSKELETAL: Stable benign bone islands within L5 and the sacrum. No suspicious osseous lesions.      Impression    IMPRESSION:  1.  Bibasilar dependent atelectasis and trace effusions. No focal pneumonia.  2.  Layering density within the bladder lumen could represent debris or calculi. No evidence of cystitis nor hydronephrosis.  3.  Otherwise stable exam.   XR Lumbar Puncture Spinal Tap Diag    Narrative    EXAM: XR LUMBAR PUNCTURE SPINAL TAP DIAGNOSTIC  11/6/2023 2:30 PM       History:  New onset status epilepticus. 68-year-old female presenting  sedated and on ventilator support. Procedure deemed medically  necessary by Dr. Alfred.    PROCEDURE: Consent unable to be taking obtained from the patient,  however due to medical necessity of procedure, we proceeded without  obtaining informed consent. The patient was sterilely prepped and  draped with the patient in the supine position, over  the lower back.  Under fluoroscopic guidance, the interlaminar spaces were noted. 1%  lidocaine was administered for local anesthetic over the L2-3  interlaminar space, and a 20 gauge needle was advanced into the thecal  sac under fluoroscopic guidance.  There was initial aspiration of  clear CSF. About 36 cc's total were passively obtained and divided  into 4 tubes.  The needle was removed. There were no immediate  complications associated with the procedure.   Estimated blood loss  during the procedure was less than 5 mL.    Fluoro time: 0.1 minutes  Images Obtained: 2  DAP: 13.03 uGym2  Medications used: 3 mL Local Lidocaine 1%.      Impression    IMPRESSION: Successful lumbar puncture at L2-L3 using fluoroscopic  guidance.    JEFFREY Ryan         SYSTEM ID:  [id number]   XR Chest Port 1 View    Narrative    EXAM: XR CHEST PORT 1 VIEW  LOCATION: [facility]  DATE: 11/5/2023    INDICATION: Increased secretions from the tracheostomy. Cough.  COMPARISON: 10/24/2023. CT yesterday.      Impression    IMPRESSION: Linear opacities in the left lower lung likely represent atelectasis, similar to the CT yesterday. Lungs are otherwise clear. Normal heart size and pulmonary vascularity. Left PICC line tip in the mid SVC. Tracheostomy. Bones are   demineralized.   CT Head w/o Contrast    Narrative    EXAM: CT HEAD W/O CONTRAST  LOCATION: [facility]  DATE: 11/6/2023    INDICATION: rule out bleed. Status epilepticus.  COMPARISON: 10/20/2023 brain MRI.  TECHNIQUE: Routine CT Head without IV contrast. Multiplanar reformats. Dose reduction techniques were used.    FINDINGS:  INTRACRANIAL CONTENTS: No intracranial hemorrhage, extraaxial collection, or mass effect.  No CT evidence of acute infarct. Moderate presumed chronic small vessel ischemic changes. Mild generalized volume loss. No hydrocephalus.     VISUALIZED ORBITS/SINUSES/MASTOIDS: No intraorbital abnormality. No  paranasal sinus mucosal disease. No middle ear or mastoid effusion.    BONES/SOFT TISSUES: No skull fracture. No scalp hematoma.      Impression    IMPRESSION:  1.  Age-related changes as above with no acute intracranial abnormality.   XR Chest Port 1 View    Narrative    EXAM: XR CHEST PORT 1 VIEW  LOCATION: [facility]  DATE: 11/9/2023    INDICATION: eval for possible VAP  COMPARISON: 11/5/2023 and 11/4/2023.      Impression    IMPRESSION: Tracheostomy in satisfactory position. Left PICC tip in the mid SVC. Small left pleural effusion. Lungs otherwise appear clear. Stable cardiac silhouette. No visible pneumothorax.       Discharge Medications   Current Discharge Medication List        CONTINUE these medications which have NOT CHANGED    Details   acetaminophen (TYLENOL) 500 MG tablet Take 1,000 mg by mouth 3 times daily 0800 / 1400 / 2000      albuterol (PROVENTIL) (2.5 MG/3ML) 0.083% neb solution Take 2.5 mg by nebulization every 4 hours as needed for shortness of breath, wheezing or cough      alendronate (FOSAMAX) 70 MG tablet Take 70 mg by mouth every 7 days Thursdays at 0700      cholecalciferol 50 MCG (2000 UT) tablet Take 1 tablet by mouth daily 0800      cyclobenzaprine (FLEXERIL) 5 MG tablet Take 5 mg by mouth 3 times daily as needed for muscle spasms      diphenhydrAMINE (BENADRYL) 50 MG capsule Take 50 mg by mouth 3 times daily 0800 / 1500 / 2000      escitalopram (LEXAPRO) 20 MG tablet Take 20 mg by mouth daily 0800      !! LORazepam (ATIVAN) 0.5 MG tablet Take 0.5 mg by mouth 2 times daily 0800/1400      !! LORazepam (ATIVAN) 0.5 MG tablet Take 0.25 mg by mouth At Bedtime 2000      multivitamin w/minerals (THERA-VIT-M) tablet Take 1 tablet by mouth daily 0800      muscle rub (ARTHRITIS HOT) 10-15 % CREA Apply topically 4 times daily Back pain - 0700/1100/1500/2000      NIFEdipine ER (ADALAT CC) 60 MG 24 hr tablet Take 60 mg by mouth daily      !! perphenazine 4 MG tablet Take  4 mg by mouth every morning      !! perphenazine 8 MG tablet Take 8 mg by mouth At Bedtime       !! - Potential duplicate medications found. Please discuss with provider.        Allergies   Allergies   Allergen Reactions    Amlodipine     Clozapine     Egg [Chicken-Derived Products (Egg)]     Penicillins      Tolerated ceftriaxone (9/28/23)    Potassium     Poultry Meal

## 2023-11-18 NOTE — PLAN OF CARE
Orientation: ANN-pt is unresponsive  Activity: AX2 with lift-not oob  Diet/BS Checks: NPO  Tele:  N/A  IV Access/Drains: PICC-SL  Pain Management: Scheduled morphine and Ativan  Abnormal VS/Results: VS deferred d/t comfort cares  Bowel/Bladder: Incontinent B/B. Purewick in place. No BM this shift.  Skin/Wounds: Excoriation and blanchable redness on Coccyx-mepi in place.  Consults: GIP  Other Info: T/R and oral cares maintained Q 2hrs.

## 2023-11-18 NOTE — PLAN OF CARE
Goal Outcome Evaluation:  Shift: 11/17-11/18/23 [id number]   Orientation: ANN-pt is unresponsive  Activity: AX2 with lift-not oob;turn and reposition q2hr for comfort   Diet/BS Checks: NPO  Tele:  N/A  IV Access/Drains: triple lumen PICC-SL  Pain Management: Scheduled morphine and Ativan  Abnormal VS/Results: VS deferred d/t comfort cares  Bowel/Bladder: Incontinent B/B. Purewick in place; changed this shift. No BM this shift.  Skin/Wounds: Excoriation and blanchable redness on Coccyx-mepi in place.  Consults: GIP  Other Info: T/R and oral cares maintained Q 2hrs. Based on Dr notes; Keppra IV infusion still being given for seizures

## 2023-11-18 NOTE — PROGRESS NOTES
[facility]    Medicine Progress Note - Hospitalist Service    Date of Admission:  11/17/2023    Assessment & Plan     Kortney Germain is a 68-year-old female patient with past medical history significant for schizoaffective disorder, tardive dyskinesia, hypertension, anxiety, and depression.  She was initially admitted on 9/28/2023 for UTI, constipation, and anemia attributed to rectal bleeding from mucosal injury post enema (received 1 U PRBCs). She had decreased LOC and generalized shaking overnight 9/28/23. RRT called and pt was intubated 9/29/23 for encephalopathy and inability to protect her airway, subsequently diagnosed with refractory status epilepticus requiring multiple AEDs, high dose steroids, and IV versed with ongoing breakthrough seizure. She underwent trach and PEG placement throughout her course. She was transferred out of the ICU on 10/24/23, but ultimately returned to the ICU 11/5/23 for refractory seizure with need for ventilatory support and initiation of IV versed. Case further complicated by inability to locate next of kin which was ultimately found on 11/8. After reviewing the case with the Intensivist team, family elected to transition to comfort cares. Pt was compassionately extubated 11/9/23 evening. Hospitalist service was contacted 11/10/23 for assumption of care and transition out of the ICU         We are looking at possible [facility] admission to [facility] hospice team GETTING ADMITTED uNDER [facility] hospICE  11/17/23      No change in patient's mental status she remains unresponsive and obtunded currently     Comfort cares:   - [facility] hospice consulted-appears that still not evaluated by [facility] hospice.  Spoke with RN to contact them regarding consult.  - Transitioned off IV Versed, given 4mg Ativan X2 , now on scheduled Ativan 2 mg q4 hrs   - Continue IV Keppra 1500 mg BID, Vimpat 250 mg BID, Valproic acid 250 mg q8 hrs, Clobazam 20 mg BID for seizures   - Comfort medications  available  - When patient passes, group home ([facility]) and Nephew (Ivan) need to be contacted      Hospital course  ?  Recurrence of status epilepticus versus persistent  Prolonged encephalopathy following presentation of new onset refractory status epilepticus  Seizure disorder  *EEG 23: Abnormal due to the presences of generalized periodic pattern consisting of 2-3.5 hz discharges, maximum negativity in bifrontal region, periodic pattern occupy 90% of the record and the remaining portion consist of delta slowing     *CT Head : no acute intracranial pathology, brain atrophy,   *MRI 23: no acute intracranial process, generalized atrophy,   *MRI 10/11/23: no acute intracranial process, no hemorrhage, moderate-advanced brain atrophy and leukoaraiosis,   *MRI 10/20/23: no interval change   *BC: NG  *CSF analysis: glucose 98 protein 22.8, 2 nucleated cells, RBC 0, aerobic culture GPC in broth only anaerobic culture NGTD, HSV negative  *[place] encephalopathy autoimmune panel negative  *CT CAP  no evidence or concern for malignancy   *Ambien trial  negative   * transferred back to the ICU for initiation of versed drip and reintubation  *sEEG : 1-2Hz GPDs, per Dr Castaneda on ictal-interictal continuum ~ status epilepticus  *cEEG - : runs of GPEDs, 1-2 Hz alternating with generalized slowing; versed increased to 4 ml/hr ---> with improvement of GPEDS  *CSF : Basic Profile: 2 nucleated cells, Protein 20, Glucose 88. Gram stain: no organism, 1+ WBC. Meningitis/Encephalitis panel: negative  VZV, HSV PCR - negative. ENS2 Encephalopathy panel. CSF 14-3-3 pending. Flow Cytometry negative. EBV negative. CMV negative. WNV negative. Meningitis/encephalitis panel negative.  *Pending CSF studies: Anti-LG1 (this is associated with faciobrachial seizures, which pt has), anti-VGKC, anti-striational, anti-Hu, anti-Ma, anti-VGCC, enterovirus, bartonella, mycoplasma, T whippeli, 14-3-3. Clobazam  "metabolite  - Review Neurology notes for complete details regarding evaluation and treatment to date     Acute hypoxic respiratory failure secondary to CNS failure/loss of protective airway reflexes on prolonged mechanical ventilatory support status post trach tube placement on 10/20/2023 by Dr. Benites  - Supplemental oxygen PRN for comfort      Possible hospital-acquired pneumonia-treated  Urinary tract infection, pseudomonas, treated  *low-grade temp of 99 on 10/23/2023 with low-grade leukocytosis  *urine culture grew Pseudomonas  *Was started on empiric cefepime on 10/24/2023.  However this lowers seizure threshold.  Discontinued and transition to IV ceftriaxone.  Patient is allergic to penicillin so cannot use Zosyn.  Meropenem also reduce the seizure threshold.  * MRSA swab negative. Sputum culture shows mixed christina  *Chest x-ray--> possible retrocardiac infiltrate  - Ceftriaxone stopped and Levaquin initiated per ID. Course completed on 10/30     Moderate malnutrition, status post PEG tube placement (10/27)   - TF discontinued per Intensivist team with transition to comfort cares      Hypertension: PTA on Nifedipine on MAR. Treated with Lasix IV then changed to 40mg PO BID on 10/26 - lasix stopped 10/30. Started on Atenolol 25mg daily on 10/30, discontinued 11/6  - No indication for antihypertensives under comfort cares, last /63      Anemia normocytic likely secondary to prolonged critical illness and frequent phlebotomy. Did require 1 U PRBCs on admission for Hgb 6 range which was attributed to rectal bleeding from mucosal injury post enema.       No results for input(s): \"HGB\" in the last 168 hours.     Schizoaffective disorder  Tardive dyskinesia  Anxiety, depression,  - Not currently on medications for these      Hypokalemia, resolved: Noted while receiving diuresis   Hypophosphatemia, resolved  Thrombocytosis, resolved      DVT Prophylaxis: Comfort cares   Liu Catheter: Not present  Lines: " PRESENT      PICC 09/30/23 Triple Lumen Left Basilic ok to use-Site Assessment: WDL      Cardiac Monitoring: None  Code Status: No CPR- Do NOT Intubate             Clinically Significant Risk Factors               # Hypoalbuminemia: Lowest albumin = 3.4 g/dL at 9/30/2023  5:15 AM, will monitor as appropriate     # Hypertension: Noted on problem list         # Moderate Malnutrition: based on nutrition assessment                  Disposition Plan     Expected Discharge Date: 11/22/2023    Discharge Delays: Comfort Care/Hospice  Destination: other (comment) (unsure, please ask pt, MD, or care coordinator)  Discharge Comments: 9/28 ED admit.  Care conference 11/3 11:30                 Chitra Lagos MD  Hospitalist Service  [facility]  Securely message with [employer] (more info)  Text page via [employer]   ___________________________________________________________________        Interval History  Resting comfortably. No seizure activity appreciated           Physical Exam  Vital Signs:               O2 Device: None (Room air) Oxygen Delivery: 2 LPM  Weight: 102 lbs 4.7 oz     CONSTITUTIONAL: Pt laying in bed, dressed in hospital garb. Appears comfortable. Unresponsive.   HEENT: Normocephalic, atraumatic. Trach in place.   RESPIRATORY: No increased work of breathing.    GASTROINTESTINAL:  Abdomen soft, non-distended.  NEUROLOGIC: Unresponsive.   SKIN: Warm, dry, intact.      Diet:  npo   DVT Prophylaxis: COMFORT CARES   Liu Catheter: Not present  Lines: PRESENT             Cardiac Monitoring: None  Code Status: dnr/dni WITH coMFORT CARES ONBLY         Clinically Significant Risk Factors Present on Admission                   # Hypertension: Noted on problem list                        Disposition Plan              Chitra Lagos MD  Hospitalist Service  [facility]  Securely message with [employer] (more info)  Text page via [employer]       ______________________________________________________________________      show

## 2023-11-18 NOTE — PROGRESS NOTES
[facility]    Progress Note - [facility]    ______________________________________________________________________    [facility] 24/7 Contact Number: [phone]    - Providers: Please contact [facility] with changes in orders or clinical plan of care   - Nursing: Please contact [facility] with significant changes in patient condition  ______________________________________________________________________      Hospice Agency:[facility]    Plan of Care Discussed with the Following:   - Nurse: CECILIA Osei  - Hospitalist/Rounding Provider    - Kortney's Family/Preferred Contact: Meagan Morales  - Hospice Provider: [facility]    Summary of Visit (includes assessment findings, discharge planning progress/status or any new orders):   GIP daily bedside visit to Kortney. Client is in bed, unresponsive to voice or touch. Kortney's skin is pale, Kortney has a fan on, her Trach appears intact as well as her central lines. Kortney also has a purewick in place. VS 02 91%, P 52, R 16. LS have rales to both lower lobes, BS hypoactive. DIANN Mckee is called via teams Video so that he can have visual on Kortney and discuss with this writer current condition. Rafi to reach out to Zak Love regarding plans towards discharge. Updated CECILIA Osei, Updated Dr. Sandoval Medical Director, [facility]. Epic update for Dr. Lagos.        Catina Graham, CCEILIA

## 2023-11-18 NOTE — PROGRESS NOTES
[facility]    Progress Note - [facility]    ______________________________________________________________________    [facility] 24[facility] Contact Number: [phone]    - Providers: Please contact [facility] with changes in orders or clinical plan of care   - Nursing: Please contact [facility] with significant changes in patient condition  ______________________________________________________________________        Plan of Care Discussed with the Following:   - Nurse: Rina  - Hospitalist/Rounding Provider: Dr. Lagos   - Kortney's Family/Preferred Contact: Ivan Crespo  - Hospice Provider: [facility]    Summary of Visit (includes assessment findings, discharge planning progress/status or any new orders):   [facility] daily visit to Kortney. Kortney is alone in her room, laying in her bed, unresponsive to touch or voice. Pt is admitted to [facility] for new onset seizure, suctioning, IV medications. Hospice Dx: Seizure. Kortney has a trach, PICC G tube central midline, PICC to sternum, external urinary catheter, dressings  D/I. Kortney is NPO. LS CTA and shallow to both upper and lower R-L lobes. BS hypoactive. Kortney does not exhibit clinical signs of distress at this visit. Skin is dry, pale, poor turgor. Protective boots to both feet.No new orders needed at this time. Goal to  is SW is discharge is ability to take oral meds, 3 or less prn medications in 24 hours for 48 hours, changing to a lower level of care in working with Zak Love on Discharge.     Updated CECILIA Flynn  Updated Dr. Sandoval  No new orders for Dr. Yolis Graham, RN

## 2023-11-19 NOTE — PLAN OF CARE
Goal Outcome Evaluation:    Orientation: ANN - unresponsive  Activity: Ax2 w/ lift, T/R  Diet/BS Checks: NPO  Tele:  NA  IV Access/Drains: Trach, L PICC SL, PEG tube  Pain Management: Scheduled Morphine & Ativan  Abnormal VS/Results: Deferred D/T comfort care status  Bowel/Bladder: Incont b/b, Purewick in place  Skin/Wounds: Excoration and blanchable redness to coccyx, Mepi in place  Consults: LAUREN  D/C Disposition: Possible discharge to hospice care  Other Info: Pt unresponsive & appears comfortable,

## 2023-11-19 NOTE — PROGRESS NOTES
[facility]    Medicine Progress Note - Hospitalist Service    Date of Admission:  11/17/2023    Assessment & Plan     Kortney Germain is a 68-year-old female patient with past medical history significant for schizoaffective disorder, tardive dyskinesia, hypertension, anxiety, and depression.  She was initially admitted on 9/28/2023 for UTI, constipation, and anemia attributed to rectal bleeding from mucosal injury post enema (received 1 U PRBCs). She had decreased LOC and generalized shaking overnight 9/28/23. RRT called and pt was intubated 9/29/23 for encephalopathy and inability to protect her airway, subsequently diagnosed with refractory status epilepticus requiring multiple AEDs, high dose steroids, and IV versed with ongoing breakthrough seizure. She underwent trach and PEG placement throughout her course. She was transferred out of the ICU on 10/24/23, but ultimately returned to the ICU 11/5/23 for refractory seizure with need for ventilatory support and initiation of IV versed. Case further complicated by inability to locate next of kin which was ultimately found on 11/8. After reviewing the case with the Intensivist team, family elected to transition to comfort cares. Pt was compassionately extubated 11/9/23 evening. Hospitalist service was contacted 11/10/23 for assumption of care and transition out of the ICU         [facility] GETTING ADMITTED [facility]ER [facility]  11/17/23      No change in patient's mental status she remains unresponsive and obtunded currently     Comfort cares:   - [facility] hospice consulted-appears that still not evaluated by [facility] hosp[facility].  Spoke with RN to contact them regarding consult.  - Transitioned off IV Versed, given 4mg Ativan X2 , now on scheduled Ativan 2 mg q4 hrs   - Continue IV Keppra 1500 mg BID, Vimpat 250 mg BID, Valproic acid 250 mg q8 hrs, Clobazam 20 mg BID for seizures   - Comfort medications  available  - When patient passes, group home ([facility]) and Nephew (Ivan) need to be contacted      Hospital course  ?  Recurrence of status epilepticus versus persistent  Prolonged encephalopathy following presentation of new onset refractory status epilepticus  Seizure disorder  *EEG 23: Abnormal due to the presences of generalized periodic pattern consisting of 2-3.5 hz discharges, maximum negativity in bifrontal region, periodic pattern occupy 90% of the record and the remaining portion consist of delta slowing     *CT Head : no acute intracranial pathology, brain atrophy,   *MRI 23: no acute intracranial process, generalized atrophy,   *MRI 10/11/23: no acute intracranial process, no hemorrhage, moderate-advanced brain atrophy and leukoaraiosis,   *MRI 10/20/23: no interval change   *BC: NG  *CSF analysis: glucose 98 protein 22.8, 2 nucleated cells, RBC 0, aerobic culture GPC in broth only anaerobic culture NGTD, HSV negative  *[place] encephalopathy autoimmune panel negative  *CT CAP  no evidence or concern for malignancy   *Ambien trial  negative   * transferred back to the ICU for initiation of versed drip and reintubation  *sEEG : 1-2Hz GPDs, per Dr Castaneda on ictal-interictal continuum ~ status epilepticus  *cEEG - : runs of GPEDs, 1-2 Hz alternating with generalized slowing; versed increased to 4 ml/hr ---> with improvement of GPEDS  *CSF : Basic Profile: 2 nucleated cells, Protein 20, Glucose 88. Gram stain: no organism, 1+ WBC. Meningitis/Encephalitis panel: negative  VZV, HSV PCR - negative. ENS2 Encephalopathy panel. CSF 14-3-3 pending. Flow Cytometry negative. EBV negative. CMV negative. WNV negative. Meningitis/encephalitis panel negative.  *Pending CSF studies: Anti-LG1 (this is associated with faciobrachial seizures, which pt has), anti-VGKC, anti-striational, anti-Hu, anti-Ma, anti-VGCC, enterovirus, bartonella, mycoplasma, T whippeli, 14-3-3. Clobazam  "metabolite  - Review Neurology notes for complete details regarding evaluation and treatment to date     Acute hypoxic respiratory failure secondary to CNS failure/loss of protective airway reflexes on prolonged mechanical ventilatory support status post trach tube placement on 10/20/2023 by Dr. Benites  - Supplemental oxygen PRN for comfort      Possible hospital-acquired pneumonia-treated  Urinary tract infection, pseudomonas, treated  *low-grade temp of 99 on 10/23/2023 with low-grade leukocytosis  *urine culture grew Pseudomonas  *Was started on empiric cefepime on 10/24/2023.  However this lowers seizure threshold.  Discontinued and transition to IV ceftriaxone.  Patient is allergic to penicillin so cannot use Zosyn.  Meropenem also reduce the seizure threshold.  * MRSA swab negative. Sputum culture shows mixed christina  *Chest x-ray--> possible retrocardiac infiltrate  - Ceftriaxone stopped and Levaquin initiated per ID. Course completed on 10/30     Moderate malnutrition, status post PEG tube placement (10/27)   - TF discontinued per Intensivist team with transition to comfort cares      Hypertension: PTA on Nifedipine on MAR. Treated with Lasix IV then changed to 40mg PO BID on 10/26 - lasix stopped 10/30. Started on Atenolol 25mg daily on 10/30, discontinued 11/6  - No indication for antihypertensives under comfort cares, last /63      Anemia normocytic likely secondary to prolonged critical illness and frequent phlebotomy. Did require 1 U PRBCs on admission for Hgb 6 range which was attributed to rectal bleeding from mucosal injury post enema.       No results for input(s): \"HGB\" in the last 168 hours.     Schizoaffective disorder  Tardive dyskinesia  Anxiety, depression,  - Not currently on medications for these      Hypokalemia, resolved: Noted while receiving diuresis   Hypophosphatemia, resolved  Thrombocytosis, resolved      DVT Prophylaxis: Comfort cares   Liu Catheter: Not present  Lines: " PRESENT      PICC 09/30/23 Triple Lumen Left Basilic ok to use-Site Assessment: WDL      Cardiac Monitoring: None  Code Status: No CPR- Do NOT Intubate             Clinically Significant Risk Factors               # Hypoalbuminemia: Lowest albumin = 3.4 g/dL at 9/30/2023  5:15 AM, will monitor as appropriate     # Hypertension: Noted on problem list         # Moderate Malnutrition: based on nutrition assessment                  Disposition Plan     Expected Discharge Date: 11/22/2023    Discharge Delays: Comfort Care/Hospice  Destination: other (comment) (unsure, please ask pt, MD, or care coordinator)  Discharge Comments: 9/28 ED admit.  Care conference 11/3 11:30                 Chitra Lagos MD  Hospitalist Service  [facility]  Securely message with [employer] (more info)  Text page via [facility]/[employer]   ___________________________________________________________________        Interval History  Resting comfortably. No seizure activity appreciated           Physical Exam  Vital Signs:               O2 Device: None (Room air) Oxygen Delivery: 2 LPM  Weight: 102 lbs 4.7 oz     CONSTITUTIONAL: Pt laying in bed, dressed in hospital garb. Appears comfortable. Unresponsive.   HEENT: Normocephalic, atraumatic. Trach in place.   RESPIRATORY: No increased work of breathing.    GASTROINTESTINAL:  Abdomen soft, non-distended.  NEUROLOGIC: Unresponsive.   SKIN: Warm, dry, intact.      Diet:  npo   DVT Prophylaxis: COMFORT CARES   Liu Catheter: Not present  Lines: PRESENT             Cardiac Monitoring: None  Code Status: dnr/dni WITH coMFORT CARES ONBLY         Clinically Significant Risk Factors Present on Admission                   # Hypertension: Noted on problem list                        Disposition Plan              Chitra Lagos MD  Hospitalist Service  [facility]  Securely message with [employer] (more info)  Text page via [facility]/[employer]       ______________________________________________________________________

## 2023-11-19 NOTE — PROGRESS NOTES
Pt appears comfortable however unresponsive she  appears to be comfortable and not in any distress. Turn and repo q2hrs and oral cares done Q 2hrs. Trach suctioning done as needed for disconfort. Music playing for relaxation. Comfort meds given as ordered.

## 2023-11-20 NOTE — PROGRESS NOTES
Care Management Follow Up    Length of Stay (days): 3    Expected Discharge Date: 11/20/2023     Concerns to be Addressed:       Patient plan of care discussed at interdisciplinary rounds: Yes    Anticipated Discharge Disposition:       Anticipated Discharge Services:    Anticipated Discharge DME:      Patient/family educated on Medicare website which has current facility and service quality ratings:    Education Provided on the Discharge Plan:    Patient/Family in Agreement with the Plan:      Referrals Placed by CM/SW:    Private pay costs discussed: Not applicable    Additional Information:  DIANN received vm from [facility] indicating they had a few questions. DIANN called back and left a vm.     Addendum:  DIANN informed [facility] and rehab can accept patient tomorrow with hospice. DIANN spoke with [facility] hospice and informed they can do an intake tomorrow at 1300. DIANN called Isabelle GIP RN to discuss discharge plans and informed Isabelle can touch base with SW tomorrow to let her know if a discharge plan tomorrow will work. DIANN escalated to her supervisor. DIANN scheduled  stretcher transport to [facility] for tomorrow between [id number] as this was the earliest they had available. DIANN informed supervisor will escalate to see if transport could be moved up.    Addendum: DIANN spoke with Gricelda with [facility] intake and informed since patient is [facility] they are unable to confirm an intake time and DIANN will have to work with [facility] team for this. At this time patient has a transport arranged for 11/21 at [phone] to [facility] and hospice intake has not yet been arranged. DIANN updated nephew Ivan on acceptance and and he was in agreement Sw will update him once final discharge arrangements are made.         Matty Lynn, BABATUNDE    [facility]

## 2023-11-20 NOTE — PROGRESS NOTES
Care Management Follow Up    Length of Stay (days): 3    Expected Discharge Date: 11/20/2023     Concerns to be Addressed:       Patient plan of care discussed at interdisciplinary rounds: Yes    Anticipated Discharge Disposition:  Hospice (LTC)     Anticipated Discharge Services:    Anticipated Discharge DME:      Patient/family educated on Medicare website which has current facility and service quality ratings:    Education Provided on the Discharge Plan:    Patient/Family in Agreement with the Plan:      Referrals Placed by CM/SW:    Private pay costs discussed: Not applicable    Additional Information:  SW sent referrals via [facility] for LTC with hospice placement. Patient has MA for coverage at LTC.    BABATUNDE Mcbride    [facility]

## 2023-11-20 NOTE — PLAN OF CARE
Goal Outcome Evaluation:    Orientation: ANN - unresponsive  Activity: Ax2 w/ lift, T/R q2  Diet/BS Checks: NPO  Tele:  NA  IV Access/Drains: Trach, L PICC SL (no blood return noted, flushes well), PEG tube  Pain Management: Scheduled morphine & ativan  Abnormal VS/Results: Deferred D/T comfort care status  Bowel/Bladder: Incont b/b, Purewick in place  Skin/Wounds: Excoration and blanchable redness to coccyx, Mepi in place  Consults: GIP  D/C Disposition: Possible discharge to hospice care  Other Info: Pt unresponsive & appears comfortable, trach suction done w/ each T/R

## 2023-11-20 NOTE — PROGRESS NOTES
[facility]    Medicine Progress Note - Hospitalist Service    Date of Admission:  11/17/2023    Assessment & Plan     Kortney Germain is a 68-year-old female patient with past medical history significant for schizoaffective disorder, tardive dyskinesia, hypertension, anxiety, and depression.  She was initially admitted on 9/28/2023 for UTI, constipation, and anemia attributed to rectal bleeding from mucosal injury post enema (received 1 U PRBCs). She had decreased LOC and generalized shaking overnight 9/28/23. RRT called and pt was intubated 9/29/23 for encephalopathy and inability to protect her airway, subsequently diagnosed with refractory status epilepticus requiring multiple AEDs, high dose steroids, and IV versed with ongoing breakthrough seizure. She underwent trach and PEG placement throughout her course. She was transferred out of the ICU on 10/24/23, but ultimately returned to the ICU 11/5/23 for refractory seizure with need for ventilatory support and initiation of IV versed. Case further complicated by inability to locate next of kin which was ultimately found on 11/8. After reviewing the case with the Intensivist team, family elected to transition to comfort cares. Pt was compassionately extubated 11/9/23 evening. Hospitalist service was contacted 11/10/23 for assumption of care and transition out of the ICU         We are looking at possible [facility] admission to [facility] hospice team GETTING ADMITTED uNDER [facility] hospICE  11/17/23      No change in patient's mental status she remains unresponsive and obtunded currently     Comfort cares:   - [facility] hospice consulted-appears that still not evaluated by [facility] hospice.  Spoke with RN to contact them regarding consult.  - Transitioned off IV Versed, given 4mg Ativan X2 , now on scheduled Ativan 2 mg q4 hrs   - Continue IV Keppra 1500 mg BID, Vimpat 250 mg BID, Valproic acid 250 mg q8 hrs, Clobazam 20 mg BID for seizures   - Comfort medications  available  - When patient passes, group home ([facility]) and Nephew (Ivan) need to be contacted      Hospital course  ?  Recurrence of status epilepticus versus persistent  Prolonged encephalopathy following presentation of new onset refractory status epilepticus  Seizure disorder  *EEG 23: Abnormal due to the presences of generalized periodic pattern consisting of 2-3.5 hz discharges, maximum negativity in bifrontal region, periodic pattern occupy 90% of the record and the remaining portion consist of delta slowing     *CT Head : no acute intracranial pathology, brain atrophy,   *MRI 23: no acute intracranial process, generalized atrophy,   *MRI 10/11/23: no acute intracranial process, no hemorrhage, moderate-advanced brain atrophy and leukoaraiosis,   *MRI 10/20/23: no interval change   *BC: NG  *CSF analysis: glucose 98 protein 22.8, 2 nucleated cells, RBC 0, aerobic culture GPC in broth only anaerobic culture NGTD, HSV negative  *[place] encephalopathy autoimmune panel negative  *CT CAP  no evidence or concern for malignancy   *Ambien trial  negative   * transferred back to the ICU for initiation of versed drip and reintubation  *sEEG : 1-2Hz GPDs, per Dr Castaneda on ictal-interictal continuum ~ status epilepticus  *cEEG - : runs of GPEDs, 1-2 Hz alternating with generalized slowing; versed increased to 4 ml/hr ---> with improvement of GPEDS  *CSF : Basic Profile: 2 nucleated cells, Protein 20, Glucose 88. Gram stain: no organism, 1+ WBC. Meningitis/Encephalitis panel: negative  VZV, HSV PCR - negative. ENS2 Encephalopathy panel. CSF 14-3-3 pending. Flow Cytometry negative. EBV negative. CMV negative. WNV negative. Meningitis/encephalitis panel negative.  *Pending CSF studies: Anti-LG1 (this is associated with faciobrachial seizures, which pt has), anti-VGKC, anti-striational, anti-Hu, anti-Ma, anti-VGCC, enterovirus, bartonella, mycoplasma, T whippeli, 14-3-3. Clobazam  "metabolite  - Review Neurology notes for complete details regarding evaluation and treatment to date     Acute hypoxic respiratory failure secondary to CNS failure/loss of protective airway reflexes on prolonged mechanical ventilatory support status post trach tube placement on 10/20/2023 by Dr. Benites  - Supplemental oxygen PRN for comfort      Possible hospital-acquired pneumonia-treated  Urinary tract infection, pseudomonas, treated  *low-grade temp of 99 on 10/23/2023 with low-grade leukocytosis  *urine culture grew Pseudomonas  *Was started on empiric cefepime on 10/24/2023.  However this lowers seizure threshold.  Discontinued and transition to IV ceftriaxone.  Patient is allergic to penicillin so cannot use Zosyn.  Meropenem also reduce the seizure threshold.  * MRSA swab negative. Sputum culture shows mixed christina  *Chest x-ray--> possible retrocardiac infiltrate  - Ceftriaxone stopped and Levaquin initiated per ID. Course completed on 10/30     Moderate malnutrition, status post PEG tube placement (10/27)   - TF discontinued per Intensivist team with transition to comfort cares      Hypertension: PTA on Nifedipine on MAR. Treated with Lasix IV then changed to 40mg PO BID on 10/26 - lasix stopped 10/30. Started on Atenolol 25mg daily on 10/30, discontinued 11/6  - No indication for antihypertensives under comfort cares, last /63      Anemia normocytic likely secondary to prolonged critical illness and frequent phlebotomy. Did require 1 U PRBCs on admission for Hgb 6 range which was attributed to rectal bleeding from mucosal injury post enema.       No results for input(s): \"HGB\" in the last 168 hours.     Schizoaffective disorder  Tardive dyskinesia  Anxiety, depression,  - Not currently on medications for these      Hypokalemia, resolved: Noted while receiving diuresis   Hypophosphatemia, resolved  Thrombocytosis, resolved      DVT Prophylaxis: Comfort cares   Liu Catheter: Not present  Lines: " PRESENT      PICC 09/30/23 Triple Lumen Left Basilic ok to use-Site Assessment: WDL      Cardiac Monitoring: None  Code Status: No CPR- Do NOT Intubate             Clinically Significant Risk Factors               # Hypoalbuminemia: Lowest albumin = 3.4 g/dL at 9/30/2023  5:15 AM, will monitor as appropriate     # Hypertension: Noted on problem list         # Moderate Malnutrition: based on nutrition assessment                  Disposition Plan     Expected Discharge Date: 11/22/2023    Discharge Delays: Comfort Care/Hospice  Destination: other (comment) (unsure, please ask pt, MD, or care coordinator)  Discharge Comments: 9/28 ED admit.  Care conference 11/3 11:30                 Chitra Lagos MD  Hospitalist Service  [facility]  Securely message with [employer] (more info)  Text page via [employer]   ___________________________________________________________________        Interval History  Resting comfortably. No seizure activity appreciated           Physical Exam  Vital Signs:               O2 Device: None (Room air) Oxygen Delivery: 2 LPM  Weight: 102 lbs 4.7 oz     CONSTITUTIONAL: Pt laying in bed, dressed in hospital garb. Appears comfortable. Unresponsive.   HEENT: Normocephalic, atraumatic. Trach in place.   RESPIRATORY: No increased work of breathing.    GASTROINTESTINAL:  Abdomen soft, non-distended.  NEUROLOGIC: Unresponsive.   SKIN: Warm, dry, intact.      Diet:  npo   DVT Prophylaxis: COMFORT CARES   Liu Catheter: Not present  Lines: PRESENT             Cardiac Monitoring: None  Code Status: dnr/dni WITH coMFORT CARES ONBLY         Clinically Significant Risk Factors Present on Admission                   # Hypertension: Noted on problem list                        Disposition Plan              Chitra Lagos MD  Hospitalist Service  [facility]  Securely message with [employer] (more info)  Text page via [employer]       ______________________________________________________________________

## 2023-11-21 NOTE — PROGRESS NOTES
Care Management Follow Up    Length of Stay (days): 4    Expected Discharge Date: 11/22/2023     Concerns to be Addressed:    Pre-Admission Screening   Patient plan of care discussed at interdisciplinary rounds: Yes    Anticipated Discharge Disposition:  [facility]     Anticipated Discharge Services:  Hospice  Anticipated Discharge DME:  N/A    Patient/family educated on Medicare website which has current facility and service quality ratings:  N/A  Education Provided on the Discharge Plan:  N/A  Patient/Family in Agreement with the Plan:  N/A    Referrals Placed by CM/SW:  [facility]  Private pay costs discussed: Not applicable    Additional Information:  Completed the Pres-Admission Screening and faxed it to [facility].      PAS-RR    D: Per DHS regulation, SW completed and submitted PAS-RR to [place] [employer] Direct Connect via the [facility].  PAS-RR confirmation # is : [id number].    I: SW spoke with patient's nephew Ivan and they are aware a PAS-RR has been submitted.  SW reviewed with nephbryan Ivan that they may be contacted for a follow up appointment within 10 days of hospital discharge if their SNF stay is < 30 days.  Contact information for [facility] was also provided.    A: Nephbryan Ivan verbalized understanding.    P: Further questions may be directed to [facility] at #[phone], option #4 for PAS-RR staff.         ANDERSON Carrera, [facility]    [phone]  [facility]

## 2023-11-21 NOTE — PROGRESS NOTES
[facility]    Consult Note - [facility]  _________________________________________________________________    [facility] 24/7 Contact Number: [phone]    - Providers: Please contact [facility] with changes in orders or clinical plan of care   - Nursing: Please contact [facility] with significant changes in patient condition    Hospice will notify the care team (including the hospitalist) to confirm date of inpatient hospice (GIP) admission.    New Epic encounter will not be created until hospice completes admission.   ______________________________________________________________________        Hospice Diagnosis: onset seizures    Indication for Inpatient Hospice: iv medications, suctioning, pain    Goals for Hospital Discharge: to change to a lower level care     Plan of Care Discussed with the Following:   - Nurse: CECILIA Barrera  - Hospitalist/Rounding Provider: Dr. Mackey    - Hospice Provider: Dr. Sandoval, Hospice     Summary of Visit (includes assessment, medications and any new orders):   Met with patient and CECILIA Lu at bedside for GIP assessment and cares. Patient is unresponsive to voice or touch. Patient is furrowed brow.  VS - P:58 R: 4/min with periods of 20 second apnea B/P:112/64 o2-sats:83%RA. Patient suctioned for thick green secretions. Recommended NS nebs for thick secretion and inclosed suction catheter be changed. Patient has a purewick in place and bladder palpated and distended. Recommended quinonez placement with lidocaine for comfort - urine return of 1000 mL. Patient was given PRN IVP Dilaudid for pain per furrowed brow and PRN IVP Ativan and effective.  Patient is transitioning to EOL with knees cool to touch and mottling.     Recommendations:    NS neb q3hr prn for thick secretions  Quinonez catheter with lidocaine for insertion and comfort    Matty - SW called regarding patient being transferred to [facility] / Rehab. Will talk with  [facility] Team in the AM.    Debra Behselich, RN  [facility] / [place]

## 2023-11-21 NOTE — PROGRESS NOTES
[facility]    Medicine Progress Note - Hospitalist Service    Date of Admission:  11/17/2023    Assessment & Plan     Kortney Germain is a 68-year-old female patient with past medical history significant for schizoaffective disorder, tardive dyskinesia, hypertension, anxiety, and depression.  She was initially admitted on 9/28/2023 for UTI, constipation, and anemia attributed to rectal bleeding from mucosal injury post enema (received 1 U PRBCs). She had decreased LOC and generalized shaking overnight 9/28/23. RRT called and pt was intubated 9/29/23 for encephalopathy and inability to protect her airway, subsequently diagnosed with refractory status epilepticus requiring multiple AEDs, high dose steroids, and IV versed with ongoing breakthrough seizure. She underwent trach and PEG placement throughout her course. She was transferred out of the ICU on 10/24/23, but ultimately returned to the ICU 11/5/23 for refractory seizure with need for ventilatory support and initiation of IV versed. Case further complicated by inability to locate next of kin which was ultimately found on 11/8. After reviewing the case with the Intensivist team, family elected to transition to comfort cares. Pt was compassionately extubated 11/9/23 evening. Hospitalist service was contacted 11/10/23 for assumption of care and transition out of the ICU         We are looking at possible [facility] admission to [facility] hospice team GETTING ADMITTED uNDER [facility] hospICE  11/17/23      No change in patient's mental status she remains unresponsive and obtunded currently     Comfort cares:   - [facility] hospice consulted-appears that still not evaluated by [facility] hospice.  Spoke with RN to contact them regarding consult.  - Transitioned off IV Versed, given 4mg Ativan X2 , now on scheduled Ativan 2 mg q4 hrs   - Continue IV Keppra 1500 mg BID, Vimpat 250 mg BID, Valproic acid 250 mg q8 hrs, Clobazam 20 mg BID for seizures   - Comfort medications  available  - When patient passes, group home ([facility]) and Nephew (Ivan) need to be contacted   Pt is on IV Keppra and IV ativan 2mg Q 4hours changed to via feeding tube on 23   Want to make sure she does not have further seizures in the next 36hours with the change     Hospital course  ?  Recurrence of status epilepticus versus persistent  Prolonged encephalopathy following presentation of new onset refractory status epilepticus  Seizure disorder  *EEG 23: Abnormal due to the presences of generalized periodic pattern consisting of 2-3.5 hz discharges, maximum negativity in bifrontal region, periodic pattern occupy 90% of the record and the remaining portion consist of delta slowing     *CT Head : no acute intracranial pathology, brain atrophy,   *MRI 23: no acute intracranial process, generalized atrophy,   *MRI 10/11/23: no acute intracranial process, no hemorrhage, moderate-advanced brain atrophy and leukoaraiosis,   *MRI 10/20/23: no interval change   *BC: NG  *CSF analysis: glucose 98 protein 22.8, 2 nucleated cells, RBC 0, aerobic culture GPC in broth only anaerobic culture NGTD, HSV negative  *[place] encephalopathy autoimmune panel negative  *CT CAP  no evidence or concern for malignancy   *Ambien trial  negative   * transferred back to the ICU for initiation of versed drip and reintubation  *sEEG : 1-2Hz GPDs, per Dr Castaneda on ictal-interictal continuum ~ status epilepticus  *cEEG - : runs of GPEDs, 1-2 Hz alternating with generalized slowing; versed increased to 4 ml/hr ---> with improvement of GPEDS  *CSF : Basic Profile: 2 nucleated cells, Protein 20, Glucose 88. Gram stain: no organism, 1+ WBC. Meningitis/Encephalitis panel: negative  VZV, HSV PCR - negative. ENS2 Encephalopathy panel. CSF 14-3-3 pending. Flow Cytometry negative. EBV negative. CMV negative. WNV negative. Meningitis/encephalitis panel negative.  *Pending CSF studies: Anti-LG1 (this is  "associated with faciobrachial seizures, which pt has), anti-VGKC, anti-striational, anti-Hu, anti-Ma, anti-VGCC, enterovirus, bartonella, mycoplasma, T whippeli, 14-3-3. Clobazam metabolite  - Review Neurology notes for complete details regarding evaluation and treatment to date     Acute hypoxic respiratory failure secondary to CNS failure/loss of protective airway reflexes on prolonged mechanical ventilatory support status post trach tube placement on 10/20/2023 by Dr. Benites  - Supplemental oxygen PRN for comfort      Possible hospital-acquired pneumonia-treated  Urinary tract infection, pseudomonas, treated  *low-grade temp of 99 on 10/23/2023 with low-grade leukocytosis  *urine culture grew Pseudomonas  *Was started on empiric cefepime on 10/24/2023.  However this lowers seizure threshold.  Discontinued and transition to IV ceftriaxone.  Patient is allergic to penicillin so cannot use Zosyn.  Meropenem also reduce the seizure threshold.  * MRSA swab negative. Sputum culture shows mixed christina  *Chest x-ray--> possible retrocardiac infiltrate  - Ceftriaxone stopped and Levaquin initiated per ID. Course completed on 10/30     Moderate malnutrition, status post PEG tube placement (10/27)   - TF discontinued per Intensivist team with transition to comfort cares      Hypertension: PTA on Nifedipine on MAR. Treated with Lasix IV then changed to 40mg PO BID on 10/26 - lasix stopped 10/30. Started on Atenolol 25mg daily on 10/30, discontinued 11/6  - No indication for antihypertensives under comfort cares, last /63      Anemia normocytic likely secondary to prolonged critical illness and frequent phlebotomy. Did require 1 U PRBCs on admission for Hgb 6 range which was attributed to rectal bleeding from mucosal injury post enema.       No results for input(s): \"HGB\" in the last 168 hours.     Schizoaffective disorder  Tardive dyskinesia  Anxiety, depression,  - Not currently on medications for these    "   Hypokalemia, resolved: Noted while receiving diuresis   Hypophosphatemia, resolved  Thrombocytosis, resolved      DVT Prophylaxis: Comfort cares   Liu Catheter: Not present  Lines: PRESENT      PICC 09/30/23 Triple Lumen Left Basilic ok to use-Site Assessment: WDL      Cardiac Monitoring: None  Code Status: No CPR- Do NOT Intubate             Clinically Significant Risk Factors               # Hypoalbuminemia: Lowest albumin = 3.4 g/dL at 9/30/2023  5:15 AM, will monitor as appropriate     # Hypertension: Noted on problem list         # Moderate Malnutrition: based on nutrition assessment                  Disposition Plan     Expected Discharge Date: 11/22/2023    Discharge Delays: Comfort Care/Hospice  Destination: other (comment) (unsure, please ask pt, MD, or care coordinator)  Discharge Comments: 9/28 ED admit.  Care conference 11/3 11:30                 Chitra Lagos MD  Hospitalist Service  [facility]  Securely message with [employer] (more info)  Text page via [employer] Paging/Directory   ___________________________________________________________________        Interval History  Resting comfortably. No seizure activity appreciated           Physical Exam  Vital Signs:               O2 Device: None (Room air) Oxygen Delivery: 2 LPM  Weight: 102 lbs 4.7 oz     CONSTITUTIONAL: Pt laying in bed, dressed in hospital garb. Appears comfortable. Unresponsive.   HEENT: Normocephalic, atraumatic. Trach in place.   RESPIRATORY: No increased work of breathing.    GASTROINTESTINAL:  Abdomen soft, non-distended.  NEUROLOGIC: Unresponsive.   SKIN: Warm, dry, intact.      Diet:  npo   DVT Prophylaxis: COMFORT CARES   Liu Catheter: Not present  Lines: PRESENT             Cardiac Monitoring: None  Code Status: dnr/dni WITH coMFORT CARES ONBLY         Clinically Significant Risk Factors Present on Admission                   # Hypertension: Noted on problem list                        Disposition Plan               Chitra Lagos MD  Hospitalist Service  [facility]  Securely message with [employer] (more info)  Text page via [employer]      ______________________________________________________________________

## 2023-11-21 NOTE — PROGRESS NOTES
Care Management Follow Up    Length of Stay (days): 4    Expected Discharge Date: 11/21/2023     Concerns to be Addressed:       Patient plan of care discussed at interdisciplinary rounds: Yes    Anticipated Discharge Disposition:       Anticipated Discharge Services:    Anticipated Discharge DME:      Patient/family educated on Medicare website which has current facility and service quality ratings:    Education Provided on the Discharge Plan:    Patient/Family in Agreement with the Plan:      Referrals Placed by CM/SW:    Private pay costs discussed: Not applicable    Additional Information:  DI[facility] and inquired about discharge today and available time for intake. DIANN informed Isabelle will check and get back to . DIANN called [facility] and confirmed they can accept today and will supply patient with Trach supplies. DIANN informed the [id number] transport time works on their end.    Addendum 1040: DIANN informed hospitalist is changing patients IV medication and want patient to stay overnight. DIANN spoke with [facility] and informed they can accept tomorrow. DIANN rescheduled transport for 11/22 at [id number]. DIANN called Isabelle with [facility] who will arrange an intake tomorrow.    BABATUNDE Mcbride    [facility]

## 2023-11-21 NOTE — PROGRESS NOTES
[facility]    Consult Note - [facility]  _________________________________________________________________    [facility] Contact Number: [phone]    - Providers: Please contact [facility] with changes in orders or clinical plan of care   - Nursing: Please contact [facility] with significant changes in patient condition    Hospice will notify the care team (including the hospitalist) to confirm date of inpatient hospice (GIP) admission.    New Epic encounter will not be created until hospice completes admission.   ______________________________________________________________________        Hospice Diagnosis: onset seizures    Indication for Inpatient Hospice: IV medications, suctioning, and pain    Goals for Hospital Discharge: Discharging to [facility]    Plan of Care Discussed with the Following:   - Nurse: CECILIA Lim  - SW: Matty  - Hospitalist/Rounding Provider: Dr. Lagos    - Kortney's Family/Preferred Contact: Ivan Crespo  - Hospice Provider: Dr. Sandoval    Summary of Visit (includes assessment, medications and any new orders):   Patient will not be discharged to [facility] today d/t IV medications switched to TF. bettina Wiseman called and updated.       Debra Behselich, RN

## 2023-11-21 NOTE — PROGRESS NOTES
Care Management Follow Up    Length of Stay (days): 4    Expected Discharge Date: 11/23/2023     Concerns to be Addressed:       Patient plan of care discussed at interdisciplinary rounds: Yes    Anticipated Discharge Disposition:       Anticipated Discharge Services:    Anticipated Discharge DME:      Patient/family educated on Medicare website which has current facility and service quality ratings:    Education Provided on the Discharge Plan:    Patient/Family in Agreement with the Plan:      Referrals Placed by CM/DIANN:    Private pay costs discussed: Not applicable    Additional Information:  DIANN scheduled [facility] stretcher transport for 11/23 at [id number]. DIANN called Nephbryan Love and updated on plan and he is in agreement. DIANN confirmed discharge plan with Zbigniew piedra.    BABATUNDE Mcbride    [facility]

## 2023-11-21 NOTE — PLAN OF CARE
Comfort cares maintained. Pt remains unresponsive to tough and voice. T/R Q2hr. Suctioned trach as needed - thick secretions present. PRN robinol x1 for secretions. PRN dilaudid x1 given this shift. Placed quinonez for comfort with adequate UOP. Possible discharge on hospice today.

## 2023-11-21 NOTE — PLAN OF CARE
Goal Outcome Evaluation:    [id number]    Orientation: ANN- unresponsive  Activity: A2, T/R Q2H - bedrest  Diet/BS Checks: NPO, oral swabs/moisturizer with turns  Tele:  n/a  IV Access/Drains: L PICC SL. Trach with inline suction as needed  Pain Management: Scheduled morphine/ativan   Abnormal VS/Results: VS and assessments deferred, comfort cares maintained  Bowel/Bladder: Minimal urine output, purewick in place  Skin/Wounds: scattered bruising, sacral mepi in place  Consults: LAUREN  D/C Disposition: Per SW, possible discharge on hospice 11/21  Other Info:

## 2023-11-22 NOTE — PLAN OF CARE
Goal Outcome Evaluation:  11/21/2023 [id number]    Orientation: ANN - unresponsive  Activity: Bedrest, T/R Q2H  Diet/BS Checks: NPO, oral swabs q2h, turn/repo q2h  Tele:  n/a  IV Access/Drains: PICC SL. Trach with inline suction, quinonez in placed not much output, PEG TF  Pain Management: scheduled ativan/morphine via TF  Abnormal VS/Results: VS and assessments deferred, comfort cares maintained  Bowel/Bladder: Quinonez in place, minimal UOP. No BM  Skin/Wounds: Scattered bruising  Consults: GIP  D/C Disposition: Possible discharge to [facility] 11/23 at [id number] with stretcher transport   Other Info: Inline suction, breathing more slower overnight 6-7 breath/min

## 2023-11-22 NOTE — PROGRESS NOTES
[facility]    Medicine Progress Note - Hospitalist Service    Date of Admission:  11/17/2023    Assessment & Plan     Kortney Germain is a 68-year-old female patient with past medical history significant for schizoaffective disorder, tardive dyskinesia, hypertension, anxiety, and depression.  She was initially admitted on 9/28/2023 for UTI, constipation, and anemia attributed to rectal bleeding from mucosal injury post enema (received 1 U PRBCs). She had decreased LOC and generalized shaking overnight 9/28/23. RRT called and pt was intubated 9/29/23 for encephalopathy and inability to protect her airway, subsequently diagnosed with refractory status epilepticus requiring multiple AEDs, high dose steroids, and IV versed with ongoing breakthrough seizure. She underwent trach and PEG placement throughout her course. She was transferred out of the ICU on 10/24/23, but ultimately returned to the ICU 11/5/23 for refractory seizure with need for ventilatory support and initiation of IV versed. Case further complicated by inability to locate next of kin which was ultimately found on 11/8. After reviewing the case with the Intensivist team, family elected to transition to comfort cares. Pt was compassionately extubated 11/9/23 evening.      No change in patient's mental status she remains unresponsive and obtunded currently     Comfort cares:   - GIP hospice consulted, patient now in GIP  - Transitioned off IV Versed, given 4mg Ativan X2 , now on scheduled Ativan 2 mg q4 hrs   - Continue IV Keppra 1500 mg BID, Vimpat 250 mg BID, Valproic acid 250 mg q8 hrs, Clobazam 20 mg BID for seizures   - Comfort medications available  - When patient passes, group home ([facility]) and Nephew (Ivan) need to be contacted   Pt is on IV Keppra and IV ativan 2mg Q 4hours changed to via feeding tube on 11/21/23   Want to make sure she does not have further seizures in the next 36 hours with the change     Hospital  course  ?  Recurrence of status epilepticus versus persistent  Prolonged encephalopathy following presentation of new onset refractory status epilepticus  Seizure disorder  *EEG 23: Abnormal due to the presences of generalized periodic pattern consisting of 2-3.5 hz discharges, maximum negativity in bifrontal region, periodic pattern occupy 90% of the record and the remaining portion consist of delta slowing     *CT Head : no acute intracranial pathology, brain atrophy,   *MRI 23: no acute intracranial process, generalized atrophy,   *MRI 10/11/23: no acute intracranial process, no hemorrhage, moderate-advanced brain atrophy and leukoaraiosis,   *MRI 10/20/23: no interval change   *BC: NG  *CSF analysis: glucose 98 protein 22.8, 2 nucleated cells, RBC 0, aerobic culture GPC in broth only anaerobic culture NGTD, HSV negative  *[place] encephalopathy autoimmune panel negative  *CT CAP  no evidence or concern for malignancy   *Ambien trial  negative   * transferred back to the ICU for initiation of versed drip and reintubation  *sEEG : 1-2Hz GPDs, per Dr Castaneda on ictal-interictal continuum ~ status epilepticus  *cEEG - : runs of GPEDs, 1-2 Hz alternating with generalized slowing; versed increased to 4 ml/hr ---> with improvement of GPEDS  *CSF : Basic Profile: 2 nucleated cells, Protein 20, Glucose 88. Gram stain: no organism, 1+ WBC. Meningitis/Encephalitis panel: negative  VZV, HSV PCR - negative. ENS2 Encephalopathy panel. CSF 14-3-3 pending. Flow Cytometry negative. EBV negative. CMV negative. WNV negative. Meningitis/encephalitis panel negative.  *Pending CSF studies: Anti-LG1 (this is associated with faciobrachial seizures, which pt has), anti-VGKC, anti-striational, anti-Hu, anti-Ma, anti-VGCC, enterovirus, bartonella, mycoplasma, T whippeli, 14-3-3. Clobazam metabolite  - Review Neurology notes for complete details regarding evaluation and treatment to date     Acute  hypoxic respiratory failure secondary to CNS failure/loss of protective airway reflexes on prolonged mechanical ventilatory support status post trach tube placement on 10/20/2023 by Dr. Benites  - Supplemental oxygen PRN for comfort      Possible hospital-acquired pneumonia-treated  Urinary tract infection, pseudomonas, treated  *low-grade temp of 99 on 10/23/2023 with low-grade leukocytosis  *urine culture grew Pseudomonas  *Was started on empiric cefepime on 10/24/2023.  However this lowers seizure threshold.  Discontinued and transition to IV ceftriaxone.  Patient is allergic to penicillin so cannot use Zosyn.  Meropenem also reduce the seizure threshold.  * MRSA swab negative. Sputum culture shows mixed christina  *Chest x-ray--> possible retrocardiac infiltrate  - Ceftriaxone stopped and Levaquin initiated per ID. Course completed on 10/30     Moderate malnutrition, status post PEG tube placement (10/27)   - TF discontinued per Intensivist team with transition to comfort cares      Hypertension: PTA on Nifedipine on MAR. Treated with Lasix IV then changed to 40mg PO BID on 10/26 - lasix stopped 10/30. Started on Atenolol 25mg daily on 10/30, discontinued 11/6  - No indication for antihypertensives under comfort cares, last /63      Anemia normocytic likely secondary to prolonged critical illness and frequent phlebotomy. Did require 1 U PRBCs on admission for Hgb 6 range which was attributed to rectal bleeding from mucosal injury post enema.         Schizoaffective disorder  Tardive dyskinesia  Anxiety, depression,  - Not currently on medications for these      Hypokalemia, resolved: Noted while receiving diuresis   Hypophosphatemia, resolved  Thrombocytosis, resolved      DVT Prophylaxis: Comfort cares   Liu Catheter: Not present  Lines: PRESENT      PICC 09/30/23 Triple Lumen Left Basilic ok to use-Site Assessment: WDL      Cardiac Monitoring: None  Code Status: No CPR- Do NOT Intubate         Clinically  Significant Risk Factors             # Hypoalbuminemia: Lowest albumin = 3.4 g/dL at 9/30/2023  5:15 AM, will monitor as appropriate     # Hypertension: Noted on problem list         # Moderate Malnutrition: based on nutrition assessment         Disposition Plan     Expected Discharge Date: 11/22/2023    Discharge Delays: Comfort Care/Hospice  Discharge Comments: 9/28 ED admit.  Care conference 11/3 11:30            Neetu Torres M.D.  Hospitalist  [facility]  Securely message with the [employer] (learn more here)  Text page via [employer] Paging/Directory      ___________________________________________________________________       Interval History  Patient is nonverbal, cannot obtain review of systems from her.  Discussed with RN.  Patient has less respiratory effort today.  RN continues to suction light green secretions from tracheostomy.  No new GI complaints are noted.     Physical Exam  Vital Signs:               O2 Device: None (Room air) Oxygen Delivery: 2 LPM  Weight: 102 lbs 4.7 oz     Constitutional: Frail woman, looks chronically ill  Respiratory: Has tracheostomy, decreased breath sounds throughout  Cardiovascular: Regular rate and rhythm  GI: Normal bowel sounds, soft, non-distended, non-tender  Skin/Integumen: She is pale, has some waxy edema of both lower extremities  Other: Cannot assess mood due to obtundation       Clinically Significant Risk Factors Present on Admission       # Hypertension: Noted on problem list                  Disposition Plan                 ______________________________________________________________________

## 2023-11-22 NOTE — PLAN OF CARE
Goal Outcome Evaluation:    [id number]    Orientation: ANN - unresponsive  Activity: Bedrest, T/R Q2H  Diet/BS Checks: NPO, oral swabs with turns  Tele:  n/a  IV Access/Drains: PICC SL. Trach with inline suction as needed. RT managing trach cares  Pain Management: scheduled morphine/ativan  Abnormal VS/Results: VS and assessments deferred,  comfort cares maintained  Bowel/Bladder: Liu in place, minimal UOP  Skin/Wounds: Scattered bruising  Consults: GIP  D/C Disposition: Possible discharge tomorrow   Other Info: long periods of apnea, RR 6

## 2023-11-22 NOTE — CONSULTS
[facility]    Consult Note - [facility]  _________________________________________________________________    [facility] Contact Number: [phone]    - Providers: Please contact [facility] with changes in orders or clinical plan of care   - Nursing: Please contact [facility] with significant changes in patient condition    Hospice will notify the care team (including the hospitalist) to confirm date of inpatient hospice (GIP) admission.    New Epic encounter will not be created until hospice completes admission.   ______________________________________________________________________        Hospice Diagnosis: onset seizures    Indication for Inpatient Hospice: IV meds, suction, pain    Goals for Hospital Discharge: 3 or less PRN's in 24 hours, goals for discharge met, anticipated discharge date tomorrow 11.23.23    Plan of Care Discussed with the Following:   - Nurse: CECILIA Lim  - Hospitalist/Rounding Provider: Dr. Torres   - Kortney's Family/Preferred Contact: Ivan Crespo  - Hospice Provider: Dr. Sandoval     Summary of Visit (includes assessment, medications and any new orders):   Met with pateint RR 4. HR 60. Floor nurse administered scheduled medications.     No new order recommendations at this time.       Ely Miller

## 2023-11-22 NOTE — PLAN OF CARE
Goal Outcome Evaluation:    [id number]    Orientation: ANN - unresponsive  Activity: Bedrest, T/R Q2H  Diet/BS Checks: NPO, oral swabs with turns  Tele:  n/a  IV Access/Drains: PICC SL. Trach with inline suction  Pain Management: scheduled ativan/morphine  Abnormal VS/Results: VS and assessments deferred, comfort cares maintained  Bowel/Bladder: Liu in place, minimal UOP. No BM  Skin/Wounds: Scattered bruising  Consults: GIP  D/C Disposition: Possible discharge to [facility] 11/22   Other Info: Inline suction and inner cannula changed by RT this shift

## 2023-11-22 NOTE — PROGRESS NOTES
Care Management Follow Up    Length of Stay (days): 5    Expected Discharge Date: 11/23/2023     Concerns to be Addressed:       Patient plan of care discussed at interdisciplinary rounds: Yes    Anticipated Discharge Disposition:       Anticipated Discharge Services:    Anticipated Discharge DME:      Patient/family educated on Medicare website which has current facility and service quality ratings:    Education Provided on the Discharge Plan:    Patient/Family in Agreement with the Plan:      Referrals Placed by CM/DIANN:    Private pay costs discussed: Not applicable    Additional Information:  DIANN called Isabelle with [facility] to inquire about hospice intake time tomorrow. DIANN called [facility] and confirmed discharge plans tomorrow 11/23 at [phone]. DIANN confirmed discharge orders can be sent via the in[employer]omee.       Addendum 1500: DIANN called Natividad with [facility] and left a  inquiring about intake time tomorrow.    Addendum: DIANN informed [facility] plans to meet with patient right at admission to [facility]  BABATUNDE Mcbride    [facility]

## 2023-11-23 NOTE — PLAN OF CARE
Goal Outcome Evaluation:    Orientation: ANN, unresponsive  Activity: Bedrest, T/R Q2H  Diet/BS Checks: NPO, oral care  Tele:  n/a  IV Access/Drains: PICC SL. Trach with inline suction, PEG TF.  Pain Management: Schedule roxanol and ativan, no signs of pain noted.  Abnormal VS/Results: VS and assessments deferred.  Bowel/Bladder: Liu in place w/ minimal UOP. No BM.  Skin/Wounds: Scattered bruising  Consults: GIP  D/C Disposition: Possible discharge today to [facility] 11/23 at [phone] w/ stretcher transport.  Other Info: Periods of apnea noted (~5 sec), suctioned PRN d/t secretions.

## 2023-11-23 NOTE — PROGRESS NOTES
Care Management Follow Up    Length of Stay (days): 6    Expected Discharge Date: 11/23/2023     Concerns to be Addressed:       Patient plan of care discussed at interdisciplinary rounds: No    Anticipated Discharge Disposition:       Anticipated Discharge Services:    Anticipated Discharge DME:      Patient/family educated on Medicare website which has current facility and service quality ratings:    Education Provided on the Discharge Plan:    Patient/Family in Agreement with the Plan:      Referrals Placed by CM/SW:    Private pay costs discussed: Not applicable    Additional Information:  Writer able to see unit  has set up discharge for [facility] with hospice today and [facility] for [phone].     Writer placed phone call to [facility] hospice. No answer. Writer spoke to  supervisor who states writer can call Isabelle with [facility] [phone].    Writer placed phone call to Isabelle at [phone] at 846am. No answer. Writer left voicemail asking for call back.    Writer spoke with  who states that she believes pt is stable for discharge but will visit pt. Writer informed  of transport time and awaiting to hear back from hospice agency.  States understanding.    Addendum: Writer spoke with Charge RN who states that hospice nurse Isabelle does not feel pt is stable for discharge. Charge is in agreement. Charge is paging doctor to see if she feels pt's discharge should be cancelled.    Writer is later updated by charge RN that  Is in agreement to cancel discharge.  Writer called [facility] and spoke to Anjana in nursing station lt. They are aware of plan for pt to discharge there today. Writer updated nurse that pt will not be discharging there now as not considered stable today. Anjana states understanding.  Writer called  [facility] transport and spoke with Melquiades. Writer cancelled transport.    Deb Lay, LOGANW  Social Work  [facility]  [facility]

## 2023-11-23 NOTE — PROGRESS NOTES
[facility]    Consult Note - [facility]  _________________________________________________________________    [facility]  Contact Number: [phone]    - Providers: Please contact [facility] with changes in orders or clinical plan of care   - Nursing: Please contact [facility] with significant changes in patient condition    Hospice will notify the care team (including the hospitalist) to confirm date of inpatient hospice (GIP) admission.    New Epic encounter will not be created until hospice completes admission.   ______________________________________________________________________        Hospice Diagnosis: onset seizures     Indication for Inpatient Hospice: IV medications, suction, and pain    Goals for Hospital Discharge: at this time not stable for transportation    Plan of Care Discussed with the Following:   - Nurse: CECILIA Up  - Charge Nurse: talked with charge rn   - Hospitalist/Rounding Provider: Dr. Torres - updated     - Kortney's Family/Preferred Contact: Iraj  - Hospice Provider: Dr Sandoval    Summary of Visit (includes assessment, medications and any new orders):   Met with Kortney and Jeovanny RN for daily GIP assessment and cares. Patient is unresponsive to voice or touch. Patient is pale, waxy, and head is hyperextended. P:60 thready R:5 B/P:108/57 o2-sats:81%RA. Patient is actively dying and not stable for discharge to LTC. PICC is CDI. Liu is patent with minimal UOP. Patient was pre-medicated Dilaudid 0.5mg IVP, Robinul 0.2mg for terminal secretions, and scheduled Ativan for T/R with A-2.     Called Ivan Andrew - nephew with an update regarding Kortney is not stable for transport. Also Ivan stated there isn't a  home picked out yet.     Called and left voice message for  to call back with  home information so it can be passed on to Ivan to decided on a  home.     Dr. Sandoval, Hospice Medical Director  called and updated on patient and agrees patient is not stable for transfer to  LTC.    Talked with charge nurse on patient's condition and not stable for transfer to LTC.    I would like to thank the all the staff for the exceptional care for Kortney.    Debra Behselich, RN  [facility] [place]

## 2023-11-23 NOTE — PLAN OF CARE
Orientation: ANN-unresponsive  Activity: Bedrest-unresponsive  Diet/BS Checks: NPO  Tele:  N/A  IV Access/Drains: LPICC-3Lumens  Pain Management: Scheduled morphine and ativan  Abnormal VS/Results: Assessments deferred-Comfort Cares  Bowel/Bladder: Liu in place. No BM this shift.  Skin/Wounds: Scattered bruising  Consults: GIP  Other Info: T/R Q 2hrs, oral cares, and music therapy to keep pt comfortable.

## 2023-11-23 NOTE — PROGRESS NOTES
[facility]    Medicine Progress Note - Hospitalist Service    Date of Admission:  11/17/2023    Assessment & Plan     Kortney Germain is a 68-year-old female patient with past medical history significant for schizoaffective disorder, tardive dyskinesia, hypertension, anxiety, and depression.  She was initially admitted on 9/28/2023 for UTI, constipation, and anemia attributed to rectal bleeding from mucosal injury post enema (received 1 U PRBCs). She had decreased LOC and generalized shaking overnight 9/28/23. RRT called and pt was intubated 9/29/23 for encephalopathy and inability to protect her airway, subsequently diagnosed with refractory status epilepticus requiring multiple AEDs, high dose steroids, and IV versed with ongoing breakthrough seizure. She underwent trach and PEG placement throughout her course. She was transferred out of the ICU on 10/24/23, but ultimately returned to the ICU 11/5/23 for refractory seizure with need for ventilatory support and initiation of IV versed. Case further complicated by inability to locate next of kin which was ultimately found on 11/8. After reviewing the case with the Intensivist team, family elected to transition to comfort cares. Pt was removed from ventilator support 11/9/23 evening.       Comfort cares:   - [facility] hospice consulted, patient has been in [facility] since 11/17  - Transitioned off IV Versed, given 4mg Ativan X2 , now on scheduled Ativan 2 mg q4 hrs   - Continue Keppra 1500 mg per FT BID, Vimpat 250 mg BID, Valproic acid 250 mg q8 hrs, Clobazam 20 mg BID for seizures   - Comfort medications available  - When patient passes, group home ([facility]) and Nephew (Ivan) need to be contacted   - Plan was to discharge to [place] with [facility] 11/23  - Her overall condition continues to be tenuous, hospice staff believe she is not stable for transfer to long-term care, discharge canceled    Hospital course  ?  Recurrence of status  epilepticus versus persistent  Prolonged encephalopathy following presentation of new onset refractory status epilepticus  Seizure disorder  *EEG 23: Abnormal due to the presences of generalized periodic pattern consisting of 2-3.5 hz discharges, maximum negativity in bifrontal region, periodic pattern occupy 90% of the record and the remaining portion consist of delta slowing     *CT Head : no acute intracranial pathology, brain atrophy,   *MRI 23: no acute intracranial process, generalized atrophy,   *MRI 10/11/23: no acute intracranial process, no hemorrhage, moderate-advanced brain atrophy and leukoaraiosis,   *MRI 10/20/23: no interval change   *BC: NG  *CSF analysis: glucose 98 protein 22.8, 2 nucleated cells, RBC 0, aerobic culture GPC in broth only anaerobic culture NGTD, HSV negative  *[place] encephalopathy autoimmune panel negative  *CT CAP  no evidence or concern for malignancy   *Ambien trial  negative   * transferred back to the ICU for initiation of versed drip and reintubation  *sEEG : 1-2Hz GPDs, per Dr Castaneda on ictal-interictal continuum ~ status epilepticus  *cEEG - : runs of GPEDs, 1-2 Hz alternating with generalized slowing; versed increased to 4 ml/hr ---> with improvement of GPEDS  *CSF : Basic Profile: 2 nucleated cells, Protein 20, Glucose 88. Gram stain: no organism, 1+ WBC. Meningitis/Encephalitis panel: negative  VZV, HSV PCR - negative. ENS2 Encephalopathy panel. CSF 14-3-3 pending. Flow Cytometry negative. EBV negative. CMV negative. WNV negative. Meningitis/encephalitis panel negative.  *Pending CSF studies: Anti-LG1 (this is associated with faciobrachial seizures, which pt has), anti-VGKC, anti-striational, anti-Hu, anti-Ma, anti-VGCC, enterovirus, bartonella, mycoplasma, T whippeli, 14-3-3. Clobazam metabolite  - Review Neurology notes for complete details regarding evaluation and treatment to date     Acute hypoxic respiratory failure  secondary to CNS failure/loss of protective airway reflexes on prolonged mechanical ventilatory support status post trach tube placement on 10/20/2023 by Dr. Benites  - Supplemental oxygen PRN for comfort      Possible hospital-acquired pneumonia-treated  Urinary tract infection, pseudomonas, treated  *low-grade temp of 99 on 10/23/2023 with low-grade leukocytosis  *urine culture grew Pseudomonas  *Was started on empiric cefepime on 10/24/2023.  However this lowers seizure threshold.  Discontinued and transition to IV ceftriaxone.  Patient is allergic to penicillin so cannot use Zosyn.  Meropenem also reduce the seizure threshold.  * MRSA swab negative. Sputum culture shows mixed christina  *Chest x-ray--> possible retrocardiac infiltrate  - Ceftriaxone stopped and Levaquin initiated per ID. Course completed on 10/30     Moderate malnutrition, status post PEG tube placement (10/27)   - TF discontinued per Intensivist team with transition to comfort cares      Hypertension: PTA on Nifedipine on MAR. Treated with Lasix IV then changed to 40mg PO BID on 10/26 - lasix stopped 10/30. Started on Atenolol 25mg daily on 10/30, discontinued 11/6  - No indication for antihypertensives under comfort cares, last /63      Anemia normocytic likely secondary to prolonged critical illness and frequent phlebotomy. Did require 1 U PRBCs on admission for Hgb 6 range which was attributed to rectal bleeding from mucosal injury post enema.         Schizoaffective disorder  Tardive dyskinesia  Anxiety, depression,  - Not currently on medications for these      Hypokalemia, resolved: Noted while receiving diuresis   Hypophosphatemia, resolved  Thrombocytosis, resolved      DVT Prophylaxis: Comfort cares   Liu Catheter: Not present  Lines: PRESENT      PICC 09/30/23 Triple Lumen Left Basilic ok to use-Site Assessment: WDL      Cardiac Monitoring: None  Code Status: No CPR- Do NOT Intubate         Clinically Significant Risk Factors              # Hypoalbuminemia: Lowest albumin = 3.4 g/dL at 9/30/2023  5:15 AM, will monitor as appropriate     # Hypertension: Noted on problem list         # Moderate Malnutrition: based on nutrition assessment         Disposition Plan     Expected Discharge Date: none  Discharge Delays: Comfort Care/Hospice  Discharge Comments: 9/28 ED admit.  Care conference 11/3 11:30           Neetu Torres M.D.  Hospitalist  [facility]  Securely message with the Vocera Web Console (learn more here)  Text page via [employer] Paging/Directory      ___________________________________________________________________       Interval History  Patient is nonverbal, cannot obtain review of systems from her.  Discussed with RN.  Patient's respiratory rate has declined further today and she needs frequent suctioning of secretions.  No seizures noted. No new GI complaints are noted.     Physical Exam  Vital Signs:               O2 Device: None (Room air) Oxygen Delivery: 2 LPM  Weight: 102 lbs 4.7 oz     Constitutional: Frail woman, looks chronically ill  Respiratory: Has tracheostomy, with coarse upper airway noise  Cardiovascular: Regular rate and rhythm  GI: Normal bowel sounds, soft, non-distended, non-tender.  There is a feeding tube in the left upper quadrant, no surrounding redness or drainage  Skin/Integumen: She is pale, has some waxy edema of both lower extremities  Other: Cannot assess mood due to obtundation       Clinically Significant Risk Factors Present on Admission       # Hypertension: Noted on problem list                  Disposition Plan                 ______________________________________________________________________

## 2023-11-23 NOTE — PLAN OF CARE
Goal Outcome Evaluation:       Patient remains on comfort care. On scheduled Morphine and lorazepam. A one time 0.5 mg Iv dilaudid given. Appear without signs pain. Depressed breathing, about 5-7 per minute. The plan to transfer patient to outside facility was abandoned as there was a concern for safe transfer of patient. Remains unarousable. Trach with ample mucus secretion. Inline suction provided. Robinul given as well. Turn and reposition. Liu with about 150 mg brown output.

## 2023-11-24 NOTE — PLAN OF CARE
Orientation: ANN, unresponsive     Activity: Not OOB, T&R q 2hr     Diet/BS Checks: N/A, NPO    Tele: Deferred-comfort cares     IV Access/Drains: LPICC CDI SL    Pain Management: Scheduled morphine     Abnormal VS/Results: Deferred-comfort cares     Bowel/Bladder: No BM, low UOP, quinonez CDI     Skin/Wounds: Intact     Consults: GIP hospice     D/C Disposition: Plan to pass in hospital     Other Info: Prn robinul given x1, pt suctioned x3 this shift

## 2023-11-24 NOTE — PLAN OF CARE
Goal Outcome Evaluation:    [id number]  Orientation: ANN, unresponsive  Activity: bedrest, T/R q2hrs  Diet/BS Checks: NPO  Tele:  n/a  IV Access/Drains: L PICC SL. PEG tube clamped.  Pain Management: Scheduled ativan, morphine given.   Abnormal VS/Results: deferred d/t comfort care.   Bowel/Bladder: Liu in place w/ minimal UOP. No BM.  Skin/Wounds: Scattered bruising  Consults: GIP  D/C Disposition: plan to pass in hospital   Other Info: depressed breathing noted ~5 sec, suctioned trach PRN d/t secretions.

## 2023-11-24 NOTE — PROGRESS NOTES
[facility]    Medicine Progress Note - Hospitalist Service    Date of Admission:  11/17/2023    Assessment & Plan     Kortney Germain is a 68-year-old female patient with past medical history significant for schizoaffective disorder, tardive dyskinesia, hypertension, anxiety, and depression.  She was initially admitted on 9/28/2023 for UTI, constipation, and anemia attributed to rectal bleeding from mucosal injury post enema (received 1 U PRBCs). She had decreased LOC and generalized shaking overnight 9/28/23. RRT called and pt was intubated 9/29/23 for encephalopathy and inability to protect her airway, subsequently diagnosed with refractory status epilepticus requiring multiple AEDs, high dose steroids, and IV versed with ongoing breakthrough seizure. She underwent trach and PEG placement throughout her course. She was transferred out of the ICU on 10/24/23, but ultimately returned to the ICU 11/5/23 for refractory seizure with need for ventilatory support and initiation of IV versed. Case further complicated by inability to locate next of kin which was ultimately found on 11/8. After reviewing the case with the Intensivist team, family elected to transition to comfort cares. Pt was removed from ventilator support 11/9/23 evening.       Comfort cares:   - [facility] hospice consulted, patient has been in [facility] since 11/17  - Transitioned off IV Versed, given 4mg Ativan X2 , now on scheduled Ativan 2 mg q4 hrs   - Continue Keppra 1500 mg per FT BID, Vimpat 250 mg BID, Valproic acid 250 mg q8 hrs, Clobazam 20 mg BID for seizures   - Comfort medications available  - When patient passes, group home ([facility]) and Nephew (Ivan) need to be contacted   - Plan was to discharge to [place] with [facility] 11/23  - Her overall condition continues to be tenuous, hospice staff believe she is not stable for transfer to long-term care, discharge canceled    Hospital course  ?  Recurrence of status  epilepticus versus persistent  Prolonged encephalopathy following presentation of new onset refractory status epilepticus  Seizure disorder  *EEG 23: Abnormal due to the presences of generalized periodic pattern consisting of 2-3.5 hz discharges, maximum negativity in bifrontal region, periodic pattern occupy 90% of the record and the remaining portion consist of delta slowing     *CT Head : no acute intracranial pathology, brain atrophy,   *MRI 23: no acute intracranial process, generalized atrophy,   *MRI 10/11/23: no acute intracranial process, no hemorrhage, moderate-advanced brain atrophy and leukoaraiosis,   *MRI 10/20/23: no interval change   *BC: NG  *CSF analysis: glucose 98 protein 22.8, 2 nucleated cells, RBC 0, aerobic culture GPC in broth only anaerobic culture NGTD, HSV negative  *[place] encephalopathy autoimmune panel negative  *CT CAP  no evidence or concern for malignancy   *Ambien trial  negative   * transferred back to the ICU for initiation of versed drip and reintubation  *sEEG : 1-2Hz GPDs, per Dr Castaneda on ictal-interictal continuum ~ status epilepticus  *cEEG - : runs of GPEDs, 1-2 Hz alternating with generalized slowing; versed increased to 4 ml/hr ---> with improvement of GPEDS  *CSF : Basic Profile: 2 nucleated cells, Protein 20, Glucose 88. Gram stain: no organism, 1+ WBC. Meningitis/Encephalitis panel: negative  VZV, HSV PCR - negative. ENS2 Encephalopathy panel. CSF 14-3-3 pending. Flow Cytometry negative. EBV negative. CMV negative. WNV negative. Meningitis/encephalitis panel negative.  *Pending CSF studies: Anti-LG1 (this is associated with faciobrachial seizures, which pt has), anti-VGKC, anti-striational, anti-Hu, anti-Ma, anti-VGCC, enterovirus, bartonella, mycoplasma, T whippeli, 14-3-3. Clobazam metabolite  - Review Neurology notes for complete details regarding evaluation and treatment to date     Acute hypoxic respiratory failure  secondary to CNS failure/loss of protective airway reflexes on prolonged mechanical ventilatory support status post trach tube placement on 10/20/2023 by Dr. Benites  - Supplemental oxygen PRN for comfort      Possible hospital-acquired pneumonia-treated  Urinary tract infection, pseudomonas, treated  *low-grade temp of 99 on 10/23/2023 with low-grade leukocytosis  *urine culture grew Pseudomonas  *Was started on empiric cefepime on 10/24/2023.  However this lowers seizure threshold.  Discontinued and transition to IV ceftriaxone.  Patient is allergic to penicillin so cannot use Zosyn.  Meropenem also reduce the seizure threshold.  * MRSA swab negative. Sputum culture shows mixed christina  *Chest x-ray--> possible retrocardiac infiltrate  - Ceftriaxone stopped and Levaquin initiated per ID. Course completed on 10/30     Moderate malnutrition, status post PEG tube placement (10/27)   - TF discontinued per Intensivist team with transition to comfort cares      Hypertension: PTA on Nifedipine on MAR. Treated with Lasix IV then changed to 40mg PO BID on 10/26 - lasix stopped 10/30. Started on Atenolol 25mg daily on 10/30, discontinued 11/6  - No indication for antihypertensives under comfort cares, last /63      Anemia normocytic likely secondary to prolonged critical illness and frequent phlebotomy. Did require 1 U PRBCs on admission for Hgb 6 range which was attributed to rectal bleeding from mucosal injury post enema.         Schizoaffective disorder  Tardive dyskinesia  Anxiety, depression,  - Not currently on medications for these      Hypokalemia, resolved: Noted while receiving diuresis   Hypophosphatemia, resolved  Thrombocytosis, resolved      DVT Prophylaxis: Comfort cares   Liu Catheter: Not present  Lines: PRESENT      PICC 09/30/23 Triple Lumen Left Basilic ok to use-Site Assessment: WDL      Cardiac Monitoring: None  Code Status: No CPR- Do NOT Intubate         Clinically Significant Risk Factors              # Hypoalbuminemia: Lowest albumin = 3.4 g/dL at 9/30/2023  5:15 AM, will monitor as appropriate     # Hypertension: Noted on problem list         # Moderate Malnutrition: based on nutrition assessment         Disposition Plan     Expected Discharge Date: none  Discharge Delays: Comfort Care/Hospice  Discharge Comments: 9/28 ED admit.  Care conference 11/3 11:30           Neetu Torres M.D.  Hospitalist  [facility]  Securely message with the Vocera Web Console (learn more here)  Text page via [employer] Paging/Directory      ___________________________________________________________________       Interval History  Patient is nonverbal, cannot obtain review of systems from her.  Reviewed nursing notes.  They indicate patient has periods of apnea, I did witness these at the bedside.  They continued to suction the airway as needed for comfort.  No new GI complaints are noted.     Physical Exam  Vital Signs:               O2 Device: None (Room air) Oxygen Delivery: 2 LPM  Weight: 102 lbs 4.7 oz     Constitutional: Frail woman, looks chronically ill  Respiratory: Has tracheostomy.  Breath sounds are decreased throughout.  She has periods of apnea followed by a deep, sighing respiration (Cheyne-Winter breathing pattern)  Cardiovascular: Regular rate and rhythm  GI: Normal bowel sounds, soft, non-distended, non-tender.  There is a feeding tube in the left upper quadrant, no surrounding redness or drainage  Skin/Integumen: She is pale, has some waxy edema of both lower extremities  Other: Cannot assess mood due to obtundation       Clinically Significant Risk Factors Present on Admission       # Hypertension: Noted on problem list                  Disposition Plan                 ______________________________________________________________________

## 2023-11-24 NOTE — PLAN OF CARE
[id number]    Orientation: ANN, unresponsive  Activity: bedrest, TR q2h  Diet/BS Checks: NPO  Tele:  n/a  IV Access/Drains: L PICC SL, Gtube clamped  Pain Management: Scheduled ativan and morphine  Abnormal VS/Results: deferred d/t comfort cares  Bowel/Bladder: Liu with minimal UOP, no bm this shift  Skin/Wounds: scattered bruising  Consults: GIP  D/C Disposition: plan to pass in hospital  Other Info: Periods of apnea. Pt was suctioned x1.

## 2023-11-25 NOTE — PROGRESS NOTES
[facility]    Consult Note - [facility]patient [facility]  _________________________________________________________________    [facility] 24/7 Contact Number: [phone]    - Providers: Please contact [facility] with changes in orders or clinical plan of care   - Nursing: Please contact [facility] with significant changes in patient condition    Hospice will notify the care team (including the hospitalist) to confirm date of inpatient hospice (GIP) admission.    New Epic encounter will not be created until hospice completes admission.   ______________________________________________________________________        Hospice Diagnosis: onset seizures    Indication for Inpatient Hospice: IV medications, suction, and pain    Goals for Hospital Discharge: at this time patient is not stable for transportation to change to a lower level of care.    Plan of Care Discussed with the Following:   - Nurse: CECILIA Awad  - Hospice Provider: Dr. Ines Sandoval, Hospice    Summary of Visit (includes assessment, medications and any new orders):   Met with Isabelle daily [facility] assessment and cares. Patient is unresponsive and actively dying. P:65 R: 4 with periods of 20 seconds + apnea, B/P:95/53 SP02: 85%RA. Trach is CDI. PICC CDI. Patient received no PRNs.  Liu patent and small amount of aleida urine in bedside bag     Debra Behselich, RN  [facility] / [place]

## 2023-11-25 NOTE — PLAN OF CARE
Orientation: ANN, unresponsive  Activity: bedrest, q2 turn/repo  Diet/BS Checks: NPO  Tele:  n/a  IV Access/Drains: L PICC SL. PEG tube clamped.  Pain Management: Scheduled ativan and morphine given.   Abnormal VS/Results: deferred d/t comfort care.   Bowel/Bladder: Liu in place . No BM this shift  Skin/Wounds: Scattered bruising  Consults: GIP  D/C Disposition: plan to pass in hospital   Other Info: periods of apnea, suctioned trach 4x this shift.

## 2023-11-25 NOTE — PROGRESS NOTES
[facility]    Medicine Progress Note - Hospitalist Service    Date of Admission:  11/17/2023    Assessment & Plan     Kortney Germain is a 68-year-old female patient with past medical history significant for schizoaffective disorder, tardive dyskinesia, hypertension, anxiety, and depression.  She was initially admitted on 9/28/2023 for UTI, constipation, and anemia attributed to rectal bleeding from mucosal injury post enema (received 1 U PRBCs). She had decreased LOC and generalized shaking overnight 9/28/23. RRT called and pt was intubated 9/29/23 for encephalopathy and inability to protect her airway, subsequently diagnosed with refractory status epilepticus requiring multiple AEDs, high dose steroids, and IV versed with ongoing breakthrough seizure. She underwent trach and PEG placement throughout her course. She was transferred out of the ICU on 10/24/23, but ultimately returned to the ICU 11/5/23 for refractory seizure with need for ventilatory support and initiation of IV versed. Case further complicated by inability to locate next of kin which was ultimately found on 11/8. After reviewing the case with the Intensivist team, family elected to transition to comfort cares. Pt was removed from ventilator support 11/9/23 evening.       Comfort cares:   - [facility] hospice consulted, patient has been in GIP since 11/17  - Transitioned off IV Versed, given 4mg Ativan X2 , now on scheduled Ativan 2 mg q4 hrs   - Continue Keppra 1500 mg per FT BID, Vimpat 250 mg BID, Valproic acid 250 mg q8 hrs, Clobazam 20 mg BID for seizures   - Comfort medications available  - When patient passes, group home ([facility]) and Nephew (Ivan) need to be contacted   - Plan was to discharge to [place] with [facility] 11/23  - Her overall condition continues to be tenuous, hospice staff believe she is not stable for transfer to long-term care, discharge was canceled 11/23    Hospital course  ?  Recurrence of  status epilepticus versus persistent  Prolonged encephalopathy following presentation of new onset refractory status epilepticus  Seizure disorder  *EEG 23: Abnormal due to the presences of generalized periodic pattern consisting of 2-3.5 hz discharges, maximum negativity in bifrontal region, periodic pattern occupy 90% of the record and the remaining portion consist of delta slowing     *CT Head : no acute intracranial pathology, brain atrophy,   *MRI 23: no acute intracranial process, generalized atrophy,   *MRI 10/11/23: no acute intracranial process, no hemorrhage, moderate-advanced brain atrophy and leukoaraiosis,   *MRI 10/20/23: no interval change   *BC: NG  *CSF analysis: glucose 98 protein 22.8, 2 nucleated cells, RBC 0, aerobic culture GPC in broth only anaerobic culture NGTD, HSV negative  *[place] encephalopathy autoimmune panel negative  *CT CAP  no evidence or concern for malignancy   *Ambien trial  negative   * transferred back to the ICU for initiation of versed drip and reintubation  *sEEG : 1-2Hz GPDs, per Dr Casatneda on ictal-interictal continuum ~ status epilepticus  *cEEG - : runs of GPEDs, 1-2 Hz alternating with generalized slowing; versed increased to 4 ml/hr ---> with improvement of GPEDS  *CSF : Basic Profile: 2 nucleated cells, Protein 20, Glucose 88. Gram stain: no organism, 1+ WBC. Meningitis/Encephalitis panel: negative  VZV, HSV PCR - negative. ENS2 Encephalopathy panel. CSF 14-3-3 pending. Flow Cytometry negative. EBV negative. CMV negative. WNV negative. Meningitis/encephalitis panel negative.  *Pending CSF studies: Anti-LG1 (this is associated with faciobrachial seizures, which pt has), anti-VGKC, anti-striational, anti-Hu, anti-Ma, anti-VGCC, enterovirus, bartonella, mycoplasma, T whippeli, 14-3-3. Clobazam metabolite  - Review Neurology notes for complete details regarding evaluation and treatment to date     Acute hypoxic respiratory  failure secondary to CNS failure/loss of protective airway reflexes on prolonged mechanical ventilatory support status post trach tube placement on 10/20/2023 by Dr. Benites  - Supplemental oxygen PRN for comfort      Possible hospital-acquired pneumonia-treated  Urinary tract infection, pseudomonas, treated  *low-grade temp of 99 on 10/23/2023 with low-grade leukocytosis  *urine culture grew Pseudomonas  *Was started on empiric cefepime on 10/24/2023.  However this lowers seizure threshold.  Discontinued and transition to IV ceftriaxone.  Patient is allergic to penicillin so cannot use Zosyn.  Meropenem also reduce the seizure threshold.  * MRSA swab negative. Sputum culture shows mixed christina  *Chest x-ray--> possible retrocardiac infiltrate  - Ceftriaxone stopped and Levaquin initiated per ID. Course completed on 10/30     Moderate malnutrition, status post PEG tube placement (10/27)   - TF discontinued per Intensivist team with transition to comfort cares      Hypertension: PTA on Nifedipine on MAR. Treated with Lasix IV then changed to 40mg PO BID on 10/26 - lasix stopped 10/30. Started on Atenolol 25mg daily on 10/30, discontinued 11/6  - No indication for antihypertensives under comfort cares, last /63      Anemia normocytic likely secondary to prolonged critical illness and frequent phlebotomy. Did require 1 U PRBCs on admission for Hgb 6 range which was attributed to rectal bleeding from mucosal injury post enema.         Schizoaffective disorder  Tardive dyskinesia  Anxiety, depression,  - Not currently on medications for these      Hypokalemia, resolved: Noted while receiving diuresis   Hypophosphatemia, resolved  Thrombocytosis, resolved      DVT Prophylaxis: Comfort cares   Liu Catheter: Not present  Lines: PRESENT      PICC 09/30/23 Triple Lumen Left Basilic ok to use-Site Assessment: WDL      Cardiac Monitoring: None  Code Status: No CPR- Do NOT Intubate         Clinically Significant Risk  Factors             # Hypoalbuminemia: Lowest albumin = 3.4 g/dL at 9/30/2023  5:15 AM, will monitor as appropriate     # Hypertension: Noted on problem list         # Moderate Malnutrition: based on nutrition assessment         Disposition Plan     Expected Discharge Date: none  Discharge Delays: Comfort Care/Hospice  Discharge Comments: 9/28 ED admit.  Care conference 11/3 11:30           Neetu Torres M.D.  Hospitalist  [facility]  Securely message with the Vocera Web Console (learn more here)  Text page via [employer] Paging/Directory      ___________________________________________________________________       Interval History  Patient is nonverbal, cannot obtain review of systems from her.  I reviewed nursing notes.  They continue to note patient has periods of apnea, she was suctioned x 3 during this shift, given as needed atropine x 2.     Physical Exam  Vital Signs:               O2 Device: None (Room air) Oxygen Delivery: 2 LPM  Weight: 102 lbs 4.7 oz     Constitutional: Frail woman, looks chronically ill  Respiratory: Has tracheostomy.  Breath sounds are decreased throughout.  She has prolonged periods of apnea  Cardiovascular: Regular rate and rhythm  GI: Normal bowel sounds, soft, non-distended, non-tender.  There is a feeding tube in the left upper quadrant, no surrounding redness or drainage  Skin/Integumen: She is pale, has some waxy edema of both lower extremities  Other: Cannot assess mood due to obtundation       Clinically Significant Risk Factors Present on Admission       # Hypertension: Noted on problem list                  Disposition Plan                 ______________________________________________________________________

## 2023-11-25 NOTE — PROGRESS NOTES
Consult Note - [facility]  _________________________________________________________________    [facility] 24/7 Contact Number: [phone]    - Providers: Please contact [facility] with changes in orders or clinical plan of care   - Nursing: Please contact [facility] with significant changes in patient condition    Hospice will notify the care team (including the hospitalist) to confirm date of inpatient hospice (GIP) admission.    New Epic encounter will not be created until hospice completes admission.   ______________________________________________________________________        Hospice Diagnosis: onset seizures    Indication for Inpatient Hospice: iv medications, suction, and pain    Goals for Hospital Discharge: at this time not stable for transportation and actively dying    Plan of Care Discussed with the Following:   - Nurse: CECILIA Awad    - Kortney's Family/Preferred Contact: Talked to brother Marshal on the phone re: update on sister  - Hospice Provider: Dr. Ines Sandoval, Hospice Medical Director    Summary of Visit (includes assessment, medications and any new orders):   Met with Isabelle and Ritesh regarding daily gip assessment and cares. Patient is unresponsive and actively dying. P:54 R: 3 with periods of 20 seconds + apnea, B/P:99/47 SP02: 86%RA. Trach is CDI. PICC CDI. Patient received Robinul x 2 for secretions. Liu patent and scant of aleida urine in bedside bag.     Talked with Marshal brother and will update him daily.    I would like to thank all staff for the exceptional care provided for Isabelle.    Debra Behselich, RN CNL  [facility] / [place]

## 2023-11-25 NOTE — PROGRESS NOTES
Nursing note  Assessment deferred d/t pt is comfort care now. Pt unresponsive. PICC on the left upper arm patent but no blood return noted on the three lumens. T/R Q 2-3 hrs. BLE's elevated on the pillow. BUE's/BLE's edema 2+. Liu patent with low UOP. Trach/PEG tube dressing c/d/I. Scheduled morphine and ativan given.Will continue to monitor pt.

## 2023-11-26 NOTE — PROGRESS NOTES
[facility]    Consult Note - [facility]  _________________________________________________________________    [facility] 24/7 Contact Number: [phone]    - Providers: Please contact [facility] with changes in orders or clinical plan of care   - Nursing: Please contact [facility] with significant changes in patient condition    Hospice will notify the care team (including the hospitalist) to confirm date of inpatient hospice (GIP) admission.    New Epic encounter will not be created until hospice completes admission.   ______________________________________________________________________        Hospice Diagnosis: onset seizures    Indication for Inpatient Hospice: pain agitation seizures    Goals for Hospital Discharge: unstable for transfer to lower level of care    Plan of Care Discussed with the Following:   - Nurse: CECILIA Awad    - Kortney's Family/Preferred Contact: Cynthia  - Hospice Provider: Dr. Sandoval, Hospice    Summary of Visit (includes assessment, medications and any new orders):   Met with Ritesh BROOKS and patient for daily GIP assessment and cares. Patient remains unresponsive and actively dying. P:74 R:24 shallow Spo2: 45% to 50%. Pale, and BLE cool to touch and knees are mottling. N PRN medications over the last 24 hours. Will update Cynthia.    Debra Behselich, RN

## 2023-11-26 NOTE — PLAN OF CARE
[id number]  Orientation: ANN, unresponsive  Activity: bedrest, TR q2h  Diet/BS Checks: NPO  Tele:  n/a  IV Access/Drains: L PICC SL, Gtube clamped  Pain Management: Scheduled ativan and morphine  Abnormal VS/Results: deferred d/t comfort cares  Bowel/Bladder: Liu with minimal UOP, no bm this shift  Skin/Wounds: scattered bruising  Consults: GIP following.  D/C Disposition: plan to pass in hospital  Other Info: Periods of apnea. Pt was suctioned x2. Robinul given 1x.

## 2023-11-26 NOTE — PROGRESS NOTES
[facility]    Medicine Progress Note - Hospitalist Service    Date of Admission:  11/17/2023    Assessment & Plan     Kortney Germain is a 68-year-old female patient with past medical history significant for schizoaffective disorder, tardive dyskinesia, hypertension, anxiety, and depression.  She was initially admitted on 9/28/2023 for UTI, constipation, and anemia attributed to rectal bleeding from mucosal injury post enema (received 1 U PRBCs). She had decreased LOC and generalized shaking overnight 9/28/23. RRT called and pt was intubated 9/29/23 for encephalopathy and inability to protect her airway, subsequently diagnosed with refractory status epilepticus requiring multiple AEDs, high dose steroids, and IV versed with ongoing breakthrough seizure. She underwent trach and PEG placement throughout her course. She was transferred out of the ICU on 10/24/23, but ultimately returned to the ICU 11/5/23 for refractory seizure with need for ventilatory support and initiation of IV versed. Case further complicated by inability to locate next of kin which was ultimately found on 11/8. After reviewing the case with the Intensivist team, family elected to transition to comfort cares. Pt was removed from ventilator support 11/9/23 evening.       Comfort cares:   - [facility] hospice consulted, patient has been in [facility] since 11/17  - Transitioned off IV Versed, given 4mg Ativan X2 , now on scheduled Ativan 2 mg q4 hrs   - Continue Keppra 1500 mg per FT BID, Vimpat 250 mg BID, Valproic acid 250 mg q8 hrs, Clobazam 20 mg BID for seizures   - Comfort medications available  - When patient passes, group home ([facility]) and Nephew (Ivan) need to be contacted   - Plan was to discharge to [place] with [facility] 11/23  - hospice staff believe she is not stable for transfer to long-term care, discharge was canceled 11/23    Hospital course  ?  Recurrence of status epilepticus versus persistent  Prolonged  encephalopathy following presentation of new onset refractory status epilepticus  Seizure disorder  *EEG 23: Abnormal due to the presences of generalized periodic pattern consisting of 2-3.5 hz discharges, maximum negativity in bifrontal region, periodic pattern occupy 90% of the record and the remaining portion consist of delta slowing     *CT Head : no acute intracranial pathology, brain atrophy,   *MRI 23: no acute intracranial process, generalized atrophy,   *MRI 10/11/23: no acute intracranial process, no hemorrhage, moderate-advanced brain atrophy and leukoaraiosis,   *MRI 10/20/23: no interval change   *BC: NG  *CSF analysis: glucose 98 protein 22.8, 2 nucleated cells, RBC 0, aerobic culture GPC in broth only anaerobic culture NGTD, HSV negative  *[place] encephalopathy autoimmune panel negative  *CT CAP  no evidence or concern for malignancy   *Ambien trial  negative   * transferred back to the ICU for initiation of versed drip and reintubation  *sEEG : 1-2Hz GPDs, per Dr Castaneda on ictal-interictal continuum ~ status epilepticus  *cEEG - : runs of GPEDs, 1-2 Hz alternating with generalized slowing; versed increased to 4 ml/hr ---> with improvement of GPEDS  *CSF : Basic Profile: 2 nucleated cells, Protein 20, Glucose 88. Gram stain: no organism, 1+ WBC. Meningitis/Encephalitis panel: negative  VZV, HSV PCR - negative. ENS2 Encephalopathy panel. CSF 14-3-3 pending. Flow Cytometry negative. EBV negative. CMV negative. WNV negative. Meningitis/encephalitis panel negative.  *Pending CSF studies: Anti-LG1 (this is associated with faciobrachial seizures, which pt has), anti-VGKC, anti-striational, anti-Hu, anti-Ma, anti-VGCC, enterovirus, bartonella, mycoplasma, T whippeli, 14-3-3. Clobazam metabolite  - Review Neurology notes for complete details regarding evaluation and treatment to date     Acute hypoxic respiratory failure secondary to CNS failure/loss of protective  airway reflexes on prolonged mechanical ventilatory support status post trach tube placement on 10/20/2023 by Dr. Benites  - Supplemental oxygen PRN for comfort      Possible hospital-acquired pneumonia-treated  Urinary tract infection, pseudomonas, treated  *low-grade temp of 99 on 10/23/2023 with low-grade leukocytosis  *urine culture grew Pseudomonas  *Was started on empiric cefepime on 10/24/2023.  However this lowers seizure threshold.  Discontinued and transition to IV ceftriaxone.  Patient is allergic to penicillin so cannot use Zosyn.  Meropenem also reduce the seizure threshold.  * MRSA swab negative. Sputum culture shows mixed christina  *Chest x-ray--> possible retrocardiac infiltrate  - Ceftriaxone stopped and Levaquin initiated per ID. Course completed on 10/30     Moderate malnutrition, status post PEG tube placement (10/27)   - TF discontinued per Intensivist team with transition to comfort cares      Hypertension: PTA on Nifedipine on MAR. Treated with Lasix IV then changed to 40mg PO BID on 10/26 - lasix stopped 10/30. Started on Atenolol 25mg daily on 10/30, discontinued 11/6  - No indication for antihypertensives under comfort cares, last /63      Anemia normocytic likely secondary to prolonged critical illness and frequent phlebotomy. Did require 1 U PRBCs on admission for Hgb 6 range which was attributed to rectal bleeding from mucosal injury post enema.         Schizoaffective disorder  Tardive dyskinesia  Anxiety, depression,  - Not currently on medications for these      Hypokalemia, resolved: Noted while receiving diuresis   Hypophosphatemia, resolved  Thrombocytosis, resolved      DVT Prophylaxis: Comfort cares   Liu Catheter: Not present  Lines: PRESENT      PICC 09/30/23 Triple Lumen Left Basilic ok to use-Site Assessment: WDL      Cardiac Monitoring: None  Code Status: No CPR- Do NOT Intubate         Clinically Significant Risk Factors      # Hypoalbuminemia: Lowest albumin = 3.4  g/dL at 9/30/2023  5:15 AM, will monitor as appropriate     # Hypertension: Noted on problem list         # Moderate Malnutrition: based on nutrition assessment         Disposition Plan     Expected Discharge Date: none  Discharge Delays: Comfort Care/Hospice  Discharge Comments: 9/28 ED admit.  Care conference 11/3 11:30           Neetu Torres M.D.  Hospitalist  [facility]  Securely message with the [employer]oc[employer] Web Console (learn more here)  Text page via [employer] Paging/Directory      ___________________________________________________________________       Interval History  Patient is nonverbal, cannot obtain review of systems from her.  Reviewed nursing notes.  Patient was suctioned x 2 overnight, given Robinul.  No suctioning needed during the day 11/26.  As needed atropine given.     Physical Exam  Vital Signs:               O2 Device: None (Room air) Oxygen Delivery: 2 LPM  Weight: 102 lbs 4.7 oz     Constitutional: Frail woman, looks chronically ill  Respiratory: Has tracheostomy yielding limegreen secretions.  Respiratory rate was more steady during my visit, with no prolonged periods of apnea.  Cardiovascular: Regular rate and rhythm  GI: Normal bowel sounds, soft, non-distended, non-tender.  There is a feeding tube in the left upper quadrant, no surrounding redness or drainage  Skin/Integumen: She is pale, has some waxy edema of both lower extremities  Other: Cannot assess mood due to obtundation       Clinically Significant Risk Factors Present on Admission       # Hypertension: Noted on problem list                  Disposition Plan                 ______________________________________________________________________

## 2023-11-26 NOTE — PLAN OF CARE
[id number]     Orientation: ANN, unresponsive  Activity: bedrest, TR q2h  Diet/BS Checks: NPO  Tele:  n/a  IV Access/Drains: L PICC SL, Gtube clamped  Pain Management: Scheduled ativan and morphine  Abnormal VS/Results: deferred d/t comfort cares  Bowel/Bladder: Liu with minimal UOP, no bm this shift  Skin/Wounds: scattered bruising  Consults: GIP  D/C Disposition: plan to pass in hospital  Other Info: Shallow breathing. No suctioning needed. PRN Atropine x2 given

## 2023-11-26 NOTE — PLAN OF CARE
Orientation: ANN, unresponsive  Activity: bedrest, q2 turn/repo  Diet/BS Checks: NPO  Tele:  n/a  IV Access/Drains: L PICC SL. PEG tube clamped.  Pain Management: Scheduled ativan and morphine given.   Abnormal VS/Results: deferred d/t comfort care.   Bowel/Bladder: Liu in place . No BM this shift  Skin/Wounds: Scattered bruising  Consults: GIP  D/C Disposition: plan to pass in hospital   Other Info: periods of apnea, suctioned trach 2x this shift. Robinul given 1x.

## 2023-11-27 NOTE — PLAN OF CARE
Orientation: ANN, unresponsive  Activity: bedrest, q2 turn/repo  Diet/BS Checks: NPO  Tele:  n/a  IV Access/Drains: L PICC SL. PEG tube clamped.  Pain Management: Scheduled ativan and morphine given.   Abnormal VS/Results: deferred d/t comfort care.   Bowel/Bladder: Liu in place . No BM this shift  Skin/Wounds: Scattered bruising  Consults: GIP  D/C Disposition: plan to pass in hospital   Other Info: periods of apnea, suctioned trach 1x this shift. Robinul 2x.

## 2023-11-27 NOTE — DISCHARGE SUMMARY
[facility]  Hospitalist Discharge Summary      Date of Admission:  2023  Date of Discharge:     Discharging Provider: Arie Jones MD  Discharge Service: Hospitalist Service    Discharge Diagnoses     Prolonged encephalopathy likely due to new onset refractory status epilepticus  Recurrence of status epilepticus versus persistent epilepsy  Acute hypoxic respiratory failure secondary to treat due to CNS failure/loss of protective airway reflexes  Moderate malnutrition status PEG tube placement on 10/27    Clinically Significant Risk Factors          Follow-ups Needed After Discharge   {Additional follow-up instructions/to-do's for PCP    :    Unresulted Labs Ordered in the Past 30 Days of this Admission       Date and Time Order Name Status Description    2023  3:55 PM Other Laboratory; [employer]; Test code 4640 (Paraneoplastic Autoantibody Evaluation with Recombx, CSF) (Laboratory Miscellaneous Order) In process         These results will be followed up by    Discharge Disposition             Hospital Course     This is a 68-year-old female with significant medical history which included schizoaffective disorder, tardive dyskinesia, hypertension, anxiety, depression who was initially admitted to the hospital on 2023 for UTI, constipation and anemia which was due to rectal bleeding from mucosal injury post enema and was given blood and she had episode of loss of consciousness and generalized shaking on  and was transferred to the ICU and was intubated and subsequently diagnosed with status epilepticus requiring multiple AEDs, high-dose steroids and underwent trach and PEG tube placement and was transferred out of the ICU but was again admitted to ICU on  for reflective seizure and was intubated again and patient was eventually transition to comfort cares on  and was compassionately extubated on  and was transitioned to hospitalist  care and was further seen by [facility] and was admitted under [facility] inpatient hospice and passed away peacefully on 11/ 27/23    Consultations This Hospital Stay   PHARMACY IP CONSULT    Code Status   No CPR- Do NOT Intubate    Time Spent on this Encounter   I, Arie Jones MD, personally saw the patient today and spent less than or equal to 30 minutes discharging this patient.       Arie Jones MD  [place] [address] [facility]  [address] [place] [address] [address]VE, [address]  [place] [address]  Phone: [phone]  ______________________________________________________________________    Physical Exam   Vital Signs:           Resp: (!) 6   O2 Device: None (Room air)    Weight: 0 lbs 0 oz    Physical exam is limited because of current clinical condition and inpatient hospice status    General: Laying in the bed and was unresponsive  Neck: Trach in place       Primary Care Physician   [facility]    Discharge Orders   No discharge procedures on file.    Significant Results and Procedures   Most Recent 3 CBC's:  Recent Labs   Lab Test 11/08/23  1207 11/07/23  0418 11/05/23  0550 11/04/23  0602   WBC 7.4 9.6  --  16.1*   HGB 7.9* 7.7*  --  9.1*   MCV 88 86  --  85    289 341 397     Most Recent 3 BMP's:  Recent Labs   Lab Test 11/09/23  1216 11/09/23  0755 11/09/23  0501 11/07/23  0423 11/07/23  0418 11/04/23  0623 11/04/23  0602 10/30/23  1800 10/30/23  0514   NA  --   --   --   --  144  --  145  --  142   POTASSIUM  --   --   --   --  4.1  --  4.0  --  3.5   CHLORIDE  --   --   --   --  110*  --  105  --  100   CO2  --   --   --   --  24  --  30*  --  32*   BUN  --   --   --   --  18.9  --  24.0*  --  26.0*   CR  --   --   --   --  0.36*  --  0.42*  --  0.34*   ANIONGAP  --   --   --   --  10  --  10  --  10   LULU  --   --   --   --  8.1*  --  8.7*  --  8.9   * 118* 102*   < > 117*   < > 122*   < > 120*    < > = values in this interval not displayed.     Most Recent 2 LFT's:  Recent Labs   Lab Test  09/30/23  0515 09/29/23  0518   AST 27 22   ALT 20 16   ALKPHOS 66 77   BILITOTAL 0.2 0.2     Most Recent 3 INR's:  Recent Labs   Lab Test 09/28/23  1244 03/07/23  1629   INR 1.04 1.15   ,   Results for orders placed or performed during the hospital encounter of 09/28/23   Lumbar spine CT w/o contrast    Narrative    CT LUMBAR SPINE WITHOUT CONTRAST  9/28/2023 1:56 PM     HISTORY: fall, low back pain      TECHNIQUE: Axial images of the lumbar spine were obtained without  intravenous contrast. Multiplanar reformations were performed.   Radiation dose for this scan was reduced using automated exposure  control, adjustment of the mA and/or kV according to patient size, or  iterative reconstruction technique.     COMPARISON: None.     FINDINGS: Vertebral body heights are maintained. Schmorl's node  involves the superior endplate of T12. No anterolisthesis or  retrolisthesis. Multilevel facet disease. Transverse processes are  intact. No evidence of an acute fracture. Paravertebral soft tissues  are unremarkable. Calcified uterine fibroids incidentally noted.  Moderate stool burden in the colon.      Impression    IMPRESSION:      No acute fracture in the lumbar spine.     OLIVA GOLDSMITH MD         SYSTEM ID:  [id number]   CT Abdomen Pelvis w Contrast    Narrative    EXAM: CT ABDOMEN PELVIS W CONTRAST  LOCATION: [facility]  DATE: 9/28/2023    INDICATION: Questionable abdominal pain.  COMPARISON: None.  TECHNIQUE: CT scan of the abdomen and pelvis was performed following injection of IV contrast. Multiplanar reformats were obtained. Dose reduction techniques were used.  CONTRAST: 45 mL Isovue 370    FINDINGS:   LOWER CHEST: Small esophageal hiatal hernia. Cardiac enlargement.    HEPATOBILIARY: Normal.    PANCREAS: Normal.    SPLEEN: Normal.    ADRENAL GLANDS: Normal.    KIDNEYS/BLADDER: Simple cyst left kidney. No follow-up is needed.    BOWEL: Rectum distended with stool.    LYMPH NODES:  Normal.    VASCULATURE: Unremarkable.    PELVIC ORGANS: Enlargement of the uterus with multiple fibroids. The urinary bladder is distended.    MUSCULOSKELETAL: Normal.      Impression    IMPRESSION:   1.  Enlarged uterus with multiple fibroids.    2.  Rectum is distended with stool.    3.  Urinary bladder is distended.   CT Head w/o Contrast    Narrative    CT SCAN OF THE HEAD WITHOUT CONTRAST September 29, 2023 1:00 PM     HISTORY: Decreased level of consciousness.    TECHNIQUE: Axial images of the head and coronal reformations without  IV contrast material. Radiation dose for this scan was reduced using  automated exposure control, adjustment of the mA and/or kV according  to patient size, or iterative reconstruction technique.    COMPARISON: MRI brain 3/8/2023.    FINDINGS: There is no evidence of intracranial hemorrhage, mass, acute  infarct or anomaly. The ventricles are normal in size, shape and  configuration. Mild diffuse parenchymal volume loss. Mild-to-moderate  scattered patchy cerebral white matter hypodensities which are  nonspecific, but likely related to chronic microvascular ischemic  disease.     The visualized portions of the sinuses and mastoids appear normal. The  bony calvarium and bones of the skull base appear intact. Presumed  cerumen in the bilateral external auditory canals.      Impression    IMPRESSION:  1. No CT findings of acute intracranial process.  2. Brain atrophy and presumed chronic small vessel ischemic changes,  as described.    KVNG DICK MD         SYSTEM ID:  [id number]   XR Lumbar Puncture Spinal Tap Diag    Narrative    LUMBAR PUNCTURE WITH FLUOROSCOPIC GUIDANCE 9/29/2023 1:25 PM     HISTORY: Confusion, altered mental status, encephalopathy.    CONSENT: Emergent consent. The procedure was performed in an emergent  situation. Dr. Faith Polanco, hospitalist, deemed the procedure as  emergent. Additionally, the patient reportedly has no next of kin able  to give consent at  this time.     PROCEDURE:  The patient was placed in prone position. The L1-L2 interspace was  identified and the overlying skin was marked. The patient was then  prepped, draped and anesthetized using sterile technique. Local  anesthesia was performed using 1% lidocaine.    A 22-gauge 3.5 inch spinal needle was inserted under fluoroscopic  guidance into the CSF space with return of clear fluid. 8 mL of clear  cerebrospinal fluid was sent to the laboratory for analysis. The  stylet was reinserted and the spinal needle was removed. There were no  complications.     FLUOROSCOPY TIME: 0.2 minutes.       Impression    IMPRESSION: Uncomplicated fluoroscopic-guided lumbar puncture.    KVNG DICK MD         SYSTEM ID:  [id number]   XR Chest Port 1 View    Narrative    XR CHEST PORT 1 VIEW 9/29/2023 1:21 PM    HISTORY: check ETT placemet    COMPARISON: 3/12/2023    FINDINGS: Endotracheal tube tip is 4.2 cm above the tin. No  consolidation. No pleural effusions or pneumothorax. Normal cardiac  size. Aortic atherosclerosis. Old bilateral rib fractures.    AVEL PANTOJA MD         SYSTEM ID:  [id number]   XR Abdomen Port 1 View    Narrative    ABDOMEN ONE VIEW PORTABLE  9/29/2023 2:20 PM     HISTORY: NG tube placement.    COMPARISON: March 8, 2023      Impression    IMPRESSION: Enteric tube tip in the left upper quadrant in the  expected location of the stomach. Minimal amount of stool.   Nonobstructed bowel gas pattern.    KVNG MARC MD         SYSTEM ID:  [id number]   MR Brain w/o & w Contrast    Narrative    EXAM: MR BRAIN W/O and W CONTRAST  LOCATION: [facility]  DATE: 9/30/2023    INDICATION: decreased LOC, shaking  COMPARISON: CT head 09/29/2023 and MRI brain 03/08/2023  CONTRAST: 4mL Gadavist  TECHNIQUE: Routine multiplanar multisequence head MRI without and with intravenous contrast.    FINDINGS:  INTRACRANIAL CONTENTS: No acute or subacute infarct. No mass, acute hemorrhage, or  extra-axial fluid collections. Patchy and confluent nonspecific T2/FLAIR hyperintensities within the cerebral white matter most consistent with moderate chronic   microvascular ischemic change. Mild generalized cerebral atrophy. No hydrocephalus. Normal position of the cerebellar tonsils. No pathologic contrast enhancement.    SELLA: No abnormality accounting for technique.    OSSEOUS STRUCTURES/SOFT TISSUES: Normal marrow signal. The major intracranial vascular flow voids are maintained.     ORBITS: No abnormality accounting for technique.     SINUSES/MASTOIDS: No paranasal sinus mucosal disease. No middle ear or mastoid effusion.       Impression    IMPRESSION:  1.  No acute intracranial process.  2.  Generalized brain atrophy and presumed microvascular ischemic changes as detailed above.   XR Abdomen Port 1 View    Narrative    EXAM: XR ABDOMEN PORT 1 VIEW  LOCATION: [facility]  DATE: 9/29/2023    INDICATION: MRI clearance. MRI safety rule out no metal.  COMPARISON: None.      Impression    IMPRESSION: No metallic implanted device in the abdomen. NG tube tip and sidehole in the stomach. Endotracheal tube with tip approximately 4 cm above the tin. Pelvic calcifications.   XR Chest Port 1 View    Narrative    EXAM: XR CHEST PORT 1 VIEW  LOCATION: [facility]  DATE: 9/29/2023    INDICATION: For MRI safety to rule out metal.  COMPARISON: Chest x-ray 09/29/2023.      Impression    IMPRESSION: ET tube is 2.8 cm proximal to the tin. Nasogastric tube identified. No acute airspace disease. Normal cardiac silhouette. The patient is rotated. No abnormal metallic foreign body identified.   XR Skull Port 1/3 Views    Narrative    EXAM: XR SKULL PORT 1/3 VIEWS  LOCATION: [facility]  DATE: 9/29/2023    INDICATION: Two-view skull x-ray for MRI safety.  COMPARISON: None.      Impression    IMPRESSION: No metallic foreign bodies identified in the  face.   XR Chest Port 1 View    Narrative    CHEST ONE VIEW PORTABLE  10/5/2023 3:00 PM       INDICATION: Intubated.    COMPARISON: 9/29/2023.       Impression    IMPRESSION: Endotracheal tube in good position above the tin. NG  tube in the stomach. Left PICC line tip in the low SVC. There is new  infiltrate or atelectasis in the left lower lobe. The right lung  remains clear.    KALE SEGOVIA MD         SYSTEM ID:  [id number]   XR Chest Port 1 View    Narrative    CHEST ONE VIEW  10/6/2023 11:26 AM     HISTORY: Infiltrates and low grade fever.    COMPARISON: October 5, 2023      Impression    IMPRESSION: Endotracheal tube tip 3 cm from the tin. Similar to  perhaps slightly worse retrocardiac atelectasis and/or infiltrate,  possibly with some pleural fluid. Right lung clear.    KVNG MARC MD         SYSTEM ID:  [id number]   MR Brain w/o & w Contrast    Narrative    EXAM: MR BRAIN W/O & W CONTRAST  10/11/2023 3:07 PM     HISTORY: seizures, ongoing encephalopathy.    COMPARISON: 9/30/2023    TECHNIQUE: Multiplanar, multisequence MR imaging of the head obtained  prior to, and after, intravenous contrast administration    CONTRAST: 5mL Gadavist.    FINDINGS:  No mass effect, midline shift, or intracranial hemorrhage. Stable  subcortical, periventricular areas of T2 FLAIR white matter  hyperintensities, mild to moderate symmetric generalized parenchymal  volume loss and compensatory ventricular dilatation. No MRI findings  of acute hydrocephalus. Preserved major intracranial vascular flow  voids. No pathologic postcontrast enhancement.    Normal skull marrow signal. No substantial paranasal sinus mucosal  disease. Clear mastoid air cells. Nonfocal pituitary gland, sella,  skull base and upper cervical spinal structures. The orbits are  normal.      Impression    IMPRESSION:  1. No acute intracranial infarct or intracranial hemorrhage.  2. Stable moderate to advanced brain atrophy and leukoaraiosis,  presumed  sequela of chronic microvascular ischemic disease, as  detailed.    BNAG KWON DO         SYSTEM ID:  [id number]   XR Abdomen Port 1 View    Narrative    ABDOMEN ONE VIEW PORTABLE  10/16/2023 1:33 PM     HISTORY: NG/NJ tube swap/placement.    COMPARISON: Abdominal radiograph 9/29/2023.       Impression    IMPRESSION: Limited AP view of the abdomen demonstrates a feeding tube  curled (with kink) within the stomach with the tip pointing towards  the gastroesophageal junction.     Partially visualized right central venous catheter. Aortic  calcification.     No dilated loops of bowel within the visualized abdomen. Calcified  fibroid uterus.    KARTHIKEYAN TRUJILLO MD         SYSTEM ID:  [id number]   XR Abdomen Port 1 View    Narrative    ABDOMEN ONE VIEW PORTABLE  10/16/2023 1:35 PM     HISTORY: NG placement.    COMPARISON: Abdominal radiograph 10/16/2023.       Impression    IMPRESSION: Interval advancement of the feeding tube, with the tip now  projecting over the mid stomach with kink at the distal portion of the  internal stylette.     Otherwise, no significant interval change.    KARTHIKEYAN TRUJILLO MD         SYSTEM ID:  [id number]   XR Abdomen Port 1 View    Narrative    EXAM: XR ABDOMEN PORT 1 VIEW  LOCATION: [facility]  DATE: 10/16/2023    INDICATION: Evaluate NG tube position. Unable to flush. Kinked on previous imaging. Attempted to pull back with no success  COMPARISON: 10/16/2023 at 1321 hours      Impression    IMPRESSION: The NG to remains kinked approximately 6 cm from the distal and. Location NG tube is in good position within the mid to distal stomach. Normal bowel gas pattern. Scattered uterine fibroids.   XR Abdomen Port 1 View    Narrative    XR ABDOMEN PORT 1 VIEW   10/17/2023 9:02 AM     HISTORY: NG tube repositioning    COMPARISON: 10/16/2023      Impression    IMPRESSION: Feeding tube tip is in the distal stomach.    NAVEED WALDRON MD         SYSTEM ID:  [id number]    MR Brain w/o & w Contrast    Narrative    EXAM: MR BRAIN W/O and W CONTRAST  LOCATION: [facility]  DATE: 10/20/2023    INDICATION: status epilepticus, etiology unclear  COMPARISON: 10/11/2023.  CONTRAST: 5 mL Gadavist  TECHNIQUE: MRI brain without and with contrast.    FINDINGS: On the diffusion-weighted images there is no evidence of acute ischemia or restricted diffusion. There is no discrete mass lesion or midline shift. There is no acute extra-axial fluid collection or acute intraparenchymal hemorrhage. There are   prominent perivascular spaces involving the basal ganglia and the subcortical white matter. On the FLAIR and T2-weighted images there are multiple foci of high signal within the periventricular and subcortical white matter consistent with diffuse small   vessel ischemic disease. The ventricular system, basal cisterns and the cortical sulci are consistent with diffuse volume loss. Following the administration of contrast no abnormal enhancement is visualized.    There is no evidence of cerebellar tonsillar ectopia. The corpus callosum and the sella region have appropriate configuration and signal intensity for the patient's age. There are regions are unremarkable. There is no significant paranasal sinus disease.   There is fluid noted within the mastoid air cells bilaterally.      Impression    IMPRESSION:  1. No discrete mass lesion, hemorrhage or focal area suggestive of acute infarct.  2. Stable diffuse age related changes.   XR Chest Port 1 View    Narrative    CHEST ONE VIEW  10/24/2023 2:45 PM     HISTORY: Evaluate for pneumonia, patient has leukocytosis.    COMPARISON: October 6, 2023      Impression    IMPRESSION: Possible retrocardiac infiltrate, consider confirmation  with lateral view. Question groundglass infiltrates at the right base.    KVNG MARC MD         SYSTEM ID:  [id number]   CT Chest/Abdomen/Pelvis w Contrast    Narrative    EXAM: CT  CHEST/ABDOMEN/PELVIS W CONTRAST  LOCATION: [facility]  DATE: 11/4/2023    INDICATION: Prolonged encephalopathy and hypoxic injury failure following new onset of refractory status epilepticus.  COMPARISON: Portable chest 10/24/2023, CT abdomen and pelvis 09/28/2023  TECHNIQUE: CT scan of the chest, abdomen, and pelvis was performed following injection of IV contrast. Multiplanar reformats were obtained. Dose reduction techniques were used.   CONTRAST: 48mL Isovue 370    FINDINGS:   LUNGS AND PLEURA: Trace bilateral pleural effusions with bibasilar dependent subsegmental atelectasis. Mild bronchial wall thickening. Mild ground glass opacity in upper lobes with faint mosaic pattern could represent small airway disease/air trapping.   Tracheostomy tip in mid trachea. Central airways otherwise patent.    MEDIASTINUM/AXILLAE: Left-sided PICC tip at cavoatrial junction. No mass/adenopathy nor pericardial effusion the thoracic aorta and heart are normal in size. No central pulmonary emboli..    CORONARY ARTERY CALCIFICATION: Mild.    HEPATOBILIARY: Normal.    PANCREAS: Normal.    SPLEEN: Normal.    ADRENAL GLANDS: Normal.    KIDNEYS/BLADDER: Simple left renal cortical cyst does not require imaging follow-up. No upper urinary tract calculus nor hydronephrosis. Layering density within the dependent portion of the bladder could represent tiny stones or debris. Left periureteral   diverticulum unchanged. No evidence of cystitis.    BOWEL: Diverticulosis of the colon. No acute inflammatory change. No obstruction. Gastrostomy tube in good position.    LYMPH NODES: No lymphadenopathy.    VASCULATURE: No aortoiliac aneurysm.    PELVIC ORGANS: Enlarged myomatous uterus redemonstrated. No adnexal mass nor abnormal fluid collection.    MUSCULOSKELETAL: Stable benign bone islands within L5 and the sacrum. No suspicious osseous lesions.      Impression    IMPRESSION:  1.  Bibasilar dependent atelectasis and  trace effusions. No focal pneumonia.  2.  Layering density within the bladder lumen could represent debris or calculi. No evidence of cystitis nor hydronephrosis.  3.  Otherwise stable exam.   XR Lumbar Puncture Spinal Tap Diag    Narrative    EXAM: XR LUMBAR PUNCTURE SPINAL TAP DIAGNOSTIC  11/6/2023 2:30 PM       History:  New onset status epilepticus. 68-year-old female presenting  sedated and on ventilator support. Procedure deemed medically  necessary by Dr. Alfred.    PROCEDURE: Consent unable to be taking obtained from the patient,  however due to medical necessity of procedure, we proceeded without  obtaining informed consent. The patient was sterilely prepped and  draped with the patient in the supine position, over the lower back.  Under fluoroscopic guidance, the interlaminar spaces were noted. 1%  lidocaine was administered for local anesthetic over the L2-3  interlaminar space, and a 20 gauge needle was advanced into the thecal  sac under fluoroscopic guidance.  There was initial aspiration of  clear CSF. About 36 cc's total were passively obtained and divided  into 4 tubes.  The needle was removed. There were no immediate  complications associated with the procedure.   Estimated blood loss  during the procedure was less than 5 mL.    Fluoro time: 0.1 minutes  Images Obtained: 2  DAP: 13.03 uGym2  Medications used: 3 mL Local Lidocaine 1%.      Impression    IMPRESSION: Successful lumbar puncture at L2-L3 using fluoroscopic  guidance.    JEFFREY Ryan         SYSTEM ID:  [id number]   XR Chest Port 1 View    Narrative    EXAM: XR CHEST PORT 1 VIEW  LOCATION: [facility]  DATE: 11/5/2023    INDICATION: Increased secretions from the tracheostomy. Cough.  COMPARISON: 10/24/2023. CT yesterday.      Impression    IMPRESSION: Linear opacities in the left lower lung likely represent atelectasis, similar to the CT yesterday. Lungs are otherwise clear. Normal heart size and pulmonary  vascularity. Left PICC line tip in the mid SVC. Tracheostomy. Bones are   demineralized.   CT Head w/o Contrast    Narrative    EXAM: CT HEAD W/O CONTRAST  LOCATION: [facility]  DATE: 11/6/2023    INDICATION: rule out bleed. Status epilepticus.  COMPARISON: 10/20/2023 brain MRI.  TECHNIQUE: Routine CT Head without IV contrast. Multiplanar reformats. Dose reduction techniques were used.    FINDINGS:  INTRACRANIAL CONTENTS: No intracranial hemorrhage, extraaxial collection, or mass effect.  No CT evidence of acute infarct. Moderate presumed chronic small vessel ischemic changes. Mild generalized volume loss. No hydrocephalus.     VISUALIZED ORBITS/SINUSES/MASTOIDS: No intraorbital abnormality. No paranasal sinus mucosal disease. No middle ear or mastoid effusion.    BONES/SOFT TISSUES: No skull fracture. No scalp hematoma.      Impression    IMPRESSION:  1.  Age-related changes as above with no acute intracranial abnormality.   XR Chest Port 1 View    Narrative    EXAM: XR CHEST PORT 1 VIEW  LOCATION: [facility]  DATE: 11/9/2023    INDICATION: eval for possible VAP  COMPARISON: 11/5/2023 and 11/4/2023.      Impression    IMPRESSION: Tracheostomy in satisfactory position. Left PICC tip in the mid SVC. Small left pleural effusion. Lungs otherwise appear clear. Stable cardiac silhouette. No visible pneumothorax.       Discharge Medications   Current Discharge Medication List        CONTINUE these medications which have NOT CHANGED    Details   acetaminophen (TYLENOL) 500 MG tablet Take 1,000 mg by mouth 3 times daily 0800 / 1400 / 2000      albuterol (PROVENTIL) (2.5 MG/3ML) 0.083% neb solution Take 2.5 mg by nebulization every 4 hours as needed for shortness of breath, wheezing or cough      alendronate (FOSAMAX) 70 MG tablet Take 70 mg by mouth every 7 days Thursdays at 0700      cholecalciferol 50 MCG (2000 UT) tablet Take 1 tablet by mouth daily 0800       cyclobenzaprine (FLEXERIL) 5 MG tablet Take 5 mg by mouth 3 times daily as needed for muscle spasms      diphenhydrAMINE (BENADRYL) 50 MG capsule Take 50 mg by mouth 3 times daily 0800 / 1500 / 2000      escitalopram (LEXAPRO) 20 MG tablet Take 20 mg by mouth daily 0800      !! LORazepam (ATIVAN) 0.5 MG tablet Take 0.5 mg by mouth 2 times daily 0800/1400      !! LORazepam (ATIVAN) 0.5 MG tablet Take 0.25 mg by mouth At Bedtime 2000      multivitamin w/minerals (THERA-VIT-M) tablet Take 1 tablet by mouth daily 0800      muscle rub (ARTHRITIS HOT) 10-15 % CREA Apply topically 4 times daily Back pain - 0700/1100/1500/2000      NIFEdipine ER (ADALAT CC) 60 MG 24 hr tablet Take 60 mg by mouth daily      !! perphenazine 4 MG tablet Take 4 mg by mouth every morning      !! perphenazine 8 MG tablet Take 8 mg by mouth At Bedtime       !! - Potential duplicate medications found. Please discuss with provider.        Allergies   Allergies   Allergen Reactions    Amlodipine     Clozapine     Egg [Chicken-Derived Products (Egg)]     Penicillins      Tolerated ceftriaxone (9/28/23)    Potassium     Poultry Meal

## 2023-11-27 NOTE — DISCHARGE SUMMARY
Patient time of death was 10:10am. Family notified. Belongings (clothing/purse) sent (with Em[facility] from [facility]). Autopsy requested (no). Death record completed. Patient sent to [facility] at 1300.

## 2023-11-27 NOTE — PLAN OF CARE
[id number]  Orientation: ANN, unresponsive  Activity: bedrest, TR q2h, next due at 12:30 am  Diet/BS Checks: NPO  Tele: n/a  IV Access/Drains: L PICC SL, Gtube clamped  Pain Management: Scheduled ativan and morphine  Abnormal VS/Results: deferred d/t comfort cares  Bowel/Bladder: Liu with minimal UOP, no bm this shift  Skin/Wounds: scattered bruising  Consults: n/a  D/C Disposition: plan to pass in hospital  Other Info: Swallow breathing. Didn't need to suction; Atropine given 1x.

## 2023-11-27 NOTE — PROGRESS NOTES
House Officer Death Pronouncement    Called by nursing staff to pronounce Kortney Germain dead.    Physical Exam: Spontaneous respirations absent, Breath sounds absent, Carotid pulse absent, Heart sounds absent, and Pupillary light reflex absent    Patient was pronounced dead at 1010am: , 2023.    No data filed       Principal Problem:    Hospice care    Recurrence of status epilepticus versus persistent  Prolonged encephalopathy following presentation of new onset refractory status epilepticus  Seizure disorder  Acute hypoxic respiratory failure secondary to CNS failure/loss of protective airway reflexes  S/p  trach  placement   Moderate malnutrition, status post PEG tube placement (10/27)     Other PMH  Hypertension  Normocytic anemia  Schizoaffective disorder  Tardive dyskinesia  Anxiety  Depression    Infectious disease present?: YES    Communicable disease present? (examples: HIV, chicken pox, TB, Ebola, CJD) :  NO    Multi-drug resistant organism present? (example: MRSA): YES    Please consider an autopsy if any of the following exist:  NO Unexpected or unexplained death during or following any dental, medical, or surgical diagnostic treatment procedures.   NO Death of mother at or up to seven days after delivery.     NO All  and pediatric deaths.     NO Death where the cause is sufficiently obscure to delay completion of the death certificate.   NO Deaths in which autopsy would confirm a suspected illness/condition that would affect surviving family members or recipients of transplanted organs.     The following deaths must be reported to the 's Office:  NO A death that may be due entirely or in part to any factors other than natural disease (recent surgery, recent trauma, suspected abuse/neglect).   NO A death that may be an accident, suicide, or homicide.     NO Any sudden, unexpected death in which there is no prior history of significant heart disease or any other condition  associated with sudden death.   NO A death under suspicious, unusual, or unexpected circumstances.    NO Any death which is apparently due to natural causes but in which the  does not have a personal physician familiar with the patient s medical history, social, or environmental situation or the circumstances of the terminal event.   NO Any death apparently due to Sudden Infant Death Syndrome.     NO Deaths that occur during, in association with, or as consequences of a diagnostic, therapeutic, or anesthetic procedure.   NO Any death in which a fracture of a major bone has occurred within the past (6) six months.   NO A death of persons note seen by their physician within 120 days of demise.     NO Any death in which the  was an inmate of a public institution or was in the custody of Law Enforcement personnel.   NO  All unexpected deaths of children   NO Solid organ donors   NO Unidentified bodies   unknown Deaths of persons whose bodies are to be cremated or otherwise disposed of so that the bodies will later be unavailable for examination;   NO Deaths unattended by a physician outside of a licensed healthcare facility or licensed residential hospice program   NO Deaths occurring within 24 hours of arrival to a health care facility if death is unexpected.    NO Deaths associated with the decedent s employment.   NO Deaths attributed to acts of terrorism.   NO Any death in which there is uncertainty as to whether it is a medical examiner s care should be discussed with the medical investigator.      Death Certificate to be directed to Dr. Arie Jones  Attending physician, Dr. Arie Jones, notified of death.    Body disposition: Autopsy was discussed with family member:  nephew by phone.  Permission for autopsy was declined.    Family is yet undecided regarding cremation or burial; pt may remain in morgue pending this decision. Family has requested call from  re: next steps in care.      Addendum:  I spoke w/ pt's brother Marshal by phone re: autopsy which he was potentially interested in re: cause of death.  I discussed with him the extensive workup pursued during hospitalization that was broad yet ultimately unrevealing aside from refractory SE w/ resultant brain injury, acute resp failure, etc.  I discussed that death certificate availability may be delayed pending autopsy results and that autopsy doesn't guarantee a definitive answer any more than already identified.  Brother opted not to pursue autopsy.      TAMI Alan [facility]  Hospitalist Service  [facility]  Securely message with [employer] (more info)  Text page via [facility] Paging/Directory

## 2023-11-28 NOTE — PROGRESS NOTES
Patient belongings sent to security, includes a purse and clothes.  Nephbryan Love was notified to call security when arriving to  belongings.

## 2023-11-30 LAB — SCANNED LAB RESULT: NORMAL
